# Patient Record
Sex: FEMALE | Race: BLACK OR AFRICAN AMERICAN | NOT HISPANIC OR LATINO | Employment: UNEMPLOYED | URBAN - METROPOLITAN AREA
[De-identification: names, ages, dates, MRNs, and addresses within clinical notes are randomized per-mention and may not be internally consistent; named-entity substitution may affect disease eponyms.]

---

## 2019-01-29 ENCOUNTER — TELEPHONE (OUTPATIENT)
Dept: ENDOCRINOLOGY | Facility: CLINIC | Age: 24
End: 2019-01-29

## 2022-04-16 ENCOUNTER — NURSE TRIAGE (OUTPATIENT)
Dept: ADMINISTRATIVE | Facility: CLINIC | Age: 27
End: 2022-04-16
Payer: MEDICAID

## 2022-04-17 NOTE — TELEPHONE ENCOUNTER
Spoke with Marleen Keita (nurse) at [facility]. She states Dr. Zamarripa was consulted on this patient a few days ago.  She states patient has not been seen and she is calling to check the status of when Dr. Zamarripa will see patient.  Dr. Zamarripa is not a [facility] provider.  Marleen states she is not sure how she got to the nurse on call line.  She states she will try to make make contact with Dr. Zamarripa and call back if needed. No further assistance needed     Reason for Disposition   Caller has cancelled the call before the first contact    Protocols used: NO CONTACT OR DUPLICATE CONTACT CALL-A-

## 2022-04-17 NOTE — TELEPHONE ENCOUNTER
Attempted to call Marleen Keita back she was gone for the day.  Spoke the oncoming nurse Funmi Galdamez to inform her that Dr. Edwardo Zamarripa was found in the system.  Offered to connect her to on call provider Ms. Choudhary declined.  She states patient was just seen by Dr. Ruff who works with Dr. Zamarripa and no further assistance is needed.

## 2022-04-28 ENCOUNTER — TELEPHONE (OUTPATIENT)
Dept: OBSTETRICS AND GYNECOLOGY | Facility: CLINIC | Age: 27
End: 2022-04-28
Payer: MEDICAID

## 2022-04-28 NOTE — TELEPHONE ENCOUNTER
----- Message from Rosa Conner sent at 4/28/2022 12:32 PM CDT -----  Contact: pt mother  Type: Needs Medical Advice    Who Called: pt mother   Best Call Back Number: [phone]  Inquiry/Question: pt was seen in hospital for low blood sugar and passing out was advise to be seen by Dr medel is 8 week pregnant, I am unable to schedule on my end please advise pt jerome apt  Thank you~

## 2022-04-29 ENCOUNTER — TELEPHONE (OUTPATIENT)
Dept: OBSTETRICS AND GYNECOLOGY | Facility: CLINIC | Age: 27
End: 2022-04-29
Payer: MEDICAID

## 2022-05-02 ENCOUNTER — PROCEDURE VISIT (OUTPATIENT)
Dept: OBSTETRICS AND GYNECOLOGY | Facility: CLINIC | Age: 27
End: 2022-05-02
Payer: MEDICAID

## 2022-05-02 ENCOUNTER — OFFICE VISIT (OUTPATIENT)
Dept: OBSTETRICS AND GYNECOLOGY | Facility: CLINIC | Age: 27
End: 2022-05-02
Payer: MEDICAID

## 2022-05-02 VITALS
DIASTOLIC BLOOD PRESSURE: 86 MMHG | SYSTOLIC BLOOD PRESSURE: 143 MMHG | WEIGHT: 130 LBS | HEIGHT: 61 IN | BODY MASS INDEX: 24.55 KG/M2

## 2022-05-02 DIAGNOSIS — O16.1 HYPERTENSION AFFECTING PREGNANCY IN FIRST TRIMESTER: ICD-10-CM

## 2022-05-02 DIAGNOSIS — N91.2 AMENORRHEA: Primary | ICD-10-CM

## 2022-05-02 DIAGNOSIS — N91.2 AMENORRHEA: ICD-10-CM

## 2022-05-02 DIAGNOSIS — O24.011 TYPE 1 DIABETES MELLITUS AFFECTING PREGNANCY IN FIRST TRIMESTER, ANTEPARTUM: ICD-10-CM

## 2022-05-02 LAB
B-HCG UR QL: POSITIVE
CTP QC/QA: YES

## 2022-05-02 PROCEDURE — 81025 URINE PREGNANCY TEST: CPT | Mod: S$GLB,,, | Performed by: NURSE PRACTITIONER

## 2022-05-02 PROCEDURE — 3077F PR MOST RECENT SYSTOLIC BLOOD PRESSURE >= 140 MM HG: ICD-10-PCS | Mod: CPTII,S$GLB,, | Performed by: NURSE PRACTITIONER

## 2022-05-02 PROCEDURE — 1160F RVW MEDS BY RX/DR IN RCRD: CPT | Mod: CPTII,S$GLB,, | Performed by: NURSE PRACTITIONER

## 2022-05-02 PROCEDURE — 3008F BODY MASS INDEX DOCD: CPT | Mod: CPTII,S$GLB,, | Performed by: NURSE PRACTITIONER

## 2022-05-02 PROCEDURE — 76801 OB US < 14 WKS SINGLE FETUS: CPT | Mod: S$GLB,,, | Performed by: OBSTETRICS & GYNECOLOGY

## 2022-05-02 PROCEDURE — 76801 US OB/GYN PROCEDURE (VIEWPOINT): ICD-10-PCS | Mod: S$GLB,,, | Performed by: OBSTETRICS & GYNECOLOGY

## 2022-05-02 PROCEDURE — 3077F SYST BP >= 140 MM HG: CPT | Mod: CPTII,S$GLB,, | Performed by: NURSE PRACTITIONER

## 2022-05-02 PROCEDURE — 1160F PR REVIEW ALL MEDS BY PRESCRIBER/CLIN PHARMACIST DOCUMENTED: ICD-10-PCS | Mod: CPTII,S$GLB,, | Performed by: NURSE PRACTITIONER

## 2022-05-02 PROCEDURE — 99214 OFFICE O/P EST MOD 30 MIN: CPT | Mod: TH,25,S$GLB, | Performed by: NURSE PRACTITIONER

## 2022-05-02 PROCEDURE — 81025 POCT URINE PREGNANCY: ICD-10-PCS | Mod: S$GLB,,, | Performed by: NURSE PRACTITIONER

## 2022-05-02 PROCEDURE — 3008F PR BODY MASS INDEX (BMI) DOCUMENTED: ICD-10-PCS | Mod: CPTII,S$GLB,, | Performed by: NURSE PRACTITIONER

## 2022-05-02 PROCEDURE — 1159F PR MEDICATION LIST DOCUMENTED IN MEDICAL RECORD: ICD-10-PCS | Mod: CPTII,S$GLB,, | Performed by: NURSE PRACTITIONER

## 2022-05-02 PROCEDURE — 99214 PR OFFICE/OUTPT VISIT, EST, LEVL IV, 30-39 MIN: ICD-10-PCS | Mod: TH,25,S$GLB, | Performed by: NURSE PRACTITIONER

## 2022-05-02 PROCEDURE — 3079F DIAST BP 80-89 MM HG: CPT | Mod: CPTII,S$GLB,, | Performed by: NURSE PRACTITIONER

## 2022-05-02 PROCEDURE — 3079F PR MOST RECENT DIASTOLIC BLOOD PRESSURE 80-89 MM HG: ICD-10-PCS | Mod: CPTII,S$GLB,, | Performed by: NURSE PRACTITIONER

## 2022-05-02 PROCEDURE — 1159F MED LIST DOCD IN RCRD: CPT | Mod: CPTII,S$GLB,, | Performed by: NURSE PRACTITIONER

## 2022-05-02 RX ORDER — LORAZEPAM 0.5 MG/1
0.5 TABLET ORAL DAILY PRN
COMMUNITY
Start: 2022-04-24 | End: 2022-06-09

## 2022-05-02 RX ORDER — METHYLDOPA 250 MG/1
500 TABLET, FILM COATED ORAL
COMMUNITY
Start: 2022-04-27 | End: 2022-05-18

## 2022-05-02 RX ORDER — NIFEDIPINE 30 MG/1
30 TABLET, EXTENDED RELEASE ORAL DAILY
Qty: 30 TABLET | Refills: 11 | Status: SHIPPED | OUTPATIENT
Start: 2022-05-02 | End: 2022-05-18

## 2022-05-02 RX ORDER — INSULIN GLARGINE 100 [IU]/ML
INJECTION, SOLUTION SUBCUTANEOUS
COMMUNITY
Start: 2022-04-21 | End: 2022-06-09 | Stop reason: SDUPTHER

## 2022-05-02 RX ORDER — HYDRALAZINE HYDROCHLORIDE 50 MG/1
50 TABLET, FILM COATED ORAL
COMMUNITY
Start: 2022-04-24 | End: 2022-05-18

## 2022-05-02 RX ORDER — METOCLOPRAMIDE 5 MG/1
5 TABLET ORAL EVERY 6 HOURS PRN
COMMUNITY
Start: 2022-04-21 | End: 2022-05-18

## 2022-05-02 RX ORDER — LABETALOL 200 MG/1
200 TABLET, FILM COATED ORAL 2 TIMES DAILY
Qty: 60 TABLET | Refills: 11 | Status: SHIPPED | OUTPATIENT
Start: 2022-05-02 | End: 2022-05-02

## 2022-05-02 RX ORDER — PROMETHAZINE HYDROCHLORIDE 25 MG/1
25 TABLET ORAL EVERY 4 HOURS
Qty: 30 TABLET | Refills: 0 | Status: SHIPPED | OUTPATIENT
Start: 2022-05-02 | End: 2022-06-06 | Stop reason: SDUPTHER

## 2022-05-02 RX ORDER — ONDANSETRON 4 MG/1
4 TABLET, FILM COATED ORAL
COMMUNITY
Start: 2022-04-22 | End: 2022-05-02 | Stop reason: ALTCHOICE

## 2022-05-02 RX ORDER — INSULIN ASPART 100 [IU]/ML
INJECTION, SOLUTION INTRAVENOUS; SUBCUTANEOUS
COMMUNITY
Start: 2022-04-28 | End: 2022-06-09

## 2022-05-02 NOTE — PROGRESS NOTES
Subjective:      Alicia Valles is being seen today for her first obstetrical visit. She is a   She is at 9w2d gestation. Patient's last menstrual period was 2022 (exact date). She is sure  of her LMP. Past medical hx is Positive for chronic uncontrolled hypertension.  Type 1 diabetes uncontrolled with insulin. Her previous obstetrical history is significant for None as she is a G1. .  She was recently released from a 2 week admission in the hospital where she was being evaluated for uncontrolled high blood pressure and uncontrolled diabetes.  She reports a seizure and hypoglycemia at 27. Blood pressures were ranging in the 200s over 100s.  She is currently taking in aldomet 250 mg twice a day.  She was supposed to be taking 500 mg twice a day, however her mom felt it was too much.  She is also supposed to be taking hydralazine 50 mg b.i.d. educated patient and mom regarding the importance of compliance with medication as prescribed.  Aldomet and is risk for rebound hypertension.  The plan is to wean patient off the Aldomet start Procardia at 30 mg XL with intentions of increasing.  She is to continue taking hydralazine b.i.d..  Patient's blood sugars are uncontrolled.  Mom states she is on Lantus 15 units it is not clear exactly what regimen they are following.  Attempted to educate mom and patient regarding maintenance, sliding scale, diet and compliance.  She reports blood sugar readings anywhere from .  Pregnancy history fully reviewed.      The following portions of the patient's history were reviewed and updated as appropriate: allergies, current medications, past family history, past medical history, past social history, past surgical history and problem list.      Review of Systems:  General ROS: negative for headache or visual changes  Breast ROS: negative for breast lumps  Gastrointestinal ROS: negative for constipation, diarrhea or nausea/vomiting  Musculoskeletal ROS: negative for pain  in joints or swelling in face or hands.   Neurological ROS: negative for - headaches, numbness/tingling or visual changes     Objective:*      Physical Exam      Constitutional: She is oriented to person, place, and time. She appears well-developed and well-nourished. No distress.     Pulmonary/Chest: Effort normal. No respiratory distress  Abdominal: Soft, gravid, nontender. No rebound and no guarding.   Genitourinary: Deferred   Musculoskeletal: Normal range of motion, Minimal peripheral edema.   Neurological: She is alert and oriented to person, place, and time. Coordination normal.   Skin: Skin is warm and dry. She is not diaphoretic.  Psychiatric: She has a normal mood and affect.     Assessment:      Overall doing well.   27 y.o., at 9w2d Gestation   There is no problem list on file for this patient.    Current Outpatient Medications on File Prior to Visit   Medication Sig Dispense Refill    hydrALAZINE (APRESOLINE) 50 MG tablet 50 mg.      insulin aspart U-100 (NOVOLOG) 100 unit/mL (3 mL) InPn pen SMARTSI Unit(s) SUB-Q Daily      insulin glargine (LANTUS) 100 unit/mL injection   15 unit, SUBQ, Daily, # 15 mL, 1 Refill(s), Maintenance, Pharmacy: [id number], 158, cm, 22 14:00:00 CDT, Height/Length Measured, 68.4, kg, 22 15:04:00 CDT, Weight Dosing      LORazepam (ATIVAN) 0.5 MG tablet Take 0.5 mg by mouth daily as needed.      methyldopa (ALDOMET) 250 MG tablet 500 mg.      metoclopramide HCl (REGLAN) 5 MG tablet Take 5 mg by mouth every 6 (six) hours as needed.      pen needle, diabetic (INSULIN PEN NEEDLE MISC) Use as directed.      [DISCONTINUED] ondansetron (ZOFRAN) 4 MG tablet 4 mg.       No current facility-administered medications on file prior to visit.           Plan:         Wean down from abdomen 250 mg daily, simultaneously start Procardia 30 mg XL daily, continue hydralazine 50 mg b.i.d..  Return next week with Dr. Min for adjustment of insulin  medication.  Patient is to return with blood glucose log sheet.    Referral to Maternal Fetal Medicine placed today.  Ultrasound today for dating and viability.  After return in 1 week will order prenatal labs with 24 hour urine at 10-11 weeks  Start and continue to take Prenatal vitamins.  Ultrasound, Prenatal labs, and cultures to be obtained    Amenorrhea  -     POCT urine pregnancy  -     US OB/GYN Procedure ([facility]); Future    Hypertension affecting pregnancy in first trimester  -     Ambulatory referral/consult to Perinatology; Future; Expected date: 05/09/2022    Type 1 diabetes mellitus affecting pregnancy in first trimester, antepartum  -     Ambulatory referral/consult to Perinatology; Future; Expected date: 05/09/2022    Other orders  -     Discontinue: labetaloL (NORMODYNE) 200 MG tablet; Take 1 tablet (200 mg total) by mouth 2 (two) times daily.  Dispense: 60 tablet; Refill: 11  -     NIFEdipine (PROCARDIA-XL) 30 MG (OSM) 24 hr tablet; Take 1 tablet (30 mg total) by mouth once daily.  Dispense: 30 tablet; Refill: 11  -     promethazine (PHENERGAN) 25 MG tablet; Take 1 tablet (25 mg total) by mouth every 4 (four) hours.  Dispense: 30 tablet; Refill: 0         Follow up 1Weeks, bleeding/pain precautions, kick counts, labor precautions discussed.     Patient was counseled today on proper weight gain based on the [place] [employer]'s recommendations based on her pre-pregnancy weight. Discussed foods to avoid in pregnancy (i.e. sushi, fish that are high in mercury, deli meat, and unpasteurized cheeses). Discussed prenatal vitamin options (i.e. stool softener, DHA). Contingency screen offered - patient desires.

## 2022-05-03 ENCOUNTER — PATIENT MESSAGE (OUTPATIENT)
Dept: MATERNAL FETAL MEDICINE | Facility: CLINIC | Age: 27
End: 2022-05-03
Payer: MEDICAID

## 2022-05-03 ENCOUNTER — TELEPHONE (OUTPATIENT)
Dept: MATERNAL FETAL MEDICINE | Facility: CLINIC | Age: 27
End: 2022-05-03
Payer: MEDICAID

## 2022-05-03 DIAGNOSIS — Z36.89 ENCOUNTER FOR FETAL ANATOMIC SURVEY: ICD-10-CM

## 2022-05-03 DIAGNOSIS — O16.9 HYPERTENSION AFFECTING PREGNANCY, ANTEPARTUM: ICD-10-CM

## 2022-05-03 DIAGNOSIS — O24.019 PRE-EXISTING TYPE 1 DIABETES MELLITUS DURING PREGNANCY, ANTEPARTUM: Primary | ICD-10-CM

## 2022-05-03 NOTE — TELEPHONE ENCOUNTER
RN attempted to call Pt at number listed [phone]. Patient's mother answered the phone and verified pt by name, .  Mother states patient is currently not with her, but able to schedule appt for her daughter with MFM- as she takes Quenisha to all of her appts.    RN educated parent that unfortunately patient is of legal age and does not have a POA,  or advance directive indicated in patient's chart that mother is point of contact/caregiver.   Mother has given RN patients phone number [phone].   RN will give patient a call.

## 2022-05-04 ENCOUNTER — PROCEDURE VISIT (OUTPATIENT)
Dept: MATERNAL FETAL MEDICINE | Facility: CLINIC | Age: 27
End: 2022-05-04
Payer: MEDICAID

## 2022-05-04 ENCOUNTER — OFFICE VISIT (OUTPATIENT)
Dept: MATERNAL FETAL MEDICINE | Facility: CLINIC | Age: 27
End: 2022-05-04
Payer: MEDICAID

## 2022-05-04 VITALS
SYSTOLIC BLOOD PRESSURE: 161 MMHG | HEIGHT: 61 IN | WEIGHT: 129.81 LBS | BODY MASS INDEX: 24.51 KG/M2 | DIASTOLIC BLOOD PRESSURE: 91 MMHG

## 2022-05-04 DIAGNOSIS — O24.011 PRE-EXISTING TYPE 1 DIABETES MELLITUS DURING PREGNANCY IN FIRST TRIMESTER: ICD-10-CM

## 2022-05-04 DIAGNOSIS — O16.1 HYPERTENSION AFFECTING PREGNANCY IN FIRST TRIMESTER: ICD-10-CM

## 2022-05-04 DIAGNOSIS — O24.011 TYPE 1 DIABETES MELLITUS AFFECTING PREGNANCY IN FIRST TRIMESTER, ANTEPARTUM: ICD-10-CM

## 2022-05-04 DIAGNOSIS — O24.019 PRE-EXISTING TYPE 1 DIABETES MELLITUS DURING PREGNANCY, ANTEPARTUM: Primary | ICD-10-CM

## 2022-05-04 DIAGNOSIS — O24.019 PRE-EXISTING TYPE 1 DIABETES MELLITUS DURING PREGNANCY, ANTEPARTUM: ICD-10-CM

## 2022-05-04 DIAGNOSIS — O16.9 HYPERTENSION AFFECTING PREGNANCY, ANTEPARTUM: ICD-10-CM

## 2022-05-04 DIAGNOSIS — Z36.89 ENCOUNTER FOR FETAL ANATOMIC SURVEY: ICD-10-CM

## 2022-05-04 PROCEDURE — 3077F PR MOST RECENT SYSTOLIC BLOOD PRESSURE >= 140 MM HG: ICD-10-PCS | Mod: CPTII,S$GLB,, | Performed by: OBSTETRICS & GYNECOLOGY

## 2022-05-04 PROCEDURE — 3008F PR BODY MASS INDEX (BMI) DOCUMENTED: ICD-10-PCS | Mod: CPTII,S$GLB,, | Performed by: OBSTETRICS & GYNECOLOGY

## 2022-05-04 PROCEDURE — 76801 OB US < 14 WKS SINGLE FETUS: CPT | Mod: S$GLB,,, | Performed by: OBSTETRICS & GYNECOLOGY

## 2022-05-04 PROCEDURE — 76801 PR US, OB <14WKS, TRANSABD, SINGLE GESTATION: ICD-10-PCS | Mod: S$GLB,,, | Performed by: OBSTETRICS & GYNECOLOGY

## 2022-05-04 PROCEDURE — 3080F PR MOST RECENT DIASTOLIC BLOOD PRESSURE >= 90 MM HG: ICD-10-PCS | Mod: CPTII,S$GLB,, | Performed by: OBSTETRICS & GYNECOLOGY

## 2022-05-04 PROCEDURE — 1160F RVW MEDS BY RX/DR IN RCRD: CPT | Mod: CPTII,S$GLB,, | Performed by: OBSTETRICS & GYNECOLOGY

## 2022-05-04 PROCEDURE — 1159F PR MEDICATION LIST DOCUMENTED IN MEDICAL RECORD: ICD-10-PCS | Mod: CPTII,S$GLB,, | Performed by: OBSTETRICS & GYNECOLOGY

## 2022-05-04 PROCEDURE — 3077F SYST BP >= 140 MM HG: CPT | Mod: CPTII,S$GLB,, | Performed by: OBSTETRICS & GYNECOLOGY

## 2022-05-04 PROCEDURE — 99205 OFFICE O/P NEW HI 60 MIN: CPT | Mod: TH,25,S$GLB, | Performed by: OBSTETRICS & GYNECOLOGY

## 2022-05-04 PROCEDURE — 99205 PR OFFICE/OUTPT VISIT, NEW, LEVL V, 60-74 MIN: ICD-10-PCS | Mod: TH,25,S$GLB, | Performed by: OBSTETRICS & GYNECOLOGY

## 2022-05-04 PROCEDURE — 1159F MED LIST DOCD IN RCRD: CPT | Mod: CPTII,S$GLB,, | Performed by: OBSTETRICS & GYNECOLOGY

## 2022-05-04 PROCEDURE — 1160F PR REVIEW ALL MEDS BY PRESCRIBER/CLIN PHARMACIST DOCUMENTED: ICD-10-PCS | Mod: CPTII,S$GLB,, | Performed by: OBSTETRICS & GYNECOLOGY

## 2022-05-04 PROCEDURE — 3008F BODY MASS INDEX DOCD: CPT | Mod: CPTII,S$GLB,, | Performed by: OBSTETRICS & GYNECOLOGY

## 2022-05-04 PROCEDURE — 3080F DIAST BP >= 90 MM HG: CPT | Mod: CPTII,S$GLB,, | Performed by: OBSTETRICS & GYNECOLOGY

## 2022-05-04 RX ORDER — INSULIN ASPART 100 [IU]/ML
INJECTION, SOLUTION INTRAVENOUS; SUBCUTANEOUS
COMMUNITY
Start: 2021-08-24 | End: 2022-06-09

## 2022-05-04 RX ORDER — INSULIN GLARGINE 100 [IU]/ML
INJECTION, SOLUTION SUBCUTANEOUS
COMMUNITY
Start: 2022-04-30 | End: 2022-06-09 | Stop reason: SDUPTHER

## 2022-05-04 NOTE — PROGRESS NOTES
Chief complaint: Uncontrolled DM in 1st trimester, [place] recently admitted for hypertensive encephalopathy    Provider requesting consultation: LUIS Ambrose NP    27 y.o. at 9w4d EGA    PMH:  Past Medical History:   Diagnosis Date    Anxiety     Diabetes mellitus     type 1    Hypertension     Hypertensive encephalopathy 2022       PObHx:  OB History    Para Term  AB Living   1             SAB IAB Ectopic Multiple Live Births                  # Outcome Date GA Lbr Alireza/2nd Weight Sex Delivery Anes PTL Lv   1 Current                PSH:  Past Surgical History:   Procedure Laterality Date    RETINAL DETACHMENT SURGERY         Family history:family history includes Cancer in her paternal grandfather; Diabetes in her maternal grandfather; Hypertension in her mother.    Social history: reports that she has never smoked. She has never used smokeless tobacco. She reports previous alcohol use. She reports that she does not use drugs.    A detailed fetal anatomical ultrasound was completed today.  See details in imaging section of EPIC.    The patient is referred to Maternal-Fetal Medicine for both a history of chronic hypertension recently discharged from the hospital after an admission for hypertensive encephalopathy and pre-existing diabetes that is completely out of control.  Her most recent hemoglobin A1c was 12.4.  Reviewing her glucose log which is not timed with respect to postprandial sugars demonstrates blood sugars as high as 487 and as low as 68. There is no rhyme or reason to her highs and lows.  She is known to have diabetic retinopathy.  She was strongly counseled on the need for diabetic control in pregnancy.  We reviewed the rationale based on the impact of hyperglycemia on the fetus.  Diabetes in pregnancy:  Today the patient was counseled on the risks of diabetes in pregnancy.  I reviewed the physiologic effects of pregnancy on diabetes and I stressed with the patient the need for  meticulous glucose control.  I discussed with the patient the increased risks of fetal structural anomalies, macrosomia, prematurity, shoulder dystocia,  hyperbilirubinemia, and sudden stillbirth in those patients with poor glucose control.   I also discussed with the patient the need for increased fetal surveillance with monthly fetal growth assessments and third trimester fetal testing.  I also reviewed the possibility of need for early delivery in those with poor glucose control or evidence of fetal growth abnormalities.  Recommendations from our MFM group at [facility]:  -Diet and/or medication should be used to keep fasting glucose levels <90 mg/dL and 2 hour postprandial levels <120 mg/dL.  Metformin or glyburide are our recommended first line therapy for those who are unable to control glucose with diet alone.  -In those patients with diabetes existing prior to pregnancy we recommend a fetal echocardiogram around 22-24 weeks gestation to rule out congenital heart disease.  -Periodic fetal growth assessments should be performed every 4-6 weeks to assess for fetal growth abnormalities.    -Twice weekly non-stress tests should be instituted beginning at 32 weeks EGA for those patients requiring medication for control of their diabetes and should be continued until delivery.  -Secondary to the high incidence of preeclampsia in patients with pregestational diabetes we recommend a baseline assessment of renal function.  This can be accomplished by a serum creatinine and a 24 hour urine collection or a urine protein to creatinine (P/C) ratio.    -In regards to timing of delivery our group refers to the ACOG article by Rohith et al from 2011 that addresses Timing of Indicated Late- and Early-Term Birth:  -Well controlled pregestational and gestational diabetes: we do not recommend late  or early term delivery  -Pregestational diabetes with vascular disease: 37-39 weeks  -Pregestational poorly  controlled: 34-39 weeks (individualized to situation)  -Gestational poorly controlled on medication: 34-39 weeks (individualized to situation)    Chronic Hypertension  In addition, I briefly counseled the patient on maternal/fetal risks associated with CHTN during pregnancy. Risks include but not limited to fetal growth restriction, miscarriage, abruption, maternal end organ disease (renal failure, MI, and stroke),  delivery, development of superimposed preeclampsia, and eclampsia. She was counseled on the recommendations for blood pressure control, serial ultrasound for fetal growth assessment and  testing, and timing of delivery. I also counseled her on the recommendation for aspirin 81 mg daily which may decrease her risk of developing superimposed preeclampsia.     Recommendations (Please refer to [facility] guidelines):  -Initiate aspirin 81 mg daily at 12-16 weeks gestation for preeclampsia risk reduction  -Continue current medications:  Procardia XL 30 mg  -Baseline evaluation with primary OB:    24-hour urine protein or baseline P/C ratio, CMP, and CBC.   Maternal EKG   Maternal ophthalmic evaluation   Maternal echocardiogram if HTN has been long-standing or EKG is abnormal  -Serial fetal growth ultrasounds every 4-6 weeks, beginning at 26-28 weeks.   -Continued close observation of patient's blood pressures. Avoid hypotension as this has been associated with uteroplacental insufficiency.  -Recommend treatment to a goal blood pressure < 140/90  -For women with evidence of end-organ damage (prior CVA, renal disease, etc) or co-morbid conditions (ie diabetes), a lower threshold may be recommended  -Weekly antepartum testing at 32 weeks (NST+AFV); twice weekly testing if control is poor, multiple comorbidities are present, or requires several medications for control   -Delivery timing:  No medications, no comorbid conditions: 39 0/7 - 39 6/7 weeks gestation  No medications, comorbid  conditions: 38 0/7 - 38 6/7 weeks gestation  Controlled on single agent, no comorbid conditions: 38 0/7 - 38 6/7 weeks gestation  Controlled on single agent, comorbid conditions: 37 0/7 - 38 6/7 weeks gestation  Uncontrolled or requiring ? 2 medications: 36 0/7 - 37 6/7 weeks gestation    Comorbid conditions include BMI >= 40, diabetes, and complex medical condition associated with placental dysfunction (ie lupus or other vascular disease)  Delivery may be recommended earlier pending results of fetal growth ultrasounds, AFV assessment, or antepartum testing results.    Patient is being admitted for diabetic control at [facility] tomorr after her ophthalmology appointment.  It was determined that out patient management would not be productive in her case.  After she has controlled we will continue to follow her closely for both diabetic and hypertensive evaluation.  Note is made of the fact that during most of the consult the patient was quiet and minimally communicative.  Most of the discussion was through her mother who was present for the consultation.    The patient was given an opportunity to ask questions about management and the diease process.  She expressed an understanding of and agreement to the above impression and plan. All questions were answered to her satisfaction.  She was given contact information to the [facility] clinic to address further concerns.      I spent a total of 45 minutes on the day of the visit. This includes face to face time and non-face to face time preparing to see the patient (eg, review of tests), Obtaining and/or reviewing separately obtained history, Documenting clinical information in the electronic or other health record, Independently interpreting results and communicating results to the patient/family/caregiver, or Care coordination.

## 2022-05-04 NOTE — PROGRESS NOTES
Patient admits she's experiencing headaches, blurred vision at the moment. Elevated blood pressure upon nursing assessment. Will recheck bp and notify provider.   Pt confirms she took her blood pressure medication this morning.

## 2022-05-05 ENCOUNTER — HOSPITAL ENCOUNTER (INPATIENT)
Facility: OTHER | Age: 27
LOS: 11 days | Discharge: HOME | End: 2022-05-16
Attending: OBSTETRICS & GYNECOLOGY | Admitting: OBSTETRICS & GYNECOLOGY
Payer: MEDICAID

## 2022-05-05 DIAGNOSIS — E11.9 DIABETES: ICD-10-CM

## 2022-05-05 DIAGNOSIS — D64.9 ANEMIA, UNSPECIFIED TYPE: ICD-10-CM

## 2022-05-05 DIAGNOSIS — Z34.91 FIRST TRIMESTER PREGNANCY: ICD-10-CM

## 2022-05-05 DIAGNOSIS — E10.69 TYPE 1 DIABETES MELLITUS WITH OTHER SPECIFIED COMPLICATION: Primary | ICD-10-CM

## 2022-05-05 DIAGNOSIS — Z97.8 USES SELF-APPLIED CONTINUOUS GLUCOSE MONITORING DEVICE: ICD-10-CM

## 2022-05-05 DIAGNOSIS — E10.9 TYPE 1 DIABETES MELLITUS: ICD-10-CM

## 2022-05-05 DIAGNOSIS — R07.9 CHEST PAIN: ICD-10-CM

## 2022-05-05 PROBLEM — I10 CHRONIC HYPERTENSION: Status: ACTIVE | Noted: 2022-05-05

## 2022-05-05 LAB
ABO + RH BLD: NORMAL
ALBUMIN SERPL BCP-MCNC: 2.6 G/DL (ref 3.5–5.2)
ALP SERPL-CCNC: 60 U/L (ref 55–135)
ALT SERPL W/O P-5'-P-CCNC: 19 U/L (ref 10–44)
ANION GAP SERPL CALC-SCNC: 7 MMOL/L (ref 8–16)
AST SERPL-CCNC: 18 U/L (ref 10–40)
BACTERIA #/AREA URNS HPF: NORMAL /HPF
BASOPHILS # BLD AUTO: 0.02 K/UL (ref 0–0.2)
BASOPHILS NFR BLD: 0.2 % (ref 0–1.9)
BILIRUB SERPL-MCNC: 0.2 MG/DL (ref 0.1–1)
BILIRUB UR QL STRIP: NEGATIVE
BLD GP AB SCN CELLS X3 SERPL QL: NORMAL
BUN SERPL-MCNC: 26 MG/DL (ref 6–20)
CALCIUM SERPL-MCNC: 8.7 MG/DL (ref 8.7–10.5)
CHLORIDE SERPL-SCNC: 102 MMOL/L (ref 95–110)
CLARITY UR: CLEAR
CO2 SERPL-SCNC: 25 MMOL/L (ref 23–29)
COLOR UR: YELLOW
CREAT SERPL-MCNC: 1.2 MG/DL (ref 0.5–1.4)
CREAT UR-MCNC: 53.1 MG/DL (ref 15–325)
DIFFERENTIAL METHOD: ABNORMAL
EOSINOPHIL # BLD AUTO: 0.1 K/UL (ref 0–0.5)
EOSINOPHIL NFR BLD: 1.1 % (ref 0–8)
ERYTHROCYTE [DISTWIDTH] IN BLOOD BY AUTOMATED COUNT: 13.8 % (ref 11.5–14.5)
EST. GFR  (AFRICAN AMERICAN): >60 ML/MIN/1.73 M^2
EST. GFR  (NON AFRICAN AMERICAN): >60 ML/MIN/1.73 M^2
ESTIMATED AVG GLUCOSE: 235 MG/DL (ref 68–131)
GLUCOSE SERPL-MCNC: 206 MG/DL (ref 70–110)
GLUCOSE SERPL-MCNC: 237 MG/DL (ref 70–110)
GLUCOSE UR QL STRIP: ABNORMAL
HBA1C MFR BLD: 9.8 % (ref 4–5.6)
HCT VFR BLD AUTO: 24.9 % (ref 37–48.5)
HCV AB SERPL QL IA: NEGATIVE
HGB BLD-MCNC: 8.3 G/DL (ref 12–16)
HGB UR QL STRIP: ABNORMAL
HIV1+2 IGG SERPL QL IA.RAPID: NORMAL
HYALINE CASTS #/AREA URNS LPF: 0 /LPF
IMM GRANULOCYTES # BLD AUTO: 0.03 K/UL (ref 0–0.04)
IMM GRANULOCYTES NFR BLD AUTO: 0.3 % (ref 0–0.5)
KETONES UR QL STRIP: NEGATIVE
LEUKOCYTE ESTERASE UR QL STRIP: NEGATIVE
LYMPHOCYTES # BLD AUTO: 2 K/UL (ref 1–4.8)
LYMPHOCYTES NFR BLD: 21.4 % (ref 18–48)
MCH RBC QN AUTO: 29.9 PG (ref 27–31)
MCHC RBC AUTO-ENTMCNC: 33.3 G/DL (ref 32–36)
MCV RBC AUTO: 90 FL (ref 82–98)
MICROSCOPIC COMMENT: NORMAL
MONOCYTES # BLD AUTO: 0.6 K/UL (ref 0.3–1)
MONOCYTES NFR BLD: 6.7 % (ref 4–15)
NEUTROPHILS # BLD AUTO: 6.5 K/UL (ref 1.8–7.7)
NEUTROPHILS NFR BLD: 70.3 % (ref 38–73)
NITRITE UR QL STRIP: NEGATIVE
NRBC BLD-RTO: 0 /100 WBC
PH UR STRIP: 6 [PH] (ref 5–8)
PLATELET # BLD AUTO: 252 K/UL (ref 150–450)
PMV BLD AUTO: 10.3 FL (ref 9.2–12.9)
POCT GLUCOSE: 243 MG/DL (ref 70–110)
POCT GLUCOSE: 263 MG/DL (ref 70–110)
POCT GLUCOSE: 354 MG/DL (ref 70–110)
POTASSIUM SERPL-SCNC: 4.3 MMOL/L (ref 3.5–5.1)
PROT SERPL-MCNC: 6.2 G/DL (ref 6–8.4)
PROT UR QL STRIP: ABNORMAL
PROT UR-MCNC: 199 MG/DL (ref 0–15)
PROT/CREAT UR: 3.75 MG/G{CREAT} (ref 0–0.2)
RBC # BLD AUTO: 2.78 M/UL (ref 4–5.4)
RBC #/AREA URNS HPF: 2 /HPF (ref 0–4)
SODIUM SERPL-SCNC: 134 MMOL/L (ref 136–145)
SP GR UR STRIP: 1.02 (ref 1–1.03)
TSH SERPL DL<=0.005 MIU/L-ACNC: 1.57 UIU/ML (ref 0.4–4)
URN SPEC COLLECT METH UR: ABNORMAL
UROBILINOGEN UR STRIP-ACNC: NEGATIVE EU/DL
WBC # BLD AUTO: 9.21 K/UL (ref 3.9–12.7)
WBC #/AREA URNS HPF: 4 /HPF (ref 0–5)

## 2022-05-05 PROCEDURE — 93010 ELECTROCARDIOGRAM REPORT: CPT | Mod: ,,, | Performed by: INTERNAL MEDICINE

## 2022-05-05 PROCEDURE — 93041 RHYTHM ECG TRACING: CPT

## 2022-05-05 PROCEDURE — 96372 THER/PROPH/DIAG INJ SC/IM: CPT | Performed by: STUDENT IN AN ORGANIZED HEALTH CARE EDUCATION/TRAINING PROGRAM

## 2022-05-05 PROCEDURE — 86762 RUBELLA ANTIBODY: CPT | Performed by: STUDENT IN AN ORGANIZED HEALTH CARE EDUCATION/TRAINING PROGRAM

## 2022-05-05 PROCEDURE — 93010 EKG 12-LEAD: ICD-10-PCS | Mod: ,,, | Performed by: INTERNAL MEDICINE

## 2022-05-05 PROCEDURE — 85025 COMPLETE CBC W/AUTO DIFF WBC: CPT | Performed by: STUDENT IN AN ORGANIZED HEALTH CARE EDUCATION/TRAINING PROGRAM

## 2022-05-05 PROCEDURE — 93005 ELECTROCARDIOGRAM TRACING: CPT

## 2022-05-05 PROCEDURE — 25000003 PHARM REV CODE 250

## 2022-05-05 PROCEDURE — 86787 VARICELLA-ZOSTER ANTIBODY: CPT | Performed by: STUDENT IN AN ORGANIZED HEALTH CARE EDUCATION/TRAINING PROGRAM

## 2022-05-05 PROCEDURE — 25000242 PHARM REV CODE 250 ALT 637 W/ HCPCS: Performed by: STUDENT IN AN ORGANIZED HEALTH CARE EDUCATION/TRAINING PROGRAM

## 2022-05-05 PROCEDURE — 25000003 PHARM REV CODE 250: Performed by: STUDENT IN AN ORGANIZED HEALTH CARE EDUCATION/TRAINING PROGRAM

## 2022-05-05 PROCEDURE — 86803 HEPATITIS C AB TEST: CPT | Performed by: STUDENT IN AN ORGANIZED HEALTH CARE EDUCATION/TRAINING PROGRAM

## 2022-05-05 PROCEDURE — 11000001 HC ACUTE MED/SURG PRIVATE ROOM

## 2022-05-05 PROCEDURE — 86592 SYPHILIS TEST NON-TREP QUAL: CPT | Performed by: STUDENT IN AN ORGANIZED HEALTH CARE EDUCATION/TRAINING PROGRAM

## 2022-05-05 PROCEDURE — 82570 ASSAY OF URINE CREATININE: CPT | Performed by: STUDENT IN AN ORGANIZED HEALTH CARE EDUCATION/TRAINING PROGRAM

## 2022-05-05 PROCEDURE — 80053 COMPREHEN METABOLIC PANEL: CPT | Performed by: STUDENT IN AN ORGANIZED HEALTH CARE EDUCATION/TRAINING PROGRAM

## 2022-05-05 PROCEDURE — 87340 HEPATITIS B SURFACE AG IA: CPT | Performed by: STUDENT IN AN ORGANIZED HEALTH CARE EDUCATION/TRAINING PROGRAM

## 2022-05-05 PROCEDURE — 63600175 PHARM REV CODE 636 W HCPCS: Performed by: STUDENT IN AN ORGANIZED HEALTH CARE EDUCATION/TRAINING PROGRAM

## 2022-05-05 PROCEDURE — 99223 1ST HOSP IP/OBS HIGH 75: CPT | Mod: ,,, | Performed by: OBSTETRICS & GYNECOLOGY

## 2022-05-05 PROCEDURE — 86703 HIV-1/HIV-2 1 RESULT ANTBDY: CPT | Performed by: STUDENT IN AN ORGANIZED HEALTH CARE EDUCATION/TRAINING PROGRAM

## 2022-05-05 PROCEDURE — 83036 HEMOGLOBIN GLYCOSYLATED A1C: CPT | Performed by: STUDENT IN AN ORGANIZED HEALTH CARE EDUCATION/TRAINING PROGRAM

## 2022-05-05 PROCEDURE — 86850 RBC ANTIBODY SCREEN: CPT | Performed by: STUDENT IN AN ORGANIZED HEALTH CARE EDUCATION/TRAINING PROGRAM

## 2022-05-05 PROCEDURE — 36415 COLL VENOUS BLD VENIPUNCTURE: CPT | Performed by: STUDENT IN AN ORGANIZED HEALTH CARE EDUCATION/TRAINING PROGRAM

## 2022-05-05 PROCEDURE — 99900035 HC TECH TIME PER 15 MIN (STAT)

## 2022-05-05 PROCEDURE — 99223 PR INITIAL HOSPITAL CARE,LEVL III: ICD-10-PCS | Mod: ,,, | Performed by: OBSTETRICS & GYNECOLOGY

## 2022-05-05 PROCEDURE — C9399 UNCLASSIFIED DRUGS OR BIOLOG: HCPCS | Performed by: STUDENT IN AN ORGANIZED HEALTH CARE EDUCATION/TRAINING PROGRAM

## 2022-05-05 PROCEDURE — 84443 ASSAY THYROID STIM HORMONE: CPT | Performed by: STUDENT IN AN ORGANIZED HEALTH CARE EDUCATION/TRAINING PROGRAM

## 2022-05-05 PROCEDURE — 81000 URINALYSIS NONAUTO W/SCOPE: CPT | Performed by: STUDENT IN AN ORGANIZED HEALTH CARE EDUCATION/TRAINING PROGRAM

## 2022-05-05 RX ORDER — NIFEDIPINE 30 MG/1
30 TABLET, EXTENDED RELEASE ORAL
Status: DISCONTINUED | OUTPATIENT
Start: 2022-05-05 | End: 2022-05-06

## 2022-05-05 RX ORDER — INSULIN ASPART 100 [IU]/ML
6 INJECTION, SOLUTION INTRAVENOUS; SUBCUTANEOUS ONCE
Status: COMPLETED | OUTPATIENT
Start: 2022-05-05 | End: 2022-05-05

## 2022-05-05 RX ORDER — AMOXICILLIN 250 MG
1 CAPSULE ORAL NIGHTLY PRN
Status: DISCONTINUED | OUTPATIENT
Start: 2022-05-05 | End: 2022-05-12

## 2022-05-05 RX ORDER — DOCUSATE SODIUM 100 MG/1
100 CAPSULE, LIQUID FILLED ORAL 2 TIMES DAILY
Status: DISCONTINUED | OUTPATIENT
Start: 2022-05-05 | End: 2022-05-05

## 2022-05-05 RX ORDER — DIPHENHYDRAMINE HCL 25 MG
25 CAPSULE ORAL EVERY 4 HOURS PRN
Status: DISCONTINUED | OUTPATIENT
Start: 2022-05-05 | End: 2022-05-12

## 2022-05-05 RX ORDER — INSULIN ASPART 100 [IU]/ML
3 INJECTION, SOLUTION INTRAVENOUS; SUBCUTANEOUS
Status: ON HOLD | COMMUNITY
End: 2022-05-16 | Stop reason: SDUPTHER

## 2022-05-05 RX ORDER — NIFEDIPINE 30 MG/1
30 TABLET, EXTENDED RELEASE ORAL DAILY
Status: DISCONTINUED | OUTPATIENT
Start: 2022-05-05 | End: 2022-05-05

## 2022-05-05 RX ORDER — NIFEDIPINE 30 MG/1
30 TABLET, EXTENDED RELEASE ORAL DAILY
Status: ON HOLD | COMMUNITY
End: 2022-05-16 | Stop reason: HOSPADM

## 2022-05-05 RX ORDER — INSULIN ASPART 100 [IU]/ML
3 INJECTION, SOLUTION INTRAVENOUS; SUBCUTANEOUS
Status: DISCONTINUED | OUTPATIENT
Start: 2022-05-05 | End: 2022-05-06

## 2022-05-05 RX ORDER — SIMETHICONE 80 MG
1 TABLET,CHEWABLE ORAL EVERY 6 HOURS PRN
Status: DISCONTINUED | OUTPATIENT
Start: 2022-05-05 | End: 2022-05-16 | Stop reason: HOSPADM

## 2022-05-05 RX ORDER — INSULIN GLARGINE 100 [IU]/ML
INJECTION, SOLUTION SUBCUTANEOUS
Status: ON HOLD | COMMUNITY
End: 2022-05-16 | Stop reason: SDUPTHER

## 2022-05-05 RX ORDER — SODIUM CHLORIDE 0.9 % (FLUSH) 0.9 %
10 SYRINGE (ML) INJECTION
Status: DISCONTINUED | OUTPATIENT
Start: 2022-05-05 | End: 2022-05-16 | Stop reason: HOSPADM

## 2022-05-05 RX ORDER — PYRIDOXINE HCL (VITAMIN B6) 25 MG
25 TABLET ORAL ONCE
Status: COMPLETED | OUTPATIENT
Start: 2022-05-05 | End: 2022-05-05

## 2022-05-05 RX ORDER — HYDRALAZINE HYDROCHLORIDE 25 MG/1
50 TABLET, FILM COATED ORAL EVERY 12 HOURS
Status: DISCONTINUED | OUTPATIENT
Start: 2022-05-05 | End: 2022-05-10

## 2022-05-05 RX ORDER — ONDANSETRON 8 MG/1
8 TABLET, ORALLY DISINTEGRATING ORAL EVERY 8 HOURS PRN
Status: DISCONTINUED | OUTPATIENT
Start: 2022-05-05 | End: 2022-05-13

## 2022-05-05 RX ORDER — PROCHLORPERAZINE EDISYLATE 5 MG/ML
5 INJECTION INTRAMUSCULAR; INTRAVENOUS EVERY 6 HOURS PRN
Status: DISCONTINUED | OUTPATIENT
Start: 2022-05-05 | End: 2022-05-16 | Stop reason: HOSPADM

## 2022-05-05 RX ORDER — HYDRALAZINE HYDROCHLORIDE 50 MG/1
50 TABLET, FILM COATED ORAL 2 TIMES DAILY
Status: ON HOLD | COMMUNITY
End: 2022-05-16 | Stop reason: HOSPADM

## 2022-05-05 RX ORDER — PRENATAL WITH FERROUS FUM AND FOLIC ACID 3080; 920; 120; 400; 22; 1.84; 3; 20; 10; 1; 12; 200; 27; 25; 2 [IU]/1; [IU]/1; MG/1; [IU]/1; MG/1; MG/1; MG/1; MG/1; MG/1; MG/1; UG/1; MG/1; MG/1; MG/1; MG/1
1 TABLET ORAL DAILY
Status: DISCONTINUED | OUTPATIENT
Start: 2022-05-06 | End: 2022-05-16 | Stop reason: HOSPADM

## 2022-05-05 RX ORDER — INSULIN ASPART 100 [IU]/ML
2 INJECTION, SOLUTION INTRAVENOUS; SUBCUTANEOUS ONCE
Status: COMPLETED | OUTPATIENT
Start: 2022-05-05 | End: 2022-05-05

## 2022-05-05 RX ORDER — DOCUSATE SODIUM 100 MG/1
200 CAPSULE, LIQUID FILLED ORAL 2 TIMES DAILY
Status: DISCONTINUED | OUTPATIENT
Start: 2022-05-05 | End: 2022-05-12

## 2022-05-05 RX ORDER — ACETAMINOPHEN 500 MG
1000 TABLET ORAL ONCE
Status: COMPLETED | OUTPATIENT
Start: 2022-05-05 | End: 2022-05-05

## 2022-05-05 RX ORDER — IBUPROFEN 200 MG
16 TABLET ORAL
Status: DISCONTINUED | OUTPATIENT
Start: 2022-05-05 | End: 2022-05-12

## 2022-05-05 RX ORDER — INSULIN ASPART 100 [IU]/ML
1 INJECTION, SOLUTION INTRAVENOUS; SUBCUTANEOUS ONCE
Status: COMPLETED | OUTPATIENT
Start: 2022-05-05 | End: 2022-05-05

## 2022-05-05 RX ORDER — NAPROXEN SODIUM 220 MG/1
81 TABLET, FILM COATED ORAL DAILY
Status: DISCONTINUED | OUTPATIENT
Start: 2022-05-06 | End: 2022-05-16 | Stop reason: HOSPADM

## 2022-05-05 RX ORDER — ACETAMINOPHEN 325 MG/1
650 TABLET ORAL EVERY 6 HOURS PRN
Status: DISCONTINUED | OUTPATIENT
Start: 2022-05-05 | End: 2022-05-16 | Stop reason: HOSPADM

## 2022-05-05 RX ORDER — HYDRALAZINE HYDROCHLORIDE 25 MG/1
50 TABLET, FILM COATED ORAL EVERY 12 HOURS
Status: DISCONTINUED | OUTPATIENT
Start: 2022-05-05 | End: 2022-05-05

## 2022-05-05 RX ORDER — FAMOTIDINE 20 MG/1
20 TABLET, FILM COATED ORAL 2 TIMES DAILY
Status: DISCONTINUED | OUTPATIENT
Start: 2022-05-05 | End: 2022-05-09

## 2022-05-05 RX ORDER — IBUPROFEN 200 MG
24 TABLET ORAL
Status: DISCONTINUED | OUTPATIENT
Start: 2022-05-05 | End: 2022-05-12

## 2022-05-05 RX ORDER — METOCLOPRAMIDE 10 MG/1
10 TABLET ORAL 3 TIMES DAILY
Status: DISCONTINUED | OUTPATIENT
Start: 2022-05-05 | End: 2022-05-09

## 2022-05-05 RX ORDER — DOCUSATE SODIUM 100 MG/1
100 CAPSULE, LIQUID FILLED ORAL DAILY
COMMUNITY
End: 2023-02-08

## 2022-05-05 RX ORDER — GLUCAGON 1 MG
1 KIT INJECTION
Status: DISCONTINUED | OUTPATIENT
Start: 2022-05-05 | End: 2022-05-12

## 2022-05-05 RX ORDER — DIPHENHYDRAMINE HYDROCHLORIDE 50 MG/ML
25 INJECTION INTRAMUSCULAR; INTRAVENOUS EVERY 4 HOURS PRN
Status: DISCONTINUED | OUTPATIENT
Start: 2022-05-05 | End: 2022-05-12

## 2022-05-05 RX ADMIN — ACETAMINOPHEN 1000 MG: 500 TABLET, FILM COATED ORAL at 05:05

## 2022-05-05 RX ADMIN — Medication 25 MG: at 08:05

## 2022-05-05 RX ADMIN — INSULIN DETEMIR 15 UNITS: 100 INJECTION, SOLUTION SUBCUTANEOUS at 05:05

## 2022-05-05 RX ADMIN — INSULIN ASPART 2 UNITS: 100 INJECTION, SOLUTION INTRAVENOUS; SUBCUTANEOUS at 05:05

## 2022-05-05 RX ADMIN — NIFEDIPINE 30 MG: 30 TABLET, FILM COATED, EXTENDED RELEASE ORAL at 05:05

## 2022-05-05 RX ADMIN — INSULIN ASPART 6 UNITS: 100 INJECTION, SOLUTION INTRAVENOUS; SUBCUTANEOUS at 08:05

## 2022-05-05 RX ADMIN — FAMOTIDINE 20 MG: 20 TABLET ORAL at 08:05

## 2022-05-05 RX ADMIN — DOCUSATE SODIUM 200 MG: 100 CAPSULE, LIQUID FILLED ORAL at 08:05

## 2022-05-05 RX ADMIN — SIMETHICONE 80 MG: 80 TABLET, CHEWABLE ORAL at 05:05

## 2022-05-05 RX ADMIN — HYDRALAZINE HYDROCHLORIDE 50 MG: 25 TABLET, FILM COATED ORAL at 10:05

## 2022-05-05 RX ADMIN — INSULIN ASPART 3 UNITS: 100 INJECTION, SOLUTION INTRAVENOUS; SUBCUTANEOUS at 05:05

## 2022-05-05 RX ADMIN — DOXYLAMINE SUCCINATE 12.5 MG: 25 TABLET ORAL at 08:05

## 2022-05-05 RX ADMIN — INSULIN ASPART 1 UNITS: 100 INJECTION, SOLUTION INTRAVENOUS; SUBCUTANEOUS at 10:05

## 2022-05-05 RX ADMIN — METOCLOPRAMIDE 10 MG: 10 TABLET ORAL at 08:05

## 2022-05-05 NOTE — ASSESSMENT & PLAN NOTE
- BP: (147)/(85) 147/85  - Baseline labs pending  - continue home procardia 30 mg qD and hydralazine 50 mg BID

## 2022-05-05 NOTE — H&P
[facility] ([place])  Obstetrics  History & Physical    Patient Name: Alicia Valles  MRN: [id number]  Admission Date: 2022  Primary Care Provider: No primary care provider on file.    Subjective:     Principal Problem:Type 1 diabetes mellitus with other specified complication    History of Present Illness:  Alicia Valles is a 27 y.o. [id number] at 9w5d presents for blood sugar patterning. She was seen in [facility] clinic yesterday after recently being discharged from the hospital where she was admitted for hypertensive encephalopathy/hypoglycemia.    Ms. Valles has poorly controlled DM1. She was diagnosed at age 10 and her most recent A1C was >12. She previously was followed by an endocrinologist in [place], but has not established with anyone in [place] where she currently lives. She states that used to count carbs and have an insulin sensitivity factor, but she has not done that in a few years. She currently takes lantus 15 u at 5 pm; for her mealtime insulin, she randomly decides how much to give herself. Reports that she has been admitted for DKA more than 5 times. Prior to her most recent hospitalization, she was found down with BG of 29. She had a seizure due to hypoglycemia; she has not ever had seizures before and does not take medications for a seizure disorder.  Reports that she has never had an episode of hypoglycemia like that before. 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 Reports prior surgery for retinal detachment.    Ms. Valles also has chronic hypertension. She previously was taking methyldopa and hydralazine, however she was recently switched by her OBGYN to procardia 30 mg qD and hydralazine 50 mg BID. She believes this regimen has kept her well-controlled. She does not follow with a PCP.    Currently, she reports cramping epigastric pain, which she believes could be gas pain. Denies polyuria,  shortness of breath, vaginal bleeding, vision changes, chest pain, diarrhea, and dysuria.    Of note, this is an unplanned, but desired pregnancy.      Obstetric HPI:  Denies cramping, headache, bleeding, SOB, nausea/vomiting, diarrhea, dysuria, polyuria, vision changes.    OB History    Para Term  AB Living   1 0 0 0 0 0   SAB IAB Ectopic Multiple Live Births   0 0 0 0 0      # Outcome Date GA Lbr Alireza/2nd Weight Sex Delivery Anes PTL Lv   1 Current              No past medical history on file.  No past surgical history on file.    PTA Medications   Medication Sig    docusate sodium (COLACE) 100 MG capsule Take 100 mg by mouth once daily.    hydrALAZINE (APRESOLINE) 50 MG tablet Take 50 mg by mouth 2 (two) times a day.    insulin aspart U-100 (NOVOLOG) 100 unit/mL (3 mL) InPn pen Inject 3 Units into the skin 3 (three) times daily before meals.    insulin glargine (LANTUS) 100 unit/mL injection Inject 15 Units into the skin every evening.    NIFEdipine (PROCARDIA-XL) 30 MG (OSM) 24 hr tablet Take 30 mg by mouth once daily.       Review of patient's allergies indicates:  No Known Allergies     Family History    None       Tobacco Use    Smoking status: Not on file    Smokeless tobacco: Not on file   Substance and Sexual Activity    Alcohol use: Not on file    Drug use: Not on file    Sexual activity: Not on file     Review of Systems   Constitutional:  Negative for chills and fever.   Respiratory:  Negative for cough and shortness of breath.    Cardiovascular:  Negative for chest pain and palpitations.   Gastrointestinal:  Positive for bloating. Negative for abdominal pain, constipation, diarrhea, nausea and vomiting.   Genitourinary:  Negative for dysuria, urgency, vaginal bleeding, vaginal discharge and vaginal pain.   Musculoskeletal:  Negative for arthralgias.   Integumentary:  Negative for rash.   Neurological:  Negative for syncope and headaches.    Objective:     Vital Signs (Most  Recent):  Temp: 99.1 °F (37.3 °C) (05/05/22 1444)  Pulse: 97 (05/05/22 1444)  Resp: 16 (05/05/22 1444)  BP: (!) 147/85 (05/05/22 1444)  SpO2: 98 % (05/05/22 1444)   Vital Signs (24h Range):  Temp:  [99.1 °F (37.3 °C)] 99.1 °F (37.3 °C)  Pulse:  [97] 97  Resp:  [16] 16  SpO2:  [98 %] 98 %  BP: (147)/(85) 147/85     Weight: 58.9 kg (129 lb 13.6 oz)  Body mass index is 24.54 kg/m².    FHT: deferred    Physical Exam:   Constitutional: She is oriented to person, place, and time. She appears well-developed and well-nourished. No distress.    HENT:   Head: Normocephalic and atraumatic.    Eyes: EOM are normal.     Cardiovascular:  Normal rate.             Pulmonary/Chest: Effort normal. No respiratory distress.        Abdominal: Soft.             Musculoskeletal: Normal range of motion.       Neurological: She is alert and oriented to person, place, and time.    Skin: Skin is warm and dry. She is not diaphoretic.    Psychiatric: She has a normal mood and affect.     Cervix: deferred     Significant Labs:  No results found for: GROUPTRH, HEPBSAG, RUBELLAIGGSC, STREPBCULT, AFP, [id number]    I have personallly reviewed all pertinent lab results from the last 24 hours.  Recent Lab Results         05/05/22  1638   05/05/22  1636   05/05/22  1615        Albumin 2.6           Alkaline Phosphatase 60           ALT 19           Anion Gap 7           Appearance, UA     Clear       AST 18           Bacteria, UA     Occasional       Baso # 0.02           Basophil % 0.2           Bilirubin (UA)     Negative       BILIRUBIN TOTAL 0.2  Comment: For infants and newborns, interpretation of results should be based  on gestational age, weight and in agreement with clinical  observations.    Premature Infant recommended reference ranges:  Up to 24 hours.............<8.0 mg/dL  Up to 48 hours............<12.0 mg/dL  3-5 days..................<15.0 mg/dL  6-29 days.................<15.0 mg/dL             BUN 26           Calcium 8.7            Chloride 102           CO2 25           Color, UA     Yellow       Creatinine 1.2           Differential Method Automated           eGFR if  >60           eGFR if non  >60  Comment: Calculation used to obtain the estimated glomerular filtration  rate (eGFR) is the CKD-EPI equation.              Eos # 0.1           Eosinophil % 1.1           Glucose 237           Glucose, UA     2+       Gran # (ANC) 6.5           Gran % 70.3           Hematocrit 24.9           Hemoglobin 8.3           Hyaline Casts, UA     0       Immature Grans (Abs) 0.03  Comment: Mild elevation in immature granulocytes is non specific and   can be seen in a variety of conditions including stress response,   acute inflammation, trauma and pregnancy. Correlation with other   laboratory and clinical findings is essential.             Immature Granulocytes 0.3           Ketones, UA     Negative       Leukocytes, UA     Negative       Lymph # 2.0           Lymph % 21.4           MCH 29.9           MCHC 33.3           MCV 90           Microscopic Comment     SEE COMMENT  Comment: Other formed elements not mentioned in the report are not   present in the microscopic examination.          Mono # 0.6           Mono % 6.7           MPV 10.3           NITRITE UA     Negative       nRBC 0           Occult Blood UA     Trace       pH, UA     6.0       Platelets 252           POCT Glucose   263         Potassium 4.3           PROTEIN TOTAL 6.2           Protein, UA     2+  Comment: Recommend a 24 hour urine protein or a urine   protein/creatinine ratio if globulin induced proteinuria is  clinically suspected.         RBC 2.78           RBC, UA     2       RDW 13.8           Sodium 134           Specific Gravity, UA     1.020       Specimen UA     Urine, Clean Catch       UROBILINOGEN UA     Negative       WBC, UA     4       WBC 9.21                 Assessment/Plan:     27 y.o. female  at 9w5d for:    * Type 1 diabetes  mellitus with other specified complication  - Diagnosed at age 10, does not currently follow with endocrine  - complications include diabetic retinopathy, gastroparesis, neuropathy, and h/o retinal detachment  - currently asymptomatic  - BG on admit 263  - CBC, CMP, P:C, UA, TSH pending  - Echo/EKG pending  - Will pattern BG 2AM, 6AM, pre-meal and 2 hours post-meal  - Continue lantus 15u qHS  - Will give aspart 3/3/3, ISF of 1:50 >150  - aspirin qD    First trimester pregnancy  - 1T labs pending  - daily PNV    Chronic hypertension  - BP: (147)/(85) 147/85  - Baseline labs pending  - continue home procardia 30 mg qD and hydralazine 50 mg BID        Archana Will MD  Obstetrics  [facility] ([place])      Patient seen and examined. Agree with resident assessment and plan.  Admitted last night for blood sugar patterning.   Recent admission to the hospital for hypertensive encephalopathy. Patient's mom states that the patient suffered an anoxic brain injury from ?encephalopathy vs hypoglycemic coma. She states that the patient has not recovered neurologic function completely with some episodes of weakness, confusion and blank staring episodes. The patient cannot live independently and depends on her mom to administer her insulin and help care for her.   The patient reports previously being able to count carbs and administer calculated insulin dose with meals.   Additional history provided as documented above.  Patient states she feels well this morning. Had hypoglycemic episode overnight.    Temp:  [98.1 °F (36.7 °C)-99.4 °F (37.4 °C)] 99.4 °F (37.4 °C)  Pulse:  [93-98] 94  Resp:  [16-18] 17  SpO2:  [97 %-98 %] 98 %  BP: (133-157)/() 157/100    No peripheral edema  Strength 5/5 x4 extremities    Labs reviewed  Cr 1.2  H/H 8.3/24.9  Plts 252  No ketonuria  AG normal  HbA1c 9.8 (previously 12.4)  P:C 3.75    BGs reviewed    27 year old G1 at 9w6d with poorly controlled T1DM with end organ damage  (retinopathy, neuropathy, gastroparesis), CHTN with history of hypertensive retinal detachment and encephalopathy, and proteinuria.   T1DM: discussed management of DM in pregnancy. Discussed risk of poorly controlled DM in pregnancy. Will consult DM education. Unsure if carb counting is most appropriate for patient, given her inability to consistently calculate and administer insulin. She is also very sensitive to correction dose insulin independent of meal time. For now, will continue Lantus 15u in the evening and will increase mealtime insulin to 4/4/4. Change correction dose to 1:50 if pre-meal BG is >150 and 1:75 between meals if BG is >150. Continue BG monitoring 2am, fasting, pre and post meals. EKG normal, echo pending.  CHTN: continue home meds. Increase to Procardia 60 daily. BP target <140/90. Will attempt to obtain [facility] from [facility] regarding [facility] imaging.   Proteinuria: Cr 1.2 (baseline 1.0 8/2021) and P:C 3.7. 24 hour urine pending. Will administer ppx Lovenox for presumed nephropathy while admitted. If >3g proteinuria on 24 hour urine, will continue Lovenox for the remainder of pregnancy and 6 weeks postpartum. Will consider nephrology consult, although no specific question for them at this time so will await additional testing before consulting.   Anemia: iron studies and Hb electrophoresis pending  We also discussed the option for elective pregnancy termination given risk of maternal morbidity and mortality. We reviewed the fact that termination would not be offered here based on current maternal status and would only be offered if imminent threat to maternal life was present; however, I informed the patient that we can provide information about resources if she is interested in pursuing this option.  The patient and her mother had no additional questions.     Lora Arthur MD  Maternal Fetal Medicine fellow  PGY-7

## 2022-05-05 NOTE — ASSESSMENT & PLAN NOTE
- Diagnosed at age 10, does not currently follow with endocrine  - complications include diabetic retinopathy, gastroparesis, neuropathy, and h/o retinal detachment  - currently asymptomatic  - BG on admit 263  - CBC, CMP, P:C, UA, TSH pending  - Echo/EKG pending  - Will pattern BG 2AM, 6AM, pre-meal and 2 hours post-meal  - Continue lantus 15u qHS  - Will give aspart 3/3/3, ISF of 1:50 >150  - aspirin qD

## 2022-05-05 NOTE — SUBJECTIVE & OBJECTIVE
Obstetric HPI:  Denies cramping, headache, bleeding, SOB, nausea/vomiting, diarrhea, dysuria, polyuria, vision changes.    OB History    Para Term  AB Living   1 0 0 0 0 0   SAB IAB Ectopic Multiple Live Births   0 0 0 0 0      # Outcome Date GA Lbr Alireza/2nd Weight Sex Delivery Anes PTL Lv   1 Current              No past medical history on file.  No past surgical history on file.    PTA Medications   Medication Sig    docusate sodium (COLACE) 100 MG capsule Take 100 mg by mouth once daily.    hydrALAZINE (APRESOLINE) 50 MG tablet Take 50 mg by mouth 2 (two) times a day.    insulin aspart U-100 (NOVOLOG) 100 unit/mL (3 mL) InPn pen Inject 3 Units into the skin 3 (three) times daily before meals.    insulin glargine (LANTUS) 100 unit/mL injection Inject 15 Units into the skin every evening.    NIFEdipine (PROCARDIA-XL) 30 MG (OSM) 24 hr tablet Take 30 mg by mouth once daily.       Review of patient's allergies indicates:  No Known Allergies     Family History    None       Tobacco Use    Smoking status: Not on file    Smokeless tobacco: Not on file   Substance and Sexual Activity    Alcohol use: Not on file    Drug use: Not on file    Sexual activity: Not on file     Review of Systems   Constitutional:  Negative for chills and fever.   Respiratory:  Negative for cough and shortness of breath.    Cardiovascular:  Negative for chest pain and palpitations.   Gastrointestinal:  Positive for bloating. Negative for abdominal pain, constipation, diarrhea, nausea and vomiting.   Genitourinary:  Negative for dysuria, urgency, vaginal bleeding, vaginal discharge and vaginal pain.   Musculoskeletal:  Negative for arthralgias.   Integumentary:  Negative for rash.   Neurological:  Negative for syncope and headaches.    Objective:     Vital Signs (Most Recent):  Temp: 99.1 °F (37.3 °C) (22 144)  Pulse: 97 (22 144)  Resp: 16 (22)  BP: (!) 147/85 (22 144)  SpO2: 98 % (22)    Vital Signs (24h Range):  Temp:  [99.1 °F (37.3 °C)] 99.1 °F (37.3 °C)  Pulse:  [97] 97  Resp:  [16] 16  SpO2:  [98 %] 98 %  BP: (147)/(85) 147/85     Weight: 58.9 kg (129 lb 13.6 oz)  Body mass index is 24.54 kg/m².    FHT: deferred    Physical Exam:   Constitutional: She is oriented to person, place, and time. She appears well-developed and well-nourished. No distress.    HENT:   Head: Normocephalic and atraumatic.    Eyes: EOM are normal.     Cardiovascular:  Normal rate.             Pulmonary/Chest: Effort normal. No respiratory distress.        Abdominal: Soft.             Musculoskeletal: Normal range of motion.       Neurological: She is alert and oriented to person, place, and time.    Skin: Skin is warm and dry. She is not diaphoretic.    Psychiatric: She has a normal mood and affect.     Cervix: deferred     Significant Labs:  No results found for: GROUPTRH, HEPBSAG, RUBELLAIGGSC, STREPBCULT, AFP, [id number]    I have personallly reviewed all pertinent lab results from the last 24 hours.  Recent Lab Results         05/05/22  1638   05/05/22  1636   05/05/22  1615        Albumin 2.6           Alkaline Phosphatase 60           ALT 19           Anion Gap 7           Appearance, UA     Clear       AST 18           Bacteria, UA     Occasional       Baso # 0.02           Basophil % 0.2           Bilirubin (UA)     Negative       BILIRUBIN TOTAL 0.2  Comment: For infants and newborns, interpretation of results should be based  on gestational age, weight and in agreement with clinical  observations.    Premature Infant recommended reference ranges:  Up to 24 hours.............<8.0 mg/dL  Up to 48 hours............<12.0 mg/dL  3-5 days..................<15.0 mg/dL  6-29 days.................<15.0 mg/dL             BUN 26           Calcium 8.7           Chloride 102           CO2 25           Color, UA     Yellow       Creatinine 1.2           Differential Method Automated           eGFR if  >60            eGFR if non  >60  Comment: Calculation used to obtain the estimated glomerular filtration  rate (eGFR) is the CKD-EPI equation.              Eos # 0.1           Eosinophil % 1.1           Glucose 237           Glucose, UA     2+       Gran # (ANC) 6.5           Gran % 70.3           Hematocrit 24.9           Hemoglobin 8.3           Hyaline Casts, UA     0       Immature Grans (Abs) 0.03  Comment: Mild elevation in immature granulocytes is non specific and   can be seen in a variety of conditions including stress response,   acute inflammation, trauma and pregnancy. Correlation with other   laboratory and clinical findings is essential.             Immature Granulocytes 0.3           Ketones, UA     Negative       Leukocytes, UA     Negative       Lymph # 2.0           Lymph % 21.4           MCH 29.9           MCHC 33.3           MCV 90           Microscopic Comment     SEE COMMENT  Comment: Other formed elements not mentioned in the report are not   present in the microscopic examination.          Mono # 0.6           Mono % 6.7           MPV 10.3           NITRITE UA     Negative       nRBC 0           Occult Blood UA     Trace       pH, UA     6.0       Platelets 252           POCT Glucose   263         Potassium 4.3           PROTEIN TOTAL 6.2           Protein, UA     2+  Comment: Recommend a 24 hour urine protein or a urine   protein/creatinine ratio if globulin induced proteinuria is  clinically suspected.         RBC 2.78           RBC, UA     2       RDW 13.8           Sodium 134           Specific Gravity, UA     1.020       Specimen UA     Urine, Clean Catch       UROBILINOGEN UA     Negative       WBC, UA     4       WBC 9.21

## 2022-05-05 NOTE — HOSPITAL COURSE
05/05/2022 - Admitted for BG patterning. 1T labs, echo, EKG, TSH, A1C pending. Will continue home BP meds. Lantus 15u qHS with aspart 3/3/3 and ISF 1:50 >150. Will count carbs for each meal.  05/06/2022 - BG . Symptomatic lows. Will continue to pattern.  05/07/2022 - BG poorly controlled, endocrine consulted. Adjusted carb ratio to 1:20 and ISF 1:70 > 180. Will not slide post-meal. One episode of hypoglycemia which responded to D50. Discussed meal timing.  05/08/2022 - Episode of hypoglycemia overnight, will continue to pattern.  05/09/2022 - Continued poor BG control. Insulin regimen now levemir 7u BID. Mealtime insulin 1:15 and ISF 1:70  >180.   05/10/2022 - Continued poor BG control. Insulin regimen now levemir 6u BID. Carb ratio 1:10 and ISF 1:70 > 180.  05/11/2022 - Episode of hypoglycemia overnight. Dexcom installed yesterday, appropriately calibrated. Patient considering termination of pregnancy, will continue to discuss. No changes to insulin regimen at this time.  05/12/2022 - Continued challenges with BG control. Patient and mother frustrated with labile BG. Will transfer to [facility] for closer monitoring with endocrine. Per endocrine recommendations:   - Levemir 6 units in the morning and 5 units at night (Eat a snack before bed if BG is < 100 mg/dl)  - Novolog 5 units TIDWM (Hold if BG < 80 mg/dL) (45 grams of carbohydrates)  - Novolog 2 units prn with snacks (15-30 grams of carbohydrates)  - Novolog SSI for BG excursions:  180 - 230 + 1 unit  231- 280  + 2 units  281 - 330 + 3 units  331 - 380 + 4 units      > 380   + 5 units   Price (Do Not Change): 0.00 Instructions: This plan will send the code FBSE to the PM system.  DO NOT or CHANGE the price. Detail Level: Simple

## 2022-05-05 NOTE — HPI
Alicia Valles is a 27 y.o. [id number] at 9w5d presents for blood sugar patterning. She was seen in [facility] clinic yesterday after recently being discharged from the hospital where she was admitted for hypertensive encephalopathy/hypoglycemia.    Ms. Valles has poorly controlled DM1. She was diagnosed at age 10 and her most recent A1C was >12. She previously was followed by an endocrinologist in [place], but has not established with anyone in [place] where she currently lives. She states that used to count carbs and have an insulin sensitivity factor, but she has not done that in a few years. She currently takes lantus 15 u at 5 pm; for her mealtime insulin, she randomly decides how much to give herself. Reports that she has been admitted for DKA more than 5 times. Prior to her most recent hospitalization, she was found down with BG of 29. She had a seizure due to hypoglycemia; she has not ever had seizures before and does not take medications for a seizure disorder.  Reports that she has never had an episode of hypoglycemia like that before. 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 Reports prior surgery for retinal detachment.    Ms. Valles also has chronic hypertension. She previously was taking methyldopa and hydralazine, however she was recently switched by her OBGYN to procardia 30 mg qD and hydralazine 50 mg BID. She believes this regimen has kept her well-controlled. She does not follow with a PCP.    Currently, she reports cramping epigastric pain, which she believes could be gas pain. Denies polyuria, shortness of breath, vaginal bleeding, vision changes, chest pain, diarrhea, and dysuria.    Of note, this is an unplanned, but desired pregnancy.

## 2022-05-06 PROBLEM — D64.9 ANEMIA: Status: ACTIVE | Noted: 2022-05-06

## 2022-05-06 PROBLEM — R80.9 PROTEINURIA: Status: ACTIVE | Noted: 2022-05-06

## 2022-05-06 LAB
ALBUMIN SERPL BCP-MCNC: 2.8 G/DL (ref 3.5–5.2)
ALP SERPL-CCNC: 60 U/L (ref 55–135)
ALT SERPL W/O P-5'-P-CCNC: 16 U/L (ref 10–44)
ANION GAP SERPL CALC-SCNC: 8 MMOL/L (ref 8–16)
ASCENDING AORTA: 2.09 CM
AST SERPL-CCNC: 19 U/L (ref 10–40)
AV INDEX (PROSTH): 0.85
AV MEAN GRADIENT: 7 MMHG
AV PEAK GRADIENT: 12 MMHG
AV VALVE AREA: 1.34 CM2
AV VELOCITY RATIO: 0.78
BILIRUB SERPL-MCNC: 0.2 MG/DL (ref 0.1–1)
BSA FOR ECHO PROCEDURE: 1.59 M2
BUN SERPL-MCNC: 21 MG/DL (ref 6–20)
CALCIUM SERPL-MCNC: 8.8 MG/DL (ref 8.7–10.5)
CHLORIDE SERPL-SCNC: 107 MMOL/L (ref 95–110)
CO2 SERPL-SCNC: 22 MMOL/L (ref 23–29)
CREAT SERPL-MCNC: 1.1 MG/DL (ref 0.5–1.4)
CV ECHO LV RWT: 0.35 CM
DOP CALC AO PEAK VEL: 1.75 M/S
DOP CALC AO VTI: 34.73 CM
DOP CALC LVOT AREA: 1.6 CM2
DOP CALC LVOT DIAMETER: 1.42 CM
DOP CALC LVOT PEAK VEL: 1.37 M/S
DOP CALC LVOT STROKE VOLUME: 46.55 CM3
DOP CALC RVOT PEAK VEL: 0.94 M/S
DOP CALC RVOT VTI: 19.45 CM
DOP CALCLVOT PEAK VEL VTI: 29.41 CM
E WAVE DECELERATION TIME: 189.76 MSEC
E/A RATIO: 1.24
E/E' RATIO: 13.79 M/S
ECHO LV POSTERIOR WALL: 0.81 CM (ref 0.6–1.1)
EJECTION FRACTION: 65 %
EST. GFR  (AFRICAN AMERICAN): >60 ML/MIN/1.73 M^2
EST. GFR  (NON AFRICAN AMERICAN): >60 ML/MIN/1.73 M^2
FERRITIN SERPL-MCNC: 79 NG/ML (ref 20–300)
FRACTIONAL SHORTENING: 31 % (ref 28–44)
GLUCOSE SERPL-MCNC: 110 MG/DL (ref 70–110)
HBV SURFACE AG SERPL QL IA: NEGATIVE
INTERVENTRICULAR SEPTUM: 0.67 CM (ref 0.6–1.1)
IRON SERPL-MCNC: 84 UG/DL (ref 30–160)
IVRT: 68.51 MSEC
LA MAJOR: 5.4 CM
LA MINOR: 4.64 CM
LA WIDTH: 4.21 CM
LEFT ATRIUM SIZE: 3.19 CM
LEFT ATRIUM VOLUME INDEX MOD: 33.1 ML/M2
LEFT ATRIUM VOLUME INDEX: 36.3 ML/M2
LEFT ATRIUM VOLUME MOD: 52 CM3
LEFT ATRIUM VOLUME: 56.98 CM3
LEFT INTERNAL DIMENSION IN SYSTOLE: 3.2 CM (ref 2.1–4)
LEFT VENTRICLE DIASTOLIC VOLUME INDEX: 62.75 ML/M2
LEFT VENTRICLE DIASTOLIC VOLUME: 98.51 ML
LEFT VENTRICLE MASS INDEX: 68 G/M2
LEFT VENTRICLE SYSTOLIC VOLUME INDEX: 26.2 ML/M2
LEFT VENTRICLE SYSTOLIC VOLUME: 41.06 ML
LEFT VENTRICULAR INTERNAL DIMENSION IN DIASTOLE: 4.62 CM (ref 3.5–6)
LEFT VENTRICULAR MASS: 107.4 G
LV LATERAL E/E' RATIO: 13.1 M/S
LV SEPTAL E/E' RATIO: 14.56 M/S
MAGNESIUM SERPL-MCNC: 2.2 MG/DL (ref 1.6–2.6)
MV A" WAVE DURATION": 11.04 MSEC
MV PEAK A VEL: 1.06 M/S
MV PEAK E VEL: 1.31 M/S
MV STENOSIS PRESSURE HALF TIME: 55.03 MS
MV VALVE AREA P 1/2 METHOD: 4 CM2
PHOSPHATE SERPL-MCNC: 3.6 MG/DL (ref 2.7–4.5)
PISA TR MAX VEL: 2.13 M/S
POCT GLUCOSE: 107 MG/DL (ref 70–110)
POCT GLUCOSE: 123 MG/DL (ref 70–110)
POCT GLUCOSE: 162 MG/DL (ref 70–110)
POCT GLUCOSE: 172 MG/DL (ref 70–110)
POCT GLUCOSE: 206 MG/DL (ref 70–110)
POCT GLUCOSE: 214 MG/DL (ref 70–110)
POCT GLUCOSE: 215 MG/DL (ref 70–110)
POCT GLUCOSE: 219 MG/DL (ref 70–110)
POCT GLUCOSE: 250 MG/DL (ref 70–110)
POCT GLUCOSE: 254 MG/DL (ref 70–110)
POCT GLUCOSE: 276 MG/DL (ref 70–110)
POCT GLUCOSE: 35 MG/DL (ref 70–110)
POCT GLUCOSE: 358 MG/DL (ref 70–110)
POCT GLUCOSE: 366 MG/DL (ref 70–110)
POCT GLUCOSE: 41 MG/DL (ref 70–110)
POCT GLUCOSE: 50 MG/DL (ref 70–110)
POCT GLUCOSE: 68 MG/DL (ref 70–110)
POCT GLUCOSE: 83 MG/DL (ref 70–110)
POTASSIUM SERPL-SCNC: 4 MMOL/L (ref 3.5–5.1)
PROT SERPL-MCNC: 6.8 G/DL (ref 6–8.4)
PULM VEIN S/D RATIO: 1.4
PV MEAN GRADIENT: 1.76 MMHG
PV PEAK D VEL: 0.53 M/S
PV PEAK S VEL: 0.74 M/S
PV PEAK VELOCITY: 1.11 CM/S
RA MAJOR: 4.27 CM
RA PRESSURE: 3 MMHG
RA WIDTH: 3.22 CM
RIGHT VENTRICULAR END-DIASTOLIC DIMENSION: 2.29 CM
RPR SER QL: NORMAL
RUBV IGG SER-ACNC: 20.6 IU/ML
RUBV IGG SER-IMP: REACTIVE
SATURATED IRON: 32 % (ref 20–50)
SINUS: 2.64 CM
SODIUM SERPL-SCNC: 137 MMOL/L (ref 136–145)
STJ: 1.95 CM
TDI LATERAL: 0.1 M/S
TDI SEPTAL: 0.09 M/S
TDI: 0.1 M/S
TOTAL IRON BINDING CAPACITY: 266 UG/DL (ref 250–450)
TR MAX PG: 18 MMHG
TRANSFERRIN SERPL-MCNC: 180 MG/DL (ref 200–375)
TRICUSPID ANNULAR PLANE SYSTOLIC EXCURSION: 2.65 CM
TV REST PULMONARY ARTERY PRESSURE: 21 MMHG

## 2022-05-06 PROCEDURE — 84100 ASSAY OF PHOSPHORUS: CPT | Performed by: OBSTETRICS & GYNECOLOGY

## 2022-05-06 PROCEDURE — 83020 HEMOGLOBIN ELECTROPHORESIS: CPT | Mod: 91 | Performed by: STUDENT IN AN ORGANIZED HEALTH CARE EDUCATION/TRAINING PROGRAM

## 2022-05-06 PROCEDURE — 63600175 PHARM REV CODE 636 W HCPCS: Performed by: STUDENT IN AN ORGANIZED HEALTH CARE EDUCATION/TRAINING PROGRAM

## 2022-05-06 PROCEDURE — 25000003 PHARM REV CODE 250: Performed by: STUDENT IN AN ORGANIZED HEALTH CARE EDUCATION/TRAINING PROGRAM

## 2022-05-06 PROCEDURE — 83735 ASSAY OF MAGNESIUM: CPT | Performed by: OBSTETRICS & GYNECOLOGY

## 2022-05-06 PROCEDURE — 84466 ASSAY OF TRANSFERRIN: CPT | Performed by: STUDENT IN AN ORGANIZED HEALTH CARE EDUCATION/TRAINING PROGRAM

## 2022-05-06 PROCEDURE — 82728 ASSAY OF FERRITIN: CPT | Performed by: STUDENT IN AN ORGANIZED HEALTH CARE EDUCATION/TRAINING PROGRAM

## 2022-05-06 PROCEDURE — 25000003 PHARM REV CODE 250

## 2022-05-06 PROCEDURE — 63600175 PHARM REV CODE 636 W HCPCS

## 2022-05-06 PROCEDURE — 11000001 HC ACUTE MED/SURG PRIVATE ROOM

## 2022-05-06 PROCEDURE — 80053 COMPREHEN METABOLIC PANEL: CPT | Performed by: OBSTETRICS & GYNECOLOGY

## 2022-05-06 PROCEDURE — 36415 COLL VENOUS BLD VENIPUNCTURE: CPT | Performed by: STUDENT IN AN ORGANIZED HEALTH CARE EDUCATION/TRAINING PROGRAM

## 2022-05-06 RX ORDER — GLUCAGON 1 MG
KIT INJECTION
Status: DISCONTINUED
Start: 2022-05-06 | End: 2022-05-07 | Stop reason: WASHOUT

## 2022-05-06 RX ORDER — INSULIN ASPART 100 [IU]/ML
1 INJECTION, SOLUTION INTRAVENOUS; SUBCUTANEOUS ONCE
Status: COMPLETED | OUTPATIENT
Start: 2022-05-06 | End: 2022-05-06

## 2022-05-06 RX ORDER — INSULIN ASPART 100 [IU]/ML
3 INJECTION, SOLUTION INTRAVENOUS; SUBCUTANEOUS ONCE
Status: COMPLETED | OUTPATIENT
Start: 2022-05-06 | End: 2022-05-06

## 2022-05-06 RX ORDER — INSULIN ASPART 100 [IU]/ML
4 INJECTION, SOLUTION INTRAVENOUS; SUBCUTANEOUS ONCE
Status: COMPLETED | OUTPATIENT
Start: 2022-05-06 | End: 2022-05-06

## 2022-05-06 RX ORDER — INSULIN ASPART 100 [IU]/ML
4 INJECTION, SOLUTION INTRAVENOUS; SUBCUTANEOUS
Status: DISCONTINUED | OUTPATIENT
Start: 2022-05-06 | End: 2022-05-07

## 2022-05-06 RX ORDER — ENOXAPARIN SODIUM 100 MG/ML
40 INJECTION SUBCUTANEOUS EVERY 24 HOURS
Status: DISCONTINUED | OUTPATIENT
Start: 2022-05-06 | End: 2022-05-16 | Stop reason: HOSPADM

## 2022-05-06 RX ORDER — IBUPROFEN 200 MG
16 TABLET ORAL
Status: DISCONTINUED | OUTPATIENT
Start: 2022-05-06 | End: 2022-05-06

## 2022-05-06 RX ORDER — LORAZEPAM 2 MG/ML
INJECTION INTRAMUSCULAR
Status: COMPLETED
Start: 2022-05-06 | End: 2022-05-06

## 2022-05-06 RX ORDER — NIFEDIPINE 30 MG/1
60 TABLET, EXTENDED RELEASE ORAL
Status: DISCONTINUED | OUTPATIENT
Start: 2022-05-06 | End: 2022-05-08

## 2022-05-06 RX ORDER — LORAZEPAM 2 MG/ML
0.5 INJECTION INTRAMUSCULAR ONCE
Status: COMPLETED | OUTPATIENT
Start: 2022-05-06 | End: 2022-05-06

## 2022-05-06 RX ORDER — NIFEDIPINE 30 MG/1
30 TABLET, EXTENDED RELEASE ORAL DAILY
Status: DISCONTINUED | OUTPATIENT
Start: 2022-05-06 | End: 2022-05-06

## 2022-05-06 RX ADMIN — HYDRALAZINE HYDROCHLORIDE 50 MG: 25 TABLET, FILM COATED ORAL at 10:05

## 2022-05-06 RX ADMIN — METOCLOPRAMIDE 10 MG: 10 TABLET ORAL at 09:05

## 2022-05-06 RX ADMIN — HYDRALAZINE HYDROCHLORIDE 50 MG: 25 TABLET, FILM COATED ORAL at 09:05

## 2022-05-06 RX ADMIN — LORAZEPAM 0.5 MG: 2 INJECTION INTRAMUSCULAR at 09:05

## 2022-05-06 RX ADMIN — LORAZEPAM 0.5 MG: 2 INJECTION INTRAMUSCULAR; INTRAVENOUS at 09:05

## 2022-05-06 RX ADMIN — Medication 24 G: at 01:05

## 2022-05-06 RX ADMIN — INSULIN ASPART 1 UNITS: 100 INJECTION, SOLUTION INTRAVENOUS; SUBCUTANEOUS at 12:05

## 2022-05-06 RX ADMIN — METOCLOPRAMIDE 10 MG: 10 TABLET ORAL at 08:05

## 2022-05-06 RX ADMIN — FAMOTIDINE 20 MG: 20 TABLET ORAL at 08:05

## 2022-05-06 RX ADMIN — INSULIN ASPART 1 UNITS: 100 INJECTION, SOLUTION INTRAVENOUS; SUBCUTANEOUS at 05:05

## 2022-05-06 RX ADMIN — ASPIRIN 81 MG CHEWABLE TABLET 81 MG: 81 TABLET CHEWABLE at 08:05

## 2022-05-06 RX ADMIN — METOCLOPRAMIDE 10 MG: 10 TABLET ORAL at 05:05

## 2022-05-06 RX ADMIN — ENOXAPARIN SODIUM 40 MG: 100 INJECTION SUBCUTANEOUS at 04:05

## 2022-05-06 RX ADMIN — INSULIN ASPART 4 UNITS: 100 INJECTION, SOLUTION INTRAVENOUS; SUBCUTANEOUS at 06:05

## 2022-05-06 RX ADMIN — INSULIN ASPART 4 UNITS: 100 INJECTION, SOLUTION INTRAVENOUS; SUBCUTANEOUS at 01:05

## 2022-05-06 RX ADMIN — INSULIN ASPART 3 UNITS: 100 INJECTION, SOLUTION INTRAVENOUS; SUBCUTANEOUS at 09:05

## 2022-05-06 RX ADMIN — DOCUSATE SODIUM 200 MG: 100 CAPSULE, LIQUID FILLED ORAL at 08:05

## 2022-05-06 RX ADMIN — INSULIN DETEMIR 15 UNITS: 100 INJECTION, SOLUTION SUBCUTANEOUS at 05:05

## 2022-05-06 RX ADMIN — NIFEDIPINE 60 MG: 30 TABLET, FILM COATED, EXTENDED RELEASE ORAL at 04:05

## 2022-05-06 RX ADMIN — INSULIN ASPART 3 UNITS: 100 INJECTION, SOLUTION INTRAVENOUS; SUBCUTANEOUS at 05:05

## 2022-05-06 RX ADMIN — Medication 16 G: at 09:05

## 2022-05-06 NOTE — ASSESSMENT & PLAN NOTE
- BP: (133-150)/(73-92) 133/73  - CBC 8.3/24.9/252, Cr 1.2, LFTs 19/18  - P:C 3.75  - 24 hour urine pending  - continue home procardia 30 mg qD and hydralazine 50 mg BID

## 2022-05-06 NOTE — NURSING
Latest Reference Range & Units 05/05/22 22:34   POCT Glucose 70 - 110 mg/dL 206 (H)   (H): Data is abnormally high    MD notified, 1 U aspart ordered.

## 2022-05-06 NOTE — CONSULTS
"Nutrition-Related Diabetes Education      Time Spent: 15 minutes   Learners: patient/ mother     Current HbA1c: 9.8   Home diabetes medication(s):15 u Lantus at 5 pm. She randomly decides how much to give herself for her mealtime insulin    Nutrition Education with handouts: CHO Counting for People with DM; Nutrition Label Reading; CHO; DM Label Reading Tips; Meal Planning Using the Plate Method    Comments: RD consulted for DM edu. RD consult for a diabetic education. Pt is on a diabetic diet (breakfast & midmorning snack = 70 g CHO, lunch and midafternoon snack = 70 g CHO, dinner and HS snack = 70 g CHO, snacks recommended at 15-30 g CHO). No LBM documented. Pt is [id number] at 9w5d. Pt has been dx with T1DM since she was 10 years. A1C 9.8% poorly controlled with other complications including retinopathy with blindness in right eye, gastroparesis, and neuropathy. BG poorly controlled overnight - .. Pt does not have an established endocrinologist where she currently lives. No wt hx available in system. Not sure which foods are CHO but does know how to count carbs and read the nutrition label. Understands the meaning of free foods such as - non-starch vegs, proteins, and fats. The main issue seems to be reading the nutrition label to the applesauce with "no sugar added" with 0g total CHO. Spoke with RN and did research on applesauce. Pt and mother states this is the applesauce but explains they will have to check the label for the applesauce to ensure it has 0g CHO compared to the 13 gCHO in applesauce with no added sugars. Explained how if it does say 0g it is considered a "free CHO item," but if it says 13g CHO the patient needs to be aware and count these CHO and take insulin as prescribed. Dicussed the need of possibly limiting said applesauce if it does contain CHO. Will continue to monitor. Mother will try to get picture of nutrition label of applesauce at home.     Barriers to Learning: Previous education " with possible confusion     Follow up: 5/10/2022   Please consult as needed.  Thank you!

## 2022-05-06 NOTE — NURSING
Latest Reference Range & Units 05/06/22 02:07   POCT Glucose 70 - 110 mg/dL 83     15 min after 6 saltine crackers and 30 min after glucose tabs, recheck in 15 min.

## 2022-05-06 NOTE — ASSESSMENT & PLAN NOTE
- Diagnosed at age 10, does not currently follow with endocrine  - complications include diabetic retinopathy, gastroparesis, neuropathy, and h/o retinal detachment  - currently asymptomatic  - CBC 8.3/24.9/252  - Cr 1.2, LFTs 18/19  - P:C 3.75  - UA with 2+ glucose, 2+ protein  - TSH 1.570  - EKG wnl  - Echo pending  - BG poorly controlled overnight,   - A1C 9.8  - Will pattern BG 2AM, 6AM, pre-meal and 2 hours post-meal  - Continue lantus 15u qHS  - Will give aspart 3/3/3, ISF of 1:50 >150  - aspirin qD

## 2022-05-06 NOTE — PROGRESS NOTES
[facility] - Antepartum ([place])  Obstetrics  Antepartum Progress Note    Patient Name: Alicia Valles  MRN: [id number]  Admission Date: 2022  Hospital Length of Stay: 1 days  Attending Physician: Jose Martin Funes MD  Primary Care Provider: Primary Doctor No    Subjective:     Principal Problem:Type 1 diabetes mellitus with other specified complication    HPI:  Alicia Valles is a 27 y.o. [id number] at 9w5d presents for blood sugar patterning. She was seen in [facility] clinic yesterday after recently being discharged from the hospital where she was admitted for hypertensive encephalopathy/hypoglycemia.    Ms. Valles has poorly controlled DM1. She was diagnosed at age 10 and her most recent A1C was >12. She previously was followed by an endocrinologist in [place], but has not established with anyone in [place] where she currently lives. She states that used to count carbs and have an insulin sensitivity factor, but she has not done that in a few years. She currently takes lantus 15 u at 5 pm; for her mealtime insulin, she randomly decides how much to give herself. Reports that she has been admitted for DKA more than 5 times. Prior to her most recent hospitalization, she was found down with BG of 29. She had a seizure due to hypoglycemia; she has not ever had seizures before and does not take medications for a seizure disorder.  Reports that she has never had an episode of hypoglycemia like that before. 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 Reports prior surgery for retinal detachment.    Ms. Valles also has chronic hypertension. She previously was taking methyldopa and hydralazine, however she was recently switched by her OBGYN to procardia 30 mg qD and hydralazine 50 mg BID. She believes this regimen has kept her well-controlled. She does not follow with a PCP.    Currently, she reports cramping epigastric pain, which she  believes could be gas pain. Denies polyuria, shortness of breath, vaginal bleeding, vision changes, chest pain, diarrhea, and dysuria.    Of note, this is an unplanned, but desired pregnancy.      Hospital Course:  05/05/2022 - Admitted for BG patterning. 1T labs, echo, EKG, TSH, A1C pending. Will continue home BP meds. Lantus 15u qHS with aspart 3/3/3 and ISF 1:50 >150. Will count carbs for each meal.  05/06/2022 - BG . Symptomatic lows. Will continue to pattern.      Obstetric HPI:  Patient reports no contractions. Denies headaches, SOB, nausea/vomiting, diarrhea, polyuria.   She is no longer feeling shaky and feels that her glucose has returned to normal.      Objective:     Vital Signs (Most Recent):  Temp: 98.1 °F (36.7 °C) (05/06/22 0225)  Pulse: 93 (05/06/22 0225)  Resp: 16 (05/06/22 0225)  BP: 133/73 (05/06/22 0225)  SpO2: 98 % (05/06/22 0225) Vital Signs (24h Range):  Temp:  [98.1 °F (36.7 °C)-99.1 °F (37.3 °C)] 98.1 °F (36.7 °C)  Pulse:  [93-98] 93  Resp:  [16-18] 16  SpO2:  [97 %-98 %] 98 %  BP: (133-150)/(73-92) 133/73     Weight: 58.9 kg (129 lb 13.6 oz)  Body mass index is 24.54 kg/m².    No intake or output data in the 24 hours ending 05/06/22 0642    Significant Labs:  Recent Lab Results  (Last 5 results in the past 24 hours)        05/06/22  0601   05/06/22  0436   05/06/22  0326   05/06/22  0224   05/06/22  0207        POCT Glucose 254   276   250   123   83                              Physical Exam:   Constitutional: She is oriented to person, place, and time. She appears well-developed and well-nourished. No distress.    HENT:   Head: Normocephalic and atraumatic.    Eyes: EOM are normal.     Cardiovascular:  Normal rate.             Pulmonary/Chest: Effort normal. No respiratory distress.        Abdominal: Soft.             Musculoskeletal: Normal range of motion.       Neurological: She is alert and oriented to person, place, and time.    Skin: Skin is warm and dry. She is not  diaphoretic.    Psychiatric: She has a normal mood and affect.     Assessment/Plan:     27 y.o. female  at 9w6d for:    * Type 1 diabetes mellitus with other specified complication  - Diagnosed at age 10, does not currently follow with endocrine  - complications include diabetic retinopathy, gastroparesis, neuropathy, and h/o retinal detachment  - currently asymptomatic  - CBC 8.3/24.9/252  - Cr 1.2, LFTs   - P:C 3.75  - UA with 2+ glucose, 2+ protein  - TSH 1.570  - EKG wnl  - Echo pending  - BG poorly controlled overnight,   - A1C 9.8  - Will pattern BG 2AM, 6AM, pre-meal and 2 hours post-meal  - Continue lantus 15u qHS  - Will give aspart 3/3/3, ISF of 1:50 >150  - aspirin qD    Proteinuria  - P:C 3.75  - 24 hour urine pending    Anemia  - iron/colace  - iron studies pending  - Hemoglobin electrophoresis pending    First trimester pregnancy  - 1T labs pending  - HCV neg  - HIV neg  - daily PNV    Chronic hypertension  - BP: (133-150)/(73-92) 133/73  - CBC 8.3/24.9/252, Cr 1.2, LFTs   - P:C 3.75  - 24 hour urine pending  - continue home procardia 30 mg qD and hydralazine 50 mg BID          Archana Will MD  Obstetrics  [facility] ([place])      See addendum to H&P for today's note    Lora Arthur MD  Maternal Fetal Medicine fellow  PGY-7

## 2022-05-06 NOTE — NURSING
Latest Reference Range & Units 05/06/22 02:24   POCT Glucose 70 - 110 mg/dL 123 (H)   (H): Data is abnormally high    Md notified, repeat BG 1 hour.

## 2022-05-06 NOTE — NURSING
Latest Reference Range & Units 05/06/22 01:47   POCT Glucose 70 - 110 mg/dL 41 (LL)   (LL): Data is critically low    Md notified, pt given crackers. Recheck BG in 15 min

## 2022-05-06 NOTE — SUBJECTIVE & OBJECTIVE
Obstetric HPI:  Patient reports no contractions. Denies headaches, SOB, nausea/vomiting, diarrhea, polyuria.   She is no longer feeling shaky and feels that her glucose has returned to normal.      Objective:     Vital Signs (Most Recent):  Temp: 98.1 °F (36.7 °C) (05/06/22 0225)  Pulse: 93 (05/06/22 0225)  Resp: 16 (05/06/22 0225)  BP: 133/73 (05/06/22 0225)  SpO2: 98 % (05/06/22 0225) Vital Signs (24h Range):  Temp:  [98.1 °F (36.7 °C)-99.1 °F (37.3 °C)] 98.1 °F (36.7 °C)  Pulse:  [93-98] 93  Resp:  [16-18] 16  SpO2:  [97 %-98 %] 98 %  BP: (133-150)/(73-92) 133/73     Weight: 58.9 kg (129 lb 13.6 oz)  Body mass index is 24.54 kg/m².    No intake or output data in the 24 hours ending 05/06/22 0642    Significant Labs:  Recent Lab Results  (Last 5 results in the past 24 hours)        05/06/22  0601   05/06/22  0436   05/06/22  0326   05/06/22  0224   05/06/22  0207        POCT Glucose 254   276   250   123   83                              Physical Exam:   Constitutional: She is oriented to person, place, and time. She appears well-developed and well-nourished. No distress.    HENT:   Head: Normocephalic and atraumatic.    Eyes: EOM are normal.     Cardiovascular:  Normal rate.             Pulmonary/Chest: Effort normal. No respiratory distress.        Abdominal: Soft.             Musculoskeletal: Normal range of motion.       Neurological: She is alert and oriented to person, place, and time.    Skin: Skin is warm and dry. She is not diaphoretic.    Psychiatric: She has a normal mood and affect.

## 2022-05-06 NOTE — NURSING
Latest Reference Range & Units 05/06/22 03:26   POCT Glucose 70 - 110 mg/dL 250 (H)   (H): Data is abnormally high    Md notified, recheck BG in 1 hr.

## 2022-05-06 NOTE — NURSING
Latest Reference Range & Units 05/05/22 20:04   POCT Glucose 70 - 110 mg/dL 354 (H)   (H): Data is abnormally high    MD notified, 6U Aspart ordered.

## 2022-05-06 NOTE — NURSING
Latest Reference Range & Units 05/06/22 01:28   POCT Glucose 70 - 110 mg/dL 35 (LL)   (LL): Data is critically low    Md notified, 24g glucose tab ordered. Recheck BG 15 min after glucose tabs.

## 2022-05-06 NOTE — NURSING
Latest Reference Range & Units 05/06/22 04:36   POCT Glucose 70 - 110 mg/dL 276 (H)   (H): Data is abnormally high    Md notified, 1u aspart ordered

## 2022-05-06 NOTE — PLAN OF CARE
Problem:  Fall Injury Risk  Goal: Absence of Fall, Infant Drop and Related Injury  Outcome: Ongoing, Progressing     Problem: Adult Inpatient Plan of Care  Goal: Plan of Care Review  Outcome: Ongoing, Progressing  Goal: Patient-Specific Goal (Individualized)  Outcome: Ongoing, Progressing  Goal: Absence of Hospital-Acquired Illness or Injury  Outcome: Ongoing, Progressing  Goal: Optimal Comfort and Wellbeing  Outcome: Ongoing, Progressing  Goal: Readiness for Transition of Care  Outcome: Ongoing, Progressing     Problem: Diabetes Comorbidity  Goal: Blood Glucose Level Within Targeted Range  Outcome: Ongoing, Progressing     Problem: Fall Injury Risk  Goal: Absence of Fall and Fall-Related Injury  Outcome: Ongoing, Progressing     Problem: Pain Acute  Goal: Acceptable Pain Control and Functional Ability  Outcome: Ongoing, Progressing

## 2022-05-07 DIAGNOSIS — E10.69 TYPE 1 DIABETES MELLITUS WITH OTHER SPECIFIED COMPLICATION: Primary | ICD-10-CM

## 2022-05-07 LAB
POCT GLUCOSE: 108 MG/DL (ref 70–110)
POCT GLUCOSE: 143 MG/DL (ref 70–110)
POCT GLUCOSE: 147 MG/DL (ref 70–110)
POCT GLUCOSE: 148 MG/DL (ref 70–110)
POCT GLUCOSE: 149 MG/DL (ref 70–110)
POCT GLUCOSE: 157 MG/DL (ref 70–110)
POCT GLUCOSE: 204 MG/DL (ref 70–110)
POCT GLUCOSE: 224 MG/DL (ref 70–110)
POCT GLUCOSE: 228 MG/DL (ref 70–110)
POCT GLUCOSE: 229 MG/DL (ref 70–110)
POCT GLUCOSE: 294 MG/DL (ref 70–110)
POCT GLUCOSE: 320 MG/DL (ref 70–110)
POCT GLUCOSE: 371 MG/DL (ref 70–110)
POCT GLUCOSE: 377 MG/DL (ref 70–110)
POCT GLUCOSE: 38 MG/DL (ref 70–110)
PROT 24H UR-MRATE: 6270 MG/SPEC (ref 0–100)
PROT UR-MCNC: 220 MG/DL (ref 0–15)
URINE COLLECTION DURATION: 24 HR
URINE VOLUME: 2850 ML
VARICELLA INTERPRETATION: POSITIVE
VARICELLA ZOSTER IGG: 3.09 ISR (ref 0–0.9)

## 2022-05-07 PROCEDURE — 99232 PR SUBSEQUENT HOSPITAL CARE,LEVL II: ICD-10-PCS | Mod: ,,, | Performed by: NURSE PRACTITIONER

## 2022-05-07 PROCEDURE — C9399 UNCLASSIFIED DRUGS OR BIOLOG: HCPCS | Performed by: OBSTETRICS & GYNECOLOGY

## 2022-05-07 PROCEDURE — 63600175 PHARM REV CODE 636 W HCPCS: Performed by: STUDENT IN AN ORGANIZED HEALTH CARE EDUCATION/TRAINING PROGRAM

## 2022-05-07 PROCEDURE — 25000003 PHARM REV CODE 250: Performed by: STUDENT IN AN ORGANIZED HEALTH CARE EDUCATION/TRAINING PROGRAM

## 2022-05-07 PROCEDURE — 25000003 PHARM REV CODE 250

## 2022-05-07 PROCEDURE — 11000001 HC ACUTE MED/SURG PRIVATE ROOM

## 2022-05-07 PROCEDURE — 99232 SBSQ HOSP IP/OBS MODERATE 35: CPT | Mod: ,,, | Performed by: NURSE PRACTITIONER

## 2022-05-07 PROCEDURE — 99232 PR SUBSEQUENT HOSPITAL CARE,LEVL II: ICD-10-PCS | Mod: ,,, | Performed by: OBSTETRICS & GYNECOLOGY

## 2022-05-07 PROCEDURE — 25000003 PHARM REV CODE 250: Performed by: OBSTETRICS & GYNECOLOGY

## 2022-05-07 PROCEDURE — 25000242 PHARM REV CODE 250 ALT 637 W/ HCPCS: Performed by: STUDENT IN AN ORGANIZED HEALTH CARE EDUCATION/TRAINING PROGRAM

## 2022-05-07 PROCEDURE — 84156 ASSAY OF PROTEIN URINE: CPT | Performed by: STUDENT IN AN ORGANIZED HEALTH CARE EDUCATION/TRAINING PROGRAM

## 2022-05-07 PROCEDURE — 99232 SBSQ HOSP IP/OBS MODERATE 35: CPT | Mod: ,,, | Performed by: OBSTETRICS & GYNECOLOGY

## 2022-05-07 RX ORDER — INSULIN ASPART 100 [IU]/ML
5 INJECTION, SOLUTION INTRAVENOUS; SUBCUTANEOUS
Status: DISCONTINUED | OUTPATIENT
Start: 2022-05-07 | End: 2022-05-08

## 2022-05-07 RX ORDER — INSULIN ASPART 100 [IU]/ML
3 INJECTION, SOLUTION INTRAVENOUS; SUBCUTANEOUS ONCE
Status: COMPLETED | OUTPATIENT
Start: 2022-05-07 | End: 2022-05-07

## 2022-05-07 RX ORDER — INSULIN ASPART 100 [IU]/ML
1-10 INJECTION, SOLUTION INTRAVENOUS; SUBCUTANEOUS
Status: DISCONTINUED | OUTPATIENT
Start: 2022-05-07 | End: 2022-05-11

## 2022-05-07 RX ORDER — PYRIDOXINE HCL (VITAMIN B6) 25 MG
25 TABLET ORAL ONCE
Status: DISCONTINUED | OUTPATIENT
Start: 2022-05-08 | End: 2022-05-07

## 2022-05-07 RX ORDER — PYRIDOXINE HCL (VITAMIN B6) 25 MG
25 TABLET ORAL ONCE
Status: DISCONTINUED | OUTPATIENT
Start: 2022-05-08 | End: 2022-05-08

## 2022-05-07 RX ADMIN — DEXTROSE 250 ML: 10 SOLUTION INTRAVENOUS at 05:05

## 2022-05-07 RX ADMIN — FAMOTIDINE 20 MG: 20 TABLET ORAL at 08:05

## 2022-05-07 RX ADMIN — DOXYLAMINE SUCCINATE 25 MG: 25 TABLET ORAL at 11:05

## 2022-05-07 RX ADMIN — INSULIN ASPART 3 UNITS: 100 INJECTION, SOLUTION INTRAVENOUS; SUBCUTANEOUS at 12:05

## 2022-05-07 RX ADMIN — DOCUSATE SODIUM 200 MG: 100 CAPSULE, LIQUID FILLED ORAL at 08:05

## 2022-05-07 RX ADMIN — INSULIN ASPART 3 UNITS: 100 INJECTION, SOLUTION INTRAVENOUS; SUBCUTANEOUS at 10:05

## 2022-05-07 RX ADMIN — INSULIN ASPART 3 UNITS: 100 INJECTION, SOLUTION INTRAVENOUS; SUBCUTANEOUS at 03:05

## 2022-05-07 RX ADMIN — INSULIN DETEMIR 8 UNITS: 100 INJECTION, SOLUTION SUBCUTANEOUS at 08:05

## 2022-05-07 RX ADMIN — HYDRALAZINE HYDROCHLORIDE 50 MG: 25 TABLET, FILM COATED ORAL at 10:05

## 2022-05-07 RX ADMIN — METOCLOPRAMIDE 10 MG: 10 TABLET ORAL at 03:05

## 2022-05-07 RX ADMIN — INSULIN ASPART 3 UNITS: 100 INJECTION, SOLUTION INTRAVENOUS; SUBCUTANEOUS at 06:05

## 2022-05-07 RX ADMIN — INSULIN ASPART 3 UNITS: 100 INJECTION, SOLUTION INTRAVENOUS; SUBCUTANEOUS at 01:05

## 2022-05-07 RX ADMIN — FAMOTIDINE 20 MG: 20 TABLET ORAL at 09:05

## 2022-05-07 RX ADMIN — NIFEDIPINE 60 MG: 30 TABLET, FILM COATED, EXTENDED RELEASE ORAL at 04:05

## 2022-05-07 RX ADMIN — ENOXAPARIN SODIUM 40 MG: 100 INJECTION SUBCUTANEOUS at 04:05

## 2022-05-07 RX ADMIN — INSULIN DETEMIR 8 UNITS: 100 INJECTION, SOLUTION SUBCUTANEOUS at 09:05

## 2022-05-07 RX ADMIN — METOCLOPRAMIDE 10 MG: 10 TABLET ORAL at 09:05

## 2022-05-07 RX ADMIN — METOCLOPRAMIDE 10 MG: 10 TABLET ORAL at 08:05

## 2022-05-07 RX ADMIN — HYDRALAZINE HYDROCHLORIDE 50 MG: 25 TABLET, FILM COATED ORAL at 09:05

## 2022-05-07 RX ADMIN — ASPIRIN 81 MG CHEWABLE TABLET 81 MG: 81 TABLET CHEWABLE at 09:05

## 2022-05-07 RX ADMIN — DOCUSATE SODIUM 200 MG: 100 CAPSULE, LIQUID FILLED ORAL at 09:05

## 2022-05-07 RX ADMIN — ACETAMINOPHEN 650 MG: 325 TABLET ORAL at 11:05

## 2022-05-07 NOTE — PROGRESS NOTES
Patient's pre-dinner , carb count 81. Patient given 8u aspart per algorithm. 2 hour postprandial BG 68, patient about to eat peaches for snack. 30 minutes later, RN called to report that patient was not acting normally. Repeat BG 50. 2 glucose tabs given. Patient had also received juice. Resident to bedside for assessment. Patient responsive but lethargic. Appears pale, diaphoretic. . Discussed with MFM fellow. Will hold off on additional glucose at this time given that it is currently normal. CMP, mag, phos ordered. Will get hourly glucose checks overnight. Continue to monitor closely.      Eliana Vasquez MD  OBGYN PGY-4

## 2022-05-07 NOTE — CONSULTS
[facility]te[facility] ([place])  Endocrinology  Diabetes Consult Note    Consult Requested by: Jose Martin Funes MD   Reason for admit: Type 1 diabetes mellitus with other specified complication    HISTORY OF PRESENT ILLNESS:  Reason for Consult: Management of T1DM, Hyperglycemia     Currently 10 weeks pregnant    Outpatient diabetes provider: Primary Care in [place]; [place] (No established PCP or endocrine care in [facility])  Diabetes education during pregnancy: None     Diabetes diagnosis year: 18 years     Home Diabetes Regimen:   Lantus 15 units QD  Novolog 3 units TIDWM with SSI (ISF 1:50)    For insulin pump users: Historically on Medronic (But has been off the pump for about 6 weeks; Found down with BG in the 20's).   No CGM    How often checking glucose at home? >4 x day (Every 2-3 hours)  BG readings on regimen: 's  Hypoglycemia on the regimen?  Yes  Missed doses on regimen?  No    Diabetes Complications include:     Hyperglycemia, Hypoglycemia , Diabetic nephropathy  , Diabetic retinopathy , and Diabetic gastroparesis     Complicating diabetes co morbidities:   Pregancy.       HPI:     Alicia Valles is a 27 y.o. [id number] at 9w5d presents for blood sugar patterning. She was seen in MFM clinic yesterday after recently being discharged from the hospital where she was admitted for hypertensive encephalopathy/hypoglycemia.Ms. Valles has poorly controlled DM1. She was diagnosed at age 10 and her most recent A1C was >12. She previously was followed by an endocrinologist in [place], but has not established with anyone in [place] where she currently lives. She states that used to count carbs and have an insulin sensitivity factor, but she has not done that in a few years. She currently takes lantus 15 u at 5 pm; for her mealtime insulin, she randomly decides how much to give herself. Reports that she has been admitted for DKA more than 5 times. Prior to her most recent hospitalization, she was found  down with BG of 29. She had a seizure due to hypoglycemia; she has not ever had seizures before and does not take medications for a seizure disorder.  Reports that she has never had an episode of hypoglycemia like that before. 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 Reports prior surgery for retinal detachment. Endocrine consulted to manage hyperglycemia and type 1 diabetes.         VIRTUAL TELENOTE    Start time:11:45  Chief complaint: Type 1 Diabetes in pregnancy  The patient location is: [facility]  The patient arrived at: 11:46  Present with the patient at the time of the telemed/virtual assessment:11:46             End time:  12:15    Total time spent with patient: 30 minutes    The attending portion of this evaluation, treatment, and documentation was performed per Enio Bedolla DNP, GENIEP via Telemedicine AudioVisual using the secure [employer]ender[employer] software platform with 2 way audio/video. The provider was located off-site and the patient is located in the hospital. The aforementioned video software was utilized to document the relevant history and physical exam.    Interval HPI:   Overnight events: No acute events overnight. Patient on the antepartum unit at [facility] in room B393/B393 A. Blood glucose  labile . BG at, above, and below goal on current insulin regimen (SSI, prandial, and basal insulin ). Steroid use- None.    Renal function- Normal   Vasopressors-  None       Diet gestational diabetic 2000 kcal (Breakfast & midmorning snack=70 carb grams; Lunch & midafternoon snack = 70 carb grams; Dinner & HS snack = 70 carb grams; Snacks recommended at 15 - 30 grams)     Eatin%  Nausea: Yes  Hypoglycemia and intervention: No  Fever: No  TPN and/or TF: No    PMH, PSH, FH, SH updated and reviewed     ROS:  Review of Systems  Constitutional: Negative for weight changes.  Eyes: Negative for visual  disturbance.  Respiratory: Negative for cough.   Cardiovascular: Negative for chest pain.  Gastrointestinal: Negative for nausea.  Endocrine: Positive for polyuria, polydipsia.  Musculoskeletal: Positive for back pain.  Skin: Negative for rash.  Neurological: Positive for syncope.  Psychiatric/Behavioral: Negative for depression.    Current Medications and/or Treatments Impacting Glycemic Control  Immunotherapy:    Immunosuppressants       None          Steroids:   Hormones (From admission, onward)                None          Pressors:    Autonomic Drugs (From admission, onward)                None          Hyperglycemia/Diabetes Medications:   Antihyperglycemics (From admission, onward)                Start     Stop Route Frequency Ordered    05/07/22 1130  insulin aspart U-100 pen 5 Units         -- SubQ 3 times daily with meals 05/07/22 0917    05/07/22 0945  insulin detemir U-100 pen 8 Units         -- SubQ 2 times daily 05/07/22 0836    05/07/22 0939  insulin aspart U-100 pen 1-10 Units         -- SubQ Before meals & nightly PRN 05/07/22 0842             PHYSICAL EXAMINATION:  Vitals:    05/07/22 0945   BP: (!) 148/87   Pulse: 99   Resp: 18   Temp: 98.2 °F (36.8 °C)     Body mass index is 24.37 kg/m².    Physical Exam  Physical Exam deferred due to tele health visit.       Labs Reviewed and Include   Recent Labs   Lab 05/06/22  2133      CALCIUM 8.8   ALBUMIN 2.8*   PROT 6.8      K 4.0   CO2 22*      BUN 21*   CREATININE 1.1   ALKPHOS 60   ALT 16   AST 19   BILITOT 0.2     Lab Results   Component Value Date    WBC 9.21 05/05/2022    HGB 8.3 (L) 05/05/2022    HCT 24.9 (L) 05/05/2022    MCV 90 05/05/2022     05/05/2022     Recent Labs   Lab 05/05/22  1638   TSH 1.570     Lab Results   Component Value Date    HGBA1C 9.8 (H) 05/05/2022       Nutritional status:   Body mass index is 24.37 kg/m².  Lab Results   Component Value Date    ALBUMIN 2.8 (L) 05/06/2022    ALBUMIN 2.6 (L) 05/05/2022      No results found for: PREALBUMIN    Estimated Creatinine Clearance: 62.1 mL/min (based on SCr of 1.1 mg/dL).    Accu-Checks  Recent Labs     05/06/22  2203 05/06/22  2302 05/07/22  0003 05/07/22  0103 05/07/22  0203 05/07/22  0303 05/07/22  0401 05/07/22  0503 05/07/22  0605 05/07/22  0947   POCTGLUCOSE 214* 215* 224* 294* 228* 147* 108 229* 204* 157*        ASSESSMENT and PLAN    * Type 1 diabetes mellitus with other specified complication  Endocrinology consulted for BG management.   BG goal    (Discussed recommendations with staff).   - Levemir Flex Pen 8 units BID  - Novolog (aspart) insulin ICR 1:15 Units SQ TIDWM and prn for BG excursions ISF 1:70 (starting to slide at 180 mg/dL)  - BG checks AC/HS/0200 and two hours post-prandial  - Initiate Dexcom use at d/c  - Hypoglycemia protocol in place  - If blood glucose greater than 300, please ask patient not to eat food or drink anything other than water until correctional insulin has brought it back below 250    ** Please notify Endocrine for any change and/or advance in diet**  ** Please call Endocrine for any BG related issues **    Goal of glucose for diabetes in pregnancy  Fasting blood glucose concentration ?95 mg/dL  One-hour postprandial blood glucose concentration ?140 mg/dL   Two-hour postprandial glucose concentration ?120 mg/dL     Goal A1c in pregnancy ideally <6.5%, to reduce the risk of congenital anomalies    Discharge Planning:   TBD. Please notify endocrinology prior to discharge.  Discharge planning:  Outpatient follow-up: In two weeks in the D/C clinic then follow-up with Dr. Schumacher for primary endocrine care.   Prescriptions needed: Dexcom G6; updated Lantus and Novolog dosing (see above).   Glucagon: Recommend Baqsimi if covered by insurance.   Ketones strips: Y                    First trimester pregnancy  Optimize glucose control for the health of mom and baby.       Anemia  Lab Results   Component Value Date    HGBA1C 9.8 (H)  05/05/2022     Anemia may impact the accuracy of the A1C.           Plan discussed with patient, family, and RN at bedside.      Enio Bedolla, DNP, FNP  Endocrinology  [facility] - [facility] ([place])

## 2022-05-07 NOTE — PROGRESS NOTES
[facility] - Antepartum ([place])  Obstetrics  Antepartum Progress Note    Patient Name: Alicia Valles  MRN: [id number]  Admission Date: 2022  Hospital Length of Stay: 2 days  Attending Physician: Jose Martin Funes MD  Primary Care Provider: Primary Doctor No    Subjective:     Principal Problem:Type 1 diabetes mellitus with other specified complication    HPI:  Alicia Valles is a 27 y.o. [id number] at 9w5d presents for blood sugar patterning. She was seen in [facility] clinic yesterday after recently being discharged from the hospital where she was admitted for hypertensive encephalopathy/hypoglycemia.    Ms. Valles has poorly controlled DM1. She was diagnosed at age 10 and her most recent A1C was >12. She previously was followed by an endocrinologist in [place], but has not established with anyone in [place] where she currently lives. She states that used to count carbs and have an insulin sensitivity factor, but she has not done that in a few years. She currently takes lantus 15 u at 5 pm; for her mealtime insulin, she randomly decides how much to give herself. Reports that she has been admitted for DKA more than 5 times. Prior to her most recent hospitalization, she was found down with BG of 29. She had a seizure due to hypoglycemia; she has not ever had seizures before and does not take medications for a seizure disorder.  Reports that she has never had an episode of hypoglycemia like that before. 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 Reports prior surgery for retinal detachment.    Ms. Valles also has chronic hypertension. She previously was taking methyldopa and hydralazine, however she was recently switched by her OBGYN to procardia 30 mg qD and hydralazine 50 mg BID. She believes this regimen has kept her well-controlled. She does not follow with a PCP.    Currently, she reports cramping epigastric pain, which she  believes could be gas pain. Denies polyuria, shortness of breath, vaginal bleeding, vision changes, chest pain, diarrhea, and dysuria.    Of note, this is an unplanned, but desired pregnancy.      Hospital Course:  05/05/2022 - Admitted for BG patterning. 1T labs, echo, EKG, TSH, A1C pending. Will continue home BP meds. Lantus 15u qHS with aspart 3/3/3 and ISF 1:50 >150. Will count carbs for each meal.  05/06/2022 - BG . Symptomatic lows. Will continue to pattern.        Resting at this time. No complaints.     Objective:     Vital Signs (Most Recent):  Temp: 98 °F (36.7 °C) (05/07/22 0205)  Pulse: 91 (05/07/22 0205)  Resp: 18 (05/07/22 0205)  BP: 136/81 (05/07/22 0205)  SpO2: 99 % (05/07/22 0205) Vital Signs (24h Range):  Temp:  [98 °F (36.7 °C)-99.4 °F (37.4 °C)] 98 °F (36.7 °C)  Pulse:  [] 91  Resp:  [17-18] 18  SpO2:  [99 %-100 %] 99 %  BP: (136-185)/() 136/81     Weight: 58.5 kg (129 lb)  Body mass index is 24.37 kg/m².      No intake or output data in the 24 hours ending 05/07/22 0454      Significant Labs:  Recent Lab Results  (Last 5 results in the past 24 hours)        05/07/22  0401   05/07/22  0303   05/07/22  0203   05/07/22  0103   05/07/22  0003        POCT Glucose 108   147   228   294   224                              Physical Exam:   Constitutional: She is oriented to person, place, and time. She appears well-developed and well-nourished. No distress.    HENT:   Head: Normocephalic and atraumatic.    Eyes: Conjunctivae and EOM are normal.     Cardiovascular:  Normal rate and regular rhythm.             Pulmonary/Chest: Effort normal. No respiratory distress.        Abdominal: Soft. She exhibits no distension. There is no abdominal tenderness.             Musculoskeletal: No tenderness or edema.       Neurological: She is alert and oriented to person, place, and time.    Skin: Skin is warm and dry. No rash noted. She is not diaphoretic.    Psychiatric: She has a normal mood and  affect. Her behavior is normal.     Assessment/Plan:     27 y.o. female  at 10w0d for:    * Type 1 diabetes mellitus with other specified complication  - Diagnosed at age 10, does not currently follow with endocrine  - complications include diabetic retinopathy, gastroparesis, neuropathy, and h/o retinal detachment  - currently asymptomatic  - CBC 8.3/24.9/252  - Cr 1.2, LFTs 18  - P:C 3.75  - UA with 2+ glucose, 2+ protein  - TSH 1.570  - EKG wnl  - Echo pending  - BG poorly controlled overnight,   - A1C 9.8  - Will pattern BG 2AM, 6AM, pre-meal and 2 hours post-meal  - Continue lantus 15u qHS  - Aspart up-titrated on , to 4u with each meal. Correction: 1 unit for every 50 > 150 before meals and 1 unit for every 75 > 150 after meals   - Given extremely labile BG overnight, hourly accuchecks obtained. Has not required any insulin since pre-dinner. Most recent .  - Repeat CMP, mag, phos were WNL.  - Plan for endocrinology consult today      Proteinuria  - P:C 3.75  - 24 hour urine pending    Anemia  - iron/colace  - iron studies pending  - Hemoglobin electrophoresis pending    First trimester pregnancy  - 1T labs pending  - HCV neg  - HIV neg  - daily PNV    Chronic hypertension  - BP: (136-185)/() 136/81  - CBC 8.3/24.9/252, Cr 1.2, LFTs 18  - P:C 3.75  - 24 hour urine pending  - continue home procardia 30 mg qD and hydralazine 50 mg BID          Eliana Vasquez MD  Obstetrics  [facility] - Antepartum ([place])    I have reviewed and agree with progress note, assessment and plan as written by Dr. Vasquez. Patient is a 27 y.o.  at 10w0d admitted to [facility] service for poorly controlled T1DM. Pregnancy also complicated by cHTN and renal disease. Yesterday pm patient had significant hyperglycemia pre-meal followed by post meal symptomatic hypoglycemia. Seen and examined at bedside this am. Doing well without major complaints. Denies abdominal pain, cramping, vaginal bleeding.     Temp:   [98 °F (36.7 °C)-99 °F (37.2 °C)] 98 °F (36.7 °C)  Pulse:  [] 94  Resp:  [17-18] 18  SpO2:  [98 %-100 %] 98 %  BP: (136-185)/() 144/82    1. Type 1 DM complicating pregnancy  -endocrinology consulted this am for assistance with management, appreciated recommendations:     Recommendations:  - place formal MFM diabetes telemedicine cosult  - adjust insulin to :              levemir 8 units twice a day               novolog per carb count and ISF 15 min prior to meals                          ICR 1:15                          ISF 1:70                          Target 180   - once BG improved can work on tightening target but for now need to avoid hypoglycemia.     -these orders were placed and verbally discussed with her nurse and the OB resident team  -a telemedicine consult was placed, and patient will have outpatient follow up arranged with endocrinology  -once again discussed options regarding pregnancy termination with patient given multiple uncontrolled medical co-morbidities and possibility of end organ damage or maternal death as a result of continued pregnancy. Patient expressed understanding.   -will continue to monitor closely inpatient until adequate glucose control is obtained     2. cHTN  -BP well controlled on current medication regimen  -24 hour urine in process  -on lovenox for DVT ppx given significant proteinuria    Jimenez Escobedo MD  PGY 5  Maternal Fetal Medicine  [facility]

## 2022-05-07 NOTE — ASSESSMENT & PLAN NOTE
- Diagnosed at age 10, does not currently follow with endocrine  - complications include diabetic retinopathy, gastroparesis, neuropathy, and h/o retinal detachment  - currently asymptomatic  - CBC 8.3/24.9/252  - Cr 1.2, LFTs 18/19  - P:C 3.75  - UA with 2+ glucose, 2+ protein  - TSH 1.570  - EKG wnl  - Echo pending  - BG poorly controlled overnight,   - A1C 9.8  - Will pattern BG 2AM, 6AM, pre-meal and 2 hours post-meal  - Continue lantus 15u qHS  - Aspart up-titrated on 5/6, to 4u with each meal. Correction: 1 unit for every 50 > 150 before meals and 1 unit for every 75 > 150 after meals   - Given extremely labile BG overnight, hourly accuchecks obtained. Has not required any insulin since pre-dinner. Most recent .  - Repeat CMP, mag, phos were WNL.  - Plan for endocrinology consult today

## 2022-05-07 NOTE — PROGRESS NOTES
" Latest Reference Range & Units 05/07/22 17:42 05/07/22 18:18   POCT Glucose 70 - 110 mg/dL 38 (LL) 149 (H)   (H): Data is abnormally high  (LL): Data is critically low      Pt called out asking for her BG to be checked and stated she "felt low". BG 38. Dr. Will notified. 250cc bolus of D10 given and peaches. BG rechecked 15 minutes after bolus. . Dr. Royal notified. Pt's dinner tray at bedside. Order placed for 3 units of aspart to be given with meal. Pt reports feeling better.   "

## 2022-05-07 NOTE — PROGRESS NOTES
Preprandial . Lunch tray at bedside. 53 carbs for meal. Dr. Will notified. Orders placed for mealtime aspart.

## 2022-05-07 NOTE — ASSESSMENT & PLAN NOTE
Endocrinology consulted for BG management.   BG goal      - Levemir Flex Pen 8 units BID  - Novolog (aspart) insulin ICR 1:15 Units SQ TIDWM and prn for BG excursions ISF 1:70 (starting to slide at 180 mg/dL)  - BG checks AC/HS/0200 and two hours post-prandial  - Initiate Dexcom use at d/c  - Hypoglycemia protocol in place  - If blood glucose greater than 300, please ask patient not to eat food or drink anything other than water until correctional insulin has brought it back below 250    ** Please notify Endocrine for any change and/or advance in diet**  ** Please call Endocrine for any BG related issues **    Goal of glucose for diabetes in pregnancy  Fasting blood glucose concentration ?95 mg/dL  One-hour postprandial blood glucose concentration ?140 mg/dL   Two-hour postprandial glucose concentration ?120 mg/dL     Goal A1c in pregnancy ideally <6.5%, to reduce the risk of congenital anomalies    Discharge Planning:   TBD. Please notify endocrinology prior to discharge.  Discharge planning:  Outpatient follow-up: In two weeks in the D/C clinic then follow-up with Dr. Schumacher for primary endocrine care.   Prescriptions needed: Dexcom G6; updated Lantus and Novolog dosing (see above).   Glucagon: Recommend Baqsimi if covered by insurance.   Ketones strips: Y

## 2022-05-07 NOTE — ASSESSMENT & PLAN NOTE
- BP: (136-185)/() 136/81  - CBC 8.3/24.9/252, Cr 1.2, LFTs 19/18  - P:C 3.75  - 24 hour urine pending  - continue home procardia 30 mg qD and hydralazine 50 mg BID

## 2022-05-07 NOTE — SUBJECTIVE & OBJECTIVE
VIRTUAL TELENOTE    Start time:11:45  Chief complaint: Type 1 Diabetes in pregnancy  The patient location is: [facility]  The patient arrived at: 11:46  Present with the patient at the time of the telemed/virtual assessment:11:46             End time:  12:15    Total time spent with patient: 30 minutes    The attending portion of this evaluation, treatment, and documentation was performed per Enio Bedolla DNP, FNP via Telemedicine AudioVisual using the secure [employer] software platform with 2 way audio/video. The provider was located off-site and the patient is located in the hospital. The aforementioned video software was utilized to document the relevant history and physical exam.    Interval HPI:   Overnight events: No acute events overnight. Patient on the antepartum unit at [facility] in room B393/B393 A. Blood glucose  labile . BG at, above, and below goal on current insulin regimen (SSI, prandial, and basal insulin ). Steroid use- None.    Renal function- Normal   Vasopressors-  None       Diet gestational diabetic 2000 kcal (Breakfast & midmorning snack=70 carb grams; Lunch & midafternoon snack = 70 carb grams; Dinner & HS snack = 70 carb grams; Snacks recommended at 15 - 30 grams)     Eatin%  Nausea: Yes  Hypoglycemia and intervention: No  Fever: No  TPN and/or TF: No    PMH, PSH, FH, SH updated and reviewed     ROS:  Review of Systems  Constitutional: Negative for weight changes.  Eyes: Negative for visual disturbance.  Respiratory: Negative for cough.   Cardiovascular: Negative for chest pain.  Gastrointestinal: Negative for nausea.  Endocrine: Positive for polyuria, polydipsia.  Musculoskeletal: Positive for back pain.  Skin: Negative for rash.  Neurological: Positive for syncope.  Psychiatric/Behavioral: Negative for depression.    Current Medications and/or Treatments Impacting Glycemic Control  Immunotherapy:    Immunosuppressants       None          Steroids:   Hormones (From  admission, onward)                None          Pressors:    Autonomic Drugs (From admission, onward)                None          Hyperglycemia/Diabetes Medications:   Antihyperglycemics (From admission, onward)                Start     Stop Route Frequency Ordered    05/07/22 1130  insulin aspart U-100 pen 5 Units         -- SubQ 3 times daily with meals 05/07/22 0917    05/07/22 0945  insulin detemir U-100 pen 8 Units         -- SubQ 2 times daily 05/07/22 0836    05/07/22 0939  insulin aspart U-100 pen 1-10 Units         -- SubQ Before meals & nightly PRN 05/07/22 0842             PHYSICAL EXAMINATION:  Vitals:    05/07/22 0945   BP: (!) 148/87   Pulse: 99   Resp: 18   Temp: 98.2 °F (36.8 °C)     Body mass index is 24.37 kg/m².    Physical Exam  Physical Exam deferred due to tele health visit.

## 2022-05-07 NOTE — HPI
Reason for Consult: Management of T1DM, Hyperglycemia     Currently 10 weeks pregnant    Outpatient diabetes provider: Primary Care in [place]; [place] (No established PCP or endocrine care in [facility])  Diabetes education during pregnancy: None     Diabetes diagnosis year: 18 years     Home Diabetes Regimen:   Lantus 15 units QD  Novolog 3 units TIDWM with SSI (ISF 1:50)    For insulin pump users: Historically on Medronic (But has been off the pump for about 6 weeks; Found down with BG in the 20's).   No CGM    How often checking glucose at home? >4 x day (Every 2-3 hours)  BG readings on regimen: 's  Hypoglycemia on the regimen?  Yes  Missed doses on regimen?  No    Diabetes Complications include:     Hyperglycemia, Hypoglycemia , Diabetic nephropathy  , Diabetic retinopathy , and Diabetic gastroparesis     Complicating diabetes co morbidities:   Pregancy.       HPI:     Alicia Valles is a 27 y.o. [id number] at 9w5d presents for blood sugar patterning. She was seen in [facility] clinic yesterday after recently being discharged from the hospital where she was admitted for hypertensive encephalopathy/hypoglycemia.Ms. Valles has poorly controlled DM1. She was diagnosed at age 10 and her most recent A1C was >12. She previously was followed by an endocrinologist in [place], but has not established with anyone in [place] where she currently lives. She states that used to count carbs and have an insulin sensitivity factor, but she has not done that in a few years. She currently takes lantus 15 u at 5 pm; for her mealtime insulin, she randomly decides how much to give herself. Reports that she has been admitted for DKA more than 5 times. Prior to her most recent hospitalization, she was found down with BG of 29. She had a seizure due to hypoglycemia; she has not ever had seizures before and does not take medications for a seizure disorder.  Reports that she has never had an episode of hypoglycemia like that before.  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 Reports prior surgery for retinal detachment. Endocrine consulted to manage hyperglycemia and type 1 diabetes.

## 2022-05-07 NOTE — PROGRESS NOTES
Latest Reference Range & Units 05/07/22 09:47   POCT Glucose 70 - 110 mg/dL 157 (H)   (H): Data is abnormally high    Preprandial BG. Breakfast tray at bedside with 44 carbs. Dr. Royal notified. Order placed for 3 units of aspart.

## 2022-05-07 NOTE — ASSESSMENT & PLAN NOTE
Lab Results   Component Value Date    HGBA1C 9.8 (H) 05/05/2022     Anemia may impact the accuracy of the A1C.

## 2022-05-07 NOTE — CARE UPDATE
Brief Endocrine Note    Patient with labile T1DM hospitalized for glycemic control having both hypoglycemia (after corrections) and postprandial hypoglycemia:    Recommendations:  - place formal MFM diabetes telemedicine cosult  - adjust insulin to :   levemir 8 units twice a day    novolog per carb count and ISF 15 min prior to meals    ICR 1:15    ISF 1:70    Target 180   - once BG improved can work on tightening target but for now need to avoid hypoglycemia.     Will work on getting patient visit in endocrine clinic.    Eunice Schumacher MD

## 2022-05-07 NOTE — SUBJECTIVE & OBJECTIVE
Resting at this time. No complaints.     Objective:     Vital Signs (Most Recent):  Temp: 98 °F (36.7 °C) (05/07/22 0205)  Pulse: 91 (05/07/22 0205)  Resp: 18 (05/07/22 0205)  BP: 136/81 (05/07/22 0205)  SpO2: 99 % (05/07/22 0205) Vital Signs (24h Range):  Temp:  [98 °F (36.7 °C)-99.4 °F (37.4 °C)] 98 °F (36.7 °C)  Pulse:  [] 91  Resp:  [17-18] 18  SpO2:  [99 %-100 %] 99 %  BP: (136-185)/() 136/81     Weight: 58.5 kg (129 lb)  Body mass index is 24.37 kg/m².      No intake or output data in the 24 hours ending 05/07/22 0454      Significant Labs:  Recent Lab Results  (Last 5 results in the past 24 hours)        05/07/22  0401   05/07/22  0303   05/07/22  0203   05/07/22  0103   05/07/22  0003        POCT Glucose 108   147   228   294   224                              Physical Exam:   Constitutional: She is oriented to person, place, and time. She appears well-developed and well-nourished. No distress.    HENT:   Head: Normocephalic and atraumatic.    Eyes: Conjunctivae and EOM are normal.     Cardiovascular:  Normal rate and regular rhythm.             Pulmonary/Chest: Effort normal. No respiratory distress.        Abdominal: Soft. She exhibits no distension. There is no abdominal tenderness.             Musculoskeletal: No tenderness or edema.       Neurological: She is alert and oriented to person, place, and time.    Skin: Skin is warm and dry. No rash noted. She is not diaphoretic.    Psychiatric: She has a normal mood and affect. Her behavior is normal.

## 2022-05-08 LAB
ALBUMIN SERPL BCP-MCNC: 2.4 G/DL (ref 3.5–5.2)
ALP SERPL-CCNC: 55 U/L (ref 55–135)
ALT SERPL W/O P-5'-P-CCNC: 14 U/L (ref 10–44)
ANION GAP SERPL CALC-SCNC: 9 MMOL/L (ref 8–16)
AST SERPL-CCNC: 19 U/L (ref 10–40)
BILIRUB SERPL-MCNC: 0.3 MG/DL (ref 0.1–1)
BUN SERPL-MCNC: 17 MG/DL (ref 6–20)
CALCIUM SERPL-MCNC: 8.6 MG/DL (ref 8.7–10.5)
CHLORIDE SERPL-SCNC: 105 MMOL/L (ref 95–110)
CO2 SERPL-SCNC: 23 MMOL/L (ref 23–29)
CREAT SERPL-MCNC: 1 MG/DL (ref 0.5–1.4)
EST. GFR  (AFRICAN AMERICAN): >60 ML/MIN/1.73 M^2
EST. GFR  (NON AFRICAN AMERICAN): >60 ML/MIN/1.73 M^2
GLUCOSE SERPL-MCNC: 121 MG/DL (ref 70–110)
MAGNESIUM SERPL-MCNC: 2.2 MG/DL (ref 1.6–2.6)
PHOSPHATE SERPL-MCNC: 4.9 MG/DL (ref 2.7–4.5)
POCT GLUCOSE: 101 MG/DL (ref 70–110)
POCT GLUCOSE: 131 MG/DL (ref 70–110)
POCT GLUCOSE: 135 MG/DL (ref 70–110)
POCT GLUCOSE: 147 MG/DL (ref 70–110)
POCT GLUCOSE: 153 MG/DL (ref 70–110)
POCT GLUCOSE: 198 MG/DL (ref 70–110)
POCT GLUCOSE: 207 MG/DL (ref 70–110)
POCT GLUCOSE: 230 MG/DL (ref 70–110)
POCT GLUCOSE: 241 MG/DL (ref 70–110)
POCT GLUCOSE: 316 MG/DL (ref 70–110)
POCT GLUCOSE: 316 MG/DL (ref 70–110)
POCT GLUCOSE: 425 MG/DL (ref 70–110)
POCT GLUCOSE: 54 MG/DL (ref 70–110)
POCT GLUCOSE: 77 MG/DL (ref 70–110)
POTASSIUM SERPL-SCNC: 4.5 MMOL/L (ref 3.5–5.1)
PROT SERPL-MCNC: 6.1 G/DL (ref 6–8.4)
SODIUM SERPL-SCNC: 137 MMOL/L (ref 136–145)

## 2022-05-08 PROCEDURE — 25000003 PHARM REV CODE 250: Performed by: STUDENT IN AN ORGANIZED HEALTH CARE EDUCATION/TRAINING PROGRAM

## 2022-05-08 PROCEDURE — 63600175 PHARM REV CODE 636 W HCPCS: Performed by: STUDENT IN AN ORGANIZED HEALTH CARE EDUCATION/TRAINING PROGRAM

## 2022-05-08 PROCEDURE — 63600175 PHARM REV CODE 636 W HCPCS: Performed by: OBSTETRICS & GYNECOLOGY

## 2022-05-08 PROCEDURE — 25000003 PHARM REV CODE 250

## 2022-05-08 PROCEDURE — 83735 ASSAY OF MAGNESIUM: CPT | Performed by: STUDENT IN AN ORGANIZED HEALTH CARE EDUCATION/TRAINING PROGRAM

## 2022-05-08 PROCEDURE — 80053 COMPREHEN METABOLIC PANEL: CPT | Performed by: STUDENT IN AN ORGANIZED HEALTH CARE EDUCATION/TRAINING PROGRAM

## 2022-05-08 PROCEDURE — 84100 ASSAY OF PHOSPHORUS: CPT | Performed by: STUDENT IN AN ORGANIZED HEALTH CARE EDUCATION/TRAINING PROGRAM

## 2022-05-08 PROCEDURE — 11000001 HC ACUTE MED/SURG PRIVATE ROOM

## 2022-05-08 PROCEDURE — 25000003 PHARM REV CODE 250: Performed by: OBSTETRICS & GYNECOLOGY

## 2022-05-08 PROCEDURE — 36415 COLL VENOUS BLD VENIPUNCTURE: CPT | Performed by: STUDENT IN AN ORGANIZED HEALTH CARE EDUCATION/TRAINING PROGRAM

## 2022-05-08 PROCEDURE — 25000242 PHARM REV CODE 250 ALT 637 W/ HCPCS: Performed by: STUDENT IN AN ORGANIZED HEALTH CARE EDUCATION/TRAINING PROGRAM

## 2022-05-08 PROCEDURE — 99232 PR SUBSEQUENT HOSPITAL CARE,LEVL II: ICD-10-PCS | Mod: ,,, | Performed by: OBSTETRICS & GYNECOLOGY

## 2022-05-08 PROCEDURE — 99232 SBSQ HOSP IP/OBS MODERATE 35: CPT | Mod: ,,, | Performed by: OBSTETRICS & GYNECOLOGY

## 2022-05-08 RX ORDER — LABETALOL HYDROCHLORIDE 5 MG/ML
20 INJECTION, SOLUTION INTRAVENOUS ONCE
Status: COMPLETED | OUTPATIENT
Start: 2022-05-08 | End: 2022-05-08

## 2022-05-08 RX ORDER — INSULIN ASPART 100 [IU]/ML
3 INJECTION, SOLUTION INTRAVENOUS; SUBCUTANEOUS ONCE
Status: DISCONTINUED | OUTPATIENT
Start: 2022-05-08 | End: 2022-05-08

## 2022-05-08 RX ORDER — INSULIN ASPART 100 [IU]/ML
3 INJECTION, SOLUTION INTRAVENOUS; SUBCUTANEOUS ONCE
Status: COMPLETED | OUTPATIENT
Start: 2022-05-08 | End: 2022-05-08

## 2022-05-08 RX ORDER — PYRIDOXINE HCL (VITAMIN B6) 25 MG
25 TABLET ORAL NIGHTLY
Status: DISCONTINUED | OUTPATIENT
Start: 2022-05-08 | End: 2022-05-16 | Stop reason: HOSPADM

## 2022-05-08 RX ORDER — NIFEDIPINE 10 MG/1
10 CAPSULE ORAL ONCE
Status: COMPLETED | OUTPATIENT
Start: 2022-05-08 | End: 2022-05-08

## 2022-05-08 RX ORDER — INSULIN ASPART 100 [IU]/ML
2 INJECTION, SOLUTION INTRAVENOUS; SUBCUTANEOUS ONCE
Status: COMPLETED | OUTPATIENT
Start: 2022-05-08 | End: 2022-05-08

## 2022-05-08 RX ORDER — NIFEDIPINE 30 MG/1
90 TABLET, EXTENDED RELEASE ORAL
Status: DISCONTINUED | OUTPATIENT
Start: 2022-05-08 | End: 2022-05-09

## 2022-05-08 RX ADMIN — FAMOTIDINE 20 MG: 20 TABLET ORAL at 09:05

## 2022-05-08 RX ADMIN — METOCLOPRAMIDE 10 MG: 10 TABLET ORAL at 09:05

## 2022-05-08 RX ADMIN — ENOXAPARIN SODIUM 40 MG: 100 INJECTION SUBCUTANEOUS at 05:05

## 2022-05-08 RX ADMIN — NIFEDIPINE 10 MG: 10 CAPSULE ORAL at 10:05

## 2022-05-08 RX ADMIN — METOCLOPRAMIDE 10 MG: 10 TABLET ORAL at 02:05

## 2022-05-08 RX ADMIN — HYDRALAZINE HYDROCHLORIDE 50 MG: 25 TABLET, FILM COATED ORAL at 09:05

## 2022-05-08 RX ADMIN — NIFEDIPINE 90 MG: 30 TABLET, FILM COATED, EXTENDED RELEASE ORAL at 05:05

## 2022-05-08 RX ADMIN — INSULIN DETEMIR 8 UNITS: 100 INJECTION, SOLUTION SUBCUTANEOUS at 09:05

## 2022-05-08 RX ADMIN — INSULIN ASPART 1 UNITS: 100 INJECTION, SOLUTION INTRAVENOUS; SUBCUTANEOUS at 11:05

## 2022-05-08 RX ADMIN — INSULIN ASPART 2 UNITS: 100 INJECTION, SOLUTION INTRAVENOUS; SUBCUTANEOUS at 12:05

## 2022-05-08 RX ADMIN — DOCUSATE SODIUM 200 MG: 100 CAPSULE, LIQUID FILLED ORAL at 09:05

## 2022-05-08 RX ADMIN — INSULIN ASPART 6 UNITS: 100 INJECTION, SOLUTION INTRAVENOUS; SUBCUTANEOUS at 07:05

## 2022-05-08 RX ADMIN — Medication 16 G: at 03:05

## 2022-05-08 RX ADMIN — HYDRALAZINE HYDROCHLORIDE 50 MG: 25 TABLET, FILM COATED ORAL at 07:05

## 2022-05-08 RX ADMIN — ASPIRIN 81 MG CHEWABLE TABLET 81 MG: 81 TABLET CHEWABLE at 09:05

## 2022-05-08 RX ADMIN — INSULIN ASPART 6 UNITS: 100 INJECTION, SOLUTION INTRAVENOUS; SUBCUTANEOUS at 02:05

## 2022-05-08 RX ADMIN — INSULIN ASPART 3 UNITS: 100 INJECTION, SOLUTION INTRAVENOUS; SUBCUTANEOUS at 09:05

## 2022-05-08 RX ADMIN — SIMETHICONE 80 MG: 80 TABLET, CHEWABLE ORAL at 02:05

## 2022-05-08 RX ADMIN — LABETALOL HYDROCHLORIDE 20 MG: 5 INJECTION INTRAVENOUS at 08:05

## 2022-05-08 RX ADMIN — DOXYLAMINE SUCCINATE 12.5 MG: 25 TABLET ORAL at 11:05

## 2022-05-08 NOTE — NURSING
No acute events noted overnight. VSS and afebrile. Blood sugar monitored per order. 1 episode of hypoglycemia, which resolved after glucose tabs and snack. All patient needs met and questions were answered. Pt safety maintained. Bed in the lowest position, wheels locked, call light within reach. Will continue to monitor.

## 2022-05-08 NOTE — NURSING
Latest Reference Range & Units 05/08/22 00:04   POCT Glucose 70 - 110 mg/dL 198 (H)   (H): Data is abnormally high    Dr. Will aware. No orders at this time. Will cont to monitor.

## 2022-05-08 NOTE — PLAN OF CARE
Problem: Adult Inpatient Plan of Care  Goal: Plan of Care Review  Outcome: Ongoing, Progressing  Goal: Patient-Specific Goal (Individualized)  Outcome: Ongoing, Progressing  Goal: Absence of Hospital-Acquired Illness or Injury  Outcome: Ongoing, Progressing  Goal: Optimal Comfort and Wellbeing  Outcome: Ongoing, Progressing  Goal: Readiness for Transition of Care  Outcome: Ongoing, Progressing     Problem: Diabetes Comorbidity  Goal: Blood Glucose Level Within Targeted Range  Outcome: Ongoing, Progressing     Problem: Maternal-Fetal Wellbeing  Goal: Optimal Maternal-Fetal Wellbeing  Outcome: Ongoing, Progressing     Problem: Fall Injury Risk  Goal: Absence of Fall and Fall-Related Injury  Outcome: Ongoing, Progressing

## 2022-05-08 NOTE — ASSESSMENT & PLAN NOTE
- BP: (132-148)/(74-87) 143/79  - CBC 8.3/24.9/252, Cr 1.2, LFTs 19/18  - P:C 3.75  - 24 hour urine 6270  - continue home hydralazine 50 mg BID, will titrate up to procardia 90 mg qD

## 2022-05-08 NOTE — CARE UPDATE
Endocrinology Care Update:  BG goal     BG mostly within goal ranges with one episode of hypoglycemia noted overnight (BG 54) requiring 16g oral glucose tabs.     Plan:  - Recommend decreasing Levemir Flex Pen to 7 units BID (20% dose decrease; hypoglycemia overnight)   -Continue Novolog (aspart) insulin ICR 1:15 Units SQ TIDWM and prn for BG excursions ISF 1:70 (starting to slide at 180 mg/dL)  - BG checks AC/HS/0200 and two hours post-prandial  - Initiate Dexcom use at d/c  - Hypoglycemia protocol in place  - If blood glucose greater than 300, please ask patient not to eat food or drink anything other than water until correctional insulin has brought it back below 250     ** Please notify Endocrine for any change and/or advance in diet**  ** Please call Endocrine for any BG related issues **     Goal of glucose for diabetes in pregnancy  Fasting blood glucose concentration ?95 mg/dL  One-hour postprandial blood glucose concentration ?140 mg/dL   Two-hour postprandial glucose concentration ?120 mg/dL      Goal A1c in pregnancy ideally <6.5%, to reduce the risk of congenital anomalies     Discharge Planning:   TBD. Please notify endocrinology prior to discharge.  Discharge planning:  Outpatient follow-up: In two weeks in the D/C clinic then follow-up with Dr. Schumacher for primary endocrine care.   Prescriptions needed: Dexcom G6; updated Lantus and Novolog dosing (see above).   Glucagon: Recommend Baqsimi if covered by insurance.   Ketones strips: Y

## 2022-05-08 NOTE — NURSING
Latest Reference Range & Units 05/08/22 14:07   POCT Glucose 70 - 110 mg/dL 230 (H)   (H): Data is abnormally high    MD notified. Will give appropriate dose of insulin with lunch when meal arrives to unit.

## 2022-05-08 NOTE — NURSING
Latest Reference Range & Units 05/08/22 17:12   POCT Glucose 70 - 110 mg/dL 241 (H)   (H): Data is abnormally high    MD Danny notified. No orders given.Pt safety maintained. Will continue to monitor.

## 2022-05-08 NOTE — ASSESSMENT & PLAN NOTE
- Diagnosed at age 10, does not currently follow with endocrine  - complications include diabetic retinopathy, gastroparesis, neuropathy, and h/o retinal detachment  - currently asymptomatic  - CBC 8.3/24.9/252  - Cr 1.2, LFTs 18/19  - P:C 3.75  - UA with 2+ glucose, 2+ protein  - TSH 1.570  - EKG wnl  - Echo wnl  - BG poorly controlled overnight,   - A1C 9.8  - Will pattern BG 2AM, 6AM, pre-meal and 2 hours post-meal  - Levemir 8/8  - Mealtime insulin: Carb ratio 1:20, ISF 1:70 > 180, will NOT correct for post-meal BG  - Will hold food if pre-meal BG >300  - Will attempt to space out meals to >4 hours apart to allow insulin to work  - Appreciate endocrine assistance

## 2022-05-08 NOTE — NURSING
" Latest Reference Range & Units 05/08/22 03:39   POCT Glucose 70 - 110 mg/dL 54 (L)   (L): Data is abnormally low    Pt called out stating she "felt low." Dr. Will notified. 4 glucose tabs given per MAR, juice and snack given per MD ok. Will reassess in 30 minutes.       Latest Reference Range & Units 05/08/22 04:27   POCT Glucose 70 - 110 mg/dL 147 (H)   (H): Data is abnormally high    BG 30 minutes after snack. Dr. Will aware.   "

## 2022-05-08 NOTE — NURSING
Latest Reference Range & Units 05/08/22 13:07   POCT Glucose 70 - 110 mg/dL 316 (H)   (H): Data is abnormally high    MD notified. Awaiting orders. Will continue to monitor and hold meals    Received orders to recheck BG in 1 hour and hold meals. No corrective insulin ordered at this time.

## 2022-05-08 NOTE — SUBJECTIVE & OBJECTIVE
Resting at this time. No complaints.     Objective:     Vital Signs (Most Recent):  Temp: 98.3 °F (36.8 °C) (05/08/22 0428)  Pulse: 97 (05/08/22 0428)  Resp: 16 (05/08/22 0428)  BP: (!) 143/79 (05/08/22 0428)  SpO2: 97 % (05/08/22 0428) Vital Signs (24h Range):  Temp:  [98 °F (36.7 °C)-98.5 °F (36.9 °C)] 98.3 °F (36.8 °C)  Pulse:  [] 97  Resp:  [16-18] 16  SpO2:  [97 %-99 %] 97 %  BP: (132-148)/(74-87) 143/79     Weight: 58.5 kg (129 lb)  Body mass index is 24.37 kg/m².      No intake or output data in the 24 hours ending 05/08/22 0440      Significant Labs:  Recent Lab Results  (Last 5 results in the past 24 hours)        05/08/22  0427   05/08/22  0339   05/08/22  0202   05/08/22  0004   05/07/22  2046        POCT Glucose 147   54   101   198   143                              Physical Exam:   Constitutional: She is oriented to person, place, and time. She appears well-developed and well-nourished. No distress.    HENT:   Head: Normocephalic and atraumatic.    Eyes: Conjunctivae and EOM are normal.     Cardiovascular:  Normal rate and regular rhythm.             Pulmonary/Chest: Effort normal. No respiratory distress.        Abdominal: Soft. She exhibits no distension. There is no abdominal tenderness.             Musculoskeletal: No tenderness or edema.       Neurological: She is alert and oriented to person, place, and time.    Skin: Skin is warm and dry. No rash noted. She is not diaphoretic.    Psychiatric: She has a normal mood and affect. Her behavior is normal.

## 2022-05-08 NOTE — CARE UPDATE
Summary of Glucose Checks For 5/7/22:    Breakfast: 44 carbs   Pre 157 > 3 units of aspart   Post 371 > 3 units aspart    Lunch: 53 carbs   Pre 377 > 4 units of aspart (did not do full ISF as pt ate lunch 1 hr after breakfast)   Post 320 > 3 units aspart > 148    1745: Patient felt symptomatic hypoglycemia and asked for BG check. BG 38, give D50 with improvement in symptoms. Repeat . Patient then requested to eat dinner.     Case discussed with endocrine. Recommend Carb ratio of 1:20 with ISF of 1:70 > 180. Do NOT recommend correction for post-meal BG. Recommend encouraging patient to wait at least 4 hours between meals. Recommend no PO intake if BG >300 pre-meal.    Dinner: 71 carbs   Pre 149 > 3 units of aspart   Post 143        Will continue to closely titrate insulin regimen.    Archana Will MD/MPH  OB/GYN PGY2

## 2022-05-08 NOTE — NURSING
Latest Reference Range & Units 05/08/22 09:17   POCT Glucose 70 - 110 mg/dL 135 (H)   (H): Data is abnormally high    BG pre-breakfast. MD Danny notified. Breakfast contains 65 grams of carbohydrates. 3 units of insulin ordered and given

## 2022-05-08 NOTE — NURSING
Latest Reference Range & Units 05/08/22 12:06   POCT Glucose 70 - 110 mg/dL 316 (H)   (H): Data is abnormally high    MD notified. Ordered 2 units of insulin. Given to pt in right arm. Will recheck BG in 1 hour and hold meals until < 250 per order.

## 2022-05-08 NOTE — PROGRESS NOTES
[facility], operated by [facility] - Antepartum ([place])  Obstetrics  Antepartum Progress Note    Patient Name: Alicia Valles  MRN: [id number]  Admission Date: 2022  Hospital Length of Stay: 3 days  Attending Physician: Jose Martin Funes MD  Primary Care Provider: Primary Doctor No    Subjective:     Principal Problem:Type 1 diabetes mellitus with other specified complication    HPI:  Alicia Valles is a 27 y.o. [id number] at 9w5d presents for blood sugar patterning. She was seen in [facility] clinic yesterday after recently being discharged from the hospital where she was admitted for hypertensive encephalopathy/hypoglycemia.    Ms. Valles has poorly controlled DM1. She was diagnosed at age 10 and her most recent A1C was >12. She previously was followed by an endocrinologist in [place], but has not established with anyone in [place] where she currently lives. She states that used to count carbs and have an insulin sensitivity factor, but she has not done that in a few years. She currently takes lantus 15 u at 5 pm; for her mealtime insulin, she randomly decides how much to give herself. Reports that she has been admitted for DKA more than 5 times. Prior to her most recent hospitalization, she was found down with BG of 29. She had a seizure due to hypoglycemia; she has not ever had seizures before and does not take medications for a seizure disorder.  Reports that she has never had an episode of hypoglycemia like that before. 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 Reports prior surgery for retinal detachment.    Ms. Valles also has chronic hypertension. She previously was taking methyldopa and hydralazine, however she was recently switched by her OBGYN to procardia 30 mg qD and hydralazine 50 mg BID. She believes this regimen has kept her well-controlled. She does not follow with a PCP.    Currently, she reports cramping epigastric pain, which she  believes could be gas pain. Denies polyuria, shortness of breath, vaginal bleeding, vision changes, chest pain, diarrhea, and dysuria.    Of note, this is an unplanned, but desired pregnancy.      Hospital Course:  05/05/2022 - Admitted for BG patterning. 1T labs, echo, EKG, TSH, A1C pending. Will continue home BP meds. Lantus 15u qHS with aspart 3/3/3 and ISF 1:50 >150. Will count carbs for each meal.  05/06/2022 - BG . Symptomatic lows. Will continue to pattern.  05/07/2022 - BG poorly controlled, endocrine consulted. Adjusted carb ratio to 1:20 and ISF 1:70 > 180. Will not slide post-meal. One episode of hypoglycemia which responded to D50. Discussed meal timing.  05/08/2022 - Episode of hypoglycemia overnight, will continue to pattern.        Resting at this time. No complaints.     Objective:     Vital Signs (Most Recent):  Temp: 98.3 °F (36.8 °C) (05/08/22 0428)  Pulse: 97 (05/08/22 0428)  Resp: 16 (05/08/22 0428)  BP: (!) 143/79 (05/08/22 0428)  SpO2: 97 % (05/08/22 0428) Vital Signs (24h Range):  Temp:  [98 °F (36.7 °C)-98.5 °F (36.9 °C)] 98.3 °F (36.8 °C)  Pulse:  [] 97  Resp:  [16-18] 16  SpO2:  [97 %-99 %] 97 %  BP: (132-148)/(74-87) 143/79     Weight: 58.5 kg (129 lb)  Body mass index is 24.37 kg/m².      No intake or output data in the 24 hours ending 05/08/22 0440      Significant Labs:  Recent Lab Results  (Last 5 results in the past 24 hours)        05/08/22  0427   05/08/22  0339   05/08/22  0202   05/08/22  0004   05/07/22 2046        POCT Glucose 147   54   101   198   143                              Physical Exam:   Constitutional: She is oriented to person, place, and time. She appears well-developed and well-nourished. No distress.    HENT:   Head: Normocephalic and atraumatic.    Eyes: Conjunctivae and EOM are normal.     Cardiovascular:  Normal rate and regular rhythm.             Pulmonary/Chest: Effort normal. No respiratory distress.        Abdominal: Soft. She exhibits no  distension. There is no abdominal tenderness.             Musculoskeletal: No tenderness or edema.       Neurological: She is alert and oriented to person, place, and time.    Skin: Skin is warm and dry. No rash noted. She is not diaphoretic.    Psychiatric: She has a normal mood and affect. Her behavior is normal.     Assessment/Plan:     27 y.o. female  at 10w1d for:    * Type 1 diabetes mellitus with other specified complication  - Diagnosed at age 10, does not currently follow with endocrine  - complications include diabetic retinopathy, gastroparesis, neuropathy, and h/o retinal detachment  - currently asymptomatic  - CBC 8.3/24.9/252  - Cr 1.2, LFTs   - P:C 3.75  - UA with 2+ glucose, 2+ protein  - TSH 1.570  - EKG wnl  - Echo wnl  - BG poorly controlled overnight,   - A1C 9.8  - Will pattern BG 2AM, 6AM, pre-meal and 2 hours post-meal  - Levemir 8/8  - Mealtime insulin: Carb ratio 1:20, ISF 1:70 > 180, will NOT correct for post-meal BG  - Will hold food if pre-meal BG >300  - Will attempt to space out meals to >4 hours apart to allow insulin to work  - Appreciate endocrine assistance    Proteinuria  - P:C 3.75  - 24 hour urine 6270, nephrotic range  - lovenox 40 mg qD    Anemia  - iron/colace  - iron studies grossly wnl  - Hemoglobin electrophoresis pending    First trimester pregnancy  - 1T labs pending  - HCV neg  - HIV neg  - daily PNV    Chronic hypertension  - BP: (132-148)/(74-87) 143/79  - CBC 8.3/24.9/252, Cr 1.2, LFTs   - P:C 3.75  - 24 hour urine 6270  - continue home hydralazine 50 mg BID, will titrate up to procardia 90 mg qD          Archana Will MD  Obstetrics  [facility] - [facility]te[facility] ([place])      I have reviewed and agree with progress note, assessment and plan as written by Dr. Delgado. Patient is a 27 y.o.  at 10w1d admitted to [facility] service for poorly controlled T1DM. Pregnancy also complicated by cHTN and nephrotic range proteinuria. Endocrinology consulted  yesterday for assistance with management, see recs below. Patient doing well at bedside this am, no acute complaints.    Temp:  [98 °F (36.7 °C)-98.5 °F (36.9 °C)] 98.3 °F (36.8 °C)  Pulse:  [] 97  Resp:  [16-18] 16  SpO2:  [97 %-99 %] 97 %  BP: (132-148)/(74-87) 143/79    FHTs via bedside ultrasound 163 bpm    1. Type 1 DM complicating pregnancy  -endocrinology consulted and assisting with management of this complex T1DM, appreciate assistance:  - insulin regimen and dietary planning adjusted given continued erratic hyper/hypo glycemic episodes              levemir 8 units twice a day               novolog per carb count and ISF 15 min prior to meals                          ICR 1:20                          ISF 1:70                          Target 180   -hold meals if glucose > 300, check hourly glucose until glucose is below 250, then patient can eat. Water only during hyperglycemic episodes  -only correct PREMEAL glucoses to avoid hypoglycemia     -plan once again discussed with nursing staff and OB resident team  -fetal heart tones confirmed at bedside  -will continue to monitor closely inpatient until adequate glucose control is obtained      2. cHTN  -BP mild range with 2 severe range BP overnight, will increase procardia to 90 mg XL  -24 hour urine 6275  -on lovenox for DVT ppx given nephrotic range proteinuria  -ASA 81 at 12 weeks for preeclampsia prevention    Jimenez Escobedo MD  PGY 5  Maternal Fetal Medicine  [facility]

## 2022-05-08 NOTE — PROGRESS NOTES
Latest Reference Range & Units 05/08/22 14:51   POCT Glucose 70 - 110 mg/dL 207 (H)   (H): Data is abnormally high    Pt's lunch tray arrived with 78 g of carbs. According to MAR, patient to receive 5 units of insulin for tray. BG prior to eating = 207, so pt will receive an additional unit of insulin. Dr. Marques notified pt will receive a total of 6 units of insulin prior to meal. MD states to administer now. No additional orders given. Pt safety maintained. Will continue to monitor.

## 2022-05-08 NOTE — NURSING
Latest Reference Range & Units 05/08/22 11:09   POCT Glucose 70 - 110 mg/dL 425 (H)   (H): Data is abnormally high    MD Danny notified. Advised to hold all meals and recheck BG in 1 hour.

## 2022-05-08 NOTE — NURSING
Latest Reference Range & Units 05/08/22 02:02   POCT Glucose 70 - 110 mg/dL 101     Dr. Will aware.

## 2022-05-08 NOTE — NURSING
Latest Reference Range & Units 05/07/22 20:46   POCT Glucose 70 - 110 mg/dL 143 (H)   (H): Data is abnormally high    2 hour post prandial BG. Dr. Will aware. No insulin needed at this time. Will encourage pt to have diabetic snack prior to bedtime per MD request.

## 2022-05-09 ENCOUNTER — TELEPHONE (OUTPATIENT)
Dept: ENDOCRINOLOGY | Facility: CLINIC | Age: 27
End: 2022-05-09
Payer: MEDICAID

## 2022-05-09 LAB
HB ELECTROPHORESIS INTERP CANCEL: NORMAL
HB ELECTROPHORESIS INTERPRETATION: NORMAL
HGB A MFR BLD ELPH: 97.5 % (ref 95.8–98)
HGB A2 MFR BLD: 2.5 % (ref 2–3.3)
HGB A2+XXX MFR BLD ELPH: NORMAL %
HGB F MFR BLD: 0 % (ref 0–0.9)
HGB XXX MFR BLD ELPH: NORMAL %
HPLC HB VARIANT: NORMAL
POCT GLUCOSE: 111 MG/DL (ref 70–110)
POCT GLUCOSE: 113 MG/DL (ref 70–110)
POCT GLUCOSE: 140 MG/DL (ref 70–110)
POCT GLUCOSE: 144 MG/DL (ref 70–110)
POCT GLUCOSE: 175 MG/DL (ref 70–110)
POCT GLUCOSE: 176 MG/DL (ref 70–110)
POCT GLUCOSE: 190 MG/DL (ref 70–110)
POCT GLUCOSE: 236 MG/DL (ref 70–110)
POCT GLUCOSE: 258 MG/DL (ref 70–110)
POCT GLUCOSE: 290 MG/DL (ref 70–110)
POCT GLUCOSE: 297 MG/DL (ref 70–110)
POCT GLUCOSE: 393 MG/DL (ref 70–110)
POCT GLUCOSE: 48 MG/DL (ref 70–110)
POCT GLUCOSE: 52 MG/DL (ref 70–110)
POCT GLUCOSE: 58 MG/DL (ref 70–110)
POCT GLUCOSE: 63 MG/DL (ref 70–110)

## 2022-05-09 PROCEDURE — 99232 SBSQ HOSP IP/OBS MODERATE 35: CPT | Mod: ,,, | Performed by: OBSTETRICS & GYNECOLOGY

## 2022-05-09 PROCEDURE — 63600175 PHARM REV CODE 636 W HCPCS: Performed by: STUDENT IN AN ORGANIZED HEALTH CARE EDUCATION/TRAINING PROGRAM

## 2022-05-09 PROCEDURE — 25000003 PHARM REV CODE 250: Performed by: STUDENT IN AN ORGANIZED HEALTH CARE EDUCATION/TRAINING PROGRAM

## 2022-05-09 PROCEDURE — 99232 PR SUBSEQUENT HOSPITAL CARE,LEVL II: ICD-10-PCS | Mod: ,,, | Performed by: OBSTETRICS & GYNECOLOGY

## 2022-05-09 PROCEDURE — 11000001 HC ACUTE MED/SURG PRIVATE ROOM

## 2022-05-09 PROCEDURE — 25000003 PHARM REV CODE 250

## 2022-05-09 PROCEDURE — 25000242 PHARM REV CODE 250 ALT 637 W/ HCPCS: Performed by: STUDENT IN AN ORGANIZED HEALTH CARE EDUCATION/TRAINING PROGRAM

## 2022-05-09 RX ORDER — FAMOTIDINE 10 MG/ML
20 INJECTION INTRAVENOUS 2 TIMES DAILY
Status: DISCONTINUED | OUTPATIENT
Start: 2022-05-09 | End: 2022-05-12

## 2022-05-09 RX ORDER — METOCLOPRAMIDE HYDROCHLORIDE 5 MG/ML
10 INJECTION INTRAMUSCULAR; INTRAVENOUS EVERY 8 HOURS
Status: DISCONTINUED | OUTPATIENT
Start: 2022-05-09 | End: 2022-05-12

## 2022-05-09 RX ORDER — INSULIN ASPART 100 [IU]/ML
3 INJECTION, SOLUTION INTRAVENOUS; SUBCUTANEOUS ONCE
Status: COMPLETED | OUTPATIENT
Start: 2022-05-09 | End: 2022-05-09

## 2022-05-09 RX ORDER — LABETALOL HYDROCHLORIDE 5 MG/ML
40 INJECTION, SOLUTION INTRAVENOUS ONCE
Status: COMPLETED | OUTPATIENT
Start: 2022-05-09 | End: 2022-05-09

## 2022-05-09 RX ORDER — NIFEDIPINE 30 MG/1
120 TABLET, EXTENDED RELEASE ORAL
Status: DISCONTINUED | OUTPATIENT
Start: 2022-05-09 | End: 2022-05-16 | Stop reason: HOSPADM

## 2022-05-09 RX ORDER — INSULIN ASPART 100 [IU]/ML
6 INJECTION, SOLUTION INTRAVENOUS; SUBCUTANEOUS ONCE
Status: COMPLETED | OUTPATIENT
Start: 2022-05-09 | End: 2022-05-09

## 2022-05-09 RX ORDER — METOCLOPRAMIDE 10 MG/1
10 TABLET ORAL EVERY 8 HOURS
Status: DISCONTINUED | OUTPATIENT
Start: 2022-05-09 | End: 2022-05-09

## 2022-05-09 RX ORDER — NIFEDIPINE 10 MG/1
10 CAPSULE ORAL ONCE
Status: DISCONTINUED | OUTPATIENT
Start: 2022-05-10 | End: 2022-05-12

## 2022-05-09 RX ORDER — POLYETHYLENE GLYCOL 3350 17 G/17G
17 POWDER, FOR SOLUTION ORAL DAILY
Status: DISCONTINUED | OUTPATIENT
Start: 2022-05-09 | End: 2022-05-16 | Stop reason: HOSPADM

## 2022-05-09 RX ORDER — LABETALOL HYDROCHLORIDE 5 MG/ML
20 INJECTION, SOLUTION INTRAVENOUS ONCE
Status: COMPLETED | OUTPATIENT
Start: 2022-05-09 | End: 2022-05-09

## 2022-05-09 RX ORDER — LORAZEPAM 2 MG/ML
0.5 INJECTION INTRAMUSCULAR EVERY 6 HOURS PRN
Status: DISCONTINUED | OUTPATIENT
Start: 2022-05-09 | End: 2022-05-11

## 2022-05-09 RX ORDER — INSULIN ASPART 100 [IU]/ML
5 INJECTION, SOLUTION INTRAVENOUS; SUBCUTANEOUS ONCE
Status: DISCONTINUED | OUTPATIENT
Start: 2022-05-09 | End: 2022-05-09

## 2022-05-09 RX ORDER — METOCLOPRAMIDE HYDROCHLORIDE 5 MG/ML
10 INJECTION INTRAMUSCULAR; INTRAVENOUS ONCE
Status: COMPLETED | OUTPATIENT
Start: 2022-05-09 | End: 2022-05-09

## 2022-05-09 RX ORDER — INSULIN ASPART 100 [IU]/ML
2 INJECTION, SOLUTION INTRAVENOUS; SUBCUTANEOUS ONCE
Status: DISCONTINUED | OUTPATIENT
Start: 2022-05-09 | End: 2022-05-09

## 2022-05-09 RX ADMIN — METOCLOPRAMIDE 10 MG: 10 TABLET ORAL at 03:05

## 2022-05-09 RX ADMIN — INSULIN ASPART 3 UNITS: 100 INJECTION, SOLUTION INTRAVENOUS; SUBCUTANEOUS at 04:05

## 2022-05-09 RX ADMIN — FAMOTIDINE 20 MG: 20 TABLET ORAL at 09:05

## 2022-05-09 RX ADMIN — INSULIN ASPART 6 UNITS: 100 INJECTION, SOLUTION INTRAVENOUS; SUBCUTANEOUS at 10:05

## 2022-05-09 RX ADMIN — PROCHLORPERAZINE EDISYLATE 5 MG: 5 INJECTION INTRAMUSCULAR; INTRAVENOUS at 03:05

## 2022-05-09 RX ADMIN — LORAZEPAM 0.5 MG: 2 INJECTION INTRAMUSCULAR; INTRAVENOUS at 08:05

## 2022-05-09 RX ADMIN — INSULIN ASPART 3 UNITS: 100 INJECTION, SOLUTION INTRAVENOUS; SUBCUTANEOUS at 12:05

## 2022-05-09 RX ADMIN — HYDRALAZINE HYDROCHLORIDE 50 MG: 25 TABLET, FILM COATED ORAL at 10:05

## 2022-05-09 RX ADMIN — NIFEDIPINE 120 MG: 30 TABLET, FILM COATED, EXTENDED RELEASE ORAL at 09:05

## 2022-05-09 RX ADMIN — Medication 16 G: at 03:05

## 2022-05-09 RX ADMIN — LABETALOL HYDROCHLORIDE 20 MG: 5 INJECTION INTRAVENOUS at 10:05

## 2022-05-09 RX ADMIN — ENOXAPARIN SODIUM 40 MG: 100 INJECTION SUBCUTANEOUS at 06:05

## 2022-05-09 RX ADMIN — METOCLOPRAMIDE 10 MG: 5 INJECTION, SOLUTION INTRAMUSCULAR; INTRAVENOUS at 09:05

## 2022-05-09 RX ADMIN — DOXYLAMINE SUCCINATE 12.5 MG: 25 TABLET ORAL at 10:05

## 2022-05-09 RX ADMIN — Medication 24 G: at 02:05

## 2022-05-09 RX ADMIN — DOCUSATE SODIUM 200 MG: 100 CAPSULE, LIQUID FILLED ORAL at 10:05

## 2022-05-09 RX ADMIN — INSULIN ASPART 2 UNITS: 100 INJECTION, SOLUTION INTRAVENOUS; SUBCUTANEOUS at 09:05

## 2022-05-09 RX ADMIN — FAMOTIDINE 20 MG: 10 INJECTION, SOLUTION INTRAVENOUS at 09:05

## 2022-05-09 RX ADMIN — ASPIRIN 81 MG CHEWABLE TABLET 81 MG: 81 TABLET CHEWABLE at 09:05

## 2022-05-09 RX ADMIN — DOCUSATE SODIUM 200 MG: 100 CAPSULE, LIQUID FILLED ORAL at 09:05

## 2022-05-09 RX ADMIN — LABETALOL HYDROCHLORIDE 40 MG: 5 INJECTION INTRAVENOUS at 11:05

## 2022-05-09 NOTE — NURSING
Latest Reference Range & Units 05/09/22 09:43 05/09/22 12:05   POCT Glucose 70 - 110 mg/dL 175 (H) 393 (H)   (H): Data is abnormally high    Patient's pre- and 2 hr post breakfast; patient ate approximately 50-55g carbs;  notified c each result

## 2022-05-09 NOTE — PROGRESS NOTES
[facility] - Antepartum ([place])  Obstetrics  Antepartum Progress Note    Patient Name: Alicia Valles  MRN: [id number]  Admission Date: 2022  Hospital Length of Stay: 4 days  Attending Physician: Jose Martin Funes MD  Primary Care Provider: Primary Doctor No    Subjective:     Principal Problem:Type 1 diabetes mellitus with other specified complication    HPI:  Alicia Valles is a 27 y.o. [id number] at 9w5d presents for blood sugar patterning. She was seen in [facility] clinic yesterday after recently being discharged from the hospital where she was admitted for hypertensive encephalopathy/hypoglycemia.    Ms. Valles has poorly controlled DM1. She was diagnosed at age 10 and her most recent A1C was >12. She previously was followed by an endocrinologist in [place], but has not established with anyone in [place] where she currently lives. She states that used to count carbs and have an insulin sensitivity factor, but she has not done that in a few years. She currently takes lantus 15 u at 5 pm; for her mealtime insulin, she randomly decides how much to give herself. Reports that she has been admitted for DKA more than 5 times. Prior to her most recent hospitalization, she was found down with BG of 29. She had a seizure due to hypoglycemia; she has not ever had seizures before and does not take medications for a seizure disorder.  Reports that she has never had an episode of hypoglycemia like that before. 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 Reports prior surgery for retinal detachment.    Ms. Valles also has chronic hypertension. She previously was taking methyldopa and hydralazine, however she was recently switched by her OBGYN to procardia 30 mg qD and hydralazine 50 mg BID. She believes this regimen has kept her well-controlled. She does not follow with a PCP.    Currently, she reports cramping epigastric pain, which she  believes could be gas pain. Denies polyuria, shortness of breath, vaginal bleeding, vision changes, chest pain, diarrhea, and dysuria.    Of note, this is an unplanned, but desired pregnancy.      Hospital Course:  05/05/2022 - Admitted for BG patterning. 1T labs, echo, EKG, TSH, A1C pending. Will continue home BP meds. Lantus 15u qHS with aspart 3/3/3 and ISF 1:50 >150. Will count carbs for each meal.  05/06/2022 - BG . Symptomatic lows. Will continue to pattern.  05/07/2022 - BG poorly controlled, endocrine consulted. Adjusted carb ratio to 1:20 and ISF 1:70 > 180. Will not slide post-meal. One episode of hypoglycemia which responded to D50. Discussed meal timing.  05/08/2022 - Episode of hypoglycemia overnight, will continue to pattern.  05/09/2022 - Continued poor BG control. Insulin regimen now levemir 7u BID. Mealtime insulin 1:15 and ISF 1:70  >180.         Resting at this time. No complaints.     Objective:     Vital Signs (Most Recent):  Temp: 98.9 °F (37.2 °C) (05/09/22 0315)  Pulse: 99 (05/09/22 0315)  Resp: 18 (05/09/22 0315)  BP: 130/68 (05/09/22 0315)  SpO2: 100 % (05/09/22 0315) Vital Signs (24h Range):  Temp:  [98.5 °F (36.9 °C)-99 °F (37.2 °C)] 98.9 °F (37.2 °C)  Pulse:  [] 99  Resp:  [16-18] 18  SpO2:  [100 %] 100 %  BP: (130-169)/() 130/68     Weight: 58.5 kg (129 lb)  Body mass index is 24.37 kg/m².      No intake or output data in the 24 hours ending 05/09/22 0636      Significant Labs:  Recent Lab Results  (Last 5 results in the past 24 hours)        05/09/22  0609   05/09/22  0307   05/09/22  0231   05/09/22  0208   05/09/22  0142        POCT Glucose 144   190   111   48   63                              Physical Exam:   Constitutional: She is oriented to person, place, and time. She appears well-developed and well-nourished. No distress.    HENT:   Head: Normocephalic and atraumatic.    Eyes: Conjunctivae and EOM are normal.     Cardiovascular:  Normal rate and regular  rhythm.             Pulmonary/Chest: Effort normal. No respiratory distress.        Abdominal: Soft. She exhibits no distension. There is no abdominal tenderness.             Musculoskeletal: No tenderness or edema.       Neurological: She is alert and oriented to person, place, and time.    Skin: Skin is warm and dry. No rash noted. She is not diaphoretic.    Psychiatric: She has a normal mood and affect. Her behavior is normal.     Assessment/Plan:     27 y.o. female  at 10w2d for:    * Type 1 diabetes mellitus with other specified complication  - Diagnosed at age 10, does not currently follow with endocrine  - complications include diabetic retinopathy, gastroparesis, neuropathy, and h/o retinal detachment  - currently asymptomatic  - CBC 8.3/24.9/252  - Cr 1.2, LFTs   - P:C 3.75  - UA with 2+ glucose, 2+ protein  - TSH 1.570  - EKG wnl  - Echo wnl  - BG poorly controlled overnight,   - A1C 9.8  - Will pattern BG 2AM, 6AM, pre-meal and 2 hours post-meal  - Levemir 7/7  - Mealtime insulin: Carb ratio 1:15, ISF 1:70 > 180, will NOT correct for post-meal BG  - Will hold food if pre-meal BG >300  - Will attempt to space out meals to >4 hours apart to allow insulin to work  - Appreciate endocrine assistance    Proteinuria  - P:C 3.75  - 24 hour urine 6270, nephrotic range  - lovenox 40 mg qD    Anemia  - iron/colace  - iron studies grossly wnl  - Hemoglobin electrophoresis pending    First trimester pregnancy  - RPR NR, HIV neg, HCV neg, HBV neg, RI  - daily PNV    Chronic hypertension  - BP: (130-169)/() 130/68  - CBC 8.3/24.9/252, Cr 1.2, LFTs   - P:C 3.75  - 24 hour urine 6270  - continue home hydralazine 50 mg BID, will titrate up to procardia 120 mg qD          Archana Will MD  Obstetrics  [facility] - [facility] ([place])      Patient seen and examined. Agree with resident assessment and plan.  BGs still eratic. Feels well, no complaints.    Temp:  [98.5 °F (36.9 °C)-98.9 °F (37.2 °C)]  98.9 °F (37.2 °C)  Pulse:  [] 99  Resp:  [16-18] 18  SpO2:  [100 %] 100 %  BP: (130-169)/() 130/68    Abdomen soft, no tenderness    FHTs verified yesterday    BGs noted    Plan as outlined above  Defer to endocrinology for all changes to insulin regimen. Changes made today: detemir 6u BID, Aspart ICHO 1:12, 1:70 correction >180 pre meals only.  Will discuss with endocrinology about potential transfer to [facility] for closer care with endocrine service. Currently no obstetric complaints or concerns  Will uptitrate BPs meds as needed for goal BP <140/90    Lora Arthur MD  Maternal Fetal Medicine fellow  PGY-7

## 2022-05-09 NOTE — PLAN OF CARE
05/09/22 1654   Discharge Assessment   Assessment Type Discharge Planning Assessment   Confirmed/corrected address, phone number and insurance Yes   Confirmed Demographics Correct on Facesheet   Source of Information patient   Lives With parent(s)   Do you expect to return to your current living situation? Other (see comments)  (moving in with pt mother.)   Prior to hospitilization cognitive status: Alert/Oriented   Current cognitive status: Alert/Oriented   Do you have any problems affording any of your prescribed medications? Yes  (some difficulty affording test strips. Provided info to [employer] program for diabetics.)   How do you get to doctors appointments? family or friend will provide   Discharge Plan A Home     Introduced self to pt and explained sw role. No anticipated d/c needs at this time. Should any d/c needs arise, please consult social work. Emotional support provided.

## 2022-05-09 NOTE — PROGRESS NOTES
Component      Latest Ref Rng & Units 5/8/2022           7:16 PM   POCT Glucose      70 - 110 mg/dL 131 (H)       Md notified, Md ordered to give 6 units of aspart based off of carb amount in pt's meal and to not give additional aspart for pre meal check. Pt safety maintained. And verbalized understanding to RN.

## 2022-05-09 NOTE — PROGRESS NOTES
Component      Latest Ref Rng & Units 5/8/2022          11:32 PM   POCT Glucose      70 - 110 mg/dL 77   Md notifed, pt given 4 hour snack with 1 unit of aspart to cover 24 G of carbs per Md order. Pt's safety maintained and RN will check BGL 2 hours PP.

## 2022-05-09 NOTE — PROGRESS NOTES
Latest Reference Range & Units 05/09/22 06:09   POCT Glucose 70 - 110 mg/dL 144 (H)   (H): Data is abnormally high    MD notified, no new orders placed.

## 2022-05-09 NOTE — CARE UPDATE
Endocrinology Care Update:  BG goal     BG continues to be highly variable. 2 hr post prandial BG significantly above goal. Recommend the following adjustments.     Plan:  -Increase Novolog (aspart) insulin to ICR 1:10 Units SQ TIDWM and prn for BG excursions ISF 1:70 (starting to slide at 180 mg/dL)  - BG checks AC/HS/0200 and two hours post-prandial  -Avoid post-prandial correction   - Hypoglycemia protocol in place  - If blood glucose greater than 300, please ask patient not to eat food or drink anything other than water until correctional insulin has brought it back below 250      ** Please call Endocrine for any BG related issues **

## 2022-05-09 NOTE — PROGRESS NOTES
Latest Reference Range & Units 05/08/22 21:51   POCT Glucose 70 - 110 mg/dL 153 (H)   (H): Data is abnormally high    MD notified, ordered to give 7 units of Detremir and to repeat BGL at 0000. PT verbalized understanding of patient care, and pt's safety was maintained.

## 2022-05-09 NOTE — ASSESSMENT & PLAN NOTE
- Diagnosed at age 10, does not currently follow with endocrine  - complications include diabetic retinopathy, gastroparesis, neuropathy, and h/o retinal detachment  - currently asymptomatic  - CBC 8.3/24.9/252  - Cr 1.2, LFTs 18/19  - P:C 3.75  - UA with 2+ glucose, 2+ protein  - TSH 1.570  - EKG wnl  - Echo wnl  - BG poorly controlled overnight,   - A1C 9.8  - Will pattern BG 2AM, 6AM, pre-meal and 2 hours post-meal  - Levemir 7/7  - Mealtime insulin: Carb ratio 1:15, ISF 1:70 > 180, will NOT correct for post-meal BG  - Will hold food if pre-meal BG >300  - Will attempt to space out meals to >4 hours apart to allow insulin to work  - Appreciate endocrine assistance

## 2022-05-09 NOTE — TELEPHONE ENCOUNTER
----- Message from Eunice Schumacher MD sent at 5/7/2022 10:19 AM CDT -----  I have a f/u spot open this Thursday at 10 am.  Can you please schedule this patient at that time in person with a same day diabetes education session for comprehensive education preferably before she sees me but if not possible after is okay.     ----- Message -----  From: Jimenez Escobedo MD  Sent: 5/7/2022   8:50 AM CDT  To: MD Dr. Endy Mcdermott,    See attached MFM patient from Saturday 5/7/22. As a reminder, she lives in [place] and will need an in person visit followed by virtual visits to assist with management of Type 1 DM in pregnancy. Thank you again for your assistance.     Jimenez Escobedo MD  PGY 5  Maternal Fetal Medicine  [facility]

## 2022-05-09 NOTE — ASSESSMENT & PLAN NOTE
- BP: (130-169)/() 130/68  - CBC 8.3/24.9/252, Cr 1.2, LFTs 19/18  - P:C 3.75  - 24 hour urine 6270  - continue home hydralazine 50 mg BID, will titrate up to procardia 120 mg qD

## 2022-05-09 NOTE — PROGRESS NOTES
Component      Latest Ref Rng & Units 5/9/2022           2:08 AM   POCT Glucose      70 - 110 mg/dL 48 (LL)     Md notifed, ordered to give 24 G of carbs via glucose tabs and to repeat BGL in 15 minutes. Pt notified, verbalized understanding, and stated she feels sweaty and tired. RN administer glucose tabs, please see mar for adminsitraiton details. Will continue to monitor BGL per Md order to ensure pt safety.

## 2022-05-09 NOTE — PROGRESS NOTES
Component      Latest Ref Rng & Units 5/9/2022           1:42 AM   POCT Glucose      70 - 110 mg/dL 63 (L)   Md notified, ordered to give 16 G of carbs to the patient and recheck BGL15 minutes after administration. Pt notified, verbalized understanding, and RN confirmed safety of pt.

## 2022-05-09 NOTE — CARE UPDATE
Endocrinology Care Update:  BG goal     BG has been variable () on current insulin regimen. One episode of hypoglycemia noted overnight (BG 48); pt received 24g oral glucose. Post prandial BG excursions noted; recommend increasing ICR and decreasing basal to prevent overnight hypoglycemia.     Plan:  - Recommend decreasing Levemir Flex Pen to 6 units BID (20% dose decrease; hypoglycemia overnight)   -Increase Novolog (aspart) insulin to ICR 1:12 Units SQ TIDWM and prn for BG excursions ISF 1:70 (starting to slide at 180 mg/dL)  - BG checks AC/HS/0200 and two hours post-prandial  - Initiate Dexcom use at d/c  - Hypoglycemia protocol in place  - If blood glucose greater than 300, please ask patient not to eat food or drink anything other than water until correctional insulin has brought it back below 250     ** Please notify Endocrine for any change and/or advance in diet**  ** Please call Endocrine for any BG related issues **     Goal of glucose for diabetes in pregnancy  Fasting blood glucose concentration ?95 mg/dL  One-hour postprandial blood glucose concentration ?140 mg/dL   Two-hour postprandial glucose concentration ?120 mg/dL      Goal A1c in pregnancy ideally <6.5%, to reduce the risk of congenital anomalies     Discharge Planning:   TBD. Please notify endocrinology prior to discharge.  Discharge planning:  Outpatient follow-up: In two weeks in the D/C clinic then follow-up with Dr. Schumacher for primary endocrine care.   Prescriptions needed: Dexcom G6; updated Lantus and Novolog dosing (see above).   Glucagon: Recommend Baqsimi if covered by insurance.   Ketones strips: Y

## 2022-05-09 NOTE — PROGRESS NOTES
Component      Latest Ref Rng & Units 5/9/2022           2:31 AM   POCT Glucose      70 - 110 mg/dL 111 (H)     MD notified, ordered to repeat BGL in 30 minutes. Pt notified of care plan, pt verbalized understanding. pt stated she did not have s/s of hypoglycemia anymore. PT stable and RN instructed pt to notify staff if she feels like her blood sugar is dropping before Rn checks in 30 min.

## 2022-05-09 NOTE — SUBJECTIVE & OBJECTIVE
Resting at this time. No complaints.     Objective:     Vital Signs (Most Recent):  Temp: 98.9 °F (37.2 °C) (05/09/22 0315)  Pulse: 99 (05/09/22 0315)  Resp: 18 (05/09/22 0315)  BP: 130/68 (05/09/22 0315)  SpO2: 100 % (05/09/22 0315) Vital Signs (24h Range):  Temp:  [98.5 °F (36.9 °C)-99 °F (37.2 °C)] 98.9 °F (37.2 °C)  Pulse:  [] 99  Resp:  [16-18] 18  SpO2:  [100 %] 100 %  BP: (130-169)/() 130/68     Weight: 58.5 kg (129 lb)  Body mass index is 24.37 kg/m².      No intake or output data in the 24 hours ending 05/09/22 0636      Significant Labs:  Recent Lab Results  (Last 5 results in the past 24 hours)        05/09/22  0609   05/09/22  0307   05/09/22  0231   05/09/22  0208   05/09/22  0142        POCT Glucose 144   190   111   48   63                              Physical Exam:   Constitutional: She is oriented to person, place, and time. She appears well-developed and well-nourished. No distress.    HENT:   Head: Normocephalic and atraumatic.    Eyes: Conjunctivae and EOM are normal.     Cardiovascular:  Normal rate and regular rhythm.             Pulmonary/Chest: Effort normal. No respiratory distress.        Abdominal: Soft. She exhibits no distension. There is no abdominal tenderness.             Musculoskeletal: No tenderness or edema.       Neurological: She is alert and oriented to person, place, and time.    Skin: Skin is warm and dry. No rash noted. She is not diaphoretic.    Psychiatric: She has a normal mood and affect. Her behavior is normal.

## 2022-05-09 NOTE — PROGRESS NOTES
Latest Reference Range & Units 05/09/22 03:07   POCT Glucose 70 - 110 mg/dL 190 (H)   (H): Data is abnormally high    Md notified and ordered to repeat BGL at 6 am unless pt calls out. Pt educated about the new care plan and verbalized understanding. RN confirmed pt was stable, safe, and that the pt understood to notify staff if she feels either hypo or hyperglycemic before 6 am.

## 2022-05-09 NOTE — CONSULTS
Pt seen by DM education. Type I many years, now 10 weeks pregnant. Per mom on 4/12, pt experience overdose of insulin w/ medtronic pump resulting in hypoglycemic seizure. Now doing MDI w/ carb ratio dosing. No CGM sensor was being used. Follows endocrine in [facility], lives in [place] - willing to drive to [place] for care. Pt staying with mom since seizure for additional help and support w/ DM management.     Pt using glucometer at home. Would benefit from CGM sensor.     C/o gastroparesis. Usually dosing novolog after meals due to low BG issues. Reviewed ways to reduce gastro symptoms through diet changes (reduce fats, limit fiber, cooked veggies, small portions).     Reviewed importance of accurate carb counting.     Encouraged to follow up w/ outpatient diabetes education + endo. All questions answered at this time.

## 2022-05-10 ENCOUNTER — EDUCATION (OUTPATIENT)
Dept: DIABETES | Facility: OTHER | Age: 27
End: 2022-05-10
Payer: MEDICAID

## 2022-05-10 LAB
GLUCOSE SERPL-MCNC: 230 MG/DL (ref 70–110)
GLUCOSE SERPL-MCNC: 286 MG/DL (ref 70–110)
GLUCOSE SERPL-MCNC: 347 MG/DL (ref 70–110)
POCT GLUCOSE: 117 MG/DL (ref 70–110)
POCT GLUCOSE: 120 MG/DL (ref 70–110)
POCT GLUCOSE: 121 MG/DL (ref 70–110)
POCT GLUCOSE: 138 MG/DL (ref 70–110)
POCT GLUCOSE: 166 MG/DL (ref 70–110)
POCT GLUCOSE: 167 MG/DL (ref 70–110)
POCT GLUCOSE: 179 MG/DL (ref 70–110)
POCT GLUCOSE: 193 MG/DL (ref 70–110)
POCT GLUCOSE: 206 MG/DL (ref 70–110)
POCT GLUCOSE: 208 MG/DL (ref 70–110)
POCT GLUCOSE: 243 MG/DL (ref 70–110)
POCT GLUCOSE: 248 MG/DL (ref 70–110)
POCT GLUCOSE: 272 MG/DL (ref 70–110)
POCT GLUCOSE: 273 MG/DL (ref 70–110)
POCT GLUCOSE: 286 MG/DL (ref 70–110)
POCT GLUCOSE: 294 MG/DL (ref 70–110)
POCT GLUCOSE: 302 MG/DL (ref 70–110)
POCT GLUCOSE: 325 MG/DL (ref 70–110)
POCT GLUCOSE: 342 MG/DL (ref 70–110)
POCT GLUCOSE: 355 MG/DL (ref 70–110)
POCT GLUCOSE: 363 MG/DL (ref 70–110)
POCT GLUCOSE: 375 MG/DL (ref 70–110)

## 2022-05-10 PROCEDURE — 63600175 PHARM REV CODE 636 W HCPCS: Performed by: STUDENT IN AN ORGANIZED HEALTH CARE EDUCATION/TRAINING PROGRAM

## 2022-05-10 PROCEDURE — 25000242 PHARM REV CODE 250 ALT 637 W/ HCPCS: Performed by: STUDENT IN AN ORGANIZED HEALTH CARE EDUCATION/TRAINING PROGRAM

## 2022-05-10 PROCEDURE — 99233 PR SUBSEQUENT HOSPITAL CARE,LEVL III: ICD-10-PCS | Mod: ,,, | Performed by: OBSTETRICS & GYNECOLOGY

## 2022-05-10 PROCEDURE — 25000003 PHARM REV CODE 250: Performed by: STUDENT IN AN ORGANIZED HEALTH CARE EDUCATION/TRAINING PROGRAM

## 2022-05-10 PROCEDURE — 25000003 PHARM REV CODE 250

## 2022-05-10 PROCEDURE — C1751 CATH, INF, PER/CENT/MIDLINE: HCPCS

## 2022-05-10 PROCEDURE — 36410 VNPNXR 3YR/> PHY/QHP DX/THER: CPT

## 2022-05-10 PROCEDURE — 99232 SBSQ HOSP IP/OBS MODERATE 35: CPT | Mod: ,,, | Performed by: NURSE PRACTITIONER

## 2022-05-10 PROCEDURE — 99232 PR SUBSEQUENT HOSPITAL CARE,LEVL II: ICD-10-PCS | Mod: ,,, | Performed by: NURSE PRACTITIONER

## 2022-05-10 PROCEDURE — 99233 SBSQ HOSP IP/OBS HIGH 50: CPT | Mod: ,,, | Performed by: OBSTETRICS & GYNECOLOGY

## 2022-05-10 PROCEDURE — A4216 STERILE WATER/SALINE, 10 ML: HCPCS | Performed by: STUDENT IN AN ORGANIZED HEALTH CARE EDUCATION/TRAINING PROGRAM

## 2022-05-10 PROCEDURE — 11000001 HC ACUTE MED/SURG PRIVATE ROOM

## 2022-05-10 RX ORDER — DOCUSATE SODIUM 100 MG/1
100 CAPSULE, LIQUID FILLED ORAL DAILY
Status: DISCONTINUED | OUTPATIENT
Start: 2022-05-10 | End: 2022-05-10

## 2022-05-10 RX ORDER — INSULIN ASPART 100 [IU]/ML
3 INJECTION, SOLUTION INTRAVENOUS; SUBCUTANEOUS ONCE
Status: COMPLETED | OUTPATIENT
Start: 2022-05-10 | End: 2022-05-10

## 2022-05-10 RX ORDER — INSULIN ASPART 100 [IU]/ML
2 INJECTION, SOLUTION INTRAVENOUS; SUBCUTANEOUS ONCE
Status: COMPLETED | OUTPATIENT
Start: 2022-05-10 | End: 2022-05-10

## 2022-05-10 RX ORDER — HYDRALAZINE HYDROCHLORIDE 25 MG/1
50 TABLET, FILM COATED ORAL EVERY 8 HOURS
Status: DISCONTINUED | OUTPATIENT
Start: 2022-05-10 | End: 2022-05-10

## 2022-05-10 RX ORDER — HYDRALAZINE HYDROCHLORIDE 25 MG/1
50 TABLET, FILM COATED ORAL EVERY 8 HOURS
Status: DISCONTINUED | OUTPATIENT
Start: 2022-05-10 | End: 2022-05-11

## 2022-05-10 RX ORDER — INSULIN ASPART 100 [IU]/ML
5 INJECTION, SOLUTION INTRAVENOUS; SUBCUTANEOUS ONCE
Status: COMPLETED | OUTPATIENT
Start: 2022-05-10 | End: 2022-05-10

## 2022-05-10 RX ORDER — HYDRALAZINE HYDROCHLORIDE 20 MG/ML
5 INJECTION INTRAMUSCULAR; INTRAVENOUS ONCE
Status: COMPLETED | OUTPATIENT
Start: 2022-05-10 | End: 2022-05-10

## 2022-05-10 RX ADMIN — ENOXAPARIN SODIUM 40 MG: 100 INJECTION SUBCUTANEOUS at 05:05

## 2022-05-10 RX ADMIN — DOXYLAMINE SUCCINATE 12.5 MG: 25 TABLET ORAL at 08:05

## 2022-05-10 RX ADMIN — HYDRALAZINE HYDROCHLORIDE 50 MG: 25 TABLET, FILM COATED ORAL at 02:05

## 2022-05-10 RX ADMIN — HYDRALAZINE HYDROCHLORIDE 5 MG: 20 INJECTION, SOLUTION INTRAMUSCULAR; INTRAVENOUS at 10:05

## 2022-05-10 RX ADMIN — INSULIN ASPART 2 UNITS: 100 INJECTION, SOLUTION INTRAVENOUS; SUBCUTANEOUS at 03:05

## 2022-05-10 RX ADMIN — FAMOTIDINE 20 MG: 10 INJECTION, SOLUTION INTRAVENOUS at 09:05

## 2022-05-10 RX ADMIN — Medication 10 ML: at 02:05

## 2022-05-10 RX ADMIN — DOCUSATE SODIUM 200 MG: 100 CAPSULE, LIQUID FILLED ORAL at 08:05

## 2022-05-10 RX ADMIN — INSULIN ASPART 3 UNITS: 100 INJECTION, SOLUTION INTRAVENOUS; SUBCUTANEOUS at 10:05

## 2022-05-10 RX ADMIN — ASPIRIN 81 MG CHEWABLE TABLET 81 MG: 81 TABLET CHEWABLE at 08:05

## 2022-05-10 RX ADMIN — METOCLOPRAMIDE 10 MG: 5 INJECTION, SOLUTION INTRAMUSCULAR; INTRAVENOUS at 09:05

## 2022-05-10 RX ADMIN — Medication 25 MG: at 08:05

## 2022-05-10 RX ADMIN — PSYLLIUM HUSK 1 PACKET: 3.4 POWDER ORAL at 07:05

## 2022-05-10 RX ADMIN — LORAZEPAM 0.5 MG: 2 INJECTION INTRAMUSCULAR; INTRAVENOUS at 06:05

## 2022-05-10 RX ADMIN — INSULIN ASPART 2 UNITS: 100 INJECTION, SOLUTION INTRAVENOUS; SUBCUTANEOUS at 02:05

## 2022-05-10 RX ADMIN — METOCLOPRAMIDE 10 MG: 5 INJECTION, SOLUTION INTRAMUSCULAR; INTRAVENOUS at 02:05

## 2022-05-10 RX ADMIN — PROCHLORPERAZINE EDISYLATE 5 MG: 5 INJECTION INTRAMUSCULAR; INTRAVENOUS at 09:05

## 2022-05-10 RX ADMIN — LORAZEPAM 0.5 MG: 2 INJECTION INTRAMUSCULAR; INTRAVENOUS at 03:05

## 2022-05-10 RX ADMIN — PROCHLORPERAZINE EDISYLATE 5 MG: 5 INJECTION INTRAMUSCULAR; INTRAVENOUS at 05:05

## 2022-05-10 RX ADMIN — INSULIN ASPART 2 UNITS: 100 INJECTION, SOLUTION INTRAVENOUS; SUBCUTANEOUS at 05:05

## 2022-05-10 RX ADMIN — METOCLOPRAMIDE 10 MG: 5 INJECTION, SOLUTION INTRAMUSCULAR; INTRAVENOUS at 05:05

## 2022-05-10 RX ADMIN — LORAZEPAM 0.5 MG: 2 INJECTION INTRAMUSCULAR; INTRAVENOUS at 09:05

## 2022-05-10 RX ADMIN — DIPHENHYDRAMINE HYDROCHLORIDE 25 MG: 25 CAPSULE ORAL at 05:05

## 2022-05-10 RX ADMIN — NIFEDIPINE 120 MG: 30 TABLET, FILM COATED, EXTENDED RELEASE ORAL at 07:05

## 2022-05-10 RX ADMIN — HYDRALAZINE HYDROCHLORIDE 50 MG: 25 TABLET, FILM COATED ORAL at 08:05

## 2022-05-10 RX ADMIN — DIPHENHYDRAMINE HYDROCHLORIDE 25 MG: 50 INJECTION, SOLUTION INTRAMUSCULAR; INTRAVENOUS at 09:05

## 2022-05-10 RX ADMIN — INSULIN ASPART 5 UNITS: 100 INJECTION, SOLUTION INTRAVENOUS; SUBCUTANEOUS at 11:05

## 2022-05-10 RX ADMIN — INSULIN ASPART 2 UNITS: 100 INJECTION, SOLUTION INTRAVENOUS; SUBCUTANEOUS at 08:05

## 2022-05-10 RX ADMIN — HYDRALAZINE HYDROCHLORIDE 50 MG: 25 TABLET, FILM COATED ORAL at 09:05

## 2022-05-10 NOTE — PROGRESS NOTES
Latest Reference Range & Units 05/10/22 13:04   POCT Glucose 70 - 110 mg/dL 166 (H)   (H): Data is abnormally high    Dr. Will notified. This CBG is 1 hr after starting eating her meal (per endocrine rec). MD notified that patient only ate half of her meal. Per MD unless patient requests do not need to recheck CBG until she eats again. Will continue to monitor patient.

## 2022-05-10 NOTE — PROGRESS NOTES
" Latest Reference Range & Units 05/10/22 14:01   POCT Glucose 70 - 110 mg/dL 121 (H)   (H): Data is abnormally high    2 hr pp. Pt complains of feeling hot and clammy. Juice given. Pt requests CBG check:    Latest Reference Range & Units 05/10/22 14:16   POCT Glucose 70 - 110 mg/dL 120 (H)   (H): Data is abnormally high    Pt still feeling warm and clammy after juice. Requested recheck:     Latest Reference Range & Units 05/10/22 14:29   POCT Glucose 70 - 110 mg/dL 138 (H)   (H): Data is abnormally high    2nd Juice given. Pt states she is "starting to feel better"     Dr. Mcfarlane notified of above CBG and juice given. Patient feeling better. Patient requesting snack. MD to put in orders for 2u aspart (see mar) prior to snack.    Instructed patient to call RN if she starts to feel any symptoms (hot, clammy, lightheaded, dizzy, or any other feelings not normal for her) Patient verbalized understanding.  "

## 2022-05-10 NOTE — PROGRESS NOTES
Latest Reference Range & Units 05/10/22 06:40   POCT Glucose 70 - 110 mg/dL 363 (H)   (H): Data is abnormally high    Md notified, ordered to repeat BGL in an hour. Pt denies hyperglycemic s/s. Pt safety maintained and all orders followed

## 2022-05-10 NOTE — NURSING
Latest Reference Range & Units 05/10/22 01:41   POCT Glucose 70 - 110 mg/dL 206 (H)   (H): Data is abnormally high    Md notified, ordered to repeat BGL in an hour. Pt denies hyperglycemic s/s. Pt safety maintained and all orders followed

## 2022-05-10 NOTE — NURSING
Latest Reference Range & Units 05/10/22 03:46   POCT Glucose 70 - 110 mg/dL 167 (H)   (H): Data is abnormally high    Md notified, Pt asked for a snack. 24 G of carbs given and 2 U of insulin given per Md order. MD ordered to repeat BGL in an hour. Pt safety maintained and all orders followed.

## 2022-05-10 NOTE — PROGRESS NOTES
Dr. Ernst at bedside to answer patient and mother questions regarding insulin dosing and highs and lows. All questions were answered. Patient and mother denied any other questions at this time.

## 2022-05-10 NOTE — NURSING
Latest Reference Range & Units 05/09/22 15:15 05/09/22 15:31 05/09/22 15:50   POCT Glucose 70 - 110 mg/dL 52 (L) 58 (L) 113 (H)   (L): Data is abnormally low  (H): Data is abnormally high  Patient Attempting to eat lunch and began feeling bad; blood glucose low, patient sweating , pale, not wanting to eat; rechecked blood sugar and still low (58);  called , glucose tabs given; patient's mother states patient usually doesn't respond to the tablets and requested juice; given @ 1330; blood sugar at 1350= 113; ate approximately 32g carbs for lunch; patient had declined midmorning snack; has sandwich for post-lunch;  called c above

## 2022-05-10 NOTE — NURSING
Latest Reference Range & Units 05/09/22 22:37   POCT Glucose 70 - 110 mg/dL 297 (H)   (H): Data is abnormally high    Md noticed, ordered to repeat BGL in an hour to get her 2 Hr pp BGL. Pt reported no s/s of hyperglycemia. Pt safety maintained and orders followed

## 2022-05-10 NOTE — PROGRESS NOTES
Latest Reference Range & Units 05/10/22 16:08   POCT Glucose 70 - 110 mg/dL 193 (H)   (H): Data is abnormally high    Dr. Will notified. Dexicom sensor reading 205 at same time as CBG.

## 2022-05-10 NOTE — PROGRESS NOTES
Latest Reference Range & Units 05/10/22 11:00   POCT Glucose 70 - 110 mg/dL 302 (H)   (H): Data is abnormally high      Dr. Will notified. Recheck in 30 minutes. If patient sleeping recheck in 1 hour. Will continue to monitor.

## 2022-05-10 NOTE — PROGRESS NOTES
Food & Nutrition  Education    Diet Education: DM + Gastroparesis   Time Spent: 15 minutes  Learners: pt and mother       Nutrition Education provided with handouts: Gastroparesis Nutrition Therapy; DM packet from previous EDU    Comments: RD f/u for questions regarding. Alison TERRELL, requested gastroparesis edu to help pt choose better options. Complaint of constipation. Has been on a higher fiber diet. Explained this may be the cause of exacerbating her gastroparesis symptoms. Will continue to monitor and educate pt as needed.       All questions and concerns answered. Dietitian's contact information provided.       Follow-Up: 5/16/2022   Please Re-consult as needed        Thanks!

## 2022-05-10 NOTE — NURSING
Latest Reference Range & Units 05/09/22 21:36   POCT Glucose 70 - 110 mg/dL 140 (H)   (H): Data is abnormally high    Md notifed, Md ordered to give 2 U on aspart to correct for the pt's carb intake for dinner (24 carbs). Pt safety maintained. And verbalized understanding to RN. Will get BGL hourly.

## 2022-05-10 NOTE — NURSING
40 labetalol given, MD ordered to repeat BP 30 minutes after administration. Pt safety maintained and ordered followed

## 2022-05-10 NOTE — PROGRESS NOTES
Latest Reference Range & Units 05/10/22 07:52   POCT Glucose 70 - 110 mg/dL 342 (H)   (H): Data is abnormally high    Dr. Mcfarlane notified. Will discuss with team and call with orders.     Also clarified with MD that hydralazine and procardia were given last night. Hydralazine given at 10pm last night. Next dose not due until 2pm today. Will change order to have dose due now. Will continue to monitor patient.

## 2022-05-10 NOTE — SUBJECTIVE & OBJECTIVE
Overnight, patient with anxiety regarding BG checks. Concerned that she is going to go to sleep and not wake up because her BG is too low. Required ativan with improvement in anxiety.    Now, resting without complaints.     Objective:     Vital Signs (Most Recent):  Temp: 98.4 °F (36.9 °C) (05/10/22 0415)  Pulse: 103 (05/10/22 0415)  Resp: 16 (05/10/22 0415)  BP: (!) 145/87 (05/10/22 0415)  SpO2: 99 % (05/10/22 0415) Vital Signs (24h Range):  Temp:  [98.4 °F (36.9 °C)-98.8 °F (37.1 °C)] 98.4 °F (36.9 °C)  Pulse:  [] 103  Resp:  [16-18] 16  SpO2:  [96 %-100 %] 99 %  BP: (136-189)/() 145/87     Weight: 58.5 kg (129 lb)  Body mass index is 24.37 kg/m².      No intake or output data in the 24 hours ending 05/10/22 0638      Significant Labs:  Recent Lab Results  (Last 5 results in the past 24 hours)        05/10/22  0545   05/10/22  0458   05/10/22  0346   05/10/22  0231   05/10/22  0141        POCT Glucose 355   325   167   179   206                              Physical Exam:   Constitutional: She is oriented to person, place, and time. She appears well-developed and well-nourished. No distress.    HENT:   Head: Normocephalic and atraumatic.    Eyes: Conjunctivae and EOM are normal.     Cardiovascular:  Normal rate and regular rhythm.             Pulmonary/Chest: Effort normal. No respiratory distress.        Abdominal: Soft. She exhibits no distension. There is no abdominal tenderness.             Musculoskeletal: No tenderness or edema.       Neurological: She is alert and oriented to person, place, and time.    Skin: Skin is warm and dry. No rash noted. She is not diaphoretic.    Psychiatric: She has a normal mood and affect. Her behavior is normal.    No

## 2022-05-10 NOTE — NURSING
Component      Latest Ref Rng & Units 5/10/2022           2:31 AM   POCT Glucose      70 - 110 mg/dL 179 (H)     Md notified, ordered to repeat BGL in an hour. Pt denies hyperglycemic s/s. Pt safety maintained and all orders followed

## 2022-05-10 NOTE — PROGRESS NOTES
Latest Reference Range & Units 05/10/22 11:35   POCT Glucose 70 - 110 mg/dL 248 (H)   (H): Data is abnormally high    Dr. Will and Dr. Boecking notified. Per Dr. Will patient to receive 5 units insulin. Dr. Boecking to put orders in.

## 2022-05-10 NOTE — NURSING
Latest Reference Range & Units 05/10/22 04:58   POCT Glucose 70 - 110 mg/dL 325 (H)   (H): Data is abnormally high  Md notified, ordered to repeat BGL in an hour. Pt denies hyperglycemic s/s. Pt safety maintained and all orders followed

## 2022-05-10 NOTE — NURSING
"Patient's blood sugar checked = 235; patient states doesn't want to eat dinner because she is still nauseated and states she vomited several times "small amount' Dr  Called and reported; patient requesting Ativan due to feeling anxious about her fluctuating blood sugars. Report to Night RN  "

## 2022-05-10 NOTE — NURSING
Component      Latest Ref Rng & Units 5/10/2022             POCT Glucose      70 - 110 mg/dL 243 (H)     Md notified, ordered to repeat BGL in an hour. Pt denies hyperglycemic s/s. Pt safety maintained and all orders followed

## 2022-05-10 NOTE — PROGRESS NOTES
PT called out in a panic attack. Day shift RN reported that the patient did not tolerate PO meds and could not take her BP medication during theshift. Md notified of situation and PT's request for ativan to help her anxiety attack. See Mar for administration. MD ordered to not take her BP until she can tolerate PO medication. Pt safety maintained and Md orders followed.

## 2022-05-10 NOTE — ASSESSMENT & PLAN NOTE
- Diagnosed at age 10, does not currently follow with endocrine  - complications include diabetic retinopathy, gastroparesis, neuropathy, and h/o retinal detachment  - currently asymptomatic  - CBC 8.3/24.9/252  - Cr 1.2, LFTs 18/19  - P:C 3.75  - UA with 2+ glucose, 2+ protein  - TSH 1.570  - EKG wnl  - Echo wnl  - BG poorly controlled overnight, 113-366  - A1C 9.8  - Will pattern BG 2AM, 6AM, pre-meal and 2 hours post-meal  - Levemir 6/6  - Mealtime insulin: Carb ratio 1:10, ISF 1:70 > 180, will NOT correct for post-meal BG  - Will hold food if pre-meal BG >300  - Will attempt to space out meals to >4 hours apart to allow insulin to work  - Appreciate endocrine assistance

## 2022-05-10 NOTE — PLAN OF CARE
Problem:  Fall Injury Risk  Goal: Absence of Fall, Infant Drop and Related Injury  Outcome: Ongoing, Progressing     Problem: Adult Inpatient Plan of Care  Goal: Plan of Care Review  Outcome: Ongoing, Not Progressing  Goal: Patient-Specific Goal (Individualized)  Outcome: Ongoing, Not Progressing  Goal: Absence of Hospital-Acquired Illness or Injury  Outcome: Ongoing, Not Progressing  Goal: Optimal Comfort and Wellbeing  Outcome: Ongoing, Not Progressing  Goal: Readiness for Transition of Care  Outcome: Ongoing, Not Progressing   Labile blood sugars; patient doesn't eat entire meal due to gastroparesis discomfort; declined to eat the mid-morning and afternoon snacks;  reports nausea c some relief p reglan and compazine; changed reglanto Q 8hrs for better 24hr coverage of nausea

## 2022-05-10 NOTE — NURSING
Component      Latest Ref Rng & Units 5/10/2022           5:45 AM   POCT Glucose      70 - 110 mg/dL 355 (H)    Md notified, ordered 2U of aspart, see mar for admin. ordered to repeat BGL in an hour. Pt denies hyperglycemic s/s. Pt safety maintained and all orders followed.

## 2022-05-10 NOTE — PROGRESS NOTES
Latest Reference Range & Units 05/10/22 17:40   POCT Glucose 70 - 110 mg/dL 117 (H)   (H): Data is abnormally high    Dr. Ernst notified. Patient dexcom reading 72 at this time. Patient drank juice 10 minutes ago d/t dexcom reading of 68. Pt not wanting to eat at this time d/t not feeling well. Compazine given (see MAR). Dr. Ernst aware. Instructed patient to call RN when she felt like she wanted to eat and will recheck CBG. Patient and mother verbalized understanding.

## 2022-05-10 NOTE — NURSING
Latest Reference Range & Units 05/09/22 23:38   POCT Glucose 70 - 110 mg/dL 290 (H)   (H): Data is abnormally high    Md notified, Ordered to repeat BGL in an hour.   Pt denies hyperglycemic s/s. Pt safety maintained and all orders followed

## 2022-05-10 NOTE — ASSESSMENT & PLAN NOTE
- BP: (136-189)/() 145/87  - CBC 8.3/24.9/252, Cr 1.2, LFTs 19/18  - P:C 3.75  - 24 hour urine 6270  - BP meds titrated to hydralazine 50 mg TID and procardia 120 mg qD  - required labetalol 20/40 overnight

## 2022-05-10 NOTE — PROGRESS NOTES
. Dr. Will notified. Pt has breakfast at bedside. Instructed patient not to eat breakfast as CBG is greater than 300. Dr. Will to consult with endocrinology and put in insulin orders.    Will continue to monitor patient.

## 2022-05-10 NOTE — PROGRESS NOTES
Latest Reference Range & Units 05/10/22 08:56   POCT Glucose 70 - 110 mg/dL 273 (H)   (H): Data is abnormally high    Dr. Will notified. No new orders. Do not give insulin at this time. WCYANY pt.

## 2022-05-10 NOTE — ASSESSMENT & PLAN NOTE
Endocrinology consulted for BG management.   BG goal      - Levemir Flex Pen 6 units BID  - Novolog (aspart) insulin ICR 1:10 Units SQ TIDWM and prn for BG excursions ISF 1:70 (starting to slide at 180 mg/dL)  - BG checks AC/HS/0200 and two hours post-prandial  - Initiate Dexcom (Clarity Code: [id number])  - Change diet to 45 grams of carbohydrates for meals and 15-25 grams of carbs for snacks   - Ensure prandial insulin is administered 15 minutes before starting to eat or drink.   - Hypoglycemia protocol in place  - If blood glucose greater than 300, please ask patient not to eat food or drink anything other than water until correctional insulin has brought it back below 250    ** Please notify Endocrine for any change and/or advance in diet**  ** Please call Endocrine for any BG related issues **    Goal of glucose for diabetes in pregnancy  Fasting blood glucose concentration ?95 mg/dL  One-hour postprandial blood glucose concentration ?140 mg/dL   Two-hour postprandial glucose concentration ?120 mg/dL     Goal A1c in pregnancy ideally <6.5%, to reduce the risk of congenital anomalies    Discharge Planning:   TBD. Please notify endocrinology prior to discharge.  Discharge planning:  Outpatient follow-up: In two weeks in the D/C clinic then follow-up with Dr. Schumacher for primary endocrine care.   Prescriptions needed: Dexcom G6; updated Lantus and Novolog dosing (see above).   Glucagon: Recommend Baqsimi if covered by insurance.   Ketones strips: Y

## 2022-05-10 NOTE — PROGRESS NOTES
[facility] - Antepartum ([place])  Endocrinology  Progress Note    Admit Date: 2022     Reason for Consult: Management of T1DM, Hyperglycemia     Currently 10 weeks pregnant    Outpatient diabetes provider: Primary Care in [place]; [place] (No established PCP or endocrine care in [facility])  Diabetes education during pregnancy: None     Diabetes diagnosis year: 18 years     Home Diabetes Regimen:   Lantus 15 units QD  Novolog 3 units TIDWM with SSI (ISF 1:50)    For insulin pump users: Historically on Medronic (But has been off the pump for about 6 weeks; Found down with BG in the 20's).   No CGM    How often checking glucose at home? >4 x day (Every 2-3 hours)  BG readings on regimen: 's  Hypoglycemia on the regimen?  Yes  Missed doses on regimen?  No    Diabetes Complications include:     Hyperglycemia, Hypoglycemia , Diabetic nephropathy  , Diabetic retinopathy , and Diabetic gastroparesis     Complicating diabetes co morbidities:   Pregancy.       HPI:     Alicia Valles is a 27 y.o. [id number] at 9w5d presents for blood sugar patterning. She was seen in MFM clinic yesterday after recently being discharged from the hospital where she was admitted for hypertensive encephalopathy/hypoglycemia.Ms. Valles has poorly controlled DM1. She was diagnosed at age 10 and her most recent A1C was >12. She previously was followed by an endocrinologist in [place], but has not established with anyone in [place] where she currently lives. She states that used to count carbs and have an insulin sensitivity factor, but she has not done that in a few years. She currently takes lantus 15 u at 5 pm; for her mealtime insulin, she randomly decides how much to give herself. Reports that she has been admitted for DKA more than 5 times. Prior to her most recent hospitalization, she was found down with BG of 29. She had a seizure due to hypoglycemia; she has not ever had seizures before and does not take medications for a seizure  "disorder.  Reports that she has never had an episode of hypoglycemia like that before. 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 Reports prior surgery for retinal detachment. Endocrine consulted to manage hyperglycemia and type 1 diabetes.         Interval HPI:   VIRTUAL TELENOTE     Start time:11:00  Chief complaint: Type 1 Diabetes in pregnancy  The patient location is: [facility]  The patient arrived at: 11:01  Present with the patient at the time of the telemed/virtual assessment:11:01                  End time:  11:20     Total time spent with patient: 20 minutes     The attending portion of this evaluation, treatment, and documentation was performed per Enio Bedolla DNP, SRINATH via Telemedicine AudioVisual using the secure [employer] software platform with 2 way audio/video. The provider was located off-site and the patient is located in the hospital. The aforementioned video software was utilized to document the relevant history and physical exam.  Overnight events: No acute events overnight. Patient on the Antepartum unit at [facility] in room B393/B393 A. Blood glucose  variable . BG at, above, and below goal on current insulin regimen (SSI, prandial, and basal insulin ). Steroid use- None.    Renal function- Normal   Vasopressors-  None       Diet gestational diabetic 2000 kcal (Breakfast & midmorning snack=70 carb grams; Lunch & midafternoon snack = 70 carb grams; Dinner & HS snack = 70 carb grams; Snacks recommended at 15 - 30 grams)     Eatin%  Nausea: No  Hypoglycemia and intervention: No  Fever: No  TPN and/or TF: No    /83 (BP Location: Right arm, Patient Position: Lying)   Pulse 98   Temp 98.2 °F (36.8 °C) (Oral)   Resp 15   Ht 5' 1" (1.549 m)   Wt 58.5 kg (129 lb)   LMP 2022 (Approximate)   SpO2 100%   Breastfeeding No   BMI 24.37 kg/m²     Labs Reviewed and Include    No " results for input(s): GLU, CALCIUM, ALBUMIN, PROT, NA, K, CO2, CL, BUN, CREATININE, ALKPHOS, ALT, AST, BILITOT in the last 24 hours.  Lab Results   Component Value Date    WBC 9.21 05/05/2022    HGB 8.3 (L) 05/05/2022    HCT 24.9 (L) 05/05/2022    MCV 90 05/05/2022     05/05/2022     Recent Labs   Lab 05/05/22  1638   TSH 1.570     Lab Results   Component Value Date    HGBA1C 9.8 (H) 05/05/2022       Nutritional status:   Body mass index is 24.37 kg/m².  Lab Results   Component Value Date    ALBUMIN 2.4 (L) 05/08/2022    ALBUMIN 2.8 (L) 05/06/2022    ALBUMIN 2.6 (L) 05/05/2022     No results found for: PREALBUMIN    Estimated Creatinine Clearance: 68.3 mL/min (based on SCr of 1 mg/dL).    Accu-Checks  Recent Labs     05/10/22  0752 05/10/22  0856 05/10/22  0955 05/10/22  1100 05/10/22  1135 05/10/22  1304 05/10/22  1401 05/10/22  1416 05/10/22  1429 05/10/22  1608   POCTGLUCOSE 342* 273* 375* 302* 248* 166* 121* 120* 138* 193*       Current Medications and/or Treatments Impacting Glycemic Control  Immunotherapy:    Immunosuppressants       None          Steroids:   Hormones (From admission, onward)                None          Pressors:    Autonomic Drugs (From admission, onward)                None          Hyperglycemia/Diabetes Medications:   Antihyperglycemics (From admission, onward)                Start     Stop Route Frequency Ordered    05/09/22 0900  insulin detemir U-100 pen 6 Units         -- SubQ 2 times daily 05/09/22 0856    05/07/22 0939  insulin aspart U-100 pen 1-10 Units         -- SubQ Before meals & nightly PRN 05/07/22 0842            ASSESSMENT and PLAN    * Type 1 diabetes mellitus with other specified complication  Endocrinology consulted for BG management.   BG goal      - Levemir Flex Pen 6 units BID  - Novolog (aspart) insulin ICR 1:10 Units SQ TIDWM and prn for BG excursions ISF 1:70 (starting to slide at 180 mg/dL)  - BG checks AC/HS/0200 and two hours post-prandial  -  Initiate Dexcom (Clarity Code: [id number])  - Change diet to 45 grams of carbohydrates for meals and 15-25 grams of carbs for snacks   - Ensure prandial insulin is administered 15 minutes before starting to eat or drink.   - Hypoglycemia protocol in place  - If blood glucose greater than 300, please ask patient not to eat food or drink anything other than water until correctional insulin has brought it back below 250    ** Please notify Endocrine for any change and/or advance in diet**  ** Please call Endocrine for any BG related issues **    Goal of glucose for diabetes in pregnancy  Fasting blood glucose concentration ?95 mg/dL  One-hour postprandial blood glucose concentration ?140 mg/dL   Two-hour postprandial glucose concentration ?120 mg/dL     Goal A1c in pregnancy ideally <6.5%, to reduce the risk of congenital anomalies    Discharge Planning:   TBD. Please notify endocrinology prior to discharge.  Discharge planning:  Outpatient follow-up: In two weeks in the D/C clinic then follow-up with Dr. Schumacher for primary endocrine care.   Prescriptions needed: Dexcom G6; updated Lantus and Novolog dosing (see above).   Glucagon: Recommend Baqsimi if covered by insurance.   Ketones strips: Y                    First trimester pregnancy  Optimize glucose control for the health of mom and baby.       Anemia  Lab Results   Component Value Date    HGBA1C 9.8 (H) 05/05/2022     Anemia may impact the accuracy of the A1C.            Enio Bedolla, DNP, FNP  Endocrinology  [facility] - Antepartum ([place])

## 2022-05-10 NOTE — PROGRESS NOTES
[facility] - Antepartum ([place])  Obstetrics  Antepartum Progress Note    Patient Name: Alicia Valles  MRN: [id number]  Admission Date: 2022  Hospital Length of Stay: 5 days  Attending Physician: Jose Martin Funes MD  Primary Care Provider: Primary Doctor No    Subjective:     Principal Problem:Type 1 diabetes mellitus with other specified complication    HPI:  Alicia Valles is a 27 y.o. [id number] at 9w5d presents for blood sugar patterning. She was seen in [facility] clinic yesterday after recently being discharged from the hospital where she was admitted for hypertensive encephalopathy/hypoglycemia.    Ms. Valles has poorly controlled DM1. She was diagnosed at age 10 and her most recent A1C was >12. She previously was followed by an endocrinologist in [place], but has not established with anyone in [place] where she currently lives. She states that used to count carbs and have an insulin sensitivity factor, but she has not done that in a few years. She currently takes lantus 15 u at 5 pm; for her mealtime insulin, she randomly decides how much to give herself. Reports that she has been admitted for DKA more than 5 times. Prior to her most recent hospitalization, she was found down with BG of 29. She had a seizure due to hypoglycemia; she has not ever had seizures before and does not take medications for a seizure disorder.  Reports that she has never had an episode of hypoglycemia like that before. 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 Reports prior surgery for retinal detachment.    Ms. Valles also has chronic hypertension. She previously was taking methyldopa and hydralazine, however she was recently switched by her OBGYN to procardia 30 mg qD and hydralazine 50 mg BID. She believes this regimen has kept her well-controlled. She does not follow with a PCP.    Currently, she reports cramping epigastric pain, which she  believes could be gas pain. Denies polyuria, shortness of breath, vaginal bleeding, vision changes, chest pain, diarrhea, and dysuria.    Of note, this is an unplanned, but desired pregnancy.      Hospital Course:  05/05/2022 - Admitted for BG patterning. 1T labs, echo, EKG, TSH, A1C pending. Will continue home BP meds. Lantus 15u qHS with aspart 3/3/3 and ISF 1:50 >150. Will count carbs for each meal.  05/06/2022 - BG . Symptomatic lows. Will continue to pattern.  05/07/2022 - BG poorly controlled, endocrine consulted. Adjusted carb ratio to 1:20 and ISF 1:70 > 180. Will not slide post-meal. One episode of hypoglycemia which responded to D50. Discussed meal timing.  05/08/2022 - Episode of hypoglycemia overnight, will continue to pattern.  05/09/2022 - Continued poor BG control. Insulin regimen now levemir 7u BID. Mealtime insulin 1:15 and ISF 1:70  >180.   05/10/2022 - Continued poor BG control. Insulin regimen now levemir 6u BID. Carb ratio 1:10 and ISF 1:70 > 180.      Overnight, patient with anxiety regarding BG checks. Concerned that she is going to go to sleep and not wake up because her BG is too low. Required ativan with improvement in anxiety.    Now, resting without complaints.     Objective:     Vital Signs (Most Recent):  Temp: 98.4 °F (36.9 °C) (05/10/22 0415)  Pulse: 103 (05/10/22 0415)  Resp: 16 (05/10/22 0415)  BP: (!) 145/87 (05/10/22 0415)  SpO2: 99 % (05/10/22 0415) Vital Signs (24h Range):  Temp:  [98.4 °F (36.9 °C)-98.8 °F (37.1 °C)] 98.4 °F (36.9 °C)  Pulse:  [] 103  Resp:  [16-18] 16  SpO2:  [96 %-100 %] 99 %  BP: (136-189)/() 145/87     Weight: 58.5 kg (129 lb)  Body mass index is 24.37 kg/m².      No intake or output data in the 24 hours ending 05/10/22 0638      Significant Labs:  Recent Lab Results  (Last 5 results in the past 24 hours)        05/10/22  0545   05/10/22  0458   05/10/22  0346   05/10/22  0231   05/10/22  0141        POCT Glucose 355   325   702   179    206                              Physical Exam:   Constitutional: She is oriented to person, place, and time. She appears well-developed and well-nourished. No distress.    HENT:   Head: Normocephalic and atraumatic.    Eyes: Conjunctivae and EOM are normal.     Cardiovascular:  Normal rate and regular rhythm.             Pulmonary/Chest: Effort normal. No respiratory distress.        Abdominal: Soft. She exhibits no distension. There is no abdominal tenderness.             Musculoskeletal: No tenderness or edema.       Neurological: She is alert and oriented to person, place, and time.    Skin: Skin is warm and dry. No rash noted. She is not diaphoretic.    Psychiatric: She has a normal mood and affect. Her behavior is normal.     Assessment/Plan:     27 y.o. female  at 10w3d for:    * Type 1 diabetes mellitus with other specified complication  - Diagnosed at age 10, does not currently follow with endocrine  - complications include diabetic retinopathy, gastroparesis, neuropathy, and h/o retinal detachment  - currently asymptomatic  - CBC 8.3/24.9/252  - Cr 1.2, LFTs   - P:C 3.75  - UA with 2+ glucose, 2+ protein  - TSH 1.570  - EKG wnl  - Echo wnl  - BG poorly controlled overnight, 113-366  - A1C 9.8  - Will pattern BG 2AM, 6AM, pre-meal and 2 hours post-meal  - Levemir 6/6  - Mealtime insulin: Carb ratio 1:10, ISF 1:70 > 180, will NOT correct for post-meal BG  - Will hold food if pre-meal BG >300  - Will attempt to space out meals to >4 hours apart to allow insulin to work  - Appreciate endocrine assistance    Proteinuria  - P:C 3.75  - 24 hour urine 6270, nephrotic range  - lovenox 40 mg qD    Anemia  - iron/colace  - iron studies grossly wnl  - Hemoglobin electrophoresis pending    First trimester pregnancy  - RPR NR, HIV neg, HCV neg, HBV neg, RI  - daily PNV    Chronic hypertension  - BP: (136-189)/() 145/87  - CBC 8.3/24.9/252, Cr 1.2, LFTs   - P:C 3.75  - 24 hour urine 6270  - BP meds  titrated to hydralazine 50 mg TID and procardia 120 mg qD  - required labetalol 20/40 overnight          Archana Will MD  Obstetrics  [facility] - Antepartum ([place])      Patient seen and examined. Agree with resident assessment and plan.  No complaints. Reports constipation.     Temp:  [98.4 °F (36.9 °C)-98.7 °F (37.1 °C)] 98.7 °F (37.1 °C)  Pulse:  [] 107  Resp:  [16-18] 18  SpO2:  [96 %-100 %] 100 %  BP: (131-189)/() 143/85    Abdomen soft, no fundal tenderness    BGs reviewed    Plan as outlined above  Defer to endocrinology for all changes to insulin regimen. Changes made today: detemir 6u BID, Aspart ICHO 1:10, 1:70 correction >180 pre meals only.  BGs slightly improved from yesterday. Will obtain Dexcom for CGM  Currently no obstetric complaints or concerns  Will uptitrate BPs meds as needed for goal BP <140/90  Colace and fiber PRN  Increase reglan to QID    Lora Arthur MD  Maternal Fetal Medicine fellow  PGY-7

## 2022-05-10 NOTE — PROGRESS NOTES
"DEXCOM G6 SENSOR START     Patient seen in hospital today for Dexcom G6 continuous glucose sensor system training.  Education provided using "Quick Start Guide" per Dexcom protocol.    · Kristopher/ Overview:  5 min glucose reading updates, trending arrows, BG graph screens, battery life indicator, Blue Tooth Symbol.  · Kristopher/ Menus: trend Graph, start sensor, enter BG, events, Alerts, Settings, Shutdown, Stop Sensor.   · Kristopher/ Screens and prompts  · Alarm Settings:    * Low alert: 90      * High alert: 250    · Transmitter and Sensor Codes entered per prompts    · Reviewed sensor site selection. Site selected and prepped using aseptic technique Inserted to R abdomen. Transmitter placed in pod and secured.  · Practiced sensor pod/transmitter removal from site, and removal of transmitter from sensor pod.  · Patient able to demonstrate without difficulty.  Encouraged to review manual or kristopher video prior to starting another sensor.   · Reviewed problem-solving aspects of sensor transmission/ variables that can disrupt transmission. /kristopher range 20 feet, but the first 3 hrs keep within 3 feet of transmitter.  · Pt instructed on lag time of interstitial fluid from CBG and was advised to tx hypoglycemia and dose insulin based on SMBG values.  · Dexcom technical support contact number given and examples of when to contact them discussed.   · Clarity kristopher and website reviewed w/ pt. Assisted pt w/ connecting to Clarity account for automatic uploads.     Sharing code: [email or web address].    "

## 2022-05-10 NOTE — SUBJECTIVE & OBJECTIVE
"Interval HPI:   Overnight events: No acute events overnight. Patient on the Antepartum unit at [facility] in room B393/B393 A. Blood glucose  variable . BG at, above, and below goal on current insulin regimen (SSI, prandial, and basal insulin ). Steroid use- None.    Renal function- Normal   Vasopressors-  None       Diet gestational diabetic 2000 kcal (Breakfast & midmorning snack=70 carb grams; Lunch & midafternoon snack = 70 carb grams; Dinner & HS snack = 70 carb grams; Snacks recommended at 15 - 30 grams)     Eatin%  Nausea: No  Hypoglycemia and intervention: No  Fever: No  TPN and/or TF: No    /83 (BP Location: Right arm, Patient Position: Lying)   Pulse 98   Temp 98.2 °F (36.8 °C) (Oral)   Resp 15   Ht 5' 1" (1.549 m)   Wt 58.5 kg (129 lb)   LMP 2022 (Approximate)   SpO2 100%   Breastfeeding No   BMI 24.37 kg/m²     Labs Reviewed and Include    No results for input(s): GLU, CALCIUM, ALBUMIN, PROT, NA, K, CO2, CL, BUN, CREATININE, ALKPHOS, ALT, AST, BILITOT in the last 24 hours.  Lab Results   Component Value Date    WBC 9.21 2022    HGB 8.3 (L) 2022    HCT 24.9 (L) 2022    MCV 90 2022     2022     Recent Labs   Lab 22  1638   TSH 1.570     Lab Results   Component Value Date    HGBA1C 9.8 (H) 2022       Nutritional status:   Body mass index is 24.37 kg/m².  Lab Results   Component Value Date    ALBUMIN 2.4 (L) 2022    ALBUMIN 2.8 (L) 2022    ALBUMIN 2.6 (L) 2022     No results found for: PREALBUMIN    Estimated Creatinine Clearance: 68.3 mL/min (based on SCr of 1 mg/dL).    Accu-Checks  Recent Labs     05/10/22  0752 05/10/22  0856 05/10/22  0955 05/10/22  1100 05/10/22  1135 05/10/22  1304 05/10/22  1401 05/10/22  1416 05/10/22  1429 05/10/22  1608   POCTGLUCOSE 342* 273* 375* 302* 248* 166* 121* 120* 138* 193*       Current Medications and/or Treatments Impacting Glycemic Control  Immunotherapy:  "   Immunosuppressants       None          Steroids:   Hormones (From admission, onward)                None          Pressors:    Autonomic Drugs (From admission, onward)                None          Hyperglycemia/Diabetes Medications:   Antihyperglycemics (From admission, onward)                Start     Stop Route Frequency Ordered    05/09/22 0900  insulin detemir U-100 pen 6 Units         -- SubQ 2 times daily 05/09/22 0856    05/07/22 0939  insulin aspart U-100 pen 1-10 Units         -- SubQ Before meals & nightly PRN 05/07/22 0842

## 2022-05-11 ENCOUNTER — TELEPHONE (OUTPATIENT)
Dept: MATERNAL FETAL MEDICINE | Facility: CLINIC | Age: 27
End: 2022-05-11
Payer: MEDICAID

## 2022-05-11 LAB
ALBUMIN SERPL BCP-MCNC: 2.7 G/DL (ref 3.5–5.2)
ALP SERPL-CCNC: 58 U/L (ref 55–135)
ALT SERPL W/O P-5'-P-CCNC: 18 U/L (ref 10–44)
ANION GAP SERPL CALC-SCNC: 9 MMOL/L (ref 8–16)
AST SERPL-CCNC: 25 U/L (ref 10–40)
BILIRUB SERPL-MCNC: 0.2 MG/DL (ref 0.1–1)
BUN SERPL-MCNC: 16 MG/DL (ref 6–20)
C PEPTIDE SERPL-MCNC: <0.08 NG/ML (ref 0.78–5.19)
CALCIUM SERPL-MCNC: 8.2 MG/DL (ref 8.7–10.5)
CHLORIDE SERPL-SCNC: 104 MMOL/L (ref 95–110)
CO2 SERPL-SCNC: 22 MMOL/L (ref 23–29)
CREAT SERPL-MCNC: 1.4 MG/DL (ref 0.5–1.4)
EST. GFR  (AFRICAN AMERICAN): 59 ML/MIN/1.73 M^2
EST. GFR  (NON AFRICAN AMERICAN): 51 ML/MIN/1.73 M^2
GLUCOSE SERPL-MCNC: 188 MG/DL (ref 70–110)
GLUCOSE SERPL-MCNC: 272 MG/DL (ref 70–110)
GLUCOSE SERPL-MCNC: 58 MG/DL (ref 70–110)
GLUCOSE SERPL-MCNC: 91 MG/DL (ref 70–110)
MAGNESIUM SERPL-MCNC: 2.2 MG/DL (ref 1.6–2.6)
PHOSPHATE SERPL-MCNC: 4.1 MG/DL (ref 2.7–4.5)
POCT GLUCOSE: 125 MG/DL (ref 70–110)
POCT GLUCOSE: 129 MG/DL (ref 70–110)
POCT GLUCOSE: 155 MG/DL (ref 70–110)
POCT GLUCOSE: 169 MG/DL (ref 70–110)
POCT GLUCOSE: 182 MG/DL (ref 70–110)
POCT GLUCOSE: 198 MG/DL (ref 70–110)
POCT GLUCOSE: 217 MG/DL (ref 70–110)
POCT GLUCOSE: 227 MG/DL (ref 70–110)
POCT GLUCOSE: 229 MG/DL (ref 70–110)
POCT GLUCOSE: 232 MG/DL (ref 70–110)
POCT GLUCOSE: 261 MG/DL (ref 70–110)
POCT GLUCOSE: 58 MG/DL (ref 70–110)
POCT GLUCOSE: 91 MG/DL (ref 70–110)
POCT GLUCOSE: 92 MG/DL (ref 70–110)
POTASSIUM SERPL-SCNC: 4.9 MMOL/L (ref 3.5–5.1)
PROT SERPL-MCNC: 5.9 G/DL (ref 6–8.4)
SODIUM SERPL-SCNC: 135 MMOL/L (ref 136–145)
TROPONIN I SERPL DL<=0.01 NG/ML-MCNC: <0.006 NG/ML (ref 0–0.03)

## 2022-05-11 PROCEDURE — 25000003 PHARM REV CODE 250: Performed by: STUDENT IN AN ORGANIZED HEALTH CARE EDUCATION/TRAINING PROGRAM

## 2022-05-11 PROCEDURE — 84100 ASSAY OF PHOSPHORUS: CPT | Performed by: OBSTETRICS & GYNECOLOGY

## 2022-05-11 PROCEDURE — 93010 EKG 12-LEAD: ICD-10-PCS | Mod: 59,76,, | Performed by: INTERNAL MEDICINE

## 2022-05-11 PROCEDURE — 93005 ELECTROCARDIOGRAM TRACING: CPT

## 2022-05-11 PROCEDURE — 84681 ASSAY OF C-PEPTIDE: CPT | Performed by: NURSE PRACTITIONER

## 2022-05-11 PROCEDURE — 63600175 PHARM REV CODE 636 W HCPCS: Performed by: STUDENT IN AN ORGANIZED HEALTH CARE EDUCATION/TRAINING PROGRAM

## 2022-05-11 PROCEDURE — 82947 ASSAY GLUCOSE BLOOD QUANT: CPT | Performed by: NURSE PRACTITIONER

## 2022-05-11 PROCEDURE — 99233 SBSQ HOSP IP/OBS HIGH 50: CPT | Mod: ,,, | Performed by: OBSTETRICS & GYNECOLOGY

## 2022-05-11 PROCEDURE — 99231 PR SUBSEQUENT HOSPITAL CARE,LEVL I: ICD-10-PCS | Mod: GT,,, | Performed by: PSYCHIATRY & NEUROLOGY

## 2022-05-11 PROCEDURE — 80053 COMPREHEN METABOLIC PANEL: CPT | Performed by: OBSTETRICS & GYNECOLOGY

## 2022-05-11 PROCEDURE — 83735 ASSAY OF MAGNESIUM: CPT | Performed by: OBSTETRICS & GYNECOLOGY

## 2022-05-11 PROCEDURE — 99231 SBSQ HOSP IP/OBS SF/LOW 25: CPT | Mod: GT,,, | Performed by: PSYCHIATRY & NEUROLOGY

## 2022-05-11 PROCEDURE — C9399 UNCLASSIFIED DRUGS OR BIOLOG: HCPCS | Performed by: STUDENT IN AN ORGANIZED HEALTH CARE EDUCATION/TRAINING PROGRAM

## 2022-05-11 PROCEDURE — 99233 PR SUBSEQUENT HOSPITAL CARE,LEVL III: ICD-10-PCS | Mod: ,,, | Performed by: OBSTETRICS & GYNECOLOGY

## 2022-05-11 PROCEDURE — 25000242 PHARM REV CODE 250 ALT 637 W/ HCPCS: Performed by: STUDENT IN AN ORGANIZED HEALTH CARE EDUCATION/TRAINING PROGRAM

## 2022-05-11 PROCEDURE — 93010 ELECTROCARDIOGRAM REPORT: CPT | Mod: 59,76,, | Performed by: INTERNAL MEDICINE

## 2022-05-11 PROCEDURE — 11000001 HC ACUTE MED/SURG PRIVATE ROOM

## 2022-05-11 PROCEDURE — 93010 ELECTROCARDIOGRAM REPORT: CPT | Mod: ,,, | Performed by: INTERNAL MEDICINE

## 2022-05-11 PROCEDURE — 84484 ASSAY OF TROPONIN QUANT: CPT | Performed by: OBSTETRICS & GYNECOLOGY

## 2022-05-11 PROCEDURE — 25000003 PHARM REV CODE 250

## 2022-05-11 PROCEDURE — 36415 COLL VENOUS BLD VENIPUNCTURE: CPT | Performed by: NURSE PRACTITIONER

## 2022-05-11 RX ORDER — INSULIN ASPART 100 [IU]/ML
3 INJECTION, SOLUTION INTRAVENOUS; SUBCUTANEOUS ONCE
Status: DISCONTINUED | OUTPATIENT
Start: 2022-05-11 | End: 2022-05-11

## 2022-05-11 RX ORDER — INSULIN ASPART 100 [IU]/ML
3 INJECTION, SOLUTION INTRAVENOUS; SUBCUTANEOUS ONCE
Status: COMPLETED | OUTPATIENT
Start: 2022-05-11 | End: 2022-05-11

## 2022-05-11 RX ORDER — INSULIN ASPART 100 [IU]/ML
5 INJECTION, SOLUTION INTRAVENOUS; SUBCUTANEOUS ONCE
Status: DISCONTINUED | OUTPATIENT
Start: 2022-05-11 | End: 2022-05-11

## 2022-05-11 RX ORDER — LORAZEPAM 2 MG/ML
0.5 INJECTION INTRAMUSCULAR EVERY 4 HOURS PRN
Status: DISCONTINUED | OUTPATIENT
Start: 2022-05-11 | End: 2022-05-12

## 2022-05-11 RX ORDER — LORAZEPAM 2 MG/ML
INJECTION INTRAMUSCULAR
Status: COMPLETED
Start: 2022-05-11 | End: 2022-05-16

## 2022-05-11 RX ORDER — CYCLOBENZAPRINE HCL 5 MG
5 TABLET ORAL ONCE
Status: COMPLETED | OUTPATIENT
Start: 2022-05-11 | End: 2022-05-11

## 2022-05-11 RX ORDER — INSULIN ASPART 100 [IU]/ML
2 INJECTION, SOLUTION INTRAVENOUS; SUBCUTANEOUS ONCE
Status: COMPLETED | OUTPATIENT
Start: 2022-05-11 | End: 2022-05-11

## 2022-05-11 RX ORDER — PYRIDOXINE HCL (VITAMIN B6) 25 MG
25 TABLET ORAL ONCE
Status: DISCONTINUED | OUTPATIENT
Start: 2022-05-11 | End: 2022-05-16 | Stop reason: HOSPADM

## 2022-05-11 RX ORDER — INSULIN ASPART 100 [IU]/ML
5 INJECTION, SOLUTION INTRAVENOUS; SUBCUTANEOUS ONCE
Status: COMPLETED | OUTPATIENT
Start: 2022-05-11 | End: 2022-05-11

## 2022-05-11 RX ADMIN — ENOXAPARIN SODIUM 40 MG: 100 INJECTION SUBCUTANEOUS at 05:05

## 2022-05-11 RX ADMIN — FAMOTIDINE 20 MG: 10 INJECTION, SOLUTION INTRAVENOUS at 08:05

## 2022-05-11 RX ADMIN — FAMOTIDINE 20 MG: 10 INJECTION, SOLUTION INTRAVENOUS at 09:05

## 2022-05-11 RX ADMIN — PROCHLORPERAZINE EDISYLATE 5 MG: 5 INJECTION INTRAMUSCULAR; INTRAVENOUS at 09:05

## 2022-05-11 RX ADMIN — DOXYLAMINE SUCCINATE 12.5 MG: 25 TABLET ORAL at 07:05

## 2022-05-11 RX ADMIN — Medication 16 G: at 02:05

## 2022-05-11 RX ADMIN — Medication 25 MG: at 07:05

## 2022-05-11 RX ADMIN — LORAZEPAM 0.5 MG: 2 INJECTION INTRAMUSCULAR; INTRAVENOUS at 11:05

## 2022-05-11 RX ADMIN — LORAZEPAM 0.5 MG: 2 INJECTION INTRAMUSCULAR; INTRAVENOUS at 09:05

## 2022-05-11 RX ADMIN — INSULIN DETEMIR 5 UNITS: 100 INJECTION, SOLUTION SUBCUTANEOUS at 10:05

## 2022-05-11 RX ADMIN — Medication 25 MG: at 09:05

## 2022-05-11 RX ADMIN — NIFEDIPINE 120 MG: 30 TABLET, FILM COATED, EXTENDED RELEASE ORAL at 05:05

## 2022-05-11 RX ADMIN — ONDANSETRON 8 MG: 8 TABLET, ORALLY DISINTEGRATING ORAL at 09:05

## 2022-05-11 RX ADMIN — INSULIN ASPART 3 UNITS: 100 INJECTION, SOLUTION INTRAVENOUS; SUBCUTANEOUS at 07:05

## 2022-05-11 RX ADMIN — HYDRALAZINE HYDROCHLORIDE 50 MG: 25 TABLET, FILM COATED ORAL at 05:05

## 2022-05-11 RX ADMIN — ASPIRIN 81 MG CHEWABLE TABLET 81 MG: 81 TABLET CHEWABLE at 08:05

## 2022-05-11 RX ADMIN — INSULIN ASPART 2 UNITS: 100 INJECTION, SOLUTION INTRAVENOUS; SUBCUTANEOUS at 03:05

## 2022-05-11 RX ADMIN — INSULIN ASPART 3 UNITS: 100 INJECTION, SOLUTION INTRAVENOUS; SUBCUTANEOUS at 10:05

## 2022-05-11 RX ADMIN — DIPHENHYDRAMINE HYDROCHLORIDE 25 MG: 50 INJECTION, SOLUTION INTRAMUSCULAR; INTRAVENOUS at 10:05

## 2022-05-11 RX ADMIN — DOXYLAMINE SUCCINATE 12.5 MG: 25 TABLET ORAL at 09:05

## 2022-05-11 RX ADMIN — DOCUSATE SODIUM 200 MG: 100 CAPSULE, LIQUID FILLED ORAL at 09:05

## 2022-05-11 RX ADMIN — INSULIN ASPART 5 UNITS: 100 INJECTION, SOLUTION INTRAVENOUS; SUBCUTANEOUS at 03:05

## 2022-05-11 RX ADMIN — METOCLOPRAMIDE 10 MG: 5 INJECTION, SOLUTION INTRAMUSCULAR; INTRAVENOUS at 09:05

## 2022-05-11 RX ADMIN — CYCLOBENZAPRINE HYDROCHLORIDE 5 MG: 5 TABLET, FILM COATED ORAL at 04:05

## 2022-05-11 RX ADMIN — HYDRALAZINE HYDROCHLORIDE 75 MG: 50 TABLET ORAL at 01:05

## 2022-05-11 RX ADMIN — DIPHENHYDRAMINE HYDROCHLORIDE 25 MG: 50 INJECTION, SOLUTION INTRAMUSCULAR; INTRAVENOUS at 03:05

## 2022-05-11 RX ADMIN — DOCUSATE SODIUM 200 MG: 100 CAPSULE, LIQUID FILLED ORAL at 08:05

## 2022-05-11 RX ADMIN — METOCLOPRAMIDE 10 MG: 5 INJECTION, SOLUTION INTRAMUSCULAR; INTRAVENOUS at 01:05

## 2022-05-11 RX ADMIN — METOCLOPRAMIDE 10 MG: 5 INJECTION, SOLUTION INTRAMUSCULAR; INTRAVENOUS at 05:05

## 2022-05-11 RX ADMIN — PROCHLORPERAZINE EDISYLATE 5 MG: 5 INJECTION INTRAMUSCULAR; INTRAVENOUS at 03:05

## 2022-05-11 RX ADMIN — DIPHENHYDRAMINE HYDROCHLORIDE 25 MG: 25 CAPSULE ORAL at 12:05

## 2022-05-11 RX ADMIN — LORAZEPAM 0.5 MG: 2 INJECTION INTRAMUSCULAR; INTRAVENOUS at 05:05

## 2022-05-11 RX ADMIN — DEXTROSE 250 ML: 10 SOLUTION INTRAVENOUS at 05:05

## 2022-05-11 RX ADMIN — PROMETHAZINE HYDROCHLORIDE 12.5 MG: 25 INJECTION INTRAMUSCULAR; INTRAVENOUS at 07:05

## 2022-05-11 RX ADMIN — HYDRALAZINE HYDROCHLORIDE 75 MG: 50 TABLET ORAL at 09:05

## 2022-05-11 RX ADMIN — INSULIN ASPART 3 UNITS: 100 INJECTION, SOLUTION INTRAVENOUS; SUBCUTANEOUS at 11:05

## 2022-05-11 NOTE — NURSING
Pt Dexcom reading 324. Dr. Will notified. Phlebotomy currently at bedside to draw glucose level and c-peptide. Awaiting results.

## 2022-05-11 NOTE — NURSING
Pt did not order a breakfast tray and stated she was not hungry and was nauseous. RN encouraged pt to order a tray to have at the bedside just in case she begins to have less nausea and more of an appetite.

## 2022-05-11 NOTE — SUBJECTIVE & OBJECTIVE
Overnight, patient with anxiety regarding hypoglycemia and general level of illness. Considering termination of pregnancy, but unsure at this time.  Now, resting without complaints.     Objective:     Vital Signs (Most Recent):  Temp: 98.3 °F (36.8 °C) (05/11/22 0846)  Pulse: 110 (05/11/22 0846)  Resp: 17 (05/11/22 0846)  BP: (!) 157/97 (05/11/22 0846)  SpO2: 99 % (05/11/22 0846) Vital Signs (24h Range):  Temp:  [98.2 °F (36.8 °C)-99.5 °F (37.5 °C)] 98.3 °F (36.8 °C)  Pulse:  [] 110  Resp:  [15-18] 17  SpO2:  [98 %-100 %] 99 %  BP: (133-168)/(83-98) 157/97     Weight: 58.5 kg (129 lb)  Body mass index is 24.37 kg/m².        Intake/Output Summary (Last 24 hours) at 5/11/2022 0901  Last data filed at 5/10/2022 1444  Gross per 24 hour   Intake 10 ml   Output --   Net 10 ml         Significant Labs:  Recent Lab Results  (Last 5 results in the past 24 hours)        05/11/22  0553   05/11/22  0407   05/11/22  0308   05/11/22  0308   05/11/22  0256        POC Glucose       91  Comment: via dexcom   58  Comment: via dexcom  [C]       POCT Glucose 155   227   92                                   [C] - Corrected Result               Physical Exam:   Constitutional: She is oriented to person, place, and time. She appears well-developed and well-nourished. No distress.    HENT:   Head: Normocephalic and atraumatic.    Eyes: Conjunctivae and EOM are normal.     Cardiovascular:  Normal rate and regular rhythm.             Pulmonary/Chest: Effort normal. No respiratory distress.        Abdominal: Soft. She exhibits no distension. There is no abdominal tenderness.             Musculoskeletal: No tenderness or edema.       Neurological: She is alert and oriented to person, place, and time.    Skin: Skin is warm and dry. No rash noted. She is not diaphoretic.    Psychiatric: She has a normal mood and affect. Her behavior is normal.

## 2022-05-11 NOTE — ASSESSMENT & PLAN NOTE
- BP: (133-168)/(83-98) 157/97  - CBC 8.3/24.9/252, Cr 1.2, LFTs 19/18  - P:C 3.75  - 24 hour urine 6270  - BP meds titrated to hydralazine 75 mg TID and procardia 120 mg qD

## 2022-05-11 NOTE — PROGRESS NOTES
[facility] - Antepartum ([place])  Obstetrics  Antepartum Progress Note    Patient Name: Alicia Valles  MRN: [id number]  Admission Date: 2022  Hospital Length of Stay: 6 days  Attending Physician: Jose Martin Funes MD  Primary Care Provider: Primary Doctor No    Subjective:     Principal Problem:Type 1 diabetes mellitus with other specified complication    HPI:  Alicia Valles is a 27 y.o. [id number] at 9w5d presents for blood sugar patterning. She was seen in [facility] clinic yesterday after recently being discharged from the hospital where she was admitted for hypertensive encephalopathy/hypoglycemia.    Ms. Valles has poorly controlled DM1. She was diagnosed at age 10 and her most recent A1C was >12. She previously was followed by an endocrinologist in [place], but has not established with anyone in [place] where she currently lives. She states that used to count carbs and have an insulin sensitivity factor, but she has not done that in a few years. She currently takes lantus 15 u at 5 pm; for her mealtime insulin, she randomly decides how much to give herself. Reports that she has been admitted for DKA more than 5 times. Prior to her most recent hospitalization, she was found down with BG of 29. She had a seizure due to hypoglycemia; she has not ever had seizures before and does not take medications for a seizure disorder.  Reports that she has never had an episode of hypoglycemia like that before. 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 Reports prior surgery for retinal detachment.    Ms. Valles also has chronic hypertension. She previously was taking methyldopa and hydralazine, however she was recently switched by her OBGYN to procardia 30 mg qD and hydralazine 50 mg BID. She believes this regimen has kept her well-controlled. She does not follow with a PCP.    Currently, she reports cramping epigastric pain, which she  believes could be gas pain. Denies polyuria, shortness of breath, vaginal bleeding, vision changes, chest pain, diarrhea, and dysuria.    Of note, this is an unplanned, but desired pregnancy.      Hospital Course:  05/05/2022 - Admitted for BG patterning. 1T labs, echo, EKG, TSH, A1C pending. Will continue home BP meds. Lantus 15u qHS with aspart 3/3/3 and ISF 1:50 >150. Will count carbs for each meal.  05/06/2022 - BG . Symptomatic lows. Will continue to pattern.  05/07/2022 - BG poorly controlled, endocrine consulted. Adjusted carb ratio to 1:20 and ISF 1:70 > 180. Will not slide post-meal. One episode of hypoglycemia which responded to D50. Discussed meal timing.  05/08/2022 - Episode of hypoglycemia overnight, will continue to pattern.  05/09/2022 - Continued poor BG control. Insulin regimen now levemir 7u BID. Mealtime insulin 1:15 and ISF 1:70  >180.   05/10/2022 - Continued poor BG control. Insulin regimen now levemir 6u BID. Carb ratio 1:10 and ISF 1:70 > 180.  05/11/2022 - Episode of hypoglycemia overnight. Dexcom installed yesterday, appropriately calibrated. Patient considering termination of pregnancy, will continue to discuss. No changes to insulin regimen at this time.      Overnight, patient with anxiety regarding hypoglycemia and general level of illness. Considering termination of pregnancy, but unsure at this time.  Now, resting without complaints.     Objective:     Vital Signs (Most Recent):  Temp: 98.3 °F (36.8 °C) (05/11/22 0846)  Pulse: 110 (05/11/22 0846)  Resp: 17 (05/11/22 0846)  BP: (!) 157/97 (05/11/22 0846)  SpO2: 99 % (05/11/22 0846) Vital Signs (24h Range):  Temp:  [98.2 °F (36.8 °C)-99.5 °F (37.5 °C)] 98.3 °F (36.8 °C)  Pulse:  [] 110  Resp:  [15-18] 17  SpO2:  [98 %-100 %] 99 %  BP: (133-168)/(83-98) 157/97     Weight: 58.5 kg (129 lb)  Body mass index is 24.37 kg/m².        Intake/Output Summary (Last 24 hours) at 5/11/2022 0901  Last data filed at 5/10/2022 1444  Gross  per 24 hour   Intake 10 ml   Output --   Net 10 ml         Significant Labs:  Recent Lab Results  (Last 5 results in the past 24 hours)        22  0553   22  0407   22  0308   22  0308   22  0256        POC Glucose       91  Comment: via dexcom   58  Comment: via dexcom  [C]       POCT Glucose 155   227   92                                   [C] - Corrected Result               Physical Exam:   Constitutional: She is oriented to person, place, and time. She appears well-developed and well-nourished. No distress.    HENT:   Head: Normocephalic and atraumatic.    Eyes: Conjunctivae and EOM are normal.     Cardiovascular:  Normal rate and regular rhythm.             Pulmonary/Chest: Effort normal. No respiratory distress.        Abdominal: Soft. She exhibits no distension. There is no abdominal tenderness.             Musculoskeletal: No tenderness or edema.       Neurological: She is alert and oriented to person, place, and time.    Skin: Skin is warm and dry. No rash noted. She is not diaphoretic.    Psychiatric: She has a normal mood and affect. Her behavior is normal.     Assessment/Plan:     27 y.o. female  at 10w4d for:    * Type 1 diabetes mellitus with other specified complication  - Diagnosed at age 10, does not currently follow with endocrine  - complications include diabetic retinopathy, gastroparesis, neuropathy, and h/o retinal detachment  - currently asymptomatic  - CBC 8.3/24.9/252  - Cr 1.2, LFTs   - P:C 3.75  - UA with 2+ glucose, 2+ protein  - TSH 1.570  - EKG wnl  - Echo wnl  - BG poorly controlled overnight,   - A1C 9.8  - Will pattern BG 2AM, 6AM, pre-meal and 2 hours post-meal  - Levemir /6  - Mealtime insulin: Carb ratio 1:10, ISF 1:70 > 180, will NOT correct for post-meal BG  - Will hold food if pre-meal BG >300  - Will attempt to space out meals to >4 hours apart to allow insulin to work  - Dexcom placed 5/10  - Appreciate endocrine  "assistance    Proteinuria  - P:C 3.75  - 24 hour urine 6270, nephrotic range  - lovenox 40 mg qD    Anemia  - iron/colace  - iron studies grossly wnl  - Hemoglobin electrophoresis pending    First trimester pregnancy  - RPR NR, HIV neg, HCV neg, HBV neg, RI  - daily PNV    Chronic hypertension  - BP: (133-168)/(83-98) 157/97  - CBC 8.3/24.9/252, Cr 1.2, LFTs 19/18  - P:C 3.75  - 24 hour urine 6270  - BP meds titrated to hydralazine 75 mg TID and procardia 120 mg qD          Archana Will MD  Obstetrics  [facility] - Antepartum ([place])      Patient seen and examined. Agree with resident assessment and plan.  Patient feels overwhelmed this morning, states she feels depressed about pregnancy co-morbidities and overall prognosis for herself and her fetus.    Temp:  [98.2 °F (36.8 °C)-99.5 °F (37.5 °C)] 98.3 °F (36.8 °C)  Pulse:  [] 110  Resp:  [15-18] 17  SpO2:  [98 %-100 %] 99 %  BP: (133-168)/(83-98) 157/97    Abdomen soft, no fundal tenderness    BGs reviewed    Long discussion with patient and mother about co-morbidities and overall poor health status. The patient and her mother have poor insight about the state of the patient's health before pregnancy, as they have asked on multiple occasions whether the patient would go back to "normal" after the pregnancy ends, because they view her pre-pregnancy status as normal. We reviewed the fact that the patient was not in a normal healthy state prior to pregnancy, nor would she become normal and healthy after the pregnancy ends without aggressive management of diabetes and HTN. The patient asked about potential outcomes of this pregnancy if it continues, and we reviewed these as well.   The patient stated that she would like to terminate the pregnancy due to her current health state. I inquired more about this, as this is a change in her previous desires, and she stated that she realizes how sick she is and doesn't want to continue the pregnancy in her current state. " The patient's mother is supportive of this decision. The patient asked to be discharged today so she can arrange for termination of pregnancy at a local clinic.   I reviewed options for pregnancy termination with the patient and voiced my concern about the patient leaving the hospital without appropriate BG control.   This is a difficult situation on many levels, as the patient is medically complex and desires pregnancy termination, however I am not confident that hospital discharge is appropriate at this time due to risk of DKA or hypoglycemia.   Will discuss with MFM department and with endocrinology to determine next best step for the patient.  Will ask psychiatry to see the patient, as today she is expressing symptoms of depression and anhedonia.  Will provide phone numbers for local termination clinics. There is at least a 4 week waiting period at all [place] termination clinics, due to high volume in clinics.   Will uptitrate hypertension medications.     Lora Arthur MD  Maternal Fetal Medicine fellow  PGY-7

## 2022-05-11 NOTE — CARE UPDATE
Care Update:     No acute events overnight. Patient on the Antepartum Unit in room B393/B393 A. Blood glucose variable. BG at, above and below goal on current insulin regimen (SSI, prandial, and basal insulin ). Steroid use- None.    Renal function- Normal   Vasopressors-  None       Diet gestational diabetic 2000 kcal (Breakfast & midmorning snack=70 carb grams; Lunch & midafternoon snack = 70 carb grams; Dinner & HS snack = 70 carb grams; Snacks recommended at 15 - 30 grams)     POCT Glucose   Date Value Ref Range Status   05/11/2022 261 (H) 70 - 110 mg/dL Final   05/11/2022 155 (H) 70 - 110 mg/dL Final   05/11/2022 227 (H) 70 - 110 mg/dL Final   05/11/2022 92 70 - 110 mg/dL Final   05/11/2022 58 (L) 70 - 110 mg/dL Final   05/11/2022 91 70 - 110 mg/dL Final   05/11/2022 125 (H) 70 - 110 mg/dL Final   05/11/2022 182 (H) 70 - 110 mg/dL Final   05/10/2022 208 (H) 70 - 110 mg/dL Final   05/10/2022 272 (H) 70 - 110 mg/dL Final   05/10/2022 286 (H) 70 - 110 mg/dL Final   05/10/2022 294 (H) 70 - 110 mg/dL Final   05/10/2022 117 (H) 70 - 110 mg/dL Final   05/10/2022 193 (H) 70 - 110 mg/dL Final   05/10/2022 138 (H) 70 - 110 mg/dL Final   05/10/2022 120 (H) 70 - 110 mg/dL Final   05/10/2022 121 (H) 70 - 110 mg/dL Final   05/10/2022 166 (H) 70 - 110 mg/dL Final   05/10/2022 248 (H) 70 - 110 mg/dL Final   05/10/2022 302 (H) 70 - 110 mg/dL Final   05/10/2022 375 (H) 70 - 110 mg/dL Final   05/10/2022 273 (H) 70 - 110 mg/dL Final   05/10/2022 342 (H) 70 - 110 mg/dL Final   05/10/2022 363 (H) 70 - 110 mg/dL Final   05/10/2022 355 (H) 70 - 110 mg/dL Final   05/10/2022 325 (H) 70 - 110 mg/dL Final   05/10/2022 167 (H) 70 - 110 mg/dL Final   05/10/2022 179 (H) 70 - 110 mg/dL Final   05/10/2022 206 (H) 70 - 110 mg/dL Final   05/10/2022 243 (H) 70 - 110 mg/dL Final   05/09/2022 290 (H) 70 - 110 mg/dL Final   05/09/2022 297 (H) 70 - 110 mg/dL Final   05/09/2022 140 (H) 70 - 110 mg/dL Final   05/09/2022 176 (H) 70 - 110 mg/dL Final    05/09/2022 258 (H) 70 - 110 mg/dL Final   05/09/2022 113 (H) 70 - 110 mg/dL Final   05/09/2022 58 (L) 70 - 110 mg/dL Final   05/09/2022 52 (L) 70 - 110 mg/dL Final   05/09/2022 236 (H) 70 - 110 mg/dL Final   05/09/2022 393 (H) 70 - 110 mg/dL Final   05/09/2022 175 (H) 70 - 110 mg/dL Final   05/09/2022 144 (H) 70 - 110 mg/dL Final   05/09/2022 190 (H) 70 - 110 mg/dL Final   05/09/2022 111 (H) 70 - 110 mg/dL Final   05/09/2022 48 (LL) 70 - 110 mg/dL Final   05/09/2022 63 (L) 70 - 110 mg/dL Final   05/08/2022 77 70 - 110 mg/dL Final   05/08/2022 153 (H) 70 - 110 mg/dL Final   05/08/2022 131 (H) 70 - 110 mg/dL Final   05/08/2022 241 (H) 70 - 110 mg/dL Final   05/08/2022 207 (H) 70 - 110 mg/dL Final   05/08/2022 230 (H) 70 - 110 mg/dL Final   05/08/2022 316 (H) 70 - 110 mg/dL Final   05/08/2022 316 (H) 70 - 110 mg/dL Final     Lab Results   Component Value Date    HGBA1C 9.8 (H) 05/05/2022       Type 1 diabetes mellitus with other specified complication  Endocrinology consulted for BG management.   BG goal       - Ordered C-peptide and random glucose to evaluate for any endogenous insulin production given sensitivity to insulin and carbohydrates.   - Levemir Flex Pen 6 units in the morning and 5 units at night. (20% decrease in h.s. dosing due to FBG dropped at 0200 overnight).   - Novolog (aspart) insulin ICR 1:10 Units SQ TIDWM and prn for BG excursions ISF 1:70 (starting to slide at 180 mg/dL)  - BG checks AC/HS/0200 and two hours post-prandial (Do not give post-prandial correction)  - Initiate Dexcom (Clarity Code: [id number])  - Change diet to 45 grams of carbohydrates for meals and 15-25 grams of carbs for snacks   - Ensure prandial insulin is administered 15 minutes before starting to eat or drink.   - Hypoglycemia protocol in place  - If blood glucose greater than 300, please ask patient not to eat food or drink anything other than water until correctional insulin has brought it back below 250     **  Please notify Endocrine for any change and/or advance in diet**  ** Please call Endocrine for any BG related issues **     Goal of glucose for diabetes in pregnancy  Fasting blood glucose concentration ?95 mg/dL  One-hour postprandial blood glucose concentration ?140 mg/dL   Two-hour postprandial glucose concentration ?120 mg/dL      Goal A1c in pregnancy ideally <6.5%, to reduce the risk of congenital anomalies       Discharge Planning:   - Lantus 6 units in the morning and 5 units at night (Eat a snack before bed if BG is < 100 mg/dl)  - Novolog 5 units TIDWM (Hold if BG < 80 mg/dL) (45 grams of carbohydrates)  - Novolog 2 units prn with snacks (15-30 grams of carbohydrates)  - Novolog SSI for BG excursions:  180 - 230 + 1 unit  231- 280  + 2 units  281 - 330 + 3 units  331 - 380 + 4 units      > 380   + 5 units  - Insurance approved diabetes testing supplies to check blood sugar 4 times a day (Before meals and at bedtime) and as needed.  - Dexcom G6 sensor and transmitter (to be changed every 10 days)   - BD pen needles   - Insurance approved glucagon for extreme hypoglycemia (Baqsimi or Zegalogue if insurance approved)  - Send BG logs every 4 days for review.  - Follow-up in discharge clinic in 2 weeks.     Education:  Discharge Teaching:    Reviewed topics related to DM including: the need for insulin, how insulin works, what makes it a high risk medication, the importance of immediate follow up with either PCP or endocrine provider, importance of and how to check BG, how to record BG on logs, how to administer insulin, appropriate insulin administration sites, importance of rotating injection sites, hyper/hypoglycemia, how and when to treat hypoglycemia, when to hold insulin, how the correction scale works, importance of storing unused insulin in the refrigerator, and when to seek medical attention.  Patient verbalized understanding, answered all questions to patient's satisfaction. Blood sugar logs given to  patient by staff at [facility].     Hypoglycemia (Low Blood Sugar)  Too little glucose (sugar) in your blood is called hypoglycemia or low blood sugar. Diabetes itself doesnt cause low blood sugar. But some of the treatments for diabetes, such as pills or insulin, may increase your risk for it. Low blood sugar may cause you to lose consciousness or have a seizure. So always treat low blood sugar right away.    Special note: Always carry a source of fast-acting sugar and a snack in case of hypoglycemia     What You May Notice  If you have low blood sugar, you may have these symptoms:   Shakiness or dizziness   Cold, clammy skin or sweating   Feelings of hunger   Headache   Nervousness   A hard, fast heartbeat   Weakness   Confusion or irritability   Blurred vision  What You Should Do   First, check your blood sugar. If it is too low (out of your target range), eat or drink 15 to 20 grams of fast-acting sugar. This may be 3 to 4 glucose tablets, 4 oz (half a cup) fruit juice or regular (non-diet) soda, 8 oz (one cup ) fat-free milk, or 1 tablespoon of honey. Dont take more than this, or your blood sugar may go too high.   Wait 15 minutes. Then recheck your blood sugar if you can.   If your blood sugar is still too low, repeat the steps above and check your blood sugar again. If your blood sugar still has not returned to your target range, contact your healthcare provider or seek emergency care.   Once your blood sugar returns to target range, eat a snack or meal.  Preventing Low Blood Sugar   Eat your meals and snacks at the same times each day. Dont skip meals!   Ask your healthcare provider if it is safe for you to drink alcohol. Never drink on an empty stomach.   Take your medication at the prescribed times.   Always carry a source of fast-acting sugar and a snack when youre away from home.  Other Things to Do   Carry a medical ID card or wear a medical alert bracelet or necklace. It  should say that you have diabetes. It should also say what to do if you pass out or have a seizure.   Make sure your family, friends, and coworkers know the signs of low blood sugar. Tell them what to do if your blood sugar falls very low and you cant treat yourself.   Keep a glucagon emergency kit handy. Be sure your family, friends, and coworkers know how and when to use it. Check it regularly and replace the glucagon before it expires.   Talk to your healthcare team about other things you can do to prevent low blood sugar.     If you experience hypoglycemia several times, call your doctor.   © 7504-8321 [facility], [address] [place] [address], [place] [address]. All rights reserved. This information is not intended as a substitute for professional medical care. Always follow your healthcare professional's instructions.         Outpatient follow-up: In two weeks in the D/C clinic then follow-up with Dr. Schumacher for primary endocrine care.   Prescriptions needed: Dexcom G6; updated Lantus and Novolog dosing (see above).   Glucagon: Recommend Baqsimi if covered by insurance.   Ketones strips: Y        First trimester pregnancy  Optimize glucose control for the health of mom and baby.     Patients with type 1 diabetes are more likely to have pregestational microvascular complications that can worsen due to pregnancy, and they are at higher risk of developing severe hypoglycemia and diabetic ketoacidosis. Microvascular complications also increase the risk for some pregnancy complications, such as (FGR)/(SGA) infant. Pregnancy can exacerbate preexisting microvascular disease, and this effect varies by type of complication. Fetal and  complications among patients with pregestational diabetes range in severity from potentially mild [LGA] infant to lethal (higher risk of early pregnancy loss, some congenital malformations, and stillbirth). The risk of these complications are directly related to glycemic control  throughout pregnancy. Will continue to optimize blood glucose control.         Anemia  Lab Results   Component Value Date    HGBA1C 9.8 (H) 05/05/2022       Anemia may impact the accuracy of the A1C.              Enio Bedolla DNP, FNP-C  Department of Endocrinology  Inpatient Glycemic Management

## 2022-05-11 NOTE — PLAN OF CARE
Problem: Adult Inpatient Plan of Care  Goal: Plan of Care Review  Outcome: Ongoing, Progressing     Problem:  Fall Injury Risk  Goal: Absence of Fall, Infant Drop and Related Injury  Outcome: Ongoing, Progressing     Problem: Adult Inpatient Plan of Care  Goal: Plan of Care Review  Outcome: Ongoing, Progressing  Goal: Patient-Specific Goal (Individualized)  Outcome: Ongoing, Progressing  Goal: Absence of Hospital-Acquired Illness or Injury  Outcome: Ongoing, Progressing  Goal: Optimal Comfort and Wellbeing  Outcome: Ongoing, Progressing  Goal: Readiness for Transition of Care  Outcome: Ongoing, Progressing     Problem: Diabetes Comorbidity  Goal: Blood Glucose Level Within Targeted Range  Outcome: Ongoing, Progressing     Problem: Fall Injury Risk  Goal: Absence of Fall and Fall-Related Injury  Outcome: Ongoing, Progressing     Problem: Pain Acute  Goal: Acceptable Pain Control and Functional Ability  Outcome: Ongoing, Progressing     Problem: Maternal-Fetal Wellbeing  Goal: Optimal Maternal-Fetal Wellbeing  Outcome: Ongoing, Progressing     Problem: Infection  Goal: Absence of Infection Signs and Symptoms  Outcome: Ongoing, Progressing

## 2022-05-11 NOTE — PROVIDER TRANSFER
Outside Transfer Acceptance Note / Regional Referral Center    Referring facility: [facility] LOCATION ([facility])   Referring provider: JAYRO OBANDO  Accepting facility: [facility]  Accepting provider: LISETTE WEAVER  Admitting provider: Hospital Medicine JF  Reason for transfer:  Endocrine Consult   Transfer diagnosis:  Symptomatic hypoglycemia  Transfer specialty requested: Hospital Medicine  Transfer specialty notified: yes  Transfer level: NUMBER 1-5: 2  Bed type requested:  Step-down  Isolation status: No active isolations   Admission class or status: IP- Inpatient      Narrative     Alicia Valles is a 27 y.o. with past medical history of diabetes mellitus type 1, currently pregnant with gestational age 10w4d  who presented to [facility] with labile blood sugars.  Patient has a history of poorly controlled brittle diabetes mellitus type 1, most recent A1c over 12. with the large BS swings, highs in the 300s (and some in the 400s) and just as sudden hypoglycemic episodes from minor dosing of immediate acting insulin. Endocrine consulted and managing her DM virtually but unable to around in person at [facility].  This has proven to be quite difficult. The appropriate regimen for long acting and carb ratio continues to be difficult to determine.  admitting provider and Endocrinology requesting transfer to [facility] for endocrinology consult for closer glucose management. No further management needed per obstetrics at this time, but they are willing to consult at [facility] if needed.    Patient noted to be afebrile, stable on room air.  Intermittent episodes of hypertension/tachycardia noted.  Last blood glucose 217. Mild KOBY with creatinine of 1.4, baseline 1.0.    Objective     Vitals: Temp: 98.3 °F (36.8 °C) (05/11/22 0846)  Pulse: (!) 114 (05/11/22 1806)  Resp: 18 (05/11/22 1806)  BP: (!) 155/96 (05/11/22 1806)  SpO2: 99 % (05/11/22 1806)  Recent Labs:   All  pertinent labs within the past 24 hours have been reviewed.  A1C:   Recent Labs   Lab 05/05/22  1638   HGBA1C 9.8*     CBC: No results for input(s): WBC, HGB, HCT, PLT in the last 48 hours.  CMP:   Recent Labs   Lab 05/11/22  1359 05/11/22  1736   NA  --  135*   K  --  4.9   CL  --  104   CO2  --  22*   * 188*   BUN  --  16   CREATININE  --  1.4   CALCIUM  --  8.2*   PROT  --  5.9*   ALBUMIN  --  2.7*   BILITOT  --  0.2   ALKPHOS  --  58   AST  --  25   ALT  --  18   ANIONGAP  --  9   EGFRNONAA  --  51*     Recent imaging:    Airway:     Vent settings:     IV access:        Midline Catheter Insertion/Assessment  - Single Lumen 05/10/22 1010 Left basilic vein (medial side of arm) other (see comments);18g x 10cm (Active)   $ Midline Charges (Upon insertion) Bedside Insertion Performed Pt > 3 Years Old;Midline Catheter (Supply) 05/10/22 1021   Site Assessment Dry;Clean;Intact;No redness;No swelling;No drainage;No warmth 05/11/22 0701   IV Device Securement catheter securement device 05/11/22 0701   Line Status Flushed;Saline locked;Capped 05/11/22 0701   Extremity Circumference (cm) 30 cm 05/10/22 1021   Dressing Type Biopatch in place 05/11/22 0701   Dressing Status Clean;Dry;Intact 05/11/22 0701   Dressing Intervention Integrity maintained 05/11/22 0701   Dressing Change Due 05/17/22 05/10/22 1021   Reason Not Rotated Not due 05/11/22 0701     Infusions:  None  Allergies: Review of patient's allergies indicates:  No Known Allergies   NPO: No      Anticoagulation:   Anticoagulants     Ordered     Route Frequency Start Stop    05/06/22 1137  enoxaparin         SubQ Daily 05/06/22 1700 --           Instructions      Garland Rawls-  Admit to Hospital Medicine  Upon patient arrival to floor, please send SecureChat to [facility] or call extension [phone] (if no answer, this will flip to a beeper, so enter your call back number) for Hospital Medicine admit team assignment and for additional admit orders for the patient.  Do  not page the attending physician associated with the patient on arrival (this physician may not be on duty at the time of arrival).  Rather, always call [phone] to reach the triage physician for orders and team assignment.    Per endocrinology:  Please consult [place]ocrinology [place] [facility]  Recommend the following POC on transfer:           - BG checks q4hr           - Hypoglycemia protocol           - Continued use of Dexcom G6   - Levemir 6 units in the morning and 5 units at night (Eat a snack before bed if BG is < 100 mg/dl)  - Novolog 5 units TIDWM (Hold if BG < 80 mg/dL) (45 grams of carbohydrates)  - Novolog 2 units prn with snacks (15-30 grams of carbohydrates)  - Novolog SSI for BG excursions:  180 - 230 + 1 unit  231- 280  + 2 units  281 - 330 + 3 units  331 - 380 + 4 units      > 380   + 5 units

## 2022-05-11 NOTE — NURSING
Pt ordered sugar free lemonade, chicken broth and 1/2 turkey sandwich that she was able to finally eat after having less nausea.   PAIN

## 2022-05-11 NOTE — NURSING
Dr. Boecking at bedside to discuss pt comment about not wanting to be here anymore and discomfort at the hospital.

## 2022-05-11 NOTE — CARE UPDATE
Spoke with MFM, and agree with proposed plan of transferring the patient to [facility] for closer glucose management. Patient to be admitted to hospital medicine team. Patient should be admitted to a step-down unit in the event that the patient needs to be put on frequent BG checks. Please consult endocrine upon admission to [facility].     Recommend the following POC on transfer:           - BG checks q4hr           - Hypoglycemia protocol           - Continued use of Dexcom G6   - Levemir 6 units in the morning and 5 units at night (Eat a snack before bed if BG is < 100 mg/dl)  - Novolog 5 units TIDWM (Hold if BG < 80 mg/dL) (45 grams of carbohydrates)  - Novolog 2 units prn with snacks (15-30 grams of carbohydrates)  - Novolog SSI for BG excursions:  180 - 230 + 1 unit  231- 280  + 2 units  281 - 330 + 3 units  331 - 380 + 4 units      > 380   + 5 units      POCT Glucose   Date Value Ref Range Status   05/11/2022 217 (H) 70 - 110 mg/dL Final   05/11/2022 232 (H) 70 - 110 mg/dL Final   05/11/2022 198 (H) 70 - 110 mg/dL Final   05/11/2022 229 (H) 70 - 110 mg/dL Final   05/11/2022 261 (H) 70 - 110 mg/dL Final   05/11/2022 155 (H) 70 - 110 mg/dL Final   05/11/2022 227 (H) 70 - 110 mg/dL Final   05/11/2022 92 70 - 110 mg/dL Final   05/11/2022 58 (L) 70 - 110 mg/dL Final   05/11/2022 91 70 - 110 mg/dL Final   05/11/2022 125 (H) 70 - 110 mg/dL Final   05/11/2022 182 (H) 70 - 110 mg/dL Final   05/10/2022 208 (H) 70 - 110 mg/dL Final   05/10/2022 272 (H) 70 - 110 mg/dL Final   05/10/2022 286 (H) 70 - 110 mg/dL Final   05/10/2022 294 (H) 70 - 110 mg/dL Final   05/10/2022 117 (H) 70 - 110 mg/dL Final   05/10/2022 193 (H) 70 - 110 mg/dL Final   05/10/2022 138 (H) 70 - 110 mg/dL Final   05/10/2022 120 (H) 70 - 110 mg/dL Final   05/10/2022 121 (H) 70 - 110 mg/dL Final   05/10/2022 166 (H) 70 - 110 mg/dL Final   05/10/2022 248 (H) 70 - 110 mg/dL Final   05/10/2022 302 (H) 70 - 110 mg/dL Final   05/10/2022 375 (H) 70 - 110  mg/dL Final   05/10/2022 273 (H) 70 - 110 mg/dL Final   05/10/2022 342 (H) 70 - 110 mg/dL Final   05/10/2022 363 (H) 70 - 110 mg/dL Final   05/10/2022 355 (H) 70 - 110 mg/dL Final   05/10/2022 325 (H) 70 - 110 mg/dL Final   05/10/2022 167 (H) 70 - 110 mg/dL Final   05/10/2022 179 (H) 70 - 110 mg/dL Final   05/10/2022 206 (H) 70 - 110 mg/dL Final   05/10/2022 243 (H) 70 - 110 mg/dL Final   05/09/2022 290 (H) 70 - 110 mg/dL Final   05/09/2022 297 (H) 70 - 110 mg/dL Final   05/09/2022 140 (H) 70 - 110 mg/dL Final   05/09/2022 176 (H) 70 - 110 mg/dL Final   05/09/2022 258 (H) 70 - 110 mg/dL Final   05/09/2022 113 (H) 70 - 110 mg/dL Final   05/09/2022 58 (L) 70 - 110 mg/dL Final   05/09/2022 52 (L) 70 - 110 mg/dL Final   05/09/2022 236 (H) 70 - 110 mg/dL Final   05/09/2022 393 (H) 70 - 110 mg/dL Final   05/09/2022 175 (H) 70 - 110 mg/dL Final   05/09/2022 144 (H) 70 - 110 mg/dL Final   05/09/2022 190 (H) 70 - 110 mg/dL Final   05/09/2022 111 (H) 70 - 110 mg/dL Final   05/09/2022 48 (LL) 70 - 110 mg/dL Final   05/09/2022 63 (L) 70 - 110 mg/dL Final   05/08/2022 77 70 - 110 mg/dL Final   05/08/2022 153 (H) 70 - 110 mg/dL Final   05/08/2022 131 (H) 70 - 110 mg/dL Final     Lab Results   Component Value Date    HGBA1C 9.8 (H) 05/05/2022       Enio Bedolla DNP, FNP-C  Department of Endocrinology  Inpatient Glycemic Management

## 2022-05-11 NOTE — NURSING
"Pt called out asking RN to come to room. Pt stated she would like RN to tell doctors she is "uncomfortable" and "I don't want to be here anymore." Dr. Marques notified.  "

## 2022-05-11 NOTE — NURSING
RN accompanied pt and pt family member to outdoor patio to get some fresh air and sunlight. Able to ambulate to patio, but was pushed in wheelchair to return to room after 15 min due to feeling like her heart rate was high. Upon returning to room pt encouraged to drink water since she has not had any but a few sips with medication administration. MD notified.

## 2022-05-11 NOTE — TELEPHONE ENCOUNTER
Mom verified self by name, Janene COLE.  Mother states her frustration as well and explains the same thing as previously stated.  RN tried to explain to mother as well of what RN told patient earlier with Dr. Dias's recommendations.  We would not be able to manage her blood sugars adequately from a [facility] outpatient standpoint. Patient does not have a local endocrinologist at home either so she is safer to stay inpatient with an endo on board. Mother states that M did not see patient today and it has been frustrating. RN advised parent/patient to have a care-team meeting with all providers as her sugars are not being addressed until it's too late, or having to call the doctor everytime her sugar needs to be treated, but it is bottomed out even worst before they treat her, then she is over-treated or medicated causing her sugars to come too high.  RN explained that per Dr. Dias, patient's blood sugar control is so brittle at the moment, does not advise to leave inpatient treatment until under control.   Mother agrees to call for a team-meeting so that better parameters are set for blood sugar control.   Call back if need anything else or if RN can do anything else.  Pt/parent rbalized understanding. Agreed to POC w intent to comply.

## 2022-05-11 NOTE — NURSING
Dr. Will at bedside for assessment. BP and pulse assessed using monitor. Ativan, EKG and CXR ordered by MD. BG ordered to be assessed.

## 2022-05-11 NOTE — CONSULTS
[facility]  Psychiatry  Telepsychiatry Consult Note    Please see previous notes:    Patient agreeable to consultation via telepsychiatry.    Tele-Consultation from Psychiatry started: 2022 at 1015  The chief complaint leading to psychiatric consultation is: depression  This consultation was requested by Archana Will MD, the Emergency Department attending physician.  The location of the consulting psychiatrist is [facility].  The patient location is  [facility]   The patient arrived at the ED at: unknown    Also present with the patient at the time of the consultation: no one    Patient Identification:   Alicia Valles is a 27 y.o. female.    Patient information was obtained from patient, past medical records, ER records and primary team.      Inpatient consult to Telemedicine - Psych  Consult performed by: Emilio Parker DO  Consult ordered by: Archana Will MD        Consult Start Time: 2022 10:15 CDT  Consult End Time: 2022 10:52 CDT        Subjective:     History of Present Illness:  Per OBGYN MD  Alicia Valles is a 27 y.o. [id number] at 9w5d presents for blood sugar patterning. She was seen in MFM clinic yesterday after recently being discharged from the hospital where she was admitted for hypertensive encephalopathy/hypoglycemia.     Ms. Valles has poorly controlled DM1. She was diagnosed at age 10 and her most recent A1C was >12. She previously was followed by an endocrinologist in [place], but has not established with anyone in [place] where she currently lives. She states that used to count carbs and have an insulin sensitivity factor, but she has not done that in a few years. She currently takes lantus 15 u at 5 pm; for her mealtime insulin, she randomly decides how much to give herself. Reports that she has been admitted for DKA more than 5 times. Prior to her most recent hospitalization, she was found down with BG of 29. She had a seizure due to hypoglycemia;  she has not ever had seizures before and does not take medications for a seizure disorder.  Reports that she has never had an episode of hypoglycemia like that before. 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 Reports prior surgery for retinal detachment.     Ms. Valles also has chronic hypertension. She previously was taking methyldopa and hydralazine, however she was recently switched by her OBGYN to procardia 30 mg qD and hydralazine 50 mg BID. She believes this regimen has kept her well-controlled. She does not follow with a PCP.     Currently, she reports cramping epigastric pain, which she believes could be gas pain. Denies polyuria, shortness of breath, vaginal bleeding, vision changes, chest pain, diarrhea, and dysuria.     Of note, this is an unplanned, but desired pregnancy.    Psychiatric Evaluation  Chart reviewed, no previous psychiatric encounters documented. Patient minimally engaged during interview. Objectively appears depressed (slow, monotonous speech, constricted affect, minimal eye contact, minimal engagement in interview) but is denying depression at this time. Answers my questions with soft, one word responses. Does not have report any additional psychiatric concerns today.     Psychiatric History:   Previous Psychiatric Hospitalizations: No   Previous Medication Trials: No   Previous Suicide Attempts: no   History of Violence: Denies  History of Depression: yes  History of Joyce: Denies  History of Auditory/Visual Hallucination Denies  History of Delusions: Denies  Outpatient psychiatrist (current & past): No    Substance Abuse History:  Tobacco:No  Alcohol: No  Illicit Substances:No  Detox/Rehab: No    Legal History: Past charges/incarcerations: deferred     Family Psychiatric History: denies      Social History:  Developmental/Childhood:Achieved all developmental milestones  "timely  *Education:"college"  Employment Status/Finances:Unemployed   Relationship Status/Sexual Orientation: single. States father is in the picture  Children: 0  Housing Status: lives with parents    history:  NO  Access to gun: Denies  Recreational activities:Time with family    Psychiatric Mental Status Exam:  Arousal: lethargic  Sensorium/Orientation: oriented to grossly intact  Behavior/Cooperation: reluctant to participate, eye contact minimal, guarded   Speech: slowed, soft, monotone  Language: grossly intact  Mood: " fine "   Affect: constricted  Thought Process: linear  Thought Content:   Auditory hallucinations: NO  Visual hallucinations: NO  Paranoia: NO  Delusions:  NO  Suicidal ideation: NO  Homicidal ideation: NO  Attention/Concentration:  intact  Memory:    Recent:  Intact   Remote: Intact     Fund of Knowledge: Aware of current events   Abstract reasoning: similarities were abstract  Insight: limited awareness of illness  Judgment: limited      Past Medical History: No past medical history on file.   Laboratory Data:   Labs Reviewed   CBC W/ AUTO DIFFERENTIAL - Abnormal; Notable for the following components:       Result Value    RBC 2.78 (*)     Hemoglobin 8.3 (*)     Hematocrit 24.9 (*)     All other components within normal limits    Narrative:     Release to patient->Immediate   COMPREHENSIVE METABOLIC PANEL - Abnormal; Notable for the following components:    Sodium 134 (*)     Glucose 237 (*)     BUN 26 (*)     Albumin 2.6 (*)     Anion Gap 7 (*)     All other components within normal limits    Narrative:     Release to patient->Immediate   PROTEIN / CREATININE RATIO, URINE - Abnormal; Notable for the following components:    Protein, Urine Random 199 (*)     Prot/Creat Ratio, Urine 3.75 (*)     All other components within normal limits    Narrative:     Specimen Source->Urine   URINALYSIS, REFLEX TO URINE CULTURE - Abnormal; Notable for the following components:    Protein, UA 2+ (*)  "    Glucose, UA 2+ (*)     Occult Blood UA Trace (*)     All other components within normal limits    Narrative:     Specimen Source->Urine   VARICELLA ZOSTER ANTIBODY, IGG - Abnormal; Notable for the following components:    Varicella Zoster IgG 3.09 (*)     Varicella Interpretation Positive (*)     All other components within normal limits    Narrative:     Release to patient->Immediate   HEMOGLOBIN A1C - Abnormal; Notable for the following components:    Hemoglobin A1C 9.8 (*)     Estimated Avg Glucose 235 (*)     All other components within normal limits    Narrative:     Release to patient->Immediate   IRON AND TIBC - Abnormal; Notable for the following components:    Transferrin 180 (*)     All other components within normal limits   PROTEIN, URINE, TIMED - Abnormal; Notable for the following components:    Protein, Urine 220 (*)     Urine Protein, Timed 6270 (*)     All other components within normal limits    Narrative:     Specimen Source->Urine  Collection Start Date->5/6/22  Collection Start Time-> 8:00 AM  Collection Stop Time-> 8:00 AM  05/7/2022   COMPREHENSIVE METABOLIC PANEL - Abnormal; Notable for the following components:    CO2 22 (*)     BUN 21 (*)     Albumin 2.8 (*)     All other components within normal limits   COMPREHENSIVE METABOLIC PANEL - Abnormal; Notable for the following components:    Glucose 121 (*)     Calcium 8.6 (*)     Albumin 2.4 (*)     All other components within normal limits   PHOSPHORUS - Abnormal; Notable for the following components:    Phosphorus 4.9 (*)     All other components within normal limits   POCT GLUCOSE - Abnormal; Notable for the following components:    POCT Glucose 263 (*)     All other components within normal limits   POCT GLUCOSE - Abnormal; Notable for the following components:    POCT Glucose 243 (*)     All other components within normal limits   POCT GLUCOSE - Abnormal; Notable for the following components:    POCT Glucose 354 (*)     All other  components within normal limits   POCT GLUCOSE - Abnormal; Notable for the following components:    POCT Glucose 206 (*)     All other components within normal limits   POCT GLUCOSE - Abnormal; Notable for the following components:    POCT Glucose 35 (*)     All other components within normal limits   POCT GLUCOSE - Abnormal; Notable for the following components:    POCT Glucose 41 (*)     All other components within normal limits   POCT GLUCOSE - Abnormal; Notable for the following components:    POCT Glucose 123 (*)     All other components within normal limits   POCT GLUCOSE - Abnormal; Notable for the following components:    POCT Glucose 250 (*)     All other components within normal limits   POCT GLUCOSE - Abnormal; Notable for the following components:    POCT Glucose 276 (*)     All other components within normal limits   POCT GLUCOSE - Abnormal; Notable for the following components:    POCT Glucose 254 (*)     All other components within normal limits   POCT GLUCOSE - Abnormal; Notable for the following components:    POCT Glucose 172 (*)     All other components within normal limits   POCT GLUCOSE - Abnormal; Notable for the following components:    POCT Glucose 219 (*)     All other components within normal limits   POCT GLUCOSE - Abnormal; Notable for the following components:    POCT Glucose 162 (*)     All other components within normal limits   POCT GLUCOSE - Abnormal; Notable for the following components:    POCT Glucose 358 (*)     All other components within normal limits   POCT GLUCOSE - Abnormal; Notable for the following components:    POCT Glucose 366 (*)     All other components within normal limits   POCT GLUCOSE - Abnormal; Notable for the following components:    POCT Glucose 68 (*)     All other components within normal limits   POCT GLUCOSE - Abnormal; Notable for the following components:    POCT Glucose 50 (*)     All other components within normal limits   POCT GLUCOSE - Abnormal;  Notable for the following components:    POCT Glucose 214 (*)     All other components within normal limits   POCT GLUCOSE - Abnormal; Notable for the following components:    POCT Glucose 215 (*)     All other components within normal limits   POCT GLUCOSE - Abnormal; Notable for the following components:    POCT Glucose 224 (*)     All other components within normal limits   POCT GLUCOSE - Abnormal; Notable for the following components:    POCT Glucose 294 (*)     All other components within normal limits   POCT GLUCOSE - Abnormal; Notable for the following components:    POCT Glucose 228 (*)     All other components within normal limits   POCT GLUCOSE - Abnormal; Notable for the following components:    POCT Glucose 147 (*)     All other components within normal limits   POCT GLUCOSE - Abnormal; Notable for the following components:    POCT Glucose 229 (*)     All other components within normal limits   POCT GLUCOSE - Abnormal; Notable for the following components:    POCT Glucose 204 (*)     All other components within normal limits   POCT GLUCOSE - Abnormal; Notable for the following components:    POCT Glucose 157 (*)     All other components within normal limits   POCT GLUCOSE - Abnormal; Notable for the following components:    POCT Glucose 371 (*)     All other components within normal limits   POCT GLUCOSE - Abnormal; Notable for the following components:    POCT Glucose 377 (*)     All other components within normal limits   POCT GLUCOSE - Abnormal; Notable for the following components:    POCT Glucose 320 (*)     All other components within normal limits   POCT GLUCOSE - Abnormal; Notable for the following components:    POCT Glucose 148 (*)     All other components within normal limits   POCT GLUCOSE - Abnormal; Notable for the following components:    POCT Glucose 38 (*)     All other components within normal limits   POCT GLUCOSE - Abnormal; Notable for the following components:    POCT Glucose 149  (*)     All other components within normal limits   POCT GLUCOSE - Abnormal; Notable for the following components:    POCT Glucose 143 (*)     All other components within normal limits   POCT GLUCOSE - Abnormal; Notable for the following components:    POCT Glucose 198 (*)     All other components within normal limits   POCT GLUCOSE - Abnormal; Notable for the following components:    POCT Glucose 54 (*)     All other components within normal limits   POCT GLUCOSE - Abnormal; Notable for the following components:    POCT Glucose 147 (*)     All other components within normal limits   POCT GLUCOSE - Abnormal; Notable for the following components:    POCT Glucose 135 (*)     All other components within normal limits   POCT GLUCOSE - Abnormal; Notable for the following components:    POCT Glucose 425 (*)     All other components within normal limits   POCT GLUCOSE - Abnormal; Notable for the following components:    POCT Glucose 316 (*)     All other components within normal limits   POCT GLUCOSE - Abnormal; Notable for the following components:    POCT Glucose 316 (*)     All other components within normal limits   POCT GLUCOSE - Abnormal; Notable for the following components:    POCT Glucose 230 (*)     All other components within normal limits   POCT GLUCOSE - Abnormal; Notable for the following components:    POCT Glucose 207 (*)     All other components within normal limits   POCT GLUCOSE - Abnormal; Notable for the following components:    POCT Glucose 241 (*)     All other components within normal limits   POCT GLUCOSE - Abnormal; Notable for the following components:    POCT Glucose 131 (*)     All other components within normal limits   POCT GLUCOSE - Abnormal; Notable for the following components:    POCT Glucose 153 (*)     All other components within normal limits   POCT GLUCOSE - Abnormal; Notable for the following components:    POCT Glucose 63 (*)     All other components within normal limits   POCT  GLUCOSE - Abnormal; Notable for the following components:    POCT Glucose 48 (*)     All other components within normal limits   POCT GLUCOSE - Abnormal; Notable for the following components:    POCT Glucose 111 (*)     All other components within normal limits   POCT GLUCOSE - Abnormal; Notable for the following components:    POCT Glucose 190 (*)     All other components within normal limits   POCT GLUCOSE - Abnormal; Notable for the following components:    POCT Glucose 144 (*)     All other components within normal limits   POCT GLUCOSE - Abnormal; Notable for the following components:    POCT Glucose 175 (*)     All other components within normal limits   POCT GLUCOSE - Abnormal; Notable for the following components:    POCT Glucose 393 (*)     All other components within normal limits   POCT GLUCOSE - Abnormal; Notable for the following components:    POCT Glucose 236 (*)     All other components within normal limits   POCT GLUCOSE - Abnormal; Notable for the following components:    POCT Glucose 52 (*)     All other components within normal limits   POCT GLUCOSE - Abnormal; Notable for the following components:    POCT Glucose 58 (*)     All other components within normal limits   POCT GLUCOSE - Abnormal; Notable for the following components:    POCT Glucose 113 (*)     All other components within normal limits   POCT GLUCOSE - Abnormal; Notable for the following components:    POCT Glucose 258 (*)     All other components within normal limits   POCT GLUCOSE - Abnormal; Notable for the following components:    POCT Glucose 176 (*)     All other components within normal limits   POCT GLUCOSE - Abnormal; Notable for the following components:    POCT Glucose 140 (*)     All other components within normal limits   POCT GLUCOSE - Abnormal; Notable for the following components:    POCT Glucose 297 (*)     All other components within normal limits   POCT GLUCOSE - Abnormal; Notable for the following components:     POCT Glucose 290 (*)     All other components within normal limits   POCT GLUCOSE - Abnormal; Notable for the following components:    POCT Glucose 243 (*)     All other components within normal limits   POCT GLUCOSE - Abnormal; Notable for the following components:    POCT Glucose 206 (*)     All other components within normal limits   POCT GLUCOSE - Abnormal; Notable for the following components:    POCT Glucose 179 (*)     All other components within normal limits   POCT GLUCOSE - Abnormal; Notable for the following components:    POCT Glucose 167 (*)     All other components within normal limits   POCT GLUCOSE - Abnormal; Notable for the following components:    POCT Glucose 325 (*)     All other components within normal limits   POCT GLUCOSE - Abnormal; Notable for the following components:    POCT Glucose 355 (*)     All other components within normal limits   POCT GLUCOSE - Abnormal; Notable for the following components:    POCT Glucose 363 (*)     All other components within normal limits   POCT GLUCOSE - Abnormal; Notable for the following components:    POCT Glucose 342 (*)     All other components within normal limits   POCT GLUCOSE - Abnormal; Notable for the following components:    POCT Glucose 273 (*)     All other components within normal limits   POCT GLUCOSE - Abnormal; Notable for the following components:    POCT Glucose 375 (*)     All other components within normal limits   POCT GLUCOSE - Abnormal; Notable for the following components:    POCT Glucose 302 (*)     All other components within normal limits   POCT GLUCOSE - Abnormal; Notable for the following components:    POCT Glucose 248 (*)     All other components within normal limits   POCT GLUCOSE - Abnormal; Notable for the following components:    POCT Glucose 166 (*)     All other components within normal limits   POCT GLUCOSE - Abnormal; Notable for the following components:    POCT Glucose 121 (*)     All other components within normal  limits   POCT GLUCOSE - Abnormal; Notable for the following components:    POCT Glucose 120 (*)     All other components within normal limits   POCT GLUCOSE - Abnormal; Notable for the following components:    POCT Glucose 138 (*)     All other components within normal limits   POCT GLUCOSE - Abnormal; Notable for the following components:    POCT Glucose 193 (*)     All other components within normal limits   POCT GLUCOSE - Abnormal; Notable for the following components:    POCT Glucose 117 (*)     All other components within normal limits   POCT GLUCOSE - Abnormal; Notable for the following components:    POCT Glucose 294 (*)     All other components within normal limits   POCT GLUCOSE - Abnormal; Notable for the following components:    POCT Glucose 286 (*)     All other components within normal limits   POCT GLUCOSE - Abnormal; Notable for the following components:    POCT Glucose 272 (*)     All other components within normal limits   POCT GLUCOSE - Abnormal; Notable for the following components:    POCT Glucose 208 (*)     All other components within normal limits   POCT GLUCOSE - Abnormal; Notable for the following components:    POCT Glucose 182 (*)     All other components within normal limits   POCT GLUCOSE - Abnormal; Notable for the following components:    POCT Glucose 125 (*)     All other components within normal limits   POCT GLUCOSE - Abnormal; Notable for the following components:    POCT Glucose 58 (*)     All other components within normal limits   POCT GLUCOSE - Abnormal; Notable for the following components:    POCT Glucose 227 (*)     All other components within normal limits   POCT GLUCOSE - Abnormal; Notable for the following components:    POCT Glucose 155 (*)     All other components within normal limits   TSH    Narrative:     Release to patient->Immediate   RPR    Narrative:     Release to patient->Immediate   HEPATITIS B SURFACE ANTIGEN    Narrative:     Release to patient->Immediate    RAPID HIV    Narrative:     Release to patient->Immediate   URINALYSIS MICROSCOPIC    Narrative:     Specimen Source->Urine   HEPATITIS C ANTIBODY   HEPATITIS C ANTIBODY    Narrative:     Release to patient->Immediate   RUBELLA ANTIBODY, IGG   HEMOGLOBIN ELECTROPHORSIS EVALUATION, BLOOD   FERRITIN   RUBELLA ANTIBODY, IGG    Narrative:     Release to patient->Immediate   MAGNESIUM   PHOSPHORUS   MAGNESIUM   TYPE & SCREEN   POCT GLUCOSE   POCT GLUCOSE   POCT GLUCOSE   POCT GLUCOSE   POCT GLUCOSE   POCT GLUCOSE   POCT GLUCOSE   POCT GLUCOSE MONITORING CONTINUOUS       Neurological History:  Seizures: deferred, patient participating minimally in interview  Head trauma:deferred, patient participating minimally in interview    Allergies:   Review of patient's allergies indicates:  No Known Allergies    Medications in ER:   Medications   sodium chloride 0.9% flush 10 mL (10 mLs Intravenous Given 5/10/22 1444)   acetaminophen tablet 650 mg (650 mg Oral Given 5/7/22 2307)   ondansetron disintegrating tablet 8 mg (8 mg Oral Given 5/11/22 0935)   prochlorperazine injection Soln 5 mg (5 mg Intravenous Given 5/11/22 0930)   senna-docusate 8.6-50 mg per tablet 1 tablet (has no administration in time range)   simethicone chewable tablet 80 mg (80 mg Oral Given 5/8/22 0207)   diphenhydrAMINE capsule 25 mg (25 mg Oral Given 5/10/22 0522)   diphenhydrAMINE injection 25 mg (25 mg Intravenous Given 5/11/22 0322)   prenatal vitamin oral tablet (1 tablet Oral Not Given 5/11/22 0900)   aspirin chewable tablet 81 mg (81 mg Oral Given 5/11/22 0832)   glucose chewable tablet 16 g (16 g Oral Given 5/11/22 0259)   glucose chewable tablet 24 g (24 g Oral Given 5/9/22 0215)   glucagon (human recombinant) injection 1 mg (has no administration in time range)   dextrose 10% bolus 125 mL (has no administration in time range)   dextrose 10% bolus 250 mL (0 mLs Intravenous Stopped 5/7/22 1802)   docusate sodium capsule 200 mg (200 mg Oral Given  5/11/22 0833)   enoxaparin injection 40 mg (40 mg Subcutaneous Given 5/10/22 1757)   doxylamine succinate split tablet 12.5 mg (12.5 mg Oral Given 5/11/22 0732)     And   pyridoxine (vitamin B6) tablet 25 mg (25 mg Oral Given 5/11/22 0733)   polyethylene glycol packet 17 g (17 g Oral Not Given 5/11/22 0900)   NIFEdipine 24 hr tablet 120 mg (120 mg Oral Given 5/10/22 1932)   insulin detemir U-100 pen 6 Units (6 Units Subcutaneous Given 5/11/22 0839)   metoclopramide HCl injection 10 mg (10 mg Intravenous Given 5/11/22 0557)   famotidine (PF) injection 20 mg (20 mg Intravenous Given 5/11/22 0835)   lorazepam injection 0.5 mg (0.5 mg Intravenous Given 5/10/22 1811)   NIFEdipine capsule 10 mg (0 mg Oral Hold 5/10/22 0000)   psyllium husk (aspartame) 3.4 gram packet 1 packet (1 packet Oral Not Given 5/11/22 0900)   doxylamine succinate split tablet 12.5 mg (has no administration in time range)     And   pyridoxine (vitamin B6) tablet 25 mg (has no administration in time range)   promethazine (PHENERGAN) 12.5 mg in sodium chloride 0.9% 50 mL IVPB (0 mg Intravenous Stopped 5/11/22 0754)   hydrALAZINE tablet 75 mg (has no administration in time range)   acetaminophen tablet 1,000 mg (1,000 mg Oral Given 5/5/22 1723)   insulin aspart U-100 pen 2 Units (2 Units Subcutaneous Given 5/5/22 1750)   insulin aspart U-100 pen 6 Units (6 Units Subcutaneous Given 5/5/22 2033)   doxylamine succinate split tablet 12.5 mg (12.5 mg Oral Given 5/5/22 2033)     And   pyridoxine (vitamin B6) tablet 25 mg (25 mg Oral Given 5/5/22 2033)   insulin aspart U-100 pen 1 Units (1 Units Subcutaneous Given 5/5/22 2252)   insulin aspart U-100 pen 1 Units (1 Units Subcutaneous Given 5/6/22 0530)   insulin aspart U-100 pen 1 Units (1 Units Subcutaneous Given 5/6/22 1204)   insulin aspart U-100 pen 3 Units (3 Units Subcutaneous Given 5/6/22 1706)   insulin aspart U-100 pen 4 Units (4 Units Subcutaneous Given 5/6/22 1811)   lorazepam injection 0.5 mg (0.5  mg Intravenous Given 5/6/22 2145)   insulin aspart U-100 pen 3 Units (3 Units Subcutaneous Given 5/7/22 1000)   insulin aspart U-100 pen 3 Units (3 Units Subcutaneous Given 5/7/22 1222)   insulin aspart U-100 pen 3 Units ( Subcutaneous Canceled Entry 5/7/22 1415)   insulin aspart U-100 pen 3 Units (3 Units Subcutaneous Given 5/7/22 1544)   insulin aspart U-100 pen 3 Units (3 Units Subcutaneous Given 5/7/22 1829)   doxylamine succinate split tablet 25 mg (25 mg Oral Given 5/7/22 2328)   insulin aspart U-100 pen 3 Units (3 Units Subcutaneous Given 5/8/22 0930)   NIFEdipine capsule 10 mg (10 mg Oral Given 5/8/22 1011)   insulin aspart U-100 pen 2 Units (2 Units Subcutaneous Given 5/8/22 1214)   labetaloL injection 20 mg (20 mg Intravenous Given 5/8/22 2028)   metoclopramide HCl injection 10 mg (10 mg Intravenous Given 5/9/22 0927)   insulin aspart U-100 pen 6 Units (6 Units Subcutaneous Given 5/9/22 1011)   insulin aspart U-100 pen 3 Units (3 Units Subcutaneous Given 5/9/22 1253)   insulin aspart U-100 pen 3 Units (3 Units Subcutaneous Given 5/9/22 1628)   labetaloL injection 20 mg (20 mg Intravenous Given 5/9/22 2231)   labetaloL injection 40 mg (40 mg Intravenous Given 5/9/22 2304)   insulin aspart U-100 pen 3 Units ( Subcutaneous Canceled Entry 5/10/22 1130)   insulin aspart U-100 pen 5 Units (5 Units Subcutaneous Given 5/10/22 1143)   insulin aspart U-100 pen 2 Units (2 Units Subcutaneous Given 5/10/22 1445)   hydrALAZINE injection 5 mg (5 mg Intravenous Given 5/10/22 2222)   insulin aspart U-100 pen 2 Units (2 Units Subcutaneous Given 5/11/22 0321)       Medications at home:   Current Facility-Administered Medications:     acetaminophen tablet 650 mg, 650 mg, Oral, Q6H PRN, Katherine C Boecking, MD, 650 mg at 05/07/22 2307    aspirin chewable tablet 81 mg, 81 mg, Oral, Daily, Archana Will MD, 81 mg at 05/11/22 0832    dextrose 10% bolus 125 mL, 12.5 g, Intravenous, PRN, Archana Will MD    dextrose 10%  bolus 250 mL, 25 g, Intravenous, PRN, Archana Will MD, Stopped at 05/07/22 1802    diphenhydrAMINE capsule 25 mg, 25 mg, Oral, Q4H PRN, Katherine C Boecking, MD, 25 mg at 05/10/22 0522    diphenhydrAMINE injection 25 mg, 25 mg, Intravenous, Q4H PRN, Katherine C Boecking, MD, 25 mg at 05/11/22 0322    docusate sodium capsule 200 mg, 200 mg, Oral, BID, Kim Barnes MD, 200 mg at 05/11/22 0833    doxylamine succinate split tablet 12.5 mg, 12.5 mg, Oral, QHS, 12.5 mg at 05/11/22 0732 **AND** pyridoxine (vitamin B6) tablet 25 mg, 25 mg, Oral, QHS, Kait Ernst MD, 25 mg at 05/11/22 0733    doxylamine succinate split tablet 12.5 mg, 12.5 mg, Oral, Once **AND** pyridoxine (vitamin B6) tablet 25 mg, 25 mg, Oral, Once, Bibi Mcfarlane MD    enoxaparin injection 40 mg, 40 mg, Subcutaneous, Daily, Archana Will MD, 40 mg at 05/10/22 1757    famotidine (PF) injection 20 mg, 20 mg, Intravenous, BID, Kait Ernst MD, 20 mg at 05/11/22 0835    glucagon (human recombinant) injection 1 mg, 1 mg, Intramuscular, PRN, Archana Will MD    glucose chewable tablet 16 g, 16 g, Oral, PRN, Archana Will MD, 16 g at 05/11/22 0259    glucose chewable tablet 24 g, 24 g, Oral, PRN, Archana Will MD, 24 g at 05/09/22 0215    hydrALAZINE tablet 75 mg, 75 mg, Oral, Q8H, Archana Will MD    insulin detemir U-100 pen 6 Units, 6 Units, Subcutaneous, BID, Archana Will MD, 6 Units at 05/11/22 0839    lorazepam injection 0.5 mg, 0.5 mg, Intravenous, Q6H PRN, Kait Ernst MD, 0.5 mg at 05/10/22 1811    metoclopramide HCl injection 10 mg, 10 mg, Intravenous, Q8H, Kait Ernst MD, 10 mg at 05/11/22 0557    NIFEdipine 24 hr tablet 120 mg, 120 mg, Oral, Daily with dinner, Archana Will MD, 120 mg at 05/10/22 1932    NIFEdipine capsule 10 mg, 10 mg, Oral, Once, Miguel A Wong MD    ondansetron disintegrating tablet 8 mg, 8 mg, Oral, Q8H PRN, Katherine C Boecking, MD, 8 mg at 05/11/22 0962    polyethylene  glycol packet 17 g, 17 g, Oral, Daily, Kait Ernst MD    prenatal vitamin oral tablet, 1 tablet, Oral, Daily, Katherine C Boecking, MD    prochlorperazine injection Soln 5 mg, 5 mg, Intravenous, Q6H PRN, Katherine C Boecking, MD, 5 mg at 05/11/22 0930    promethazine (PHENERGAN) 12.5 mg in sodium chloride 0.9% 50 mL IVPB, 12.5 mg, Intravenous, Q6H PRN, Archana Will MD, Stopped at 05/11/22 0754    psyllium husk (aspartame) 3.4 gram packet 1 packet, 1 packet, Oral, Daily, Archana Will MD, 1 packet at 05/10/22 1933    senna-docusate 8.6-50 mg per tablet 1 tablet, 1 tablet, Oral, Nightly PRN, Katherine C Boecking, MD    simethicone chewable tablet 80 mg, 1 tablet, Oral, Q6H PRN, Katherine C Boecking, MD, 80 mg at 05/08/22 0207    sodium chloride 0.9% flush 10 mL, 10 mL, Intravenous, PRN, Katherine C Boecking, MD, 10 mL at 05/10/22 1444      No new subjective & objective note has been filed under this hospital service since the last note was generated.      Assessment - Diagnosis - Goals:     Diagnosis/Impression:   Adjustment disorder    Rec:   -Patient minimally engaged during interview  objectively appears depressed (slow, monotonous speech, constricted affect, minimal eye contact, minimal engagement in interview) but is denying depression at this time  -No psychiatric medication recommendations at this time   -Please consult for any psychiatric decompensation  -Recommend outpatient psychiatric/psycholgoical followup       Time with patient and coordinating care: 37 minutes      More than 50% of the time was spent counseling/coordinating care    Consulting clinician was informed of the encounter and consult note.        Emilio Parker,    Psychiatry  [facility]

## 2022-05-11 NOTE — TELEPHONE ENCOUNTER
Pt verified self by name, . NKBASILIO.  Dr. Dias notified of patient's concerns.     Patient feels like it's not really helpful, she feels its been terrible staying inpatient.   Educated patient endocrinology is on board to stabilize her sugar as well.     Patient feels like they were managing her sugars better at home than at the hospital.  Patient state shse is constantly dropping, and getting to low before she is taken seriously and then she is over treated with insulin causing severe spikes and drops.   And they bringing it up too quick, and not consistent with keeping her stabilized the hospital.     Patient states that they are checking it but not checking it as often because she has a dexcom. To compare the sugars.. some of them are not checking her sugars as frequently. Or it is not readily treated as quickly then her sugar is spiked up because her sugar is dropped too low.     And it is not making sense to patient. Patient feels like she is not doing well there as she has been managing herself at home.     Per Dr. Dias, patient's sugars are so brittle that any minute change will spike the patient's sugars, so he is recommending that she stays because managing outpatient is not ideal until her sugars are stabilized. [facility] is unable to manage patient from an outpatient standpoint until we can get her under control.     Pt verbalized understanding. Agreed to POC w intent to comply.

## 2022-05-11 NOTE — NURSING
Latest Reference Range & Units 05/11/22 02:56   POC Glucose 70 - 110 MG/DL 58 ! (E)   !: Data is abnormal  (E): External lab result    Called to bedside by pts mom. Dexcom reading 58 and dropping. POCT glucose check also reading 58. Pt given juice and 16g glucose tabs. Repeat BG after 15 min 92. Dexcom reading 91. PT then stated she was hungry and wanting chips. Gave 2 units NovoLog per MD for 24g carbs. Stokes recheck BG in 1 hr.

## 2022-05-11 NOTE — TELEPHONE ENCOUNTER
Mother verified pt by name, .   Mother states that patient is currently admitted at [facility] for blood sugar control. Mother states that patient is not being well managed, her sugars are up and down (mother is managing her daughter's sugars better than the doctors are there), she was mobile, now unable to walk, she is becoming unconscious at times, and even talking about  now.   Mother is upset and wants to be discharged. Wants patient to see [facility] outpatient next week.  RN states she will forward message to Dr. Dias who is in clinic right now and let him know what is going on. Will call patient/mother back.  Pt verbalized understanding. Agreed to POC w intent to comply.

## 2022-05-11 NOTE — CARE UPDATE
Resident to bedside for patient and patient's mother reporting wanting to leave facility.    Patient and patient's mother report frustration with care and labile blood sugars. Patient reports she continues to feel symptomatic with lows and desires discharge to home. Patient's mother desires transfer to different facility.     Discussed pathophysiology of diabetes in pregnancy and importance of stable glucose control prior to discharge. Discussed risk to fetus with elevated blood sugars. Also discussed expected side effects with tighter glucose control.     Condolences given for patient's frustration. Recommend continued inpatient management given uncontrolled blood glucose, particularly hypoglycemia which can be fatal. Counseled on need to sign AMA paperwork if patient strongly desires to leave.     Patient and patient's mother verbalized understanding.      Katherine Boecking MD   Ob/Gyn PGY-2

## 2022-05-11 NOTE — PROGRESS NOTES
Latest Reference Range & Units 05/10/22 19:01   POCT Glucose 70 - 110 mg/dL 294 (H)   (H): Data is abnormally high    Dr. Wong notified. Patient drank 2 cranberry juice around 6pm. Pt states she does not want to eat at this time. Instructed patient to call RN if she wants to eat dinner. Patient verbalized understanding.    NURSE    Patient believes her labs say is is NOT immune to the MMR  Can we check that and schedule her?

## 2022-05-11 NOTE — ASSESSMENT & PLAN NOTE
- Diagnosed at age 10, does not currently follow with endocrine  - complications include diabetic retinopathy, gastroparesis, neuropathy, and h/o retinal detachment  - currently asymptomatic  - CBC 8.3/24.9/252  - Cr 1.2, LFTs 18/19  - P:C 3.75  - UA with 2+ glucose, 2+ protein  - TSH 1.570  - EKG wnl  - Echo wnl  - BG poorly controlled overnight,   - A1C 9.8  - Will pattern BG 2AM, 6AM, pre-meal and 2 hours post-meal  - Levemir 6/6  - Mealtime insulin: Carb ratio 1:10, ISF 1:70 > 180, will NOT correct for post-meal BG  - Will hold food if pre-meal BG >300  - Will attempt to space out meals to >4 hours apart to allow insulin to work  - Dexcom placed 5/10  - Appreciate endocrine assistance

## 2022-05-12 ENCOUNTER — TELEPHONE (OUTPATIENT)
Dept: ENDOCRINOLOGY | Facility: CLINIC | Age: 27
End: 2022-05-12
Payer: MEDICAID

## 2022-05-12 PROBLEM — N18.30 CHRONIC KIDNEY DISEASE, STAGE 3: Status: ACTIVE | Noted: 2022-05-12

## 2022-05-12 PROBLEM — N18.30 CHRONIC KIDNEY DISEASE, STAGE 3: Chronic | Status: ACTIVE | Noted: 2022-05-12

## 2022-05-12 PROBLEM — F41.9 ANXIETY: Chronic | Status: ACTIVE | Noted: 2022-05-12

## 2022-05-12 PROBLEM — E10.69 TYPE 1 DIABETES MELLITUS WITH OTHER SPECIFIED COMPLICATION: Chronic | Status: ACTIVE | Noted: 2022-05-05

## 2022-05-12 PROBLEM — F41.9 ANXIETY: Status: ACTIVE | Noted: 2022-05-12

## 2022-05-12 PROBLEM — I10 CHRONIC HYPERTENSION: Chronic | Status: ACTIVE | Noted: 2022-05-05

## 2022-05-12 LAB
POCT GLUCOSE: 164 MG/DL (ref 70–110)
POCT GLUCOSE: 198 MG/DL (ref 70–110)
POCT GLUCOSE: 205 MG/DL (ref 70–110)
POCT GLUCOSE: 256 MG/DL (ref 70–110)
POCT GLUCOSE: 258 MG/DL (ref 70–110)
POCT GLUCOSE: 300 MG/DL (ref 70–110)
POCT GLUCOSE: 45 MG/DL (ref 70–110)
POCT GLUCOSE: 80 MG/DL (ref 70–110)

## 2022-05-12 PROCEDURE — C9399 UNCLASSIFIED DRUGS OR BIOLOG: HCPCS | Performed by: STUDENT IN AN ORGANIZED HEALTH CARE EDUCATION/TRAINING PROGRAM

## 2022-05-12 PROCEDURE — 63600175 PHARM REV CODE 636 W HCPCS: Performed by: STUDENT IN AN ORGANIZED HEALTH CARE EDUCATION/TRAINING PROGRAM

## 2022-05-12 PROCEDURE — 25000003 PHARM REV CODE 250: Performed by: STUDENT IN AN ORGANIZED HEALTH CARE EDUCATION/TRAINING PROGRAM

## 2022-05-12 PROCEDURE — C9399 UNCLASSIFIED DRUGS OR BIOLOG: HCPCS | Performed by: NURSE PRACTITIONER

## 2022-05-12 PROCEDURE — 25000003 PHARM REV CODE 250: Performed by: HOSPITALIST

## 2022-05-12 PROCEDURE — 99223 1ST HOSP IP/OBS HIGH 75: CPT | Mod: ,,, | Performed by: HOSPITALIST

## 2022-05-12 PROCEDURE — 99233 SBSQ HOSP IP/OBS HIGH 50: CPT | Mod: ,,, | Performed by: OBSTETRICS & GYNECOLOGY

## 2022-05-12 PROCEDURE — 99232 SBSQ HOSP IP/OBS MODERATE 35: CPT | Mod: ,,, | Performed by: NURSE PRACTITIONER

## 2022-05-12 PROCEDURE — 99232 PR SUBSEQUENT HOSPITAL CARE,LEVL II: ICD-10-PCS | Mod: ,,, | Performed by: NURSE PRACTITIONER

## 2022-05-12 PROCEDURE — 25000003 PHARM REV CODE 250

## 2022-05-12 PROCEDURE — 99223 PR INITIAL HOSPITAL CARE,LEVL III: ICD-10-PCS | Mod: ,,, | Performed by: HOSPITALIST

## 2022-05-12 PROCEDURE — 99233 PR SUBSEQUENT HOSPITAL CARE,LEVL III: ICD-10-PCS | Mod: ,,, | Performed by: OBSTETRICS & GYNECOLOGY

## 2022-05-12 PROCEDURE — 20600001 HC STEP DOWN PRIVATE ROOM

## 2022-05-12 PROCEDURE — 25000003 PHARM REV CODE 250: Performed by: NURSE PRACTITIONER

## 2022-05-12 RX ORDER — GLUCAGON 1 MG
1 KIT INJECTION
Status: DISCONTINUED | OUTPATIENT
Start: 2022-05-12 | End: 2022-05-16 | Stop reason: HOSPADM

## 2022-05-12 RX ORDER — IBUPROFEN 200 MG
16 TABLET ORAL
Status: DISCONTINUED | OUTPATIENT
Start: 2022-05-12 | End: 2022-05-16 | Stop reason: HOSPADM

## 2022-05-12 RX ORDER — INSULIN ASPART 100 [IU]/ML
0-5 INJECTION, SOLUTION INTRAVENOUS; SUBCUTANEOUS
Status: DISCONTINUED | OUTPATIENT
Start: 2022-05-12 | End: 2022-05-16 | Stop reason: HOSPADM

## 2022-05-12 RX ORDER — INSULIN ASPART 100 [IU]/ML
5 INJECTION, SOLUTION INTRAVENOUS; SUBCUTANEOUS
Status: DISCONTINUED | OUTPATIENT
Start: 2022-05-12 | End: 2022-05-12

## 2022-05-12 RX ORDER — INSULIN ASPART 100 [IU]/ML
1-10 INJECTION, SOLUTION INTRAVENOUS; SUBCUTANEOUS
Status: DISCONTINUED | OUTPATIENT
Start: 2022-05-12 | End: 2022-05-12

## 2022-05-12 RX ORDER — SENNOSIDES 8.6 MG/1
8.6 TABLET ORAL DAILY
Status: DISCONTINUED | OUTPATIENT
Start: 2022-05-13 | End: 2022-05-16 | Stop reason: HOSPADM

## 2022-05-12 RX ORDER — INSULIN ASPART 100 [IU]/ML
3 INJECTION, SOLUTION INTRAVENOUS; SUBCUTANEOUS ONCE
Status: COMPLETED | OUTPATIENT
Start: 2022-05-12 | End: 2022-05-12

## 2022-05-12 RX ORDER — LORAZEPAM 0.5 MG/1
0.5 TABLET ORAL EVERY 6 HOURS PRN
Status: DISCONTINUED | OUTPATIENT
Start: 2022-05-12 | End: 2022-05-13

## 2022-05-12 RX ORDER — INSULIN ASPART 100 [IU]/ML
0-6 INJECTION, SOLUTION INTRAVENOUS; SUBCUTANEOUS
Status: DISCONTINUED | OUTPATIENT
Start: 2022-05-12 | End: 2022-05-14

## 2022-05-12 RX ORDER — METOCLOPRAMIDE 5 MG/1
10 TABLET ORAL
Status: DISCONTINUED | OUTPATIENT
Start: 2022-05-12 | End: 2022-05-16 | Stop reason: HOSPADM

## 2022-05-12 RX ORDER — INSULIN ASPART 100 [IU]/ML
2 INJECTION, SOLUTION INTRAVENOUS; SUBCUTANEOUS
Status: DISCONTINUED | OUTPATIENT
Start: 2022-05-12 | End: 2022-05-12

## 2022-05-12 RX ORDER — FAMOTIDINE 20 MG/1
20 TABLET, FILM COATED ORAL DAILY
Status: DISCONTINUED | OUTPATIENT
Start: 2022-05-13 | End: 2022-05-16 | Stop reason: HOSPADM

## 2022-05-12 RX ORDER — INSULIN ASPART 100 [IU]/ML
0-3 INJECTION, SOLUTION INTRAVENOUS; SUBCUTANEOUS
Status: DISCONTINUED | OUTPATIENT
Start: 2022-05-12 | End: 2022-05-16 | Stop reason: HOSPADM

## 2022-05-12 RX ORDER — HYDROXYZINE PAMOATE 25 MG/1
25 CAPSULE ORAL EVERY 8 HOURS PRN
Status: DISCONTINUED | OUTPATIENT
Start: 2022-05-12 | End: 2022-05-16 | Stop reason: HOSPADM

## 2022-05-12 RX ORDER — DIPHENHYDRAMINE HCL 12.5MG/5ML
12.5 ELIXIR ORAL NIGHTLY
Status: DISCONTINUED | OUTPATIENT
Start: 2022-05-12 | End: 2022-05-16 | Stop reason: HOSPADM

## 2022-05-12 RX ORDER — IBUPROFEN 200 MG
24 TABLET ORAL
Status: DISCONTINUED | OUTPATIENT
Start: 2022-05-12 | End: 2022-05-16 | Stop reason: HOSPADM

## 2022-05-12 RX ADMIN — HYDRALAZINE HYDROCHLORIDE 75 MG: 50 TABLET ORAL at 03:05

## 2022-05-12 RX ADMIN — INSULIN ASPART 5 UNITS: 100 INJECTION, SOLUTION INTRAVENOUS; SUBCUTANEOUS at 05:05

## 2022-05-12 RX ADMIN — INSULIN DETEMIR 5 UNITS: 100 INJECTION, SOLUTION SUBCUTANEOUS at 09:05

## 2022-05-12 RX ADMIN — DEXTROSE 250 ML: 10 SOLUTION INTRAVENOUS at 03:05

## 2022-05-12 RX ADMIN — Medication 25 MG: at 09:05

## 2022-05-12 RX ADMIN — HYDRALAZINE HYDROCHLORIDE 75 MG: 50 TABLET ORAL at 06:05

## 2022-05-12 RX ADMIN — LORAZEPAM 0.5 MG: 0.5 TABLET ORAL at 09:05

## 2022-05-12 RX ADMIN — INSULIN ASPART 3 UNITS: 100 INJECTION, SOLUTION INTRAVENOUS; SUBCUTANEOUS at 05:05

## 2022-05-12 RX ADMIN — HYDROXYZINE PAMOATE 25 MG: 25 CAPSULE ORAL at 05:05

## 2022-05-12 RX ADMIN — LORAZEPAM 0.5 MG: 2 INJECTION INTRAMUSCULAR; INTRAVENOUS at 10:05

## 2022-05-12 RX ADMIN — ONDANSETRON 8 MG: 8 TABLET, ORALLY DISINTEGRATING ORAL at 09:05

## 2022-05-12 RX ADMIN — INSULIN ASPART 5 UNITS: 100 INJECTION, SOLUTION INTRAVENOUS; SUBCUTANEOUS at 09:05

## 2022-05-12 RX ADMIN — ENOXAPARIN SODIUM 40 MG: 100 INJECTION SUBCUTANEOUS at 04:05

## 2022-05-12 RX ADMIN — HYDRALAZINE HYDROCHLORIDE 75 MG: 50 TABLET ORAL at 09:05

## 2022-05-12 RX ADMIN — METOCLOPRAMIDE 10 MG: 5 INJECTION, SOLUTION INTRAMUSCULAR; INTRAVENOUS at 03:05

## 2022-05-12 RX ADMIN — DOCUSATE SODIUM 200 MG: 100 CAPSULE, LIQUID FILLED ORAL at 09:05

## 2022-05-12 RX ADMIN — INSULIN DETEMIR 6 UNITS: 100 INJECTION, SOLUTION SUBCUTANEOUS at 09:05

## 2022-05-12 RX ADMIN — PROCHLORPERAZINE EDISYLATE 5 MG: 5 INJECTION INTRAMUSCULAR; INTRAVENOUS at 09:05

## 2022-05-12 RX ADMIN — FAMOTIDINE 20 MG: 10 INJECTION, SOLUTION INTRAVENOUS at 10:05

## 2022-05-12 RX ADMIN — NIFEDIPINE 120 MG: 30 TABLET, FILM COATED, EXTENDED RELEASE ORAL at 04:05

## 2022-05-12 RX ADMIN — DIPHENHYDRAMINE HYDROCHLORIDE 12.5 MG: 25 SOLUTION ORAL at 11:05

## 2022-05-12 RX ADMIN — METOCLOPRAMIDE 10 MG: 5 INJECTION, SOLUTION INTRAMUSCULAR; INTRAVENOUS at 06:05

## 2022-05-12 RX ADMIN — INSULIN ASPART 3 UNITS: 100 INJECTION, SOLUTION INTRAVENOUS; SUBCUTANEOUS at 01:05

## 2022-05-12 RX ADMIN — ASPIRIN 81 MG CHEWABLE TABLET 81 MG: 81 TABLET CHEWABLE at 09:05

## 2022-05-12 NOTE — ASSESSMENT & PLAN NOTE
Endocrinology consulted for BG management.   BG goal      - Levemir Flex Pen 6 units in the morning and 5 units in the evening.   - Novolog (aspart) insulin ICR 1:10 Units SQ TIDWM and prn for BG excursions ISF 1:50 (starting to slide at 180 mg/dL)  - Novolog ICR 1:18 with snacks.   - BG checks q4hr and two hours post-prandial  - Continue Dexcom (Clarity Code: [id number])  - Change diet to 45 grams of carbohydrates for meals and 15-25 grams of carbs for snacks   - Ensure prandial insulin is administered 15 minutes before starting to eat or drink.   - Hypoglycemia protocol in place  - If blood glucose greater than 300, please ask patient not to eat food or drink anything other than water until correctional insulin has brought it back below 250    ** Please notify Endocrine for any change and/or advance in diet**  ** Please call Endocrine for any BG related issues **    Goal of glucose for diabetes in pregnancy  Fasting blood glucose concentration ?95 mg/dL  One-hour postprandial blood glucose concentration ?140 mg/dL   Two-hour postprandial glucose concentration ?120 mg/dL     Goal A1c in pregnancy ideally <6.5%, to reduce the risk of congenital anomalies  Discharge Planning:   TBD. Please notify endocrinology prior to discharge.

## 2022-05-12 NOTE — PROGRESS NOTES
Pt mother called RN to bedside. Mother stated she is concerned that the pt will have a rapid decrease blood sugar. Mother states she is very frustrated. Pt and mother reassured and Dr. Will notified. 2 hr post prandial 274.

## 2022-05-12 NOTE — SUBJECTIVE & OBJECTIVE
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     Current Outpatient Medications on File Prior to Encounter   Medication Sig    hydrALAZINE (APRESOLINE) 50 MG tablet Take 50 mg by mouth 2 (two) times a day.    docusate sodium (COLACE) 100 MG capsule Take 100 mg by mouth once daily.    insulin aspart U-100 (NOVOLOG) 100 unit/mL (3 mL) InPn pen Inject 3 Units into the skin 3 (three) times daily before meals.    insulin glargine (LANTUS) 100 unit/mL injection Inject 15 Units into the skin every evening.    NIFEdipine (PROCARDIA-XL) 30 MG (OSM) 24 hr tablet Take 30 mg by mouth once daily.     Family History       Problem Relation (Age of Onset)    No Known Problems Mother, Father          Tobacco Use    Smoking status: Not on file    Smokeless tobacco: Not on file   Substance and Sexual Activity    Alcohol use: Not on file    Drug use: Not on file    Sexual activity: Not on file     Review of Systems   Constitutional:  Negative for fatigue and fever.   HENT:  Negative for congestion and sore throat.    Eyes:  Positive for visual disturbance. Negative for photophobia.   Respiratory:  Negative for cough, chest tightness and shortness of breath.    Cardiovascular:  Negative for chest pain and leg swelling.   Gastrointestinal:  Negative for abdominal distention and abdominal pain.   Genitourinary:  Negative for dysuria and urgency.   Musculoskeletal:  Negative for arthralgias and myalgias.   Skin:  Negative for rash and wound.   Neurological:  Negative for speech difficulty, light-headedness and headaches.   Psychiatric/Behavioral:   Negative for behavioral problems and sleep disturbance.    Objective:     Vital Signs (Most Recent):  Temp: 99 °F (37.2 °C) (05/12/22 1528)  Pulse: 101 (05/12/22 1528)  Resp: 16 (05/12/22 1528)  BP: (!) 140/88 (05/12/22 1528)  SpO2: 100 % (05/12/22 1528)   Vital Signs (24h Range):  Temp:  [98.2 °F (36.8 °C)-99 °F (37.2 °C)] 99 °F (37.2 °C)  Pulse:  [] 101  Resp:  [16-18] 16  SpO2:  [97 %-100 %] 100 %  BP: (127-175)/(67-98) 140/88     Weight: 58.5 kg (129 lb)  Body mass index is 24.37 kg/m².    Physical Exam  Constitutional:       Appearance: Normal appearance. She is normal weight.   HENT:      Head: Normocephalic and atraumatic.      Mouth/Throat:      Mouth: Mucous membranes are moist.      Pharynx: Oropharynx is clear.   Eyes:      General: No scleral icterus.     Extraocular Movements: Extraocular movements intact.      Pupils: Pupils are equal, round, and reactive to light.   Cardiovascular:      Rate and Rhythm: Normal rate and regular rhythm.      Pulses: Normal pulses.      Heart sounds: Normal heart sounds.   Pulmonary:      Effort: Pulmonary effort is normal. No respiratory distress.      Breath sounds: Normal breath sounds. No wheezing.   Abdominal:      General: Abdomen is flat. Bowel sounds are normal. There is no distension.      Palpations: Abdomen is soft.      Tenderness: no abdominal tenderness   Musculoskeletal:      Cervical back: Normal range of motion and neck supple.      Right lower leg: No edema.      Left lower leg: No edema.   Skin:     General: Skin is warm and dry.      Capillary Refill: Capillary refill takes less than 2 seconds.      Coloration: Skin is not pale.   Neurological:      General: No focal deficit present.      Mental Status: She is alert and oriented to person, place, and time. Mental status is at baseline.   Psychiatric:         Mood and Affect: Mood normal.         Behavior: Behavior normal.         CRANIAL NERVES     CN III, IV, VI   Pupils are equal, round, and  reactive to light.     Significant Labs: All pertinent labs within the past 24 hours have been reviewed.    Significant Imaging: I have reviewed all pertinent imaging results/findings within the past 24 hours.

## 2022-05-12 NOTE — ASSESSMENT & PLAN NOTE
Complications: diabetic retinopathy, gastroparesis, neuropathy, and h/o retinal detachment    Endocrinology consulted, follow for insulin recs  Diabetic diet with consistent carbs  Extensive conversations while inpatient

## 2022-05-12 NOTE — PROGRESS NOTES
[facility] - Antepartum ([place])  Obstetrics  Antepartum Progress Note    Patient Name: Alicia Vlales  MRN: [id number]  Admission Date: 2022  Hospital Length of Stay: 7 days  Attending Physician: Jose Martin Funes MD  Primary Care Provider: Primary Doctor No    Subjective:     Principal Problem:Type 1 diabetes mellitus with other specified complication    HPI:  Alicia Valles is a 27 y.o. [id number] at 9w5d presents for blood sugar patterning. She was seen in [facility] clinic yesterday after recently being discharged from the hospital where she was admitted for hypertensive encephalopathy/hypoglycemia.    Ms. Valles has poorly controlled DM1. She was diagnosed at age 10 and her most recent A1C was >12. She previously was followed by an endocrinologist in [place], but has not established with anyone in [place] where she currently lives. She states that used to count carbs and have an insulin sensitivity factor, but she has not done that in a few years. She currently takes lantus 15 u at 5 pm; for her mealtime insulin, she randomly decides how much to give herself. Reports that she has been admitted for DKA more than 5 times. Prior to her most recent hospitalization, she was found down with BG of 29. She had a seizure due to hypoglycemia; she has not ever had seizures before and does not take medications for a seizure disorder.  Reports that she has never had an episode of hypoglycemia like that before. 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 Reports prior surgery for retinal detachment.    Ms. Valles also has chronic hypertension. She previously was taking methyldopa and hydralazine, however she was recently switched by her OBGYN to procardia 30 mg qD and hydralazine 50 mg BID. She believes this regimen has kept her well-controlled. She does not follow with a PCP.    Currently, she reports cramping epigastric pain, which she  believes could be gas pain. Denies polyuria, shortness of breath, vaginal bleeding, vision changes, chest pain, diarrhea, and dysuria.    Of note, this is an unplanned, but desired pregnancy.      Hospital Course:  05/05/2022 - Admitted for BG patterning. 1T labs, echo, EKG, TSH, A1C pending. Will continue home BP meds. Lantus 15u qHS with aspart 3/3/3 and ISF 1:50 >150. Will count carbs for each meal.  05/06/2022 - BG . Symptomatic lows. Will continue to pattern.  05/07/2022 - BG poorly controlled, endocrine consulted. Adjusted carb ratio to 1:20 and ISF 1:70 > 180. Will not slide post-meal. One episode of hypoglycemia which responded to D50. Discussed meal timing.  05/08/2022 - Episode of hypoglycemia overnight, will continue to pattern.  05/09/2022 - Continued poor BG control. Insulin regimen now levemir 7u BID. Mealtime insulin 1:15 and ISF 1:70  >180.   05/10/2022 - Continued poor BG control. Insulin regimen now levemir 6u BID. Carb ratio 1:10 and ISF 1:70 > 180.  05/11/2022 - Episode of hypoglycemia overnight. Dexcom installed yesterday, appropriately calibrated. Patient considering termination of pregnancy, will continue to discuss. No changes to insulin regimen at this time.  05/12/2022 - Continued challenges with BG control. Patient and mother frustrated with labile BG. Will transfer to [facility] for closer monitoring with endocrine. Per endocrine recommendations:   - Levemir 6 units in the morning and 5 units at night (Eat a snack before bed if BG is < 100 mg/dl)  - Novolog 5 units TIDWM (Hold if BG < 80 mg/dL) (45 grams of carbohydrates)  - Novolog 2 units prn with snacks (15-30 grams of carbohydrates)  - Novolog SSI for BG excursions:  180 - 230 + 1 unit  231- 280  + 2 units  281 - 330 + 3 units  331 - 380 + 4 units      > 380   + 5 units      In evening, patient with episode of anxiety regarding labile BG. Precipitous drop of BG requiring D50 at 1700 and 0300. Repeat labs and EKG at time of  first hypoglycemia episode overall stable.  Now, resting in bed. Reports upper abdominal pain and nausea which has now resolved. Looking forward to transfer to main [place].     Objective:     Vital Signs (Most Recent):  Temp: 98.2 °F (36.8 °C) (22)  Pulse: 99 (22)  Resp: 16 (22)  BP: 127/74 (22)  SpO2: 97 % (22) Vital Signs (24h Range):  Temp:  [98.2 °F (36.8 °C)-98.6 °F (37 °C)] 98.2 °F (36.8 °C)  Pulse:  [] 99  Resp:  [16-18] 16  SpO2:  [97 %-100 %] 97 %  BP: (127-175)/(67-98) 127/74     Weight: 58.5 kg (129 lb)  Body mass index is 24.37 kg/m².        Intake/Output Summary (Last 24 hours) at 2022 0617  Last data filed at 2022 0559  Gross per 24 hour   Intake 1580 ml   Output --   Net 1580 ml           Significant Labs:  Recent Lab Results  (Last 5 results in the past 24 hours)        22  0558   22  0433   22  0343   22  0321   22  2144        POCT Glucose 164   198   256   45   169                              Physical Exam:   Constitutional: She is oriented to person, place, and time. She appears well-developed and well-nourished. No distress.    HENT:   Head: Normocephalic and atraumatic.    Eyes: Conjunctivae and EOM are normal.     Cardiovascular:  Normal rate and regular rhythm.             Pulmonary/Chest: Effort normal. No respiratory distress.        Abdominal: Soft. She exhibits no distension. There is no abdominal tenderness.             Musculoskeletal: No tenderness or edema.       Neurological: She is alert and oriented to person, place, and time.    Skin: Skin is warm and dry. No rash noted. She is not diaphoretic.    Psychiatric: She has a normal mood and affect. Her behavior is normal.     Assessment/Plan:     27 y.o. female  at 10w5d for:    * Type 1 diabetes mellitus with other specified complication  - Diagnosed at age 10, does not currently follow with endocrine  - complications include  diabetic retinopathy, gastroparesis, neuropathy, and h/o retinal detachment  - CBC 8.3/24.9/252   - Cr 1.2, LFTs 18/19 > Cr 1.4, LFTs 25/18  - P:C 3.75  - UA with 2+ glucose, 2+ protein  - TSH 1.570  - EKG wnl  - Echo wnl  - BG poorly controlled overnight  - A1C 9.8  - Will pattern BG 2AM, 6AM, pre-meal and 2 hours post-meal  - Levemir 5/6  - Mealtime insulin: aspart 5/5/5, novolog SSI for BG excursions:  180 - 230 + 1 unit  231- 280  + 2 units  281 - 330 + 3 units  331 - 380 + 4 units      > 380   + 5 units  - Will attempt to space out meals to >4 hours apart to allow insulin to work  - Dexcom placed 5/10  - Overnight 5/11, patient and mother expressed frustration with labile BG.  - Anticipate transfer to [facility] [place] today for closer BG monitoring with endocrine.  - Greatly appreciate endocrine assistance          Anxiety  - Patient with mood changes throughout hospital stay, becoming increasingly withdrawn and anxious  - Ativan PRN  - Telepsych consult on 5/11, recommend outpatient follow up  - Moved to room with window 5/11    Proteinuria  - P:C 3.75  - 24 hour urine 6270, nephrotic range  - lovenox 40 mg qD    Anemia  - iron/colace  - iron studies grossly wnl  - Hemoglobin electrophoresis wnl    First trimester pregnancy  - RPR NR, HIV neg, HCV neg, HBV neg, RI  - daily PNV  - FHT confirmed 5/11    Chronic hypertension  - BP: (127-175)/(67-98) 127/74  - CBC 8.3/24.9/252, Cr 1.2>1.4, LFTs 19/18>25/18  - P:C 3.75  - 24 hour urine 6270  - BP meds titrated to hydralazine 75 mg TID and procardia 120 mg qD          Archana Will MD  Obstetrics  [facility] - Antepartum ([place])      Patient seen and examined. Agree with resident assessment and plan.  No complaints this morning.  Had another episode of hypoglycemia overnight, resolved with D50.   Yesterday evening, patient had another hypoglycemic episode where she was severely symptomatic, minimally responsive, after receiving 5u insulin with lunch.     Temp:  [98.2 °F  (36.8 °C)-98.6 °F (37 °C)] 98.6 °F (37 °C)  Pulse:  [] 101  Resp:  [16-18] 16  SpO2:  [97 %-100 %] 98 %  BP: (127-175)/(67-98) 131/72    BGs reviewed    Cr 1.4    Awaiting bed at [facility] for transfer to hospital medicine service with endocrine to manage diabetes  Will continue to manage BGs until patient is transported, according to endocrine recs  Cr bumped to 1.4 today, now qualifies as CKD stage 3a  Continue anti-hypertensives, will increase to Hydralazine 100 TID if severe range BPs today  Patient was minimally responsive to psych consultant yesterday and therefore psych meds were not initiated  Encourage patient to ambulate to avoid deconditioning    Lora Arthur MD  Maternal Fetal Medicine fellow  PGY-7

## 2022-05-12 NOTE — PROGRESS NOTES
Pre-prandial blood sugar 205. Pt states her stomach hurts and will not eat all of her breakfast. Dr. Will notified and instructed to give 5 units of aspart.

## 2022-05-12 NOTE — ASSESSMENT & PLAN NOTE
Baseline Cr 1.0, Cr 1.4 on admission  Etiology T1DM  Associated proteinuria: yes  No indications for HD at this time    Plan:  - Trend Cr  - Strict I&Os  - Avoid nephrotoxic agents  - Renally adjust medications

## 2022-05-12 NOTE — NURSING
Pre-prandial /snack blood sugar=169    Snack=1/2 turkey sandwich on white bread and baked chips  Carbs=13+24    Notified Dr. Ernst of blood sugar; MD ordered 3 units of novolog at this time.

## 2022-05-12 NOTE — H&P
"[facility] - Intensive Care ([address] Medicine  History & Physical    Patient Name: Alicia Valles  MRN: [id number]  Patient Class: IP- Inpatient  Admission Date: 5/5/2022  Attending Physician: Jessica Peguero*   Primary Care Provider: Primary Doctor No         Patient information was obtained from patient, parent, past medical records and ER records.     Subjective:     Principal Problem:Type 1 diabetes mellitus with other specified complication    Chief Complaint:   Chief Complaint   Patient presents with    endocrine consult        HPI: Alicia Valles is a 27F with HTN and T1DM complicated by retinopathy, gastroparesis, and neuropathy who presented to [facility] as a transfer from [facility] for endocrinology evaluation. She has a history of poorly controlled T1DM, recent A1c >12. She was initially admitted to [facility] for hypertensive encephalopathy and hypoglycemia. Prior to this admission she reported a seizure secondary to hypoglycemia, BG of 29. No reported hx of seizures prior. 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     Current Outpatient Medications on File Prior to Encounter   Medication Sig    hydrALAZINE (APRESOLINE) 50 MG tablet Take 50 mg by mouth 2 (two) times a day.    docusate sodium (COLACE) 100 MG capsule Take 100 mg by mouth once daily.    insulin aspart U-100 (NOVOLOG) 100 unit/mL (3 mL) InPn pen Inject 3 Units into the " skin 3 (three) times daily before meals.    insulin glargine (LANTUS) 100 unit/mL injection Inject 15 Units into the skin every evening.    NIFEdipine (PROCARDIA-XL) 30 MG (OSM) 24 hr tablet Take 30 mg by mouth once daily.     Family History       Problem Relation (Age of Onset)    No Known Problems Mother, Father          Tobacco Use    Smoking status: Not on file    Smokeless tobacco: Not on file   Substance and Sexual Activity    Alcohol use: Not on file    Drug use: Not on file    Sexual activity: Not on file     Review of Systems   Constitutional:  Negative for fatigue and fever.   HENT:  Negative for congestion and sore throat.    Eyes:  Positive for visual disturbance. Negative for photophobia.   Respiratory:  Negative for cough, chest tightness and shortness of breath.    Cardiovascular:  Negative for chest pain and leg swelling.   Gastrointestinal:  Negative for abdominal distention and abdominal pain.   Genitourinary:  Negative for dysuria and urgency.   Musculoskeletal:  Negative for arthralgias and myalgias.   Skin:  Negative for rash and wound.   Neurological:  Negative for speech difficulty, light-headedness and headaches.   Psychiatric/Behavioral:  Negative for behavioral problems and sleep disturbance.    Objective:     Vital Signs (Most Recent):  Temp: 99 °F (37.2 °C) (05/12/22 1528)  Pulse: 101 (05/12/22 1528)  Resp: 16 (05/12/22 1528)  BP: (!) 140/88 (05/12/22 1528)  SpO2: 100 % (05/12/22 1528)   Vital Signs (24h Range):  Temp:  [98.2 °F (36.8 °C)-99 °F (37.2 °C)] 99 °F (37.2 °C)  Pulse:  [] 101  Resp:  [16-18] 16  SpO2:  [97 %-100 %] 100 %  BP: (127-175)/(67-98) 140/88     Weight: 58.5 kg (129 lb)  Body mass index is 24.37 kg/m².    Physical Exam  Constitutional:       Appearance: Normal appearance. She is normal weight.   HENT:      Head: Normocephalic and atraumatic.      Mouth/Throat:      Mouth: Mucous membranes are moist.      Pharynx: Oropharynx is clear.   Eyes:      General:  No scleral icterus.     Extraocular Movements: Extraocular movements intact.      Pupils: Pupils are equal, round, and reactive to light.   Cardiovascular:      Rate and Rhythm: Normal rate and regular rhythm.      Pulses: Normal pulses.      Heart sounds: Normal heart sounds.   Pulmonary:      Effort: Pulmonary effort is normal. No respiratory distress.      Breath sounds: Normal breath sounds. No wheezing.   Abdominal:      General: Abdomen is flat. Bowel sounds are normal. There is no distension.      Palpations: Abdomen is soft.      Tenderness: no abdominal tenderness   Musculoskeletal:      Cervical back: Normal range of motion and neck supple.      Right lower leg: No edema.      Left lower leg: No edema.   Skin:     General: Skin is warm and dry.      Capillary Refill: Capillary refill takes less than 2 seconds.      Coloration: Skin is not pale.   Neurological:      General: No focal deficit present.      Mental Status: She is alert and oriented to person, place, and time. Mental status is at baseline.   Psychiatric:         Mood and Affect: Mood normal.         Behavior: Behavior normal.         CRANIAL NERVES     CN III, IV, VI   Pupils are equal, round, and reactive to light.     Significant Labs: All pertinent labs within the past 24 hours have been reviewed.    Significant Imaging: I have reviewed all pertinent imaging results/findings within the past 24 hours.    Assessment/Plan:     * Type 1 diabetes mellitus with other specified complication  Complications: diabetic retinopathy, gastroparesis, neuropathy, and h/o retinal detachment    Endocrinology consulted, follow for insulin recs  Diabetic diet with consistent carbs  Extensive conversations while inpatient    Anxiety  Patient with anxiety during hospital admission  PRN vistaril       Proteinuria  See Diabetes      Anemia  CBC daily  Iron panel WNL      First trimester pregnancy  OBGYN following      Chronic hypertension  Cont hydralazine 75mg TID  and nifedipine 120mg nightly  Uptitrate BP meds for goal of <140/90, per OBGYN    VTE Risk Mitigation (From admission, onward)         Ordered     enoxaparin injection 40 mg  Daily         05/06/22 1137     Place FELICIA hose  Until discontinued         05/05/22 1553     Place sequential compression device  Until discontinued         05/05/22 1553     IP VTE LOW RISK PATIENT  Once         05/05/22 1553                   Adriana Castro ([place] Name: Deniz), MD  Department of Hospital Medicine   [facility] - Intensive Care ([place]-14)

## 2022-05-12 NOTE — ASSESSMENT & PLAN NOTE
- Diagnosed at age 10, does not currently follow with endocrine  - complications include diabetic retinopathy, gastroparesis, neuropathy, and h/o retinal detachment  - CBC 8.3/24.9/252   - Cr 1.2, LFTs 18/19 > Cr 1.4, LFTs 25/18  - P:C 3.75  - UA with 2+ glucose, 2+ protein  - TSH 1.570  - EKG wnl  - Echo wnl  - BG poorly controlled overnight  - A1C 9.8  - Will pattern BG 2AM, 6AM, pre-meal and 2 hours post-meal  - Levemir 5/6  - Mealtime insulin: aspart 5/5/5, novolog SSI for BG excursions:  180 - 230 + 1 unit  231- 280  + 2 units  281 - 330 + 3 units  331 - 380 + 4 units      > 380   + 5 units  - Will attempt to space out meals to >4 hours apart to allow insulin to work  - Dexcom placed 5/10  - Overnight 5/11, patient and mother expressed frustration with labile BG.  - Anticipate transfer to [facility] [place] today for closer BG monitoring with endocrine.  - Greatly appreciate endocrine assistance

## 2022-05-12 NOTE — ASSESSMENT & PLAN NOTE
- BP: (127-175)/(67-98) 127/74  - CBC 8.3/24.9/252, Cr 1.2>1.4, LFTs 19/18>25/18  - P:C 3.75  - 24 hour urine 6270  - BP meds titrated to hydralazine 75 mg TID and procardia 120 mg qD

## 2022-05-12 NOTE — ASSESSMENT & PLAN NOTE
Cont hydralazine 75mg TID and nifedipine 120mg nightly  Uptitrate BP meds for goal of <140/90, per OBGYN

## 2022-05-12 NOTE — SUBJECTIVE & OBJECTIVE
"Interval HPI:   Overnight events: No acute events overnight. Patient transferred to [facility] Patient on the M[facility]U in room 03827/37265 A. Blood glucose  variable . BG at, above, and below goal on current insulin regimen (SSI, prandial, and basal insulin ). Steroid use- None.    Renal function- Normal   Vasopressors-  None       Diet gestational diabetic 2000 kcal (Breakfast & midmorning snack=70 carb grams; Lunch & midafternoon snack = 70 carb grams; Dinner & HS snack = 70 carb grams; Snacks recommended at 15 - 30 grams)     Eatin%  Nausea: No  Hypoglycemia and intervention: No  Fever: No  TPN and/or TF: No      /84 (BP Location: Left arm, Patient Position: Lying)   Pulse 100   Temp 98.2 °F (36.8 °C) (Oral)   Resp 18   Ht 5' 1" (1.549 m)   Wt 58.5 kg (129 lb)   LMP 2022 (Approximate)   SpO2 100%   Breastfeeding No   BMI 24.37 kg/m²     Labs Reviewed and Include    No results for input(s): GLU, CALCIUM, ALBUMIN, PROT, NA, K, CO2, CL, BUN, CREATININE, ALKPHOS, ALT, AST, BILITOT in the last 24 hours.  Lab Results   Component Value Date    WBC 9.21 2022    HGB 8.3 (L) 2022    HCT 24.9 (L) 2022    MCV 90 2022     2022     No results for input(s): TSH, FREET4 in the last 168 hours.  Lab Results   Component Value Date    HGBA1C 9.8 (H) 2022       Nutritional status:   Body mass index is 24.37 kg/m².  Lab Results   Component Value Date    ALBUMIN 2.7 (L) 2022    ALBUMIN 2.4 (L) 2022    ALBUMIN 2.8 (L) 2022     No results found for: PREALBUMIN    Estimated Creatinine Clearance: 48.8 mL/min (based on SCr of 1.4 mg/dL).    Accu-Checks  Recent Labs     22  1753 22  1835 22  2144 22  0321 22  0343 22  0433 22  0558 22  0853 22  1250 22  1622   POCTGLUCOSE 217* 129* 169* 45* 256* 198* 164* 205* 258* 300*       Current Medications and/or Treatments Impacting Glycemic Control  Immunotherapy: "    Immunosuppressants       None          Steroids:   Hormones (From admission, onward)                None          Pressors:    Autonomic Drugs (From admission, onward)                None          Hyperglycemia/Diabetes Medications:   Antihyperglycemics (From admission, onward)                Start     Stop Route Frequency Ordered    05/13/22 0900  insulin detemir U-100 pen 6 Units         -- SubQ Daily 05/12/22 1817 05/12/22 2100  insulin detemir U-100 pen 5 Units         -- SubQ Nightly 05/12/22 1817 05/12/22 1914  insulin aspart U-100 pen 0-3 Units         -- SubQ As needed (PRN) 05/12/22 1817 05/12/22 1914  insulin aspart U-100 pen 0-5 Units         -- SubQ As needed (PRN) 05/12/22 1817 05/12/22 1830  insulin aspart U-100 pen 0-6 Units         -- SubQ 3 times daily with meals 05/12/22 1817

## 2022-05-12 NOTE — PROGRESS NOTES
[facility] ([place] [address])  Endocrinology  Progress Note    Admit Date: 2022     Reason for Consult: Management of T1DM, Hyperglycemia     Currently 10 weeks pregnant    Outpatient diabetes provider: Primary Care in [place]; [place] (No established PCP or endocrine care in [facility])  Diabetes education during pregnancy: None     Diabetes diagnosis year: 18 years     Home Diabetes Regimen:   Lantus 15 units QD  Novolog 3 units TIDWM with SSI (ISF 1:50)    For insulin pump users: Historically on Medronic (But has been off the pump for about 6 weeks; Found down with BG in the 20's).   No CGM    How often checking glucose at home? >4 x day (Every 2-3 hours)  BG readings on regimen: 's  Hypoglycemia on the regimen?  Yes  Missed doses on regimen?  No    Diabetes Complications include:     Hyperglycemia, Hypoglycemia , Diabetic nephropathy  , Diabetic retinopathy , and Diabetic gastroparesis     Complicating diabetes co morbidities:   Pregancy.       HPI:     Alicia Valles is a 27 y.o. [id number] at 9w5d presents for blood sugar patterning. She was seen in MFM clinic yesterday after recently being discharged from the hospital where she was admitted for hypertensive encephalopathy/hypoglycemia.Ms. Valles has poorly controlled DM1. She was diagnosed at age 10 and her most recent A1C was >12. She previously was followed by an endocrinologist in [place], but has not established with anyone in [place] where she currently lives. She states that used to count carbs and have an insulin sensitivity factor, but she has not done that in a few years. She currently takes lantus 15 u at 5 pm; for her mealtime insulin, she randomly decides how much to give herself. Reports that she has been admitted for DKA more than 5 times. Prior to her most recent hospitalization, she was found down with BG of 29. She had a seizure due to hypoglycemia; she has not ever had seizures before and does not take medications  "for a seizure disorder.  Reports that she has never had an episode of hypoglycemia like that before. 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 Reports prior surgery for retinal detachment. Endocrine consulted to manage hyperglycemia and type 1 diabetes.         Interval HPI:   Overnight events: No acute events overnight. Patient transferred to [facility] Patient on the MTSU in room 90893/81244 A. Blood glucose  variable . BG at, above, and below goal on current insulin regimen (SSI, prandial, and basal insulin ). Steroid use- None.    Renal function- Normal   Vasopressors-  None       Diet gestational diabetic 2000 kcal (Breakfast & midmorning snack=70 carb grams; Lunch & midafternoon snack = 70 carb grams; Dinner & HS snack = 70 carb grams; Snacks recommended at 15 - 30 grams)     Eatin%  Nausea: No  Hypoglycemia and intervention: No  Fever: No  TPN and/or TF: No      /84 (BP Location: Left arm, Patient Position: Lying)   Pulse 100   Temp 98.2 °F (36.8 °C) (Oral)   Resp 18   Ht 5' 1" (1.549 m)   Wt 58.5 kg (129 lb)   LMP 2022 (Approximate)   SpO2 100%   Breastfeeding No   BMI 24.37 kg/m²     Labs Reviewed and Include    No results for input(s): GLU, CALCIUM, ALBUMIN, PROT, NA, K, CO2, CL, BUN, CREATININE, ALKPHOS, ALT, AST, BILITOT in the last 24 hours.  Lab Results   Component Value Date    WBC 9.21 2022    HGB 8.3 (L) 2022    HCT 24.9 (L) 2022    MCV 90 2022     2022     No results for input(s): TSH, FREET4 in the last 168 hours.  Lab Results   Component Value Date    HGBA1C 9.8 (H) 2022       Nutritional status:   Body mass index is 24.37 kg/m².  Lab Results   Component Value Date    ALBUMIN 2.7 (L) 2022    ALBUMIN 2.4 (L) 2022    ALBUMIN 2.8 (L) 2022     No results found for: PREALBUMIN    Estimated Creatinine Clearance: 48.8 mL/min (based on " SCr of 1.4 mg/dL).    Accu-Checks  Recent Labs     05/11/22  1753 05/11/22  1835 05/11/22  2144 05/12/22  0321 05/12/22  0343 05/12/22  0433 05/12/22  0558 05/12/22  0853 05/12/22  1250 05/12/22  1622   POCTGLUCOSE 217* 129* 169* 45* 256* 198* 164* 205* 258* 300*       Current Medications and/or Treatments Impacting Glycemic Control  Immunotherapy:    Immunosuppressants       None          Steroids:   Hormones (From admission, onward)                None          Pressors:    Autonomic Drugs (From admission, onward)                None          Hyperglycemia/Diabetes Medications:   Antihyperglycemics (From admission, onward)                Start     Stop Route Frequency Ordered    05/13/22 0900  insulin detemir U-100 pen 6 Units         -- SubQ Daily 05/12/22 1817 05/12/22 2100  insulin detemir U-100 pen 5 Units         -- SubQ Nightly 05/12/22 1817 05/12/22 1914  insulin aspart U-100 pen 0-3 Units         -- SubQ As needed (PRN) 05/12/22 1817 05/12/22 1914  insulin aspart U-100 pen 0-5 Units         -- SubQ As needed (PRN) 05/12/22 1817 05/12/22 1830  insulin aspart U-100 pen 0-6 Units         -- SubQ 3 times daily with meals 05/12/22 1817            ASSESSMENT and PLAN    * Type 1 diabetes mellitus with other specified complication  Endocrinology consulted for BG management.   BG goal      - Levemir Flex Pen 6 units in the morning and 5 units in the evening.   - Novolog (aspart) insulin ICR 1:10 Units SQ TIDWM and prn for BG excursions ISF 1:50 (starting to slide at 180 mg/dL)  - Novolog ICR 1:18 with snacks.   - BG checks q4hr and two hours post-prandial  - Continue Dexcom (Clarity Code: [id number])  - Change diet to 45 grams of carbohydrates for meals and 15-25 grams of carbs for snacks   - Ensure prandial insulin is administered 15 minutes before starting to eat or drink.   - Hypoglycemia protocol in place  - If blood glucose greater than 300, please ask patient not to eat food or drink  anything other than water until correctional insulin has brought it back below 250    ** Please notify Endocrine for any change and/or advance in diet**  ** Please call Endocrine for any BG related issues **    Goal of glucose for diabetes in pregnancy  Fasting blood glucose concentration ?95 mg/dL  One-hour postprandial blood glucose concentration ?140 mg/dL   Two-hour postprandial glucose concentration ?120 mg/dL     Goal A1c in pregnancy ideally <6.5%, to reduce the risk of congenital anomalies  Discharge Planning:   TBD. Please notify endocrinology prior to discharge.                        First trimester pregnancy  Optimize glucose control for the health of mom and baby.       Anemia  Lab Results   Component Value Date    HGBA1C 9.8 (H) 05/05/2022     Anemia may impact the accuracy of the A1C.             Enio Bedolla, DNP, FNP  Endocrinology  [facility] - Intensive Care ([place]

## 2022-05-12 NOTE — HPI
"Alicia Valles is a 27F with HTN and T1DM complicated by retinopathy, gastroparesis, and neuropathy who [place] [facility] [place] [facility] for endocrinology evaluation. She has a history of poorly controlled T1DM, recent A1c >12. She was initially admitted to [facility] for hypertensive encephalopathy and hypoglycemia. Prior to this admission she reported a seizure secondary to hypoglycemia, BG of 29. No reported hx of seizures prior. Of note, she reports that she's been admitted for DKA more than 5 times and she "randomly decides how much insulin to give herself at mealtimes."  "

## 2022-05-12 NOTE — PROGRESS NOTES
Lunch tray has arrived, 32 grams of carbs.  Dr. Mcfarlane notified, will put in order for 4 units asparte.

## 2022-05-12 NOTE — SUBJECTIVE & OBJECTIVE
In evening, patient with episode of anxiety regarding labile BG. Precipitous drop of BG requiring D50 at 1700 and 0300. Repeat labs and EKG at time of first hypoglycemia episode overall stable.  Now, resting in bed. Reports upper abdominal pain and nausea which has now resolved. Looking forward to transfer to [facility].     Objective:     Vital Signs (Most Recent):  Temp: 98.2 °F (36.8 °C) (05/12/22 0554)  Pulse: 99 (05/12/22 0554)  Resp: 16 (05/12/22 0554)  BP: 127/74 (05/12/22 0554)  SpO2: 97 % (05/12/22 0554) Vital Signs (24h Range):  Temp:  [98.2 °F (36.8 °C)-98.6 °F (37 °C)] 98.2 °F (36.8 °C)  Pulse:  [] 99  Resp:  [16-18] 16  SpO2:  [97 %-100 %] 97 %  BP: (127-175)/(67-98) 127/74     Weight: 58.5 kg (129 lb)  Body mass index is 24.37 kg/m².        Intake/Output Summary (Last 24 hours) at 5/12/2022 0617  Last data filed at 5/12/2022 0559  Gross per 24 hour   Intake 1580 ml   Output --   Net 1580 ml           Significant Labs:  Recent Lab Results  (Last 5 results in the past 24 hours)        05/12/22  0558   05/12/22  0433   05/12/22  0343   05/12/22  0321   05/11/22  2144        POCT Glucose 164   198   256   45   169                              Physical Exam:   Constitutional: She is oriented to person, place, and time. She appears well-developed and well-nourished. No distress.    HENT:   Head: Normocephalic and atraumatic.    Eyes: Conjunctivae and EOM are normal.     Cardiovascular:  Normal rate and regular rhythm.             Pulmonary/Chest: Effort normal. No respiratory distress.        Abdominal: Soft. She exhibits no distension. There is no abdominal tenderness.             Musculoskeletal: No tenderness or edema.       Neurological: She is alert and oriented to person, place, and time.    Skin: Skin is warm and dry. No rash noted. She is not diaphoretic.    Psychiatric: She has a normal mood and affect. Her behavior is normal.

## 2022-05-12 NOTE — ASSESSMENT & PLAN NOTE
Cont hydralazine 75mg TID and procardia 120mg daily  Uptitrate BP meds for goal of <140/90, per OBGYN

## 2022-05-12 NOTE — ASSESSMENT & PLAN NOTE
- Patient with mood changes throughout hospital stay, becoming increasingly withdrawn and anxious  - Ativan PRN  - Telepsych consult on 5/11, recommend outpatient follow up  - Moved to room with window 5/11

## 2022-05-13 PROBLEM — Z97.8 USES SELF-APPLIED CONTINUOUS GLUCOSE MONITORING DEVICE: Status: ACTIVE | Noted: 2022-05-13

## 2022-05-13 PROBLEM — Z86.59 HISTORY OF PANIC ATTACKS: Chronic | Status: ACTIVE | Noted: 2022-05-13

## 2022-05-13 LAB
ABO + RH BLD: NORMAL
ALBUMIN SERPL BCP-MCNC: 2.3 G/DL (ref 3.5–5.2)
ALP SERPL-CCNC: 60 U/L (ref 55–135)
ALT SERPL W/O P-5'-P-CCNC: 14 U/L (ref 10–44)
ANION GAP SERPL CALC-SCNC: 7 MMOL/L (ref 8–16)
AST SERPL-CCNC: 20 U/L (ref 10–40)
BASOPHILS # BLD AUTO: 0.02 K/UL (ref 0–0.2)
BASOPHILS NFR BLD: 0.2 % (ref 0–1.9)
BILIRUB SERPL-MCNC: 0.2 MG/DL (ref 0.1–1)
BLD GP AB SCN CELLS X3 SERPL QL: NORMAL
BLD PROD TYP BPU: NORMAL
BLOOD UNIT EXPIRATION DATE: NORMAL
BLOOD UNIT TYPE CODE: 6200
BLOOD UNIT TYPE: NORMAL
BUN SERPL-MCNC: 16 MG/DL (ref 6–20)
CALCIUM SERPL-MCNC: 8.3 MG/DL (ref 8.7–10.5)
CHLORIDE SERPL-SCNC: 105 MMOL/L (ref 95–110)
CO2 SERPL-SCNC: 22 MMOL/L (ref 23–29)
CODING SYSTEM: NORMAL
CREAT SERPL-MCNC: 1.1 MG/DL (ref 0.5–1.4)
DIFFERENTIAL METHOD: ABNORMAL
DISPENSE STATUS: NORMAL
EOSINOPHIL # BLD AUTO: 0.1 K/UL (ref 0–0.5)
EOSINOPHIL NFR BLD: 1.1 % (ref 0–8)
ERYTHROCYTE [DISTWIDTH] IN BLOOD BY AUTOMATED COUNT: 13.9 % (ref 11.5–14.5)
EST. GFR  (AFRICAN AMERICAN): >60 ML/MIN/1.73 M^2
EST. GFR  (NON AFRICAN AMERICAN): >60 ML/MIN/1.73 M^2
GLUCOSE SERPL-MCNC: 96 MG/DL (ref 70–110)
HCT VFR BLD AUTO: 23.5 % (ref 37–48.5)
HGB BLD-MCNC: 7.6 G/DL (ref 12–16)
IMM GRANULOCYTES # BLD AUTO: 0.03 K/UL (ref 0–0.04)
IMM GRANULOCYTES NFR BLD AUTO: 0.3 % (ref 0–0.5)
LYMPHOCYTES # BLD AUTO: 2 K/UL (ref 1–4.8)
LYMPHOCYTES NFR BLD: 22.7 % (ref 18–48)
MCH RBC QN AUTO: 29.1 PG (ref 27–31)
MCHC RBC AUTO-ENTMCNC: 32.3 G/DL (ref 32–36)
MCV RBC AUTO: 90 FL (ref 82–98)
MONOCYTES # BLD AUTO: 0.6 K/UL (ref 0.3–1)
MONOCYTES NFR BLD: 6.9 % (ref 4–15)
NEUTROPHILS # BLD AUTO: 6.1 K/UL (ref 1.8–7.7)
NEUTROPHILS NFR BLD: 68.8 % (ref 38–73)
NRBC BLD-RTO: 0 /100 WBC
PLATELET # BLD AUTO: 264 K/UL (ref 150–450)
PMV BLD AUTO: 10.9 FL (ref 9.2–12.9)
POCT GLUCOSE: 109 MG/DL (ref 70–110)
POCT GLUCOSE: 324 MG/DL (ref 70–110)
POCT GLUCOSE: 481 MG/DL (ref 70–110)
POCT GLUCOSE: 96 MG/DL (ref 70–110)
POTASSIUM SERPL-SCNC: 4.1 MMOL/L (ref 3.5–5.1)
PROT SERPL-MCNC: 5.9 G/DL (ref 6–8.4)
RBC # BLD AUTO: 2.61 M/UL (ref 4–5.4)
SODIUM SERPL-SCNC: 134 MMOL/L (ref 136–145)
TRANS ERYTHROCYTES VOL PATIENT: NORMAL ML
WBC # BLD AUTO: 8.82 K/UL (ref 3.9–12.7)

## 2022-05-13 PROCEDURE — 86901 BLOOD TYPING SEROLOGIC RH(D): CPT

## 2022-05-13 PROCEDURE — 99233 PR SUBSEQUENT HOSPITAL CARE,LEVL III: ICD-10-PCS | Mod: ,,, | Performed by: HOSPITALIST

## 2022-05-13 PROCEDURE — 25000003 PHARM REV CODE 250: Performed by: NURSE PRACTITIONER

## 2022-05-13 PROCEDURE — 99232 PR SUBSEQUENT HOSPITAL CARE,LEVL II: ICD-10-PCS | Mod: ,,, | Performed by: NURSE PRACTITIONER

## 2022-05-13 PROCEDURE — 99233 SBSQ HOSP IP/OBS HIGH 50: CPT | Mod: ,,, | Performed by: HOSPITALIST

## 2022-05-13 PROCEDURE — 25000003 PHARM REV CODE 250: Performed by: STUDENT IN AN ORGANIZED HEALTH CARE EDUCATION/TRAINING PROGRAM

## 2022-05-13 PROCEDURE — 36430 TRANSFUSION BLD/BLD COMPNT: CPT

## 2022-05-13 PROCEDURE — 63600175 PHARM REV CODE 636 W HCPCS: Performed by: STUDENT IN AN ORGANIZED HEALTH CARE EDUCATION/TRAINING PROGRAM

## 2022-05-13 PROCEDURE — 25000003 PHARM REV CODE 250: Performed by: HOSPITALIST

## 2022-05-13 PROCEDURE — P9021 RED BLOOD CELLS UNIT: HCPCS

## 2022-05-13 PROCEDURE — 63600175 PHARM REV CODE 636 W HCPCS

## 2022-05-13 PROCEDURE — 80053 COMPREHEN METABOLIC PANEL: CPT

## 2022-05-13 PROCEDURE — 86920 COMPATIBILITY TEST SPIN: CPT

## 2022-05-13 PROCEDURE — C9399 UNCLASSIFIED DRUGS OR BIOLOG: HCPCS | Performed by: NURSE PRACTITIONER

## 2022-05-13 PROCEDURE — 25000003 PHARM REV CODE 250

## 2022-05-13 PROCEDURE — 36415 COLL VENOUS BLD VENIPUNCTURE: CPT

## 2022-05-13 PROCEDURE — 99232 SBSQ HOSP IP/OBS MODERATE 35: CPT | Mod: ,,, | Performed by: NURSE PRACTITIONER

## 2022-05-13 PROCEDURE — 20600001 HC STEP DOWN PRIVATE ROOM

## 2022-05-13 PROCEDURE — 85025 COMPLETE CBC W/AUTO DIFF WBC: CPT

## 2022-05-13 PROCEDURE — 86900 BLOOD TYPING SEROLOGIC ABO: CPT

## 2022-05-13 PROCEDURE — 63600175 PHARM REV CODE 636 W HCPCS: Performed by: NURSE PRACTITIONER

## 2022-05-13 RX ORDER — HYDROCODONE BITARTRATE AND ACETAMINOPHEN 500; 5 MG/1; MG/1
TABLET ORAL
Status: DISCONTINUED | OUTPATIENT
Start: 2022-05-13 | End: 2022-05-16 | Stop reason: HOSPADM

## 2022-05-13 RX ORDER — LORAZEPAM 2 MG/ML
0.5 INJECTION INTRAMUSCULAR EVERY 6 HOURS PRN
Status: DISCONTINUED | OUTPATIENT
Start: 2022-05-13 | End: 2022-05-16 | Stop reason: HOSPADM

## 2022-05-13 RX ORDER — ONDANSETRON 2 MG/ML
4 INJECTION INTRAMUSCULAR; INTRAVENOUS EVERY 6 HOURS PRN
Status: DISCONTINUED | OUTPATIENT
Start: 2022-05-13 | End: 2022-05-16 | Stop reason: HOSPADM

## 2022-05-13 RX ORDER — DIPHENHYDRAMINE HCL 25 MG
25 CAPSULE ORAL DAILY PRN
COMMUNITY
End: 2023-02-08

## 2022-05-13 RX ORDER — METOCLOPRAMIDE 5 MG/1
5 TABLET ORAL EVERY 6 HOURS PRN
Status: ON HOLD | COMMUNITY
Start: 2022-04-21 | End: 2022-05-16 | Stop reason: HOSPADM

## 2022-05-13 RX ORDER — LORAZEPAM 0.5 MG/1
0.5 TABLET ORAL DAILY PRN
Status: ON HOLD | COMMUNITY
Start: 2022-04-24 | End: 2022-05-26 | Stop reason: HOSPADM

## 2022-05-13 RX ORDER — ACETAMINOPHEN 500 MG
2000 TABLET ORAL DAILY PRN
Status: ON HOLD | COMMUNITY
End: 2022-10-05 | Stop reason: HOSPADM

## 2022-05-13 RX ADMIN — LORAZEPAM 0.5 MG: 2 INJECTION INTRAMUSCULAR; INTRAVENOUS at 06:05

## 2022-05-13 RX ADMIN — DIPHENHYDRAMINE HYDROCHLORIDE 12.5 MG: 25 SOLUTION ORAL at 11:05

## 2022-05-13 RX ADMIN — METOCLOPRAMIDE 10 MG: 5 TABLET ORAL at 08:05

## 2022-05-13 RX ADMIN — HYDRALAZINE HYDROCHLORIDE 75 MG: 50 TABLET ORAL at 10:05

## 2022-05-13 RX ADMIN — NIFEDIPINE 120 MG: 30 TABLET, FILM COATED, EXTENDED RELEASE ORAL at 05:05

## 2022-05-13 RX ADMIN — PROCHLORPERAZINE EDISYLATE 5 MG: 5 INJECTION INTRAMUSCULAR; INTRAVENOUS at 10:05

## 2022-05-13 RX ADMIN — INSULIN ASPART 3 UNITS: 100 INJECTION, SOLUTION INTRAVENOUS; SUBCUTANEOUS at 12:05

## 2022-05-13 RX ADMIN — HYDRALAZINE HYDROCHLORIDE 75 MG: 50 TABLET ORAL at 05:05

## 2022-05-13 RX ADMIN — INSULIN ASPART 1 UNITS: 100 INJECTION, SOLUTION INTRAVENOUS; SUBCUTANEOUS at 10:05

## 2022-05-13 RX ADMIN — METOCLOPRAMIDE 10 MG: 5 TABLET ORAL at 12:05

## 2022-05-13 RX ADMIN — LORAZEPAM 0.5 MG: 2 INJECTION INTRAMUSCULAR; INTRAVENOUS at 10:05

## 2022-05-13 RX ADMIN — HYDRALAZINE HYDROCHLORIDE 75 MG: 50 TABLET ORAL at 03:05

## 2022-05-13 RX ADMIN — ONDANSETRON 4 MG: 2 INJECTION INTRAMUSCULAR; INTRAVENOUS at 10:05

## 2022-05-13 RX ADMIN — ASPIRIN 81 MG CHEWABLE TABLET 81 MG: 81 TABLET CHEWABLE at 08:05

## 2022-05-13 RX ADMIN — METOCLOPRAMIDE 10 MG: 5 TABLET ORAL at 05:05

## 2022-05-13 RX ADMIN — INSULIN DETEMIR 5 UNITS: 100 INJECTION, SOLUTION SUBCUTANEOUS at 11:05

## 2022-05-13 RX ADMIN — INSULIN ASPART 6 UNITS: 100 INJECTION, SOLUTION INTRAVENOUS; SUBCUTANEOUS at 04:05

## 2022-05-13 RX ADMIN — INSULIN ASPART 3 UNITS: 100 INJECTION, SOLUTION INTRAVENOUS; SUBCUTANEOUS at 01:05

## 2022-05-13 RX ADMIN — Medication 25 MG: at 10:05

## 2022-05-13 RX ADMIN — INSULIN DETEMIR 6 UNITS: 100 INJECTION, SOLUTION SUBCUTANEOUS at 08:05

## 2022-05-13 RX ADMIN — ENOXAPARIN SODIUM 40 MG: 100 INJECTION SUBCUTANEOUS at 05:05

## 2022-05-13 RX ADMIN — FAMOTIDINE 20 MG: 20 TABLET ORAL at 08:05

## 2022-05-13 NOTE — PROGRESS NOTES
"[facility] ([address] Medicine  Progress Note    Patient Name: Alicia Valles  MRN: [id number]  Patient Class: IP- Inpatient   Admission Date: 5/5/2022  Length of Stay: 8 days  Attending Physician: Jessica Peguero*  Primary Care Provider: Primary Doctor No        Subjective:     Principal Problem:Type 1 diabetes mellitus with other specified complication        HPI:  Alicia Valles is a 27F with HTN and T1DM complicated by retinopathy, gastroparesis, and neuropathy who presented to [facility] as a transfer from [facility] for endocrinology evaluation. She has a history of poorly controlled T1DM, recent A1c >12. She was initially admitted to [facility] for hypertensive encephalopathy and hypoglycemia. Prior to this admission she reported a seizure secondary to hypoglycemia, BG of 29. No reported hx of seizures prior. Of note, she reports that she's been admitted for DKA more than 5 times and she "randomly decides how much insulin to give herself at mealtimes."      Overview/Hospital Course:  Patient transferred to [facility] for closer endocrinology evaluation.      Interval History: Pt with episode of lethargy and confusion overnight, BG POCT 58. Pt reports issues with nursing staff overnight and concern for delayed care, nursing supervisor notified. Patient with anxiety, ativan 0.5mg PO given with relief. Hx of panic attacks. Pt with sinus tachycardia and Hbg 7.6 on morning labs. Concern for possible sx anemia during pregnancy, 1u RBC ordered.    Review of Systems   Constitutional:  Negative for fatigue and fever.   HENT:  Negative for congestion and sore throat.    Eyes:  Positive for visual disturbance. Negative for photophobia.   Respiratory:  Negative for cough, chest tightness and shortness of breath.    Cardiovascular:  Negative for chest pain and leg swelling.   Gastrointestinal:  Negative for abdominal distention and abdominal pain.   Genitourinary:  Negative for dysuria and urgency. "   Musculoskeletal:  Negative for arthralgias and myalgias.   Skin:  Negative for rash and wound.   Neurological:  Negative for speech difficulty, light-headedness and headaches.   Psychiatric/Behavioral:  Negative for behavioral problems and sleep disturbance. The patient is nervous/anxious.    Objective:     Vital Signs (Most Recent):  Temp: 99.1 °F (37.3 °C) (05/13/22 0805)  Pulse: 102 (05/13/22 0805)  Resp: 18 (05/13/22 0805)  BP: 138/87 (05/13/22 0805)  SpO2: 100 % (05/13/22 0805) Vital Signs (24h Range):  Temp:  [97.9 °F (36.6 °C)-99.1 °F (37.3 °C)] 99.1 °F (37.3 °C)  Pulse:  [] 102  Resp:  [16-22] 18  SpO2:  [100 %] 100 %  BP: (137-158)/(77-94) 138/87     Weight: 58.5 kg (129 lb)  Body mass index is 24.37 kg/m².  No intake or output data in the 24 hours ending 05/13/22 0812   Physical Exam  Constitutional:       Appearance: Normal appearance. She is normal weight.   HENT:      Head: Normocephalic and atraumatic.      Mouth/Throat:      Mouth: Mucous membranes are moist.      Pharynx: Oropharynx is clear.   Eyes:      General: No scleral icterus.     Extraocular Movements: Extraocular movements intact.      Pupils: Pupils are equal, round, and reactive to light.   Cardiovascular:      Rate and Rhythm: Normal rate and regular rhythm.      Pulses: Normal pulses.      Heart sounds: Normal heart sounds.   Pulmonary:      Effort: Pulmonary effort is normal. No respiratory distress.      Breath sounds: Normal breath sounds. No wheezing.   Abdominal:      General: Abdomen is flat. Bowel sounds are normal. There is no distension.      Palpations: Abdomen is soft.      Tenderness: There is no abdominal tenderness.   Musculoskeletal:      Cervical back: Normal range of motion and neck supple.      Right lower leg: No edema.      Left lower leg: No edema.   Skin:     General: Skin is warm and dry.      Capillary Refill: Capillary refill takes less than 2 seconds.      Coloration: Skin is not pale.   Neurological:       General: No focal deficit present.      Mental Status: She is alert and oriented to person, place, and time. Mental status is at baseline.   Psychiatric:         Mood and Affect: Mood normal.         Behavior: Behavior normal.       Significant Labs: All pertinent labs within the past 24 hours have been reviewed.    Significant Imaging: I have reviewed all pertinent imaging results/findings within the past 24 hours.      Assessment/Plan:      * Type 1 diabetes mellitus with other specified complication  Complications: diabetic retinopathy, gastroparesis, neuropathy, and h/o retinal detachment    Endocrinology consulted, follow for insulin recs  Diabetic diet with consistent carbs  Extensive conversations while inpatient    History of panic attacks  Pt offered psych consult, denied  See anxiety      Anxiety  Patient with anxiety during hospital admission  PRN vistaril, ativan      Proteinuria  See Diabetes      Anemia  CBC daily  Iron panel WNL    Sx anemia 5/13 with Hbg 7.6 and sinus tachycardia, 1u pRBC transfusion ordered      First trimester pregnancy  OBGYN following      Chronic hypertension  Cont hydralazine 75mg TID and nifedipine 120mg nightly  Uptitrate BP meds for goal of <140/90, per OBGYN      VTE Risk Mitigation (From admission, onward)         Ordered     enoxaparin injection 40 mg  Daily         05/06/22 1137     Place FELICIA hose  Until discontinued         05/05/22 1553     Place sequential compression device  Until discontinued         05/05/22 1553     IP VTE LOW RISK PATIENT  Once         05/05/22 1553                Discharge Planning   BRYAN: 5/16/2022     Code Status: Full Code   Is the patient medically ready for discharge?: No    Reason for patient still in hospital (select all that apply): Consult recommendations  Discharge Plan A: Home                  Adriana Castro ([place] Name: MD Alo  Department of Hospital Medicine   [facility] - Intensive Care ([place]-14)

## 2022-05-13 NOTE — SUBJECTIVE & OBJECTIVE
"Interval HPI:   Overnight events: No acute events overnight. Patient on the MTSU in room 42315/16603 A. Blood glucose improving. BG at, above, and below goal on current insulin regimen (SSI, prandial, and basal insulin ). Steroid use- None.    Renal function- Normal   Vasopressors-  None       Diet gestational diabetic 2000 kcal (Breakfast & midmorning snack=70 carb grams; Lunch & midafternoon snack = 70 carb grams; Dinner & HS snack = 70 carb grams; Snacks recommended at 15 - 30 grams); Lactose Restricted     Eatin%  Nausea: No  Hypoglycemia and intervention: No  Fever: No  TPN and/or TF: No      /87   Pulse 102   Temp 99.1 °F (37.3 °C) (Oral)   Resp 18   Ht 5' 1" (1.549 m)   Wt 58.5 kg (129 lb)   LMP 2022 (Approximate)   SpO2 100%   Breastfeeding No   BMI 24.37 kg/m²     Labs Reviewed and Include    Recent Labs   Lab 22  0452   GLU 96   CALCIUM 8.3*   ALBUMIN 2.3*   PROT 5.9*   *   K 4.1   CO2 22*      BUN 16   CREATININE 1.1   ALKPHOS 60   ALT 14   AST 20   BILITOT 0.2     Lab Results   Component Value Date    WBC 8.82 2022    HGB 7.6 (L) 2022    HCT 23.5 (L) 2022    MCV 90 2022     2022     No results for input(s): TSH, FREET4 in the last 168 hours.  Lab Results   Component Value Date    HGBA1C 9.8 (H) 2022       Nutritional status:   Body mass index is 24.37 kg/m².  Lab Results   Component Value Date    ALBUMIN 2.3 (L) 2022    ALBUMIN 2.7 (L) 2022    ALBUMIN 2.4 (L) 2022     No results found for: PREALBUMIN    Estimated Creatinine Clearance: 62.1 mL/min (based on SCr of 1.1 mg/dL).    Accu-Checks  Recent Labs     22  2144 22  0321 22  0343 22  0433 22  0558 22  0853 22  1250 22  1622 22  1947 22  0808   POCTGLUCOSE 169* 45* 256* 198* 164* 205* 258* 300* 80 109       Current Medications and/or Treatments Impacting Glycemic Control  Immunotherapy:  "   Immunosuppressants       None          Steroids:   Hormones (From admission, onward)                None          Pressors:    Autonomic Drugs (From admission, onward)                None          Hyperglycemia/Diabetes Medications:   Antihyperglycemics (From admission, onward)                Start     Stop Route Frequency Ordered    05/13/22 0900  insulin detemir U-100 pen 6 Units         -- SubQ Daily 05/12/22 1817 05/12/22 2100  insulin detemir U-100 pen 5 Units         -- SubQ Nightly 05/12/22 1817 05/12/22 1914  insulin aspart U-100 pen 0-3 Units         -- SubQ As needed (PRN) 05/12/22 1817 05/12/22 1914  insulin aspart U-100 pen 0-5 Units         -- SubQ As needed (PRN) 05/12/22 1817 05/12/22 1830  insulin aspart U-100 pen 0-6 Units         -- SubQ 3 times daily with meals 05/12/22 1817

## 2022-05-13 NOTE — NURSING
Pt has dexcom glucose monitor which updates readings every 5 mins.  Blood sugar dropped to as low 58 and at that point patient appeared very lerthagic and for a brief moment unresponsive.     Managed top stabilize patient and control the blood sugar. Last time I checked the blood sugar was 129.    Pt medicated with anxiety prn meds and she slept will into the morning

## 2022-05-13 NOTE — HOSPITAL COURSE
Patient transferred to [facility] for closer endocrinology evaluation. Pt transfused 1u pRBC during admission secondary to symptomatic anemia, Hbg 7.6. Pt with anxiety during hospital admission uncontrolled by PRN vistaril, pt seen by psych during admission at [facility]. Ongoing talks about long term anxiety medication. Patient evaluated by endocrinology who recommend lantus 15U daily and novolog 3units with meals. Patient states understanding of new insulin regimen. Stable for discharge.

## 2022-05-13 NOTE — PROGRESS NOTES
[facility] ([place] [address])  Endocrinology  Progress Note    Admit Date: 2022     Reason for Consult: Management of T1DM, Hyperglycemia     Currently 10 weeks pregnant    Outpatient diabetes provider: Primary Care in [place]; [place] (No established PCP or endocrine care in [facility])  Diabetes education during pregnancy: None     Diabetes diagnosis year: 18 years     Home Diabetes Regimen:   Lantus 15 units QD  Novolog 3 units TIDWM with SSI (ISF 1:50)    For insulin pump users: Historically on Medronic (But has been off the pump for about 6 weeks; Found down with BG in the 20's).   No CGM    How often checking glucose at home? >4 x day (Every 2-3 hours)  BG readings on regimen: 's  Hypoglycemia on the regimen?  Yes  Missed doses on regimen?  No    Diabetes Complications include:     Hyperglycemia, Hypoglycemia , Diabetic nephropathy  , Diabetic retinopathy , and Diabetic gastroparesis     Complicating diabetes co morbidities:   Pregancy.       HPI:     Alicia Valles is a 27 y.o. [id number] at 9w5d presents for blood sugar patterning. She was seen in MFM clinic yesterday after recently being discharged from the hospital where she was admitted for hypertensive encephalopathy/hypoglycemia.Ms. Valles has poorly controlled DM1. She was diagnosed at age 10 and her most recent A1C was >12. She previously was followed by an endocrinologist in [place], but has not established with anyone in [place] where she currently lives. She states that used to count carbs and have an insulin sensitivity factor, but she has not done that in a few years. She currently takes lantus 15 u at 5 pm; for her mealtime insulin, she randomly decides how much to give herself. Reports that she has been admitted for DKA more than 5 times. Prior to her most recent hospitalization, she was found down with BG of 29. She had a seizure due to hypoglycemia; she has not ever had seizures before and does not take medications  "for a seizure disorder.  Reports that she has never had an episode of hypoglycemia like that before. 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 Reports prior surgery for retinal detachment. Endocrine consulted to manage hyperglycemia and type 1 diabetes.         Interval HPI:   Overnight events: No acute events overnight. Patient on the MTSU in room 89313/41376 A. Blood glucose improving. BG at, above, and below goal on current insulin regimen (SSI, prandial, and basal insulin ). Steroid use- None.    Overall CGM data shows BG within goal overnight.   Renal function- Normal   Vasopressors-  None       Diet gestational diabetic 2000 kcal (Breakfast & midmorning snack=70 carb grams; Lunch & midafternoon snack = 70 carb grams; Dinner & HS snack = 70 carb grams; Snacks recommended at 15 - 30 grams); Lactose Restricted     Eatin%  Nausea: No  Hypoglycemia and intervention: Yes, treated with glucose tabs.   Fever: No  TPN and/or TF: No      /87   Pulse 102   Temp 99.1 °F (37.3 °C) (Oral)   Resp 18   Ht 5' 1" (1.549 m)   Wt 58.5 kg (129 lb)   LMP 2022 (Approximate)   SpO2 100%   Breastfeeding No   BMI 24.37 kg/m²     Labs Reviewed and Include    Recent Labs   Lab 22  0452   GLU 96   CALCIUM 8.3*   ALBUMIN 2.3*   PROT 5.9*   *   K 4.1   CO2 22*      BUN 16   CREATININE 1.1   ALKPHOS 60   ALT 14   AST 20   BILITOT 0.2     Lab Results   Component Value Date    WBC 8.82 2022    HGB 7.6 (L) 2022    HCT 23.5 (L) 2022    MCV 90 2022     2022     No results for input(s): TSH, FREET4 in the last 168 hours.  Lab Results   Component Value Date    HGBA1C 9.8 (H) 2022       Nutritional status:   Body mass index is 24.37 kg/m².  Lab Results   Component Value Date    ALBUMIN 2.3 (L) 2022    ALBUMIN 2.7 (L) 2022    ALBUMIN 2.4 (L) 2022     No " results found for: PREALBUMIN    Estimated Creatinine Clearance: 62.1 mL/min (based on SCr of 1.1 mg/dL).    Accu-Checks  Recent Labs     05/11/22  2144 05/12/22  0321 05/12/22  0343 05/12/22  0433 05/12/22  0558 05/12/22  0853 05/12/22  1250 05/12/22  1622 05/12/22  1947 05/13/22  0808   POCTGLUCOSE 169* 45* 256* 198* 164* 205* 258* 300* 80 109       Current Medications and/or Treatments Impacting Glycemic Control  Immunotherapy:    Immunosuppressants       None          Steroids:   Hormones (From admission, onward)                None          Pressors:    Autonomic Drugs (From admission, onward)                None          Hyperglycemia/Diabetes Medications:   Antihyperglycemics (From admission, onward)                Start     Stop Route Frequency Ordered    05/13/22 0900  insulin detemir U-100 pen 6 Units         -- SubQ Daily 05/12/22 1817 05/12/22 2100  insulin detemir U-100 pen 5 Units         -- SubQ Nightly 05/12/22 1817 05/12/22 1914  insulin aspart U-100 pen 0-3 Units         -- SubQ As needed (PRN) 05/12/22 1817 05/12/22 1914  insulin aspart U-100 pen 0-5 Units         -- SubQ As needed (PRN) 05/12/22 1817 05/12/22 1830  insulin aspart U-100 pen 0-6 Units         -- SubQ 3 times daily with meals 05/12/22 1817            ASSESSMENT and PLAN    * Type 1 diabetes mellitus with other specified complication  Endocrinology consulted for BG management.   BG goal      - Levemir Flex Pen 6 units in the morning and 5 units in the evening.   - Novolog (aspart) insulin ICR 1:10 Units SQ TIDWM and prn for BG excursions ISF 1:50 (starting to slide at 180 mg/dL during the daytime. At night ISF 1:70 starting to slide at 180 mg/dL)  - Novolog ICR 1:18 with snacks.   - BG checks q4hr and two hours post-prandial  - Continue Dexcom (Clarity Code: [id number])  - Change diet to 45 grams of carbohydrates for meals and 15-25 grams of carbs for snacks   - Ensure prandial insulin is administered 15 minutes  before starting to eat or drink.   - Hypoglycemia protocol in place  - If blood glucose greater than 300, please ask patient not to eat food or drink anything other than water until correctional insulin has brought it back below 250    ** Please notify Endocrine for any change and/or advance in diet**  ** Please call Endocrine for any BG related issues **    Goal of glucose for diabetes in pregnancy  Fasting blood glucose concentration ?95 mg/dL  One-hour postprandial blood glucose concentration ?140 mg/dL   Two-hour postprandial glucose concentration ?120 mg/dL     Goal A1c in pregnancy ideally <6.5%, to reduce the risk of congenital anomalies  Discharge Planning:   TBD. Please notify endocrinology prior to discharge.                        First trimester pregnancy  Optimize glucose control for the health of mom and baby.       Anemia  Lab Results   Component Value Date    HGBA1C 9.8 (H) 05/05/2022     Anemia may impact the accuracy of the A1C.       Uses self-applied continuous glucose monitoring device  Patient may continue to wear Dexcom CGM, but must have a finger stick BG check AC/HS.   Treatment decision inpatient must be confirmed via fingerstick.   Always confirm hypo and hyperglycemia with finger sticks.              Enio Bedolla, DNP, FNP  Endocrinology  [facility] ([place]-)

## 2022-05-13 NOTE — ASSESSMENT & PLAN NOTE
Patient may continue to wear Dexcom CGM, but must have a finger stick BG check AC/HS.   Treatment decision inpatient must be confirmed via fingerstick.   Always confirm hypo and hyperglycemia with finger sticks.

## 2022-05-13 NOTE — SUBJECTIVE & OBJECTIVE
Interval History: Pt with episode of lethargy and confusion overnight, BG POCT 58. Pt reports issues with nursing staff overnight and concern for delayed care, nursing supervisor notified. Patient with anxiety, ativan 0.5mg PO given with relief. Hx of panic attacks. Pt with sinus tachycardia and Hbg 7.6 on morning labs. Concern for possible sx anemia during pregnancy, 1u RBC ordered.    Review of Systems   Constitutional:  Negative for fatigue and fever.   HENT:  Negative for congestion and sore throat.    Eyes:  Positive for visual disturbance. Negative for photophobia.   Respiratory:  Negative for cough, chest tightness and shortness of breath.    Cardiovascular:  Negative for chest pain and leg swelling.   Gastrointestinal:  Negative for abdominal distention and abdominal pain.   Genitourinary:  Negative for dysuria and urgency.   Musculoskeletal:  Negative for arthralgias and myalgias.   Skin:  Negative for rash and wound.   Neurological:  Negative for speech difficulty, light-headedness and headaches.   Psychiatric/Behavioral:  Negative for behavioral problems and sleep disturbance. The patient is nervous/anxious.    Objective:     Vital Signs (Most Recent):  Temp: 99.1 °F (37.3 °C) (05/13/22 0805)  Pulse: 102 (05/13/22 0805)  Resp: 18 (05/13/22 0805)  BP: 138/87 (05/13/22 0805)  SpO2: 100 % (05/13/22 0805) Vital Signs (24h Range):  Temp:  [97.9 °F (36.6 °C)-99.1 °F (37.3 °C)] 99.1 °F (37.3 °C)  Pulse:  [] 102  Resp:  [16-22] 18  SpO2:  [100 %] 100 %  BP: (137-158)/(77-94) 138/87     Weight: 58.5 kg (129 lb)  Body mass index is 24.37 kg/m².  No intake or output data in the 24 hours ending 05/13/22 0812   Physical Exam  Constitutional:       Appearance: Normal appearance. She is normal weight.   HENT:      Head: Normocephalic and atraumatic.      Mouth/Throat:      Mouth: Mucous membranes are moist.      Pharynx: Oropharynx is clear.   Eyes:      General: No scleral icterus.     Extraocular Movements:  Extraocular movements intact.      Pupils: Pupils are equal, round, and reactive to light.   Cardiovascular:      Rate and Rhythm: Normal rate and regular rhythm.      Pulses: Normal pulses.      Heart sounds: Normal heart sounds.   Pulmonary:      Effort: Pulmonary effort is normal. No respiratory distress.      Breath sounds: Normal breath sounds. No wheezing.   Abdominal:      General: Abdomen is flat. Bowel sounds are normal. There is no distension.      Palpations: Abdomen is soft.      Tenderness: There is no abdominal tenderness.   Musculoskeletal:      Cervical back: Normal range of motion and neck supple.      Right lower leg: No edema.      Left lower leg: No edema.   Skin:     General: Skin is warm and dry.      Capillary Refill: Capillary refill takes less than 2 seconds.      Coloration: Skin is not pale.   Neurological:      General: No focal deficit present.      Mental Status: She is alert and oriented to person, place, and time. Mental status is at baseline.   Psychiatric:         Mood and Affect: Mood normal.         Behavior: Behavior normal.       Significant Labs: All pertinent labs within the past 24 hours have been reviewed.    Significant Imaging: I have reviewed all pertinent imaging results/findings within the past 24 hours.

## 2022-05-13 NOTE — ASSESSMENT & PLAN NOTE
CBC daily  Iron panel WNL    Sx anemia 5/13 with Hbg 7.6 and sinus tachycardia, 1u pRBC transfusion ordered

## 2022-05-13 NOTE — PLAN OF CARE
Problem: Diabetes Comorbidity  Goal: Blood Glucose Level Within Targeted Range  Outcome: Ongoing, Progressing     Problem: Fall Injury Risk  Goal: Absence of Fall and Fall-Related Injury  Outcome: Ongoing, Progressing     Problem: Pain Acute  Goal: Acceptable Pain Control and Functional Ability  Outcome: Ongoing, Progressing

## 2022-05-13 NOTE — PHARMACY MED REC
"Admission Medication History     The home medication history was taken by Simon Lopez.    You may go to "Admission" then "Reconcile Home Medications" tabs to review and/or act upon these items.      The home medication list has been updated by the Pharmacy department.    Please read ALL comments highlighted in yellow.    Please address this information as you see fit.     Feel free to contact us if you have any questions or require assistance.      Medications listed below were obtained from: [facility] - [facility]    PTA Medications   Medication Sig    acetaminophen (TYLENOL) 500 MG tablet   Take 2,000 mg by mouth daily as needed for Pain.    diphenhydrAMINE (BENADRYL) 25 mg capsule   Take 25 mg by mouth daily as needed for Insomnia (if Unisom is ineffective).    docusate sodium (COLACE) 100 MG capsule   Take 100 mg by mouth once daily.    doxylamine succinate (UNISOM, DOXYLAMINE, ORAL)   Take 1 capsule by mouth nightly as needed (Sleep).    hydrALAZINE (APRESOLINE) 50 MG tablet   Take 50 mg by mouth 2 (two) times a day.    insulin aspart U-100 (NOVOLOG) 100 unit/mL (3 mL) InPn pen   Inject 3 Units into the skin 3 (three) times daily before meals. Also sliding scale: 2 units BS>250)    insulin glargine (LANTUS) 100 unit/mL injection   Inject 6 units every morning and 5 units every evening.    LORazepam (ATIVAN) 0.5 MG tablet   Take 0.5 mg by mouth daily as needed for Anxiety.    metoclopramide HCl (REGLAN) 5 MG tablet   Take 5 mg by mouth every 6 (six) hours as needed for GI spasms.    NIFEdipine (PROCARDIA-XL) 30 MG (OSM) 24 hr tablet   Take 30 mg by mouth once daily.    prenatal vit 93/iron fum/folic (PRENATAL FORMULA ORAL) Take 1 tablet by mouth once daily.       Potential issues to be addressed PRIOR TO DISCHARGE  Patient requires education regarding drug therapies: REGLAN & ATIVAN DUE TO PREGNACY    Simon Lopez, [facility]  EXT [id number]                  .          "

## 2022-05-13 NOTE — ASSESSMENT & PLAN NOTE
Endocrinology consulted for BG management.   BG goal      - Levemir Flex Pen 6 units in the morning and 5 units in the evening.   - Novolog (aspart) insulin ICR 1:10 Units SQ TIDWM and prn for BG excursions ISF 1:50 (starting to slide at 180 mg/dL during the daytime. At night ISF 1:70 starting to slide at 180 mg/dL)  - Novolog ICR 1:18 with snacks.   - BG checks q4hr and two hours post-prandial  - Continue Dexcom (Clarity Code: [id number])  - Change diet to 45 grams of carbohydrates for meals and 15-25 grams of carbs for snacks   - Ensure prandial insulin is administered 15 minutes before starting to eat or drink.   - Hypoglycemia protocol in place  - If blood glucose greater than 300, please ask patient not to eat food or drink anything other than water until correctional insulin has brought it back below 250    ** Please notify Endocrine for any change and/or advance in diet**  ** Please call Endocrine for any BG related issues **    Goal of glucose for diabetes in pregnancy  Fasting blood glucose concentration ?95 mg/dL  One-hour postprandial blood glucose concentration ?140 mg/dL   Two-hour postprandial glucose concentration ?120 mg/dL     Goal A1c in pregnancy ideally <6.5%, to reduce the risk of congenital anomalies  Discharge Planning:   TBD. Please notify endocrinology prior to discharge.

## 2022-05-14 PROBLEM — E11.43 GASTROPARESIS DUE TO DM: Chronic | Status: ACTIVE | Noted: 2022-05-14

## 2022-05-14 PROBLEM — K31.84 GASTROPARESIS DUE TO DM: Chronic | Status: ACTIVE | Noted: 2022-05-14

## 2022-05-14 PROBLEM — O26.859 SPOTTING IN EARLY PREGNANCY: Status: ACTIVE | Noted: 2022-05-14

## 2022-05-14 LAB
ALBUMIN SERPL BCP-MCNC: 2.4 G/DL (ref 3.5–5.2)
ALP SERPL-CCNC: 57 U/L (ref 55–135)
ALT SERPL W/O P-5'-P-CCNC: 17 U/L (ref 10–44)
ANION GAP SERPL CALC-SCNC: 7 MMOL/L (ref 8–16)
AST SERPL-CCNC: 21 U/L (ref 10–40)
BASOPHILS # BLD AUTO: 0.02 K/UL (ref 0–0.2)
BASOPHILS NFR BLD: 0.2 % (ref 0–1.9)
BILIRUB SERPL-MCNC: 0.3 MG/DL (ref 0.1–1)
BUN SERPL-MCNC: 20 MG/DL (ref 6–20)
CALCIUM SERPL-MCNC: 8.2 MG/DL (ref 8.7–10.5)
CHLORIDE SERPL-SCNC: 103 MMOL/L (ref 95–110)
CO2 SERPL-SCNC: 21 MMOL/L (ref 23–29)
CREAT SERPL-MCNC: 1 MG/DL (ref 0.5–1.4)
DIFFERENTIAL METHOD: ABNORMAL
EOSINOPHIL # BLD AUTO: 0.1 K/UL (ref 0–0.5)
EOSINOPHIL NFR BLD: 0.9 % (ref 0–8)
ERYTHROCYTE [DISTWIDTH] IN BLOOD BY AUTOMATED COUNT: 13.5 % (ref 11.5–14.5)
EST. GFR  (AFRICAN AMERICAN): >60 ML/MIN/1.73 M^2
EST. GFR  (NON AFRICAN AMERICAN): >60 ML/MIN/1.73 M^2
GLUCOSE SERPL-MCNC: 176 MG/DL (ref 70–110)
HCT VFR BLD AUTO: 29.1 % (ref 37–48.5)
HGB BLD-MCNC: 9.8 G/DL (ref 12–16)
IMM GRANULOCYTES # BLD AUTO: 0.05 K/UL (ref 0–0.04)
IMM GRANULOCYTES NFR BLD AUTO: 0.5 % (ref 0–0.5)
LYMPHOCYTES # BLD AUTO: 1.5 K/UL (ref 1–4.8)
LYMPHOCYTES NFR BLD: 16.1 % (ref 18–48)
MCH RBC QN AUTO: 30.2 PG (ref 27–31)
MCHC RBC AUTO-ENTMCNC: 33.7 G/DL (ref 32–36)
MCV RBC AUTO: 90 FL (ref 82–98)
MONOCYTES # BLD AUTO: 0.6 K/UL (ref 0.3–1)
MONOCYTES NFR BLD: 6.1 % (ref 4–15)
NEUTROPHILS # BLD AUTO: 7.3 K/UL (ref 1.8–7.7)
NEUTROPHILS NFR BLD: 76.2 % (ref 38–73)
NRBC BLD-RTO: 0 /100 WBC
PLATELET # BLD AUTO: 236 K/UL (ref 150–450)
PMV BLD AUTO: 10.9 FL (ref 9.2–12.9)
POCT GLUCOSE: 212 MG/DL (ref 70–110)
POCT GLUCOSE: 345 MG/DL (ref 70–110)
POCT GLUCOSE: 391 MG/DL (ref 70–110)
POCT GLUCOSE: 414 MG/DL (ref 70–110)
POCT GLUCOSE: 486 MG/DL (ref 70–110)
POTASSIUM SERPL-SCNC: 4.6 MMOL/L (ref 3.5–5.1)
PROT SERPL-MCNC: 5.5 G/DL (ref 6–8.4)
RBC # BLD AUTO: 3.24 M/UL (ref 4–5.4)
SODIUM SERPL-SCNC: 131 MMOL/L (ref 136–145)
WBC # BLD AUTO: 9.54 K/UL (ref 3.9–12.7)

## 2022-05-14 PROCEDURE — 25000003 PHARM REV CODE 250

## 2022-05-14 PROCEDURE — 63600175 PHARM REV CODE 636 W HCPCS: Performed by: STUDENT IN AN ORGANIZED HEALTH CARE EDUCATION/TRAINING PROGRAM

## 2022-05-14 PROCEDURE — 25000003 PHARM REV CODE 250: Performed by: STUDENT IN AN ORGANIZED HEALTH CARE EDUCATION/TRAINING PROGRAM

## 2022-05-14 PROCEDURE — 85025 COMPLETE CBC W/AUTO DIFF WBC: CPT

## 2022-05-14 PROCEDURE — 99232 SBSQ HOSP IP/OBS MODERATE 35: CPT | Mod: ,,, | Performed by: HOSPITALIST

## 2022-05-14 PROCEDURE — 99232 PR SUBSEQUENT HOSPITAL CARE,LEVL II: ICD-10-PCS | Mod: ,,, | Performed by: HOSPITALIST

## 2022-05-14 PROCEDURE — 20600001 HC STEP DOWN PRIVATE ROOM

## 2022-05-14 PROCEDURE — 25000003 PHARM REV CODE 250: Performed by: NURSE PRACTITIONER

## 2022-05-14 PROCEDURE — 99232 SBSQ HOSP IP/OBS MODERATE 35: CPT | Mod: ,,, | Performed by: NURSE PRACTITIONER

## 2022-05-14 PROCEDURE — 63600175 PHARM REV CODE 636 W HCPCS

## 2022-05-14 PROCEDURE — 80053 COMPREHEN METABOLIC PANEL: CPT

## 2022-05-14 PROCEDURE — 36415 COLL VENOUS BLD VENIPUNCTURE: CPT

## 2022-05-14 PROCEDURE — 99232 PR SUBSEQUENT HOSPITAL CARE,LEVL II: ICD-10-PCS | Mod: ,,, | Performed by: NURSE PRACTITIONER

## 2022-05-14 PROCEDURE — 25000003 PHARM REV CODE 250: Performed by: HOSPITALIST

## 2022-05-14 PROCEDURE — C9399 UNCLASSIFIED DRUGS OR BIOLOG: HCPCS | Performed by: NURSE PRACTITIONER

## 2022-05-14 RX ORDER — INSULIN ASPART 100 [IU]/ML
0-7 INJECTION, SOLUTION INTRAVENOUS; SUBCUTANEOUS
Status: DISCONTINUED | OUTPATIENT
Start: 2022-05-14 | End: 2022-05-16 | Stop reason: HOSPADM

## 2022-05-14 RX ORDER — METOCLOPRAMIDE HYDROCHLORIDE 5 MG/ML
10 INJECTION INTRAMUSCULAR; INTRAVENOUS DAILY PRN
Status: DISCONTINUED | OUTPATIENT
Start: 2022-05-14 | End: 2022-05-16 | Stop reason: HOSPADM

## 2022-05-14 RX ORDER — SERTRALINE HYDROCHLORIDE 25 MG/1
25 TABLET, FILM COATED ORAL DAILY
Status: DISCONTINUED | OUTPATIENT
Start: 2022-05-14 | End: 2022-05-16 | Stop reason: HOSPADM

## 2022-05-14 RX ADMIN — INSULIN ASPART 3 UNITS: 100 INJECTION, SOLUTION INTRAVENOUS; SUBCUTANEOUS at 06:05

## 2022-05-14 RX ADMIN — INSULIN DETEMIR 6 UNITS: 100 INJECTION, SOLUTION SUBCUTANEOUS at 08:05

## 2022-05-14 RX ADMIN — NIFEDIPINE 120 MG: 30 TABLET, FILM COATED, EXTENDED RELEASE ORAL at 06:05

## 2022-05-14 RX ADMIN — DEXTROSE 125 ML: 10 SOLUTION INTRAVENOUS at 09:05

## 2022-05-14 RX ADMIN — INSULIN ASPART 4 UNITS: 100 INJECTION, SOLUTION INTRAVENOUS; SUBCUTANEOUS at 01:05

## 2022-05-14 RX ADMIN — FAMOTIDINE 20 MG: 20 TABLET ORAL at 11:05

## 2022-05-14 RX ADMIN — LORAZEPAM 0.5 MG: 2 INJECTION INTRAMUSCULAR; INTRAVENOUS at 11:05

## 2022-05-14 RX ADMIN — INSULIN ASPART 2 UNITS: 100 INJECTION, SOLUTION INTRAVENOUS; SUBCUTANEOUS at 03:05

## 2022-05-14 RX ADMIN — Medication 25 MG: at 11:05

## 2022-05-14 RX ADMIN — DIPHENHYDRAMINE HYDROCHLORIDE 12.5 MG: 25 SOLUTION ORAL at 11:05

## 2022-05-14 RX ADMIN — INSULIN ASPART 5 UNITS: 100 INJECTION, SOLUTION INTRAVENOUS; SUBCUTANEOUS at 07:05

## 2022-05-14 RX ADMIN — HYDRALAZINE HYDROCHLORIDE 75 MG: 50 TABLET ORAL at 06:05

## 2022-05-14 RX ADMIN — PRENATAL VIT W/ FE FUMARATE-FA TAB 27-0.8 MG 1 TABLET: 27-0.8 TAB at 11:05

## 2022-05-14 RX ADMIN — INSULIN DETEMIR 5 UNITS: 100 INJECTION, SOLUTION SUBCUTANEOUS at 11:05

## 2022-05-14 RX ADMIN — ENOXAPARIN SODIUM 40 MG: 100 INJECTION SUBCUTANEOUS at 06:05

## 2022-05-14 RX ADMIN — ACETAMINOPHEN 650 MG: 325 TABLET ORAL at 11:05

## 2022-05-14 RX ADMIN — METOCLOPRAMIDE 10 MG: 5 TABLET ORAL at 06:05

## 2022-05-14 RX ADMIN — HYDRALAZINE HYDROCHLORIDE 75 MG: 50 TABLET ORAL at 03:05

## 2022-05-14 RX ADMIN — ASPIRIN 81 MG CHEWABLE TABLET 81 MG: 81 TABLET CHEWABLE at 11:05

## 2022-05-14 RX ADMIN — INSULIN ASPART 1 UNITS: 100 INJECTION, SOLUTION INTRAVENOUS; SUBCUTANEOUS at 09:05

## 2022-05-14 RX ADMIN — HYDRALAZINE HYDROCHLORIDE 75 MG: 50 TABLET ORAL at 11:05

## 2022-05-14 RX ADMIN — METOCLOPRAMIDE 10 MG: 5 TABLET ORAL at 01:05

## 2022-05-14 RX ADMIN — SERTRALINE HYDROCHLORIDE 25 MG: 25 TABLET ORAL at 11:05

## 2022-05-14 RX ADMIN — METOCLOPRAMIDE 10 MG: 5 INJECTION, SOLUTION INTRAMUSCULAR; INTRAVENOUS at 11:05

## 2022-05-14 NOTE — NURSING
Pt has dexcom glucose monitor which updates readings every 5 mins. Checked blood sugar readings from the mother     21:00 :     120  Patient received 5 units of Levemir    00:00       167     0400 176    Patient had an uneventful sight. Blood pressure and blood sugar table and within limits throughout the night.    Pt complained of stomach pain at beginning of shift and wanted reglan.  Explained to her that is was scheduled 3 times a per day.

## 2022-05-14 NOTE — PROGRESS NOTES
[facility] ([place] [address])  Endocrinology  Progress Note    Admit Date: 2022     Reason for Consult: Management of T1DM, Hyperglycemia     Currently 10 weeks pregnant    Outpatient diabetes provider: Primary Care in [place]; [place] (No established PCP or endocrine care in [facility])  Diabetes education during pregnancy: None     Diabetes diagnosis year: 18 years     Home Diabetes Regimen:   Lantus 15 units QD  Novolog 3 units TIDWM with SSI (ISF 1:50)    For insulin pump users: Historically on Medronic (But has been off the pump for about 6 weeks; Found down with BG in the 20's).   No CGM    How often checking glucose at home? >4 x day (Every 2-3 hours)  BG readings on regimen: 's  Hypoglycemia on the regimen?  Yes  Missed doses on regimen?  No    Diabetes Complications include:     Hyperglycemia, Hypoglycemia , Diabetic nephropathy  , Diabetic retinopathy , and Diabetic gastroparesis     Complicating diabetes co morbidities:   Pregancy.       HPI:     Alicia Valles is a 27 y.o. [id number] at 9w5d presents for blood sugar patterning. She was seen in MFM clinic yesterday after recently being discharged from the hospital where she was admitted for hypertensive encephalopathy/hypoglycemia.Ms. Valles has poorly controlled DM1. She was diagnosed at age 10 and her most recent A1C was >12. She previously was followed by an endocrinologist in [place], but has not established with anyone in [place] where she currently lives. She states that used to count carbs and have an insulin sensitivity factor, but she has not done that in a few years. She currently takes lantus 15 u at 5 pm; for her mealtime insulin, she randomly decides how much to give herself. Reports that she has been admitted for DKA more than 5 times. Prior to her most recent hospitalization, she was found down with BG of 29. She had a seizure due to hypoglycemia; she has not ever had seizures before and does not take medications  "for a seizure disorder.  Reports that she has never had an episode of hypoglycemia like that before. 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 Reports prior surgery for retinal detachment. Endocrine consulted to manage hyperglycemia and type 1 diabetes.         Interval HPI:   Overnight events: No acute events overnight. Patient on the MTSU in room 23294/17216 A. Patient BG within goal from 16:00-08:00 and overnight. Will work on improving prandial BG spikes. Blood glucose improving. BG at, above, and below goal on current insulin regimen (SSI, prandial, and basal insulin ). Steroid use- None.    Renal function- Normal   Vasopressors-  None       Diet gestational diabetic 2000 kcal (Breakfast & midmorning snack=70 carb grams; Lunch & midafternoon snack = 70 carb grams; Dinner & HS snack = 70 carb grams; Snacks recommended at 15 - 30 grams); Lactose Restricted     Eatin%  Nausea: No  Hypoglycemia and intervention: No  Fever: No  TPN and/or TF: No      BP (!) 159/92   Pulse 74   Temp 99 °F (37.2 °C) (Oral)   Resp 18   Ht 5' 1" (1.549 m)   Wt 58.5 kg (129 lb)   LMP 2022 (Approximate)   SpO2 100%   Breastfeeding No   BMI 24.37 kg/m²     Labs Reviewed and Include    Recent Labs   Lab 22  0743   *   CALCIUM 8.2*   ALBUMIN 2.4*   PROT 5.5*   *   K 4.6   CO2 21*      BUN 20   CREATININE 1.0   ALKPHOS 57   ALT 17   AST 21   BILITOT 0.3     Lab Results   Component Value Date    WBC 9.54 2022    HGB 9.8 (L) 2022    HCT 29.1 (L) 2022    MCV 90 2022     2022     No results for input(s): TSH, FREET4 in the last 168 hours.  Lab Results   Component Value Date    HGBA1C 9.8 (H) 2022       Nutritional status:   Body mass index is 24.37 kg/m².  Lab Results   Component Value Date    ALBUMIN 2.4 (L) 2022    ALBUMIN 2.3 (L) 2022    ALBUMIN 2.7 (L) " 05/11/2022     No results found for: PREALBUMIN    Estimated Creatinine Clearance: 68.3 mL/min (based on SCr of 1 mg/dL).    Accu-Checks  Recent Labs     05/12/22  0558 05/12/22  0853 05/12/22  1250 05/12/22  1622 05/12/22  1947 05/13/22  0808 05/13/22  1223 05/13/22  1356 05/13/22  1630 05/14/22  0858   POCTGLUCOSE 164* 205* 258* 300* 80 109 324* 481* 96 212*       Current Medications and/or Treatments Impacting Glycemic Control  Immunotherapy:    Immunosuppressants       None          Steroids:   Hormones (From admission, onward)                None          Pressors:    Autonomic Drugs (From admission, onward)                None          Hyperglycemia/Diabetes Medications:   Antihyperglycemics (From admission, onward)                Start     Stop Route Frequency Ordered    05/14/22 1130  insulin aspart U-100 pen 0-7 Units         -- SubQ 3 times daily with meals 05/14/22 0921    05/13/22 0900  insulin detemir U-100 pen 6 Units         -- SubQ Daily 05/12/22 1817    05/12/22 2100  insulin detemir U-100 pen 5 Units         -- SubQ Nightly 05/12/22 1817 05/12/22 1914  insulin aspart U-100 pen 0-3 Units         -- SubQ As needed (PRN) 05/12/22 1817 05/12/22 1914  insulin aspart U-100 pen 0-5 Units         -- SubQ As needed (PRN) 05/12/22 1817            ASSESSMENT and PLAN    * Type 1 diabetes mellitus with other specified complication  Endocrinology consulted for BG management.   BG goal      - Levemir Flex Pen 6 units in the morning and 5 units in the evening.   - Novolog (aspart) insulin ICR 1:8 Units SQ TIDWM and prn for BG excursions ISF 1:50 (starting to slide at 180 mg/dL during the daytime. At night ISF 1:70 starting to slide at 180 mg/dL)  - Novolog ICR 1:18 with snacks.   - BG checks q4hr and two hours post-prandial  - Continue Dexcom (Clarity Code: [id number])  - Change diet to 45 grams of carbohydrates for meals and 15-25 grams of carbs for snacks   - Ensure prandial insulin is  administered 15 minutes before starting to eat or drink.   - Hypoglycemia protocol in place  - If blood glucose greater than 300, please ask patient not to eat food or drink anything other than water until correctional insulin has brought it back below 250    ** Please notify Endocrine for any change and/or advance in diet**  ** Please call Endocrine for any BG related issues **    Goal of glucose for diabetes in pregnancy  Fasting blood glucose concentration ?95 mg/dL  One-hour postprandial blood glucose concentration ?140 mg/dL   Two-hour postprandial glucose concentration ?120 mg/dL     Goal A1c in pregnancy ideally <6.5%, to reduce the risk of congenital anomalies  Discharge Planning:   TBD. Please notify endocrinology prior to discharge.                        First trimester pregnancy  Optimize glucose control for the health of mom and baby.       Anemia  Lab Results   Component Value Date    HGBA1C 9.8 (H) 05/05/2022     Anemia may impact the accuracy of the A1C.       Uses self-applied continuous glucose monitoring device  Patient may continue to wear Dexcom CGM, but must have a finger stick BG check AC/HS.   Treatment decision inpatient must be confirmed via fingerstick.   Always confirm hypo and hyperglycemia with finger sticks.              Enio Bedolla, DNP, FNP  Endocrinology  [facility] ([place]

## 2022-05-14 NOTE — PROGRESS NOTES
"[facility] ([address] Medicine  Progress Note    Patient Name: Alicia Valles  MRN: [id number]  Patient Class: IP- Inpatient   Admission Date: 5/5/2022  Length of Stay: 9 days  Attending Physician: Jessica Peguero*  Primary Care Provider: Primary Doctor No        Subjective:     Principal Problem:Type 1 diabetes mellitus with other specified complication        HPI:  Alicia Valles is a 27F with HTN and T1DM complicated by retinopathy, gastroparesis, and neuropathy who presented to [facility] as a transfer from [facility] operated by [facility] for endocrinology evaluation. She has a history of poorly controlled T1DM, recent A1c >12. She was initially admitted to [facility] operated by [facility] for hypertensive encephalopathy and hypoglycemia. Prior to this admission she reported a seizure secondary to hypoglycemia, BG of 29. No reported hx of seizures prior. Of note, she reports that she's been admitted for DKA more than 5 times and she "randomly decides how much insulin to give herself at mealtimes."      Overview/Hospital Course:  Patient transferred to [facility] for closer endocrinology evaluation. Pt transfused 1u pRBC during admission secondary to symptomatic anemia, Hbg 7.6. Pt with anxiety during hospital admission uncontrolled by PRN vistaril, pt seen by psych during admission at [facility] operated by [facility]. Ongoing talks about long term anxiety medication.      Interval History: NAEON. Afebrile, HD stable. Endocrine following for BG.    Review of Systems   Constitutional:  Negative for fatigue and fever.   HENT:  Negative for congestion and sore throat.    Eyes:  Positive for visual disturbance. Negative for photophobia.   Respiratory:  Negative for cough, chest tightness and shortness of breath.    Cardiovascular:  Negative for chest pain and leg swelling.   Gastrointestinal:  Negative for abdominal distention and abdominal pain.   Genitourinary:  Negative for dysuria and urgency.   Musculoskeletal:  Negative for arthralgias and myalgias. "   Skin:  Negative for rash and wound.   Neurological:  Negative for speech difficulty, light-headedness and headaches.   Psychiatric/Behavioral:  Negative for behavioral problems and sleep disturbance. The patient is nervous/anxious.    Objective:     Vital Signs (Most Recent):  Temp: 98.7 °F (37.1 °C) (05/13/22 2044)  Pulse: 74 (05/13/22 2044)  Resp: 18 (05/13/22 2044)  BP: (!) 147/91 (05/13/22 2044)  SpO2: 100 % (05/13/22 2044)   Vital Signs (24h Range):  Temp:  [98.7 °F (37.1 °C)-99.2 °F (37.3 °C)] 98.7 °F (37.1 °C)  Pulse:  [] 74  Resp:  [18] 18  SpO2:  [100 %] 100 %  BP: (124-153)/(79-91) 147/91     Weight: 58.5 kg (129 lb)  Body mass index is 24.37 kg/m².    Intake/Output Summary (Last 24 hours) at 5/14/2022 0715  Last data filed at 5/13/2022 2200  Gross per 24 hour   Intake 272.5 ml   Output --   Net 272.5 ml      Physical Exam  Constitutional:       Appearance: Normal appearance. She is normal weight.   HENT:      Head: Normocephalic and atraumatic.      Mouth/Throat:      Mouth: Mucous membranes are moist.      Pharynx: Oropharynx is clear.   Eyes:      General: No scleral icterus.     Extraocular Movements: Extraocular movements intact.      Pupils: Pupils are equal, round, and reactive to light.   Cardiovascular:      Rate and Rhythm: Normal rate and regular rhythm.      Pulses: Normal pulses.      Heart sounds: Normal heart sounds.   Pulmonary:      Effort: Pulmonary effort is normal. No respiratory distress.      Breath sounds: Normal breath sounds. No wheezing.   Abdominal:      General: Abdomen is flat. Bowel sounds are normal. There is no distension.      Palpations: Abdomen is soft.      Tenderness: There is no abdominal tenderness.   Musculoskeletal:      Cervical back: Normal range of motion and neck supple.      Right lower leg: No edema.      Left lower leg: No edema.   Skin:     General: Skin is warm and dry.      Capillary Refill: Capillary refill takes less than 2 seconds.       Coloration: Skin is not pale.   Neurological:      General: No focal deficit present.      Mental Status: She is alert and oriented to person, place, and time. Mental status is at baseline.   Psychiatric:         Mood and Affect: Mood normal.         Behavior: Behavior normal.       Significant Labs: All pertinent labs within the past 24 hours have been reviewed.    Significant Imaging: I have reviewed all pertinent imaging results/findings within the past 24 hours.      Assessment/Plan:      * Type 1 diabetes mellitus with other specified complication  Complications: diabetic retinopathy, gastroparesis, neuropathy, and h/o retinal detachment    Endocrinology consulted, follow for insulin recs  Diabetic diet with consistent carbs  Extensive conversations while inpatient    Gastroparesis due to DM  Scheduled metoclopramide 10mg TID      History of panic attacks  Pt offered psych consult, denied  See anxiety      Uses self-applied continuous glucose monitoring device        Anxiety  Patient with anxiety during hospital admission  PRN vistaril, ativan  Start sertraline 25mg daily    Proteinuria  See Diabetes      Anemia  CBC daily  Iron panel WNL      First trimester pregnancy  OBGYN following      Chronic hypertension  Cont hydralazine 75mg TID and nifedipine 120mg nightly  Uptitrate BP meds for goal of <140/90, per OBGYN    VTE Risk Mitigation (From admission, onward)         Ordered     enoxaparin injection 40 mg  Daily         05/06/22 1137     Place FELICIA hose  Until discontinued         05/05/22 1553     Place sequential compression device  Until discontinued         05/05/22 1553     IP VTE LOW RISK PATIENT  Once         05/05/22 1553                Discharge Planning   BRYAN: 5/16/2022     Code Status: Full Code   Is the patient medically ready for discharge?: No    Reason for patient still in hospital (select all that apply): Consult recommendations  Discharge Plan A: Home                  Adriana Zarco  Name: MD Alo  Department of Hospital Medicine   [facility] - Intensive Care ([facility]

## 2022-05-14 NOTE — SUBJECTIVE & OBJECTIVE
"Interval HPI:   Overnight events: No acute events overnight. Patient on the MTSU in room 48768/69683 A. Patient BG within goal from 16:00-08:00 and overnight. Will work on improving prandial BG spikes. Blood glucose improving. BG at, above, and below goal on current insulin regimen (SSI, prandial, and basal insulin ). Steroid use- None.    Renal function- Normal   Vasopressors-  None       Diet gestational diabetic 2000 kcal (Breakfast & midmorning snack=70 carb grams; Lunch & midafternoon snack = 70 carb grams; Dinner & HS snack = 70 carb grams; Snacks recommended at 15 - 30 grams); Lactose Restricted     Eatin%  Nausea: No  Hypoglycemia and intervention: No  Fever: No  TPN and/or TF: No      BP (!) 159/92   Pulse 74   Temp 99 °F (37.2 °C) (Oral)   Resp 18   Ht 5' 1" (1.549 m)   Wt 58.5 kg (129 lb)   LMP 2022 (Approximate)   SpO2 100%   Breastfeeding No   BMI 24.37 kg/m²     Labs Reviewed and Include    Recent Labs   Lab 22  0743   *   CALCIUM 8.2*   ALBUMIN 2.4*   PROT 5.5*   *   K 4.6   CO2 21*      BUN 20   CREATININE 1.0   ALKPHOS 57   ALT 17   AST 21   BILITOT 0.3     Lab Results   Component Value Date    WBC 9.54 2022    HGB 9.8 (L) 2022    HCT 29.1 (L) 2022    MCV 90 2022     2022     No results for input(s): TSH, FREET4 in the last 168 hours.  Lab Results   Component Value Date    HGBA1C 9.8 (H) 2022       Nutritional status:   Body mass index is 24.37 kg/m².  Lab Results   Component Value Date    ALBUMIN 2.4 (L) 2022    ALBUMIN 2.3 (L) 2022    ALBUMIN 2.7 (L) 2022     No results found for: PREALBUMIN    Estimated Creatinine Clearance: 68.3 mL/min (based on SCr of 1 mg/dL).    Accu-Checks  Recent Labs     22  0558 22  0853 22  1250 22  1622 22  1947 22  0808 22  1223 22  1356 22  1630 22  0858   POCTGLUCOSE 164* 205* 258* 300* 80 109 324* " 481* 96 212*       Current Medications and/or Treatments Impacting Glycemic Control  Immunotherapy:    Immunosuppressants       None          Steroids:   Hormones (From admission, onward)                None          Pressors:    Autonomic Drugs (From admission, onward)                None          Hyperglycemia/Diabetes Medications:   Antihyperglycemics (From admission, onward)                Start     Stop Route Frequency Ordered    05/14/22 1130  insulin aspart U-100 pen 0-7 Units         -- SubQ 3 times daily with meals 05/14/22 0921    05/13/22 0900  insulin detemir U-100 pen 6 Units         -- SubQ Daily 05/12/22 1817 05/12/22 2100  insulin detemir U-100 pen 5 Units         -- SubQ Nightly 05/12/22 1817 05/12/22 1914  insulin aspart U-100 pen 0-3 Units         -- SubQ As needed (PRN) 05/12/22 1817 05/12/22 1914  insulin aspart U-100 pen 0-5 Units         -- SubQ As needed (PRN) 05/12/22 1817

## 2022-05-14 NOTE — ASSESSMENT & PLAN NOTE
Patient reporting light pink spotting. OBGYN consulted and reported light spotting is typical in early pregnancy. Pt counseled on monitoring spotting and warning signs (increased bleeding)

## 2022-05-14 NOTE — PLAN OF CARE
Alert and oriented x 4. Ambulating in hallway most of day. Insulin given as ordered with carb count. No c/o pain or sign of distress. Compliant with meds. Safety measures in place. Mother at bedside.

## 2022-05-14 NOTE — ASSESSMENT & PLAN NOTE
Endocrinology consulted for BG management.   BG goal      - Levemir Flex Pen 6 units in the morning and 5 units in the evening.   - Novolog (aspart) insulin ICR 1:8 Units SQ TIDWM and prn for BG excursions ISF 1:50 (starting to slide at 180 mg/dL during the daytime. At night ISF 1:70 starting to slide at 180 mg/dL)  - Novolog ICR 1:18 with snacks.   - BG checks q4hr and two hours post-prandial  - Continue Dexcom (Clarity Code: [id number])  - Change diet to 45 grams of carbohydrates for meals and 15-25 grams of carbs for snacks   - Ensure prandial insulin is administered 15 minutes before starting to eat or drink.   - Hypoglycemia protocol in place  - If blood glucose greater than 300, please ask patient not to eat food or drink anything other than water until correctional insulin has brought it back below 250    ** Please notify Endocrine for any change and/or advance in diet**  ** Please call Endocrine for any BG related issues **    Goal of glucose for diabetes in pregnancy  Fasting blood glucose concentration ?95 mg/dL  One-hour postprandial blood glucose concentration ?140 mg/dL   Two-hour postprandial glucose concentration ?120 mg/dL     Goal A1c in pregnancy ideally <6.5%, to reduce the risk of congenital anomalies  Discharge Planning:   TBD. Please notify endocrinology prior to discharge.

## 2022-05-14 NOTE — SUBJECTIVE & OBJECTIVE
Interval History: NAEON. Afebrile, HD stable. Endocrine following for BG.    Review of Systems   Constitutional:  Negative for fatigue and fever.   HENT:  Negative for congestion and sore throat.    Eyes:  Positive for visual disturbance. Negative for photophobia.   Respiratory:  Negative for cough, chest tightness and shortness of breath.    Cardiovascular:  Negative for chest pain and leg swelling.   Gastrointestinal:  Negative for abdominal distention and abdominal pain.   Genitourinary:  Negative for dysuria and urgency.   Musculoskeletal:  Negative for arthralgias and myalgias.   Skin:  Negative for rash and wound.   Neurological:  Negative for speech difficulty, light-headedness and headaches.   Psychiatric/Behavioral:  Negative for behavioral problems and sleep disturbance. The patient is nervous/anxious.    Objective:     Vital Signs (Most Recent):  Temp: 98.7 °F (37.1 °C) (05/13/22 2044)  Pulse: 74 (05/13/22 2044)  Resp: 18 (05/13/22 2044)  BP: (!) 147/91 (05/13/22 2044)  SpO2: 100 % (05/13/22 2044)   Vital Signs (24h Range):  Temp:  [98.7 °F (37.1 °C)-99.2 °F (37.3 °C)] 98.7 °F (37.1 °C)  Pulse:  [] 74  Resp:  [18] 18  SpO2:  [100 %] 100 %  BP: (124-153)/(79-91) 147/91     Weight: 58.5 kg (129 lb)  Body mass index is 24.37 kg/m².    Intake/Output Summary (Last 24 hours) at 5/14/2022 0715  Last data filed at 5/13/2022 2200  Gross per 24 hour   Intake 272.5 ml   Output --   Net 272.5 ml      Physical Exam  Constitutional:       Appearance: Normal appearance. She is normal weight.   HENT:      Head: Normocephalic and atraumatic.      Mouth/Throat:      Mouth: Mucous membranes are moist.      Pharynx: Oropharynx is clear.   Eyes:      General: No scleral icterus.     Extraocular Movements: Extraocular movements intact.      Pupils: Pupils are equal, round, and reactive to light.   Cardiovascular:      Rate and Rhythm: Normal rate and regular rhythm.      Pulses: Normal pulses.      Heart sounds: Normal  heart sounds.   Pulmonary:      Effort: Pulmonary effort is normal. No respiratory distress.      Breath sounds: Normal breath sounds. No wheezing.   Abdominal:      General: Abdomen is flat. Bowel sounds are normal. There is no distension.      Palpations: Abdomen is soft.      Tenderness: There is no abdominal tenderness.   Musculoskeletal:      Cervical back: Normal range of motion and neck supple.      Right lower leg: No edema.      Left lower leg: No edema.   Skin:     General: Skin is warm and dry.      Capillary Refill: Capillary refill takes less than 2 seconds.      Coloration: Skin is not pale.   Neurological:      General: No focal deficit present.      Mental Status: She is alert and oriented to person, place, and time. Mental status is at baseline.   Psychiatric:         Mood and Affect: Mood normal.         Behavior: Behavior normal.       Significant Labs: All pertinent labs within the past 24 hours have been reviewed.    Significant Imaging: I have reviewed all pertinent imaging results/findings within the past 24 hours.

## 2022-05-15 LAB
POCT GLUCOSE: 112 MG/DL (ref 70–110)
POCT GLUCOSE: 121 MG/DL (ref 70–110)
POCT GLUCOSE: 141 MG/DL (ref 70–110)
POCT GLUCOSE: 151 MG/DL (ref 70–110)
POCT GLUCOSE: 291 MG/DL (ref 70–110)
POCT GLUCOSE: 304 MG/DL (ref 70–110)

## 2022-05-15 PROCEDURE — 25000003 PHARM REV CODE 250: Performed by: STUDENT IN AN ORGANIZED HEALTH CARE EDUCATION/TRAINING PROGRAM

## 2022-05-15 PROCEDURE — 20600001 HC STEP DOWN PRIVATE ROOM

## 2022-05-15 PROCEDURE — 99232 PR SUBSEQUENT HOSPITAL CARE,LEVL II: ICD-10-PCS | Mod: ,,, | Performed by: HOSPITALIST

## 2022-05-15 PROCEDURE — 63600175 PHARM REV CODE 636 W HCPCS

## 2022-05-15 PROCEDURE — 25000003 PHARM REV CODE 250: Performed by: HOSPITALIST

## 2022-05-15 PROCEDURE — 99232 PR SUBSEQUENT HOSPITAL CARE,LEVL II: ICD-10-PCS | Mod: ,,, | Performed by: NURSE PRACTITIONER

## 2022-05-15 PROCEDURE — 99232 SBSQ HOSP IP/OBS MODERATE 35: CPT | Mod: ,,, | Performed by: HOSPITALIST

## 2022-05-15 PROCEDURE — 99232 SBSQ HOSP IP/OBS MODERATE 35: CPT | Mod: ,,, | Performed by: NURSE PRACTITIONER

## 2022-05-15 PROCEDURE — 25000003 PHARM REV CODE 250

## 2022-05-15 PROCEDURE — 63600175 PHARM REV CODE 636 W HCPCS: Performed by: STUDENT IN AN ORGANIZED HEALTH CARE EDUCATION/TRAINING PROGRAM

## 2022-05-15 RX ORDER — HYDRALAZINE HYDROCHLORIDE 50 MG/1
100 TABLET, FILM COATED ORAL EVERY 8 HOURS
Status: DISCONTINUED | OUTPATIENT
Start: 2022-05-15 | End: 2022-05-16 | Stop reason: HOSPADM

## 2022-05-15 RX ADMIN — SENNOSIDES 8.6 MG: 8.6 TABLET, FILM COATED ORAL at 09:05

## 2022-05-15 RX ADMIN — PRENATAL VIT W/ FE FUMARATE-FA TAB 27-0.8 MG 1 TABLET: 27-0.8 TAB at 09:05

## 2022-05-15 RX ADMIN — HYDRALAZINE HYDROCHLORIDE 100 MG: 50 TABLET ORAL at 09:05

## 2022-05-15 RX ADMIN — METOCLOPRAMIDE 10 MG: 5 TABLET ORAL at 01:05

## 2022-05-15 RX ADMIN — NIFEDIPINE 120 MG: 30 TABLET, FILM COATED, EXTENDED RELEASE ORAL at 05:05

## 2022-05-15 RX ADMIN — INSULIN ASPART 3 UNITS: 100 INJECTION, SOLUTION INTRAVENOUS; SUBCUTANEOUS at 01:05

## 2022-05-15 RX ADMIN — INSULIN ASPART 3 UNITS: 100 INJECTION, SOLUTION INTRAVENOUS; SUBCUTANEOUS at 09:05

## 2022-05-15 RX ADMIN — ENOXAPARIN SODIUM 40 MG: 100 INJECTION SUBCUTANEOUS at 05:05

## 2022-05-15 RX ADMIN — METOCLOPRAMIDE 10 MG: 5 TABLET ORAL at 05:05

## 2022-05-15 RX ADMIN — HYDRALAZINE HYDROCHLORIDE 75 MG: 50 TABLET ORAL at 06:05

## 2022-05-15 RX ADMIN — INSULIN DETEMIR 6 UNITS: 100 INJECTION, SOLUTION SUBCUTANEOUS at 09:05

## 2022-05-15 RX ADMIN — Medication 25 MG: at 09:05

## 2022-05-15 RX ADMIN — LORAZEPAM 0.5 MG: 2 INJECTION INTRAMUSCULAR; INTRAVENOUS at 09:05

## 2022-05-15 RX ADMIN — FAMOTIDINE 20 MG: 20 TABLET ORAL at 09:05

## 2022-05-15 RX ADMIN — INSULIN ASPART 2 UNITS: 100 INJECTION, SOLUTION INTRAVENOUS; SUBCUTANEOUS at 09:05

## 2022-05-15 RX ADMIN — ACETAMINOPHEN 650 MG: 325 TABLET ORAL at 10:05

## 2022-05-15 RX ADMIN — ASPIRIN 81 MG CHEWABLE TABLET 81 MG: 81 TABLET CHEWABLE at 09:05

## 2022-05-15 RX ADMIN — LORAZEPAM 0.5 MG: 2 INJECTION INTRAMUSCULAR; INTRAVENOUS at 03:05

## 2022-05-15 RX ADMIN — INSULIN DETEMIR 5 UNITS: 100 INJECTION, SOLUTION SUBCUTANEOUS at 10:05

## 2022-05-15 RX ADMIN — DIPHENHYDRAMINE HYDROCHLORIDE 12.5 MG: 25 SOLUTION ORAL at 09:05

## 2022-05-15 RX ADMIN — METOCLOPRAMIDE 10 MG: 5 TABLET ORAL at 06:05

## 2022-05-15 RX ADMIN — SERTRALINE HYDROCHLORIDE 25 MG: 25 TABLET ORAL at 09:05

## 2022-05-15 RX ADMIN — HYDRALAZINE HYDROCHLORIDE 100 MG: 50 TABLET ORAL at 01:05

## 2022-05-15 NOTE — SUBJECTIVE & OBJECTIVE
"Interval HPI:   Overnight events: No acute events overnight. Patient on the MTSU in room 71390/27110 A. BG excursions noted overnight. Working on timing of insulin administration. Patient will likely benefit from getting on an insulin pump outpatient. Blood glucose improving. BG at, above, and below goal on current insulin regimen (SSI, prandial, and basal insulin ). Steroid use- None.    Renal function- Normal   Vasopressors-  None       Diet gestational diabetic 2000 kcal (Breakfast & midmorning snack=70 carb grams; Lunch & midafternoon snack = 70 carb grams; Dinner & HS snack = 70 carb grams; Snacks recommended at 15 - 30 grams); Lactose Restricted     Eatin%  Nausea: No  Hypoglycemia and intervention: No  Fever: No  TPN and/or TF: No      BP (!) 150/96 (BP Location: Right arm, Patient Position: Lying)   Pulse 87   Temp 98.7 °F (37.1 °C) (Oral)   Resp 17   Ht 5' 1" (1.549 m)   Wt 58.5 kg (129 lb)   LMP 2022 (Approximate)   SpO2 100%   Breastfeeding No   BMI 24.37 kg/m²     Labs Reviewed and Include    No results for input(s): GLU, CALCIUM, ALBUMIN, PROT, NA, K, CO2, CL, BUN, CREATININE, ALKPHOS, ALT, AST, BILITOT in the last 24 hours.    Lab Results   Component Value Date    WBC 9.54 2022    HGB 9.8 (L) 2022    HCT 29.1 (L) 2022    MCV 90 2022     2022     No results for input(s): TSH, FREET4 in the last 168 hours.  Lab Results   Component Value Date    HGBA1C 9.8 (H) 2022       Nutritional status:   Body mass index is 24.37 kg/m².  Lab Results   Component Value Date    ALBUMIN 2.4 (L) 2022    ALBUMIN 2.3 (L) 2022    ALBUMIN 2.7 (L) 2022     No results found for: PREALBUMIN    Estimated Creatinine Clearance: 68.3 mL/min (based on SCr of 1 mg/dL).    Accu-Checks  Recent Labs     22  0808 22  1223 22  1356 22  1630 22  0858 22  1314 22  1525 22  1802 22  1932 05/15/22  0931 "   POCTGLUCOSE 109 324* 481* 96 212* 391* 486* 345* 414* 291*       Current Medications and/or Treatments Impacting Glycemic Control  Immunotherapy:    Immunosuppressants       None          Steroids:   Hormones (From admission, onward)                None          Pressors:    Autonomic Drugs (From admission, onward)                None          Hyperglycemia/Diabetes Medications:   Antihyperglycemics (From admission, onward)                Start     Stop Route Frequency Ordered    05/14/22 1130  insulin aspart U-100 pen 0-7 Units         -- SubQ 3 times daily with meals 05/14/22 0921    05/13/22 0900  insulin detemir U-100 pen 6 Units         -- SubQ Daily 05/12/22 1817 05/12/22 2100  insulin detemir U-100 pen 5 Units         -- SubQ Nightly 05/12/22 1817 05/12/22 1914  insulin aspart U-100 pen 0-3 Units         -- SubQ As needed (PRN) 05/12/22 1817 05/12/22 1914  insulin aspart U-100 pen 0-5 Units         -- SubQ As needed (PRN) 05/12/22 1817

## 2022-05-15 NOTE — PROGRESS NOTES
[facility] ([place] [address])  Endocrinology  Progress Note    Admit Date: 2022     Reason for Consult: Management of T1DM, Hyperglycemia     Currently 10 weeks pregnant    Outpatient diabetes provider: Primary Care in [place]; [place] (No established PCP or endocrine care in [facility])  Diabetes education during pregnancy: None     Diabetes diagnosis year: 18 years     Home Diabetes Regimen:   Lantus 15 units QD  Novolog 3 units TIDWM with SSI (ISF 1:50)    For insulin pump users: Historically on Medronic (But has been off the pump for about 6 weeks; Found down with BG in the 20's).   No CGM    How often checking glucose at home? >4 x day (Every 2-3 hours)  BG readings on regimen: 's  Hypoglycemia on the regimen?  Yes  Missed doses on regimen?  No    Diabetes Complications include:     Hyperglycemia, Hypoglycemia , Diabetic nephropathy  , Diabetic retinopathy , and Diabetic gastroparesis     Complicating diabetes co morbidities:   Pregancy.       HPI:     Alicia Valles is a 27 y.o. [id number] at 9w5d presents for blood sugar patterning. She was seen in MFM clinic yesterday after recently being discharged from the hospital where she was admitted for hypertensive encephalopathy/hypoglycemia.Ms. Valles has poorly controlled DM1. She was diagnosed at age 10 and her most recent A1C was >12. She previously was followed by an endocrinologist in [place], but has not established with anyone in [place] where she currently lives. She states that used to count carbs and have an insulin sensitivity factor, but she has not done that in a few years. She currently takes lantus 15 u at 5 pm; for her mealtime insulin, she randomly decides how much to give herself. Reports that she has been admitted for DKA more than 5 times. Prior to her most recent hospitalization, she was found down with BG of 29. She had a seizure due to hypoglycemia; she has not ever had seizures before and does not take medications  "for a seizure disorder.  Reports that she has never had an episode of hypoglycemia like that before. 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 Reports prior surgery for retinal detachment. Endocrine consulted to manage hyperglycemia and type 1 diabetes.         Interval HPI:   Overnight events: No acute events overnight. Patient on the MTSU in room 00748/79097 A. BG excursions noted overnight. Working on timing of insulin administration. Patient will likely benefit from getting on an insulin pump outpatient. Blood glucose improving. BG at, above, and below goal on current insulin regimen (SSI, prandial, and basal insulin ). Steroid use- None.    Renal function- Normal   Vasopressors-  None       Diet gestational diabetic 2000 kcal (Breakfast & midmorning snack=70 carb grams; Lunch & midafternoon snack = 70 carb grams; Dinner & HS snack = 70 carb grams; Snacks recommended at 15 - 30 grams); Lactose Restricted     Eatin%  Nausea: No  Hypoglycemia and intervention: No  Fever: No  TPN and/or TF: No      BP (!) 150/96 (BP Location: Right arm, Patient Position: Lying)   Pulse 87   Temp 98.7 °F (37.1 °C) (Oral)   Resp 17   Ht 5' 1" (1.549 m)   Wt 58.5 kg (129 lb)   LMP 2022 (Approximate)   SpO2 100%   Breastfeeding No   BMI 24.37 kg/m²     Labs Reviewed and Include    No results for input(s): GLU, CALCIUM, ALBUMIN, PROT, NA, K, CO2, CL, BUN, CREATININE, ALKPHOS, ALT, AST, BILITOT in the last 24 hours.    Lab Results   Component Value Date    WBC 9.54 2022    HGB 9.8 (L) 2022    HCT 29.1 (L) 2022    MCV 90 2022     2022     No results for input(s): TSH, FREET4 in the last 168 hours.  Lab Results   Component Value Date    HGBA1C 9.8 (H) 2022       Nutritional status:   Body mass index is 24.37 kg/m².  Lab Results   Component Value Date    ALBUMIN 2.4 (L) 2022    " ALBUMIN 2.3 (L) 05/13/2022    ALBUMIN 2.7 (L) 05/11/2022     No results found for: PREALBUMIN    Estimated Creatinine Clearance: 68.3 mL/min (based on SCr of 1 mg/dL).    Accu-Checks  Recent Labs     05/13/22  0808 05/13/22  1223 05/13/22  1356 05/13/22  1630 05/14/22  0858 05/14/22  1314 05/14/22  1525 05/14/22  1802 05/14/22  1932 05/15/22  0931   POCTGLUCOSE 109 324* 481* 96 212* 391* 486* 345* 414* 291*       Current Medications and/or Treatments Impacting Glycemic Control  Immunotherapy:    Immunosuppressants       None          Steroids:   Hormones (From admission, onward)                None          Pressors:    Autonomic Drugs (From admission, onward)                None          Hyperglycemia/Diabetes Medications:   Antihyperglycemics (From admission, onward)                Start     Stop Route Frequency Ordered    05/14/22 1130  insulin aspart U-100 pen 0-7 Units         -- SubQ 3 times daily with meals 05/14/22 0921    05/13/22 0900  insulin detemir U-100 pen 6 Units         -- SubQ Daily 05/12/22 1817 05/12/22 2100  insulin detemir U-100 pen 5 Units         -- SubQ Nightly 05/12/22 1817 05/12/22 1914  insulin aspart U-100 pen 0-3 Units         -- SubQ As needed (PRN) 05/12/22 1817 05/12/22 1914  insulin aspart U-100 pen 0-5 Units         -- SubQ As needed (PRN) 05/12/22 1817            ASSESSMENT and PLAN    * Type 1 diabetes mellitus with other specified complication  Endocrinology consulted for BG management.   BG goal      - Levemir Flex Pen 6 units in the morning and 5 units in the evening.   - Novolog (aspart) insulin ICR 1:8 Units SQ TIDWM and prn for BG excursions ISF 1:50 (starting to slide at 180 mg/dL during the daytime. At night ISF 1:70 starting to slide at 180 mg/dL)  - Novolog ICR 1:18 with snacks.   - BG checks q4hr and two hours post-prandial  - Continue Dexcom (Clarity Code: [id number])  - Change diet to 45 grams of carbohydrates for meals and 15-25 grams of carbs for  snacks   - Ensure prandial insulin is administered 15 minutes before starting to eat or drink.   - Hypoglycemia protocol in place  - If blood glucose greater than 300, please ask patient not to eat food or drink anything other than water until correctional insulin has brought it back below 250    ** Please notify Endocrine for any change and/or advance in diet**  ** Please call Endocrine for any BG related issues **    Goal of glucose for diabetes in pregnancy  Fasting blood glucose concentration ?95 mg/dL  One-hour postprandial blood glucose concentration ?140 mg/dL   Two-hour postprandial glucose concentration ?120 mg/dL     Goal A1c in pregnancy ideally <6.5%, to reduce the risk of congenital anomalies  Discharge Planning:   TBD. Please notify endocrinology prior to discharge.                        First trimester pregnancy  Optimize glucose control for the health of mom and baby.       Anemia  Lab Results   Component Value Date    HGBA1C 9.8 (H) 05/05/2022     Anemia may impact the accuracy of the A1C.       Uses self-applied continuous glucose monitoring device  Patient may continue to wear Dexcom CGM, but must have a finger stick BG check AC/HS.   Treatment decision inpatient must be confirmed via fingerstick.   Always confirm hypo and hyperglycemia with finger sticks.              Enio Bedolla, DNP, FNP  Endocrinology  [facility] ([place]-)

## 2022-05-15 NOTE — ASSESSMENT & PLAN NOTE
Endocrinology consulted for BG management.   BG goal      - Levemir Flex Pen 6 units in the morning and 5 units in the evening.   - Novolog (aspart) insulin ICR 1:8 Units SQ TIDWM and prn for BG excursions ISF 1:50 (starting to slide at 180 mg/dL during the daytime. At night ISF 1:70 starting to slide at 180 mg/dL)  - Novolog ICR 1:18 with snacks.   - BG checks q4hr and two hours post-prandial  - Continue Dexcom (Clarity Code: [id number])  - Change diet to 45 grams of carbohydrates for meals and 15-25 grams of carbs for snacks   - Ensure prandial insulin is administered 15 minutes before starting to eat or drink.   - Hypoglycemia protocol in place  - If blood glucose greater than 300, please ask patient not to eat food or drink anything other than water until correctional insulin has brought it back below 250    ** Please notify Endocrine for any change and/or advance in diet**  ** Please call Endocrine for any BG related issues **    Goal of glucose for diabetes in pregnancy  Fasting blood glucose concentration ?95 mg/dL  One-hour postprandial blood glucose concentration ?140 mg/dL   Two-hour postprandial glucose concentration ?120 mg/dL     Goal A1c in pregnancy ideally <6.5%, to reduce the risk of congenital anomalies  Discharge Planning:   TBD. Please notify endocrinology prior to discharge.

## 2022-05-15 NOTE — SUBJECTIVE & OBJECTIVE
Interval History: NAEON. Doing well today. Anxiety under control. Spotting resolved. BG 80s-400s. F/u endo recs.    Review of Systems   Constitutional:  Negative for fatigue and fever.   HENT:  Negative for congestion and sore throat.    Eyes:  Positive for visual disturbance. Negative for photophobia.   Respiratory:  Negative for cough, chest tightness and shortness of breath.    Cardiovascular:  Negative for chest pain and leg swelling.   Gastrointestinal:  Negative for abdominal distention and abdominal pain.   Genitourinary:  Negative for dysuria and urgency.   Musculoskeletal:  Negative for arthralgias and myalgias.   Skin:  Negative for rash and wound.   Neurological:  Negative for speech difficulty, light-headedness and headaches.   Psychiatric/Behavioral:  Negative for behavioral problems and sleep disturbance. The patient is nervous/anxious.    Objective:     Vital Signs (Most Recent):  Temp: 98.6 °F (37 °C) (05/15/22 0845)  Pulse: 87 (05/15/22 0540)  Resp: 17 (05/15/22 0540)  BP: (!) 150/96 (05/15/22 0540)  SpO2: 100 % (05/14/22 1929)   Vital Signs (24h Range):  Temp:  [97.9 °F (36.6 °C)-99 °F (37.2 °C)] 98.6 °F (37 °C)  Pulse:  [74-90] 87  Resp:  [17-18] 17  SpO2:  [100 %] 100 %  BP: (147-172)/(86-96) 150/96     Weight: 58.5 kg (129 lb)  Body mass index is 24.37 kg/m².  No intake or output data in the 24 hours ending 05/15/22 1326     Physical Exam  Constitutional:       Appearance: Normal appearance. She is normal weight.   HENT:      Head: Normocephalic and atraumatic.      Mouth/Throat:      Mouth: Mucous membranes are moist.      Pharynx: Oropharynx is clear.   Eyes:      General: No scleral icterus.     Extraocular Movements: Extraocular movements intact.      Pupils: Pupils are equal, round, and reactive to light.   Cardiovascular:      Rate and Rhythm: Normal rate and regular rhythm.      Pulses: Normal pulses.      Heart sounds: Normal heart sounds.   Pulmonary:      Effort: Pulmonary effort is  normal. No respiratory distress.      Breath sounds: Normal breath sounds. No wheezing.   Abdominal:      General: Abdomen is flat. Bowel sounds are normal. There is no distension.      Palpations: Abdomen is soft.      Tenderness: There is no abdominal tenderness.   Musculoskeletal:      Cervical back: Normal range of motion and neck supple.      Right lower leg: No edema.      Left lower leg: No edema.   Skin:     General: Skin is warm and dry.      Capillary Refill: Capillary refill takes less than 2 seconds.      Coloration: Skin is not pale.   Neurological:      General: No focal deficit present.      Mental Status: She is alert and oriented to person, place, and time. Mental status is at baseline.   Psychiatric:         Mood and Affect: Mood normal.         Behavior: Behavior normal.       Significant Labs: All pertinent labs within the past 24 hours have been reviewed.    Significant Imaging: I have reviewed all pertinent imaging results/findings within the past 24 hours.

## 2022-05-15 NOTE — PROGRESS NOTES
"[facility] - [facility] ([address] Medicine  Progress Note    Patient Name: Alicia Valles  MRN: [id number]  Patient Class: IP- Inpatient   Admission Date: 5/5/2022  Length of Stay: 10 days  Attending Physician: Jessica Peguero*  Primary Care Provider: Primary Doctor No        Subjective:     Principal Problem:Type 1 diabetes mellitus with other specified complication        HPI:  Alicia Valles is a 27F with HTN and T1DM complicated by retinopathy, gastroparesis, and neuropathy who presented to [facility] as a transfer from [facility] for endocrinology evaluation. She has a history of poorly controlled T1DM, recent A1c >12. She was initially admitted to [facility] for hypertensive encephalopathy and hypoglycemia. Prior to this admission she reported a seizure secondary to hypoglycemia, BG of 29. No reported hx of seizures prior. Of note, she reports that she's been admitted for DKA more than 5 times and she "randomly decides how much insulin to give herself at mealtimes."      Overview/Hospital Course:  Patient transferred to [facility] for closer endocrinology evaluation. Pt transfused 1u pRBC during admission secondary to symptomatic anemia, Hbg 7.6. Pt with anxiety during hospital admission uncontrolled by PRN vistaril, pt seen by psych during admission at [facility]. Ongoing talks about long term anxiety medication.      Interval History: NAEON. Doing well today. Anxiety under control. Spotting resolved. BG 80s-400s. F/u endo recs.    Review of Systems   Constitutional:  Negative for fatigue and fever.   HENT:  Negative for congestion and sore throat.    Eyes:  Positive for visual disturbance. Negative for photophobia.   Respiratory:  Negative for cough, chest tightness and shortness of breath.    Cardiovascular:  Negative for chest pain and leg swelling.   Gastrointestinal:  Negative for abdominal distention and abdominal pain.   Genitourinary:  Negative for dysuria and urgency.   Musculoskeletal:  " Negative for arthralgias and myalgias.   Skin:  Negative for rash and wound.   Neurological:  Negative for speech difficulty, light-headedness and headaches.   Psychiatric/Behavioral:  Negative for behavioral problems and sleep disturbance. The patient is nervous/anxious.    Objective:     Vital Signs (Most Recent):  Temp: 98.6 °F (37 °C) (05/15/22 0845)  Pulse: 87 (05/15/22 0540)  Resp: 17 (05/15/22 0540)  BP: (!) 150/96 (05/15/22 0540)  SpO2: 100 % (05/14/22 1929)   Vital Signs (24h Range):  Temp:  [97.9 °F (36.6 °C)-99 °F (37.2 °C)] 98.6 °F (37 °C)  Pulse:  [74-90] 87  Resp:  [17-18] 17  SpO2:  [100 %] 100 %  BP: (147-172)/(86-96) 150/96     Weight: 58.5 kg (129 lb)  Body mass index is 24.37 kg/m².  No intake or output data in the 24 hours ending 05/15/22 1326     Physical Exam  Constitutional:       Appearance: Normal appearance. She is normal weight.   HENT:      Head: Normocephalic and atraumatic.      Mouth/Throat:      Mouth: Mucous membranes are moist.      Pharynx: Oropharynx is clear.   Eyes:      General: No scleral icterus.     Extraocular Movements: Extraocular movements intact.      Pupils: Pupils are equal, round, and reactive to light.   Cardiovascular:      Rate and Rhythm: Normal rate and regular rhythm.      Pulses: Normal pulses.      Heart sounds: Normal heart sounds.   Pulmonary:      Effort: Pulmonary effort is normal. No respiratory distress.      Breath sounds: Normal breath sounds. No wheezing.   Abdominal:      General: Abdomen is flat. Bowel sounds are normal. There is no distension.      Palpations: Abdomen is soft.      Tenderness: There is no abdominal tenderness.   Musculoskeletal:      Cervical back: Normal range of motion and neck supple.      Right lower leg: No edema.      Left lower leg: No edema.   Skin:     General: Skin is warm and dry.      Capillary Refill: Capillary refill takes less than 2 seconds.      Coloration: Skin is not pale.   Neurological:      General: No focal  deficit present.      Mental Status: She is alert and oriented to person, place, and time. Mental status is at baseline.   Psychiatric:         Mood and Affect: Mood normal.         Behavior: Behavior normal.       Significant Labs: All pertinent labs within the past 24 hours have been reviewed.    Significant Imaging: I have reviewed all pertinent imaging results/findings within the past 24 hours.      Assessment/Plan:      * Type 1 diabetes mellitus with other specified complication  Complications: diabetic retinopathy, gastroparesis, neuropathy, and h/o retinal detachment    Endocrinology consulted, follow for insulin recs  Diabetic diet with consistent carbs  Extensive conversations while inpatient    Spotting in early pregnancy  Patient reporting light pink spotting. OBGYN consulted and reported light spotting is typical in early pregnancy. Pt counseled on monitoring spotting and warning signs (increased bleeding)      Gastroparesis due to DM  Scheduled metoclopramide 10mg TID      History of panic attacks  Pt offered psych consult, denied  See anxiety      Uses self-applied continuous glucose monitoring device        Anxiety  Patient with anxiety during hospital admission  PRN vistaril, ativan  Start sertraline 25mg daily    Proteinuria  See Diabetes      Anemia  CBC daily  Iron panel WNL      First trimester pregnancy  OBGYN following      Chronic hypertension  Cont hydralazine 75mg TID and nifedipine 120mg nightly  Uptitrate BP meds for goal of <140/90, per OBGYN      VTE Risk Mitigation (From admission, onward)         Ordered     enoxaparin injection 40 mg  Daily         05/06/22 1137     Place FELICIA hose  Until discontinued         05/05/22 1553     Place sequential compression device  Until discontinued         05/05/22 1553     IP VTE LOW RISK PATIENT  Once         05/05/22 1553                Discharge Planning   BRYAN: 5/16/2022     Code Status: Full Code   Is the patient medically ready for discharge?:  No    Reason for patient still in hospital (select all that apply): Patient trending condition and Consult recommendations  Discharge Plan A: Home                  Peggy Robert DO  Department of Hospital Medicine   [facility] - Intensive Care ([place])

## 2022-05-15 NOTE — PLAN OF CARE
Resting in room. Expressed a desire to leave today. Team aware. Ambulated around  hospital with Mother. Evening . States she is not hungry. No insulin given. No sign of distress. Safety measures in place. Mother at bedside.

## 2022-05-16 ENCOUNTER — PATIENT MESSAGE (OUTPATIENT)
Dept: ENDOCRINOLOGY | Facility: HOSPITAL | Age: 27
End: 2022-05-16
Payer: MEDICAID

## 2022-05-16 ENCOUNTER — TELEPHONE (OUTPATIENT)
Dept: ENDOCRINOLOGY | Facility: HOSPITAL | Age: 27
End: 2022-05-16

## 2022-05-16 VITALS
BODY MASS INDEX: 24.35 KG/M2 | DIASTOLIC BLOOD PRESSURE: 90 MMHG | TEMPERATURE: 99 F | WEIGHT: 129 LBS | HEIGHT: 61 IN | HEART RATE: 102 BPM | SYSTOLIC BLOOD PRESSURE: 150 MMHG | RESPIRATION RATE: 18 BRPM | OXYGEN SATURATION: 100 %

## 2022-05-16 DIAGNOSIS — E10.8 TYPE 1 DIABETES MELLITUS WITH COMPLICATIONS: Primary | ICD-10-CM

## 2022-05-16 LAB — POCT GLUCOSE: 182 MG/DL (ref 70–110)

## 2022-05-16 PROCEDURE — 25000003 PHARM REV CODE 250

## 2022-05-16 PROCEDURE — 99232 PR SUBSEQUENT HOSPITAL CARE,LEVL II: ICD-10-PCS | Mod: ,,, | Performed by: NURSE PRACTITIONER

## 2022-05-16 PROCEDURE — 25000003 PHARM REV CODE 250: Performed by: HOSPITALIST

## 2022-05-16 PROCEDURE — 99239 PR HOSPITAL DISCHARGE DAY,>30 MIN: ICD-10-PCS | Mod: ,,, | Performed by: HOSPITALIST

## 2022-05-16 PROCEDURE — 25000003 PHARM REV CODE 250: Performed by: STUDENT IN AN ORGANIZED HEALTH CARE EDUCATION/TRAINING PROGRAM

## 2022-05-16 PROCEDURE — 99232 SBSQ HOSP IP/OBS MODERATE 35: CPT | Mod: ,,, | Performed by: NURSE PRACTITIONER

## 2022-05-16 PROCEDURE — 99239 HOSP IP/OBS DSCHRG MGMT >30: CPT | Mod: ,,, | Performed by: HOSPITALIST

## 2022-05-16 PROCEDURE — 63600175 PHARM REV CODE 636 W HCPCS

## 2022-05-16 RX ORDER — SERTRALINE HYDROCHLORIDE 25 MG/1
25 TABLET, FILM COATED ORAL DAILY
Qty: 30 TABLET | Refills: 11 | Status: SHIPPED | OUTPATIENT
Start: 2022-05-16 | End: 2022-05-16 | Stop reason: SDUPTHER

## 2022-05-16 RX ORDER — INSULIN ASPART 100 [IU]/ML
0-3 INJECTION, SOLUTION INTRAVENOUS; SUBCUTANEOUS
Qty: 1 EACH | Refills: 0 | Status: CANCELLED | OUTPATIENT
Start: 2022-05-16 | End: 2022-06-15

## 2022-05-16 RX ORDER — INSULIN GLARGINE 100 [IU]/ML
15 INJECTION, SOLUTION SUBCUTANEOUS DAILY
Qty: 4.5 ML | Refills: 0 | Status: SHIPPED | OUTPATIENT
Start: 2022-05-16 | End: 2022-05-16 | Stop reason: SDUPTHER

## 2022-05-16 RX ORDER — IBUPROFEN 200 MG
24 TABLET ORAL
Qty: 60 TABLET | Refills: 12 | Status: SHIPPED | OUTPATIENT
Start: 2022-05-16 | End: 2022-05-26

## 2022-05-16 RX ORDER — PEN NEEDLE, DIABETIC 31 GX5/16"
NEEDLE, DISPOSABLE MISCELLANEOUS
Qty: 100 EACH | Refills: 3 | Status: SHIPPED | OUTPATIENT
Start: 2022-05-16 | End: 2022-05-16 | Stop reason: SDUPTHER

## 2022-05-16 RX ORDER — IBUPROFEN 200 MG
16 TABLET ORAL
Qty: 40 TABLET | Refills: 0 | Status: SHIPPED | OUTPATIENT
Start: 2022-05-16 | End: 2022-05-16 | Stop reason: SDUPTHER

## 2022-05-16 RX ORDER — INSULIN GLARGINE 100 [IU]/ML
11 INJECTION, SOLUTION SUBCUTANEOUS DAILY
Qty: 3.3 ML | Refills: 0 | Status: ON HOLD | OUTPATIENT
Start: 2022-05-16 | End: 2022-05-26 | Stop reason: HOSPADM

## 2022-05-16 RX ORDER — BLOOD-GLUCOSE,RECEIVER,CONT
1 EACH MISCELLANEOUS ONCE
Qty: 1 EACH | Refills: 0 | Status: SHIPPED | OUTPATIENT
Start: 2022-05-16 | End: 2023-02-08

## 2022-05-16 RX ORDER — INSULIN ASPART 100 [IU]/ML
0-7 INJECTION, SOLUTION INTRAVENOUS; SUBCUTANEOUS 3 TIMES DAILY
Qty: 1 EACH | Refills: 0 | Status: CANCELLED | OUTPATIENT
Start: 2022-05-16 | End: 2022-06-15

## 2022-05-16 RX ORDER — IBUPROFEN 200 MG
24 TABLET ORAL
Qty: 60 TABLET | Refills: 12 | Status: SHIPPED | OUTPATIENT
Start: 2022-05-16 | End: 2022-05-16 | Stop reason: SDUPTHER

## 2022-05-16 RX ORDER — HYDRALAZINE HYDROCHLORIDE 100 MG/1
100 TABLET, FILM COATED ORAL EVERY 8 HOURS
Qty: 90 TABLET | Refills: 11 | Status: SHIPPED | OUTPATIENT
Start: 2022-05-16 | End: 2022-06-09 | Stop reason: DRUGHIGH

## 2022-05-16 RX ORDER — METOCLOPRAMIDE 10 MG/1
10 TABLET ORAL
Qty: 90 TABLET | Refills: 0 | Status: SHIPPED | OUTPATIENT
Start: 2022-05-16 | End: 2022-05-16 | Stop reason: SDUPTHER

## 2022-05-16 RX ORDER — CEFAZOLIN SODIUM/D5W 2 G/100 ML
2 PLASTIC BAG, INJECTION (ML) INTRAVENOUS
Status: CANCELLED | OUTPATIENT
Start: 2022-05-16

## 2022-05-16 RX ORDER — PYRIDOXINE HCL (VITAMIN B6) 25 MG
25 TABLET ORAL ONCE
Refills: 0 | Status: CANCELLED | OUTPATIENT
Start: 2022-05-16 | End: 2022-05-16

## 2022-05-16 RX ORDER — BLOOD-GLUCOSE TRANSMITTER
2 EACH MISCELLANEOUS
Qty: 1 EACH | Refills: 5 | Status: SHIPPED | OUTPATIENT
Start: 2022-05-16 | End: 2022-05-16 | Stop reason: SDUPTHER

## 2022-05-16 RX ORDER — HYDRALAZINE HYDROCHLORIDE 100 MG/1
100 TABLET, FILM COATED ORAL EVERY 8 HOURS
Qty: 90 TABLET | Refills: 11 | Status: SHIPPED | OUTPATIENT
Start: 2022-05-16 | End: 2022-05-16 | Stop reason: SDUPTHER

## 2022-05-16 RX ORDER — IBUPROFEN 200 MG
16 TABLET ORAL
Qty: 40 TABLET | Refills: 0 | Status: SHIPPED | OUTPATIENT
Start: 2022-05-16 | End: 2022-06-09 | Stop reason: SDUPTHER

## 2022-05-16 RX ORDER — METOCLOPRAMIDE 10 MG/1
10 TABLET ORAL
Qty: 9 TABLET | Refills: 0 | Status: SHIPPED | OUTPATIENT
Start: 2022-05-16 | End: 2022-05-16 | Stop reason: SDUPTHER

## 2022-05-16 RX ORDER — PRENATAL WITH FERROUS FUM AND FOLIC ACID 3080; 920; 120; 400; 22; 1.84; 3; 20; 10; 1; 12; 200; 27; 25; 2 [IU]/1; [IU]/1; MG/1; [IU]/1; MG/1; MG/1; MG/1; MG/1; MG/1; MG/1; UG/1; MG/1; MG/1; MG/1; MG/1
1 TABLET ORAL DAILY
Qty: 30 TABLET | Refills: 11 | Status: SHIPPED | OUTPATIENT
Start: 2022-05-16 | End: 2022-05-16 | Stop reason: SDUPTHER

## 2022-05-16 RX ORDER — SERTRALINE HYDROCHLORIDE 25 MG/1
25 TABLET, FILM COATED ORAL DAILY
Qty: 30 TABLET | Refills: 11 | Status: SHIPPED | OUTPATIENT
Start: 2022-05-16 | End: 2022-06-09

## 2022-05-16 RX ORDER — FAMOTIDINE 20 MG/1
20 TABLET, FILM COATED ORAL DAILY
Qty: 30 TABLET | Refills: 11 | Status: ON HOLD | OUTPATIENT
Start: 2022-05-16 | End: 2022-05-26 | Stop reason: HOSPADM

## 2022-05-16 RX ORDER — NIFEDIPINE 60 MG/1
120 TABLET, EXTENDED RELEASE ORAL
Qty: 60 TABLET | Refills: 11 | Status: SHIPPED | OUTPATIENT
Start: 2022-05-16 | End: 2022-05-16 | Stop reason: SDUPTHER

## 2022-05-16 RX ORDER — INSULIN ASPART 100 [IU]/ML
0-5 INJECTION, SOLUTION INTRAVENOUS; SUBCUTANEOUS
Qty: 1 EACH | Refills: 0 | Status: CANCELLED | OUTPATIENT
Start: 2022-05-16 | End: 2022-06-15

## 2022-05-16 RX ORDER — PRENATAL WITH FERROUS FUM AND FOLIC ACID 3080; 920; 120; 400; 22; 1.84; 3; 20; 10; 1; 12; 200; 27; 25; 2 [IU]/1; [IU]/1; MG/1; [IU]/1; MG/1; MG/1; MG/1; MG/1; MG/1; MG/1; UG/1; MG/1; MG/1; MG/1; MG/1
1 TABLET ORAL DAILY
Qty: 30 TABLET | Refills: 11 | Status: ON HOLD | OUTPATIENT
Start: 2022-05-16 | End: 2022-05-26 | Stop reason: HOSPADM

## 2022-05-16 RX ORDER — METOCLOPRAMIDE 10 MG/1
10 TABLET ORAL
Qty: 9 TABLET | Refills: 0 | Status: SHIPPED | OUTPATIENT
Start: 2022-05-16 | End: 2022-05-19

## 2022-05-16 RX ORDER — BLOOD-GLUCOSE SENSOR
3 EACH MISCELLANEOUS
Qty: 1 EACH | Refills: 5 | Status: SHIPPED | OUTPATIENT
Start: 2022-05-16 | End: 2023-05-16

## 2022-05-16 RX ORDER — NIFEDIPINE 60 MG/1
120 TABLET, EXTENDED RELEASE ORAL
Qty: 60 TABLET | Refills: 11 | Status: ON HOLD | OUTPATIENT
Start: 2022-05-16 | End: 2022-05-26 | Stop reason: HOSPADM

## 2022-05-16 RX ORDER — FAMOTIDINE 20 MG/1
20 TABLET, FILM COATED ORAL DAILY
Qty: 30 TABLET | Refills: 11 | Status: SHIPPED | OUTPATIENT
Start: 2022-05-16 | End: 2022-05-16 | Stop reason: SDUPTHER

## 2022-05-16 RX ORDER — NAPROXEN SODIUM 220 MG/1
81 TABLET, FILM COATED ORAL DAILY
Qty: 30 TABLET | Refills: 11 | Status: SHIPPED | OUTPATIENT
Start: 2022-05-16 | End: 2022-05-16 | Stop reason: HOSPADM

## 2022-05-16 RX ORDER — METOCLOPRAMIDE HYDROCHLORIDE 5 MG/ML
10 INJECTION INTRAMUSCULAR; INTRAVENOUS DAILY PRN
Qty: 6 ML | Refills: 0 | Status: SHIPPED | OUTPATIENT
Start: 2022-05-16 | End: 2022-05-16 | Stop reason: HOSPADM

## 2022-05-16 RX ORDER — PEN NEEDLE, DIABETIC 31 GX5/16"
NEEDLE, DISPOSABLE MISCELLANEOUS
Qty: 100 EACH | Refills: 3 | Status: SHIPPED | OUTPATIENT
Start: 2022-05-16

## 2022-05-16 RX ORDER — BLOOD-GLUCOSE,RECEIVER,CONT
1 EACH MISCELLANEOUS ONCE
Qty: 1 EACH | Refills: 0 | Status: SHIPPED | OUTPATIENT
Start: 2022-05-16 | End: 2022-05-16 | Stop reason: SDUPTHER

## 2022-05-16 RX ORDER — INSULIN ASPART 100 [IU]/ML
INJECTION, SOLUTION INTRAVENOUS; SUBCUTANEOUS
Qty: 15 ML | Refills: 5 | Status: ON HOLD | OUTPATIENT
Start: 2022-05-16 | End: 2022-05-26 | Stop reason: HOSPADM

## 2022-05-16 RX ORDER — GLUCAGON 3 MG/1
1 POWDER NASAL
Qty: 2 EACH | Refills: 3 | Status: SHIPPED | OUTPATIENT
Start: 2022-05-16 | End: 2022-06-20 | Stop reason: SDUPTHER

## 2022-05-16 RX ORDER — BLOOD-GLUCOSE TRANSMITTER
2 EACH MISCELLANEOUS
Qty: 1 EACH | Refills: 5 | Status: SHIPPED | OUTPATIENT
Start: 2022-05-16 | End: 2023-05-16

## 2022-05-16 RX ORDER — BLOOD-GLUCOSE SENSOR
3 EACH MISCELLANEOUS
Qty: 1 EACH | Refills: 5 | Status: SHIPPED | OUTPATIENT
Start: 2022-05-16 | End: 2022-05-16 | Stop reason: SDUPTHER

## 2022-05-16 RX ORDER — GLUCAGON 3 MG/1
1 POWDER NASAL
Qty: 2 EACH | Refills: 3 | Status: SHIPPED | OUTPATIENT
Start: 2022-05-16 | End: 2022-05-16 | Stop reason: SDUPTHER

## 2022-05-16 RX ADMIN — INSULIN DETEMIR 6 UNITS: 100 INJECTION, SOLUTION SUBCUTANEOUS at 10:05

## 2022-05-16 RX ADMIN — ASPIRIN 81 MG CHEWABLE TABLET 81 MG: 81 TABLET CHEWABLE at 10:05

## 2022-05-16 RX ADMIN — LORAZEPAM 0.5 MG: 2 INJECTION INTRAMUSCULAR; INTRAVENOUS at 04:05

## 2022-05-16 RX ADMIN — METOCLOPRAMIDE 10 MG: 5 TABLET ORAL at 11:05

## 2022-05-16 RX ADMIN — SERTRALINE HYDROCHLORIDE 25 MG: 25 TABLET ORAL at 11:05

## 2022-05-16 RX ADMIN — LORAZEPAM 0.5 MG: 2 INJECTION INTRAMUSCULAR; INTRAVENOUS at 10:05

## 2022-05-16 RX ADMIN — METOCLOPRAMIDE 10 MG: 5 TABLET ORAL at 07:05

## 2022-05-16 RX ADMIN — ACETAMINOPHEN 650 MG: 325 TABLET ORAL at 11:05

## 2022-05-16 RX ADMIN — INSULIN ASPART 3 UNITS: 100 INJECTION, SOLUTION INTRAVENOUS; SUBCUTANEOUS at 10:05

## 2022-05-16 RX ADMIN — HYDRALAZINE HYDROCHLORIDE 100 MG: 50 TABLET ORAL at 07:05

## 2022-05-16 RX ADMIN — FAMOTIDINE 20 MG: 20 TABLET ORAL at 10:05

## 2022-05-16 NOTE — ASSESSMENT & PLAN NOTE
Complications: diabetic retinopathy, gastroparesis, neuropathy, and h/o retinal detachment    Endocrinology consulted, recommending lantus 15 qdaily, novolog 3 TID WM  Diabetic diet with consistent carbs  Extensive conversations while inpatient

## 2022-05-16 NOTE — DISCHARGE SUMMARY
"[facility] ([address] Medicine  Discharge Summary      Patient Name: Alicia Valles  MRN: [id number]  Patient Class: IP- Inpatient  Admission Date: 5/5/2022  Hospital Length of Stay: 11 days  Discharge Date and Time:  05/16/2022 11:19 AM  Attending Physician: Jessica Peguero*   Discharging Provider: Denise Matos MD  Primary Care Provider: Primary Doctor [facility] Medicine Team: [facility] Denise Matos MD    HPI:   Alicia Valles is a 27F with HTN and T1DM complicated by retinopathy, gastroparesis, and neuropathy who [place] [facility] [place] from [facility] for endocrinology evaluation. She has a history of poorly controlled T1DM, recent A1c >12. She was initially admitted to [facility] for hypertensive encephalopathy and hypoglycemia. Prior to this admission she reported a seizure secondary to hypoglycemia, BG of 29. No reported hx of seizures prior. Of note, she reports that she's been admitted for DKA more than 5 times and she "randomly decides how much insulin to give herself at mealtimes."      * No surgery found *      Hospital Course:   Patient transferred to [facility] [place] closer endocrinology evaluation. Pt transfused 1u pRBC during admission secondary to symptomatic anemia, Hbg 7.6. Pt with anxiety during hospital admission uncontrolled by PRN vistaril, pt seen by psych during admission at [facility]. Ongoing talks about long term anxiety medication. Patient evaluated by endocrinology who recommend lantus 15U daily and novolog 3units with meals. Patient states understanding of new insulin regimen. Stable for discharge.       Goals of Care Treatment Preferences:  Code Status: Full Code    Physical Exam  Constitutional:       General: She is not in acute distress.  HENT:      Mouth/Throat:      Mouth: Mucous membranes are dry.   Eyes:      General: No scleral icterus.  Cardiovascular:      Rate and Rhythm: Normal rate and regular rhythm.   Pulmonary:      " Effort: No respiratory distress.      Breath sounds: Normal breath sounds.   Abdominal:      General: There is no distension.      Tenderness: There is no abdominal tenderness.   Musculoskeletal:         General: Normal range of motion.      Cervical back: Normal range of motion.   Skin:     General: Skin is warm.   Neurological:      Mental Status: She is oriented to person, place, and time.           Consults:   Consults (From admission, onward)        Status Ordering Provider     Inpatient consult to Telemedicine - Psych  Once        Provider:  (Not yet assigned)    Completed VLAD MENDOSA     Inpatient consult to Diabetes educator  Once        Provider:  (Not yet assigned)    Completed JAYRO OBANDO     Inpatient consult to Telemedicine - Diabetes Management  Once        Provider:  (Not yet assigned)    Completed ZAK CARRASCO     Inpatient consult to Registered Dietitian/Nutritionist  Once        Provider:  (Not yet assigned)    Completed VLAD MENDOSA          * Type 1 diabetes mellitus with other specified complication  Complications: diabetic retinopathy, gastroparesis, neuropathy, and h/o retinal detachment    Endocrinology consulted, recommending lantus 15 qdaily, novolog 3 TID WM  Diabetic diet with consistent carbs  Extensive conversations while inpatient    Spotting in early pregnancy  Patient reporting light pink spotting. OBGYN consulted and reported light spotting is typical in early pregnancy. Pt counseled on monitoring spotting and warning signs (increased bleeding)      Gastroparesis due to DM  Scheduled metoclopramide 10mg TID      History of panic attacks  Pt offered psych consult, denied  See anxiety      Uses self-applied continuous glucose monitoring device        Anxiety  Patient with anxiety during hospital admission  PRN vistaril, ativan  Start sertraline 25mg daily    Proteinuria  See Diabetes      Anemia  CBC daily  Iron panel WNL      First trimester pregnancy  OBGYN  following      Chronic hypertension  Cont hydralazine 75mg TID and nifedipine 120mg nightly  Uptitrate BP meds for goal of <140/90, per OBGYN      Final Active Diagnoses:    Diagnosis Date Noted POA    PRINCIPAL PROBLEM:  Type 1 diabetes mellitus with other specified complication [E10.69] 05/05/2022 Yes     Chronic    Gastroparesis due to DM [E11.43, K31.84] 05/14/2022 Yes     Chronic    Spotting in early pregnancy [O26.859] 05/14/2022 Clinically Undetermined    Uses self-applied continuous glucose monitoring device [Z97.8] 05/13/2022 Yes    History of panic attacks [Z86.59] 05/13/2022 Not Applicable     Chronic    Anxiety [F41.9] 05/12/2022 Yes     Chronic    Anemia [D64.9] 05/06/2022 Yes    Proteinuria [R80.9] 05/06/2022 Yes    Chronic hypertension [I10] 05/05/2022 Yes     Chronic    First trimester pregnancy [Z34.91] 05/05/2022 Not Applicable      Problems Resolved During this Admission:       Discharged Condition: stable    Disposition:     Follow Up:    Patient Instructions:      Ambulatory referral/consult to Endocrinology   Standing Status: Future   Referral Priority: Routine Referral Type: Consultation   Requested Specialty: Endocrinology   Number of Visits Requested: 1       Significant Diagnostic Studies: Labs: All labs within the past 24 hours have been reviewed    Pending Diagnostic Studies:     None         Medications:  Reconciled Home Medications:      Medication List      START taking these medications    famotidine 20 MG tablet  Commonly known as: PEPCID  Take 1 tablet (20 mg total) by mouth once daily.     * glucose 4 GM chewable tablet  Take 4 tablets (16 g total) by mouth as needed for Low blood sugar.     * glucose 4 GM chewable tablet  Take 6 tablets (24 g total) by mouth as needed for Low blood sugar.     prenatal vitamin Tab  Take 1 tablet by mouth once daily.     sertraline 25 MG tablet  Commonly known as: ZOLOFT  Take 1 tablet (25 mg total) by mouth once daily.         * This list  has 2 medication(s) that are the same as other medications prescribed for you. Read the directions carefully, and ask your doctor or other care provider to review them with you.            CHANGE how you take these medications    hydrALAZINE 100 MG tablet  Commonly known as: APRESOLINE  Take 1 tablet (100 mg total) by mouth every 8 (eight) hours.  What changed:   · medication strength  · how much to take  · when to take this     insulin glargine 100 unit/mL injection  Commonly known as: Lantus  Inject 15 Units into the skin once daily. Inject 6 units every morning and 5 units every evening.  What changed:   · how much to take  · how to take this  · when to take this     metoclopramide HCl 10 MG tablet  Commonly known as: REGLAN  Take 1 tablet (10 mg total) by mouth 3 (three) times daily before meals. for 3 days  What changed:   · medication strength  · how much to take  · when to take this  · reasons to take this     NIFEdipine 60 MG (OSM) 24 hr tablet  Commonly known as: PROCARDIA-XL  Take 2 tablets (120 mg total) by mouth daily with dinner or evening meal.  What changed:   · medication strength  · how much to take  · when to take this        CONTINUE taking these medications    acetaminophen 500 MG tablet  Commonly known as: TYLENOL  Take 2,000 mg by mouth daily as needed for Pain.     diphenhydrAMINE 25 mg capsule  Commonly known as: BENADRYL  Take 25 mg by mouth daily as needed for Insomnia ((if Unisom ineffective)).     docusate sodium 100 MG capsule  Commonly known as: COLACE  Take 100 mg by mouth once daily.     insulin aspart U-100 100 unit/mL (3 mL) Inpn pen  Commonly known as: NovoLOG  Inject 3 Units into the skin 3 (three) times daily before meals. Also sliding scale: 2 units BS>250)     LORazepam 0.5 MG tablet  Commonly known as: ATIVAN  Take 0.5 mg by mouth daily as needed for Anxiety.     PRENATAL FORMULA ORAL  Take 1 tablet by mouth once daily.     UNISOM (DOXYLAMINE) ORAL  Take 1 capsule by mouth  nightly as needed (Sleep).            Indwelling Lines/Drains at time of discharge:   Lines/Drains/Airways     None                 Time spent on the discharge of patient: >35 minutes         Denise Matos MD  Department of Hospital Medicine  [facility] - Intensive Care ([place])

## 2022-05-16 NOTE — SUBJECTIVE & OBJECTIVE
"Interval HPI:   Overnight events: No acute events overnight. Patient on the MTSU in room 58361/73956 A. BG excursions noted overnight. Working on timing of insulin administration. Patient will likely benefit from getting on an insulin pump outpatient. Blood glucose improving. BG at, above, and below goal on current insulin regimen (SSI, prandial, and basal insulin ). Steroid use- None.    Renal function- Normal   Vasopressors-  None       Diet gestational diabetic 2000 kcal (Breakfast & midmorning snack=70 carb grams; Lunch & midafternoon snack = 70 carb grams; Dinner & HS snack = 70 carb grams; Snacks recommended at 15 - 30 grams); Lactose Restricted     Eatin%  Nausea: No  Hypoglycemia and intervention: No  Fever: No  TPN and/or TF: No      BP (!) 150/90 (BP Location: Right arm, Patient Position: Lying)   Pulse 102   Temp 99 °F (37.2 °C) (Oral)   Resp 18   Ht 5' 1" (1.549 m)   Wt 58.5 kg (129 lb)   LMP 2022 (Approximate)   SpO2 100%   Breastfeeding No   BMI 24.37 kg/m²     Labs Reviewed and Include    No results for input(s): GLU, CALCIUM, ALBUMIN, PROT, NA, K, CO2, CL, BUN, CREATININE, ALKPHOS, ALT, AST, BILITOT in the last 24 hours.    Lab Results   Component Value Date    WBC 9.54 2022    HGB 9.8 (L) 2022    HCT 29.1 (L) 2022    MCV 90 2022     2022     No results for input(s): TSH, FREET4 in the last 168 hours.  Lab Results   Component Value Date    HGBA1C 9.8 (H) 2022       Nutritional status:   Body mass index is 24.37 kg/m².  Lab Results   Component Value Date    ALBUMIN 2.4 (L) 2022    ALBUMIN 2.3 (L) 2022    ALBUMIN 2.7 (L) 2022     No results found for: PREALBUMIN    Estimated Creatinine Clearance: 68.3 mL/min (based on SCr of 1 mg/dL).    Accu-Checks  Recent Labs     22  1525 22  1802 22  1932 22  2136 05/15/22  0603 05/15/22  0931 05/15/22  1255 05/15/22  1800 05/15/22  2004 22  0750 "   POCTGLUCOSE 486* 345* 414* 112* 121* 291* 304* 141* 151* 182*       Current Medications and/or Treatments Impacting Glycemic Control  Immunotherapy:    Immunosuppressants       None          Steroids:   Hormones (From admission, onward)                None          Pressors:    Autonomic Drugs (From admission, onward)                None          Hyperglycemia/Diabetes Medications:   Antihyperglycemics (From admission, onward)                Start     Stop Route Frequency Ordered    05/14/22 1130  insulin aspart U-100 pen 0-7 Units         -- SubQ 3 times daily with meals 05/14/22 0921    05/13/22 0900  insulin detemir U-100 pen 6 Units         -- SubQ Daily 05/12/22 1817 05/12/22 2100  insulin detemir U-100 pen 5 Units         -- SubQ Nightly 05/12/22 1817 05/12/22 1914  insulin aspart U-100 pen 0-3 Units         -- SubQ As needed (PRN) 05/12/22 1817 05/12/22 1914  insulin aspart U-100 pen 0-5 Units         -- SubQ As needed (PRN) 05/12/22 1817

## 2022-05-16 NOTE — TELEPHONE ENCOUNTER
Patient needs a follow-up appointment in the discharge clinic in 1-2 weeks.    - Patient needs to be seen within 1 week in Dr. Schumacher's clinic.   - Start Dexcom paperwork  - Possibly interested in tandem pump.     Lab Results   Component Value Date    HGBA1C 9.8 (H) 05/05/2022       POCT Glucose   Date Value Ref Range Status   05/16/2022 182 (H) 70 - 110 mg/dL Final   05/15/2022 151 (H) 70 - 110 mg/dL Final   05/15/2022 141 (H) 70 - 110 mg/dL Final   05/15/2022 304 (H) 70 - 110 mg/dL Final   05/15/2022 291 (H) 70 - 110 mg/dL Final   05/15/2022 121 (H) 70 - 110 mg/dL Final   05/14/2022 112 (H) 70 - 110 mg/dL Final   05/14/2022 414 (H) 70 - 110 mg/dL Final   05/14/2022 345 (H) 70 - 110 mg/dL Final   05/14/2022 486 (HH) 70 - 110 mg/dL Final   05/14/2022 391 (H) 70 - 110 mg/dL Final   05/14/2022 212 (H) 70 - 110 mg/dL Final   05/13/2022 96 70 - 110 mg/dL Final   05/13/2022 481 (HH) 70 - 110 mg/dL Final   05/13/2022 324 (H) 70 - 110 mg/dL Final       Diabetes Medications             insulin aspart U-100 (NOVOLOG) 100 unit/mL (3 mL) InPn pen Inject 3 Units into the skin 3 (three) times daily before meals. Also sliding scale: 2 units BS>250)    insulin glargine (LANTUS) 100 unit/mL injection Inject 6 units every morning and 5 units every evening.      Hospital Medications             glucagon (human recombinant) injection 1 mg 1 mg, Intramuscular, As needed (PRN), Turn patient on their side, give IM, and NOTIFY MD IMMEDIATELY.<BR><BR>Feed the patient as soon as patient awakens and is able to swallow.    glucose chewable tablet 16 g 16 g, Oral, As needed (PRN), (16 grams = #  four 4gm glucose tablets)    glucose chewable tablet 24 g 24 g, Oral, As needed (PRN), (24 grams = # six 4gm glucose tablets)    insulin aspart U-100 pen 0-3 Units 0-3 Units, Subcutaneous, As needed (PRN), Administer 1 (one) unit for every 18 grams of carbohydrate.<BR><BR>Do not administer closer than every 3 hours<BR>&quot;HIGH ALERT MEDICATION&quot; -  Administer with meals or TF/TPN.    insulin aspart U-100 pen 0-5 Units 0-5 Units, Subcutaneous, As needed (PRN), **LOW CORRECTION DOSE**<BR>Blood Glucose<BR>mg/dL                  <BR>180 - 230 + 1 unit<BR>231- 280  + 2 units<BR>281 - 330 + 3 units<BR>331 - 380 + 4 units<BR>    &gt; 380   + 5 units<BR>Administer subcutaneously if needed at times designated by monitoring schedule. <BR>DO NOT HOLD correction dose insulin for patients who are  NPO.<BR>&quot;HIGH ALERT MEDICATION&quot; - Administer with meals or TF/TPN.    insulin aspart U-100 pen 0-7 Units 0-7 Units, Subcutaneous, 3 times daily with meals, Administer 1 (one) unit for every 8 grams of carbohydrate.<BR><BR>Do not administer closer than every 3 hours<BR><BR>If patient ate prior to BG check, administer scheduled Novolog only (do not cover with correction dose at this time).<BR>&quot;HIGH ALERT MEDICATION&quot; - Administer with meals or TF/TPN.    insulin detemir U-100 pen 5 Units 5 Units, Subcutaneous, Nightly, DO NOT HOLD basal insulin when patient is NPO.<BR>If  Blood Glucose is less than 100 mg/dL at BEDTIME, give 16 grams (four glucose tablets) X 1 dose. Recheck BG in 30 minutes and call MD for insulin dose adjustments prior to administering insulin detemir (Levemir).<BR><BR>&quot;HIGH ALERT MEDICATION&quot;    insulin detemir U-100 pen 6 Units 6 Units, Subcutaneous, Daily, DO NOT HOLD basal insulin when patient is NPO.<BR>&quot;HIGH ALERT MEDICATION&quot;          Thanks,    Enio Bedolla DNP, FNP-C  Department of Endocrinology  Inpatient Glycemic Management

## 2022-05-16 NOTE — PROGRESS NOTES
[facility] ([place] [address])  Endocrinology  Progress Note    Admit Date: 2022     Reason for Consult: Management of T1DM, Hyperglycemia     Currently 10 weeks pregnant    Outpatient diabetes provider: Primary Care in [place]; [place] (No established PCP or endocrine care in [facility])  Diabetes education during pregnancy: None     Diabetes diagnosis year: 18 years     Home Diabetes Regimen:   Lantus 15 units QD  Novolog 3 units TIDWM with SSI (ISF 1:50)    For insulin pump users: Historically on Medronic (But has been off the pump for about 6 weeks; Found down with BG in the 20's).   No CGM    How often checking glucose at home? >4 x day (Every 2-3 hours)  BG readings on regimen: 's  Hypoglycemia on the regimen?  Yes  Missed doses on regimen?  No    Diabetes Complications include:     Hyperglycemia, Hypoglycemia , Diabetic nephropathy  , Diabetic retinopathy , and Diabetic gastroparesis     Complicating diabetes co morbidities:   Pregancy.       HPI:     Alicia Valles is a 27 y.o. [id number] at 9w5d presents for blood sugar patterning. She was seen in MFM clinic yesterday after recently being discharged from the hospital where she was admitted for hypertensive encephalopathy/hypoglycemia.Ms. Valles has poorly controlled DM1. She was diagnosed at age 10 and her most recent A1C was >12. She previously was followed by an endocrinologist in [place], but has not established with anyone in [place] where she currently lives. She states that used to count carbs and have an insulin sensitivity factor, but she has not done that in a few years. She currently takes lantus 15 u at 5 pm; for her mealtime insulin, she randomly decides how much to give herself. Reports that she has been admitted for DKA more than 5 times. Prior to her most recent hospitalization, she was found down with BG of 29. She had a seizure due to hypoglycemia; she has not ever had seizures before and does not take medications  "for a seizure disorder.  Reports that she has never had an episode of hypoglycemia like that before. 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 Reports prior surgery for retinal detachment. Endocrine consulted to manage hyperglycemia and type 1 diabetes.         Interval HPI:   Overnight events: No acute events overnight. Patient on the MTSU in room 84727/61869 A. BG excursions noted overnight. Working on timing of insulin administration. Patient will likely benefit from getting on an insulin pump outpatient. Blood glucose improving. BG at, above, and below goal on current insulin regimen (SSI, prandial, and basal insulin ). Steroid use- None.    Renal function- Normal   Vasopressors-  None       Diet gestational diabetic 2000 kcal (Breakfast & midmorning snack=70 carb grams; Lunch & midafternoon snack = 70 carb grams; Dinner & HS snack = 70 carb grams; Snacks recommended at 15 - 30 grams); Lactose Restricted     Eatin%  Nausea: No  Hypoglycemia and intervention: No  Fever: No  TPN and/or TF: No      BP (!) 150/90 (BP Location: Right arm, Patient Position: Lying)   Pulse 102   Temp 99 °F (37.2 °C) (Oral)   Resp 18   Ht 5' 1" (1.549 m)   Wt 58.5 kg (129 lb)   LMP 2022 (Approximate)   SpO2 100%   Breastfeeding No   BMI 24.37 kg/m²     Labs Reviewed and Include    No results for input(s): GLU, CALCIUM, ALBUMIN, PROT, NA, K, CO2, CL, BUN, CREATININE, ALKPHOS, ALT, AST, BILITOT in the last 24 hours.    Lab Results   Component Value Date    WBC 9.54 2022    HGB 9.8 (L) 2022    HCT 29.1 (L) 2022    MCV 90 2022     2022     No results for input(s): TSH, FREET4 in the last 168 hours.  Lab Results   Component Value Date    HGBA1C 9.8 (H) 2022       Nutritional status:   Body mass index is 24.37 kg/m².  Lab Results   Component Value Date    ALBUMIN 2.4 (L) 2022    ALBUMIN " 2.3 (L) 05/13/2022    ALBUMIN 2.7 (L) 05/11/2022     No results found for: PREALBUMIN    Estimated Creatinine Clearance: 68.3 mL/min (based on SCr of 1 mg/dL).    Accu-Checks  Recent Labs     05/14/22  1525 05/14/22  1802 05/14/22  1932 05/14/22  2136 05/15/22  0603 05/15/22  0931 05/15/22  1255 05/15/22  1800 05/15/22  2004 05/16/22  0750   POCTGLUCOSE 486* 345* 414* 112* 121* 291* 304* 141* 151* 182*       Current Medications and/or Treatments Impacting Glycemic Control  Immunotherapy:    Immunosuppressants       None          Steroids:   Hormones (From admission, onward)                None          Pressors:    Autonomic Drugs (From admission, onward)                None          Hyperglycemia/Diabetes Medications:   Antihyperglycemics (From admission, onward)                Start     Stop Route Frequency Ordered    05/14/22 1130  insulin aspart U-100 pen 0-7 Units         -- SubQ 3 times daily with meals 05/14/22 0921    05/13/22 0900  insulin detemir U-100 pen 6 Units         -- SubQ Daily 05/12/22 1817 05/12/22 2100  insulin detemir U-100 pen 5 Units         -- SubQ Nightly 05/12/22 1817 05/12/22 1914  insulin aspart U-100 pen 0-3 Units         -- SubQ As needed (PRN) 05/12/22 1817 05/12/22 1914  insulin aspart U-100 pen 0-5 Units         -- SubQ As needed (PRN) 05/12/22 1817            ASSESSMENT and PLAN    * Type 1 diabetes mellitus with other specified complication  Endocrinology consulted for BG management.   BG goal      - Levemir Flex Pen 6 units in the morning and 5 units in the evening.   - Novolog (aspart) insulin ICR 1:8 Units SQ TIDWM and prn for BG excursions ISF 1:50 (starting to slide at 180 mg/dL during the daytime. At night ISF 1:70 starting to slide at 180 mg/dL)  - Novolog ICR 1:18 with snacks.   - BG checks q4hr and two hours post-prandial  - Continue Dexcom (Clarity Code: [id number])  - Change diet to 45 grams of carbohydrates for meals and 15-25 grams of carbs for  snacks   - Ensure prandial insulin is administered 15 minutes before starting to eat or drink.   - Hypoglycemia protocol in place  - If blood glucose greater than 300, please ask patient not to eat food or drink anything other than water until correctional insulin has brought it back below 250    ** Please notify Endocrine for any change and/or advance in diet**  ** Please call Endocrine for any BG related issues **    Goal of glucose for diabetes in pregnancy  Fasting blood glucose concentration ?95 mg/dL  One-hour postprandial blood glucose concentration ?140 mg/dL   Two-hour postprandial glucose concentration ?120 mg/dL     Goal A1c in pregnancy ideally <6.5%, to reduce the risk of congenital anomalies    Discharge Planning:   Discharge Planning:  - Dexcom G6 paperwork started and sent to DME  - Patient expresses interest in changing pump therapy; will start paperwork for T-Slim Tandem pump outpatient.    - Lantus 6 units in the morning and 5 units in the evening (Eat a snack before bed if BG is < 100 mg/dl)  - Novolog ICR: 1:8 units TIDWM and ICR 1:12 with snacks (Hold if BG < 100 mg/dL) (Likely 5 units with each meal and 2 units with snacks)  - Novolog SSI for BG excursions:  180 - 230 + 1 unit  231- 280  + 2 units  281 - 330 + 3 units  331 - 380 + 4 units      > 380   + 5 units  - Insurance approved diabetes testing supplies to check blood sugar 8 times a day (Before meals; after meals, and at bedtime) and as needed.  - BD pen needles   - Insurance approved glucagon for extreme hypoglycemia (Baqsimi or Zegalogue if insurance approved)  - Send BG logs every 2 days for review to endocrinology  - Follow-up in discharge clinic in 1 week (Dr. Schumacher).   - Refer to diabetes education       Education:  Discharge Teaching:    Reviewed topics related to DM including: the need for insulin, how insulin works, what makes it a high risk medication, the importance of immediate follow up with either PCP or endocrine provider,  importance of and how to check BG, how to record BG on logs, how to administer insulin, appropriate insulin administration sites, importance of rotating injection sites, hyper/hypoglycemia, how and when to treat hypoglycemia, when to hold insulin, how the correction scale works, importance of storing unused insulin in the refrigerator, and when to seek medical attention.  Patient verbalized understanding, answered all questions to patient's satisfaction. Blood sugar logs given to patient.     Hypoglycemia (Low Blood Sugar)  Too little glucose (sugar) in your blood is called hypoglycemia or low blood sugar. Diabetes itself doesnt cause low blood sugar. But some of the treatments for diabetes, such as pills or insulin, may increase your risk for it. Low blood sugar may cause you to lose consciousness or have a seizure. So always treat low blood sugar right away.    Special note: Always carry a source of fast-acting sugar and a snack in case of hypoglycemia     What You May Notice  If you have low blood sugar, you may have these symptoms:   Shakiness or dizziness   Cold, clammy skin or sweating   Feelings of hunger   Headache   Nervousness   A hard, fast heartbeat   Weakness   Confusion or irritability   Blurred vision  What You Should Do   First, check your blood sugar. If it is too low (out of your target range), eat or drink 15 to 20 grams of fast-acting sugar. This may be 3 to 4 glucose tablets, 4 oz (half a cup) fruit juice or regular (non-diet) soda, 8 oz (one cup ) fat-free milk, or 1 tablespoon of honey. Dont take more than this, or your blood sugar may go too high.   Wait 15 minutes. Then recheck your blood sugar if you can.   If your blood sugar is still too low, repeat the steps above and check your blood sugar again. If your blood sugar still has not returned to your target range, contact your healthcare provider or seek emergency care.   Once your blood sugar returns to target range, eat a  snack or meal.  Preventing Low Blood Sugar   Eat your meals and snacks at the same times each day. Dont skip meals!   Ask your healthcare provider if it is safe for you to drink alcohol. Never drink on an empty stomach.   Take your medication at the prescribed times.   Always carry a source of fast-acting sugar and a snack when youre away from home.  Other Things to Do   Carry a medical ID card or wear a medical alert bracelet or necklace. It should say that you have diabetes. It should also say what to do if you pass out or have a seizure.   Make sure your family, friends, and coworkers know the signs of low blood sugar. Tell them what to do if your blood sugar falls very low and you cant treat yourself.   Keep a glucagon emergency kit handy. Be sure your family, friends, and coworkers know how and when to use it. Check it regularly and replace the glucagon before it expires.   Talk to your healthcare team about other things you can do to prevent low blood sugar.     If you experience hypoglycemia several times, call your doctor.   © 8872-3996 [facility], [address] [place] [address]. All rights reserved. This information is not intended as a substitute for professional medical care. Always follow your healthcare professional's instructions.                         First trimester pregnancy  Optimize glucose control for the health of mom and baby.       Anemia  Lab Results   Component Value Date    HGBA1C 9.8 (H) 05/05/2022     Anemia may impact the accuracy of the A1C.       Uses self-applied continuous glucose monitoring device  Patient may continue to wear Dexcom CGM, but must have a finger stick BG check AC/HS.   Treatment decision inpatient must be confirmed via fingerstick.   Always confirm hypo and hyperglycemia with finger sticks.              Enio Bedolla, DNP, FNP  Endocrinology  [facility] - Intensive Care ([place]

## 2022-05-16 NOTE — ASSESSMENT & PLAN NOTE
Patient may continue to wear Dexcom CGM, but must have a finger stick BG check AC/HS.   Treatment decision inpatient must be confirmed via fingerstick.   Always confirm hypo and hyperglycemia with finger sticks.        X Size Of Lesion In Cm (Optional): 1.5

## 2022-05-16 NOTE — ASSESSMENT & PLAN NOTE
Endocrinology consulted for BG management.   BG goal      - Levemir Flex Pen 6 units in the morning and 5 units in the evening.   - Novolog (aspart) insulin ICR 1:8 Units SQ TIDWM and prn for BG excursions ISF 1:50 (starting to slide at 180 mg/dL during the daytime. At night ISF 1:70 starting to slide at 180 mg/dL)  - Novolog ICR 1:18 with snacks.   - BG checks q4hr and two hours post-prandial  - Continue Dexcom (Clarity Code: [id number])  - Change diet to 45 grams of carbohydrates for meals and 15-25 grams of carbs for snacks   - Ensure prandial insulin is administered 15 minutes before starting to eat or drink.   - Hypoglycemia protocol in place  - If blood glucose greater than 300, please ask patient not to eat food or drink anything other than water until correctional insulin has brought it back below 250    ** Please notify Endocrine for any change and/or advance in diet**  ** Please call Endocrine for any BG related issues **    Goal of glucose for diabetes in pregnancy  Fasting blood glucose concentration ?95 mg/dL  One-hour postprandial blood glucose concentration ?140 mg/dL   Two-hour postprandial glucose concentration ?120 mg/dL     Goal A1c in pregnancy ideally <6.5%, to reduce the risk of congenital anomalies    Discharge Planning:   Discharge Planning:  - Dexcom G6 paperwork started and sent to DME  - Patient expresses interest in changing pump therapy; will start paperwork for T-Slim Tandem pump outpatient.    - Lantus 6 units in the morning and 5 units in the evening (Eat a snack before bed if BG is < 100 mg/dl)  - Novolog ICR: 1:8 units TIDWM (Hold if BG < 100 mg/dL) (Likely 5 units with each meal and 2 units with snacks)  - Novolog SSI for BG excursions:  180 - 230 + 1 unit  231- 280  + 2 units  281 - 330 + 3 units  331 - 380 + 4 units      > 380   + 5 units  - Insurance approved diabetes testing supplies to check blood sugar 8 times a day (Before meals; after meals, and at bedtime) and as  needed.  - BD pen needles   - Insurance approved glucagon for extreme hypoglycemia (Baqsimi or Zegalogue if insurance approved)  - Send BG logs every 2 days for review to endocrinology  - Follow-up in discharge clinic in 1 week (Dr. Schumacher).   - Refer to diabetes education       Education:  Discharge Teaching:    Reviewed topics related to DM including: the need for insulin, how insulin works, what makes it a high risk medication, the importance of immediate follow up with either PCP or endocrine provider, importance of and how to check BG, how to record BG on logs, how to administer insulin, appropriate insulin administration sites, importance of rotating injection sites, hyper/hypoglycemia, how and when to treat hypoglycemia, when to hold insulin, how the correction scale works, importance of storing unused insulin in the refrigerator, and when to seek medical attention.  Patient verbalized understanding, answered all questions to patient's satisfaction. Blood sugar logs given to patient.     Hypoglycemia (Low Blood Sugar)  Too little glucose (sugar) in your blood is called hypoglycemia or low blood sugar. Diabetes itself doesnt cause low blood sugar. But some of the treatments for diabetes, such as pills or insulin, may increase your risk for it. Low blood sugar may cause you to lose consciousness or have a seizure. So always treat low blood sugar right away.    Special note: Always carry a source of fast-acting sugar and a snack in case of hypoglycemia     What You May Notice  If you have low blood sugar, you may have these symptoms:   Shakiness or dizziness   Cold, clammy skin or sweating   Feelings of hunger   Headache   Nervousness   A hard, fast heartbeat   Weakness   Confusion or irritability   Blurred vision  What You Should Do   First, check your blood sugar. If it is too low (out of your target range), eat or drink 15 to 20 grams of fast-acting sugar. This may be 3 to 4 glucose tablets, 4 oz  (half a cup) fruit juice or regular (non-diet) soda, 8 oz (one cup ) fat-free milk, or 1 tablespoon of honey. Dont take more than this, or your blood sugar may go too high.   Wait 15 minutes. Then recheck your blood sugar if you can.   If your blood sugar is still too low, repeat the steps above and check your blood sugar again. If your blood sugar still has not returned to your target range, contact your healthcare provider or seek emergency care.   Once your blood sugar returns to target range, eat a snack or meal.  Preventing Low Blood Sugar   Eat your meals and snacks at the same times each day. Dont skip meals!   Ask your healthcare provider if it is safe for you to drink alcohol. Never drink on an empty stomach.   Take your medication at the prescribed times.   Always carry a source of fast-acting sugar and a snack when youre away from home.  Other Things to Do   Carry a medical ID card or wear a medical alert bracelet or necklace. It should say that you have diabetes. It should also say what to do if you pass out or have a seizure.   Make sure your family, friends, and coworkers know the signs of low blood sugar. Tell them what to do if your blood sugar falls very low and you cant treat yourself.   Keep a glucagon emergency kit handy. Be sure your family, friends, and coworkers know how and when to use it. Check it regularly and replace the glucagon before it expires.   Talk to your healthcare team about other things you can do to prevent low blood sugar.     If you experience hypoglycemia several times, call your doctor.   © 6542-3682 [employer], [address] [place] [address], [place] [address]. All rights reserved. This information is not intended as a substitute for professional medical care. Always follow your healthcare professional's instructions.

## 2022-05-17 ENCOUNTER — TELEPHONE (OUTPATIENT)
Dept: MATERNAL FETAL MEDICINE | Facility: CLINIC | Age: 27
End: 2022-05-17
Payer: MEDICAID

## 2022-05-17 ENCOUNTER — TELEPHONE (OUTPATIENT)
Dept: ENDOCRINOLOGY | Facility: CLINIC | Age: 27
End: 2022-05-17
Payer: MEDICAID

## 2022-05-17 NOTE — TELEPHONE ENCOUNTER
mother verified self by name, . KELSEY.  Notified patient/mother that RN has been attempting to get intouch with someone from the endocrinology group but have not heard back yet.  Advised patient/parent that she should look through her discharge paperwork and contact the provider who discharged her. Parent wasn't sure who sent in the medication whether it was endocrinology or internal med. RN called rod earlier and Denise Gonzalez was the person who input her medication. Parent states that she believes Denise was part of internal Medicine. She will start there and call to see if they can replace her medication with a [place]-licensed provider.   Pt verbalized understanding. Agreed to POC w intent to comply.

## 2022-05-17 NOTE — PLAN OF CARE
[facility] ([facility])  Discharge Final Note    Primary Care Provider: Primary Doctor No    Expected Discharge Date: 5/16/2022      Final Discharge Note (most recent)     Final Note - 05/17/22 1047        Final Note    Assessment Type Final Discharge Note     Anticipated Discharge Disposition Home or Self Care     What phone number can be called within the next 1-3 days to see how you are doing after discharge? [phone]     Hospital Resources/Appts/Education Provided Appointments scheduled and added to AVS   physcian office contacted for appointments left message, physician office will contact patient directly.       Post-Acute Status    Post-Acute Authorization Other     Other Status No Post-Acute Service Needs     Discharge Delays None known at this time                 Important Message from Medicare       Please call to schedule your hospital follow-up apppointment for within 7 days of discharge from hospital.

## 2022-05-17 NOTE — TELEPHONE ENCOUNTER
Good afternoon,  Please send to medical team provider that discharged patient.  Enio is not in clinic today.  Have a great day.

## 2022-05-17 NOTE — TELEPHONE ENCOUNTER
----- Message from Soniya Serrano RN sent at 5/17/2022 11:34 AM CDT -----  Regarding: Medication Refill  Contact: pt  Patient/mother called requesting refills on 12 medications placed by provider before she was discharged yesterday. Can you please investigate and have a provider that is Medicaid/[place]-licensed to input her discharge medications?      ----- Message -----  From: Geetha Clements RN  Sent: 5/17/2022  11:00 AM CDT  To: Soniya Serrano RN      ----- Message -----  From: Unique Romero  Sent: 5/17/2022  10:33 AM CDT  To: Arun Murray Staff    Type: Needs Medical Advice    Who Called: pt  Best Call Back Number: [phone]    Inquiry/Question: pt was admitted to [facility] in [place] due to blood sugars, the Dr prescribed medication however, [place] them in [place] b[place]c it[place] a [place] provider.   Is Requesting to speak to someone regarding getting a provider here to rewrite Rx's so medicaid will pay.   Please call to discuss     Thank you~

## 2022-05-17 NOTE — TELEPHONE ENCOUNTER
Mother verified pt by name,     Pt was discharged yesterday from [facility]    Hydralazine   baqsimi 3mg one each by nostril (for hypoglycemia) x 3 refill,      Also 4 pages worth of refill medications.  'mother states that [facility] will not fill medication because it was not filled by a [place] provider- per Medicaid to pay for medications.       RN to call [facility] to further investgation.

## 2022-05-17 NOTE — TELEPHONE ENCOUNTER
RN called [facility].  Verified that Medicaid will not pay for the medications not placed by a [place]-licensed medicaid provider.  There are 12 medications the provider put in.    RN will contact the provider who placed the medication. Denise Gonzalez.

## 2022-05-18 ENCOUNTER — ROUTINE PRENATAL (OUTPATIENT)
Dept: OBSTETRICS AND GYNECOLOGY | Facility: CLINIC | Age: 27
End: 2022-05-18
Payer: MEDICAID

## 2022-05-18 VITALS — WEIGHT: 131 LBS | BODY MASS INDEX: 24.75 KG/M2 | DIASTOLIC BLOOD PRESSURE: 98 MMHG | SYSTOLIC BLOOD PRESSURE: 158 MMHG

## 2022-05-18 DIAGNOSIS — O09.91 HIGH-RISK PREGNANCY IN FIRST TRIMESTER: ICD-10-CM

## 2022-05-18 DIAGNOSIS — O16.1 HYPERTENSION AFFECTING PREGNANCY IN FIRST TRIMESTER: Primary | ICD-10-CM

## 2022-05-18 DIAGNOSIS — O24.011 PRE-EXISTING TYPE 1 DIABETES MELLITUS DURING PREGNANCY IN FIRST TRIMESTER: ICD-10-CM

## 2022-05-18 PROCEDURE — 99214 PR OFFICE/OUTPT VISIT, EST, LEVL IV, 30-39 MIN: ICD-10-PCS | Mod: TH,S$GLB,, | Performed by: NURSE PRACTITIONER

## 2022-05-18 PROCEDURE — 99214 OFFICE O/P EST MOD 30 MIN: CPT | Mod: TH,S$GLB,, | Performed by: NURSE PRACTITIONER

## 2022-05-18 RX ORDER — NIFEDIPINE 60 MG/1
TABLET, EXTENDED RELEASE ORAL
Qty: 30 TABLET | Refills: 11 | Status: SHIPPED | OUTPATIENT
Start: 2022-05-18 | End: 2022-06-09 | Stop reason: DRUGHIGH

## 2022-05-18 RX ORDER — SERTRALINE HYDROCHLORIDE 50 MG/1
50 TABLET, FILM COATED ORAL DAILY
Qty: 30 TABLET | Refills: 2 | Status: SHIPPED | OUTPATIENT
Start: 2022-05-18 | End: 2022-09-22

## 2022-05-18 RX ORDER — METOCLOPRAMIDE 10 MG/1
10 TABLET ORAL 4 TIMES DAILY
COMMUNITY
End: 2022-05-18 | Stop reason: SDUPTHER

## 2022-05-18 RX ORDER — GLUCAGON HYDROCHLORIDE 1 MG
KIT INJECTION
COMMUNITY
Start: 2022-05-02 | End: 2022-06-20 | Stop reason: SDUPTHER

## 2022-05-18 RX ORDER — FAMOTIDINE 20 MG/1
20 TABLET, FILM COATED ORAL 2 TIMES DAILY
Qty: 60 TABLET | Refills: 1 | Status: SHIPPED | OUTPATIENT
Start: 2022-05-18 | End: 2022-06-09 | Stop reason: SDUPTHER

## 2022-05-18 RX ORDER — HYDRALAZINE HYDROCHLORIDE 100 MG/1
100 TABLET, FILM COATED ORAL 2 TIMES DAILY
COMMUNITY
End: 2022-05-18

## 2022-05-18 RX ORDER — HYDRALAZINE HYDROCHLORIDE 100 MG/1
100 TABLET, FILM COATED ORAL EVERY 8 HOURS
Qty: 120 TABLET | Refills: 4 | Status: SHIPPED | OUTPATIENT
Start: 2022-05-18 | End: 2022-06-01

## 2022-05-18 RX ORDER — METOCLOPRAMIDE 10 MG/1
10 TABLET ORAL 4 TIMES DAILY
Qty: 120 TABLET | Refills: 2 | Status: SHIPPED | OUTPATIENT
Start: 2022-05-18 | End: 2023-03-06 | Stop reason: SDUPTHER

## 2022-05-18 RX ORDER — NIFEDIPINE 60 MG/1
60 TABLET, EXTENDED RELEASE ORAL DAILY
COMMUNITY
End: 2022-05-18

## 2022-05-18 NOTE — PROGRESS NOTES
2022  27 y.o. 11w4d Estimated Date of Delivery: 12/3/22, dating reviewed.   OB History    Para Term  AB Living   1             SAB IAB Ectopic Multiple Live Births                  # Outcome Date GA Lbr Alireza/2nd Weight Sex Delivery Anes PTL Lv   1 Current              The patient presents with complaints of   Chief Complaint   Patient presents with    Abdominal Pain       Reports: Good fetal movements reported. No Bleeding or contractions . She is doing poorly.  Patient was recently discharged yesterday from a hospital in [place].  She states at 1st she was admitted to [facility] they were unable to control and maintain her blood sugars and possibly hypertension, so she was then transferred to another hospital.  She remained inpatient for 2 weeks total for management.  She saw an endocrinologist who ordered a Dex Con blood sugar monitor.  She has a temporary one on today, and states it checks her blood sugars every 5 minutes.  Blood sugars in office today noted to be around 200.  This one is temporary and is only going to last for 10 days.  A permanent one is to be placed.  However does need a prior authorization .  She does have a follow-up appointment with Endocrinology in [place] for next Tuesday.  Blood pressure today is slightly elevated she admits to taking her medication.  She feels like the elevation is due to her moderate abdominal pain today.  She is currently taking nifedipine 120 mg ER in the evening and hydralazine 100 mg Q 8 hours.  However she states that the prescriptions that were sent over yesterday from [place] were not approved because she has [place] Medicaid.  I personally call the pharmacist at [facility] spoke with her and refilled every prescription that she was discharged with as instructions were previously.  At this time she does not have an appointment with [facility], advised patient she needs to call them to day to schedule an appointment for this  week or very early next week.  She is in moderate pain today due to a history of gastroparesis she is currently taking Reglan 5 mg 4 times a day I increase this today to 10 mg 4 times a day.  Patient's mother is predominantly doing most of the speaking for the patient today and again I feel she is managing her daughter's care.  She is taking prenatal vitamins. Ms. Valles   is adjusting well and has a good support system of family and friends. She is coping with pregnancy and having no difficulty with sleep.    Prenatal labs reviewed and updated today    Review of Systems:  General ROS: negative for headache or visual changes  Breast ROS: negative for breast lumps  Gastrointestinal ROS: negative for constipation, diarrhea or nausea/vomiting  Musculoskeletal ROS: negative for pain in joints or swelling in face or hands.   Neurological ROS: negative for - headaches, numbness/tingling or visual changes      Physical Exam:  BP (!) 158/98   Wt 59.4 kg (131 lb)   LMP 02/26/2022 (Exact Date)   BMI 24.75 kg/m²       Constitutional: She is oriented to person, place, and time. She appears well-developed and well-nourished. No distress.     Pulmonary/Chest: Effort normal. No respiratory distress  Abdominal: Soft, gravid, nontender. No rebound and no guarding.   Genitourinary: Deferred   Musculoskeletal: Normal range of motion, Minimal peripheral edema.   Neurological: She is alert and oriented to person, place, and time. Coordination normal.   Skin: Skin is warm and dry. She is not diaphoretic.  Psychiatric: She has a normal mood and affect.        Assessment:  Overall doing well.   27 y.o., at 11w4d Gestation   Patient Active Problem List   Diagnosis    Hypertension affecting pregnancy in first trimester    Pre-existing type 1 diabetes mellitus during pregnancy in first trimester     Current Outpatient Medications on File Prior to Visit   Medication Sig Dispense Refill    GLUCAGEN HYPOKIT 1 mg SolR SMARTSIG:Injection As  Directed      insulin aspart U-100 (NOVOLOG) 100 unit/mL injection   3 unit, SubQ, TID before meals, before a meal, # 10 mL, 1 Refill(s), Maintenance, [id number], 158, cm, 22 14:00:00 CDT, Height/Length Measured, 68.4, kg, 22 15:04:00 CDT, Weight Dosing      LANTUS SOLOSTAR U-100 INSULIN glargine 100 units/mL (3mL) SubQ pen SMARTSIG:15 Unit(s) SUB-Q Daily      LORazepam (ATIVAN) 0.5 MG tablet Take 0.5 mg by mouth daily as needed.      pen needle, diabetic (INSULIN PEN NEEDLE MISC) Use as directed.      promethazine (PHENERGAN) 25 MG tablet Take 1 tablet (25 mg total) by mouth every 4 (four) hours. 30 tablet 0    [DISCONTINUED] hydrALAZINE (APRESOLINE) 50 MG tablet 50 mg. Taking twice per [place] provider      [DISCONTINUED] metoclopramide HCl (REGLAN) 10 MG tablet Take 10 mg by mouth 4 (four) times daily.      [DISCONTINUED] NIFEdipine (PROCARDIA-XL) 30 MG (OSM) 24 hr tablet Take 1 tablet (30 mg total) by mouth once daily. (Patient taking differently: Take 30 mg by mouth once daily. Taking 4 to make 120mg per [place] provider) 30 tablet 11    insulin aspart U-100 (NOVOLOG) 100 unit/mL (3 mL) InPn pen SMARTSI Unit(s) SUB-Q Daily      insulin glargine (LANTUS) 100 unit/mL injection   15 unit, SUBQ, Daily, # 15 mL, 1 Refill(s), Maintenance, Pharmacy: [id number], 158, cm, 22 14:00:00 CDT, Height/Length Measured, 68.4, kg, 22 15:04:00 CDT, Weight Dosing      [DISCONTINUED] hydrALAZINE (APRESOLINE) 100 MG tablet Take 100 mg by mouth 2 (two) times daily.      [DISCONTINUED] methyldopa (ALDOMET) 250 MG tablet 500 mg.      [DISCONTINUED] metoclopramide HCl (REGLAN) 5 MG tablet Take 5 mg by mouth every 6 (six) hours as needed.      [DISCONTINUED] NIFEdipine (PROCARDIA XL) 60 MG (OSM) 24 hr tablet Take 60 mg by mouth once daily.       No current facility-administered medications on file prior to visit.       Hypertension affecting pregnancy in first  trimester    Pre-existing type 1 diabetes mellitus during pregnancy in first trimester    High-risk pregnancy in first trimester    Other orders  -     metoclopramide HCl (REGLAN) 10 MG tablet; Take 1 tablet (10 mg total) by mouth 4 (four) times daily.  Dispense: 120 tablet; Refill: 2  -     NIFEdipine (PROCARDIA XL) 60 MG (OSM) 24 hr tablet; 2 tablets in the evening with meal  Dispense: 30 tablet; Refill: 11  -     hydrALAZINE (APRESOLINE) 100 MG tablet; Take 1 tablet (100 mg total) by mouth every 8 (eight) hours.  Dispense: 120 tablet; Refill: 4  -     sertraline (ZOLOFT) 50 MG tablet; Take 1 tablet (50 mg total) by mouth once daily.  Dispense: 30 tablet; Refill: 2  -     famotidine (PEPCID) 20 MG tablet; Take 1 tablet (20 mg total) by mouth 2 (two) times daily.  Dispense: 60 tablet; Refill: 1         Plan:  Overall doing well  Follow up 1Weeks, bleeding/pain precautions, kick counts, labor precautions discussed.   Follow-up with MFM as soon as possible  Follow-up next week with Dr. Min  Keep follow-up appointment with Endocrinology  Patient to call endocrinologist for PA for Dexcon monitor  Patient needs to be managed by MD and not NP.

## 2022-05-18 NOTE — Clinical Note
Pt was discharged from hospital in [place] yesterday.  Please feel free to call me if necessary. Juliette Ambrose, DUTCH [phone]

## 2022-05-20 ENCOUNTER — DOCUMENTATION ONLY (OUTPATIENT)
Dept: TELEMEDICINE | Facility: HOSPITAL | Age: 27
End: 2022-05-20

## 2022-05-21 ENCOUNTER — HOSPITAL ENCOUNTER (INPATIENT)
Facility: HOSPITAL | Age: 27
LOS: 5 days | Discharge: HOME | End: 2022-05-26
Attending: EMERGENCY MEDICINE | Admitting: HOSPITALIST
Payer: MEDICAID

## 2022-05-21 DIAGNOSIS — I16.0 HYPERTENSIVE URGENCY: ICD-10-CM

## 2022-05-21 DIAGNOSIS — R11.2 NAUSEA AND VOMITING: ICD-10-CM

## 2022-05-21 DIAGNOSIS — I10 HYPERTENSION: Primary | ICD-10-CM

## 2022-05-21 DIAGNOSIS — Z34.91 FIRST TRIMESTER PREGNANCY: ICD-10-CM

## 2022-05-21 DIAGNOSIS — R00.0 TACHYCARDIA: ICD-10-CM

## 2022-05-21 DIAGNOSIS — Z34.90 PREGNANCY, UNSPECIFIED GESTATIONAL AGE: ICD-10-CM

## 2022-05-21 DIAGNOSIS — K31.84 GASTROPARESIS DUE TO DM: Chronic | ICD-10-CM

## 2022-05-21 DIAGNOSIS — E11.43 GASTROPARESIS DUE TO DM: Chronic | ICD-10-CM

## 2022-05-21 DIAGNOSIS — R11.10 HYPEREMESIS: ICD-10-CM

## 2022-05-21 DIAGNOSIS — E10.69 TYPE 1 DIABETES MELLITUS WITH OTHER SPECIFIED COMPLICATION: Chronic | ICD-10-CM

## 2022-05-21 PROBLEM — O23.41 UTI IN PREGNANCY, ANTEPARTUM, FIRST TRIMESTER: Status: ACTIVE | Noted: 2022-05-21

## 2022-05-21 PROBLEM — E87.1 HYPONATREMIA: Status: ACTIVE | Noted: 2022-05-21

## 2022-05-21 PROBLEM — D72.829 LEUKOCYTOSIS: Status: ACTIVE | Noted: 2022-05-21

## 2022-05-21 LAB
ALBUMIN SERPL BCP-MCNC: 2.6 G/DL (ref 3.5–5.2)
ALP SERPL-CCNC: 63 U/L (ref 55–135)
ALT SERPL W/O P-5'-P-CCNC: 33 U/L (ref 10–44)
AMORPH CRY UR QL COMP ASSIST: ABNORMAL
ANION GAP SERPL CALC-SCNC: 13 MMOL/L (ref 8–16)
AST SERPL-CCNC: 43 U/L (ref 10–40)
B-OH-BUTYR BLD STRIP-SCNC: 1.1 MMOL/L (ref 0–0.5)
BACTERIA #/AREA URNS AUTO: ABNORMAL /HPF
BASOPHILS # BLD AUTO: 0.03 K/UL (ref 0–0.2)
BASOPHILS NFR BLD: 0.1 % (ref 0–1.9)
BILIRUB SERPL-MCNC: 0.3 MG/DL (ref 0.1–1)
BILIRUB UR QL STRIP: NEGATIVE
BUN SERPL-MCNC: 18 MG/DL (ref 6–20)
CALCIUM SERPL-MCNC: 7.9 MG/DL (ref 8.7–10.5)
CHLORIDE SERPL-SCNC: 102 MMOL/L (ref 95–110)
CLARITY UR REFRACT.AUTO: ABNORMAL
CO2 SERPL-SCNC: 19 MMOL/L (ref 23–29)
COLOR UR AUTO: YELLOW
CREAT SERPL-MCNC: 1.1 MG/DL (ref 0.5–1.4)
CTP QC/QA: YES
DIFFERENTIAL METHOD: ABNORMAL
EOSINOPHIL # BLD AUTO: 0 K/UL (ref 0–0.5)
EOSINOPHIL NFR BLD: 0 % (ref 0–8)
ERYTHROCYTE [DISTWIDTH] IN BLOOD BY AUTOMATED COUNT: 13.9 % (ref 11.5–14.5)
EST. GFR  (AFRICAN AMERICAN): >60 ML/MIN/1.73 M^2
EST. GFR  (NON AFRICAN AMERICAN): >60 ML/MIN/1.73 M^2
GLUCOSE SERPL-MCNC: 198 MG/DL (ref 70–110)
GLUCOSE UR QL STRIP: ABNORMAL
HCT VFR BLD AUTO: 26.9 % (ref 37–48.5)
HGB BLD-MCNC: 9.3 G/DL (ref 12–16)
HGB UR QL STRIP: ABNORMAL
HYALINE CASTS UR QL AUTO: 0 /LPF
IMM GRANULOCYTES # BLD AUTO: 0.2 K/UL (ref 0–0.04)
IMM GRANULOCYTES NFR BLD AUTO: 1 % (ref 0–0.5)
KETONES UR QL STRIP: ABNORMAL
LEUKOCYTE ESTERASE UR QL STRIP: NEGATIVE
LYMPHOCYTES # BLD AUTO: 2 K/UL (ref 1–4.8)
LYMPHOCYTES NFR BLD: 9.8 % (ref 18–48)
MCH RBC QN AUTO: 29.8 PG (ref 27–31)
MCHC RBC AUTO-ENTMCNC: 34.6 G/DL (ref 32–36)
MCV RBC AUTO: 86 FL (ref 82–98)
MICROSCOPIC COMMENT: ABNORMAL
MONOCYTES # BLD AUTO: 0.9 K/UL (ref 0.3–1)
MONOCYTES NFR BLD: 4.5 % (ref 4–15)
NEUTROPHILS # BLD AUTO: 17.4 K/UL (ref 1.8–7.7)
NEUTROPHILS NFR BLD: 84.6 % (ref 38–73)
NITRITE UR QL STRIP: NEGATIVE
NRBC BLD-RTO: 0 /100 WBC
PH UR STRIP: 5 [PH] (ref 5–8)
PLATELET # BLD AUTO: 222 K/UL (ref 150–450)
PMV BLD AUTO: 10.6 FL (ref 9.2–12.9)
POCT GLUCOSE: 201 MG/DL (ref 70–110)
POTASSIUM SERPL-SCNC: 3.7 MMOL/L (ref 3.5–5.1)
PROT SERPL-MCNC: 6.1 G/DL (ref 6–8.4)
PROT UR QL STRIP: ABNORMAL
RBC # BLD AUTO: 3.12 M/UL (ref 4–5.4)
RBC #/AREA URNS AUTO: 5 /HPF (ref 0–4)
SARS-COV-2 RDRP RESP QL NAA+PROBE: NEGATIVE
SODIUM SERPL-SCNC: 134 MMOL/L (ref 136–145)
SP GR UR STRIP: 1.01 (ref 1–1.03)
SQUAMOUS #/AREA URNS AUTO: 6 /HPF
URN SPEC COLLECT METH UR: ABNORMAL
WBC # BLD AUTO: 20.6 K/UL (ref 3.9–12.7)
WBC #/AREA URNS AUTO: 7 /HPF (ref 0–5)
YEAST UR QL AUTO: ABNORMAL

## 2022-05-21 PROCEDURE — 99285 EMERGENCY DEPT VISIT HI MDM: CPT | Mod: 25

## 2022-05-21 PROCEDURE — 25000003 PHARM REV CODE 250: Performed by: EMERGENCY MEDICINE

## 2022-05-21 PROCEDURE — 93010 ELECTROCARDIOGRAM REPORT: CPT | Mod: ,,, | Performed by: INTERNAL MEDICINE

## 2022-05-21 PROCEDURE — 63600175 PHARM REV CODE 636 W HCPCS: Performed by: EMERGENCY MEDICINE

## 2022-05-21 PROCEDURE — U0002 COVID-19 LAB TEST NON-CDC: HCPCS | Performed by: PHYSICIAN ASSISTANT

## 2022-05-21 PROCEDURE — 82962 GLUCOSE BLOOD TEST: CPT

## 2022-05-21 PROCEDURE — G0378 HOSPITAL OBSERVATION PER HR: HCPCS

## 2022-05-21 PROCEDURE — 63600175 PHARM REV CODE 636 W HCPCS: Performed by: PHYSICIAN ASSISTANT

## 2022-05-21 PROCEDURE — 99220 PR INITIAL OBSERVATION CARE,LEVL III: ICD-10-PCS | Mod: ,,, | Performed by: HOSPITALIST

## 2022-05-21 PROCEDURE — 96375 TX/PRO/DX INJ NEW DRUG ADDON: CPT

## 2022-05-21 PROCEDURE — 25000003 PHARM REV CODE 250

## 2022-05-21 PROCEDURE — 93005 ELECTROCARDIOGRAM TRACING: CPT

## 2022-05-21 PROCEDURE — 96365 THER/PROPH/DIAG IV INF INIT: CPT

## 2022-05-21 PROCEDURE — 85025 COMPLETE CBC W/AUTO DIFF WBC: CPT | Performed by: PHYSICIAN ASSISTANT

## 2022-05-21 PROCEDURE — 99220 PR INITIAL OBSERVATION CARE,LEVL III: CPT | Mod: ,,, | Performed by: HOSPITALIST

## 2022-05-21 PROCEDURE — 96361 HYDRATE IV INFUSION ADD-ON: CPT

## 2022-05-21 PROCEDURE — 80053 COMPREHEN METABOLIC PANEL: CPT | Performed by: PHYSICIAN ASSISTANT

## 2022-05-21 PROCEDURE — 81001 URINALYSIS AUTO W/SCOPE: CPT | Performed by: PHYSICIAN ASSISTANT

## 2022-05-21 PROCEDURE — 87389 HIV-1 AG W/HIV-1&-2 AB AG IA: CPT | Performed by: EMERGENCY MEDICINE

## 2022-05-21 PROCEDURE — 63600175 PHARM REV CODE 636 W HCPCS

## 2022-05-21 PROCEDURE — 82010 KETONE BODYS QUAN: CPT | Performed by: PHYSICIAN ASSISTANT

## 2022-05-21 PROCEDURE — 20000000 HC ICU ROOM

## 2022-05-21 PROCEDURE — 99285 PR EMERGENCY DEPT VISIT,LEVEL V: ICD-10-PCS | Mod: CS,,, | Performed by: PHYSICIAN ASSISTANT

## 2022-05-21 PROCEDURE — 99285 EMERGENCY DEPT VISIT HI MDM: CPT | Mod: CS,,, | Performed by: PHYSICIAN ASSISTANT

## 2022-05-21 PROCEDURE — 96376 TX/PRO/DX INJ SAME DRUG ADON: CPT

## 2022-05-21 PROCEDURE — 93010 EKG 12-LEAD: ICD-10-PCS | Mod: ,,, | Performed by: INTERNAL MEDICINE

## 2022-05-21 RX ORDER — IBUPROFEN 200 MG
24 TABLET ORAL
Status: DISCONTINUED | OUTPATIENT
Start: 2022-05-21 | End: 2022-05-22

## 2022-05-21 RX ORDER — INSULIN ASPART 100 [IU]/ML
0-5 INJECTION, SOLUTION INTRAVENOUS; SUBCUTANEOUS
Status: DISCONTINUED | OUTPATIENT
Start: 2022-05-21 | End: 2022-05-22

## 2022-05-21 RX ORDER — IBUPROFEN 200 MG
16 TABLET ORAL
Status: DISCONTINUED | OUTPATIENT
Start: 2022-05-21 | End: 2022-05-22

## 2022-05-21 RX ORDER — LORAZEPAM 2 MG/ML
2 INJECTION INTRAMUSCULAR
Status: COMPLETED | OUTPATIENT
Start: 2022-05-21 | End: 2022-05-21

## 2022-05-21 RX ORDER — NIFEDIPINE 30 MG/1
120 TABLET, EXTENDED RELEASE ORAL
Status: COMPLETED | OUTPATIENT
Start: 2022-05-21 | End: 2022-05-21

## 2022-05-21 RX ORDER — METOCLOPRAMIDE 10 MG/1
10 TABLET ORAL
Status: DISCONTINUED | OUTPATIENT
Start: 2022-05-21 | End: 2022-05-22

## 2022-05-21 RX ORDER — PRENATAL WITH FERROUS FUM AND FOLIC ACID 3080; 920; 120; 400; 22; 1.84; 3; 20; 10; 1; 12; 200; 27; 25; 2 [IU]/1; [IU]/1; MG/1; [IU]/1; MG/1; MG/1; MG/1; MG/1; MG/1; MG/1; UG/1; MG/1; MG/1; MG/1; MG/1
1 TABLET ORAL DAILY
Status: DISCONTINUED | OUTPATIENT
Start: 2022-05-22 | End: 2022-05-26 | Stop reason: HOSPADM

## 2022-05-21 RX ORDER — ONDANSETRON 2 MG/ML
4 INJECTION INTRAMUSCULAR; INTRAVENOUS EVERY 8 HOURS PRN
Status: DISCONTINUED | OUTPATIENT
Start: 2022-05-21 | End: 2022-05-22

## 2022-05-21 RX ORDER — INSULIN ASPART 100 [IU]/ML
3 INJECTION, SOLUTION INTRAVENOUS; SUBCUTANEOUS
Status: DISCONTINUED | OUTPATIENT
Start: 2022-05-21 | End: 2022-05-22

## 2022-05-21 RX ORDER — TALC
6 POWDER (GRAM) TOPICAL NIGHTLY PRN
Status: DISCONTINUED | OUTPATIENT
Start: 2022-05-21 | End: 2022-05-21

## 2022-05-21 RX ORDER — AMOXICILLIN AND CLAVULANATE POTASSIUM 875; 125 MG/1; MG/1
1 TABLET, FILM COATED ORAL EVERY 12 HOURS
Status: DISCONTINUED | OUTPATIENT
Start: 2022-05-21 | End: 2022-05-22

## 2022-05-21 RX ORDER — DROPERIDOL 2.5 MG/ML
2.5 INJECTION, SOLUTION INTRAMUSCULAR; INTRAVENOUS ONCE AS NEEDED
Status: COMPLETED | OUTPATIENT
Start: 2022-05-21 | End: 2022-05-21

## 2022-05-21 RX ORDER — SERTRALINE HYDROCHLORIDE 25 MG/1
25 TABLET, FILM COATED ORAL DAILY
Status: DISCONTINUED | OUTPATIENT
Start: 2022-05-22 | End: 2022-05-26 | Stop reason: HOSPADM

## 2022-05-21 RX ORDER — GLUCAGON 1 MG
1 KIT INJECTION
Status: DISCONTINUED | OUTPATIENT
Start: 2022-05-21 | End: 2022-05-22

## 2022-05-21 RX ORDER — POLYETHYLENE GLYCOL 3350 17 G/17G
17 POWDER, FOR SOLUTION ORAL DAILY
Status: DISCONTINUED | OUTPATIENT
Start: 2022-05-22 | End: 2022-05-22

## 2022-05-21 RX ORDER — LABETALOL HYDROCHLORIDE 5 MG/ML
10 INJECTION, SOLUTION INTRAVENOUS
Status: DISCONTINUED | OUTPATIENT
Start: 2022-05-21 | End: 2022-05-21

## 2022-05-21 RX ORDER — LABETALOL HCL 20 MG/4 ML
10 SYRINGE (ML) INTRAVENOUS EVERY 6 HOURS PRN
Status: DISCONTINUED | OUTPATIENT
Start: 2022-05-21 | End: 2022-05-26 | Stop reason: HOSPADM

## 2022-05-21 RX ORDER — LORAZEPAM 2 MG/ML
1 INJECTION INTRAMUSCULAR EVERY 6 HOURS PRN
Status: DISCONTINUED | OUTPATIENT
Start: 2022-05-21 | End: 2022-05-25

## 2022-05-21 RX ORDER — DIPHENHYDRAMINE HCL 25 MG
25 CAPSULE ORAL NIGHTLY PRN
Status: DISCONTINUED | OUTPATIENT
Start: 2022-05-21 | End: 2022-05-22

## 2022-05-21 RX ORDER — ACETAMINOPHEN 325 MG/1
650 TABLET ORAL EVERY 4 HOURS PRN
Status: DISCONTINUED | OUTPATIENT
Start: 2022-05-21 | End: 2022-05-26 | Stop reason: HOSPADM

## 2022-05-21 RX ORDER — NIFEDIPINE 30 MG/1
120 TABLET, EXTENDED RELEASE ORAL
Status: DISCONTINUED | OUTPATIENT
Start: 2022-05-21 | End: 2022-05-23

## 2022-05-21 RX ORDER — FAMOTIDINE 20 MG/1
20 TABLET, FILM COATED ORAL DAILY
Status: DISCONTINUED | OUTPATIENT
Start: 2022-05-22 | End: 2022-05-22

## 2022-05-21 RX ORDER — HYDRALAZINE HYDROCHLORIDE 20 MG/ML
5 INJECTION INTRAMUSCULAR; INTRAVENOUS EVERY 8 HOURS PRN
Status: DISCONTINUED | OUTPATIENT
Start: 2022-05-21 | End: 2022-05-26 | Stop reason: HOSPADM

## 2022-05-21 RX ORDER — DOCUSATE SODIUM 100 MG/1
100 CAPSULE, LIQUID FILLED ORAL DAILY
Status: DISCONTINUED | OUTPATIENT
Start: 2022-05-22 | End: 2022-05-26 | Stop reason: HOSPADM

## 2022-05-21 RX ORDER — SODIUM CHLORIDE 0.9 % (FLUSH) 0.9 %
10 SYRINGE (ML) INJECTION
Status: DISCONTINUED | OUTPATIENT
Start: 2022-05-21 | End: 2022-05-26 | Stop reason: HOSPADM

## 2022-05-21 RX ORDER — HYDRALAZINE HYDROCHLORIDE 50 MG/1
100 TABLET, FILM COATED ORAL ONCE
Status: DISCONTINUED | OUTPATIENT
Start: 2022-05-21 | End: 2022-05-22

## 2022-05-21 RX ORDER — METOCLOPRAMIDE 10 MG/1
10 TABLET ORAL DAILY PRN
Status: DISCONTINUED | OUTPATIENT
Start: 2022-05-21 | End: 2022-05-21

## 2022-05-21 RX ORDER — ONDANSETRON 2 MG/ML
4 INJECTION INTRAMUSCULAR; INTRAVENOUS
Status: COMPLETED | OUTPATIENT
Start: 2022-05-21 | End: 2022-05-21

## 2022-05-21 RX ORDER — HYDRALAZINE HYDROCHLORIDE 50 MG/1
100 TABLET, FILM COATED ORAL EVERY 8 HOURS
Status: DISCONTINUED | OUTPATIENT
Start: 2022-05-21 | End: 2022-05-22

## 2022-05-21 RX ADMIN — SODIUM CHLORIDE, SODIUM LACTATE, POTASSIUM CHLORIDE, AND CALCIUM CHLORIDE 1000 ML: .6; .31; .03; .02 INJECTION, SOLUTION INTRAVENOUS at 12:05

## 2022-05-21 RX ADMIN — Medication 10 MG: at 06:05

## 2022-05-21 RX ADMIN — METOCLOPRAMIDE 10 MG: 10 TABLET ORAL at 06:05

## 2022-05-21 RX ADMIN — HYDRALAZINE HYDROCHLORIDE 5 MG: 20 INJECTION, SOLUTION INTRAMUSCULAR; INTRAVENOUS at 05:05

## 2022-05-21 RX ADMIN — LORAZEPAM 2 MG: 2 INJECTION INTRAMUSCULAR; INTRAVENOUS at 12:05

## 2022-05-21 RX ADMIN — LORAZEPAM 2 MG: 2 INJECTION INTRAMUSCULAR; INTRAVENOUS at 01:05

## 2022-05-21 RX ADMIN — NIFEDIPINE 120 MG: 30 TABLET, FILM COATED, EXTENDED RELEASE ORAL at 01:05

## 2022-05-21 RX ADMIN — ONDANSETRON 4 MG: 2 INJECTION INTRAMUSCULAR; INTRAVENOUS at 12:05

## 2022-05-21 RX ADMIN — PYRIDOXINE HYDROCHLORIDE 25 MG: 100 INJECTION, SOLUTION INTRAMUSCULAR; INTRAVENOUS at 01:05

## 2022-05-21 RX ADMIN — LORAZEPAM 1 MG: 2 INJECTION INTRAMUSCULAR; INTRAVENOUS at 11:05

## 2022-05-21 RX ADMIN — ONDANSETRON 4 MG: 2 INJECTION INTRAMUSCULAR; INTRAVENOUS at 11:05

## 2022-05-21 RX ADMIN — DROPERIDOL 2.5 MG: 2.5 INJECTION, SOLUTION INTRAMUSCULAR; INTRAVENOUS at 08:05

## 2022-05-21 NOTE — H&P
"[facility] - Emergency Dept  Hospital Medicine  History & Physical    Patient Name: Alicia Valles  MRN: [id number]  Patient Class: OP- Observation  Admission Date: 2022  Attending Physician: Noe Lundy MD   Primary Care Provider: Primary Doctor No         Patient information was obtained from patient, relative(s), past medical records and ER records.     Subjective:     Principal Problem:Hypertensive urgency    Chief Complaint:   Chief Complaint   Patient presents with    Multiple complaints     Signed out ama at hospital in [place], 11 weeks preg, gastroparesis, anemia, was supposed to be transferred here today        HPI: Alicia Valles is a 27F A2 who is currently 11 weeks pregnant with PMH HTN and T1DM complicated by retinopathy, gastroparesis, and neuropathy who presented to [facility] in [place] and admitted to ICU for uncontrolled hypertension and hyperglycemia. She required cardene gtt at [facility] admission. She was recently discharged AMA from [facility] after a transfer from [facility] for endocrinology evaluation. She was intially admitted to [facility] for HTN encephalopathy and hypoglycemia complicated by a seizure (BG 29). Of note, she reports that she's been admitted for DKA more than 5 times. Patient evaluated by endocrinology who recommend lantus 15U daily and novolog 3units with meals. Patient to be transferred here for higher level of care. She left AMA due to "not wanting to wait" and came to ED here.    Upon admission, she was hypertensive, 184/98, and tachycardic, 117. EKG with sinus tachycardia. Labs were notable for hyperglycemia, elevated BHB, , leukocytosis (20.6), anemia (9.3), hyponatremia (134), low bicarbonate (19), and UA with 2+ occult blood, 3+ protein, 3+ glucose, 1+ ketones, 7 WBC. Pt was given 2L LR bolus, labetalol 10mg IV, nifedipine 120mg PO, ativan 2mg IV x2, and zofran 4mg IV for intractable N/V and HTN control. She was admitted to hospital medicine after " "critical care reported patient safe for floor.      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     Current Outpatient Medications on File Prior to Encounter   Medication Sig    acetaminophen (TYLENOL) 500 MG tablet Take 2,000 mg by mouth daily as needed for Pain.    BD ULTRA-FINE LIGIA PEN NEEDLE 32 gauge x 5/32" Ndle To inject insulin 4-6 times daily.    blood-glucose meter,continuous (DEXCOM G6 ) Misc 1 Device by Misc.(Non-Drug; Combo Route) route once. for 1 dose    blood-glucose sensor (DEXCOM G6 SENSOR) Sandrine 3 Devices by Misc.(Non-Drug; Combo Route) route every 30 days.    blood-glucose transmitter (DEXCOM G6 TRANSMITTER) Sandrine 2 Devices by Misc.(Non-Drug; Combo Route) route every 6 (six) months.    diphenhydrAMINE (BENADRYL) 25 mg capsule Take 25 mg by mouth daily as needed for Insomnia ((if Unisom ineffective)).    docusate sodium (COLACE) 100 MG capsule Take 100 mg by mouth once daily.    doxylamine succinate (UNISOM, DOXYLAMINE, ORAL) Take 1 capsule by mouth nightly as needed (Sleep).    famotidine (PEPCID) 20 MG tablet Take 1 tablet (20 mg total) by mouth once daily.    glucagon (BAQSIMI) 3 mg/actuation Spry 1 each by Nasal route as needed (For Hypoglycemia).    glucose 4 GM chewable tablet Take 4 tablets (16 g total) by mouth as needed for Low blood sugar.    glucose 4 GM chewable tablet Take 6 tablets (24 g total) by mouth as needed for Low blood sugar.    hydrALAZINE (APRESOLINE) 100 MG tablet Take 1 tablet (100 mg total) by mouth every 8 (eight) hours.    insulin aspart U-100 " (NOVOLOG) 100 unit/mL (3 mL) InPn pen Inject 5-6 Units into the skin 3 (three) times daily before meals. May also inject 0-5 Units as needed (hyperglycemia). Sliding Scale: 180 - 230 + 1 unit; 231- 280  + 2 units; 281 - 330 + 3 units; 331 - 380 + 4 units; > 380   + 5 units. MAX TDD 40 units.    insulin glargine (LANTUS) 100 unit/mL injection Inject 11 Units into the skin once daily. Inject 6 units every morning and 5 units every evening.    LORazepam (ATIVAN) 0.5 MG tablet Take 0.5 mg by mouth daily as needed for Anxiety.    NIFEdipine (PROCARDIA-XL) 60 MG (OSM) 24 hr tablet Take 2 tablets (120 mg total) by mouth daily with dinner or evening meal.    PNV,calcium 72/iron/folic acid (PRENATAL VITAMIN) Tab Take 1 tablet by mouth once daily.    prenatal vit 93/iron fum/folic (PRENATAL FORMULA ORAL) Take 1 tablet by mouth once daily.    sertraline (ZOLOFT) 25 MG tablet Take 1 tablet (25 mg total) by mouth once daily.    [DISCONTINUED] aspirin 81 MG Chew Take 1 tablet (81 mg total) by mouth once daily.     Family History       Problem Relation (Age of Onset)    No Known Problems Mother, Father          Tobacco Use    Smoking status: Not on file    Smokeless tobacco: Not on file   Substance and Sexual Activity    Alcohol use: Not Currently    Drug use: Never    Sexual activity: Yes     Review of Systems   Constitutional:  Positive for fatigue. Negative for chills and fever.   HENT:  Negative for sinus pain, sneezing and sore throat.    Eyes:  Negative for photophobia and visual disturbance.   Respiratory:  Positive for shortness of breath. Negative for chest tightness.    Cardiovascular:  Negative for chest pain and palpitations.   Gastrointestinal:  Positive for nausea and vomiting. Negative for constipation and diarrhea.   Endocrine: Negative for polyphagia and polyuria.   Genitourinary:  Negative for dysuria and urgency.   Musculoskeletal:  Negative for arthralgias and myalgias.   Skin:  Negative for color  change and wound.   Neurological:  Positive for light-headedness. Negative for weakness and headaches.   Psychiatric/Behavioral:  Negative for confusion and sleep disturbance.    Objective:     Vital Signs (Most Recent):  Temp: 98.7 °F (37.1 °C) (05/21/22 1037)  Pulse: 105 (05/21/22 1336)  Resp: 15 (05/21/22 1336)  BP: (!) 182/112 (05/21/22 1537)  SpO2: 100 % (05/21/22 1336)   Vital Signs (24h Range):  Temp:  [98.7 °F (37.1 °C)] 98.7 °F (37.1 °C)  Pulse:  [104-117] 105  Resp:  [15-21] 15  SpO2:  [99 %-100 %] 100 %  BP: (176-217)/() 182/112     Weight: 59 kg (130 lb)  Body mass index is 24.56 kg/m².    Physical Exam  Vitals and nursing note reviewed.   Constitutional:       General: She is sleeping. She is not in acute distress.     Appearance: Normal appearance. She is normal weight. She is not ill-appearing.   HENT:      Head: Normocephalic and atraumatic.      Nose: Nose normal.      Mouth/Throat:      Mouth: Mucous membranes are moist.      Pharynx: Oropharynx is clear.   Eyes:      General: No scleral icterus.     Extraocular Movements: Extraocular movements intact.   Cardiovascular:      Rate and Rhythm: Regular rhythm. Tachycardia present.      Pulses: Normal pulses.      Heart sounds: Normal heart sounds.   Pulmonary:      Effort: Pulmonary effort is normal. No respiratory distress.      Breath sounds: Normal breath sounds. No wheezing.   Abdominal:      General: Abdomen is flat. Bowel sounds are normal. There is no distension.      Palpations: Abdomen is soft.      Tenderness: There is no abdominal tenderness.   Musculoskeletal:         General: Normal range of motion.      Cervical back: Normal range of motion and neck supple.   Skin:     General: Skin is warm and dry.      Capillary Refill: Capillary refill takes less than 2 seconds.      Coloration: Skin is not jaundiced.      Findings: No rash.   Neurological:      General: No focal deficit present.      Mental Status: She is oriented to person,  place, and time. Mental status is at baseline.   Psychiatric:         Mood and Affect: Mood normal.         Behavior: Behavior normal.           Significant Labs: All pertinent labs within the past 24 hours have been reviewed.    Significant Imaging: I have reviewed all pertinent imaging results/findings within the past 24 hours.    Assessment/Plan:     * Hypertensive urgency  See HTN      Leukocytosis  WBC 20.6 on admission  UA concerning for UTI - Patient started on Augmentin x5 days  BHB 1.1 - endocrine consulted  Possible stress response    Continue to monitor        UTI in pregnancy, antepartum, first trimester  WBC 20.6 on admission  UA: WBC 7, protein 3+, glucose 3+, ketones 1+, occult blood 2+    Augmentin x 5 days        Spotting in early pregnancy  Spotting on recent admission, continue to monitor      Gastroparesis due to DM  Cont metoclopramide 10mg TID  PRN zofran and phenergan IV for intractable N/V      History of panic attacks  See anxiety      Anxiety  Patient with anxiety during hospital admission  PRN vistaril, ativan  Cont sertraline 25mg daily    Proteinuria  See Diabetes      Anemia  CBC daily  Iron panel WNL      First trimester pregnancy        Chronic hypertension  Patient required recent ICU admission for cardene gtt with planned transfer here for higher level of care    Cont hydralazine 100mg TID and nifedipine 120mg nightly  Uptitrate BP meds for goal of <140/90, per OBGYN      Type 1 diabetes mellitus with other specified complication  Complications: diabetic retinopathy, gastroparesis, neuropathy, and h/o retinal detachment  BHB 1.1 on admission with , K 3.7    Endocrinology consulted, f/u recs  Detemir 8u daily, aspart 3u TID while awaiting recs  Diabetic diet with consistent carbs    VTE Risk Mitigation (From admission, onward)         Ordered     IP VTE LOW RISK PATIENT  Once         05/21/22 1535     IP VTE LOW RISK PATIENT  Once         05/21/22 1535                   Adriana  Matthew ([place] Name: Deniz), MD  Department of Hospital Medicine   [facility] - Emergency Dept

## 2022-05-21 NOTE — ASSESSMENT & PLAN NOTE
Patient required recent ICU admission for cardene gtt with planned transfer here for higher level of care    Cont hydralazine 100mg TID and nifedipine 120mg nightly  Uptitrate BP meds for goal of <140/90, per OBGYN

## 2022-05-21 NOTE — CARE UPDATE
Endocrine consult acknowledged. Full consult note to follow in am.       Patient is a 27F A2 who is currently 11 weeks pregnant with PMH HTN and T1DM complicated by retinopathy, gastroparesis, and neuropathy who presented to [facility] in [place] and admitted to ICU for uncontrolled hypertension and hyperglycemia. . She was recently discharged A[place] from [facility] [place] transfer from [facility] [place] for endocrinology evaluation. She was intially admitted to [facility] [place] for HTN encephalopathy and hypoglycemia complicated by a seizure (BG 29). Of note, she reports that she's been admitted for DKA more than 5 times. Patient to be transferred here for higher level of care.  She was admitted to hospital medicine after critical care reported patient safe for floor.    Recommend the following:  - Start Levemir Flex Pen 6 units in the morning and 5 units in the evening. (Eat a snack before bed if BG is < 100)  -Start Novolog ICR: 1:8 units TIDWM and ICR 1:12 with snacks (Hold if BG < 100 mg/dL) (Likely approx 5 units with each meal and 2 units with snacks)  - Novolog SSI for BG excursions:  180 - 230 + 1 unit  231- 280  + 2 units  281 - 330 + 3 units  331 - 380 + 4 units      > 380   + 5 units    - BG checks q4hr and two hours post-prandial  - Change diet to 45 grams of carbohydrates for meals and 15-25 grams of carbs for snacks   - Ensure prandial insulin is administered 15 minutes before starting to eat or drink.   - Hypoglycemia protocol in place  - If blood glucose greater than 300, please ask patient not to eat food or drink anything other than water until correctional insulin has brought it back below 250        ** Please notify Endocrine for any change and/or advance in diet**

## 2022-05-21 NOTE — ED TRIAGE NOTES
Pt present to ED via personal transportation after leaving AMA from [facility] ICU. Pt stated she was supposed to be transported here this am, but got impatient and came herself instead. Mother is at bedside. Pt is 12 weeks pregnant. Pt c/o hyperemesis, ''unable to keep anything down'' x last 2 days. Pt hx HTN, diabetes T1, seizures, gastroparesis and anemia.

## 2022-05-21 NOTE — ASSESSMENT & PLAN NOTE
Complications: diabetic retinopathy, gastroparesis, neuropathy, and h/o retinal detachment  BHB 1.1 on admission with , K 3.7    Endocrinology consulted, f/u recs  Detemir 8u daily, aspart 3u TID while awaiting recs  Diabetic diet with consistent carbs

## 2022-05-21 NOTE — ED PROVIDER NOTES
Encounter Date: 2022       History     Chief Complaint   Patient presents with    Multiple complaints     Signed out ama at hospital in [place], 11 weeks preg, gastroparesis, anemia, was supposed to be transferred here today     28 y/o  (currently 12 weeks pregnant) with history of HTN, DM, seizures, gastroparesis presents to the ED c/o intractable n/v.  She was admitted in the ICU at a hospital in [place] - plan was to transfer her here this morning for endocrine consult. Unfortunately, she left AMA last night and arrived to the ED by POV this morning.  She reports persistent n/v and inability to tolerate po.  She was receiving compazine, zofran, phenergan with no improvement. She also has uncontrolled HTN - she was on cardene infusion in [place].  She attempted to take 1 of her BP medications (she is on hydralazine and nifedipine) this morning but vomited shortly after so is unsure if it stayed down.  She reports chills, SOB, lightheadedness. She is followed by OBGYN in [place] at [facility]. She denies fever, chest pain, dysuria, diarrhea, abdominal pain, vaginal bleeding, headache.    The history is provided by the patient.     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 Negative for fever.   HENT: Negative for congestion and sore throat.    Eyes: Negative for photophobia and visual disturbance.   Respiratory: Positive for  shortness of breath. Negative for cough.    Cardiovascular: Negative for chest pain.   Gastrointestinal: Positive for nausea and vomiting. Negative for abdominal pain, constipation and diarrhea.   Genitourinary: Negative for dysuria, hematuria and vaginal bleeding.   Musculoskeletal: Negative for back pain.   Skin: Negative for rash and wound.   Neurological: Positive for light-headedness. Negative for syncope, weakness and headaches.   Psychiatric/Behavioral: Negative for confusion.       Physical Exam     Initial Vitals [05/21/22 1037]   BP Pulse Resp Temp SpO2   (!) 184/98 (!) 117 18 98.7 °F (37.1 °C) 99 %      MAP       --         Physical Exam    Nursing note and vitals reviewed.  Constitutional: She appears well-developed and well-nourished. She is not diaphoretic. No distress.   HENT:   Head: Normocephalic and atraumatic.   Neck: Neck supple.   Normal range of motion.  Cardiovascular: Regular rhythm and normal heart sounds. Tachycardia present.  Exam reveals no gallop and no friction rub.    No murmur heard.  Pulmonary/Chest: Breath sounds normal. She has no wheezes. She has no rhonchi. She has no rales.   Abdominal: Abdomen is soft. Bowel sounds are normal. There is no abdominal tenderness. There is no rebound and no guarding.   Musculoskeletal:         General: Normal range of motion.      Cervical back: Normal range of motion and neck supple.     Neurological: She is alert and oriented to person, place, and time.   Skin: Skin is warm and dry.   Psychiatric:   Flat affect         ED Course   Procedures  Labs Reviewed   CBC W/ AUTO DIFFERENTIAL - Abnormal; Notable for the following components:       Result Value    WBC 20.60 (*)     RBC 3.12 (*)     Hemoglobin 9.3 (*)     Hematocrit 26.9 (*)     Immature Granulocytes 1.0 (*)     Gran # (ANC) 17.4 (*)     Immature Grans (Abs) 0.20 (*)     Gran % 84.6 (*)     Lymph % 9.8 (*)     All other components within normal limits   COMPREHENSIVE METABOLIC PANEL -  Abnormal; Notable for the following components:    Sodium 134 (*)     CO2 19 (*)     Glucose 198 (*)     Calcium 7.9 (*)     Albumin 2.6 (*)     AST 43 (*)     All other components within normal limits   BETA - HYDROXYBUTYRATE, SERUM - Abnormal; Notable for the following components:    Beta-Hydroxybutyrate 1.1 (*)     All other components within normal limits   URINALYSIS, REFLEX TO URINE CULTURE - Abnormal; Notable for the following components:    Appearance, UA Cloudy (*)     Protein, UA 3+ (*)     Glucose, UA 3+ (*)     Ketones, UA 1+ (*)     Occult Blood UA 2+ (*)     All other components within normal limits    Narrative:     Specimen Source->Urine   URINALYSIS MICROSCOPIC - Abnormal; Notable for the following components:    RBC, UA 5 (*)     WBC, UA 7 (*)     All other components within normal limits    Narrative:     Specimen Source->Urine   POCT GLUCOSE - Abnormal; Notable for the following components:    POCT Glucose 201 (*)     All other components within normal limits   HIV 1 / 2 ANTIBODY   SARS-COV-2 RDRP GENE   POCT GLUCOSE MONITORING CONTINUOUS   POCT GLUCOSE MONITORING CONTINUOUS        ECG Results          EKG 12-lead (Final result)  Result time 05/21/22 14:31:23    Final result by Interface, Lab In [facility] (05/21/22 14:31:23)                 Narrative:    Test Reason : I10,    Vent. Rate : 111 BPM     Atrial Rate : 111 BPM     P-R Int : 130 ms          QRS Dur : 066 ms      QT Int : 328 ms       P-R-T Axes : 060 100 054 degrees     QTc Int : 446 ms    Sinus tachycardia  Rightward axis  Borderline Abnormal ECG  When compared with ECG of 11-MAY-2022 17:44,  No significant change was found  Confirmed by Elias ROBERTSON MD ([id number]) on 5/21/2022 2:31:12 PM    Referred By: [email or web address]   SELF           Confirmed By:Elias ROBERTSON MD                            Imaging Results    None          Medications   droperidoL injection 2.5 mg (has no administration in time range)   hydrALAZINE tablet 100 mg (100 mg Oral Not  Given 5/21/22 1544)   sodium chloride 0.9% flush 10 mL (has no administration in time range)   docusate sodium capsule 100 mg (has no administration in time range)   famotidine tablet 20 mg (has no administration in time range)   hydrALAZINE tablet 100 mg (has no administration in time range)   NIFEdipine 24 hr tablet 120 mg (0 mg Oral Hold 5/21/22 1700)   sertraline tablet 25 mg (has no administration in time range)   prenatal vitamin oral tablet (has no administration in time range)   diphenhydrAMINE capsule 25 mg (has no administration in time range)   sodium chloride 0.9% flush 10 mL (has no administration in time range)   acetaminophen tablet 650 mg (has no administration in time range)   polyethylene glycol packet 17 g (has no administration in time range)   ondansetron injection 4 mg (has no administration in time range)   promethazine (PHENERGAN) 6.25 mg in dextrose 5 % 50 mL IVPB (has no administration in time range)   metoclopramide HCl tablet 10 mg (10 mg Oral Not Given 5/21/22 1645)   labetalol 20 mg/4 mL (5 mg/mL) IV syring (has no administration in time range)   hydrALAZINE injection 5 mg (5 mg Intravenous Given 5/21/22 1726)   glucose chewable tablet 16 g (has no administration in time range)   glucose chewable tablet 24 g (has no administration in time range)   glucagon (human recombinant) injection 1 mg (has no administration in time range)   dextrose 10% bolus 125 mL (has no administration in time range)   dextrose 10% bolus 250 mL (has no administration in time range)   insulin detemir U-100 pen 8 Units (has no administration in time range)   insulin aspart U-100 pen 3 Units (has no administration in time range)   insulin aspart U-100 pen 0-5 Units (has no administration in time range)   lorazepam injection 1 mg (has no administration in time range)   amoxicillin-clavulanate 875-125mg per tablet 1 tablet (has no administration in time range)   ondansetron injection 4 mg (4 mg Intravenous Given  22 1220)   lorazepam injection 2 mg (2 mg Intravenous Given 22 1220)   lactated ringers bolus 1,000 mL ( Intravenous Restarted 22 1336)   pyridoxine (vitamin B6) (B-6) 25 mg in sodium chloride 0.9% 50 mL IVPB (0 mg Intravenous Stopped 22 1344)   NIFEdipine 24 hr tablet 120 mg (120 mg Oral Given 22 1334)   lactated ringers bolus 1,000 mL (1,000 mLs Intravenous Bolus from Bag 22 1536)   lorazepam injection 2 mg (2 mg Intravenous Given 22 1335)     Medical Decision Making:   History:   Old Medical Records: I decided to obtain old medical records.  Old Records Summarized: records from clinic visits and records from previous admission(s).       <> Summary of Records: Admitted - for HTN, DM. Was hypoglycemic and had a seizure. Endocrine consult  Admitted at OSH - planned to transfer to [facility] ICU for endocrine consult but she left AMA  Clinical Tests:   Lab Tests: Ordered and Reviewed  Medical Tests: Ordered and Reviewed  Other:   I have discussed this case with another health care provider.       <> Summary of the Discussion: Critical care, hospital medicine       APC / Resident Notes:   28 y/o  (currently 12 weeks pregnant) with history of HTN, DM, seizures, gastroparesis presents to the ED c/o intractable n/v.  Tachycardic, hypertensive. Lungs clear. Abdomen soft and nontender. Flat affect. Transfer records reviewed. She signed out AMA from hospital last night, presented to the ED by POV today. DDx includes but is not limited to hypertensive urgency/emergency, DKA, hyperemesis gravidarum, KOBY, electrolyte abnormality. Will get labs, give IVF, zofran, B6, ativan.    EKG shows sinus tachycardia, no prolonged QTc.     COVID negative. Leukocytosis noted with WBC 20.60. anemia with Hgb 9.3. Cr normal. Glucose 198, no signs of DKA. Beta hydroxybutyrate elevated at 1.1. UA: + ketones, + protein, +sugar.    After IV medication, she was able to tolerate her oral meds. BP improved.      Discussed with Critical care, if she is no longer requiring cardene gtt, then she does not need to go to ICU.    Placed in observation to hospital medicine for further management of hypertension, hyperglycemia, intractable n/v.                 Clinical Impression:   Final diagnoses:  [I10] Hypertension (Primary)  [R11.2] Nausea and vomiting  [Z34.90] Pregnancy, unspecified gestational age  [R11.10] Hyperemesis          ED Disposition Condition    Observation               Abbey Laurent PA-C  05/21/22 5549

## 2022-05-21 NOTE — HPI
"Alicia Valles is a 27F A2 who is currently 11 weeks pregnant with PMH HTN and T1DM complicated by retinopathy, gastroparesis, and neuropathy who presented to [facility] in [place] and admitted to ICU for uncontrolled hypertension and hyperglycemia. She required cardene gtt at [facility] admission. She was recently discharged AMA from [facility] after a transfer from [facility] for endocrinology evaluation. She was intially admitted to [facility] for HTN encephalopathy and hypoglycemia complicated by a seizure (BG 29). Of note, she reports that she's been admitted for DKA more than 5 times. Patient evaluated by endocrinology who recommend lantus 15U daily and novolog 3units with meals. Patient to be transferred here for higher level of care. She left AMA due to "not wanting to wait" and came to ED here.    Upon admission, she was hypertensive, 184/98, and tachycardic, 117. EKG with sinus tachycardia. Labs were notable for hyperglycemia, elevated BHB, , leukocytosis (20.6), anemia (9.3), hyponatremia (134), low bicarbonate (19), and UA with 2+ occult blood, 3+ protein, 3+ glucose, 1+ ketones, 7 WBC. Pt was given 2L LR bolus, labetalol 10mg IV, nifedipine 120mg PO, ativan 2mg IV x2, and zofran 4mg IV for intractable N/V and HTN control. She was admitted to hospital medicine after critical care reported patient safe for floor.  "

## 2022-05-21 NOTE — ASSESSMENT & PLAN NOTE
WBC 20.6 on admission  UA concerning for UTI - Patient started on Augmentin x5 days  BHB 1.1 - endocrine consulted  Possible stress response    Continue to monitor

## 2022-05-21 NOTE — SUBJECTIVE & OBJECTIVE
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     Current Outpatient Medications on File Prior to Encounter   Medication Sig    acetaminophen (TYLENOL) 500 MG tablet Take 2,000 mg by mouth daily as needed for Pain.    BD ULTRA-FINE LIGIA PEN NEEDLE 32 gauge x 5/32" Ndle To inject insulin 4-6 times daily.    blood-glucose meter,continuous (DEXCOM G6 ) Misc 1 Device by Misc.(Non-Drug; Combo Route) route once. for 1 dose    blood-glucose sensor (DEXCOM G6 SENSOR) Sandrine 3 Devices by Misc.(Non-Drug; Combo Route) route every 30 days.    blood-glucose transmitter (DEXCOM G6 TRANSMITTER) Sandrine 2 Devices by Misc.(Non-Drug; Combo Route) route every 6 (six) months.    diphenhydrAMINE (BENADRYL) 25 mg capsule Take 25 mg by mouth daily as needed for Insomnia ((if Unisom ineffective)).    docusate sodium (COLACE) 100 MG capsule Take 100 mg by mouth once daily.    doxylamine succinate (UNISOM, DOXYLAMINE, ORAL) Take 1 capsule by mouth nightly as needed (Sleep).    famotidine (PEPCID) 20 MG tablet Take 1 tablet (20 mg total) by mouth once daily.    glucagon (BAQSIMI) 3 mg/actuation Spry 1 each by Nasal route as needed (For Hypoglycemia).    glucose 4 GM chewable tablet Take 4 tablets (16 g total) by mouth as needed for Low blood sugar.    glucose 4 GM chewable tablet Take 6 tablets (24 g total) by mouth as needed for Low blood sugar.    hydrALAZINE (APRESOLINE) 100 MG tablet Take 1 tablet (100 mg total) by mouth every 8 (eight) hours.    insulin aspart U-100 (NOVOLOG) 100 unit/mL (3 mL) InPn pen Inject 5-6 Units into the skin 3 " (three) times daily before meals. May also inject 0-5 Units as needed (hyperglycemia). Sliding Scale: 180 - 230 + 1 unit; 231- 280  + 2 units; 281 - 330 + 3 units; 331 - 380 + 4 units; > 380   + 5 units. MAX TDD 40 units.    insulin glargine (LANTUS) 100 unit/mL injection Inject 11 Units into the skin once daily. Inject 6 units every morning and 5 units every evening.    LORazepam (ATIVAN) 0.5 MG tablet Take 0.5 mg by mouth daily as needed for Anxiety.    NIFEdipine (PROCARDIA-XL) 60 MG (OSM) 24 hr tablet Take 2 tablets (120 mg total) by mouth daily with dinner or evening meal.    PNV,calcium 72/iron/folic acid (PRENATAL VITAMIN) Tab Take 1 tablet by mouth once daily.    prenatal vit 93/iron fum/folic (PRENATAL FORMULA ORAL) Take 1 tablet by mouth once daily.    sertraline (ZOLOFT) 25 MG tablet Take 1 tablet (25 mg total) by mouth once daily.    [DISCONTINUED] aspirin 81 MG Chew Take 1 tablet (81 mg total) by mouth once daily.     Family History       Problem Relation (Age of Onset)    No Known Problems Mother, Father          Tobacco Use    Smoking status: Not on file    Smokeless tobacco: Not on file   Substance and Sexual Activity    Alcohol use: Not Currently    Drug use: Never    Sexual activity: Yes     Review of Systems   Constitutional:  Positive for fatigue. Negative for chills and fever.   HENT:  Negative for sinus pain, sneezing and sore throat.    Eyes:  Negative for photophobia and visual disturbance.   Respiratory:  Positive for shortness of breath. Negative for chest tightness.    Cardiovascular:  Negative for chest pain and palpitations.   Gastrointestinal:  Positive for nausea and vomiting. Negative for constipation and diarrhea.   Endocrine: Negative for polyphagia and polyuria.   Genitourinary:  Negative for dysuria and urgency.   Musculoskeletal:  Negative for arthralgias and myalgias.   Skin:  Negative for color change and wound.   Neurological:  Positive for light-headedness. Negative for  weakness and headaches.   Psychiatric/Behavioral:  Negative for confusion and sleep disturbance.    Objective:     Vital Signs (Most Recent):  Temp: 98.7 °F (37.1 °C) (05/21/22 1037)  Pulse: 105 (05/21/22 1336)  Resp: 15 (05/21/22 1336)  BP: (!) 182/112 (05/21/22 1537)  SpO2: 100 % (05/21/22 1336)   Vital Signs (24h Range):  Temp:  [98.7 °F (37.1 °C)] 98.7 °F (37.1 °C)  Pulse:  [104-117] 105  Resp:  [15-21] 15  SpO2:  [99 %-100 %] 100 %  BP: (176-217)/() 182/112     Weight: 59 kg (130 lb)  Body mass index is 24.56 kg/m².    Physical Exam  Vitals and nursing note reviewed.   Constitutional:       General: She is sleeping. She is not in acute distress.     Appearance: Normal appearance. She is normal weight. She is not ill-appearing.   HENT:      Head: Normocephalic and atraumatic.      Nose: Nose normal.      Mouth/Throat:      Mouth: Mucous membranes are moist.      Pharynx: Oropharynx is clear.   Eyes:      General: No scleral icterus.     Extraocular Movements: Extraocular movements intact.   Cardiovascular:      Rate and Rhythm: Regular rhythm. Tachycardia present.      Pulses: Normal pulses.      Heart sounds: Normal heart sounds.   Pulmonary:      Effort: Pulmonary effort is normal. No respiratory distress.      Breath sounds: Normal breath sounds. No wheezing.   Abdominal:      General: Abdomen is flat. Bowel sounds are normal. There is no distension.      Palpations: Abdomen is soft.      Tenderness: There is no abdominal tenderness.   Musculoskeletal:         General: Normal range of motion.      Cervical back: Normal range of motion and neck supple.   Skin:     General: Skin is warm and dry.      Capillary Refill: Capillary refill takes less than 2 seconds.      Coloration: Skin is not jaundiced.      Findings: No rash.   Neurological:      General: No focal deficit present.      Mental Status: She is oriented to person, place, and time. Mental status is at baseline.   Psychiatric:         Mood and  Affect: Mood normal.         Behavior: Behavior normal.           Significant Labs: All pertinent labs within the past 24 hours have been reviewed.    Significant Imaging: I have reviewed all pertinent imaging results/findings within the past 24 hours.

## 2022-05-21 NOTE — ASSESSMENT & PLAN NOTE
WBC 20.6 on admission  UA: WBC 7, protein 3+, glucose 3+, ketones 1+, occult blood 2+    Augmentin x 5 days       Bilateral Helical Rim Advancement Flap Text: The defect edges were debeveled with a #15 blade scalpel.  Given the location of the defect and the proximity to free margins (helical rim) a bilateral helical rim advancement flap was deemed most appropriate.  Using a sterile surgical marker, the appropriate advancement flaps were drawn incorporating the defect and placing the expected incisions between the helical rim and antihelix where possible.  The area thus outlined was incised through and through with a #15 scalpel blade.  With a skin hook and iris scissors, the flaps were gently and sharply undermined and freed up.

## 2022-05-22 LAB
ALBUMIN SERPL BCP-MCNC: 2.2 G/DL (ref 3.5–5.2)
ALP SERPL-CCNC: 54 U/L (ref 55–135)
ALT SERPL W/O P-5'-P-CCNC: 24 U/L (ref 10–44)
ANION GAP SERPL CALC-SCNC: 11 MMOL/L (ref 8–16)
ANION GAP SERPL CALC-SCNC: 11 MMOL/L (ref 8–16)
AST SERPL-CCNC: 27 U/L (ref 10–40)
BASOPHILS # BLD AUTO: 0.02 K/UL (ref 0–0.2)
BASOPHILS NFR BLD: 0.1 % (ref 0–1.9)
BILIRUB SERPL-MCNC: 0.2 MG/DL (ref 0.1–1)
BUN SERPL-MCNC: 18 MG/DL (ref 6–20)
BUN SERPL-MCNC: 22 MG/DL (ref 6–20)
CALCIUM SERPL-MCNC: 7.5 MG/DL (ref 8.7–10.5)
CALCIUM SERPL-MCNC: 7.7 MG/DL (ref 8.7–10.5)
CHLORIDE SERPL-SCNC: 100 MMOL/L (ref 95–110)
CHLORIDE SERPL-SCNC: 104 MMOL/L (ref 95–110)
CO2 SERPL-SCNC: 17 MMOL/L (ref 23–29)
CO2 SERPL-SCNC: 18 MMOL/L (ref 23–29)
CREAT SERPL-MCNC: 0.9 MG/DL (ref 0.5–1.4)
CREAT SERPL-MCNC: 1.4 MG/DL (ref 0.5–1.4)
DIFFERENTIAL METHOD: ABNORMAL
EOSINOPHIL # BLD AUTO: 0 K/UL (ref 0–0.5)
EOSINOPHIL NFR BLD: 0 % (ref 0–8)
ERYTHROCYTE [DISTWIDTH] IN BLOOD BY AUTOMATED COUNT: 13.6 % (ref 11.5–14.5)
EST. GFR  (AFRICAN AMERICAN): 58.6 ML/MIN/1.73 M^2
EST. GFR  (AFRICAN AMERICAN): >60 ML/MIN/1.73 M^2
EST. GFR  (NON AFRICAN AMERICAN): 50.8 ML/MIN/1.73 M^2
EST. GFR  (NON AFRICAN AMERICAN): >60 ML/MIN/1.73 M^2
GLUCOSE SERPL-MCNC: 228 MG/DL (ref 70–110)
GLUCOSE SERPL-MCNC: 264 MG/DL (ref 70–110)
HCT VFR BLD AUTO: 27.6 % (ref 37–48.5)
HGB BLD-MCNC: 9.4 G/DL (ref 12–16)
IMM GRANULOCYTES # BLD AUTO: 0.13 K/UL (ref 0–0.04)
IMM GRANULOCYTES NFR BLD AUTO: 0.8 % (ref 0–0.5)
LYMPHOCYTES # BLD AUTO: 1.6 K/UL (ref 1–4.8)
LYMPHOCYTES NFR BLD: 9.5 % (ref 18–48)
MAGNESIUM SERPL-MCNC: 1.7 MG/DL (ref 1.6–2.6)
MCH RBC QN AUTO: 29.8 PG (ref 27–31)
MCHC RBC AUTO-ENTMCNC: 34.1 G/DL (ref 32–36)
MCV RBC AUTO: 88 FL (ref 82–98)
MONOCYTES # BLD AUTO: 0.6 K/UL (ref 0.3–1)
MONOCYTES NFR BLD: 3.4 % (ref 4–15)
NEUTROPHILS # BLD AUTO: 14.9 K/UL (ref 1.8–7.7)
NEUTROPHILS NFR BLD: 86.2 % (ref 38–73)
NRBC BLD-RTO: 0 /100 WBC
PHOSPHATE SERPL-MCNC: 4.1 MG/DL (ref 2.7–4.5)
PLATELET # BLD AUTO: 257 K/UL (ref 150–450)
PMV BLD AUTO: 11.1 FL (ref 9.2–12.9)
POCT GLUCOSE: 207 MG/DL (ref 70–110)
POCT GLUCOSE: 217 MG/DL (ref 70–110)
POCT GLUCOSE: 276 MG/DL (ref 70–110)
POCT GLUCOSE: 289 MG/DL (ref 70–110)
POCT GLUCOSE: 406 MG/DL (ref 70–110)
POTASSIUM SERPL-SCNC: 3.6 MMOL/L (ref 3.5–5.1)
POTASSIUM SERPL-SCNC: 3.6 MMOL/L (ref 3.5–5.1)
PROT SERPL-MCNC: 5.3 G/DL (ref 6–8.4)
RBC # BLD AUTO: 3.15 M/UL (ref 4–5.4)
SODIUM SERPL-SCNC: 128 MMOL/L (ref 136–145)
SODIUM SERPL-SCNC: 133 MMOL/L (ref 136–145)
WBC # BLD AUTO: 17.3 K/UL (ref 3.9–12.7)

## 2022-05-22 PROCEDURE — 20000000 HC ICU ROOM

## 2022-05-22 PROCEDURE — C9399 UNCLASSIFIED DRUGS OR BIOLOG: HCPCS

## 2022-05-22 PROCEDURE — 95251 PR GLUCOSE MONITOR, 72 HOUR, PHYS INTERP: ICD-10-PCS | Mod: TH,,, | Performed by: INTERNAL MEDICINE

## 2022-05-22 PROCEDURE — 36410 VNPNXR 3YR/> PHY/QHP DX/THER: CPT

## 2022-05-22 PROCEDURE — 85025 COMPLETE CBC W/AUTO DIFF WBC: CPT

## 2022-05-22 PROCEDURE — 80048 BASIC METABOLIC PNL TOTAL CA: CPT | Mod: XB

## 2022-05-22 PROCEDURE — 93005 ELECTROCARDIOGRAM TRACING: CPT

## 2022-05-22 PROCEDURE — 36415 COLL VENOUS BLD VENIPUNCTURE: CPT

## 2022-05-22 PROCEDURE — 93010 EKG 12-LEAD: ICD-10-PCS | Mod: ,,, | Performed by: INTERNAL MEDICINE

## 2022-05-22 PROCEDURE — 63600175 PHARM REV CODE 636 W HCPCS

## 2022-05-22 PROCEDURE — 80053 COMPREHEN METABOLIC PANEL: CPT | Performed by: STUDENT IN AN ORGANIZED HEALTH CARE EDUCATION/TRAINING PROGRAM

## 2022-05-22 PROCEDURE — 84100 ASSAY OF PHOSPHORUS: CPT

## 2022-05-22 PROCEDURE — 95251 CONT GLUC MNTR ANALYSIS I&R: CPT | Mod: TH,,, | Performed by: INTERNAL MEDICINE

## 2022-05-22 PROCEDURE — 96372 THER/PROPH/DIAG INJ SC/IM: CPT

## 2022-05-22 PROCEDURE — 63600175 PHARM REV CODE 636 W HCPCS: Performed by: STUDENT IN AN ORGANIZED HEALTH CARE EDUCATION/TRAINING PROGRAM

## 2022-05-22 PROCEDURE — 99214 PR OFFICE/OUTPT VISIT, EST, LEVL IV, 30-39 MIN: ICD-10-PCS | Mod: TH,,, | Performed by: INTERNAL MEDICINE

## 2022-05-22 PROCEDURE — 25000003 PHARM REV CODE 250

## 2022-05-22 PROCEDURE — 99214 OFFICE O/P EST MOD 30 MIN: CPT | Mod: TH,,, | Performed by: INTERNAL MEDICINE

## 2022-05-22 PROCEDURE — 99225 PR SUBSEQUENT OBSERVATION CARE,LEVEL II: CPT | Mod: ,,, | Performed by: HOSPITALIST

## 2022-05-22 PROCEDURE — 83735 ASSAY OF MAGNESIUM: CPT

## 2022-05-22 PROCEDURE — G0378 HOSPITAL OBSERVATION PER HR: HCPCS

## 2022-05-22 PROCEDURE — C1751 CATH, INF, PER/CENT/MIDLINE: HCPCS

## 2022-05-22 PROCEDURE — 96372 THER/PROPH/DIAG INJ SC/IM: CPT | Performed by: STUDENT IN AN ORGANIZED HEALTH CARE EDUCATION/TRAINING PROGRAM

## 2022-05-22 PROCEDURE — 93010 ELECTROCARDIOGRAM REPORT: CPT | Mod: ,,, | Performed by: INTERNAL MEDICINE

## 2022-05-22 PROCEDURE — 51798 US URINE CAPACITY MEASURE: CPT

## 2022-05-22 PROCEDURE — 99225 PR SUBSEQUENT OBSERVATION CARE,LEVEL II: ICD-10-PCS | Mod: ,,, | Performed by: HOSPITALIST

## 2022-05-22 PROCEDURE — 25000003 PHARM REV CODE 250: Performed by: STUDENT IN AN ORGANIZED HEALTH CARE EDUCATION/TRAINING PROGRAM

## 2022-05-22 PROCEDURE — 63600175 PHARM REV CODE 636 W HCPCS: Performed by: NURSE PRACTITIONER

## 2022-05-22 RX ORDER — HYDRALAZINE HYDROCHLORIDE 20 MG/ML
20 INJECTION INTRAMUSCULAR; INTRAVENOUS EVERY 6 HOURS
Status: DISCONTINUED | OUTPATIENT
Start: 2022-05-22 | End: 2022-05-22

## 2022-05-22 RX ORDER — ONDANSETRON 2 MG/ML
4 INJECTION INTRAMUSCULAR; INTRAVENOUS EVERY 8 HOURS PRN
Status: DISCONTINUED | OUTPATIENT
Start: 2022-05-22 | End: 2022-05-23

## 2022-05-22 RX ORDER — GLUCAGON 1 MG
1 KIT INJECTION
Status: DISCONTINUED | OUTPATIENT
Start: 2022-05-22 | End: 2022-05-26 | Stop reason: HOSPADM

## 2022-05-22 RX ORDER — HYDRALAZINE HYDROCHLORIDE 50 MG/1
100 TABLET, FILM COATED ORAL EVERY 8 HOURS
Status: DISCONTINUED | OUTPATIENT
Start: 2022-05-22 | End: 2022-05-22

## 2022-05-22 RX ORDER — LABETALOL HCL 20 MG/4 ML
10 SYRINGE (ML) INTRAVENOUS EVERY 6 HOURS
Status: DISCONTINUED | OUTPATIENT
Start: 2022-05-22 | End: 2022-05-23

## 2022-05-22 RX ORDER — IBUPROFEN 200 MG
24 TABLET ORAL
Status: DISCONTINUED | OUTPATIENT
Start: 2022-05-22 | End: 2022-05-26 | Stop reason: HOSPADM

## 2022-05-22 RX ORDER — INSULIN ASPART 100 [IU]/ML
5 INJECTION, SOLUTION INTRAVENOUS; SUBCUTANEOUS
Status: DISCONTINUED | OUTPATIENT
Start: 2022-05-22 | End: 2022-05-22

## 2022-05-22 RX ORDER — IBUPROFEN 200 MG
16 TABLET ORAL
Status: DISCONTINUED | OUTPATIENT
Start: 2022-05-22 | End: 2022-05-26 | Stop reason: HOSPADM

## 2022-05-22 RX ORDER — DIPHENHYDRAMINE HYDROCHLORIDE 50 MG/ML
12.5 INJECTION INTRAMUSCULAR; INTRAVENOUS EVERY 6 HOURS
Status: DISCONTINUED | OUTPATIENT
Start: 2022-05-23 | End: 2022-05-26 | Stop reason: HOSPADM

## 2022-05-22 RX ORDER — CALCIUM CARBONATE 200(500)MG
500 TABLET,CHEWABLE ORAL 3 TIMES DAILY PRN
Status: DISCONTINUED | OUTPATIENT
Start: 2022-05-22 | End: 2022-05-26 | Stop reason: HOSPADM

## 2022-05-22 RX ORDER — DIPHENHYDRAMINE HYDROCHLORIDE 50 MG/ML
6.25 INJECTION INTRAMUSCULAR; INTRAVENOUS EVERY 6 HOURS PRN
Status: DISCONTINUED | OUTPATIENT
Start: 2022-05-22 | End: 2022-05-22

## 2022-05-22 RX ORDER — PYRIDOXINE HCL (VITAMIN B6) 25 MG
25 TABLET ORAL EVERY 8 HOURS
Status: DISCONTINUED | OUTPATIENT
Start: 2022-05-22 | End: 2022-05-26 | Stop reason: HOSPADM

## 2022-05-22 RX ORDER — DIPHENHYDRAMINE HYDROCHLORIDE 50 MG/ML
12.5 INJECTION INTRAMUSCULAR; INTRAVENOUS EVERY 6 HOURS PRN
Status: DISCONTINUED | OUTPATIENT
Start: 2022-05-22 | End: 2022-05-26 | Stop reason: HOSPADM

## 2022-05-22 RX ORDER — HYDRALAZINE HYDROCHLORIDE 20 MG/ML
20 INJECTION INTRAMUSCULAR; INTRAVENOUS EVERY 8 HOURS
Status: DISCONTINUED | OUTPATIENT
Start: 2022-05-22 | End: 2022-05-25

## 2022-05-22 RX ORDER — FAMOTIDINE 20 MG/1
40 TABLET, FILM COATED ORAL DAILY
Status: DISCONTINUED | OUTPATIENT
Start: 2022-05-23 | End: 2022-05-26 | Stop reason: HOSPADM

## 2022-05-22 RX ORDER — METOCLOPRAMIDE HYDROCHLORIDE 5 MG/ML
10 INJECTION INTRAMUSCULAR; INTRAVENOUS EVERY 6 HOURS
Status: DISCONTINUED | OUTPATIENT
Start: 2022-05-22 | End: 2022-05-26 | Stop reason: HOSPADM

## 2022-05-22 RX ORDER — SODIUM CHLORIDE, SODIUM LACTATE, POTASSIUM CHLORIDE, CALCIUM CHLORIDE 600; 310; 30; 20 MG/100ML; MG/100ML; MG/100ML; MG/100ML
INJECTION, SOLUTION INTRAVENOUS CONTINUOUS
Status: ACTIVE | OUTPATIENT
Start: 2022-05-22 | End: 2022-05-22

## 2022-05-22 RX ORDER — DIPHENHYDRAMINE HCL 25 MG
25 CAPSULE ORAL
Status: DISCONTINUED | OUTPATIENT
Start: 2022-05-22 | End: 2022-05-22

## 2022-05-22 RX ORDER — INSULIN ASPART 100 [IU]/ML
1-10 INJECTION, SOLUTION INTRAVENOUS; SUBCUTANEOUS
Status: DISCONTINUED | OUTPATIENT
Start: 2022-05-22 | End: 2022-05-26 | Stop reason: HOSPADM

## 2022-05-22 RX ORDER — INSULIN ASPART 100 [IU]/ML
0-5 INJECTION, SOLUTION INTRAVENOUS; SUBCUTANEOUS
Status: DISCONTINUED | OUTPATIENT
Start: 2022-05-22 | End: 2022-05-26 | Stop reason: HOSPADM

## 2022-05-22 RX ADMIN — LABETALOL HCL IV SOLN PREFILLED SYRINGE 20 MG/4ML (5 MG/ML) 10 MG: 20/4 SOLUTION PREFILLED SYRINGE at 06:05

## 2022-05-22 RX ADMIN — PROMETHAZINE HYDROCHLORIDE 6.25 MG: 25 INJECTION INTRAMUSCULAR; INTRAVENOUS at 02:05

## 2022-05-22 RX ADMIN — HYDRALAZINE HYDROCHLORIDE 20 MG: 20 INJECTION, SOLUTION INTRAMUSCULAR; INTRAVENOUS at 11:05

## 2022-05-22 RX ADMIN — INSULIN ASPART 3 UNITS: 100 INJECTION, SOLUTION INTRAVENOUS; SUBCUTANEOUS at 05:05

## 2022-05-22 RX ADMIN — Medication 25 MG: at 10:05

## 2022-05-22 RX ADMIN — CEFTRIAXONE 1 G: 1 INJECTION, SOLUTION INTRAVENOUS at 02:05

## 2022-05-22 RX ADMIN — Medication 10 MG: at 07:05

## 2022-05-22 RX ADMIN — METOCLOPRAMIDE 10 MG: 10 TABLET ORAL at 04:05

## 2022-05-22 RX ADMIN — LORAZEPAM 1 MG: 2 INJECTION INTRAMUSCULAR; INTRAVENOUS at 11:05

## 2022-05-22 RX ADMIN — PROMETHAZINE HYDROCHLORIDE 6.25 MG: 25 INJECTION INTRAMUSCULAR; INTRAVENOUS at 07:05

## 2022-05-22 RX ADMIN — HYDRALAZINE HYDROCHLORIDE 20 MG: 20 INJECTION, SOLUTION INTRAMUSCULAR; INTRAVENOUS at 10:05

## 2022-05-22 RX ADMIN — METOCLOPRAMIDE 10 MG: 10 TABLET ORAL at 11:05

## 2022-05-22 RX ADMIN — FAMOTIDINE 20 MG: 20 TABLET ORAL at 08:05

## 2022-05-22 RX ADMIN — INSULIN DETEMIR 5 UNITS: 100 INJECTION, SOLUTION SUBCUTANEOUS at 08:05

## 2022-05-22 RX ADMIN — METOCLOPRAMIDE 10 MG: 10 TABLET ORAL at 06:05

## 2022-05-22 RX ADMIN — PRENATAL VIT W/ FE FUMARATE-FA TAB 27-0.8 MG 1 TABLET: 27-0.8 TAB at 08:05

## 2022-05-22 RX ADMIN — HYDRALAZINE HYDROCHLORIDE 20 MG: 20 INJECTION, SOLUTION INTRAMUSCULAR; INTRAVENOUS at 05:05

## 2022-05-22 RX ADMIN — DOCUSATE SODIUM 100 MG: 100 CAPSULE, LIQUID FILLED ORAL at 08:05

## 2022-05-22 RX ADMIN — HYDRALAZINE HYDROCHLORIDE 100 MG: 50 TABLET ORAL at 03:05

## 2022-05-22 RX ADMIN — HYDRALAZINE HYDROCHLORIDE 5 MG: 20 INJECTION, SOLUTION INTRAMUSCULAR; INTRAVENOUS at 09:05

## 2022-05-22 RX ADMIN — DIPHENHYDRAMINE HYDROCHLORIDE 25 MG: 25 CAPSULE ORAL at 01:05

## 2022-05-22 RX ADMIN — PYRIDOXINE HYDROCHLORIDE 25 MG: 100 INJECTION, SOLUTION INTRAMUSCULAR; INTRAVENOUS at 08:05

## 2022-05-22 RX ADMIN — INSULIN DETEMIR 5 UNITS: 100 INJECTION, SOLUTION SUBCUTANEOUS at 10:05

## 2022-05-22 RX ADMIN — SODIUM CHLORIDE, SODIUM LACTATE, POTASSIUM CHLORIDE, AND CALCIUM CHLORIDE: 600; 310; 30; 20 INJECTION, SOLUTION INTRAVENOUS at 11:05

## 2022-05-22 RX ADMIN — SODIUM CHLORIDE, SODIUM LACTATE, POTASSIUM CHLORIDE, AND CALCIUM CHLORIDE: 600; 310; 30; 20 INJECTION, SOLUTION INTRAVENOUS at 06:05

## 2022-05-22 RX ADMIN — INSULIN ASPART 1 UNITS: 100 INJECTION, SOLUTION INTRAVENOUS; SUBCUTANEOUS at 10:05

## 2022-05-22 RX ADMIN — INSULIN ASPART 1 UNITS: 100 INJECTION, SOLUTION INTRAVENOUS; SUBCUTANEOUS at 05:05

## 2022-05-22 RX ADMIN — HYDRALAZINE HYDROCHLORIDE 20 MG: 20 INJECTION, SOLUTION INTRAMUSCULAR; INTRAVENOUS at 01:05

## 2022-05-22 RX ADMIN — METOCLOPRAMIDE 10 MG: 5 INJECTION, SOLUTION INTRAMUSCULAR; INTRAVENOUS at 10:05

## 2022-05-22 RX ADMIN — NIFEDIPINE 120 MG: 30 TABLET, FILM COATED, EXTENDED RELEASE ORAL at 05:05

## 2022-05-22 RX ADMIN — SERTRALINE HYDROCHLORIDE 25 MG: 25 TABLET ORAL at 08:05

## 2022-05-22 NOTE — HOSPITAL COURSE
Patient admitted for hypertensive urgency and intractable N/V. Endocrine consulted for assistance with insulin regimen. MFM consulted for recommendations given presentation and complicated pregnancy. Blood pressure and blood glucose much better controlled with assistance of MFM and Endo. Patient also with improvement in nausea, vomiting and able to tolerate PO medications and food. Patient is interested in terminating pregnancy given extremely high risk of further complications and both maternal and fetal morbidity and mortality -- resources provided for follow-up after discharge. Patient is medically ready for d/c with PCP establishment, MFM and Endo close follow-up.

## 2022-05-22 NOTE — PLAN OF CARE
Discussed blood pressure monitoring with patient ,diabetes patient complaints of difficulty urinating bladder scan patient 127 ml noted MD notified generalized edema noted MD is made aware awaiting for her to reach back

## 2022-05-22 NOTE — ASSESSMENT & PLAN NOTE
WBC 20.6 on admission  UA: WBC 7, protein 3+, glucose 3+, ketones 1+, occult blood 2+    Augmentin x 5 days

## 2022-05-22 NOTE — PROGRESS NOTES
Pharmacist Renal Dose Adjustment Note    Alicia Valles is a 27 y.o. female being treated with the medication famotidine.    Patient Data:    Vital Signs (Most Recent):  Temp: 98.3 °F (36.8 °C) (05/22/22 0900)  Pulse: (!) 140 (05/22/22 0934)  Resp: 15 (05/22/22 0900)  BP: (!) 167/98 (05/22/22 0906)  SpO2: 100 % (05/22/22 0900)   Vital Signs (72h Range):  Temp:  [97.1 °F (36.2 °C)-98.7 °F (37.1 °C)]   Pulse:  [104-140]   Resp:  [15-22]   BP: (145-217)/()   SpO2:  [99 %-100 %]      Recent Labs   Lab 05/21/22  1211 05/22/22  0237   CREATININE 1.1 0.9     Serum creatinine: 0.9 mg/dL 05/22/22 0237  Estimated creatinine clearance: 76.1 mL/min    Medication: Famotidine 20 mg daily will be changed to famotidine 40 mg daily.    Pharmacist's Name: Carla Rivas PharmD  Pharmacist's Extension: [phone]

## 2022-05-22 NOTE — ASSESSMENT & PLAN NOTE
Complications: diabetic retinopathy, gastroparesis, neuropathy, and h/o retinal detachment  BHB 1.1 on admission with , K 3.7    Endocrinology consulted, f/u recs

## 2022-05-22 NOTE — SUBJECTIVE & OBJECTIVE
Interval History: NAEON. Endocrine consulted for insulin regimen. Pt with intractable N/V overnight. Pt required PRN labetalol and hydralazine overnight for BP control.     Review of Systems   Constitutional:  Positive for fatigue. Negative for chills and fever.   HENT:  Negative for sinus pain, sneezing and sore throat.    Eyes:  Negative for photophobia and visual disturbance.   Respiratory:  Positive for shortness of breath. Negative for chest tightness.    Cardiovascular:  Positive for palpitations. Negative for chest pain.   Gastrointestinal:  Positive for nausea and vomiting. Negative for constipation and diarrhea.   Endocrine: Negative for polyphagia and polyuria.   Genitourinary:  Negative for dysuria and urgency.   Musculoskeletal:  Negative for arthralgias and myalgias.   Skin:  Negative for color change and wound.   Neurological:  Negative for weakness, light-headedness and headaches.   Psychiatric/Behavioral:  Negative for confusion and sleep disturbance. The patient is nervous/anxious.    Objective:     Vital Signs (Most Recent):  Temp: 97.7 °F (36.5 °C) (05/22/22 0340)  Pulse: (!) 120 (05/21/22 1850)  Resp: 15 (05/21/22 1336)  BP: (!) 164/91 (05/22/22 0340)  SpO2: 100 % (05/21/22 1850)   Vital Signs (24h Range):  Temp:  [97.7 °F (36.5 °C)-98.7 °F (37.1 °C)] 97.7 °F (36.5 °C)  Pulse:  [104-120] 120  Resp:  [15-21] 15  SpO2:  [99 %-100 %] 100 %  BP: (164-217)/() 164/91     Weight: 59 kg (130 lb)  Body mass index is 24.56 kg/m².  No intake or output data in the 24 hours ending 05/22/22 0756   Physical Exam  Vitals and nursing note reviewed.   Constitutional:       General: She is sleeping. She is not in acute distress.     Appearance: Normal appearance. She is normal weight. She is not ill-appearing.   HENT:      Head: Normocephalic and atraumatic.      Nose: Nose normal.      Mouth/Throat:      Mouth: Mucous membranes are moist.      Pharynx: Oropharynx is clear.   Eyes:      General: No scleral  icterus.     Extraocular Movements: Extraocular movements intact.   Cardiovascular:      Rate and Rhythm: Regular rhythm. Tachycardia present.      Pulses: Normal pulses.      Heart sounds: Normal heart sounds.   Pulmonary:      Effort: Pulmonary effort is normal. No respiratory distress.      Breath sounds: Normal breath sounds. No wheezing.   Abdominal:      General: Abdomen is flat. Bowel sounds are normal. There is no distension.      Palpations: Abdomen is soft.      Tenderness: There is no abdominal tenderness.   Musculoskeletal:         General: Normal range of motion.      Cervical back: Normal range of motion and neck supple.   Skin:     General: Skin is warm and dry.      Capillary Refill: Capillary refill takes less than 2 seconds.      Coloration: Skin is not jaundiced.      Findings: No rash.   Neurological:      General: No focal deficit present.      Mental Status: She is oriented to person, place, and time. Mental status is at baseline.   Psychiatric:         Mood and Affect: Mood normal.         Behavior: Behavior normal.       Significant Labs: All pertinent labs within the past 24 hours have been reviewed.    Significant Imaging: I have reviewed all pertinent imaging results/findings within the past 24 hours.

## 2022-05-22 NOTE — HPI
Reason for Consult: Management of T1DM, Hyperglycemia during pregnancy    Outpatient diabetes provider: Primary Care in [place]; [place] (No established PCP or endocrine care in [facility])  Diabetes education during pregnancy: None      Diabetes diagnosis year: 18 years      Home Diabetes Regimen:     - Lantus 6 units in the morning and 5 units in the evening (Eat a snack before bed if BG is < 100 mg/dl)  - Novolog ICR: 1:8 units TIDWM and ICR 1:12 with snacks (Hold if BG < 100 mg/dL) (Likely 5 units with each meal and 2 units with snacks)  - Novolog SSI for BG excursions:  180 - 230 + 1 unit  231- 280  + 2 units  281 - 330 + 3 units  331 - 380 + 4 units      > 380   + 5 units     Historically on Medronic (But has been off the pump for 7 weeks; Found down with BG in the 20's).     Blood Sugar Monitoring:  Dexcom (Clarity Code: [id number])        Diabetes Complications include:     Hyperglycemia, Hypoglycemia , Diabetic nephropathy  , Diabetic retinopathy , and Diabetic gastroparesis   She reports that she's been admitted for DKA more than 5 times.     Complicating diabetes co morbidities:   Pregancy.       HPI:   27 year old female A2 who is currently 12 weeks pregnant with pertinent medical history of type 1 diabetes mellitus complicated by retinopathy, gastroparesis, and neuropathy who presented initially to [facility] in [place] and admitted to ICU for uncontrolled hypertension and hyperglycemia. She left [facility] and came to [facility] for management of uncontrolled nausea and decreased appetite.   She also has a history of leaving [facility] from [facility] after a transfer from [facility] when she was managed for encephalopathy and hypoglycemia complicated by a seizure (BG 29).     Endocrinology consulted for management of inpatient diabetes

## 2022-05-22 NOTE — CONSULTS
[facility] - [facility] ([facility]-)  Endocrinology  Diabetes Consult Note    Consult Requested by: Justin Hurd MD   Reason for admit: Hypertensive urgency    HISTORY OF PRESENT ILLNESS:  Reason for Consult: Management of T1DM, Hyperglycemia during pregnancy    Outpatient diabetes provider: [facility] in [place]; [place] (No established PCP or endocrine care in [facility])  Diabetes education during pregnancy: None      Diabetes diagnosis year: 18 years      Home Diabetes Regimen:     - Lantus 6 units in the morning and 5 units in the evening (Eat a snack before bed if BG is < 100 mg/dl)  - Novolog ICR: 1:8 units TIDWM and ICR 1:12 with snacks (Hold if BG < 100 mg/dL) (Likely 5 units with each meal and 2 units with snacks)  - Novolog SSI for BG excursions:  180 - 230 + 1 unit  231- 280  + 2 units  281 - 330 + 3 units  331 - 380 + 4 units      > 380   + 5 units     Historically on Medronic (But has been off the pump for 7 weeks; Found down with BG in the 20's).     Blood Sugar Monitoring:  Dexcom (Clarity Code: [id number])        Diabetes Complications include:     Hyperglycemia, Hypoglycemia , Diabetic nephropathy  , Diabetic retinopathy , and Diabetic gastroparesis   She reports that she's been admitted for DKA more than 5 times.     Complicating diabetes co morbidities:   Pregancy.       HPI:   27 year old female A2 who is currently 12 weeks pregnant with pertinent medical history of type 1 diabetes mellitus complicated by retinopathy, gastroparesis, and neuropathy who presented initially to [facility] in [place] and admitted to ICU for uncontrolled hypertension and hyperglycemia. She left [facility] and came to [facility] for management of uncontrolled nausea and decreased appetite.   She also has a history of leaving [facility] from [facility] after a transfer from [facility] when she was managed for encephalopathy and hypoglycemia complicated by a seizure (BG 29).     Endocrinology consulted for  management of inpatient diabetes      Interval HPI:   nausea present, hyperglycemia observed    PMH, PSH, FH, SH updated and reviewed       Review of Systems as above    Current Medications and/or Treatments Impacting Glycemic Control  Immunotherapy:    Immunosuppressants       None          Steroids:   Hormones (From admission, onward)                None          Pressors:    Autonomic Drugs (From admission, onward)                None          Hyperglycemia/Diabetes Medications:   Antihyperglycemics (From admission, onward)                Start     Stop Route Frequency Ordered    05/22/22 2100  insulin detemir U-100 pen 5 Units         -- SubQ Nightly 05/22/22 0747    05/22/22 1145  insulin aspart U-100 pen 1-10 Units         -- SubQ 3 times daily with meals 05/22/22 1033    05/22/22 0900  insulin detemir U-100 pen 6 Units         -- SubQ Daily 05/22/22 0747             PHYSICAL EXAMINATION:  Vitals:    05/22/22 1127   BP: (!) 166/111   Pulse:    Resp:    Temp:      Body mass index is 24.56 kg/m².    Physical Exam  HENT:      Head: Normocephalic and atraumatic.      Right Ear: External ear normal.      Left Ear: External ear normal.      Mouth/Throat:      Mouth: Mucous membranes are moist.   Eyes:      Extraocular Movements: Extraocular movements intact.   Cardiovascular:      Rate and Rhythm: Normal rate.   Pulmonary:      Effort: Pulmonary effort is normal.   Abdominal:      Comments: Pregnant  Has dexcom sensor present  on abdomen   Musculoskeletal:         General: Normal range of motion.      Cervical back: Normal range of motion.   Skin:     Comments: Tattoos present   Neurological:      General: No focal deficit present.      Mental Status: She is alert and oriented to person, place, and time.   Psychiatric:         Mood and Affect: Mood normal.         Labs Reviewed and Include   Recent Labs   Lab 05/21/22  1211 05/22/22  0237   * 228*   CALCIUM 7.9* 7.7*   ALBUMIN 2.6*  --    PROT 6.1  --    NA  134* 133*   K 3.7 3.6   CO2 19* 18*    104   BUN 18 18   CREATININE 1.1 0.9   ALKPHOS 63  --    ALT 33  --    AST 43*  --    BILITOT 0.3  --      Lab Results   Component Value Date    WBC 17.30 (H) 05/22/2022    HGB 9.4 (L) 05/22/2022    HCT 27.6 (L) 05/22/2022    MCV 88 05/22/2022     05/22/2022     No results for input(s): TSH, FREET4 in the last 168 hours.  Lab Results   Component Value Date    HGBA1C 9.8 (H) 05/05/2022       Nutritional status:   Body mass index is 24.56 kg/m².  Lab Results   Component Value Date    ALBUMIN 2.6 (L) 05/21/2022    ALBUMIN 2.4 (L) 05/14/2022    ALBUMIN 2.3 (L) 05/13/2022     No results found for: PREALBUMIN    Estimated Creatinine Clearance: 76.1 mL/min (based on SCr of 0.9 mg/dL).    Accu-Checks  Recent Labs     05/21/22  1134 05/22/22  0527 05/22/22  0840 05/22/22  1127   POCTGLUCOSE 201* 406* 207* 217*        ASSESSMENT and PLAN    Gastroparesis due to DM  - on anti-emetics  - needs better control of blood glucose to prevent complications    First trimester pregnancy  - aiming for a fasting glucose level below 95 mg/dl, a reading of under 140 mg/dl an hour after eating, and a reading of under 120 mg/dl two hours after eating    Type 1 diabetes mellitus with other specified complication  - she is 12 weeks pregnant   - blood glucose goal should be aimed for a fasting glucose level below 95 mg/dl, a reading of under 140 mg/dl an hour after eating, and a reading of under 120 mg/dl two hours after eating   - despite multiple reassurances and advice about blood sugar goals in pregnancy, patient wants to continue on current regime of Levemir Flex Pen 6 units in the morning and 5 units in the evening. Novolog (aspart) insulin ICR 1:8 Units before meals and low dose correction scale  - BG checks q4hr and two hours post-prandial  - Continue Dexcom, reviewed data showing hyperglycemia and blood sugar above goal (Clarity Code: [id number])  - Needs to have a diet with 45 grams  of carbohydrates for meals and 15-25 grams of carbs for snacks   - Ensure prandial insulin is administered 15 minutes before starting to eat or drink.   - Hypoglycemia protocol in place  - If blood glucose greater than 300, please ask patient not to eat food or drink anything other than water until correctional insulin has brought it back below 250     ** Please notify Endocrine for any change and/or advance in diet**  ** Please call Endocrine for any BG related issues **        Henry Aleman MD  Endocrinology  [facility]

## 2022-05-22 NOTE — SUBJECTIVE & OBJECTIVE
Interval HPI:   nausea present, hyperglycemia observed    PMH, PSH, FH, SH updated and reviewed       Review of Systems as above    Current Medications and/or Treatments Impacting Glycemic Control  Immunotherapy:    Immunosuppressants       None          Steroids:   Hormones (From admission, onward)                None          Pressors:    Autonomic Drugs (From admission, onward)                None          Hyperglycemia/Diabetes Medications:   Antihyperglycemics (From admission, onward)                Start     Stop Route Frequency Ordered    05/22/22 2100  insulin detemir U-100 pen 5 Units         -- SubQ Nightly 05/22/22 0747    05/22/22 1145  insulin aspart U-100 pen 1-10 Units         -- SubQ 3 times daily with meals 05/22/22 1033    05/22/22 0900  insulin detemir U-100 pen 6 Units         -- SubQ Daily 05/22/22 0747             PHYSICAL EXAMINATION:  Vitals:    05/22/22 1127   BP: (!) 166/111   Pulse:    Resp:    Temp:      Body mass index is 24.56 kg/m².    Physical Exam  HENT:      Head: Normocephalic and atraumatic.      Right Ear: External ear normal.      Left Ear: External ear normal.      Mouth/Throat:      Mouth: Mucous membranes are moist.   Eyes:      Extraocular Movements: Extraocular movements intact.   Cardiovascular:      Rate and Rhythm: Normal rate.   Pulmonary:      Effort: Pulmonary effort is normal.   Abdominal:      Comments: Pregnant  Has dexcom sensor present  on abdomen   Musculoskeletal:         General: Normal range of motion.      Cervical back: Normal range of motion.   Skin:     Comments: Tattoos present   Neurological:      General: No focal deficit present.      Mental Status: She is alert and oriented to person, place, and time.   Psychiatric:         Mood and Affect: Mood normal.

## 2022-05-22 NOTE — PROGRESS NOTES
"[facility] - Int[facility] ([address] Medicine  Progress Note    Patient Name: Alicia Valles  MRN: [id number]  Patient Class: OP- Observation   Admission Date: 2022  Length of Stay: 0 days  Attending Physician: Justin Hurd MD  Primary Care Provider: Primary Doctor No        Subjective:     Principal Problem:Hypertensive urgency        HPI:  Alicia Valles is a 27F A2 who is currently 11 weeks pregnant with PMH HTN and T1DM complicated by retinopathy, gastroparesis, and neuropathy who presented to [facility] in [place] and admitted to ICU for uncontrolled hypertension and hyperglycemia. She required cardene gtt at [facility] admission. She was recently discharged AMA from [facility] after a transfer from [facility] for endocrinology evaluation. She was intially admitted to [facility] for HTN encephalopathy and hypoglycemia complicated by a seizure (BG 29). Of note, she reports that she's been admitted for DKA more than 5 times. Patient evaluated by endocrinology who recommend lantus 15U daily and novolog 3units with meals. Patient to be transferred here for higher level of care. She left [facility] due to "not wanting to wait" and came to ED here.    Upon admission, she was hypertensive, 184/98, and tachycardic, 117. EKG with sinus tachycardia. Labs were notable for hyperglycemia, elevated BHB, , leukocytosis (20.6), anemia (9.3), hyponatremia (134), low bicarbonate (19), and UA with 2+ occult blood, 3+ protein, 3+ glucose, 1+ ketones, 7 WBC. Pt was given 2L LR bolus, labetalol 10mg IV, nifedipine 120mg PO, ativan 2mg IV x2, and zofran 4mg IV for intractable N/V and HTN control. She was admitted to hospital medicine after critical care reported patient safe for floor.      Overview/Hospital Course:  Patient admitted for hypertensive urgency and intractable N/V. Endocrine consulted for assistance with insulin regimen.      Interval History: NAEON. Endocrine consulted for insulin regimen. Pt with " intractable N/V overnight. Pt required PRN labetalol and hydralazine overnight for BP control.     Review of Systems   Constitutional:  Positive for fatigue. Negative for chills and fever.   HENT:  Negative for sinus pain, sneezing and sore throat.    Eyes:  Negative for photophobia and visual disturbance.   Respiratory:  Positive for shortness of breath. Negative for chest tightness.    Cardiovascular:  Positive for palpitations. Negative for chest pain.   Gastrointestinal:  Positive for nausea and vomiting. Negative for constipation and diarrhea.   Endocrine: Negative for polyphagia and polyuria.   Genitourinary:  Negative for dysuria and urgency.   Musculoskeletal:  Negative for arthralgias and myalgias.   Skin:  Negative for color change and wound.   Neurological:  Negative for weakness, light-headedness and headaches.   Psychiatric/Behavioral:  Negative for confusion and sleep disturbance. The patient is nervous/anxious.    Objective:     Vital Signs (Most Recent):  Temp: 97.7 °F (36.5 °C) (05/22/22 0340)  Pulse: (!) 120 (05/21/22 1850)  Resp: 15 (05/21/22 1336)  BP: (!) 164/91 (05/22/22 0340)  SpO2: 100 % (05/21/22 1850)   Vital Signs (24h Range):  Temp:  [97.7 °F (36.5 °C)-98.7 °F (37.1 °C)] 97.7 °F (36.5 °C)  Pulse:  [104-120] 120  Resp:  [15-21] 15  SpO2:  [99 %-100 %] 100 %  BP: (164-217)/() 164/91     Weight: 59 kg (130 lb)  Body mass index is 24.56 kg/m².  No intake or output data in the 24 hours ending 05/22/22 0756   Physical Exam  Vitals and nursing note reviewed.   Constitutional:       General: She is sleeping. She is not in acute distress.     Appearance: Normal appearance. She is normal weight. She is not ill-appearing.   HENT:      Head: Normocephalic and atraumatic.      Nose: Nose normal.      Mouth/Throat:      Mouth: Mucous membranes are moist.      Pharynx: Oropharynx is clear.   Eyes:      General: No scleral icterus.     Extraocular Movements: Extraocular movements intact.    Cardiovascular:      Rate and Rhythm: Regular rhythm. Tachycardia present.      Pulses: Normal pulses.      Heart sounds: Normal heart sounds.   Pulmonary:      Effort: Pulmonary effort is normal. No respiratory distress.      Breath sounds: Normal breath sounds. No wheezing.   Abdominal:      General: Abdomen is flat. Bowel sounds are normal. There is no distension.      Palpations: Abdomen is soft.      Tenderness: There is no abdominal tenderness.   Musculoskeletal:         General: Normal range of motion.      Cervical back: Normal range of motion and neck supple.   Skin:     General: Skin is warm and dry.      Capillary Refill: Capillary refill takes less than 2 seconds.      Coloration: Skin is not jaundiced.      Findings: No rash.   Neurological:      General: No focal deficit present.      Mental Status: She is oriented to person, place, and time. Mental status is at baseline.   Psychiatric:         Mood and Affect: Mood normal.         Behavior: Behavior normal.       Significant Labs: All pertinent labs within the past 24 hours have been reviewed.    Significant Imaging: I have reviewed all pertinent imaging results/findings within the past 24 hours.      Assessment/Plan:      * Hypertensive urgency  See HTN      Leukocytosis  WBC 20.6 on admission  UA concerning for UTI - Patient started on Augmentin x5 days  BHB 1.1 - endocrine consulted  Possible stress response    Continue to monitor        Hyponatremia        UTI in pregnancy, antepartum, first trimester  WBC 20.6 on admission  UA: WBC 7, protein 3+, glucose 3+, ketones 1+, occult blood 2+    Augmentin x 5 days        Spotting in early pregnancy  Spotting on recent admission, continue to monitor      Gastroparesis due to DM  Cont metoclopramide 10mg TID  PRN zofran and phenergan IV for intractable N/V      History of panic attacks  See anxiety      Uses self-applied continuous glucose monitoring device        Anxiety  Patient with anxiety during  hospital admission  PRN vistaril, ativan  Cont sertraline 25mg daily    Proteinuria  See Diabetes      Anemia  CBC daily  Iron panel WNL      First trimester pregnancy        Chronic hypertension  Patient required recent ICU admission for cardene gtt with planned transfer here for higher level of care    Cont hydralazine 20mg IV TID and nifedipine 120mg nightly  Uptitrate BP meds for goal of <140/90, per OBGYN      Type 1 diabetes mellitus with other specified complication  Complications: diabetic retinopathy, gastroparesis, neuropathy, and h/o retinal detachment  BHB 1.1 on admission with , K 3.7    Endocrinology consulted, f/u recs        VTE Risk Mitigation (From admission, onward)         Ordered     IP VTE LOW RISK PATIENT  Once         05/21/22 1535     IP VTE LOW RISK PATIENT  Once         05/21/22 1535                Discharge Planning   BRYAN:      Code Status: Full Code   Is the patient medically ready for discharge?:     Reason for patient still in hospital (select all that apply): Patient trending condition and Consult recommendations                     Adriana Castro (Margaret Name: MD Alo  Department of Hospital Medicine   [facility] - Intensive Care ([place]-[facility])

## 2022-05-22 NOTE — ASSESSMENT & PLAN NOTE
- she is 12 weeks pregnant   - blood glucose goal should be aimed for a fasting glucose level below 95 mg/dl, a reading of under 140 mg/dl an hour after eating, and a reading of under 120 mg/dl two hours after eating   - despite multiple reassurances and advice about blood sugar goals in pregnancy, patient wants to continue on current regime of Levemir Flex Pen 6 units in the morning and 5 units in the evening. Novolog (aspart) insulin ICR 1:8 Units before meals and low dose correction scale  - BG checks q4hr and two hours post-prandial  - Continue Dexcom, reviewed data showing hyperglycemia and blood sugar above goal (Clarity Code: [id number])  - Needs to have a diet with 45 grams of carbohydrates for meals and 15-25 grams of carbs for snacks   - Ensure prandial insulin is administered 15 minutes before starting to eat or drink.   - Hypoglycemia protocol in place  - If blood glucose greater than 300, please ask patient not to eat food or drink anything other than water until correctional insulin has brought it back below 250     ** Please notify Endocrine for any change and/or advance in diet**  ** Please call Endocrine for any BG related issues **

## 2022-05-22 NOTE — ASSESSMENT & PLAN NOTE
Patient required recent ICU admission for cardene gtt with planned transfer here for higher level of care    Cont hydralazine 20mg IV TID and nifedipine 120mg nightly  Uptitrate BP meds for goal of <140/90, per OBGYN

## 2022-05-22 NOTE — ASSESSMENT & PLAN NOTE
- aiming for a fasting glucose level below 95 mg/dl, a reading of under 140 mg/dl an hour after eating, and a reading of under 120 mg/dl two hours after eating

## 2022-05-23 PROBLEM — O21.9 NAUSEA AND VOMITING DURING PREGNANCY: Status: ACTIVE | Noted: 2022-05-23

## 2022-05-23 PROBLEM — Z3A.12 12 WEEKS GESTATION OF PREGNANCY: Status: ACTIVE | Noted: 2022-05-23

## 2022-05-23 LAB
ALBUMIN SERPL BCP-MCNC: 2.3 G/DL (ref 3.5–5.2)
ALP SERPL-CCNC: 59 U/L (ref 55–135)
ALT SERPL W/O P-5'-P-CCNC: 24 U/L (ref 10–44)
ANION GAP SERPL CALC-SCNC: 10 MMOL/L (ref 8–16)
AST SERPL-CCNC: 25 U/L (ref 10–40)
BASOPHILS # BLD AUTO: 0.02 K/UL (ref 0–0.2)
BASOPHILS NFR BLD: 0.2 % (ref 0–1.9)
BILIRUB SERPL-MCNC: 0.3 MG/DL (ref 0.1–1)
BUN SERPL-MCNC: 22 MG/DL (ref 6–20)
CALCIUM SERPL-MCNC: 7.9 MG/DL (ref 8.7–10.5)
CHLORIDE SERPL-SCNC: 99 MMOL/L (ref 95–110)
CO2 SERPL-SCNC: 20 MMOL/L (ref 23–29)
CREAT SERPL-MCNC: 1.3 MG/DL (ref 0.5–1.4)
DIFFERENTIAL METHOD: ABNORMAL
EOSINOPHIL # BLD AUTO: 0 K/UL (ref 0–0.5)
EOSINOPHIL NFR BLD: 0.2 % (ref 0–8)
ERYTHROCYTE [DISTWIDTH] IN BLOOD BY AUTOMATED COUNT: 13.2 % (ref 11.5–14.5)
EST. GFR  (AFRICAN AMERICAN): >60 ML/MIN/1.73 M^2
EST. GFR  (NON AFRICAN AMERICAN): 55.6 ML/MIN/1.73 M^2
GLUCOSE SERPL-MCNC: 225 MG/DL (ref 70–110)
HCT VFR BLD AUTO: 24.8 % (ref 37–48.5)
HGB BLD-MCNC: 8.9 G/DL (ref 12–16)
IMM GRANULOCYTES # BLD AUTO: 0.12 K/UL (ref 0–0.04)
IMM GRANULOCYTES NFR BLD AUTO: 0.9 % (ref 0–0.5)
LYMPHOCYTES # BLD AUTO: 2 K/UL (ref 1–4.8)
LYMPHOCYTES NFR BLD: 15.3 % (ref 18–48)
MAGNESIUM SERPL-MCNC: 1.6 MG/DL (ref 1.6–2.6)
MCH RBC QN AUTO: 30.1 PG (ref 27–31)
MCHC RBC AUTO-ENTMCNC: 35.9 G/DL (ref 32–36)
MCV RBC AUTO: 84 FL (ref 82–98)
MONOCYTES # BLD AUTO: 0.8 K/UL (ref 0.3–1)
MONOCYTES NFR BLD: 6.2 % (ref 4–15)
NEUTROPHILS # BLD AUTO: 10.1 K/UL (ref 1.8–7.7)
NEUTROPHILS NFR BLD: 77.2 % (ref 38–73)
NRBC BLD-RTO: 0 /100 WBC
PHOSPHATE SERPL-MCNC: 3.8 MG/DL (ref 2.7–4.5)
PLATELET # BLD AUTO: 231 K/UL (ref 150–450)
PMV BLD AUTO: 11.3 FL (ref 9.2–12.9)
POCT GLUCOSE: 175 MG/DL (ref 70–110)
POCT GLUCOSE: 176 MG/DL (ref 70–110)
POCT GLUCOSE: 191 MG/DL (ref 70–110)
POCT GLUCOSE: 337 MG/DL (ref 70–110)
POCT GLUCOSE: 348 MG/DL (ref 70–110)
POTASSIUM SERPL-SCNC: 3.4 MMOL/L (ref 3.5–5.1)
PROT SERPL-MCNC: 5.5 G/DL (ref 6–8.4)
RBC # BLD AUTO: 2.96 M/UL (ref 4–5.4)
SODIUM SERPL-SCNC: 129 MMOL/L (ref 136–145)
WBC # BLD AUTO: 13.05 K/UL (ref 3.9–12.7)

## 2022-05-23 PROCEDURE — 99233 SBSQ HOSP IP/OBS HIGH 50: CPT | Mod: ,,, | Performed by: HOSPITALIST

## 2022-05-23 PROCEDURE — 25000003 PHARM REV CODE 250

## 2022-05-23 PROCEDURE — 99232 SBSQ HOSP IP/OBS MODERATE 35: CPT | Mod: ,,, | Performed by: NURSE PRACTITIONER

## 2022-05-23 PROCEDURE — 99223 1ST HOSP IP/OBS HIGH 75: CPT | Mod: ,,, | Performed by: OBSTETRICS & GYNECOLOGY

## 2022-05-23 PROCEDURE — 63600175 PHARM REV CODE 636 W HCPCS: Performed by: STUDENT IN AN ORGANIZED HEALTH CARE EDUCATION/TRAINING PROGRAM

## 2022-05-23 PROCEDURE — 80053 COMPREHEN METABOLIC PANEL: CPT | Performed by: STUDENT IN AN ORGANIZED HEALTH CARE EDUCATION/TRAINING PROGRAM

## 2022-05-23 PROCEDURE — 63600175 PHARM REV CODE 636 W HCPCS

## 2022-05-23 PROCEDURE — C9399 UNCLASSIFIED DRUGS OR BIOLOG: HCPCS

## 2022-05-23 PROCEDURE — 96372 THER/PROPH/DIAG INJ SC/IM: CPT

## 2022-05-23 PROCEDURE — 84100 ASSAY OF PHOSPHORUS: CPT

## 2022-05-23 PROCEDURE — 99233 PR SUBSEQUENT HOSPITAL CARE,LEVL III: ICD-10-PCS | Mod: ,,, | Performed by: HOSPITALIST

## 2022-05-23 PROCEDURE — 20600001 HC STEP DOWN PRIVATE ROOM

## 2022-05-23 PROCEDURE — 99232 PR SUBSEQUENT HOSPITAL CARE,LEVL II: ICD-10-PCS | Mod: ,,, | Performed by: NURSE PRACTITIONER

## 2022-05-23 PROCEDURE — 85025 COMPLETE CBC W/AUTO DIFF WBC: CPT

## 2022-05-23 PROCEDURE — 83735 ASSAY OF MAGNESIUM: CPT

## 2022-05-23 PROCEDURE — 25000003 PHARM REV CODE 250: Performed by: HOSPITALIST

## 2022-05-23 PROCEDURE — 99223 PR INITIAL HOSPITAL CARE,LEVL III: ICD-10-PCS | Mod: ,,, | Performed by: OBSTETRICS & GYNECOLOGY

## 2022-05-23 PROCEDURE — 25000003 PHARM REV CODE 250: Performed by: STUDENT IN AN ORGANIZED HEALTH CARE EDUCATION/TRAINING PROGRAM

## 2022-05-23 RX ORDER — NIFEDIPINE 30 MG/1
60 TABLET, EXTENDED RELEASE ORAL 2 TIMES DAILY
Status: DISCONTINUED | OUTPATIENT
Start: 2022-05-23 | End: 2022-05-24

## 2022-05-23 RX ORDER — LABETALOL HCL 20 MG/4 ML
20 SYRINGE (ML) INTRAVENOUS EVERY 6 HOURS
Status: DISCONTINUED | OUTPATIENT
Start: 2022-05-23 | End: 2022-05-24

## 2022-05-23 RX ORDER — SCOLOPAMINE TRANSDERMAL SYSTEM 1 MG/1
1 PATCH, EXTENDED RELEASE TRANSDERMAL
Status: DISCONTINUED | OUTPATIENT
Start: 2022-05-23 | End: 2022-05-26 | Stop reason: HOSPADM

## 2022-05-23 RX ORDER — MAGNESIUM SULFATE HEPTAHYDRATE 40 MG/ML
2 INJECTION, SOLUTION INTRAVENOUS ONCE
Status: COMPLETED | OUTPATIENT
Start: 2022-05-23 | End: 2022-05-23

## 2022-05-23 RX ORDER — ONDANSETRON 2 MG/ML
4 INJECTION INTRAMUSCULAR; INTRAVENOUS EVERY 8 HOURS
Status: DISCONTINUED | OUTPATIENT
Start: 2022-05-23 | End: 2022-05-25

## 2022-05-23 RX ORDER — MORPHINE SULFATE 2 MG/ML
2 INJECTION, SOLUTION INTRAMUSCULAR; INTRAVENOUS EVERY 6 HOURS PRN
Status: DISCONTINUED | OUTPATIENT
Start: 2022-05-23 | End: 2022-05-26 | Stop reason: HOSPADM

## 2022-05-23 RX ADMIN — LABETALOL HCL IV SOLN PREFILLED SYRINGE 20 MG/4ML (5 MG/ML) 10 MG: 20/4 SOLUTION PREFILLED SYRINGE at 01:05

## 2022-05-23 RX ADMIN — INSULIN ASPART 2 UNITS: 100 INJECTION, SOLUTION INTRAVENOUS; SUBCUTANEOUS at 08:05

## 2022-05-23 RX ADMIN — MORPHINE SULFATE 2 MG: 2 INJECTION, SOLUTION INTRAMUSCULAR; INTRAVENOUS at 09:05

## 2022-05-23 RX ADMIN — DIPHENHYDRAMINE HYDROCHLORIDE 12.5 MG: 50 INJECTION, SOLUTION INTRAMUSCULAR; INTRAVENOUS at 09:05

## 2022-05-23 RX ADMIN — SCOPALAMINE 1 PATCH: 1 PATCH, EXTENDED RELEASE TRANSDERMAL at 11:05

## 2022-05-23 RX ADMIN — Medication 25 MG: at 11:05

## 2022-05-23 RX ADMIN — PROMETHAZINE HYDROCHLORIDE 12.5 MG: 25 INJECTION INTRAMUSCULAR; INTRAVENOUS at 11:05

## 2022-05-23 RX ADMIN — LORAZEPAM 1 MG: 2 INJECTION INTRAMUSCULAR; INTRAVENOUS at 01:05

## 2022-05-23 RX ADMIN — METOCLOPRAMIDE 10 MG: 5 INJECTION, SOLUTION INTRAMUSCULAR; INTRAVENOUS at 05:05

## 2022-05-23 RX ADMIN — MORPHINE SULFATE 2 MG: 2 INJECTION, SOLUTION INTRAMUSCULAR; INTRAVENOUS at 11:05

## 2022-05-23 RX ADMIN — METOCLOPRAMIDE 10 MG: 5 INJECTION, SOLUTION INTRAMUSCULAR; INTRAVENOUS at 11:05

## 2022-05-23 RX ADMIN — DOCUSATE SODIUM 100 MG: 100 CAPSULE, LIQUID FILLED ORAL at 08:05

## 2022-05-23 RX ADMIN — METOCLOPRAMIDE 10 MG: 5 INJECTION, SOLUTION INTRAMUSCULAR; INTRAVENOUS at 04:05

## 2022-05-23 RX ADMIN — DIPHENHYDRAMINE HYDROCHLORIDE 12.5 MG: 50 INJECTION, SOLUTION INTRAMUSCULAR; INTRAVENOUS at 01:05

## 2022-05-23 RX ADMIN — PROMETHAZINE HYDROCHLORIDE 12.5 MG: 25 INJECTION INTRAMUSCULAR; INTRAVENOUS at 01:05

## 2022-05-23 RX ADMIN — SERTRALINE HYDROCHLORIDE 25 MG: 25 TABLET ORAL at 09:05

## 2022-05-23 RX ADMIN — DIPHENHYDRAMINE HYDROCHLORIDE 12.5 MG: 50 INJECTION, SOLUTION INTRAMUSCULAR; INTRAVENOUS at 11:05

## 2022-05-23 RX ADMIN — Medication 20 MG: at 05:05

## 2022-05-23 RX ADMIN — HYDRALAZINE HYDROCHLORIDE 20 MG: 20 INJECTION, SOLUTION INTRAMUSCULAR; INTRAVENOUS at 02:05

## 2022-05-23 RX ADMIN — FAMOTIDINE 40 MG: 20 TABLET ORAL at 08:05

## 2022-05-23 RX ADMIN — INSULIN DETEMIR 6 UNITS: 100 INJECTION, SOLUTION SUBCUTANEOUS at 09:05

## 2022-05-23 RX ADMIN — Medication 10 MG: at 05:05

## 2022-05-23 RX ADMIN — NIFEDIPINE 60 MG: 30 TABLET, FILM COATED, EXTENDED RELEASE ORAL at 11:05

## 2022-05-23 RX ADMIN — HYDRALAZINE HYDROCHLORIDE 20 MG: 20 INJECTION, SOLUTION INTRAMUSCULAR; INTRAVENOUS at 11:05

## 2022-05-23 RX ADMIN — HYDRALAZINE HYDROCHLORIDE 20 MG: 20 INJECTION, SOLUTION INTRAMUSCULAR; INTRAVENOUS at 06:05

## 2022-05-23 RX ADMIN — Medication 25 MG: at 06:05

## 2022-05-23 RX ADMIN — LORAZEPAM 1 MG: 2 INJECTION INTRAMUSCULAR; INTRAVENOUS at 04:05

## 2022-05-23 RX ADMIN — LABETALOL HCL IV SOLN PREFILLED SYRINGE 20 MG/4ML (5 MG/ML) 10 MG: 20/4 SOLUTION PREFILLED SYRINGE at 06:05

## 2022-05-23 RX ADMIN — ONDANSETRON 4 MG: 2 INJECTION INTRAMUSCULAR; INTRAVENOUS at 11:05

## 2022-05-23 RX ADMIN — MAGNESIUM SULFATE 2 G: 2 INJECTION INTRAVENOUS at 12:05

## 2022-05-23 NOTE — PROGRESS NOTES
"Patient is alert and oriented x  4 mom at bedside patient is sinus tch on the monitor 130-140 MD is made aware order for LR at 150 ml /hr is received and started . EKG ordered . Patient is very anxious ativan is given IV as ordered . Patient has a poor appetite , MD is made aware . Patient requested to be on a clear liquid diet MD is made aware diet is adjusted . Patient medications has been adjusted as well . Patient states :" I would like to terminate the pregnancy " MD is made aware mom is remained at bedside . Patient requested a shower , shower given by mom . Patient agrees to have the pure wick on in order to monitor urine output . No signs of distress noted .Report is given to the incoming nurse    "

## 2022-05-23 NOTE — ASSESSMENT & PLAN NOTE
- Recommend scheduled IV antiemetics: zofran/reglan (alternating), PPI, scopolamine patch, IVF   - Consider NPO until tolerating liquids and advance to bland diet

## 2022-05-23 NOTE — ASSESSMENT & PLAN NOTE
"Patient required recent ICU admission for cardene gtt with planned transfer here for higher level of care    BP today: BP (!) 177/97   Pulse 98   Temp 97.5 °F (36.4 °C)   Resp 14   Ht 5' 1" (1.549 m)   Wt 59 kg (130 lb)   LMP 02/26/2022 (Approximate)   SpO2 97%   BMI 24.56 kg/m²     PLAN:  --Cont hydralazine 20mg IV q8h  --Cont nifedipine 120mg nightly  --Continue labetalol 10mg IV q6h  --Uptitrate BP meds for goal of <140/90, per OBGYN    "

## 2022-05-23 NOTE — HOSPITAL COURSE
05/23/2022 BP sustained severe range, currently on Labetalol 10mg IV q6h, Hydralazine 20mg q8h, Procardia 60/60

## 2022-05-23 NOTE — CONSULTS
[facility] ([place] [address])  Obstetrics  Consult Note    Patient Name: Alicia Valles  MRN: [id number]  Admission Date: 2022  Hospital Length of Stay: 0 days  Code Status: Full Code  Primary Care Provider: Primary Doctor No  Principal Problem: Hypertensive urgency    Consults  Subjective:     Principal Problem:Hypertensive urgency    History of Present Illness:  Alicia Valles is a 27 y.o. [id number] at 12w2d admitted to [facility] in the setting of hypertensive urgency and DM1 BG control. PMH significant for HTN and T1DM complicated by retinopathy, gastroparesis, and neuropathy.     Patient with complicated hospital course. She was initially admitted to [facility] in the setting of BG patterning following a hospitalization due to hypertensive encephalopathy. She was then transferred to [facility] for endocrine management in the setting of brittle DM1. She subsequently left [place] and presented to [facility] in [place] for uncontrolled hypertension and hyperglycemia. She is s/p cardine gtt in ICU at [facility] and transferred to [facility] for higher level of care in the management of hypertensive urgency and BG control.    On evaluation, patient resting comfortably in bed. Denies HA, CP, SOB, RUQ pain. Reports nausea and continued vomiting. She reports she has had minimal PO intake during this admission.       Obstetric HPI:  Patient reports None contractions, No vaginal bleeding , No loss of fluid       OB History    Para Term  AB Living   3 0 0 0 2 0   SAB IAB Ectopic Multiple Live Births   0 0 0 0 0      # Outcome Date GA Lbr Alireza/2nd Weight Sex Delivery Anes PTL Lv   3 Current            2 AB            1 AB              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      Past Surgical History:   Procedure Laterality Date     "RETINAL DETACHMENT SURGERY         PTA Medications   Medication Sig    acetaminophen (TYLENOL) 500 MG tablet Take 2,000 mg by mouth daily as needed for Pain.    BD ULTRA-FINE LIGIA PEN NEEDLE 32 gauge x 5/32" Ndle To inject insulin 4-6 times daily.    blood-glucose meter,continuous (DEXCOM G6 ) Misc 1 Device by Misc.(Non-Drug; Combo Route) route once. for 1 dose    blood-glucose sensor (DEXCOM G6 SENSOR) Sandrine 3 Devices by Misc.(Non-Drug; Combo Route) route every 30 days.    blood-glucose transmitter (DEXCOM G6 TRANSMITTER) Sandrine 2 Devices by Misc.(Non-Drug; Combo Route) route every 6 (six) months.    diphenhydrAMINE (BENADRYL) 25 mg capsule Take 25 mg by mouth daily as needed for Insomnia ((if Unisom ineffective)).    docusate sodium (COLACE) 100 MG capsule Take 100 mg by mouth once daily.    doxylamine succinate (UNISOM, DOXYLAMINE, ORAL) Take 1 capsule by mouth nightly as needed (Sleep).    famotidine (PEPCID) 20 MG tablet Take 1 tablet (20 mg total) by mouth once daily.    glucagon (BAQSIMI) 3 mg/actuation Spry 1 each by Nasal route as needed (For Hypoglycemia).    glucose 4 GM chewable tablet Take 4 tablets (16 g total) by mouth as needed for Low blood sugar.    glucose 4 GM chewable tablet Take 6 tablets (24 g total) by mouth as needed for Low blood sugar.    hydrALAZINE (APRESOLINE) 100 MG tablet Take 1 tablet (100 mg total) by mouth every 8 (eight) hours.    insulin aspart U-100 (NOVOLOG) 100 unit/mL (3 mL) InPn pen Inject 5-6 Units into the skin 3 (three) times daily before meals. May also inject 0-5 Units as needed (hyperglycemia). Sliding Scale: 180 - 230 + 1 unit; 231- 280  + 2 units; 281 - 330 + 3 units; 331 - 380 + 4 units; > 380   + 5 units. MAX TDD 40 units.    insulin glargine (LANTUS) 100 unit/mL injection Inject 11 Units into the skin once daily. Inject 6 units every morning and 5 units every evening.    LORazepam (ATIVAN) 0.5 MG tablet Take 0.5 mg by mouth daily as needed for " Anxiety.    NIFEdipine (PROCARDIA-XL) 60 MG (OSM) 24 hr tablet Take 2 tablets (120 mg total) by mouth daily with dinner or evening meal.    PNV,calcium 72/iron/folic acid (PRENATAL VITAMIN) Tab Take 1 tablet by mouth once daily.    prenatal vit 93/iron fum/folic (PRENATAL FORMULA ORAL) Take 1 tablet by mouth once daily.    sertraline (ZOLOFT) 25 MG tablet Take 1 tablet (25 mg total) by mouth once daily.       Review of patient's allergies indicates:  No Known Allergies     Family History       Problem Relation (Age of Onset)    No Known Problems Mother, Father          Tobacco Use    Smoking status: Not on file    Smokeless tobacco: Not on file   Substance and Sexual Activity    Alcohol use: Not Currently    Drug use: Never    Sexual activity: Yes     Review of Systems   Constitutional:  Negative for fever.   Respiratory:  Negative for shortness of breath.    Cardiovascular:  Negative for chest pain.   Gastrointestinal:  Positive for nausea and vomiting. Negative for abdominal pain.   Endocrine: Negative for hot flashes.   Genitourinary:  Negative for pelvic pain, vaginal bleeding and vaginal discharge.   Musculoskeletal:  Negative for back pain.   Integumentary:  Negative for breast mass, nipple discharge and breast skin changes.   Neurological:  Negative for headaches.   Hematological:  Does not bruise/bleed easily.   Psychiatric/Behavioral:  Negative for depression.    Breast: Negative for mass, mastodynia, nipple discharge and skin changes   Objective:     Vital Signs (Most Recent):  Temp: 98 °F (36.7 °C) (05/23/22 1600)  Pulse: 98 (05/23/22 1600)  Resp: 11 (05/23/22 1600)  BP: (!) 168/100 (05/23/22 1600)  SpO2: 98 % (05/23/22 1600)   Vital Signs (24h Range):  Temp:  [97.5 °F (36.4 °C)-98.3 °F (36.8 °C)] 98 °F (36.7 °C)  Pulse:  [] 98  Resp:  [11-19] 11  SpO2:  [97 %-98 %] 98 %  BP: (155-177)/() 168/100     Weight: 59 kg (130 lb)  Body mass index is 24.56 kg/m².      Physical Exam:    Constitutional: She is oriented to person, place, and time. She appears well-developed. No distress.   No fever    HENT:   Head: Normocephalic and atraumatic.     Neck: No thyromegaly present.    Cardiovascular:  Normal rate.             Pulmonary/Chest: Effort normal. No respiratory distress. Right breast exhibits no mass, no nipple discharge, no skin change, no tenderness and no swelling. Left breast exhibits no mass, no nipple discharge, no skin change, no tenderness and no swelling. Breasts are symmetrical.        Abdominal: She exhibits no mass. There is no splenomegaly or hepatomegaly. There is no abdominal tenderness. No hernia.     Genitourinary: Uterus is not enlarged and not tender.              Lymphadenopathy:     She has no cervical adenopathy.    Neurological: She is alert and oriented to person, place, and time.     Psychiatric: Mood and affect normal.     Cervix: deferred     Significant Labs:  Lab Results   Component Value Date    GROUPTRH A POS 05/13/2022    HEPBSAG Negative 05/05/2022       I have personallly reviewed all pertinent lab results from the last 24 hours.  Recent Lab Results  (Last 5 results in the past 24 hours)        05/23/22  1658   05/23/22  1151   05/23/22  0905   05/23/22  0334   05/22/22  2203        Albumin       2.3         Alkaline Phosphatase       59         ALT       24         Anion Gap       10         AST       25         Baso #       0.02         Basophil %       0.2         BILIRUBIN TOTAL       0.3  Comment: For infants and newborns, interpretation of results should be based  on gestational age, weight and in agreement with clinical  observations.    Premature Infant recommended reference ranges:  Up to 24 hours.............<8.0 mg/dL  Up to 48 hours............<12.0 mg/dL  3-5 days..................<15.0 mg/dL  6-29 days.................<15.0 mg/dL           BUN       22         Calcium       7.9         Chloride       99         CO2       20         Creatinine        1.3         Differential Method       Automated         eGFR if        >60.0         eGFR if non        55.6  Comment: Calculation used to obtain the estimated glomerular filtration  rate (eGFR) is the CKD-EPI equation.            Eos #       0.0         Eosinophil %       0.2         Glucose       225         Gran # (ANC)       10.1         Gran %       77.2         Hematocrit       24.8         Hemoglobin       8.9         Immature Grans (Abs)       0.12  Comment: Mild elevation in immature granulocytes is non specific and   can be seen in a variety of conditions including stress response,   acute inflammation, trauma and pregnancy. Correlation with other   laboratory and clinical findings is essential.           Immature Granulocytes       0.9         Lymph #       2.0         Lymph %       15.3         Magnesium       1.6         MCH       30.1         MCHC       35.9         MCV       84         Mono #       0.8         Mono %       6.2         MPV       11.3         nRBC       0         Phosphorus       3.8         Platelets       231         POCT Glucose 176   191   175     276       Potassium       3.4         PROTEIN TOTAL       5.5         RBC       2.96         RDW       13.2         Sodium       129         WBC       13.05                              Assessment/Plan:     27 y.o. female  at 12w2d for:    Nausea and vomiting during pregnancy  - Recommend scheduled IV antiemetics: zofran/reglan (alternating), PPI, scopolamine patch, IVF   - Consider NPO until tolerating liquids and advance to bland diet    12 weeks gestation of pregnancy  - Previable pregnancy, FHT daily not indicated  - Patient mentioned termination to primary team in the setting of multiple hospitalizations and clinical status  - Discussed with patient and mother at bedside. Patient previously given resources for termination clinics. Discussed gestational age limitations in [place] and  surrounding states if she wishes to proceed. Patient and mother verbalized understanding.         UTI in pregnancy, antepartum, first trimester  - Recommend Rocephin 2g x1 for inpatient treatment of suspected UTI  - Recommend urine culture to follow up sensitivities, and repeat urine culture qTrimester    Chronic hypertension  - BP: (155-177)/() 168/100  - Asymptomatic, denies HA, CP, SOB, RUQ pain.  - Plt 222, AST/ALT 33/45, Cr 1.3   - Concern for early Pre Eclampsia low, Cr likely secondary to chronic disease  - Current medications (5/23) IV Labetalol 10 mg q6h, IV Hydralazine 20mg q8h, Procardia XL 120mg (60/60)  - IV medications secondary to limited PO intake  - When tolerating PO, recommend transition to PO labetalol (max 800mg TID)  - Can increase IV labetalol (max 300 mg daily)  - Consider Procardia ER 10-20 mg for acute severe range pressures (max 180mg daily)  - Goal BP <160/110  - [facility] available for assistance - [id number]      Type 1 diabetes mellitus with other specified complication  - [id number]  - Continue management per endocrine          Katherine C Boecking, MD  Obstetrics & Gynecology  [facility] ([place]-[facility])

## 2022-05-23 NOTE — PROGRESS NOTES
[facility] ([place] [address])  Endocrinology  Progress Note    Admit Date: 2022     Reason for Consult: Management of T1DM, Hyperglycemia during pregnancy    Outpatient diabetes provider: Primary Care in [place]; [place] (No established PCP or endocrine care in [facility])  Diabetes education during pregnancy: None      Diabetes diagnosis year: 18 years      Home Diabetes Regimen:     - Lantus 6 units in the morning and 5 units in the evening (Eat a snack before bed if BG is < 100 mg/dl)  - Novolog ICR: 1:8 units TIDWM and ICR 1:12 with snacks (Hold if BG < 100 mg/dL) (Likely 5 units with each meal and 2 units with snacks)  - Novolog SSI for BG excursions:  180 - 230 + 1 unit  231- 280  + 2 units  281 - 330 + 3 units  331 - 380 + 4 units      > 380   + 5 units     Historically on Medronic (But has been off the pump for 7 weeks; Found down with BG in the 20's).     Blood Sugar Monitoring:  Dexcom (Clarity Code: [id number])        Diabetes Complications include:     Hyperglycemia, Hypoglycemia , Diabetic nephropathy  , Diabetic retinopathy , and Diabetic gastroparesis   She reports that she's been admitted for DKA more than 5 times.     Complicating diabetes co morbidities:   Pregancy.       HPI:   27 year old female A2 who is currently 12 weeks pregnant with pertinent medical history of type 1 diabetes mellitus complicated by retinopathy, gastroparesis, and neuropathy who presented initially to [facility] in [place] and admitted to ICU for uncontrolled hypertension and hyperglycemia. She left [place] and came to [facility] for management of uncontrolled nausea and decreased appetite.   She also has a history of leaving [place] from [facility] after a transfer from [facility] when she was managed for encephalopathy and hypoglycemia complicated by a seizure (BG 29).     Endocrinology consulted for management of inpatient diabetes      Interval HPI:   Overnight events: BG above goal ranges on current  "SQ insulin regimen. Patient reporting N/V during rounds. Diet adjusted to clear liquids per primary team.   Eating:   <25%  Nausea: Yes  Hypoglycemia and intervention: No  Fever: No  TPN and/or TF: No  If yes, type of TF/TPN and rate: n/a    BP (!) 177/97   Pulse 98   Temp 97.5 °F (36.4 °C)   Resp 14   Ht 5' 1" (1.549 m)   Wt 59 kg (130 lb)   LMP 02/26/2022 (Approximate)   SpO2 97%   BMI 24.56 kg/m²     Labs Reviewed and Include    Recent Labs   Lab 05/23/22  0334   *   CALCIUM 7.9*   ALBUMIN 2.3*   PROT 5.5*   *   K 3.4*   CO2 20*   CL 99   BUN 22*   CREATININE 1.3   ALKPHOS 59   ALT 24   AST 25   BILITOT 0.3     Lab Results   Component Value Date    WBC 13.05 (H) 05/23/2022    HGB 8.9 (L) 05/23/2022    HCT 24.8 (L) 05/23/2022    MCV 84 05/23/2022     05/23/2022     No results for input(s): TSH, FREET4 in the last 168 hours.  Lab Results   Component Value Date    HGBA1C 9.8 (H) 05/05/2022       Nutritional status:   Body mass index is 24.56 kg/m².  Lab Results   Component Value Date    ALBUMIN 2.3 (L) 05/23/2022    ALBUMIN 2.2 (L) 05/22/2022    ALBUMIN 2.6 (L) 05/21/2022     No results found for: PREALBUMIN    Estimated Creatinine Clearance: 52.7 mL/min (based on SCr of 1.3 mg/dL).    Accu-Checks  Recent Labs     05/21/22  1134 05/22/22  0527 05/22/22  0840 05/22/22  1127 05/22/22  1726 05/22/22  2203 05/23/22  0905 05/23/22  1151   POCTGLUCOSE 201* 406* 207* 217* 289* 276* 175* 191*       Current Medications and/or Treatments Impacting Glycemic Control  Immunotherapy:    Immunosuppressants       None          Steroids:   Hormones (From admission, onward)                None          Pressors:    Autonomic Drugs (From admission, onward)                None          Hyperglycemia/Diabetes Medications:   Antihyperglycemics (From admission, onward)                Start     Stop Route Frequency Ordered    05/22/22 2100  insulin detemir U-100 pen 5 Units         -- SubQ Nightly 05/22/22 0743 "    05/22/22 1244  insulin aspart U-100 pen 0-5 Units         -- SubQ Before meals & nightly PRN 05/22/22 1144    05/22/22 1145  insulin aspart U-100 pen 1-10 Units         -- SubQ 3 times daily with meals 05/22/22 1033    05/22/22 0900  insulin detemir U-100 pen 6 Units         -- SubQ Daily 05/22/22 0747            ASSESSMENT and PLAN    * Hypertensive urgency  Managed per primary team  Optimize BG control    Type 1 diabetes mellitus with other specified complication  - she is 12 weeks pregnant   - blood glucose goal should be aimed for a fasting glucose level below 95 mg/dl, a reading of under 140   mg/dl an hour after eating, and a reading of under 120 mg/dl two hours after eating     Plan:  -Increase Levemir to 6 units BID  -Continue Novolog (aspart) insulin ICR 1:8 Units before meals   -Change Novolog Correction scale (starting to slide at 150 mg/dL during the daytime).  - BG checks q4hr and two hours post-prandial  - Continue Dexcom, reviewed data showing hyperglycemia and blood sugar above goal (Clarity Code: [id number])  -Should be changed when appropriate to have a diet with 45 grams of carbohydrates for meals and 15-25 grams of carbs for snacks   - While on clear liquids recommend changing to bariatric sugar free clears   - Ensure prandial insulin is administered 15 minutes before starting to eat or drink.   - Hypoglycemia protocol in place  - If blood glucose greater than 300, please ask patient not to eat food or drink anything other than water until correctional insulin has brought it back below 250     ** Please notify Endocrine for any change and/or advance in diet**  ** Please call Endocrine for any BG related issues **    Gastroparesis due to DM  - on anti-emetics  - needs better control of blood glucose to prevent complications        Alma Rodrigez NP  Endocrinology  [facility] ([place]

## 2022-05-23 NOTE — SUBJECTIVE & OBJECTIVE
"Interval HPI:   Overnight events: BG above goal ranges on current SQ insulin regimen. Patient reporting N/V during rounds. Diet adjusted to clear liquids per primary team.   Eating:   <25%  Nausea: Yes  Hypoglycemia and intervention: No  Fever: No  TPN and/or TF: No  If yes, type of TF/TPN and rate: n/a    BP (!) 177/97   Pulse 98   Temp 97.5 °F (36.4 °C)   Resp 14   Ht 5' 1" (1.549 m)   Wt 59 kg (130 lb)   LMP 02/26/2022 (Approximate)   SpO2 97%   BMI 24.56 kg/m²     Labs Reviewed and Include    Recent Labs   Lab 05/23/22  0334   *   CALCIUM 7.9*   ALBUMIN 2.3*   PROT 5.5*   *   K 3.4*   CO2 20*   CL 99   BUN 22*   CREATININE 1.3   ALKPHOS 59   ALT 24   AST 25   BILITOT 0.3     Lab Results   Component Value Date    WBC 13.05 (H) 05/23/2022    HGB 8.9 (L) 05/23/2022    HCT 24.8 (L) 05/23/2022    MCV 84 05/23/2022     05/23/2022     No results for input(s): TSH, FREET4 in the last 168 hours.  Lab Results   Component Value Date    HGBA1C 9.8 (H) 05/05/2022       Nutritional status:   Body mass index is 24.56 kg/m².  Lab Results   Component Value Date    ALBUMIN 2.3 (L) 05/23/2022    ALBUMIN 2.2 (L) 05/22/2022    ALBUMIN 2.6 (L) 05/21/2022     No results found for: PREALBUMIN    Estimated Creatinine Clearance: 52.7 mL/min (based on SCr of 1.3 mg/dL).    Accu-Checks  Recent Labs     05/21/22  1134 05/22/22  0527 05/22/22  0840 05/22/22  1127 05/22/22  1726 05/22/22  2203 05/23/22  0905 05/23/22  1151   POCTGLUCOSE 201* 406* 207* 217* 289* 276* 175* 191*       Current Medications and/or Treatments Impacting Glycemic Control  Immunotherapy:    Immunosuppressants       None          Steroids:   Hormones (From admission, onward)                None          Pressors:    Autonomic Drugs (From admission, onward)                None          Hyperglycemia/Diabetes Medications:   Antihyperglycemics (From admission, onward)                Start     Stop Route Frequency Ordered    05/22/22 2100  " insulin detemir U-100 pen 5 Units         -- SubQ Nightly 05/22/22 0747    05/22/22 1244  insulin aspart U-100 pen 0-5 Units         -- SubQ Before meals & nightly PRN 05/22/22 1144    05/22/22 1145  insulin aspart U-100 pen 1-10 Units         -- SubQ 3 times daily with meals 05/22/22 1033    05/22/22 0900  insulin detemir U-100 pen 6 Units         -- SubQ Daily 05/22/22 0747

## 2022-05-23 NOTE — PROGRESS NOTES
"[facility] - [facility] ([address] Medicine  Progress Note    Patient Name: Alicia Valles  MRN: [id number]  Patient Class: IP- Inpatient   Admission Date: 2022  Length of Stay: 0 days  Attending Physician: Justin Hurd MD  Primary Care Provider: Primary Doctor No        Subjective:     Principal Problem:Hypertensive urgency      HPI:  Alicia Valles is a 27F A2 who is currently 11 weeks pregnant with PMH HTN and T1DM complicated by retinopathy, gastroparesis, and neuropathy who presented to [facility] in [place] and admitted to ICU for uncontrolled hypertension and hyperglycemia. She required cardene gtt at [facility] admission. She was recently discharged AMA from [facility] [place] [facility] after a transfer from [facility] for endocrinology evaluation. She was intially admitted to [facility] for HTN encephalopathy and hypoglycemia complicated by a seizure (BG 29). Of note, she reports that she's been admitted for DKA more than 5 times. Patient evaluated by endocrinology who recommend lantus 15U daily and novolog 3units with meals. Patient to be transferred here for higher level of care. She left [place] due to "not wanting to wait" and came to ED here.    Upon admission, she was hypertensive, 184/98, and tachycardic, 117. EKG with sinus tachycardia. Labs were notable for hyperglycemia, elevated BHB, , leukocytosis (20.6), anemia (9.3), hyponatremia (134), low bicarbonate (19), and UA with 2+ occult blood, 3+ protein, 3+ glucose, 1+ ketones, 7 WBC. Pt was given 2L LR bolus, labetalol 10mg IV, nifedipine 120mg PO, ativan 2mg IV x2, and zofran 4mg IV for intractable N/V and HTN control. She was admitted to hospital medicine after critical care reported patient safe for floor.      Overview/Hospital Course:  Patient admitted for hypertensive urgency and intractable N/V. Endocrine consulted for assistance with insulin regimen. MFM consulted for recommendations given presentation and complicated " pregnancy.      Interval History: Patient with continued nausea, abd pain. Continues to be tachycardic and hypertensive.     Review of Systems   Constitutional:  Positive for fatigue. Negative for chills and fever.   HENT:  Negative for congestion, rhinorrhea and sore throat.    Eyes:  Negative for photophobia and visual disturbance.   Respiratory:  Positive for shortness of breath. Negative for chest tightness.    Cardiovascular:  Positive for palpitations. Negative for chest pain.   Gastrointestinal:  Positive for abdominal pain, nausea and vomiting. Negative for constipation and diarrhea.   Endocrine: Negative for polyphagia and polyuria.   Genitourinary:  Negative for dysuria and urgency.   Musculoskeletal:  Negative for arthralgias and myalgias.   Skin:  Negative for color change and wound.   Neurological:  Negative for weakness, light-headedness and headaches.   Psychiatric/Behavioral:  Negative for agitation and confusion. The patient is not nervous/anxious.      Objective:     Vital Signs (Most Recent):  Temp: 97.5 °F (36.4 °C) (05/23/22 1200)  Pulse: 98 (05/23/22 1200)  Resp: 14 (05/23/22 1200)  BP: (!) 177/97 (05/23/22 1200)  SpO2: 97 % (05/23/22 1200)   Vital Signs (24h Range):  Temp:  [97.5 °F (36.4 °C)-98.5 °F (36.9 °C)] 97.5 °F (36.4 °C)  Pulse:  [] 98  Resp:  [14-19] 14  SpO2:  [97 %-98 %] 97 %  BP: (155-177)/() 177/97     Weight: 59 kg (130 lb)  Body mass index is 24.56 kg/m².    Intake/Output Summary (Last 24 hours) at 5/23/2022 1439  Last data filed at 5/22/2022 1600  Gross per 24 hour   Intake 30 ml   Output --   Net 30 ml      Physical Exam  Vitals and nursing note reviewed.   Constitutional:       General: She is not in acute distress.     Appearance: Normal appearance. She is normal weight. She is not ill-appearing.   HENT:      Head: Normocephalic and atraumatic.      Nose: Nose normal.      Mouth/Throat:      Pharynx: Oropharynx is clear.   Eyes:      Extraocular Movements:  "Extraocular movements intact.      Conjunctiva/sclera: Conjunctivae normal.   Cardiovascular:      Rate and Rhythm: Regular rhythm. Tachycardia present.      Pulses: Normal pulses.      Heart sounds: Normal heart sounds.   Pulmonary:      Effort: Pulmonary effort is normal. No respiratory distress.      Breath sounds: Normal breath sounds. No wheezing.   Abdominal:      General: Abdomen is flat. Bowel sounds are normal. There is no distension.      Palpations: Abdomen is soft.      Tenderness: There is abdominal tenderness.   Musculoskeletal:         General: No swelling or tenderness. Normal range of motion.      Cervical back: Normal range of motion and neck supple.   Skin:     General: Skin is warm and dry.   Neurological:      General: No focal deficit present.      Mental Status: She is alert and oriented to person, place, and time. Mental status is at baseline.   Psychiatric:         Mood and Affect: Mood normal.         Behavior: Behavior normal.         Thought Content: Thought content normal.         Judgment: Judgment normal.       Significant Labs: All pertinent labs within the past 24 hours have been reviewed.    Significant Imaging: I have reviewed all pertinent imaging results/findings within the past 24 hours.      Assessment/Plan:      * Hypertensive urgency  See HTN      First trimester pregnancy  --MFM consulted for complicated pregnancy      Chronic hypertension  Patient required recent ICU admission for cardene gtt with planned transfer here for higher level of care    BP today: BP (!) 177/97   Pulse 98   Temp 97.5 °F (36.4 °C)   Resp 14   Ht 5' 1" (1.549 m)   Wt 59 kg (130 lb)   LMP 02/26/2022 (Approximate)   SpO2 97%   BMI 24.56 kg/m²     PLAN:  --Cont hydralazine 20mg IV q8h  --Cont nifedipine 120mg nightly  --Continue labetalol 10mg IV q6h  --Uptitrate BP meds for goal of <140/90, per OBGYN      Type 1 diabetes mellitus with other specified complication  Complications: diabetic " retinopathy, gastroparesis, neuropathy, and h/o retinal detachment  BHB 1.1 on admission with , K 3.7    Endocrinology consulted, f/u recs      Gastroparesis due to DM  Cont metoclopramide 10mg TID  PRN zofran and phenergan IV for intractable N/V      History of panic attacks  See anxiety      Anxiety  Patient with anxiety during hospital admission  PRN vistaril, ativan  Cont sertraline 25mg daily    Leukocytosis  WBC 20.6 on admission  UA concerning for UTI - Patient started on Augmentin x5 days  BHB 1.1 - endocrine consulted  Possible stress response    Continue to monitor        Hyponatremia        UTI in pregnancy, antepartum, first trimester  WBC 20.6 on admission  UA: WBC 7, protein 3+, glucose 3+, ketones 1+, occult blood 2+    Augmentin x 5 days        Spotting in early pregnancy  Spotting on recent admission, continue to monitor      Uses self-applied continuous glucose monitoring device        Proteinuria  See Diabetes      Anemia  CBC daily  Iron panel WNL        VTE Risk Mitigation (From admission, onward)         Ordered     IP VTE LOW RISK PATIENT  Once         05/21/22 1535     IP VTE LOW RISK PATIENT  Once         05/21/22 1535                Discharge Planning   BRYAN: 5/26/2022     Code Status: Full Code   Is the patient medically ready for discharge?:     Reason for patient still in hospital (select all that apply): Patient trending condition, Treatment, Consult recommendations and Pending disposition           Anish Peng MD  Department of Hospital Medicine   [facility] - Intensive Care ([place])

## 2022-05-23 NOTE — ASSESSMENT & PLAN NOTE
- Recommend Rocephin 2g x1 for inpatient treatment of suspected UTI  - Recommend urine culture to follow up sensitivities, and repeat urine culture qTrimester

## 2022-05-23 NOTE — SUBJECTIVE & OBJECTIVE
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    BD ULTRA-FINE LIGIA PEN NEEDLE 32 gauge x 5/32" Ndle To inject insulin 4-6 times daily.    blood-glucose meter,continuous (DEXCOM G6 ) Misc 1 Device by Misc.(Non-Drug; Combo Route) route once. for 1 dose    blood-glucose sensor (DEXCOM G6 SENSOR) Sandrine 3 Devices by Misc.(Non-Drug; Combo Route) route every 30 days.    blood-glucose transmitter (DEXCOM G6 TRANSMITTER) Sandrine 2 Devices by Misc.(Non-Drug; Combo Route) route every 6 (six) months.    diphenhydrAMINE (BENADRYL) 25 mg capsule Take 25 mg by mouth daily as needed for Insomnia ((if Unisom ineffective)).    docusate sodium (COLACE) 100 MG capsule Take 100 mg by mouth once daily.    doxylamine succinate (UNISOM, DOXYLAMINE, ORAL) Take 1 capsule by mouth nightly as needed (Sleep).    famotidine (PEPCID) 20 MG tablet Take 1 tablet (20 mg total) by mouth once daily.    glucagon (BAQSIMI) 3 mg/actuation Spry 1 each by Nasal route as needed (For Hypoglycemia).    glucose 4 GM chewable tablet Take 4 tablets (16 g total) by mouth as needed for Low blood sugar.    glucose 4 GM chewable tablet Take 6 tablets (24 g total) by mouth as needed for Low blood sugar.    hydrALAZINE (APRESOLINE) 100 MG tablet Take 1 tablet " (100 mg total) by mouth every 8 (eight) hours.    insulin aspart U-100 (NOVOLOG) 100 unit/mL (3 mL) InPn pen Inject 5-6 Units into the skin 3 (three) times daily before meals. May also inject 0-5 Units as needed (hyperglycemia). Sliding Scale: 180 - 230 + 1 unit; 231- 280  + 2 units; 281 - 330 + 3 units; 331 - 380 + 4 units; > 380   + 5 units. MAX TDD 40 units.    insulin glargine (LANTUS) 100 unit/mL injection Inject 11 Units into the skin once daily. Inject 6 units every morning and 5 units every evening.    LORazepam (ATIVAN) 0.5 MG tablet Take 0.5 mg by mouth daily as needed for Anxiety.    NIFEdipine (PROCARDIA-XL) 60 MG (OSM) 24 hr tablet Take 2 tablets (120 mg total) by mouth daily with dinner or evening meal.    PNV,calcium 72/iron/folic acid (PRENATAL VITAMIN) Tab Take 1 tablet by mouth once daily.    prenatal vit 93/iron fum/folic (PRENATAL FORMULA ORAL) Take 1 tablet by mouth once daily.    sertraline (ZOLOFT) 25 MG tablet Take 1 tablet (25 mg total) by mouth once daily.       Review of patient's allergies indicates:  No Known Allergies     Family History       Problem Relation (Age of Onset)    No Known Problems Mother, Father          Tobacco Use    Smoking status: Not on file    Smokeless tobacco: Not on file   Substance and Sexual Activity    Alcohol use: Not Currently    Drug use: Never    Sexual activity: Yes     Review of Systems   Constitutional:  Negative for fever.   Respiratory:  Negative for shortness of breath.    Cardiovascular:  Negative for chest pain.   Gastrointestinal:  Positive for nausea and vomiting. Negative for abdominal pain.   Endocrine: Negative for hot flashes.   Genitourinary:  Negative for pelvic pain, vaginal bleeding and vaginal discharge.   Musculoskeletal:  Negative for back pain.   Integumentary:  Negative for breast mass, nipple discharge and breast skin changes.   Neurological:  Negative for headaches.   Hematological:  Does not bruise/bleed easily.    Psychiatric/Behavioral:  Negative for depression.    Breast: Negative for mass, mastodynia, nipple discharge and skin changes   Objective:     Vital Signs (Most Recent):  Temp: 98 °F (36.7 °C) (05/23/22 1600)  Pulse: 98 (05/23/22 1600)  Resp: 11 (05/23/22 1600)  BP: (!) 168/100 (05/23/22 1600)  SpO2: 98 % (05/23/22 1600)   Vital Signs (24h Range):  Temp:  [97.5 °F (36.4 °C)-98.3 °F (36.8 °C)] 98 °F (36.7 °C)  Pulse:  [] 98  Resp:  [11-19] 11  SpO2:  [97 %-98 %] 98 %  BP: (155-177)/() 168/100     Weight: 59 kg (130 lb)  Body mass index is 24.56 kg/m².      Physical Exam:   Constitutional: She is oriented to person, place, and time. She appears well-developed. No distress.   No fever    HENT:   Head: Normocephalic and atraumatic.     Neck: No thyromegaly present.    Cardiovascular:  Normal rate.             Pulmonary/Chest: Effort normal. No respiratory distress. Right breast exhibits no mass, no nipple discharge, no skin change, no tenderness and no swelling. Left breast exhibits no mass, no nipple discharge, no skin change, no tenderness and no swelling. Breasts are symmetrical.        Abdominal: She exhibits no mass. There is no splenomegaly or hepatomegaly. There is no abdominal tenderness. No hernia.     Genitourinary: Uterus is not enlarged and not tender.              Lymphadenopathy:     She has no cervical adenopathy.    Neurological: She is alert and oriented to person, place, and time.     Psychiatric: Mood and affect normal.     Cervix: deferred     Significant Labs:  Lab Results   Component Value Date    GROUPTRH A POS 05/13/2022    HEPBSAG Negative 05/05/2022       I have personallly reviewed all pertinent lab results from the last 24 hours.  Recent Lab Results  (Last 5 results in the past 24 hours)        05/23/22  1658   05/23/22  1151   05/23/22  0905   05/23/22  0334   05/22/22  2203        Albumin       2.3         Alkaline Phosphatase       59         ALT       24         Anion Gap        10         AST       25         Baso #       0.02         Basophil %       0.2         BILIRUBIN TOTAL       0.3  Comment: For infants and newborns, interpretation of results should be based  on gestational age, weight and in agreement with clinical  observations.    Premature Infant recommended reference ranges:  Up to 24 hours.............<8.0 mg/dL  Up to 48 hours............<12.0 mg/dL  3-5 days..................<15.0 mg/dL  6-29 days.................<15.0 mg/dL           BUN       22         Calcium       7.9         Chloride       99         CO2       20         Creatinine       1.3         Differential Method       Automated         eGFR if        >60.0         eGFR if non        55.6  Comment: Calculation used to obtain the estimated glomerular filtration  rate (eGFR) is the CKD-EPI equation.            Eos #       0.0         Eosinophil %       0.2         Glucose       225         Gran # (ANC)       10.1         Gran %       77.2         Hematocrit       24.8         Hemoglobin       8.9         Immature Grans (Abs)       0.12  Comment: Mild elevation in immature granulocytes is non specific and   can be seen in a variety of conditions including stress response,   acute inflammation, trauma and pregnancy. Correlation with other   laboratory and clinical findings is essential.           Immature Granulocytes       0.9         Lymph #       2.0         Lymph %       15.3         Magnesium       1.6         MCH       30.1         MCHC       35.9         MCV       84         Mono #       0.8         Mono %       6.2         MPV       11.3         nRBC       0         Phosphorus       3.8         Platelets       231         POCT Glucose 176   191   175     276       Potassium       3.4         PROTEIN TOTAL       5.5         RBC       2.96         RDW       13.2         Sodium       129         WBC       13.05

## 2022-05-23 NOTE — HPI
Alicia Valles is a 27 y.o. G1Q2165N at 12w2d admitted to [facility] in the setting of hypertensive urgency and DM1 BG control. PMH significant for HTN and T1DM complicated by retinopathy, gastroparesis, and neuropathy.     Patient with complicated hospital course. She was initially admitted to [facility] in the setting of BG patterning following a hospitalization due to hypertensive encephalopathy. She was then transferred to [facility] for endocrine management in the setting of brittle DM1. She subsequently left [place] and presented to [facility] in [place] for uncontrolled hypertension and hyperglycemia. She is s/p cardine gtt in ICU at [facility] and transferred to [facility] for higher level of care in the management of hypertensive urgency and BG control.    On evaluation, patient resting comfortably in bed. Denies HA, CP, SOB, RUQ pain. Reports nausea and continued vomiting. She reports she has had minimal PO intake during this admission.

## 2022-05-23 NOTE — ASSESSMENT & PLAN NOTE
- she is 12 weeks pregnant   - blood glucose goal should be aimed for a fasting glucose level below 95 mg/dl, a reading of under 140   mg/dl an hour after eating, and a reading of under 120 mg/dl two hours after eating     Plan:  -Increase Levemir to 6 units BID  -Continue Novolog (aspart) insulin ICR 1:8 Units before meals   -Change Novolog Correction scale (starting to slide at 150 mg/dL during the daytime).  - BG checks q4hr and two hours post-prandial  - Continue Dexcom, reviewed data showing hyperglycemia and blood sugar above goal (Clarity Code: [id number])  -Should be changed when appropriate to have a diet with 45 grams of carbohydrates for meals and 15-25 grams of carbs for snacks   - While on clear liquids recommend changing to bariatric sugar free clears   - Ensure prandial insulin is administered 15 minutes before starting to eat or drink.   - Hypoglycemia protocol in place  - If blood glucose greater than 300, please ask patient not to eat food or drink anything other than water until correctional insulin has brought it back below 250     ** Please notify Endocrine for any change and/or advance in diet**  ** Please call Endocrine for any BG related issues **

## 2022-05-23 NOTE — ASSESSMENT & PLAN NOTE
- BP: (155-177)/() 168/100  - Asymptomatic, denies HA, CP, SOB, RUQ pain.  - Plt 222, AST/ALT 33/45, Cr 1.3   - Concern for early Pre Eclampsia low, Cr likely secondary to chronic disease  - Current medications (5/23) IV Labetalol 10 mg q6h, IV Hydralazine 20mg q8h, Procardia XL 120mg (60/60)  - IV medications secondary to limited PO intake  - When tolerating PO, recommend transition to PO labetalol (max 800mg TID)  - Can increase IV labetalol (max 300 mg daily)  - Consider Procardia ER 10-20 mg for acute severe range pressures (max 180mg daily)  - Goal BP <160/110  - [facility] available for assistance - [id number]

## 2022-05-23 NOTE — ASSESSMENT & PLAN NOTE
- Previable pregnancy, FHT daily not indicated  - Patient mentioned termination to primary team in the setting of multiple hospitalizations and clinical status  - Discussed with patient and mother at bedside. Patient previously given resources for termination clinics. Discussed gestational age limitations in [place] and surrounding states if she wishes to proceed. Patient and mother verbalized understanding.

## 2022-05-23 NOTE — SUBJECTIVE & OBJECTIVE
Interval History: Patient with continued nausea, abd pain. Continues to be tachycardic and hypertensive.     Review of Systems   Constitutional:  Positive for fatigue. Negative for chills and fever.   HENT:  Negative for congestion, rhinorrhea and sore throat.    Eyes:  Negative for photophobia and visual disturbance.   Respiratory:  Positive for shortness of breath. Negative for chest tightness.    Cardiovascular:  Positive for palpitations. Negative for chest pain.   Gastrointestinal:  Positive for abdominal pain, nausea and vomiting. Negative for constipation and diarrhea.   Endocrine: Negative for polyphagia and polyuria.   Genitourinary:  Negative for dysuria and urgency.   Musculoskeletal:  Negative for arthralgias and myalgias.   Skin:  Negative for color change and wound.   Neurological:  Negative for weakness, light-headedness and headaches.   Psychiatric/Behavioral:  Negative for agitation and confusion. The patient is not nervous/anxious.      Objective:     Vital Signs (Most Recent):  Temp: 97.5 °F (36.4 °C) (05/23/22 1200)  Pulse: 98 (05/23/22 1200)  Resp: 14 (05/23/22 1200)  BP: (!) 177/97 (05/23/22 1200)  SpO2: 97 % (05/23/22 1200)   Vital Signs (24h Range):  Temp:  [97.5 °F (36.4 °C)-98.5 °F (36.9 °C)] 97.5 °F (36.4 °C)  Pulse:  [] 98  Resp:  [14-19] 14  SpO2:  [97 %-98 %] 97 %  BP: (155-177)/() 177/97     Weight: 59 kg (130 lb)  Body mass index is 24.56 kg/m².    Intake/Output Summary (Last 24 hours) at 5/23/2022 1439  Last data filed at 5/22/2022 1600  Gross per 24 hour   Intake 30 ml   Output --   Net 30 ml      Physical Exam  Vitals and nursing note reviewed.   Constitutional:       General: She is not in acute distress.     Appearance: Normal appearance. She is normal weight. She is not ill-appearing.   HENT:      Head: Normocephalic and atraumatic.      Nose: Nose normal.      Mouth/Throat:      Pharynx: Oropharynx is clear.   Eyes:      Extraocular Movements: Extraocular movements  intact.      Conjunctiva/sclera: Conjunctivae normal.   Cardiovascular:      Rate and Rhythm: Regular rhythm. Tachycardia present.      Pulses: Normal pulses.      Heart sounds: Normal heart sounds.   Pulmonary:      Effort: Pulmonary effort is normal. No respiratory distress.      Breath sounds: Normal breath sounds. No wheezing.   Abdominal:      General: Abdomen is flat. Bowel sounds are normal. There is no distension.      Palpations: Abdomen is soft.      Tenderness: There is abdominal tenderness.   Musculoskeletal:         General: No swelling or tenderness. Normal range of motion.      Cervical back: Normal range of motion and neck supple.   Skin:     General: Skin is warm and dry.   Neurological:      General: No focal deficit present.      Mental Status: She is alert and oriented to person, place, and time. Mental status is at baseline.   Psychiatric:         Mood and Affect: Mood normal.         Behavior: Behavior normal.         Thought Content: Thought content normal.         Judgment: Judgment normal.       Significant Labs: All pertinent labs within the past 24 hours have been reviewed.    Significant Imaging: I have reviewed all pertinent imaging results/findings within the past 24 hours.

## 2022-05-24 PROBLEM — I10 CHRONIC HYPERTENSION: Chronic | Status: RESOLVED | Noted: 2022-05-05 | Resolved: 2022-05-24

## 2022-05-24 PROBLEM — Z97.8 USES SELF-APPLIED CONTINUOUS GLUCOSE MONITORING DEVICE: Status: RESOLVED | Noted: 2022-05-13 | Resolved: 2022-05-24

## 2022-05-24 PROBLEM — Z86.59 HISTORY OF PANIC ATTACKS: Chronic | Status: RESOLVED | Noted: 2022-05-13 | Resolved: 2022-05-24

## 2022-05-24 PROBLEM — E87.1 HYPONATREMIA: Status: RESOLVED | Noted: 2022-05-21 | Resolved: 2022-05-24

## 2022-05-24 LAB
ALBUMIN SERPL BCP-MCNC: 1.9 G/DL (ref 3.5–5.2)
ALP SERPL-CCNC: 50 U/L (ref 55–135)
ALT SERPL W/O P-5'-P-CCNC: 17 U/L (ref 10–44)
ANION GAP SERPL CALC-SCNC: 7 MMOL/L (ref 8–16)
AST SERPL-CCNC: 18 U/L (ref 10–40)
BASOPHILS # BLD AUTO: 0.01 K/UL (ref 0–0.2)
BASOPHILS NFR BLD: 0.1 % (ref 0–1.9)
BILIRUB SERPL-MCNC: 0.2 MG/DL (ref 0.1–1)
BUN SERPL-MCNC: 16 MG/DL (ref 6–20)
CALCIUM SERPL-MCNC: 7.4 MG/DL (ref 8.7–10.5)
CHLORIDE SERPL-SCNC: 102 MMOL/L (ref 95–110)
CO2 SERPL-SCNC: 22 MMOL/L (ref 23–29)
CREAT SERPL-MCNC: 1.1 MG/DL (ref 0.5–1.4)
DIFFERENTIAL METHOD: ABNORMAL
EOSINOPHIL # BLD AUTO: 0.1 K/UL (ref 0–0.5)
EOSINOPHIL NFR BLD: 0.9 % (ref 0–8)
ERYTHROCYTE [DISTWIDTH] IN BLOOD BY AUTOMATED COUNT: 13.4 % (ref 11.5–14.5)
EST. GFR  (AFRICAN AMERICAN): >60 ML/MIN/1.73 M^2
EST. GFR  (NON AFRICAN AMERICAN): >60 ML/MIN/1.73 M^2
GLUCOSE SERPL-MCNC: 123 MG/DL (ref 70–110)
HCT VFR BLD AUTO: 22 % (ref 37–48.5)
HGB BLD-MCNC: 8 G/DL (ref 12–16)
HIV 1+2 AB+HIV1 P24 AG SERPL QL IA: NEGATIVE
IMM GRANULOCYTES # BLD AUTO: 0.05 K/UL (ref 0–0.04)
IMM GRANULOCYTES NFR BLD AUTO: 0.6 % (ref 0–0.5)
LYMPHOCYTES # BLD AUTO: 2.2 K/UL (ref 1–4.8)
LYMPHOCYTES NFR BLD: 27.6 % (ref 18–48)
MAGNESIUM SERPL-MCNC: 2.2 MG/DL (ref 1.6–2.6)
MCH RBC QN AUTO: 30.1 PG (ref 27–31)
MCHC RBC AUTO-ENTMCNC: 36.4 G/DL (ref 32–36)
MCV RBC AUTO: 83 FL (ref 82–98)
MONOCYTES # BLD AUTO: 0.6 K/UL (ref 0.3–1)
MONOCYTES NFR BLD: 7.9 % (ref 4–15)
NEUTROPHILS # BLD AUTO: 5 K/UL (ref 1.8–7.7)
NEUTROPHILS NFR BLD: 62.9 % (ref 38–73)
NRBC BLD-RTO: 0 /100 WBC
PHOSPHATE SERPL-MCNC: 3.9 MG/DL (ref 2.7–4.5)
PLATELET # BLD AUTO: 181 K/UL (ref 150–450)
PMV BLD AUTO: 10.1 FL (ref 9.2–12.9)
POCT GLUCOSE: 120 MG/DL (ref 70–110)
POCT GLUCOSE: 130 MG/DL (ref 70–110)
POCT GLUCOSE: 143 MG/DL (ref 70–110)
POCT GLUCOSE: 243 MG/DL (ref 70–110)
POTASSIUM SERPL-SCNC: 3.5 MMOL/L (ref 3.5–5.1)
PROT SERPL-MCNC: 4.5 G/DL (ref 6–8.4)
RBC # BLD AUTO: 2.66 M/UL (ref 4–5.4)
SODIUM SERPL-SCNC: 131 MMOL/L (ref 136–145)
WBC # BLD AUTO: 8 K/UL (ref 3.9–12.7)

## 2022-05-24 PROCEDURE — 20600001 HC STEP DOWN PRIVATE ROOM

## 2022-05-24 PROCEDURE — 99233 SBSQ HOSP IP/OBS HIGH 50: CPT | Mod: ,,, | Performed by: HOSPITALIST

## 2022-05-24 PROCEDURE — 85025 COMPLETE CBC W/AUTO DIFF WBC: CPT

## 2022-05-24 PROCEDURE — 25000003 PHARM REV CODE 250: Performed by: HOSPITALIST

## 2022-05-24 PROCEDURE — 63600175 PHARM REV CODE 636 W HCPCS: Performed by: STUDENT IN AN ORGANIZED HEALTH CARE EDUCATION/TRAINING PROGRAM

## 2022-05-24 PROCEDURE — 36415 COLL VENOUS BLD VENIPUNCTURE: CPT

## 2022-05-24 PROCEDURE — 99233 PR SUBSEQUENT HOSPITAL CARE,LEVL III: ICD-10-PCS | Mod: ,,, | Performed by: HOSPITALIST

## 2022-05-24 PROCEDURE — 25000003 PHARM REV CODE 250

## 2022-05-24 PROCEDURE — 99232 PR SUBSEQUENT HOSPITAL CARE,LEVL II: ICD-10-PCS | Mod: ,,, | Performed by: NURSE PRACTITIONER

## 2022-05-24 PROCEDURE — 25000003 PHARM REV CODE 250: Performed by: STUDENT IN AN ORGANIZED HEALTH CARE EDUCATION/TRAINING PROGRAM

## 2022-05-24 PROCEDURE — 83735 ASSAY OF MAGNESIUM: CPT

## 2022-05-24 PROCEDURE — 87088 URINE BACTERIA CULTURE: CPT

## 2022-05-24 PROCEDURE — 87086 URINE CULTURE/COLONY COUNT: CPT

## 2022-05-24 PROCEDURE — 99232 SBSQ HOSP IP/OBS MODERATE 35: CPT | Mod: ,,, | Performed by: NURSE PRACTITIONER

## 2022-05-24 PROCEDURE — 63600175 PHARM REV CODE 636 W HCPCS

## 2022-05-24 PROCEDURE — 80053 COMPREHEN METABOLIC PANEL: CPT

## 2022-05-24 PROCEDURE — 84100 ASSAY OF PHOSPHORUS: CPT

## 2022-05-24 RX ORDER — LABETALOL HCL 20 MG/4 ML
40 SYRINGE (ML) INTRAVENOUS EVERY 6 HOURS
Status: DISCONTINUED | OUTPATIENT
Start: 2022-05-24 | End: 2022-05-25

## 2022-05-24 RX ORDER — NIFEDIPINE 30 MG/1
90 TABLET, EXTENDED RELEASE ORAL 2 TIMES DAILY
Status: DISCONTINUED | OUTPATIENT
Start: 2022-05-24 | End: 2022-05-26 | Stop reason: HOSPADM

## 2022-05-24 RX ADMIN — LORAZEPAM 1 MG: 2 INJECTION INTRAMUSCULAR; INTRAVENOUS at 04:05

## 2022-05-24 RX ADMIN — INSULIN ASPART 4 UNITS: 100 INJECTION, SOLUTION INTRAVENOUS; SUBCUTANEOUS at 08:05

## 2022-05-24 RX ADMIN — Medication 20 MG: at 02:05

## 2022-05-24 RX ADMIN — ONDANSETRON 4 MG: 2 INJECTION INTRAMUSCULAR; INTRAVENOUS at 06:05

## 2022-05-24 RX ADMIN — METOCLOPRAMIDE 10 MG: 5 INJECTION, SOLUTION INTRAMUSCULAR; INTRAVENOUS at 11:05

## 2022-05-24 RX ADMIN — Medication 40 MG: at 05:05

## 2022-05-24 RX ADMIN — Medication 25 MG: at 02:05

## 2022-05-24 RX ADMIN — Medication 20 MG: at 06:05

## 2022-05-24 RX ADMIN — Medication 40 MG: at 12:05

## 2022-05-24 RX ADMIN — HYDRALAZINE HYDROCHLORIDE 5 MG: 20 INJECTION, SOLUTION INTRAMUSCULAR; INTRAVENOUS at 04:05

## 2022-05-24 RX ADMIN — METOCLOPRAMIDE 10 MG: 5 INJECTION, SOLUTION INTRAMUSCULAR; INTRAVENOUS at 05:05

## 2022-05-24 RX ADMIN — HYDRALAZINE HYDROCHLORIDE 20 MG: 20 INJECTION, SOLUTION INTRAMUSCULAR; INTRAVENOUS at 02:05

## 2022-05-24 RX ADMIN — INSULIN DETEMIR 6 UNITS: 100 INJECTION, SOLUTION SUBCUTANEOUS at 08:05

## 2022-05-24 RX ADMIN — DOCUSATE SODIUM 100 MG: 100 CAPSULE, LIQUID FILLED ORAL at 08:05

## 2022-05-24 RX ADMIN — PROMETHAZINE HYDROCHLORIDE 12.5 MG: 25 INJECTION INTRAMUSCULAR; INTRAVENOUS at 12:05

## 2022-05-24 RX ADMIN — PROMETHAZINE HYDROCHLORIDE 12.5 MG: 25 INJECTION INTRAMUSCULAR; INTRAVENOUS at 08:05

## 2022-05-24 RX ADMIN — NIFEDIPINE 90 MG: 30 TABLET, FILM COATED, EXTENDED RELEASE ORAL at 08:05

## 2022-05-24 RX ADMIN — ONDANSETRON 4 MG: 2 INJECTION INTRAMUSCULAR; INTRAVENOUS at 02:05

## 2022-05-24 RX ADMIN — ONDANSETRON 4 MG: 2 INJECTION INTRAMUSCULAR; INTRAVENOUS at 11:05

## 2022-05-24 RX ADMIN — SERTRALINE HYDROCHLORIDE 25 MG: 25 TABLET ORAL at 09:05

## 2022-05-24 RX ADMIN — METOCLOPRAMIDE 10 MG: 5 INJECTION, SOLUTION INTRAMUSCULAR; INTRAVENOUS at 06:05

## 2022-05-24 RX ADMIN — HYDRALAZINE HYDROCHLORIDE 20 MG: 20 INJECTION, SOLUTION INTRAMUSCULAR; INTRAVENOUS at 06:05

## 2022-05-24 RX ADMIN — DIPHENHYDRAMINE HYDROCHLORIDE 12.5 MG: 50 INJECTION, SOLUTION INTRAMUSCULAR; INTRAVENOUS at 06:05

## 2022-05-24 RX ADMIN — DIPHENHYDRAMINE HYDROCHLORIDE 12.5 MG: 50 INJECTION, SOLUTION INTRAMUSCULAR; INTRAVENOUS at 11:05

## 2022-05-24 RX ADMIN — Medication 25 MG: at 06:05

## 2022-05-24 RX ADMIN — FAMOTIDINE 40 MG: 20 TABLET ORAL at 08:05

## 2022-05-24 RX ADMIN — CEFTRIAXONE 2 G: 2 INJECTION, SOLUTION INTRAVENOUS at 12:05

## 2022-05-24 RX ADMIN — Medication 25 MG: at 11:05

## 2022-05-24 NOTE — SUBJECTIVE & OBJECTIVE
"Interval HPI:   Overnight events: Patient reported N/V overnight. BG variable on current SQ insulin regimen. Reported drinking gatorade yesterday evening likely cause of BG excursions.   Eating:    bariatric sugar free clears   Nausea: Yes  Hypoglycemia and intervention: No  Fever: No  TPN and/or TF: No  If yes, type of TF/TPN and rate: n/a    /80 (BP Location: Right arm, Patient Position: Lying)   Pulse 95   Temp 97.5 °F (36.4 °C) (Axillary)   Resp 13   Ht 5' 1" (1.549 m)   Wt 59 kg (130 lb)   LMP 02/26/2022 (Approximate)   SpO2 97%   BMI 24.56 kg/m²     Labs Reviewed and Include    Recent Labs   Lab 05/24/22  1225   *   CALCIUM 7.4*   ALBUMIN 1.9*   PROT 4.5*   *   K 3.5   CO2 22*      BUN 16   CREATININE 1.1   ALKPHOS 50*   ALT 17   AST 18   BILITOT 0.2     Lab Results   Component Value Date    WBC 8.00 05/24/2022    HGB 8.0 (L) 05/24/2022    HCT 22.0 (L) 05/24/2022    MCV 83 05/24/2022     05/24/2022     No results for input(s): TSH, FREET4 in the last 168 hours.  Lab Results   Component Value Date    HGBA1C 9.8 (H) 05/05/2022       Nutritional status:   Body mass index is 24.56 kg/m².  Lab Results   Component Value Date    ALBUMIN 1.9 (L) 05/24/2022    ALBUMIN 2.3 (L) 05/23/2022    ALBUMIN 2.2 (L) 05/22/2022     No results found for: PREALBUMIN    Estimated Creatinine Clearance: 62.3 mL/min (based on SCr of 1.1 mg/dL).    Accu-Checks  Recent Labs     05/22/22  1127 05/22/22  1726 05/22/22  2203 05/23/22  0905 05/23/22  1151 05/23/22  1658 05/23/22  2025 05/23/22  2112 05/24/22  0837 05/24/22  1145   POCTGLUCOSE 217* 289* 276* 175* 191* 176* 348* 337* 143* 120*       Current Medications and/or Treatments Impacting Glycemic Control  Immunotherapy:    Immunosuppressants       None          Steroids:   Hormones (From admission, onward)                None          Pressors:    Autonomic Drugs (From admission, onward)                None          Hyperglycemia/Diabetes " Medications:   Antihyperglycemics (From admission, onward)                Start     Stop Route Frequency Ordered    05/23/22 2100  insulin detemir U-100 pen 6 Units         -- SubQ Nightly 05/23/22 1459    05/22/22 1244  insulin aspart U-100 pen 0-5 Units         -- SubQ Before meals & nightly PRN 05/22/22 1144    05/22/22 1145  insulin aspart U-100 pen 1-10 Units         -- SubQ 3 times daily with meals 05/22/22 1033    05/22/22 0900  insulin detemir U-100 pen 6 Units         -- SubQ Daily 05/22/22 0747

## 2022-05-24 NOTE — NURSING
Patient complained of N/V throughout the night. Medicated as scheduled and also with prns. Now complaining of lower abdominal pain and requesting of meds to just let her get some rest. Had a horrible night. I hopr obs/gyn come to see her today.

## 2022-05-24 NOTE — PLAN OF CARE
Educate the patient on how to control nausea and vomiting , her anxiety patient has generalized edema MD is made aware . Patient complaints of abdominal and cramping pain , ultrasound is ordered . Pain medications is given as ordered. Patient did not eat breakfast or lunch refused PO meds MD is made aware

## 2022-05-24 NOTE — ASSESSMENT & PLAN NOTE
--Scheduled metoclopramide 10mg IV q6h  --Scheduled Zofran 4mg IV q8h  --Scopolamine patch q3 days  --Benadryl 12.5mg IV q6h  --Famotidine 40mg QD PO  --Phenergan IV q6h prn

## 2022-05-24 NOTE — ASSESSMENT & PLAN NOTE
WBC 20.6 on admission  UA: WBC 7, protein 3+, glucose 3+, ketones 1+, occult blood 2+    --Follow-up urine culture

## 2022-05-24 NOTE — ASSESSMENT & PLAN NOTE
Patient with diabetic gastroparesis, intractable nausea and vomiting  [facility] consulted for assistance    --Scheduled Zofran, Benadryl, famotidine, pyridoxine, metoclopramide  --Scopolamine patch  --Prn promethazine

## 2022-05-24 NOTE — ASSESSMENT & PLAN NOTE
- she is 12 weeks pregnant   - blood glucose goal should be aimed for a fasting glucose level below 95 mg/dl, a reading of under 140   mg/dl an hour after eating, and a reading of under 120 mg/dl two hours after eating     Plan:  -Continue Levemir 6 units BID  -Continue Novolog (aspart) insulin ICR 1:8 Units before meals   -Novolog Correction scale (starting to slide at 150 mg/dL during the daytime).  -BG checks q4hr and two hours post-prandial  - Continue Dexcom, reviewed data showing hyperglycemia and blood sugar above goal (Clarity Code: [id number])  -Patient requesting diet changed back to diabetic. Advise 45 grams of carbohydrates for meals and 15-25 grams of carbs for snacks   Diet diabetic [facility]; 2000 Calorie; Consistent Carbohydrate  - Ensure prandial insulin is administered 15 minutes before starting to eat or drink.   - Hypoglycemia protocol in place  - If blood glucose greater than 300, please ask patient not to eat food or drink anything other than water until correctional insulin has brought it back below 250     ** Please notify Endocrine for any change and/or advance in diet**  ** Please call Endocrine for any BG related issues **

## 2022-05-24 NOTE — PLAN OF CARE
Problem:  Fall Injury Risk  Goal: Absence of Fall, Infant Drop and Related Injury  Outcome: Ongoing, Progressing     Problem: Diabetes Comorbidity  Goal: Blood Glucose Level Within Targeted Range  Outcome: Ongoing, Progressing     Problem: Infection  Goal: Absence of Infection Signs and Symptoms  Outcome: Ongoing, Progressing

## 2022-05-24 NOTE — ASSESSMENT & PLAN NOTE
"Patient required recent ICU admission for cardene gtt with planned transfer here for higher level of care  BP goal <160/110 per [facility]    BP today: /80 (BP Location: Right arm, Patient Position: Lying)   Pulse 95   Temp 97.5 °F (36.4 °C) (Axillary)   Resp 13   Ht 5' 1" (1.549 m)   Wt 59 kg (130 lb)   LMP 02/26/2022 (Approximate)   SpO2 97%   BMI 24.56 kg/m²     PLAN:  --M consulted -- appreciate assistance  --Hydralazine 20mg IV q8h  --Labetalol 40mg IV q6h (max dose 300mg QD)  --Nifedipine 90mg PO BID (max dose 180mg QD)  --IV hydralazine, labetalol prn    "

## 2022-05-24 NOTE — PLAN OF CARE
Patient is alert in bed patient slept on and off throughout the day nausea medications were given as ordered  . Patient requested to resume diabetic diet d is made aware order is changed as requested

## 2022-05-24 NOTE — SUBJECTIVE & OBJECTIVE
Interval History: Patient with continued abd pain, nausea, and vomiting, but overall is feeling better. BG well-controlled with Endo's aid. [facility] recommendations re: BP control and nausea with improvement in vitals and symptoms.     Review of Systems   Constitutional:  Positive for fatigue. Negative for chills and fever.   HENT:  Negative for congestion, rhinorrhea and sore throat.    Eyes:  Negative for photophobia and visual disturbance.   Respiratory:  Positive for shortness of breath. Negative for chest tightness.    Cardiovascular:  Negative for chest pain.   Gastrointestinal:  Positive for abdominal pain, nausea and vomiting. Negative for constipation and diarrhea.   Endocrine: Negative for polyphagia and polyuria.   Genitourinary:  Negative for dysuria and urgency.   Musculoskeletal:  Negative for arthralgias and myalgias.   Skin:  Negative for color change and wound.   Neurological:  Negative for weakness, light-headedness and headaches.   Psychiatric/Behavioral:  Negative for agitation and confusion. The patient is not nervous/anxious.      Objective:     Vital Signs (Most Recent):  Temp: 97.5 °F (36.4 °C) (05/24/22 1145)  Pulse: 95 (05/24/22 1145)  Resp: 13 (05/24/22 1145)  BP: 135/80 (05/24/22 1145)  SpO2: 97 % (05/24/22 1145) Vital Signs (24h Range):  Temp:  [97.5 °F (36.4 °C)-98.9 °F (37.2 °C)] 97.5 °F (36.4 °C)  Pulse:  [] 95  Resp:  [11-20] 13  SpO2:  [97 %-98 %] 97 %  BP: (135-177)/() 135/80     Weight: 59 kg (130 lb)  Body mass index is 24.56 kg/m².  No intake or output data in the 24 hours ending 05/24/22 1334   Physical Exam  Vitals and nursing note reviewed.   Constitutional:       General: She is not in acute distress.     Appearance: Normal appearance. She is normal weight. She is not ill-appearing.   HENT:      Head: Normocephalic and atraumatic.      Nose: Nose normal.      Mouth/Throat:      Pharynx: Oropharynx is clear.   Eyes:      Extraocular Movements: Extraocular movements  intact.      Conjunctiva/sclera: Conjunctivae normal.   Cardiovascular:      Rate and Rhythm: Regular rhythm. Tachycardia present.      Pulses: Normal pulses.      Heart sounds: Normal heart sounds.   Pulmonary:      Effort: Pulmonary effort is normal. No respiratory distress.      Breath sounds: Normal breath sounds. No wheezing.   Abdominal:      General: Abdomen is flat. Bowel sounds are normal. There is no distension.      Palpations: Abdomen is soft.      Tenderness: There is abdominal tenderness.   Musculoskeletal:         General: No swelling or tenderness. Normal range of motion.      Cervical back: Normal range of motion and neck supple.   Skin:     General: Skin is warm and dry.   Neurological:      General: No focal deficit present.      Mental Status: She is alert and oriented to person, place, and time. Mental status is at baseline.   Psychiatric:         Mood and Affect: Mood normal.         Behavior: Behavior normal.         Thought Content: Thought content normal.         Judgment: Judgment normal.       Significant Labs: All pertinent labs within the past 24 hours have been reviewed.    Significant Imaging: I have reviewed all pertinent imaging results/findings within the past 24 hours.

## 2022-05-24 NOTE — PROGRESS NOTES
"Patient is alert in bed mom is remained at bedside . Upon arrival on shift patient complaints of worsening abdominal pain , nausea and vomiting place call to the MD order for ultrasound and pain medicine is received and given as ordered . Patient high blood pressure medication is given as ordered. Patient did not each breakfast or lunch MD is made aware , order to continue with nausea medication is received .Replaced mag . Patient slept throughout the day from 1045 till 1930 , patient wake up and ate about 25 % of food . Report is given to the incoming nurse . Patient is awake and states :"I feel better now " will continue to monitor the patient . Maternity is on board . Endocrinology is on board as well for patient . Nausea and vomiting is remained medication were given as ordered   "

## 2022-05-24 NOTE — PLAN OF CARE
[facility] ([place] [id number])  Initial Discharge Assessment       Primary Care Provider: Primary Doctor No    Admission Diagnosis: Hyperemesis [R11.10]  Hypertension [I10]  Nausea and vomiting [R11.2]  Pregnancy, unspecified gestational age [Z34.90]    Admission Date: 5/21/2022  Expected Discharge Date: 5/26/2022     Met with patient family at bedside. Initial discharge planning completed. Patient lives in a single-story dwelling with no stairs. Patient lives with mother (Polina) whom will provide support upon discharge. Patient was independent with ADLs prior to admission. Patients take blood sugar with glucometer 3 times daily but in need of a PCP. Patient was discharged from this facility less than a week ago and was not able to follow up with any of her prior appointments.      Discharge Barriers Identified: None    Payor: [place] / Plan[place] / Product Type: Government /     Extended Emergency Contact Information  Primary Emergency Contact: Polina Valles  Mobile Phone: [phone]  Relation: Mother  Preferred language: English   needed? No    Discharge Plan A: Home with family  Discharge Plan B: Home      [id number] - [place], [place] - [address] AT [address]  [address]  [place] [address]  Phone: [phone] Fax: [phone]      Initial Assessment (most recent)     Adult Discharge Assessment - 05/24/22 1344        Discharge Assessment    Assessment Type Discharge Planning Assessment     Confirmed/corrected address, phone number and insurance Yes     Confirmed Demographics Correct on Facesheet     Source of Information patient;family     Does patient/caregiver understand observation status Yes     Communicated BRYAN with patient/caregiver Yes     Reason For Admission Hypertensive urgency     Lives With parent(s)     Facility Arrived From: home     Do you expect to return to your current living situation? Yes      Prior to hospitilization cognitive status: Alert/Oriented     Current cognitive status: Alert/Oriented     Walking or Climbing Stairs Difficulty none     Dressing/Bathing Difficulty none     Home Layout Able to live on 1st floor     Equipment Currently Used at Home none     Readmission within 30 days? Yes     Patient currently being followed by outpatient case management? No     Do you currently have service(s) that help you manage your care at home? No     Do you take prescription medications? Yes     Do you have prescription coverage? Yes     Coverage [place] medicaid     Is the patient taking medications as prescribed? yes     Who is going to help you get home at discharge? Polina Sargent-[phone]     How do you get to doctors appointments? family or friend will provide     Are you on dialysis? No     Do you take coumadin? No     Discharge Plan A Home with family     Discharge Plan B Home     DME Needed Upon Discharge  none     Discharge Plan discussed with: Parent(s)     Name(s) and Number(s) Polina Sargent-[phone]     Discharge Barriers Identified None        Relationship/Environment    Name(s) of Who Lives With Patient Polina Sargent-[phone]

## 2022-05-24 NOTE — PROGRESS NOTES
"[facility] ([address] Medicine  Progress Note    Patient Name: Alicia Valles  MRN: [id number]  Patient Class: IP- Inpatient   Admission Date: 2022  Length of Stay: 1 days  Attending Physician: Justin Hurd MD  Primary Care Provider: Primary Doctor No        Subjective:     Principal Problem:Hypertensive urgency      HPI:  Alicia Valles is a 27F A2 who is currently 11 weeks pregnant with PMH HTN and T1DM complicated by retinopathy, gastroparesis, and neuropathy who presented to [facility] in [place] and admitted to ICU for uncontrolled hypertension and hyperglycemia. She required cardene gtt at [facility] admission. She was recently discharged AMA from [facility] after a transfer from [facility] [place] for endocrinology evaluation. She was intially admitted to [facility] [place] for HTN encephalopathy and hypoglycemia complicated by a seizure (BG 29). Of note, she reports that she's been admitted for DKA more than 5 times. Patient evaluated by endocrinology who recommend lantus 15U daily and novolog 3units with meals. Patient to be transferred here for higher level of care. She left [facility] due to "not wanting to wait" and came to ED here.    Upon admission, she was hypertensive, 184/98, and tachycardic, 117. EKG with sinus tachycardia. Labs were notable for hyperglycemia, elevated BHB, , leukocytosis (20.6), anemia (9.3), hyponatremia (134), low bicarbonate (19), and UA with 2+ occult blood, 3+ protein, 3+ glucose, 1+ ketones, 7 WBC. Pt was given 2L LR bolus, labetalol 10mg IV, nifedipine 120mg PO, ativan 2mg IV x2, and zofran 4mg IV for intractable N/V and HTN control. She was admitted to hospital medicine after critical care reported patient safe for floor.      Overview/Hospital Course:  Patient admitted for hypertensive urgency and intractable N/V. Endocrine consulted for assistance with insulin regimen. MFM consulted for recommendations given presentation and complicated " pregnancy.      Interval History: Patient with continued abd pain, nausea, and vomiting, but overall is feeling better. BG well-controlled with Endo's aid. [facility] recommendations re: BP control and nausea with improvement in vitals and symptoms.     Review of Systems   Constitutional:  Positive for fatigue. Negative for chills and fever.   HENT:  Negative for congestion, rhinorrhea and sore throat.    Eyes:  Negative for photophobia and visual disturbance.   Respiratory:  Positive for shortness of breath. Negative for chest tightness.    Cardiovascular:  Negative for chest pain.   Gastrointestinal:  Positive for abdominal pain, nausea and vomiting. Negative for constipation and diarrhea.   Endocrine: Negative for polyphagia and polyuria.   Genitourinary:  Negative for dysuria and urgency.   Musculoskeletal:  Negative for arthralgias and myalgias.   Skin:  Negative for color change and wound.   Neurological:  Negative for weakness, light-headedness and headaches.   Psychiatric/Behavioral:  Negative for agitation and confusion. The patient is not nervous/anxious.      Objective:     Vital Signs (Most Recent):  Temp: 97.5 °F (36.4 °C) (05/24/22 1145)  Pulse: 95 (05/24/22 1145)  Resp: 13 (05/24/22 1145)  BP: 135/80 (05/24/22 1145)  SpO2: 97 % (05/24/22 1145) Vital Signs (24h Range):  Temp:  [97.5 °F (36.4 °C)-98.9 °F (37.2 °C)] 97.5 °F (36.4 °C)  Pulse:  [] 95  Resp:  [11-20] 13  SpO2:  [97 %-98 %] 97 %  BP: (135-177)/() 135/80     Weight: 59 kg (130 lb)  Body mass index is 24.56 kg/m².  No intake or output data in the 24 hours ending 05/24/22 1334   Physical Exam  Vitals and nursing note reviewed.   Constitutional:       General: She is not in acute distress.     Appearance: Normal appearance. She is normal weight. She is not ill-appearing.   HENT:      Head: Normocephalic and atraumatic.      Nose: Nose normal.      Mouth/Throat:      Pharynx: Oropharynx is clear.   Eyes:      Extraocular Movements:  "Extraocular movements intact.      Conjunctiva/sclera: Conjunctivae normal.   Cardiovascular:      Rate and Rhythm: Regular rhythm. Tachycardia present.      Pulses: Normal pulses.      Heart sounds: Normal heart sounds.   Pulmonary:      Effort: Pulmonary effort is normal. No respiratory distress.      Breath sounds: Normal breath sounds. No wheezing.   Abdominal:      General: Abdomen is flat. Bowel sounds are normal. There is no distension.      Palpations: Abdomen is soft.      Tenderness: There is abdominal tenderness.   Musculoskeletal:         General: No swelling or tenderness. Normal range of motion.      Cervical back: Normal range of motion and neck supple.   Skin:     General: Skin is warm and dry.   Neurological:      General: No focal deficit present.      Mental Status: She is alert and oriented to person, place, and time. Mental status is at baseline.   Psychiatric:         Mood and Affect: Mood normal.         Behavior: Behavior normal.         Thought Content: Thought content normal.         Judgment: Judgment normal.       Significant Labs: All pertinent labs within the past 24 hours have been reviewed.    Significant Imaging: I have reviewed all pertinent imaging results/findings within the past 24 hours.      Assessment/Plan:      * Hypertensive urgency  Patient required recent ICU admission for cardene gtt with planned transfer here for higher level of care  BP goal <160/110 per [facility]    BP today: /80 (BP Location: Right arm, Patient Position: Lying)   Pulse 95   Temp 97.5 °F (36.4 °C) (Axillary)   Resp 13   Ht 5' 1" (1.549 m)   Wt 59 kg (130 lb)   LMP 02/26/2022 (Approximate)   SpO2 97%   BMI 24.56 kg/m²     PLAN:  --[facility] consulted -- appreciate assistance  --Hydralazine 20mg IV q8h  --Labetalol 40mg IV q6h (max dose 300mg QD)  --Nifedipine 90mg PO BID (max dose 180mg QD)  --IV hydralazine, labetalol prn      UTI in pregnancy, antepartum, first trimester  WBC 20.6 on admission  UA: " WBC 7, protein 3+, glucose 3+, ketones 1+, occult blood 2+    --Follow-up urine culture        First trimester pregnancy  --MFM consulted for complicated pregnancy      Type 1 diabetes mellitus with other specified complication  Complications: diabetic retinopathy, gastroparesis, neuropathy, and h/o retinal detachment  BHB 1.1 on admission with , K 3.7    Endocrinology consulted, f/u recs      Gastroparesis due to DM  --Scheduled metoclopramide 10mg IV q6h  --Scheduled Zofran 4mg IV q8h  --Scopolamine patch q3 days  --Benadryl 12.5mg IV q6h  --Famotidine 40mg QD PO  --Phenergan IV q6h prn      Anxiety  --Sertraline 25mg QD  --PRN vistaril, ativan    Nausea and vomiting during pregnancy  Patient with diabetic gastroparesis, intractable nausea and vomiting  MFM consulted for assistance    --Scheduled Zofran, Benadryl, famotidine, pyridoxine, metoclopramide  --Scopolamine patch  --Prn promethazine      Leukocytosis  WBC 20.6 on admission  UA concerning for UTI - Patient started on Augmentin x5 days  BHB 1.1 - endocrine consulted  Possible stress response    Continue to monitor        Spotting in early pregnancy  Spotting on recent admission, continue to monitor      Uses self-applied continuous glucose monitoring device        Proteinuria  See Diabetes      Anemia  CBC daily  Iron panel WNL        VTE Risk Mitigation (From admission, onward)         Ordered     IP VTE LOW RISK PATIENT  Once         05/21/22 1535     IP VTE LOW RISK PATIENT  Once         05/21/22 1535                Discharge Planning   BRYAN: 5/26/2022     Code Status: Full Code   Is the patient medically ready for discharge?:     Reason for patient still in hospital (select all that apply): Patient trending condition, Treatment, Consult recommendations and Pending disposition             Anish Peng MD  Department of Hospital Medicine   [facility] - Int[facility] ([place]

## 2022-05-24 NOTE — PROGRESS NOTES
[facility] ([place] [address])  Endocrinology  Progress Note    Admit Date: 2022     Reason for Consult: Management of T1DM, Hyperglycemia during pregnancy    Outpatient diabetes provider: Primary Care in [place]; [place] (No established PCP or endocrine care in [facility])  Diabetes education during pregnancy: None      Diabetes diagnosis year: 18 years      Home Diabetes Regimen:     - Lantus 6 units in the morning and 5 units in the evening (Eat a snack before bed if BG is < 100 mg/dl)  - Novolog ICR: 1:8 units TIDWM and ICR 1:12 with snacks (Hold if BG < 100 mg/dL) (Likely 5 units with each meal and 2 units with snacks)  - Novolog SSI for BG excursions:  180 - 230 + 1 unit  231- 280  + 2 units  281 - 330 + 3 units  331 - 380 + 4 units      > 380   + 5 units     Historically on Medronic (But has been off the pump for 7 weeks; Found down with BG in the 20's).     Blood Sugar Monitoring:  Dexcom (Clarity Code: [id number])        Diabetes Complications include:     Hyperglycemia, Hypoglycemia , Diabetic nephropathy  , Diabetic retinopathy , and Diabetic gastroparesis   She reports that she's been admitted for DKA more than 5 times.     Complicating diabetes co morbidities:   Pregancy.       HPI:   27 year old female A2 who is currently 12 weeks pregnant with pertinent medical history of type 1 diabetes mellitus complicated by retinopathy, gastroparesis, and neuropathy who presented initially to [facility] in [place] and admitted to ICU for uncontrolled hypertension and hyperglycemia. She left [place] and came to [facility] for management of uncontrolled nausea and decreased appetite.   She also has a history of leaving [place] from [facility] after a transfer from [facility] when she was managed for encephalopathy and hypoglycemia complicated by a seizure (BG 29).     Endocrinology consulted for management of inpatient diabetes      Interval HPI:   Overnight events: Patient reported N/V overnight.  "BG variable on current SQ insulin regimen. Reported drinking gatorade yesterday evening likely cause of BG excursions.   Eating:    bariatric sugar free clears   Nausea: Yes  Hypoglycemia and intervention: No  Fever: No  TPN and/or TF: No  If yes, type of TF/TPN and rate: n/a    /80 (BP Location: Right arm, Patient Position: Lying)   Pulse 95   Temp 97.5 °F (36.4 °C) (Axillary)   Resp 13   Ht 5' 1" (1.549 m)   Wt 59 kg (130 lb)   LMP 02/26/2022 (Approximate)   SpO2 97%   BMI 24.56 kg/m²     Labs Reviewed and Include    Recent Labs   Lab 05/24/22  1225   *   CALCIUM 7.4*   ALBUMIN 1.9*   PROT 4.5*   *   K 3.5   CO2 22*      BUN 16   CREATININE 1.1   ALKPHOS 50*   ALT 17   AST 18   BILITOT 0.2     Lab Results   Component Value Date    WBC 8.00 05/24/2022    HGB 8.0 (L) 05/24/2022    HCT 22.0 (L) 05/24/2022    MCV 83 05/24/2022     05/24/2022     No results for input(s): TSH, FREET4 in the last 168 hours.  Lab Results   Component Value Date    HGBA1C 9.8 (H) 05/05/2022       Nutritional status:   Body mass index is 24.56 kg/m².  Lab Results   Component Value Date    ALBUMIN 1.9 (L) 05/24/2022    ALBUMIN 2.3 (L) 05/23/2022    ALBUMIN 2.2 (L) 05/22/2022     No results found for: PREALBUMIN    Estimated Creatinine Clearance: 62.3 mL/min (based on SCr of 1.1 mg/dL).    Accu-Checks  Recent Labs     05/22/22  1127 05/22/22  1726 05/22/22  2203 05/23/22  0905 05/23/22  1151 05/23/22  1658 05/23/22  2025 05/23/22  2112 05/24/22  0837 05/24/22  1145   POCTGLUCOSE 217* 289* 276* 175* 191* 176* 348* 337* 143* 120*       Current Medications and/or Treatments Impacting Glycemic Control  Immunotherapy:    Immunosuppressants       None          Steroids:   Hormones (From admission, onward)                None          Pressors:    Autonomic Drugs (From admission, onward)                None          Hyperglycemia/Diabetes Medications:   Antihyperglycemics (From admission, onward)                " Start     Stop Route Frequency Ordered    05/23/22 2100  insulin detemir U-100 pen 6 Units         -- SubQ Nightly 05/23/22 1459    05/22/22 1244  insulin aspart U-100 pen 0-5 Units         -- SubQ Before meals & nightly PRN 05/22/22 1144    05/22/22 1145  insulin aspart U-100 pen 1-10 Units         -- SubQ 3 times daily with meals 05/22/22 1033    05/22/22 0900  insulin detemir U-100 pen 6 Units         -- SubQ Daily 05/22/22 0747            ASSESSMENT and PLAN    * Hypertensive urgency  Managed per primary team  Optimize BG control    Type 1 diabetes mellitus with other specified complication  - she is 12 weeks pregnant   - blood glucose goal should be aimed for a fasting glucose level below 95 mg/dl, a reading of under 140   mg/dl an hour after eating, and a reading of under 120 mg/dl two hours after eating     Plan:  -Continue Levemir 6 units BID  -Continue Novolog (aspart) insulin ICR 1:8 Units before meals   -Novolog Correction scale (starting to slide at 150 mg/dL during the daytime).  -BG checks q4hr and two hours post-prandial  - Continue Dexcom, reviewed data showing hyperglycemia and blood sugar above goal (Clarity Code: [id number])  -Patient requesting diet changed back to diabetic. Advise 45 grams of carbohydrates for meals and 15-25 grams of carbs for snacks   Diet diabetic [facility]; 2000 Calorie; Consistent Carbohydrate  - Ensure prandial insulin is administered 15 minutes before starting to eat or drink.   - Hypoglycemia protocol in place  - If blood glucose greater than 300, please ask patient not to eat food or drink anything other than water until correctional insulin has brought it back below 250     ** Please notify Endocrine for any change and/or advance in diet**  ** Please call Endocrine for any BG related issues **    Gastroparesis due to DM  - on anti-emetics  - needs better control of blood glucose to prevent complications        Alma Rodrigez NP  Endocrinology  [facility] -  [facility]ensive Care ([facility]-)

## 2022-05-25 LAB
ALBUMIN SERPL BCP-MCNC: 1.8 G/DL (ref 3.5–5.2)
ALP SERPL-CCNC: 49 U/L (ref 55–135)
ALT SERPL W/O P-5'-P-CCNC: 17 U/L (ref 10–44)
ANION GAP SERPL CALC-SCNC: 6 MMOL/L (ref 8–16)
AST SERPL-CCNC: 20 U/L (ref 10–40)
BASOPHILS # BLD AUTO: 0 K/UL (ref 0–0.2)
BASOPHILS NFR BLD: 0 % (ref 0–1.9)
BILIRUB SERPL-MCNC: 0.1 MG/DL (ref 0.1–1)
BUN SERPL-MCNC: 16 MG/DL (ref 6–20)
CALCIUM SERPL-MCNC: 7.3 MG/DL (ref 8.7–10.5)
CHLORIDE SERPL-SCNC: 104 MMOL/L (ref 95–110)
CO2 SERPL-SCNC: 23 MMOL/L (ref 23–29)
CREAT SERPL-MCNC: 1.1 MG/DL (ref 0.5–1.4)
DIFFERENTIAL METHOD: ABNORMAL
EOSINOPHIL # BLD AUTO: 0.1 K/UL (ref 0–0.5)
EOSINOPHIL NFR BLD: 1.4 % (ref 0–8)
ERYTHROCYTE [DISTWIDTH] IN BLOOD BY AUTOMATED COUNT: 13.2 % (ref 11.5–14.5)
EST. GFR  (AFRICAN AMERICAN): >60 ML/MIN/1.73 M^2
EST. GFR  (NON AFRICAN AMERICAN): >60 ML/MIN/1.73 M^2
GLUCOSE SERPL-MCNC: 138 MG/DL (ref 70–110)
HCT VFR BLD AUTO: 21.2 % (ref 37–48.5)
HGB BLD-MCNC: 7.4 G/DL (ref 12–16)
IMM GRANULOCYTES # BLD AUTO: 0.05 K/UL (ref 0–0.04)
IMM GRANULOCYTES NFR BLD AUTO: 0.7 % (ref 0–0.5)
LYMPHOCYTES # BLD AUTO: 2.2 K/UL (ref 1–4.8)
LYMPHOCYTES NFR BLD: 28.2 % (ref 18–48)
MAGNESIUM SERPL-MCNC: 2.3 MG/DL (ref 1.6–2.6)
MCH RBC QN AUTO: 30.3 PG (ref 27–31)
MCHC RBC AUTO-ENTMCNC: 34.9 G/DL (ref 32–36)
MCV RBC AUTO: 87 FL (ref 82–98)
MONOCYTES # BLD AUTO: 0.6 K/UL (ref 0.3–1)
MONOCYTES NFR BLD: 8 % (ref 4–15)
NEUTROPHILS # BLD AUTO: 4.7 K/UL (ref 1.8–7.7)
NEUTROPHILS NFR BLD: 61.7 % (ref 38–73)
NRBC BLD-RTO: 0 /100 WBC
PHOSPHATE SERPL-MCNC: 3.8 MG/DL (ref 2.7–4.5)
PLATELET # BLD AUTO: 185 K/UL (ref 150–450)
PMV BLD AUTO: 11.1 FL (ref 9.2–12.9)
POCT GLUCOSE: 145 MG/DL (ref 70–110)
POCT GLUCOSE: 154 MG/DL (ref 70–110)
POCT GLUCOSE: 164 MG/DL (ref 70–110)
POTASSIUM SERPL-SCNC: 3.6 MMOL/L (ref 3.5–5.1)
PROT SERPL-MCNC: 4.4 G/DL (ref 6–8.4)
RBC # BLD AUTO: 2.44 M/UL (ref 4–5.4)
SODIUM SERPL-SCNC: 133 MMOL/L (ref 136–145)
WBC # BLD AUTO: 7.66 K/UL (ref 3.9–12.7)

## 2022-05-25 PROCEDURE — 99232 PR SUBSEQUENT HOSPITAL CARE,LEVL II: ICD-10-PCS | Mod: ,,, | Performed by: NURSE PRACTITIONER

## 2022-05-25 PROCEDURE — 25000003 PHARM REV CODE 250

## 2022-05-25 PROCEDURE — 80053 COMPREHEN METABOLIC PANEL: CPT

## 2022-05-25 PROCEDURE — 85025 COMPLETE CBC W/AUTO DIFF WBC: CPT

## 2022-05-25 PROCEDURE — 25000003 PHARM REV CODE 250: Performed by: STUDENT IN AN ORGANIZED HEALTH CARE EDUCATION/TRAINING PROGRAM

## 2022-05-25 PROCEDURE — 63600175 PHARM REV CODE 636 W HCPCS: Performed by: STUDENT IN AN ORGANIZED HEALTH CARE EDUCATION/TRAINING PROGRAM

## 2022-05-25 PROCEDURE — 20600001 HC STEP DOWN PRIVATE ROOM

## 2022-05-25 PROCEDURE — 63600175 PHARM REV CODE 636 W HCPCS

## 2022-05-25 PROCEDURE — 99233 PR SUBSEQUENT HOSPITAL CARE,LEVL III: ICD-10-PCS | Mod: ,,, | Performed by: HOSPITALIST

## 2022-05-25 PROCEDURE — 99233 SBSQ HOSP IP/OBS HIGH 50: CPT | Mod: ,,, | Performed by: HOSPITALIST

## 2022-05-25 PROCEDURE — 25000003 PHARM REV CODE 250: Performed by: HOSPITALIST

## 2022-05-25 PROCEDURE — 99232 SBSQ HOSP IP/OBS MODERATE 35: CPT | Mod: ,,, | Performed by: NURSE PRACTITIONER

## 2022-05-25 PROCEDURE — 36415 COLL VENOUS BLD VENIPUNCTURE: CPT

## 2022-05-25 PROCEDURE — 83735 ASSAY OF MAGNESIUM: CPT

## 2022-05-25 PROCEDURE — 84100 ASSAY OF PHOSPHORUS: CPT

## 2022-05-25 RX ORDER — GABAPENTIN 300 MG/1
300 CAPSULE ORAL 3 TIMES DAILY
COMMUNITY
Start: 2022-04-09 | End: 2022-06-09

## 2022-05-25 RX ORDER — PROMETHAZINE HYDROCHLORIDE 12.5 MG/1
12.5 TABLET ORAL EVERY 6 HOURS PRN
Status: DISCONTINUED | OUTPATIENT
Start: 2022-05-25 | End: 2022-05-26 | Stop reason: HOSPADM

## 2022-05-25 RX ORDER — AMOXICILLIN AND CLAVULANATE POTASSIUM 875; 125 MG/1; MG/1
TABLET, FILM COATED ORAL
Status: ON HOLD | COMMUNITY
Start: 2021-12-13 | End: 2022-05-26 | Stop reason: HOSPADM

## 2022-05-25 RX ORDER — SERTRALINE HYDROCHLORIDE 50 MG/1
50 TABLET, FILM COATED ORAL DAILY
Status: ON HOLD | COMMUNITY
Start: 2022-05-18 | End: 2022-05-26 | Stop reason: HOSPADM

## 2022-05-25 RX ORDER — PROMETHAZINE HYDROCHLORIDE 12.5 MG/1
12.5 TABLET ORAL EVERY 6 HOURS PRN
COMMUNITY
End: 2022-06-09

## 2022-05-25 RX ORDER — PREGABALIN 150 MG/1
150 CAPSULE ORAL
COMMUNITY
End: 2022-05-26

## 2022-05-25 RX ORDER — HYDRALAZINE HYDROCHLORIDE 25 MG/1
25 TABLET, FILM COATED ORAL EVERY 8 HOURS
Status: DISCONTINUED | OUTPATIENT
Start: 2022-05-25 | End: 2022-05-26 | Stop reason: HOSPADM

## 2022-05-25 RX ORDER — METHYLDOPA 250 MG/1
500 TABLET, FILM COATED ORAL 2 TIMES DAILY
COMMUNITY
Start: 2022-04-25 | End: 2022-05-26

## 2022-05-25 RX ORDER — LISINOPRIL 20 MG/1
TABLET ORAL
Status: ON HOLD | COMMUNITY
Start: 2022-02-17 | End: 2022-05-26 | Stop reason: HOSPADM

## 2022-05-25 RX ORDER — MUPIROCIN 20 MG/G
OINTMENT TOPICAL 2 TIMES DAILY
Status: DISCONTINUED | OUTPATIENT
Start: 2022-05-25 | End: 2022-05-26 | Stop reason: HOSPADM

## 2022-05-25 RX ORDER — ONDANSETRON 4 MG/1
4 TABLET, ORALLY DISINTEGRATING ORAL EVERY 8 HOURS PRN
Status: ON HOLD | COMMUNITY
Start: 2022-04-30 | End: 2022-05-26 | Stop reason: HOSPADM

## 2022-05-25 RX ORDER — LABETALOL 100 MG/1
200 TABLET, FILM COATED ORAL EVERY 12 HOURS
Status: DISCONTINUED | OUTPATIENT
Start: 2022-05-25 | End: 2022-05-26 | Stop reason: HOSPADM

## 2022-05-25 RX ORDER — LABETALOL 100 MG/1
100 TABLET, FILM COATED ORAL 2 TIMES DAILY
Status: ON HOLD | COMMUNITY
Start: 2022-04-09 | End: 2022-05-26 | Stop reason: HOSPADM

## 2022-05-25 RX ORDER — PREGABALIN 100 MG/1
100 CAPSULE ORAL 3 TIMES DAILY
Status: ON HOLD | COMMUNITY
Start: 2022-02-17 | End: 2022-05-26 | Stop reason: HOSPADM

## 2022-05-25 RX ORDER — ONDANSETRON 4 MG/1
4 TABLET, ORALLY DISINTEGRATING ORAL EVERY 8 HOURS
Status: DISCONTINUED | OUTPATIENT
Start: 2022-05-25 | End: 2022-05-26 | Stop reason: HOSPADM

## 2022-05-25 RX ORDER — IBUPROFEN 600 MG/1
600 TABLET ORAL EVERY 8 HOURS PRN
Status: ON HOLD | COMMUNITY
Start: 2021-12-13 | End: 2022-05-26 | Stop reason: HOSPADM

## 2022-05-25 RX ADMIN — HYDRALAZINE HYDROCHLORIDE 20 MG: 20 INJECTION, SOLUTION INTRAMUSCULAR; INTRAVENOUS at 07:05

## 2022-05-25 RX ADMIN — INSULIN DETEMIR 6 UNITS: 100 INJECTION, SOLUTION SUBCUTANEOUS at 09:05

## 2022-05-25 RX ADMIN — ONDANSETRON 4 MG: 4 TABLET, ORALLY DISINTEGRATING ORAL at 10:05

## 2022-05-25 RX ADMIN — MUPIROCIN: 20 OINTMENT TOPICAL at 09:05

## 2022-05-25 RX ADMIN — HYDRALAZINE HYDROCHLORIDE 25 MG: 25 TABLET, FILM COATED ORAL at 10:05

## 2022-05-25 RX ADMIN — HYDRALAZINE HYDROCHLORIDE 20 MG: 20 INJECTION, SOLUTION INTRAMUSCULAR; INTRAVENOUS at 01:05

## 2022-05-25 RX ADMIN — SERTRALINE HYDROCHLORIDE 25 MG: 25 TABLET ORAL at 11:05

## 2022-05-25 RX ADMIN — FAMOTIDINE 40 MG: 20 TABLET ORAL at 09:05

## 2022-05-25 RX ADMIN — METOCLOPRAMIDE 10 MG: 5 INJECTION, SOLUTION INTRAMUSCULAR; INTRAVENOUS at 07:05

## 2022-05-25 RX ADMIN — ONDANSETRON 4 MG: 2 INJECTION INTRAMUSCULAR; INTRAVENOUS at 07:05

## 2022-05-25 RX ADMIN — METOCLOPRAMIDE 10 MG: 5 INJECTION, SOLUTION INTRAMUSCULAR; INTRAVENOUS at 05:05

## 2022-05-25 RX ADMIN — NIFEDIPINE 90 MG: 30 TABLET, FILM COATED, EXTENDED RELEASE ORAL at 09:05

## 2022-05-25 RX ADMIN — PRENATAL VIT W/ FE FUMARATE-FA TAB 27-0.8 MG 1 TABLET: 27-0.8 TAB at 09:05

## 2022-05-25 RX ADMIN — Medication 40 MG: at 09:05

## 2022-05-25 RX ADMIN — Medication 25 MG: at 09:05

## 2022-05-25 RX ADMIN — DIPHENHYDRAMINE HYDROCHLORIDE 12.5 MG: 50 INJECTION, SOLUTION INTRAMUSCULAR; INTRAVENOUS at 10:05

## 2022-05-25 RX ADMIN — LORAZEPAM 1 MG: 2 INJECTION INTRAMUSCULAR; INTRAVENOUS at 03:05

## 2022-05-25 RX ADMIN — INSULIN ASPART 2 UNITS: 100 INJECTION, SOLUTION INTRAVENOUS; SUBCUTANEOUS at 09:05

## 2022-05-25 RX ADMIN — INSULIN ASPART 5 UNITS: 100 INJECTION, SOLUTION INTRAVENOUS; SUBCUTANEOUS at 10:05

## 2022-05-25 RX ADMIN — METOCLOPRAMIDE 10 MG: 5 INJECTION, SOLUTION INTRAMUSCULAR; INTRAVENOUS at 11:05

## 2022-05-25 RX ADMIN — Medication 25 MG: at 01:05

## 2022-05-25 RX ADMIN — LABETALOL HYDROCHLORIDE 200 MG: 100 TABLET, FILM COATED ORAL at 09:05

## 2022-05-25 RX ADMIN — DIPHENHYDRAMINE HYDROCHLORIDE 12.5 MG: 50 INJECTION, SOLUTION INTRAMUSCULAR; INTRAVENOUS at 03:05

## 2022-05-25 RX ADMIN — INSULIN ASPART 1 UNITS: 100 INJECTION, SOLUTION INTRAVENOUS; SUBCUTANEOUS at 04:05

## 2022-05-25 RX ADMIN — DOCUSATE SODIUM 100 MG: 100 CAPSULE, LIQUID FILLED ORAL at 09:05

## 2022-05-25 RX ADMIN — CEFTRIAXONE 2 G: 2 INJECTION, SOLUTION INTRAVENOUS at 12:05

## 2022-05-25 RX ADMIN — Medication 25 MG: at 10:05

## 2022-05-25 RX ADMIN — LORAZEPAM 1 MG: 2 INJECTION INTRAMUSCULAR; INTRAVENOUS at 12:05

## 2022-05-25 NOTE — PROGRESS NOTES
"[facility] ([address] Medicine  Progress Note    Patient Name: Alicia Valles  MRN: [id number]  Patient Class: IP- Inpatient   Admission Date: 2022  Length of Stay: 2 days  Attending Physician: Justin Hurd MD  Primary Care Provider: Primary Doctor No        Subjective:     Principal Problem:Hypertensive urgency        HPI:  Alicia Valles is a 27F A2 who is currently 11 weeks pregnant with PMH HTN and T1DM complicated by retinopathy, gastroparesis, and neuropathy who presented to [facility] in [place] and admitted to ICU for uncontrolled hypertension and hyperglycemia. She required cardene gtt at [facility] admission. She was recently discharged AMA from [facility] after a transfer from [facility] for endocrinology evaluation. She was intially admitted to [facility] for HTN encephalopathy and hypoglycemia complicated by a seizure (BG 29). Of note, she reports that she's been admitted for DKA more than 5 times. Patient evaluated by endocrinology who recommend lantus 15U daily and novolog 3units with meals. Patient to be transferred here for higher level of care. She left [facility] due to "not wanting to wait" and came to ED here.    Upon admission, she was hypertensive, 184/98, and tachycardic, 117. EKG with sinus tachycardia. Labs were notable for hyperglycemia, elevated BHB, , leukocytosis (20.6), anemia (9.3), hyponatremia (134), low bicarbonate (19), and UA with 2+ occult blood, 3+ protein, 3+ glucose, 1+ ketones, 7 WBC. Pt was given 2L LR bolus, labetalol 10mg IV, nifedipine 120mg PO, ativan 2mg IV x2, and zofran 4mg IV for intractable N/V and HTN control. She was admitted to hospital medicine after critical care reported patient safe for floor.      Overview/Hospital Course:  Patient admitted for hypertensive urgency and intractable N/V. Endocrine consulted for assistance with insulin regimen. MFM consulted for recommendations given presentation and complicated " pregnancy.      Interval History: No events overnight. Transitioning IV antihypertensives to PO as she is tolerating po much better    Review of Systems   Constitutional:  Positive for fatigue. Negative for chills and fever.   HENT:  Negative for congestion, rhinorrhea and sore throat.    Eyes:  Negative for photophobia and visual disturbance.   Respiratory:  Positive for shortness of breath. Negative for chest tightness.    Cardiovascular:  Negative for chest pain.   Gastrointestinal:  Positive for abdominal pain, nausea and vomiting. Negative for constipation and diarrhea.   Endocrine: Negative for polyphagia and polyuria.   Genitourinary:  Negative for dysuria and urgency.   Musculoskeletal:  Negative for arthralgias and myalgias.   Skin:  Negative for color change and wound.   Neurological:  Negative for weakness, light-headedness and headaches.   Psychiatric/Behavioral:  Negative for agitation and confusion. The patient is not nervous/anxious.      Objective:     Vital Signs (Most Recent):  Temp: 98.4 °F (36.9 °C) (05/25/22 0701)  Pulse: 97 (05/25/22 0701)  Resp: 16 (05/25/22 0701)  BP: 135/87 (05/25/22 0701)  SpO2: 98 % (05/25/22 0701) Vital Signs (24h Range):  Temp:  [98 °F (36.7 °C)-98.7 °F (37.1 °C)] 98.4 °F (36.9 °C)  Pulse:  [96-97] 97  Resp:  [16-19] 16  SpO2:  [98 %] 98 %  BP: (107-135)/(67-87) 135/87     Weight: 59 kg (130 lb)  Body mass index is 24.56 kg/m².    Intake/Output Summary (Last 24 hours) at 5/25/2022 1740  Last data filed at 5/25/2022 0110  Gross per 24 hour   Intake 109.89 ml   Output --   Net 109.89 ml        Physical Exam  Vitals and nursing note reviewed.   Constitutional:       General: She is not in acute distress.     Appearance: Normal appearance. She is normal weight. She is not ill-appearing.   HENT:      Head: Normocephalic and atraumatic.      Nose: Nose normal.      Mouth/Throat:      Pharynx: Oropharynx is clear.   Eyes:      Extraocular Movements: Extraocular movements  "intact.      Conjunctiva/sclera: Conjunctivae normal.   Cardiovascular:      Rate and Rhythm: Regular rhythm. Tachycardia present.      Pulses: Normal pulses.      Heart sounds: Normal heart sounds.   Pulmonary:      Effort: Pulmonary effort is normal. No respiratory distress.      Breath sounds: Normal breath sounds. No wheezing.   Abdominal:      General: Abdomen is flat. Bowel sounds are normal. There is no distension.      Palpations: Abdomen is soft.      Tenderness: There is abdominal tenderness.   Musculoskeletal:         General: No swelling or tenderness. Normal range of motion.      Cervical back: Normal range of motion and neck supple.   Skin:     General: Skin is warm and dry.   Neurological:      General: No focal deficit present.      Mental Status: She is alert and oriented to person, place, and time. Mental status is at baseline.   Psychiatric:         Mood and Affect: Mood normal.         Behavior: Behavior normal.         Thought Content: Thought content normal.         Judgment: Judgment normal.       Significant Labs: All pertinent labs within the past 24 hours have been reviewed.    Significant Imaging: I have reviewed all pertinent imaging results/findings within the past 24 hours.      Assessment/Plan:      * Hypertensive urgency  Patient required recent ICU admission for cardene gtt with planned transfer here for higher level of care  BP goal <160/110 per MFM    BP today: /80 (BP Location: Right arm, Patient Position: Lying)   Pulse 95   Temp 97.5 °F (36.4 °C) (Axillary)   Resp 13   Ht 5' 1" (1.549 m)   Wt 59 kg (130 lb)   LMP 02/26/2022 (Approximate)   SpO2 97%   BMI 24.56 kg/m²     Initially started on IV anti-hypertensives and have now transitioned to an oral regimen given significantly improved oral tolerance    --continue PO hydralazine, labetalol, and nifedipine    Nausea and vomiting during pregnancy  Multifactorial in the setting of diabetic gastroparesis and " pregnancy  MFM consulted for assistance    --Scheduled Zofran, Benadryl, famotidine, pyridoxine, metoclopramide  --Scopolamine patch  --Prn promethazine      Leukocytosis  WBC 20.6 on admission  UA concerning for UTI - Patient started on Augmentin x5 days  BHB 1.1 - endocrine consulted  Possible stress response    Continue to monitor        UTI in pregnancy, antepartum, first trimester  WBC 20.6 on admission  UA: WBC 7, protein 3+, glucose 3+, ketones 1+, occult blood 2+    --Follow-up urine culture        Spotting in early pregnancy  Spotting on recent admission, continue to monitor      Gastroparesis due to DM  Multimodal antiemetic regimen:   --Scheduled metoclopramide 10mg IV q6h  --Scheduled Zofran 4mg IV q8h  --Scopolamine patch q3 days  --Benadryl 12.5mg IV q6h  --Famotidine 40mg QD PO  --Phenergan IV q6h prn      Anxiety  --Sertraline 25mg QD  --PRN vistaril, ativan    Proteinuria  See Diabetes      Anemia  CBC daily  Iron panel WNL      First trimester pregnancy  --MFM consulted for complicated pregnancy      Type 1 diabetes mellitus with other specified complication  Complications: diabetic retinopathy, gastroparesis, neuropathy, and h/o retinal detachment  BHB 1.1 on admission with , K 3.7    Endocrinology consulted, f/u recs        VTE Risk Mitigation (From admission, onward)         Ordered     IP VTE LOW RISK PATIENT  Once         05/21/22 1535     IP VTE LOW RISK PATIENT  Once         05/21/22 1535              Discharge Planning   BRYAN: 5/26/2022     Code Status: Full Code   Is the patient medically ready for discharge?: No    Reason for patient still in hospital (select all that apply): Patient trending condition  Discharge Plan A: Home with family        Radha Palmer MD  Department of Hospital Medicine   [facility] - Intensive Care ([place]-)

## 2022-05-25 NOTE — ASSESSMENT & PLAN NOTE
Multifactorial in the setting of diabetic gastroparesis and pregnancy  MFM consulted for assistance    --Scheduled Zofran, Benadryl, famotidine, pyridoxine, metoclopramide  --Scopolamine patch  --Prn promethazine

## 2022-05-25 NOTE — ASSESSMENT & PLAN NOTE
- she is 12 weeks pregnant   - blood glucose goal should be aimed for a fasting glucose level below 95 mg/dl, a reading of under 140  mg/dl an hour after eating, and a reading of under 120 mg/dl two hours after eating   -Will allow for slightly higher goals to avoid severe hypoglycemia at this time.    Plan:  -Continue Levemir 6 units BID  -Continue Novolog (aspart) insulin ICR 1:8 Units before meals   -Novolog Correction scale (starting to slide at 150 mg/dL during the daytime).  -BG checks q4hr and two hours post-prandial  - Continue Dexcom, reviewed data showing improvement in hyperglycemia  (Clarity Code: [id number])  -. Advise 45 grams of carbohydrates for meals and 15-25 grams of carbs for snacks   Diet diabetic [facility]; 2000 Calorie; Consistent Carbohydrate  - Ensure prandial insulin is administered 15 minutes before starting to eat or drink.   - Hypoglycemia protocol in place  - If blood glucose greater than 300, please ask patient not to eat food or drink anything other than water until correctional insulin has brought it back below 250     ** Please notify Endocrine for any change and/or advance in diet**  ** Please call Endocrine for any BG related issues **    Discharge plans: Please notify endocrine prior to discharge. Patient requesting Dexcom samples at discharge and will need to schedule follow up with Dr. Schumacher in clinic.

## 2022-05-25 NOTE — ASSESSMENT & PLAN NOTE
Multimodal antiemetic regimen:   --Scheduled metoclopramide 10mg IV q6h  --Scheduled Zofran 4mg IV q8h  --Scopolamine patch q3 days  --Benadryl 12.5mg IV q6h  --Famotidine 40mg QD PO  --Phenergan IV q6h prn

## 2022-05-25 NOTE — SUBJECTIVE & OBJECTIVE
"Interval HPI:   Overnight events: NAEON. BG has been well controlled today on current SQ insulin regimen. Reports appetite improving and able to tolerate diet. Diet diabetic [facility];  Calorie; Consistent Carbohydrate  Eatin%  Nausea: No  Hypoglycemia and intervention: No  Fever: No  TPN and/or TF: No  If yes, type of TF/TPN and rate: n/a    /87 (BP Location: Right arm, Patient Position: Lying)   Pulse 97   Temp 98.4 °F (36.9 °C) (Oral)   Resp 16   Ht 5' 1" (1.549 m)   Wt 59 kg (130 lb)   LMP 2022 (Approximate)   SpO2 98%   BMI 24.56 kg/m²     Labs Reviewed and Include    Recent Labs   Lab 22  08   *   CALCIUM 7.3*   ALBUMIN 1.8*   PROT 4.4*   *   K 3.6   CO2 23      BUN 16   CREATININE 1.1   ALKPHOS 49*   ALT 17   AST 20   BILITOT 0.1     Lab Results   Component Value Date    WBC 7.66 2022    HGB 7.4 (L) 2022    HCT 21.2 (L) 2022    MCV 87 2022     2022     No results for input(s): TSH, FREET4 in the last 168 hours.  Lab Results   Component Value Date    HGBA1C 9.8 (H) 2022       Nutritional status:   Body mass index is 24.56 kg/m².  Lab Results   Component Value Date    ALBUMIN 1.8 (L) 2022    ALBUMIN 1.9 (L) 2022    ALBUMIN 2.3 (L) 2022     No results found for: PREALBUMIN    Estimated Creatinine Clearance: 62.3 mL/min (based on SCr of 1.1 mg/dL).    Accu-Checks  Recent Labs     22  1658 22  20222  2112 22  0837 22  1145 22  1636 22  1915 22  0829 22  1122 22  1324   POCTGLUCOSE 176* 348* 337* 143* 120* 130* 243* 154* 164* 145*       Current Medications and/or Treatments Impacting Glycemic Control  Immunotherapy:    Immunosuppressants       None          Steroids:   Hormones (From admission, onward)                None          Pressors:    Autonomic Drugs (From admission, onward)                None      "     Hyperglycemia/Diabetes Medications:   Antihyperglycemics (From admission, onward)                Start     Stop Route Frequency Ordered    05/23/22 2100  insulin detemir U-100 pen 6 Units         -- SubQ Nightly 05/23/22 1459    05/22/22 1244  insulin aspart U-100 pen 0-5 Units         -- SubQ Before meals & nightly PRN 05/22/22 1144    05/22/22 1145  insulin aspart U-100 pen 1-10 Units         -- SubQ 3 times daily with meals 05/22/22 1033    05/22/22 0900  insulin detemir U-100 pen 6 Units         -- SubQ Daily 05/22/22 0747

## 2022-05-25 NOTE — PROGRESS NOTES
[facility] - Intensive [facility] ([place] [id number])  Endocrinology  Progress Note    Admit Date: 2022     Reason for Consult: Management of T1DM, Hyperglycemia during pregnancy    Outpatient diabetes provider: Primary Care in [place]; [place] (No established PCP or endocrine care in [facility])  Diabetes education during pregnancy: None      Diabetes diagnosis year: 18 years      Home Diabetes Regimen:     - Lantus 6 units in the morning and 5 units in the evening (Eat a snack before bed if BG is < 100 mg/dl)  - Novolog ICR: 1:8 units TIDWM and ICR 1:12 with snacks (Hold if BG < 100 mg/dL) (Likely 5 units with each meal and 2 units with snacks)  - Novolog SSI for BG excursions:  180 - 230 + 1 unit  231- 280  + 2 units  281 - 330 + 3 units  331 - 380 + 4 units      > 380   + 5 units     Historically on Medronic (But has been off the pump for 7 weeks; Found down with BG in the 20's).     Blood Sugar Monitoring:  Dexcom (Clarity Code: [id number])        Diabetes Complications include:     Hyperglycemia, Hypoglycemia , Diabetic nephropathy  , Diabetic retinopathy , and Diabetic gastroparesis   She reports that she's been admitted for DKA more than 5 times.     Complicating diabetes co morbidities:   Pregancy.       HPI:   27 year old female A2 who is currently 12 weeks pregnant with pertinent medical history of type 1 diabetes mellitus complicated by retinopathy, gastroparesis, and neuropathy who presented initially to [facility] in [place] and admitted to ICU for uncontrolled hypertension and hyperglycemia. She left [facility] and came to [facility] for management of uncontrolled nausea and decreased appetite.   She also has a history of leaving [facility] from [facility] after a transfer from [facility] when she was managed for encephalopathy and hypoglycemia complicated by a seizure (BG 29).     Endocrinology consulted for management of inpatient diabetes      Interval HPI:   Overnight events: NAEON. BG has been well  "controlled today on current SQ insulin regimen. Reports appetite improving and able to tolerate diet. Diet diabetic [facility];  Calorie; Consistent Carbohydrate  Eatin%  Nausea: No  Hypoglycemia and intervention: No  Fever: No  TPN and/or TF: No  If yes, type of TF/TPN and rate: n/a    /87 (BP Location: Right arm, Patient Position: Lying)   Pulse 97   Temp 98.4 °F (36.9 °C) (Oral)   Resp 16   Ht 5' 1" (1.549 m)   Wt 59 kg (130 lb)   LMP 2022 (Approximate)   SpO2 98%   BMI 24.56 kg/m²     Labs Reviewed and Include    Recent Labs   Lab 22  0826   *   CALCIUM 7.3*   ALBUMIN 1.8*   PROT 4.4*   *   K 3.6   CO2 23      BUN 16   CREATININE 1.1   ALKPHOS 49*   ALT 17   AST 20   BILITOT 0.1     Lab Results   Component Value Date    WBC 7.66 2022    HGB 7.4 (L) 2022    HCT 21.2 (L) 2022    MCV 87 2022     2022     No results for input(s): TSH, FREET4 in the last 168 hours.  Lab Results   Component Value Date    HGBA1C 9.8 (H) 2022       Nutritional status:   Body mass index is 24.56 kg/m².  Lab Results   Component Value Date    ALBUMIN 1.8 (L) 2022    ALBUMIN 1.9 (L) 2022    ALBUMIN 2.3 (L) 2022     No results found for: PREALBUMIN    Estimated Creatinine Clearance: 62.3 mL/min (based on SCr of 1.1 mg/dL).    Accu-Checks  Recent Labs     22  1658 22  2025 22  2112 22  0837 22  1145 22  1636 22  1915 22  0829 22  1122 22  1324   POCTGLUCOSE 176* 348* 337* 143* 120* 130* 243* 154* 164* 145*       Current Medications and/or Treatments Impacting Glycemic Control  Immunotherapy:    Immunosuppressants       None          Steroids:   Hormones (From admission, onward)                None          Pressors:    Autonomic Drugs (From admission, onward)                None          Hyperglycemia/Diabetes Medications:   Antihyperglycemics (From admission, " onward)                Start     Stop Route Frequency Ordered    05/23/22 2100  insulin detemir U-100 pen 6 Units         -- SubQ Nightly 05/23/22 1459    05/22/22 1244  insulin aspart U-100 pen 0-5 Units         -- SubQ Before meals & nightly PRN 05/22/22 1144    05/22/22 1145  insulin aspart U-100 pen 1-10 Units         -- SubQ 3 times daily with meals 05/22/22 1033    05/22/22 0900  insulin detemir U-100 pen 6 Units         -- SubQ Daily 05/22/22 0747            ASSESSMENT and PLAN    * Hypertensive urgency  Managed per primary team  Optimize BG control    Type 1 diabetes mellitus with other specified complication  - she is 12 weeks pregnant   - blood glucose goal should be aimed for a fasting glucose level below 95 mg/dl, a reading of under 140  mg/dl an hour after eating, and a reading of under 120 mg/dl two hours after eating   -Will allow for slightly higher goals to avoid severe hypoglycemia at this time.    Plan:  -Continue Levemir 6 units BID  -Continue Novolog (aspart) insulin ICR 1:8 Units before meals   -Novolog Correction scale (starting to slide at 150 mg/dL during the daytime).  -BG checks q4hr and two hours post-prandial  - Continue Dexcom, reviewed data showing improvement in hyperglycemia  (Clarity Code: [id number])  -. Advise 45 grams of carbohydrates for meals and 15-25 grams of carbs for snacks   Diet diabetic [facility]; 2000 Calorie; Consistent Carbohydrate  - Ensure prandial insulin is administered 15 minutes before starting to eat or drink.   - Hypoglycemia protocol in place  - If blood glucose greater than 300, please ask patient not to eat food or drink anything other than water until correctional insulin has brought it back below 250     ** Please notify Endocrine for any change and/or advance in diet**  ** Please call Endocrine for any BG related issues **    Discharge plans: Please notify endocrine prior to discharge. Patient requesting Dexcom samples at discharge and will need  to schedule follow up with Dr. Schumacher in clinic.     Gastroparesis due to DM  - on anti-emetics  - needs better control of blood glucose to prevent complications        Alma Rodrigez, NP  Endocrinology  [facility] - Intensive Care ([place]-)

## 2022-05-25 NOTE — PLAN OF CARE
27F A2 with medical comorbidities of poorly controlled T1DM (a1c ~10) and chronic HTN here with uncontrolled HTN, labile BS, and persistent N/V. MFM and endocrine are following closely. MFM feels concern for early preeclampsia is low; greatly appreciate their recommendations. Patient is greatly improved  and may be transitioned to oral antiemetics with advancement of diet in anticipation of d/c . Would ensure DVT PPx while hospitalized.

## 2022-05-25 NOTE — SUBJECTIVE & OBJECTIVE
Interval History: No events overnight. Transitioning IV antihypertensives to PO as she is tolerating po much better    Review of Systems   Constitutional:  Positive for fatigue. Negative for chills and fever.   HENT:  Negative for congestion, rhinorrhea and sore throat.    Eyes:  Negative for photophobia and visual disturbance.   Respiratory:  Positive for shortness of breath. Negative for chest tightness.    Cardiovascular:  Negative for chest pain.   Gastrointestinal:  Positive for abdominal pain, nausea and vomiting. Negative for constipation and diarrhea.   Endocrine: Negative for polyphagia and polyuria.   Genitourinary:  Negative for dysuria and urgency.   Musculoskeletal:  Negative for arthralgias and myalgias.   Skin:  Negative for color change and wound.   Neurological:  Negative for weakness, light-headedness and headaches.   Psychiatric/Behavioral:  Negative for agitation and confusion. The patient is not nervous/anxious.      Objective:     Vital Signs (Most Recent):  Temp: 98.4 °F (36.9 °C) (05/25/22 0701)  Pulse: 97 (05/25/22 0701)  Resp: 16 (05/25/22 0701)  BP: 135/87 (05/25/22 0701)  SpO2: 98 % (05/25/22 0701) Vital Signs (24h Range):  Temp:  [98 °F (36.7 °C)-98.7 °F (37.1 °C)] 98.4 °F (36.9 °C)  Pulse:  [96-97] 97  Resp:  [16-19] 16  SpO2:  [98 %] 98 %  BP: (107-135)/(67-87) 135/87     Weight: 59 kg (130 lb)  Body mass index is 24.56 kg/m².    Intake/Output Summary (Last 24 hours) at 5/25/2022 1740  Last data filed at 5/25/2022 0110  Gross per 24 hour   Intake 109.89 ml   Output --   Net 109.89 ml        Physical Exam  Vitals and nursing note reviewed.   Constitutional:       General: She is not in acute distress.     Appearance: Normal appearance. She is normal weight. She is not ill-appearing.   HENT:      Head: Normocephalic and atraumatic.      Nose: Nose normal.      Mouth/Throat:      Pharynx: Oropharynx is clear.   Eyes:      Extraocular Movements: Extraocular movements intact.       Conjunctiva/sclera: Conjunctivae normal.   Cardiovascular:      Rate and Rhythm: Regular rhythm. Tachycardia present.      Pulses: Normal pulses.      Heart sounds: Normal heart sounds.   Pulmonary:      Effort: Pulmonary effort is normal. No respiratory distress.      Breath sounds: Normal breath sounds. No wheezing.   Abdominal:      General: Abdomen is flat. Bowel sounds are normal. There is no distension.      Palpations: Abdomen is soft.      Tenderness: There is abdominal tenderness.   Musculoskeletal:         General: No swelling or tenderness. Normal range of motion.      Cervical back: Normal range of motion and neck supple.   Skin:     General: Skin is warm and dry.   Neurological:      General: No focal deficit present.      Mental Status: She is alert and oriented to person, place, and time. Mental status is at baseline.   Psychiatric:         Mood and Affect: Mood normal.         Behavior: Behavior normal.         Thought Content: Thought content normal.         Judgment: Judgment normal.       Significant Labs: All pertinent labs within the past 24 hours have been reviewed.    Significant Imaging: I have reviewed all pertinent imaging results/findings within the past 24 hours.

## 2022-05-25 NOTE — ASSESSMENT & PLAN NOTE
"Patient required recent ICU admission for cardene gtt with planned transfer here for higher level of care  BP goal <160/110 per MFM    BP today: /80 (BP Location: Right arm, Patient Position: Lying)   Pulse 95   Temp 97.5 °F (36.4 °C) (Axillary)   Resp 13   Ht 5' 1" (1.549 m)   Wt 59 kg (130 lb)   LMP 02/26/2022 (Approximate)   SpO2 97%   BMI 24.56 kg/m²     Initially started on IV anti-hypertensives and have now transitioned to an oral regimen given significantly improved oral tolerance    --continue PO hydralazine, labetalol, and nifedipine  "

## 2022-05-26 ENCOUNTER — TELEPHONE (OUTPATIENT)
Dept: ENDOCRINOLOGY | Facility: HOSPITAL | Age: 27
End: 2022-05-26

## 2022-05-26 ENCOUNTER — PATIENT MESSAGE (OUTPATIENT)
Dept: ENDOCRINOLOGY | Facility: HOSPITAL | Age: 27
End: 2022-05-26
Payer: MEDICAID

## 2022-05-26 ENCOUNTER — TELEPHONE (OUTPATIENT)
Dept: MATERNAL FETAL MEDICINE | Facility: CLINIC | Age: 27
End: 2022-05-26
Payer: MEDICAID

## 2022-05-26 VITALS
TEMPERATURE: 98 F | BODY MASS INDEX: 24.55 KG/M2 | SYSTOLIC BLOOD PRESSURE: 123 MMHG | WEIGHT: 130 LBS | RESPIRATION RATE: 20 BRPM | HEART RATE: 96 BPM | HEIGHT: 61 IN | OXYGEN SATURATION: 97 % | DIASTOLIC BLOOD PRESSURE: 71 MMHG

## 2022-05-26 DIAGNOSIS — E10.8 TYPE 1 DIABETES MELLITUS WITH COMPLICATIONS: Primary | ICD-10-CM

## 2022-05-26 LAB
ABO + RH BLD: NORMAL
ALBUMIN SERPL BCP-MCNC: 1.7 G/DL (ref 3.5–5.2)
ALP SERPL-CCNC: 45 U/L (ref 55–135)
ALT SERPL W/O P-5'-P-CCNC: 16 U/L (ref 10–44)
ANION GAP SERPL CALC-SCNC: 4 MMOL/L (ref 8–16)
AST SERPL-CCNC: 18 U/L (ref 10–40)
BACTERIA UR CULT: ABNORMAL
BASOPHILS # BLD AUTO: 0.01 K/UL (ref 0–0.2)
BASOPHILS NFR BLD: 0.1 % (ref 0–1.9)
BILIRUB SERPL-MCNC: 0.1 MG/DL (ref 0.1–1)
BLD GP AB SCN CELLS X3 SERPL QL: NORMAL
BLD PROD TYP BPU: NORMAL
BLOOD UNIT EXPIRATION DATE: NORMAL
BLOOD UNIT TYPE CODE: 6200
BLOOD UNIT TYPE: NORMAL
BUN SERPL-MCNC: 16 MG/DL (ref 6–20)
CALCIUM SERPL-MCNC: 7.2 MG/DL (ref 8.7–10.5)
CHLORIDE SERPL-SCNC: 102 MMOL/L (ref 95–110)
CO2 SERPL-SCNC: 23 MMOL/L (ref 23–29)
CODING SYSTEM: NORMAL
CREAT SERPL-MCNC: 1.2 MG/DL (ref 0.5–1.4)
DIFFERENTIAL METHOD: ABNORMAL
DISPENSE STATUS: NORMAL
EOSINOPHIL # BLD AUTO: 0.1 K/UL (ref 0–0.5)
EOSINOPHIL NFR BLD: 1.4 % (ref 0–8)
ERYTHROCYTE [DISTWIDTH] IN BLOOD BY AUTOMATED COUNT: 13 % (ref 11.5–14.5)
EST. GFR  (AFRICAN AMERICAN): >60 ML/MIN/1.73 M^2
EST. GFR  (NON AFRICAN AMERICAN): >60 ML/MIN/1.73 M^2
GLUCOSE SERPL-MCNC: 281 MG/DL (ref 70–110)
HCT VFR BLD AUTO: 18.6 % (ref 37–48.5)
HGB BLD-MCNC: 6.3 G/DL (ref 12–16)
IMM GRANULOCYTES # BLD AUTO: 0.04 K/UL (ref 0–0.04)
IMM GRANULOCYTES NFR BLD AUTO: 0.6 % (ref 0–0.5)
LYMPHOCYTES # BLD AUTO: 1.8 K/UL (ref 1–4.8)
LYMPHOCYTES NFR BLD: 26.2 % (ref 18–48)
MAGNESIUM SERPL-MCNC: 2 MG/DL (ref 1.6–2.6)
MCH RBC QN AUTO: 28.8 PG (ref 27–31)
MCHC RBC AUTO-ENTMCNC: 33.9 G/DL (ref 32–36)
MCV RBC AUTO: 85 FL (ref 82–98)
MONOCYTES # BLD AUTO: 0.6 K/UL (ref 0.3–1)
MONOCYTES NFR BLD: 8.3 % (ref 4–15)
NEUTROPHILS # BLD AUTO: 4.4 K/UL (ref 1.8–7.7)
NEUTROPHILS NFR BLD: 63.4 % (ref 38–73)
NRBC BLD-RTO: 0 /100 WBC
NUM UNITS TRANS PACKED RBC: NORMAL
PHOSPHATE SERPL-MCNC: 3.5 MG/DL (ref 2.7–4.5)
PLATELET # BLD AUTO: 179 K/UL (ref 150–450)
PMV BLD AUTO: 10.8 FL (ref 9.2–12.9)
POCT GLUCOSE: 262 MG/DL (ref 70–110)
POCT GLUCOSE: 339 MG/DL (ref 70–110)
POTASSIUM SERPL-SCNC: 3.9 MMOL/L (ref 3.5–5.1)
PROT SERPL-MCNC: 4.2 G/DL (ref 6–8.4)
RBC # BLD AUTO: 2.19 M/UL (ref 4–5.4)
SODIUM SERPL-SCNC: 129 MMOL/L (ref 136–145)
WBC # BLD AUTO: 6.98 K/UL (ref 3.9–12.7)

## 2022-05-26 PROCEDURE — 63600175 PHARM REV CODE 636 W HCPCS

## 2022-05-26 PROCEDURE — 63600175 PHARM REV CODE 636 W HCPCS: Performed by: STUDENT IN AN ORGANIZED HEALTH CARE EDUCATION/TRAINING PROGRAM

## 2022-05-26 PROCEDURE — 36415 COLL VENOUS BLD VENIPUNCTURE: CPT

## 2022-05-26 PROCEDURE — 80053 COMPREHEN METABOLIC PANEL: CPT

## 2022-05-26 PROCEDURE — 83735 ASSAY OF MAGNESIUM: CPT

## 2022-05-26 PROCEDURE — 86850 RBC ANTIBODY SCREEN: CPT

## 2022-05-26 PROCEDURE — 99232 SBSQ HOSP IP/OBS MODERATE 35: CPT | Mod: ,,, | Performed by: NURSE PRACTITIONER

## 2022-05-26 PROCEDURE — 25000003 PHARM REV CODE 250

## 2022-05-26 PROCEDURE — 84100 ASSAY OF PHOSPHORUS: CPT

## 2022-05-26 PROCEDURE — 25000003 PHARM REV CODE 250: Performed by: STUDENT IN AN ORGANIZED HEALTH CARE EDUCATION/TRAINING PROGRAM

## 2022-05-26 PROCEDURE — P9016 RBC LEUKOCYTES REDUCED: HCPCS

## 2022-05-26 PROCEDURE — 99239 HOSP IP/OBS DSCHRG MGMT >30: CPT | Mod: ,,, | Performed by: HOSPITALIST

## 2022-05-26 PROCEDURE — 86900 BLOOD TYPING SEROLOGIC ABO: CPT

## 2022-05-26 PROCEDURE — 25000003 PHARM REV CODE 250: Performed by: HOSPITALIST

## 2022-05-26 PROCEDURE — 86920 COMPATIBILITY TEST SPIN: CPT

## 2022-05-26 PROCEDURE — 99232 PR SUBSEQUENT HOSPITAL CARE,LEVL II: ICD-10-PCS | Mod: ,,, | Performed by: NURSE PRACTITIONER

## 2022-05-26 PROCEDURE — 99239 PR HOSPITAL DISCHARGE DAY,>30 MIN: ICD-10-PCS | Mod: ,,, | Performed by: HOSPITALIST

## 2022-05-26 PROCEDURE — 85025 COMPLETE CBC W/AUTO DIFF WBC: CPT

## 2022-05-26 PROCEDURE — 36430 TRANSFUSION BLD/BLD COMPNT: CPT

## 2022-05-26 RX ORDER — SCOLOPAMINE TRANSDERMAL SYSTEM 1 MG/1
1 PATCH, EXTENDED RELEASE TRANSDERMAL
Qty: 10 PATCH | Refills: 0 | Status: SHIPPED | OUTPATIENT
Start: 2022-05-26 | End: 2022-05-26 | Stop reason: SDUPTHER

## 2022-05-26 RX ORDER — OXYCODONE HYDROCHLORIDE 5 MG/1
5 CAPSULE ORAL EVERY 6 HOURS PRN
Qty: 10 CAPSULE | Refills: 0 | Status: SHIPPED | OUTPATIENT
Start: 2022-05-26 | End: 2022-06-09

## 2022-05-26 RX ORDER — PYRIDOXINE HCL (VITAMIN B6) 25 MG
25 TABLET ORAL EVERY 8 HOURS
Qty: 90 TABLET | Refills: 2 | Status: SHIPPED | OUTPATIENT
Start: 2022-05-26 | End: 2022-05-26 | Stop reason: SDUPTHER

## 2022-05-26 RX ORDER — LABETALOL 200 MG/1
200 TABLET, FILM COATED ORAL EVERY 12 HOURS
Qty: 60 TABLET | Refills: 11 | Status: SHIPPED | OUTPATIENT
Start: 2022-05-26 | End: 2022-05-26 | Stop reason: SDUPTHER

## 2022-05-26 RX ORDER — INSULIN ASPART 100 [IU]/ML
0-5 INJECTION, SOLUTION INTRAVENOUS; SUBCUTANEOUS
Qty: 1.4 ML | Refills: 0 | Status: SHIPPED | OUTPATIENT
Start: 2022-05-26 | End: 2022-06-02

## 2022-05-26 RX ORDER — LABETALOL 200 MG/1
200 TABLET, FILM COATED ORAL EVERY 12 HOURS
Qty: 14 TABLET | Refills: 0 | Status: SHIPPED | OUTPATIENT
Start: 2022-05-26 | End: 2022-06-02

## 2022-05-26 RX ORDER — INSULIN ASPART 100 [IU]/ML
0-5 INJECTION, SOLUTION INTRAVENOUS; SUBCUTANEOUS
Qty: 3 ML | Refills: 3 | Status: SHIPPED | OUTPATIENT
Start: 2022-05-26 | End: 2022-05-26 | Stop reason: SDUPTHER

## 2022-05-26 RX ORDER — PRENATAL WITH FERROUS FUM AND FOLIC ACID 3080; 920; 120; 400; 22; 1.84; 3; 20; 10; 1; 12; 200; 27; 25; 2 [IU]/1; [IU]/1; MG/1; [IU]/1; MG/1; MG/1; MG/1; MG/1; MG/1; MG/1; UG/1; MG/1; MG/1; MG/1; MG/1
1 TABLET ORAL DAILY
Qty: 30 TABLET | Refills: 11 | Status: SHIPPED | OUTPATIENT
Start: 2022-05-27 | End: 2022-05-26 | Stop reason: SDUPTHER

## 2022-05-26 RX ORDER — FAMOTIDINE 40 MG/1
40 TABLET, FILM COATED ORAL DAILY
Qty: 7 TABLET | Refills: 0 | Status: SHIPPED | OUTPATIENT
Start: 2022-05-27 | End: 2022-06-03

## 2022-05-26 RX ORDER — PYRIDOXINE HCL (VITAMIN B6) 25 MG
25 TABLET ORAL EVERY 8 HOURS
Qty: 21 TABLET | Refills: 0 | Status: SHIPPED | OUTPATIENT
Start: 2022-05-26 | End: 2022-06-02

## 2022-05-26 RX ORDER — INSULIN GLARGINE 100 [IU]/ML
6 INJECTION, SOLUTION SUBCUTANEOUS 2 TIMES DAILY
Qty: 3.6 ML | Refills: 11 | Status: SHIPPED | OUTPATIENT
Start: 2022-05-26 | End: 2022-05-26 | Stop reason: SDUPTHER

## 2022-05-26 RX ORDER — NIFEDIPINE 90 MG/1
90 TABLET, EXTENDED RELEASE ORAL 2 TIMES DAILY
Qty: 60 TABLET | Refills: 11 | Status: SHIPPED | OUTPATIENT
Start: 2022-05-26 | End: 2022-05-26 | Stop reason: SDUPTHER

## 2022-05-26 RX ORDER — ONDANSETRON 4 MG/1
4 TABLET, ORALLY DISINTEGRATING ORAL EVERY 8 HOURS
Qty: 90 TABLET | Refills: 2 | Status: SHIPPED | OUTPATIENT
Start: 2022-05-26 | End: 2022-05-26 | Stop reason: SDUPTHER

## 2022-05-26 RX ORDER — INSULIN GLARGINE 100 [IU]/ML
6 INJECTION, SOLUTION SUBCUTANEOUS 2 TIMES DAILY
Qty: 3.6 ML | Refills: 11 | Status: SHIPPED | OUTPATIENT
Start: 2022-05-26 | End: 2022-05-30 | Stop reason: SDUPTHER

## 2022-05-26 RX ORDER — INSULIN ASPART 100 [IU]/ML
5 INJECTION, SOLUTION INTRAVENOUS; SUBCUTANEOUS
Qty: 13.5 ML | Refills: 3 | Status: SHIPPED | OUTPATIENT
Start: 2022-05-26 | End: 2022-05-26 | Stop reason: SDUPTHER

## 2022-05-26 RX ORDER — INSULIN ASPART 100 [IU]/ML
5 INJECTION, SOLUTION INTRAVENOUS; SUBCUTANEOUS
Qty: 3 ML | Refills: 0 | Status: SHIPPED | OUTPATIENT
Start: 2022-05-26 | End: 2022-06-09

## 2022-05-26 RX ORDER — INSULIN ASPART 100 [IU]/ML
1-10 INJECTION, SOLUTION INTRAVENOUS; SUBCUTANEOUS 3 TIMES DAILY
Qty: 27 ML | Refills: 3 | Status: SHIPPED | OUTPATIENT
Start: 2022-05-26 | End: 2022-05-26 | Stop reason: SDUPTHER

## 2022-05-26 RX ORDER — HYDROCODONE BITARTRATE AND ACETAMINOPHEN 500; 5 MG/1; MG/1
TABLET ORAL
Status: DISCONTINUED | OUTPATIENT
Start: 2022-05-26 | End: 2022-05-26 | Stop reason: HOSPADM

## 2022-05-26 RX ORDER — PRENATAL WITH FERROUS FUM AND FOLIC ACID 3080; 920; 120; 400; 22; 1.84; 3; 20; 10; 1; 12; 200; 27; 25; 2 [IU]/1; [IU]/1; MG/1; [IU]/1; MG/1; MG/1; MG/1; MG/1; MG/1; MG/1; UG/1; MG/1; MG/1; MG/1; MG/1
1 TABLET ORAL DAILY
Qty: 7 TABLET | Refills: 0 | Status: SHIPPED | OUTPATIENT
Start: 2022-05-27 | End: 2023-02-08

## 2022-05-26 RX ORDER — ONDANSETRON 4 MG/1
4 TABLET, ORALLY DISINTEGRATING ORAL EVERY 8 HOURS
Qty: 21 TABLET | Refills: 0 | Status: SHIPPED | OUTPATIENT
Start: 2022-05-26 | End: 2022-06-02

## 2022-05-26 RX ORDER — NIFEDIPINE 90 MG/1
90 TABLET, EXTENDED RELEASE ORAL 2 TIMES DAILY
Qty: 14 TABLET | Refills: 0 | Status: SHIPPED | OUTPATIENT
Start: 2022-05-26 | End: 2022-06-02

## 2022-05-26 RX ORDER — FAMOTIDINE 40 MG/1
40 TABLET, FILM COATED ORAL DAILY
Qty: 30 TABLET | Refills: 11 | Status: SHIPPED | OUTPATIENT
Start: 2022-05-27 | End: 2022-05-26 | Stop reason: SDUPTHER

## 2022-05-26 RX ORDER — SCOLOPAMINE TRANSDERMAL SYSTEM 1 MG/1
1 PATCH, EXTENDED RELEASE TRANSDERMAL
Qty: 3 PATCH | Refills: 0 | Status: SHIPPED | OUTPATIENT
Start: 2022-05-26 | End: 2022-06-04

## 2022-05-26 RX ADMIN — NIFEDIPINE 90 MG: 30 TABLET, FILM COATED, EXTENDED RELEASE ORAL at 08:05

## 2022-05-26 RX ADMIN — INSULIN DETEMIR 6 UNITS: 100 INJECTION, SOLUTION SUBCUTANEOUS at 09:05

## 2022-05-26 RX ADMIN — LABETALOL HYDROCHLORIDE 200 MG: 100 TABLET, FILM COATED ORAL at 08:05

## 2022-05-26 RX ADMIN — CEFTRIAXONE 1 G: 1 INJECTION, SOLUTION INTRAVENOUS at 03:05

## 2022-05-26 RX ADMIN — HYDRALAZINE HYDROCHLORIDE 25 MG: 25 TABLET, FILM COATED ORAL at 05:05

## 2022-05-26 RX ADMIN — Medication 25 MG: at 05:05

## 2022-05-26 RX ADMIN — INSULIN ASPART 10 UNITS: 100 INJECTION, SOLUTION INTRAVENOUS; SUBCUTANEOUS at 11:05

## 2022-05-26 RX ADMIN — SERTRALINE HYDROCHLORIDE 25 MG: 25 TABLET ORAL at 08:05

## 2022-05-26 RX ADMIN — Medication 25 MG: at 01:05

## 2022-05-26 RX ADMIN — DOCUSATE SODIUM 100 MG: 100 CAPSULE, LIQUID FILLED ORAL at 08:05

## 2022-05-26 RX ADMIN — MUPIROCIN: 20 OINTMENT TOPICAL at 09:05

## 2022-05-26 RX ADMIN — METOCLOPRAMIDE 10 MG: 5 INJECTION, SOLUTION INTRAMUSCULAR; INTRAVENOUS at 12:05

## 2022-05-26 RX ADMIN — DIPHENHYDRAMINE HYDROCHLORIDE 12.5 MG: 50 INJECTION, SOLUTION INTRAMUSCULAR; INTRAVENOUS at 06:05

## 2022-05-26 RX ADMIN — ONDANSETRON 4 MG: 4 TABLET, ORALLY DISINTEGRATING ORAL at 01:05

## 2022-05-26 RX ADMIN — INSULIN ASPART 1 UNITS: 100 INJECTION, SOLUTION INTRAVENOUS; SUBCUTANEOUS at 07:05

## 2022-05-26 RX ADMIN — HYDRALAZINE HYDROCHLORIDE 25 MG: 25 TABLET, FILM COATED ORAL at 01:05

## 2022-05-26 RX ADMIN — METOCLOPRAMIDE 10 MG: 5 INJECTION, SOLUTION INTRAMUSCULAR; INTRAVENOUS at 02:05

## 2022-05-26 RX ADMIN — INSULIN ASPART 1 UNITS: 100 INJECTION, SOLUTION INTRAVENOUS; SUBCUTANEOUS at 05:05

## 2022-05-26 RX ADMIN — FAMOTIDINE 40 MG: 20 TABLET ORAL at 08:05

## 2022-05-26 RX ADMIN — MORPHINE SULFATE 2 MG: 2 INJECTION, SOLUTION INTRAMUSCULAR; INTRAVENOUS at 08:05

## 2022-05-26 RX ADMIN — ONDANSETRON 4 MG: 4 TABLET, ORALLY DISINTEGRATING ORAL at 05:05

## 2022-05-26 NOTE — PLAN OF CARE
Problem:  Fall Injury Risk  Goal: Absence of Fall, Infant Drop and Related Injury  Outcome: Ongoing, Progressing     Problem: Adult Inpatient Plan of Care  Goal: Plan of Care Review  Outcome: Ongoing, Progressing  Goal: Patient-Specific Goal (Individualized)  Outcome: Ongoing, Progressing  Goal: Absence of Hospital-Acquired Illness or Injury  Outcome: Ongoing, Progressing  Goal: Optimal Comfort and Wellbeing  Outcome: Ongoing, Progressing  Goal: Readiness for Transition of Care  Outcome: Ongoing, Progressing     Problem: Diabetes Comorbidity  Goal: Blood Glucose Level Within Targeted Range  Outcome: Ongoing, Progressing     Problem: Infection  Goal: Absence of Infection Signs and Symptoms  Outcome: Ongoing, Progressing

## 2022-05-26 NOTE — DISCHARGE SUMMARY
"[facility] - [facility] ([place] [address])  [facility] Medicine  Discharge Summary      Patient Name: Alicia Valles  MRN: [id number]  Patient Class: IP- Inpatient  Admission Date: 2022  Hospital Length of Stay: 3 days  Discharge Date and Time:  2022 1:33 PM  Attending Physician: Jessica Peguero*   Discharging Provider: Anish Peng MD  Primary Care Provider: Primary Doctor   Hospital Medicine Team: [facility] HOSP MED 3 Anish Peng MD    HPI:   Alicia Valles is a 27F A2 who is currently 11 weeks pregnant with PMH HTN and T1DM complicated by retinopathy, gastroparesis, and neuropathy who presented to [facility] in [place] and admitted to ICU for uncontrolled hypertension and hyperglycemia. She required cardene gtt at [facility] admission. She was recently discharged AMA from [facility] after a transfer from [facility] for endocrinology evaluation. She was intially admitted to [facility] for HTN encephalopathy and hypoglycemia complicated by a seizure (BG 29). Of note, she reports that she's been admitted for DKA more than 5 times. Patient evaluated by endocrinology who recommend lantus 15U daily and novolog 3units with meals. Patient to be transferred here for higher level of care. She left AM due to "not wanting to wait" and came to ED here.    Upon admission, she was hypertensive, 184/98, and tachycardic, 117. EKG with sinus tachycardia. Labs were notable for hyperglycemia, elevated BHB, , leukocytosis (20.6), anemia (9.3), hyponatremia (134), low bicarbonate (19), and UA with 2+ occult blood, 3+ protein, 3+ glucose, 1+ ketones, 7 WBC. Pt was given 2L LR bolus, labetalol 10mg IV, nifedipine 120mg PO, ativan 2mg IV x2, and zofran 4mg IV for intractable N/V and HTN control. She was admitted to hospital medicine after critical care reported patient safe for floor.      * No surgery found *      Hospital Course:   Patient admitted for hypertensive urgency and intractable N/V. Endocrine consulted for " assistance with insulin regimen. MFM consulted for recommendations given presentation and complicated pregnancy. Blood pressure and blood glucose much better controlled with assistance of MFM and Endo. Patient also with improvement in nausea, vomiting and able to tolerate PO medications and food. Patient is interested in terminating pregnancy given extremely high risk of further complications and both maternal and fetal morbidity and mortality -- resources provided for follow-up after discharge. Patient is medically ready for d/c with PCP establishment, MFM and Endo close follow-up.       Goals of Care Treatment Preferences:  Code Status: Full Code    SUBJECTIVE  Review of Systems   Constitutional:  Positive for fatigue. Negative for chills and fever.   HENT:  Negative for congestion, rhinorrhea and sore throat.    Eyes:  Negative for photophobia and visual disturbance.   Respiratory:  Negative for shortness of breath, chest tightness.    Cardiovascular:  Negative for chest pain.   Gastrointestinal:  Positive for abdominal pain. Negative for nausea, vomiting, constipation and diarrhea.   Endocrine: Negative for polyphagia and polyuria.   Genitourinary:  Negative for dysuria and urgency.   Musculoskeletal:  Negative for arthralgias and myalgias.   Skin:  Negative for color change and wound.   Neurological:  Negative for weakness, light-headedness and headaches.   Psychiatric/Behavioral:  Negative for agitation and confusion. The patient is not nervous/anxious.       OBJECTIVE  Vitals:    05/26/22 1405   BP: 122/73   Pulse: 96   Resp: 20   Temp: 98.3 °F (36.8 °C)         Physical Exam  Vitals and nursing note reviewed.   Constitutional:       General: She is not in acute distress.     Appearance: Normal appearance. She is normal weight. She is not ill-appearing.   HENT:      Head: Normocephalic and atraumatic.      Nose: Nose normal.      Mouth/Throat:      Pharynx: Oropharynx is clear.   Eyes:      Extraocular  Movements: Extraocular movements intact.      Conjunctiva/sclera: Conjunctivae normal.   Cardiovascular:      Rate and Rhythm: Regular rate and rhythm.     Pulses: Normal pulses.      Heart sounds: Normal heart sounds.   Pulmonary:      Effort: Pulmonary effort is normal. No respiratory distress.      Breath sounds: Normal breath sounds. No wheezing.   Abdominal:      General: Abdomen is flat. Bowel sounds are normal. There is no distension.      Palpations: Abdomen is soft.      Tenderness: There is abdominal tenderness.   Musculoskeletal:         General: No swelling or tenderness. Normal range of motion.      Cervical back: Normal range of motion and neck supple.   Skin:     General: Skin is warm and dry.   Neurological:      General: No focal deficit present.      Mental Status: She is alert and oriented to person, place, and time. Mental status is at baseline.   Psychiatric:         Mood and Affect: Mood normal.         Behavior: Behavior normal.         Thought Content: Thought content normal.         Judgment: Judgment normal.       Consults:   Consults (From admission, onward)        Status Ordering Provider     Inpatient consult to Maternal Fetal Medicine  Once        Provider:  (Not yet assigned)    Acknowledged ORTIZ ECHAVARRIA     Inpatient consult to Midline team  Once        Provider:  (Not yet assigned)    Completed MARYELLEN JENKINS     Inpatient consult to Endocrinology  Once        Provider:  (Not yet assigned)    Completed MARYELLEN JENKINS          No new Assessment & Plan notes have been filed under this hospital service since the last note was generated.  Service: Hospital Medicine    Final Active Diagnoses:    Diagnosis Date Noted POA    PRINCIPAL PROBLEM:  Hypertensive urgency [I16.0] 05/21/2022 Yes    UTI in pregnancy, antepartum, first trimester [O23.41] 05/21/2022 Yes    Type 1 diabetes mellitus with other specified complication [E10.69] 05/05/2022 Yes     Chronic    First trimester pregnancy  [Z34.91] 05/05/2022 Not Applicable    Gastroparesis due to DM [E11.43, K31.84] 05/14/2022 Yes     Chronic    Anxiety [F41.9] 05/12/2022 Yes     Chronic    12 weeks gestation of pregnancy [Z3A.12] 05/23/2022 Not Applicable    Nausea and vomiting during pregnancy [O21.9] 05/23/2022 Unknown    Leukocytosis [D72.829] 05/21/2022 Yes    Spotting in early pregnancy [O26.859] 05/14/2022 Yes    Anemia [D64.9] 05/06/2022 Yes    Proteinuria [R80.9] 05/06/2022 Yes      Problems Resolved During this Admission:    Diagnosis Date Noted Date Resolved POA    Chronic hypertension [I10] 05/05/2022 05/24/2022 Yes     Chronic    History of panic attacks [Z86.59] 05/13/2022 05/24/2022 Not Applicable     Chronic    Hyponatremia [E87.1] 05/21/2022 05/24/2022 Yes    Uses self-applied continuous glucose monitoring device [Z97.8] 05/13/2022 05/24/2022 Yes       Discharged Condition: stable    Disposition: Home or Self Care    Follow Up:   Follow-up Information     Giovanna Hyatt MD. Go on 6/1/2022.    Specialty: Family Medicine  Why: New Primary Care appointment and hospital follow up: 6/1/22 at 8:30 am  Contact information:  [address] [facility] [id number] [phone]             Dr. Trevor Benjamin Follow up on 6/1/2022.    Why: Hospital follow up: 6/1/22 at 3:20pm  Contact information:  [address] [place] [address]                     Patient Instructions:   No discharge procedures on file.    Significant Diagnostic Studies: Labs: All labs within the past 24 hours have been reviewed    Pending Diagnostic Studies:     None         Medications:  Reconciled Home Medications:      Medication List      START taking these medications    oxyCODONE 5 mg Cap  Commonly known as: OXY-IR  Take 1 capsule (5 mg total) by mouth every 6 (six) hours as needed for Pain.     pyridoxine (vitamin B6) 25 MG Tab  Commonly known as: B-6  Take 1 tablet (25 mg total) by mouth every 8 (eight) hours.     scopolamine  1.3-1.5 mg (1 mg over 3 days)  Commonly known as: TRANSDERM-SCOP  Place 1 patch onto the skin Every 3 (three) days.        CHANGE how you take these medications    famotidine 40 MG tablet  Commonly known as: PEPCID  Take 1 tablet (40 mg total) by mouth once daily.  Start taking on: May 27, 2022  What changed:   · medication strength  · how much to take     * insulin aspart U-100 100 unit/mL (3 mL) Inpn pen  Commonly known as: NovoLOG  Inject 0-5 Units into the skin before meals and at bedtime as needed (Hyperglycemia). Novolog SSI for BG excursions:  180 - 230 + 1 unit  231- 280  + 2 units  281 - 330 + 3 units  331 - 380 + 4 units      > 380   + 5 units  What changed: See the new instructions.     * insulin aspart U-100 100 unit/mL (3 mL) Inpn pen  Commonly known as: NovoLOG  Inject 5 Units into the skin 3 (three) times daily with meals. Novolog ICR: 1:8 units TIDWM and ICR 1:12 with snacks  What changed: You were already taking a medication with the same name, and this prescription was added. Make sure you understand how and when to take each.     labetaloL 200 MG tablet  Commonly known as: NORMODYNE  Take 1 tablet (200 mg total) by mouth every 12 (twelve) hours.  What changed:   · medication strength  · how much to take  · when to take this     LANTUS U-100 INSULIN 100 unit/mL injection  Generic drug: insulin glargine  Inject 6 Units into the skin 2 (two) times a day.  What changed:   · how much to take  · when to take this  · additional instructions     NIFEdipine 90 MG (OSM) 24 hr tablet  Commonly known as: PROCARDIA-XL  Take 1 tablet (90 mg total) by mouth 2 (two) times a day.  What changed:   · medication strength  · how much to take  · when to take this     ondansetron 4 MG Tbdl  Commonly known as: ZOFRAN-ODT  Dissolve 1 tablet (4 mg total) by mouth every 8 (eight) hours.  What changed:   · when to take this  · reasons to take this     prenatal vitamin Tab  Take 1 tablet by mouth once daily.  Start taking on:  "May 27, 2022  What changed:   · medication strength  · Another medication with the same name was removed. Continue taking this medication, and follow the directions you see here.     sertraline 25 MG tablet  Commonly known as: ZOLOFT  Take 1 tablet (25 mg total) by mouth once daily.  What changed: Another medication with the same name was removed. Continue taking this medication, and follow the directions you see here.         * This list has 2 medication(s) that are the same as other medications prescribed for you. Read the directions carefully, and ask your doctor or other care provider to review them with you.            CONTINUE taking these medications    acetaminophen 500 MG tablet  Commonly known as: TYLENOL  Take 2,000 mg by mouth daily as needed for Pain.     BAQSIMI 3 mg/actuation Spry  Generic drug: glucagon  1 each by Nasal route as needed (For Hypoglycemia).     BD ULTRA-FINE LIGIA PEN NEEDLE 32 gauge x 5/32" Ndle  Generic drug: pen needle, diabetic  To inject insulin 4-6 times daily.     DEXCOM G6  Misc  Generic drug: blood-glucose meter,continuous  1 Device by Misc.(Non-Drug; Combo Route) route once. for 1 dose     DEXCOM G6 SENSOR Sandrine  Generic drug: blood-glucose sensor  3 Devices by Misc.(Non-Drug; Combo Route) route every 30 days.     DEXCOM G6 TRANSMITTER Sandrine  Generic drug: blood-glucose transmitter  2 Devices by Misc.(Non-Drug; Combo Route) route every 6 (six) months.     diphenhydrAMINE 25 mg capsule  Commonly known as: BENADRYL  Take 25 mg by mouth daily as needed for Insomnia ((if Unisom ineffective)).     docusate sodium 100 MG capsule  Commonly known as: COLACE  Take 100 mg by mouth once daily.     gabapentin 300 MG capsule  Commonly known as: NEURONTIN  Take 300 mg by mouth 3 (three) times daily.     * glucose 4 GM chewable tablet  Take 4 tablets (16 g total) by mouth as needed for Low blood sugar.     * glucose 4 GM chewable tablet  Take 6 tablets (24 g total) by mouth as needed " for Low blood sugar.     hydrALAZINE 100 MG tablet  Commonly known as: APRESOLINE  Take 1 tablet (100 mg total) by mouth every 8 (eight) hours.     promethazine 12.5 MG Tab  Commonly known as: PHENERGAN  Take 12.5 mg by mouth every 6 (six) hours as needed.     UNISOM (DOXYLAMINE) ORAL  Take 1 capsule by mouth nightly as needed (Sleep).         * This list has 2 medication(s) that are the same as other medications prescribed for you. Read the directions carefully, and ask your doctor or other care provider to review them with you.            STOP taking these medications    amoxicillin-clavulanate 875-125mg 875-125 mg per tablet  Commonly known as: AUGMENTIN     aspirin 81 MG Chew     ibuprofen 600 MG tablet  Commonly known as: ADVIL,MOTRIN     lisinopriL 20 MG tablet  Commonly known as: PRINIVIL,ZESTRIL     LORazepam 0.5 MG tablet  Commonly known as: ATIVAN     methyldopa 250 MG tablet  Commonly known as: ALDOMET     pregabalin 100 MG capsule  Commonly known as: LYRICA     pregabalin 150 MG capsule  Commonly known as: LYRICA            Indwelling Lines/Drains at time of discharge:   Lines/Drains/Airways     None                 Time spent on the discharge of patient: 33 minutes         Anish Peng MD  Department of Hospital Medicine  [facility] - Intensive Care ([place]-14)

## 2022-05-26 NOTE — NURSING
Patient adequate for discharge after administration on 1unit RBC. Midline to MESHA removed; cath tip intact. Discharge instructions reviewed with patient and her mother at the bedside; both v/u. MD at bedside to answer any and all questions regarding patient's medications. Patient transported off unit via wheelchair by her mom. Patient has all personal belongings. Patient and mom will stop at pharmacy downstairs for home Rx.  NAD

## 2022-05-26 NOTE — PROGRESS NOTES
[facility] ([place] [address])  Endocrinology  Progress Note    Admit Date: 2022     Reason for Consult: Management of T1DM, Hyperglycemia during pregnancy    Outpatient diabetes provider: Primary Care in [place]; [place] (No established PCP or endocrine care in [facility])  Diabetes education during pregnancy: None      Diabetes diagnosis year: 18 years      Home Diabetes Regimen:     - Lantus 6 units in the morning and 5 units in the evening (Eat a snack before bed if BG is < 100 mg/dl)  - Novolog ICR: 1:8 units TIDWM and ICR 1:12 with snacks (Hold if BG < 100 mg/dL) (Likely 5 units with each meal and 2 units with snacks)  - Novolog SSI for BG excursions:  180 - 230 + 1 unit  231- 280  + 2 units  281 - 330 + 3 units  331 - 380 + 4 units      > 380   + 5 units     Historically on Medronic (But has been off the pump for 7 weeks; Found down with BG in the 20's).     Blood Sugar Monitoring:  Dexcom (Clarity Code: [id number])        Diabetes Complications include:     Hyperglycemia, Hypoglycemia , Diabetic nephropathy  , Diabetic retinopathy , and Diabetic gastroparesis   She reports that she's been admitted for DKA more than 5 times.     Complicating diabetes co morbidities:   Pregancy.       HPI:   27 year old female A2 who is currently 12 weeks pregnant with pertinent medical history of type 1 diabetes mellitus complicated by retinopathy, gastroparesis, and neuropathy who presented initially to [facility] in [place] and admitted to ICU for uncontrolled hypertension and hyperglycemia. She left [place] and came to [facility] for management of uncontrolled nausea and decreased appetite.   She also has a history of leaving [place] from [facility] after a transfer from [facility] when she was managed for encephalopathy and hypoglycemia complicated by a seizure (BG 29).     Endocrinology consulted for management of inpatient diabetes      Interval HPI:   Overnight events: No acute events overnight.  "Patient on the MTSU in room 49996/12048 A. Blood glucose  variable . BG at, above, and below goal on current insulin regimen (SSI, prandial, and basal insulin ). Steroid use- None .    Renal function- Normal   Vasopressors-  None       Diet diabetic [facility];  Calorie; Consistent Carbohydrate     Eatin%  Nausea: No  Hypoglycemia and intervention: No  Fever: No  TPN and/or TF: No      /75 (BP Location: Right arm, Patient Position: Lying)   Pulse 100   Temp 99.1 °F (37.3 °C)   Resp 20   Ht 5' 1" (1.549 m)   Wt 59 kg (130 lb)   LMP 2022 (Approximate)   SpO2 100%   Breastfeeding No   BMI 24.56 kg/m²     Labs Reviewed and Include    Recent Labs   Lab 22  0507   *   CALCIUM 7.2*   ALBUMIN 1.7*   PROT 4.2*   *   K 3.9   CO2 23      BUN 16   CREATININE 1.2   ALKPHOS 45*   ALT 16   AST 18   BILITOT 0.1     Lab Results   Component Value Date    WBC 6.98 2022    HGB 6.3 (L) 2022    HCT 18.6 (LL) 2022    MCV 85 2022     2022     No results for input(s): TSH, FREET4 in the last 168 hours.  Lab Results   Component Value Date    HGBA1C 9.8 (H) 2022       Nutritional status:   Body mass index is 24.56 kg/m².  Lab Results   Component Value Date    ALBUMIN 1.7 (L) 2022    ALBUMIN 1.8 (L) 2022    ALBUMIN 1.9 (L) 2022     No results found for: PREALBUMIN    Estimated Creatinine Clearance: 57.1 mL/min (based on SCr of 1.2 mg/dL).    Accu-Checks  Recent Labs     22  2112 22  0837 22  1145 22  1636 22  1915 22  0829 22  1122 22  1324 22  0829   POCTGLUCOSE 348* 337* 143* 120* 130* 243* 154* 164* 145* 262*       Current Medications and/or Treatments Impacting Glycemic Control  Immunotherapy:    Immunosuppressants       None          Steroids:   Hormones (From admission, onward)                None          Pressors:    Autonomic Drugs (From " admission, onward)                None          Hyperglycemia/Diabetes Medications:   Antihyperglycemics (From admission, onward)                Start     Stop Route Frequency Ordered    05/23/22 2100  insulin detemir U-100 pen 6 Units         -- SubQ Nightly 05/23/22 1459    05/22/22 1244  insulin aspart U-100 pen 0-5 Units         -- SubQ Before meals & nightly PRN 05/22/22 1144    05/22/22 1145  insulin aspart U-100 pen 1-10 Units         -- SubQ 3 times daily with meals 05/22/22 1033    05/22/22 0900  insulin detemir U-100 pen 6 Units         -- SubQ Daily 05/22/22 0747            ASSESSMENT and PLAN    * Hypertensive urgency  Managed per primary team  Optimize BG control    Type 1 diabetes mellitus with other specified complication  - she is 12 weeks pregnant   - blood glucose goal should be aimed for a fasting glucose level below 95 mg/dl, a reading of under 140  mg/dl an hour after eating, and a reading of under 120 mg/dl two hours after eating   -Will allow for slightly higher goals to avoid severe hypoglycemia at this time.    Plan:  -Continue Levemir 6 units BID  -Continue Novolog (aspart) insulin ICR 1:8 Units before meals   -Novolog Correction scale (starting to slide at 150 mg/dL during the daytime) (ISF 1:40).  -BG checks q4hr and two hours post-prandial  - BG checks AC/HS/0200 + SSI prn  - Continue Dexcom, reviewed data showing improvement in hyperglycemia  (Clarity Code: [id number])  -. Advise 45 grams of carbohydrates for meals and 15-25 grams of carbs for snacks   [facility]iet [facility]; 2000 Calorie; Consistent Carbohydrate  - Ensure prandial insulin is administered 15 minutes before starting to eat or drink.   - Hypoglycemia protocol in place  - If blood glucose greater than 300, please ask patient not to eat food or drink anything other than water until correctional insulin has brought it back below 250     ** Please notify Endocrine for any change and/or advance in diet**  ** Please  call Endocrine for any BG related issues **    Discharge plans: Please notify endocrine prior to discharge. Patient requesting Dexcom samples at discharge and will need to schedule follow up with Dr. Schumacher in clinic.     - [place] Medicaid DME paperwork completed today and sent to Dexcom   - Lantus 6 units BID (Eat a snack before bed if BG is < 100 mg/dl)  - Novolog ICR: 1:8 units TIDWM and ICR 1:12 with snacks (Hold if BG < 100 mg/dL) (Likely 6 units with each meal and 3 units with snacks)  - Novolog SSI for BG excursions:  Blood Glucose   mg/dL                  Pre-meal                2200/0200   151-190                1 unit                      1 unit   191-230                2 units                    1 unit   231-270                3 units                    2 unit   271-310                4 units                    2 units   311-350                5 units                    3 units   351-390                6 units                    4 units   > 390                    7 units                    5 units   - Send BG logs in 4 days  - Follow-up in clinic with Dr. Schumacher.     First trimester pregnancy  - aiming for a fasting glucose level below 95 mg/dl, a reading of under 140 mg/dl an hour after eating, and a reading of under 120 mg/dl two hours after eating    Gastroparesis due to DM  - on anti-emetics  - needs better control of blood glucose to prevent complications         Enio Bedolla, DNP, FNP  Endocrinology  [facility] - [facility] ([place]-14)

## 2022-05-26 NOTE — PLAN OF CARE
[facility] ([facility])  Discharge Final Note    Primary Care Provider: Primary Doctor No    Expected Discharge Date: 5/26/2022    Final Discharge Note (most recent)       Final Note - 05/26/22 1624          Final Note    Assessment Type Final Discharge Note (P)      Anticipated Discharge Disposition Home or Self Care (P)      Hospital Resources/Appts/Education Provided Appointments scheduled and added to AVS (P)         Post-Acute Status    Discharge Delays None known at this time (P)                      Important Message from Medicare             Contact Info       Giovanna Hyatt MD   Specialty: Family Medicine    [address] [place] [id number]   Phone: [phone]       Next Steps: Go on 6/1/2022    Instructions: New Primary Care appointment and hospital follow up: 6/1/22 at 8:30 am    Dr. Trevor Benjamin    [address] [place] [address]       Next Steps: Follow up on 6/1/2022    Instructions: Hospital follow up: 6/1/22 at 3:20pm          SW arranged PCP and MFM f/u for the patient in [place].     Maria M Mann, LCSW   PRN

## 2022-05-26 NOTE — TELEPHONE ENCOUNTER
Patient needs a follow-up appointment in the discharge clinic in 1-2 weeks.    Lab Results   Component Value Date    HGBA1C 9.8 (H) 05/05/2022       POCT Glucose   Date Value Ref Range Status   05/26/2022 339 (H) 70 - 110 mg/dL Final   05/26/2022 262 (H) 70 - 110 mg/dL Final   05/25/2022 145 (H) 70 - 110 mg/dL Final   05/25/2022 164 (H) 70 - 110 mg/dL Final   05/25/2022 154 (H) 70 - 110 mg/dL Final   05/24/2022 243 (H) 70 - 110 mg/dL Final   05/24/2022 130 (H) 70 - 110 mg/dL Final   05/24/2022 120 (H) 70 - 110 mg/dL Final   05/24/2022 143 (H) 70 - 110 mg/dL Final   05/23/2022 337 (H) 70 - 110 mg/dL Final   05/23/2022 348 (H) 70 - 110 mg/dL Final   05/23/2022 176 (H) 70 - 110 mg/dL Final       Diabetes Medications             glucagon (BAQSIMI) 3 mg/actuation Spry 1 each by Nasal route as needed (For Hypoglycemia).    glucose 4 GM chewable tablet Take 4 tablets (16 g total) by mouth as needed for Low blood sugar.    glucose 4 GM chewable tablet Take 6 tablets (24 g total) by mouth as needed for Low blood sugar.    insulin aspart U-100 (NOVOLOG) 100 unit/mL (3 mL) InPn pen Inject 5-6 Units into the skin 3 (three) times daily before meals. May also inject 0-5 Units as needed (hyperglycemia). Sliding Scale: 180 - 230 + 1 unit; 231- 280  + 2 units; 281 - 330 + 3 units; 331 - 380 + 4 units; > 380   + 5 units. MAX TDD 40 units.    insulin glargine (LANTUS) 100 unit/mL injection Inject 11 Units into the skin once daily. Inject 6 units every morning and 5 units every evening.      Hospital Medications             glucagon (human recombinant) injection 1 mg 1 mg, Intramuscular, As needed (PRN), Turn patient on their side, give IM, and NOTIFY MD IMMEDIATELY.<BR><BR>Feed the patient as soon as patient awakens and is able to swallow.    glucose chewable tablet 16 g 16 g, Oral, As needed (PRN), (16 grams = #  four 4gm glucose tablets)    glucose chewable tablet 24 g 24 g, Oral, As needed (PRN), (24 grams = # six 4gm glucose  tablets)    insulin aspart U-100 pen 0-5 Units 0-5 Units, Subcutaneous, Before meals &amp; nightly PRN, **LOW CORRECTION DOSE**<BR>Blood Glucose<BR>mg/dL                  Pre-meal                2200/0200<BR>151-190                1 unit                      1 unit<BR>191-230                2 units                    1 unit<BR>231-270                3 units                    2 unit<BR>271-310                4 units                    2 units<BR>311-350                5 units                    3 units<BR>351-390                6 units                    4 units<BR>&gt; 390                    7 units                    5 units <BR>Administer subcutaneously if needed at times designated by monitoring schedule. <BR>DO NOT HOLD correction dose insulin for patients who are  NPO.<BR>&quot;HIGH ALERT MEDICATION&quot; - Administer with meals or TF/TPN.    insulin aspart U-100 pen 1-10 Units 1-10 Units, Subcutaneous, 3 times daily with meals, Administer 1 (one) unit for every 8 grams of carbohydrate.<BR><BR>Do not administer closer than every 3 hours<BR><BR>If patient ate prior to BG check, administer scheduled Novolog only (do not cover with correction dose at this time).<BR>&quot;HIGH ALERT MEDICATION&quot; - Administer with meals or TF/TPN.    insulin detemir U-100 pen 6 Units 6 Units, Subcutaneous, Daily, If Blood Glucose is less than 100 mg/dL at BEDTIME, give 16 grams (four glucose tablets) X 1 dose for patients who can take PO. Recheck BG in 30 minutes and call MD for insulin dose adjustments prior to administering insulin detemir (Levemir).<BR>&quot;HIGH ALERT MEDICATION&quot;    insulin detemir U-100 pen 6 Units 6 Units, Subcutaneous, Nightly, If Blood Glucose is less than 100 mg/dL at BEDTIME, give 16 grams (four glucose tablets) X 1 dose for patients who can take PO. Recheck BG in 30 minutes and call MD for insulin dose adjustments prior to administering insulin detemir (Levemir).<BR>&quot;HIGH ALERT  MEDICATION&quot;          Thanks,    Enio Bedolla DNP, FNP-C  Department of Endocrinology  Inpatient Glycemic Management

## 2022-05-26 NOTE — TELEPHONE ENCOUNTER
Maria M Mann called NIDHI Serrano that patient needed to be scheduled for a f/u MFM appt within a week as patient is being discharged from [facility] inpatient. RN given Johnathan an appt for patient.

## 2022-05-26 NOTE — PLAN OF CARE
Patient received alert and oriented x4, Mom at the bedside voiced no concerns no indication of any pain and or discomfort at this time. White board updated on the plan of care . All safety interventions are in place, call bell in use. Will continue to observe

## 2022-05-26 NOTE — ASSESSMENT & PLAN NOTE
- she is 12 weeks pregnant   - blood glucose goal should be aimed for a fasting glucose level below 95 mg/dl, a reading of under 140  mg/dl an hour after eating, and a reading of under 120 mg/dl two hours after eating   -Will allow for slightly higher goals to avoid severe hypoglycemia at this time.    Plan:  -Continue Levemir 6 units BID  -Continue Novolog (aspart) insulin ICR 1:8 Units before meals   -Novolog Correction scale (starting to slide at 150 mg/dL during the daytime) (ISF 1:40).  -BG checks q4hr and two hours post-prandial  - BG checks AC/HS/0200 + SSI prn  - Continue Dexcom, reviewed data showing improvement in hyperglycemia  (Clarity Code: [id number])  -. Advise 45 grams of carbohydrates for meals and 15-25 grams of carbs for snacks   Diet diabetic [facility]; 2000 Calorie; Consistent Carbohydrate  - Ensure prandial insulin is administered 15 minutes before starting to eat or drink.   - Hypoglycemia protocol in place  - If blood glucose greater than 300, please ask patient not to eat food or drink anything other than water until correctional insulin has brought it back below 250     ** Please notify Endocrine for any change and/or advance in diet**  ** Please call Endocrine for any BG related issues **    Discharge plans: Please notify endocrine prior to discharge. Patient requesting Dexcom samples at discharge and will need to schedule follow up with Dr. Schumacher in clinic.

## 2022-05-26 NOTE — SUBJECTIVE & OBJECTIVE
"Interval HPI:   Overnight events: No acute events overnight. Patient on the MTSU in room 86318/98481 A. Blood glucose  variable . BG at, above, and below goal on current insulin regimen (SSI, prandial, and basal insulin ). Steroid use- None .    Renal function- Normal   Vasopressors-  None       Diet diabetic [facility]; 2000 Calorie; Consistent Carbohydrate     Eatin%  Nausea: No  Hypoglycemia and intervention: No  Fever: No  TPN and/or TF: No      /75 (BP Location: Right arm, Patient Position: Lying)   Pulse 100   Temp 99.1 °F (37.3 °C)   Resp 20   Ht 5' 1" (1.549 m)   Wt 59 kg (130 lb)   LMP 2022 (Approximate)   SpO2 100%   Breastfeeding No   BMI 24.56 kg/m²     Labs Reviewed and Include    Recent Labs   Lab 22  0507   *   CALCIUM 7.2*   ALBUMIN 1.7*   PROT 4.2*   *   K 3.9   CO2 23      BUN 16   CREATININE 1.2   ALKPHOS 45*   ALT 16   AST 18   BILITOT 0.1     Lab Results   Component Value Date    WBC 6.98 2022    HGB 6.3 (L) 2022    HCT 18.6 (LL) 2022    MCV 85 2022     2022     No results for input(s): TSH, FREET4 in the last 168 hours.  Lab Results   Component Value Date    HGBA1C 9.8 (H) 2022       Nutritional status:   Body mass index is 24.56 kg/m².  Lab Results   Component Value Date    ALBUMIN 1.7 (L) 2022    ALBUMIN 1.8 (L) 2022    ALBUMIN 1.9 (L) 2022     No results found for: PREALBUMIN    Estimated Creatinine Clearance: 57.1 mL/min (based on SCr of 1.2 mg/dL).    Accu-Checks  Recent Labs     22  2112 22  0837 22  1145 22  1636 22  1915 22  0829 22  1122 22  1324 22  0829   POCTGLUCOSE 348* 337* 143* 120* 130* 243* 154* 164* 145* 262*       Current Medications and/or Treatments Impacting Glycemic Control  Immunotherapy:    Immunosuppressants       None          Steroids:   Hormones (From admission, onward)           "      None          Pressors:    Autonomic Drugs (From admission, onward)                None          Hyperglycemia/Diabetes Medications:   Antihyperglycemics (From admission, onward)                Start     Stop Route Frequency Ordered    05/23/22 2100  insulin detemir U-100 pen 6 Units         -- SubQ Nightly 05/23/22 1459    05/22/22 1244  insulin aspart U-100 pen 0-5 Units         -- SubQ Before meals & nightly PRN 05/22/22 1144    05/22/22 1145  insulin aspart U-100 pen 1-10 Units         -- SubQ 3 times daily with meals 05/22/22 1033    05/22/22 0900  insulin detemir U-100 pen 6 Units         -- SubQ Daily 05/22/22 0747

## 2022-05-26 NOTE — DISCHARGE INSTRUCTIONS
--Please follow-up with MFM and Endocrinology in clinic  --We are working to set you up with a Primary Care Physician -- please follow-up next week    Please see the below directions from Endocrinology regarding your insulin regimen:    Type 1 diabetes mellitus with other specified complication  - she is 12 weeks pregnant   - blood glucose goal should be aimed for a fasting glucose level below 95 mg/dl, a reading of under 140  mg/dl an hour after eating, and a reading of under 120 mg/dl two hours after eating   -Will allow for slightly higher goals to avoid severe hypoglycemia at this time.     Plan:  -Continue Levemir 6 units BID  -Continue Novolog (aspart) insulin ICR 1:8 Units before meals   -Novolog Correction scale (starting to slide at 150 mg/dL during the daytime) (ISF 1:40).  -BG checks q4hr and two hours post-prandial  - BG checks AC/HS/0200 + SSI prn  - Continue Dexcom, reviewed data showing improvement in hyperglycemia  (Clarity Code: [id number])  -. Advise 45 grams of carbohydrates for meals and 15-25 grams of carbs for snacks   [facility]; 2000 Calorie; Consistent Carbohydrate  - Ensure prandial insulin is administered 15 minutes before starting to eat or drink.   - Hypoglycemia protocol in place  - If blood glucose greater than 300, please ask patient not to eat food or drink anything other than water until correctional insulin has brought it back below 250     ** Please notify Endocrine for any change and/or advance in diet**  ** Please call Endocrine for any BG related issues **     Discharge plans: Please notify endocrine prior to discharge. Patient requesting Dexcom samples at discharge and will need to schedule follow up with Dr. Schumacher in clinic.      - [place] Medicaid DME paperwork completed today and sent to [employer]   - Lantus 6 units BID (Eat a snack before bed if BG is < 100 mg/dl)  - Novolog ICR: 1:8 units TIDWM and ICR 1:12 with snacks (Hold if BG < 100 mg/dL) (Likely 6  units with each meal and 3 units with snacks)  - Novolog SSI for BG excursions:  Blood Glucose   mg/dL                  Pre-meal                2200/0200   151-190                1 unit                      1 unit   191-230                2 units                    1 unit   231-270                3 units                    2 unit   271-310                4 units                    2 units   311-350                5 units                    3 units   351-390                6 units                    4 units   > 390                    7 units                    5 units   - Send BG logs in 4 days  - Follow-up in clinic with Dr. Schumacher.

## 2022-05-27 ENCOUNTER — PATIENT OUTREACH (OUTPATIENT)
Dept: ADMINISTRATIVE | Facility: CLINIC | Age: 27
End: 2022-05-27
Payer: MEDICAID

## 2022-05-27 NOTE — PROGRESS NOTES
C3 nurse spoke with Alicia Valles  for a [facility] post hospital discharge follow up call. The patient has a scheduled HOSFU appointment with Giovanna Hyatt MD on 06/01/2022 @ 0926.

## 2022-05-30 ENCOUNTER — CLINICAL SUPPORT (OUTPATIENT)
Dept: DIABETES | Facility: CLINIC | Age: 27
End: 2022-05-30
Payer: MEDICAID

## 2022-05-30 DIAGNOSIS — E10.8 TYPE 1 DIABETES MELLITUS WITH COMPLICATIONS: ICD-10-CM

## 2022-05-30 PROCEDURE — 99999 PR PBB SHADOW E&M-EST. PATIENT-LVL II: ICD-10-PCS | Mod: PBBFAC,,,

## 2022-05-30 PROCEDURE — 99999 PR PBB SHADOW E&M-EST. PATIENT-LVL II: CPT | Mod: PBBFAC,,,

## 2022-05-30 PROCEDURE — 99212 OFFICE O/P EST SF 10 MIN: CPT | Mod: PBBFAC | Performed by: DIETITIAN, REGISTERED

## 2022-05-30 PROCEDURE — G0108 DIAB MANAGE TRN  PER INDIV: HCPCS | Mod: PBBFAC | Performed by: DIETITIAN, REGISTERED

## 2022-05-30 RX ORDER — INSULIN GLARGINE 100 [IU]/ML
8 INJECTION, SOLUTION SUBCUTANEOUS 2 TIMES DAILY
Qty: 4.8 ML | Refills: 11 | Status: SHIPPED | OUTPATIENT
Start: 2022-05-30 | End: 2022-09-22

## 2022-05-30 NOTE — TELEPHONE ENCOUNTER
Increased ISF to 1:35 and increased basal inuslin to 8 units BID per evaluate with Archana Souza RD. See CGM data.     Enio Bedolla DNP, FNP-C  Department of Endocrinology  Inpatient Glycemic Management

## 2022-05-30 NOTE — PROGRESS NOTES
Diabetes Care Specialist Progress Note  Author: Archana Souza RD, CDE  Date: 6/3/2022    Program Intake  Reason for Diabetes Program Visit:: Initial Diabetes Assessment  Type of Intervention:: Individual  Individual: Education    Education: Self-Management Skill Review   -   pregnancy in T1  (currently about 13 weeks pregnant)    Current diabetes risk level:: high    In the last 12 months, have you:: been admitted to a hospital  Was the ER or hospital admission related to diabetes?: Yes (May 2021)    Permission to speak with others about care:: yes (mother present at visit - mother assists in care)      Lab Results   Component Value Date    HGBA1C 9.8 (H) 2022         Clinical  Problem Review  Reviewed Problem List with Patient: yes  Active comorbidities affecting diabetes self-care.: yes  Comorbidities: Gastrointestinal Disorder (gastroparesis)  Reviewed health maintenance: yes    Clinical Assessment  Current Diabetes Treatment: Insulin  Levemir 6 units BID (9am and 9pm) (reports she does not miss)  Novolog ICR  1:8,  + -190 + 1 (reports she is giving novolog 5+ times daily;  Timing of shot varies to before or after meal, depending on how she feels: nausea, appetite, etc)      Have you ever experienced hypoglycemia (low blood sugar)?: yes  In the last month, how often have you experienced low blood sugar?: once a week (since hospital d/c)  Are you able to tell when your blood sugar is low?: Yes    Have you ever experienced hyperglycemia (high blood sugar)?: yes  In the last month, how often have you experienced high blood sugar?: more than once a day    SMBG via Dexcom G6 (started on sample last week; pending receipt of supplies)  Average B mg/dL  Time in Range (loose pregnancy targets):  16%     (<1% low, 83% high)  GMI:  9.0%        Medication Information  How many days a week do you miss your medications?: Never  Do you sometimes have difficulty refilling your medications?: No  Medication  adherence impacting ability to self-manage diabetes?: No    Labs  Do you have regular lab work to monitor your medications?: Yes  Lab Compliance Barriers: No        Nutritional Status  Diet: Regular (carb counting)  Meal Plan 24 Hour Recall: Breakfast, Lunch, Dinner, Snack    Meal Plan 24 Hour Recall - Lunch: Egg roll, pizza stick  Meal Plan 24 Hour Recall - Dinner: Baked fish  Meal Plan 24 Hour Recall - Snack: Tuna and crackers    Recent Changes in Weight: No Recent Weight Change  Current nutritional status an area of need that is impacting patient's ability to self-manage diabetes?: No            Additional Social History  Support  Does anyone support you with your diabetes care?: yes  Who supports you?: parent, self  Who takes you to your medical appointments?: self, parent  Does the current support meet the patient's needs?: Yes  Is Support an area impacting ability to self-manage diabetes?: No    Access to Mass Media & Technology  Does the patient have access to any of the following devices or technologies?: Smart phone, Internet Access  Media or technology needs impacting ability to self-manage diabetes?: No    Cognitive/Behavioral Health  Alert and Oriented: Yes  Difficulty Thinking: No  Requires Prompting: No  Requires assistance for routine expression?: No  Cognitive or behavioral barriers impacting ability to self-manage diabetes?: No    Culture/[place]  Culture or Denominational beliefs that may impact ability to access healthcare: No    Communication  Language preference: English  Hearing Problems: No  Vision Problems: No  Communication needs impacting ability to self-manage diabetes?: No    Health Literacy  Preferred Learning Method: Face to Face, Demonstration, Hands On, Reading Materials  How often do you need to have someone help you read instructions, pamphlets, or written material from your doctor or pharmacy?: Never  Health literacy needs impacting ability to self-manage diabetes?:  No            Diabetes Self-Management Skills Assessment  Diabetes Disease Process/Treatment Options  Patient/caregiver able to state what happens when someone has diabetes.: yes  Patient/caregiver knows what type of diabetes they have.: yes  Diabetes Type : Type I  Patient/caregiver able to identify at least three signs and symptoms of diabetes.: yes  Patient able to identify at least three risk factors for diabetes.: yes  Diabetes Disease Process/Treatment Options: Skills Assessment Completed: Yes  Assessment indicates:: Adequate understanding  Area of need?: No    Nutrition/Healthy Eating  Challenges to healthy eating:: snacking between meals and at night  Method of carbohydrate measurement:: carb counting/reading labels  Patient can identify foods that impact blood sugar.: yes  Nutrition/Healthy Eating Skills Assessment Completed:: Yes  Assessment indicates:: Instruction Needed  Area of need?: Yes    Physical Activity/Exercise  Patient formally exercises outside of work.: no  Patient can identify forms of physical activity.: yes  Physical Activity/Exercise Skills Assessment Completed: : Yes  Assessment indicates:: Instruction Needed  Area of need?: Yes    Medications  Patient is able to describe current diabetes management routine.: yes  Diabetes management routine:: insulin  Patient is able to identify current diabetes medications, dosages, and appropriate timing of medications.: no  Patient understands the purpose of the medications taken for diabetes.: yes  Patient reports problems or concerns with current medication regimen.: no  Medication Skills Assessment Completed:: Yes  Assessment indicates:: Instruction Needed  Area of need?: Yes    Home Blood Glucose Monitoring  Patient states that blood sugar is checked at home daily.: yes  Monitoring Method:: personal continuous glucose monitor  Personal CGM type:: Dexcom G6  Patient is able to use personal CGM appropriately.: yes  CGM Report reviewed?: yes  Home  Blood Glucose Monitoring Skills Assessment Completed: : Yes  Assessment indicates:: Instruction Needed  Area of need?: Yes    Acute Complications  Patient is able to identify types of acute complications: Yes  Patient Identified:: Hypoglycemia, Hyperglycemia  Acute Complications Skills Assessment Completed: : Yes  Assessment indicates:: Instruction Needed  Area of need?: Yes    Chronic Complications  Patient can identify major chronic complications of diabetes.: yes  Patient can identify ways to prevent or delay diabetes complications.: yes  Chronic Complications Skills Assessment Completed: : Yes  Assessment indicates:: Instruction Needed  Area of need?: Yes    Psychosocial/Coping  Patient can identify ways of coping with chronic disease.: yes  Psychosocial/Coping Skills Assessment Completed: : Yes  Area of need?: Yes              Diabetes Self Support Plan    Assessment Summary and Plan    Based on today's diabetes care assessment, the following areas of need were identified:      Social 5/30/2022   Support No   Access to Mass Media/Tech No   Cognitive/Behavioral Health No   Culture/[place] No   Communication No   Health Literacy No      Clinical 5/30/2022   Medication Adherence No   Lab Compliance No   Nutritional Status No      Diabetes Self-Management Skills 5/30/2022   Diabetes Disease Process/Treatment Options No   Nutrition/Healthy Eating Yes  See Care Plan         Physical Activity/Exercise Yes  Encouraged regular activity throughout pregnancy.   Encouraged 10 minute walks after higher carb meals.          Medication Yes  See care Plan         Home Blood Glucose Monitoring Yes  Started on Dexcom sample last week per NP [place].   2nd sample given today per NP until supply comes in - pending receipt.    She had never worn a CGM prior to this.     Discussed BG targets in pregnancy.     Loose pregnancy targets set on Dexcom reports given recent BG hx.     Target (day)    Target (night)       Currently only 16% in range - will discuss with NP to make medication adjustments.        Acute Complications Yes  - Discussed hypoglycemia vs hyperglycemia symptoms and discussed appropriate treatments for each.   - Reviewed that pt is at high risk of hypo with current medication regimen.   - Discussed general vs severe hyperglycemia and risk of DKA/HHS.      Discussed appropriate hypo treatment in pregnancy.   Encouraged less aggressive treatment given BG target range.              Chronic Complications Yes  Reviewed maternal and fetal complications with uncontrolled diabetes.            Psychosocial/Coping Yes    Needs regular f/u and encouragement          Today's interventions were provided through individual discussion, instruction, and written materials were provided.      Patient verbalized understanding of instruction and written materials.  Pt was able to return back demonstration of instructions today. Patient understood key points, needs reinforcement and further instruction.                     Diabetes Self-Management Care Plan:    Today's Diabetes Self-Management Care Plan was developed with Alicia's input. Alicia has agreed to work toward the following goal(s) to improve his/her overall diabetes control.      Care Plan: Diabetes Management   Updates made since 5/4/2022 12:00 AM      Problem: Medications       Long-Range Goal: Patient will take all insulin and other diabetes related medications as discussed today.    Start Date: 5/30/2022   Expected End Date: 5/30/2023   Priority: High   Barriers: Lack of Motivation to Change   Note:    T1DM complicated by retinopathy, gastroparesis, and neuropathy   T1 dx in 2004 (age 9)  Has been on [employer] pump in the recent past - has been off pump for about 7 weeks not as she was found down with BG in the 20s  In Patient NP reports possibility of taking basal insulin in addition to pump? But unable to get direct answer from pt. Pt and mother  report pump malfunction.     She is currently 13 weeks pregnant.   Having some nausea, but no more vomiting. Seeing OB in [place].     Appetite is influenced by reports of severe gastroparesis (pain). She is prescribed oxycodone for pain. Instructed to discuss use with her OB asap.     -Reviewed timing and MOA of long vs rapid acting insulin.   -Reviewed need for rotation of injection sites, appropriate insulin storage, and insulin pen use.   -Emphasized need to take Levemir at same times daily.  -Reviewed appropriate use of ICR and ISF.     Long discussion on need to give insulin prior to eating.  No current evidence of delayed rise in BG with gastroparesis based on dexcom reports - no foreseeable need to give dose AFTER eating unless she is unsure of completion of meal and/or vomiting.   Encourage to, at a minimum, give split dose: half upfront and half when she is sure she will eat full amount.     At end of visit, mother admits that pt has not been using CS as reported - using 180-230 +1.       Instructed (per NP) to   INCREASE levemir to 8 units BID  Continue ICR of 1:8  Start using ISF of 40 >150 as originally reported      Tandem TSlim with CIQ discussed as an option for better control of BG during pregnancy.   Pt and mother are both fearful of pump right now as she was found down with BG of 20 with last pump use.          Task: Reviewed with patient all current diabetes medications and provided basic review of the purpose, dosage, frequency, side effects, and storage of both oral and injectable diabetes medications. Completed 6/3/2022      Task: Discussed appropriate timing and administration of both long- and rapid- acting insulins. Completed 6/3/2022      Task: Reviewed appropriate administration of insulin via pen Completed 6/3/2022      Task: Discussed guidelines for preventing, detecting and treating hypoglycemia and hyperglycemia and reviewed the importance of meal and medication timing with diabetes  mediations for prevention of hypoglycemia and maximum drug benefit. Completed 6/3/2022      Problem: Healthy Eating       Long-Range Goal: Patient will aim to eat 3-6 small meals daily with carbs limited to 45-60gm per meal (x3) gm per meal and/or 15-30 gm per meal (x3)    Start Date: 5/30/2022   Expected End Date: 5/30/2023   Priority: High   Barriers: Knowledge deficit; Lack of Motivation to Change   Note:    Discussed both gastroparesis and pregnancy carb goals.     Encouraged multiple small meals daily.   Can eat up to 60 gm carb at one time (2-3 times daily), but limit other small meals to <30 gm carb.        Task: Reviewed the sources and role of Carbohydrate, Protein, and Fat and how each nutrient impacts blood sugar. Completed 6/3/2022      Task: Provided visual examples using dry measuring cups, food models, and other familiar objects such as computer mouse, deck or cards, tennis ball etc. to help with visualization of portions. Completed 6/3/2022      Task: Explained how to count carbohydrates using the food label and the use of dry measuring cups for accurate carb counting. Completed 6/3/2022      Task: Discussed strategies for choosing healthier menu options when dining out. Completed 6/3/2022      Task: Recommended replacing beverages containing high sugar content with noncaloric/sugar free options and/or water. Completed 6/3/2022      Task: Review the importance of balancing carbohydrates with each meal using portion control techniques to count servings of carbohydrate and label reading to identify serving size and amount of total carbs per serving. Completed 6/3/2022      Task: Provided Sample plate method and reviewed the use of the plate to estimate amounts of carbohydrate per meal. Completed 6/3/2022                    Follow Up Plan     F/u with DE in pump clinic next week (scheduled).         Today's care plan and follow up schedule was discussed with patient.  Alicia verbalized understanding  of the care plan, goals, and agrees to follow up plan.        The patient was encouraged to communicate with his/her health care provider/physician and care team regarding his/her condition(s) and treatment.  I provided the patient with my contact information today and encouraged to contact me via phone or [facility] Patient Portal as needed.           Length of Visit   Total Time: 60 Minutes

## 2022-05-31 ENCOUNTER — TELEPHONE (OUTPATIENT)
Dept: MATERNAL FETAL MEDICINE | Facility: CLINIC | Age: 27
End: 2022-05-31
Payer: MEDICAID

## 2022-05-31 NOTE — TELEPHONE ENCOUNTER
"Patient's mother, Polina, called the [facility] office requesting to speak to Dr. Dias regarding Alicia's abdominal pain and to report that the "reglan is not working." Discussed with Polina that Dr. Bailey was not available but that Harley should be evaluated for her pain. Polina did reach out to the primary OB but was instructed that Harley "take more reglan."    Discussed with Polina that patient should go to ER for evaluation and that she could go to nearest ER if possible. Patient had received care at both [facility] & [facility] in the past. Polina states that she will decide where to bring Alicia.     Patient is scheduled for a follow-up with Dr. Dias tomorrow but was instructed not to wait for the visit to be evaluated for pain.    Polina verbalized understanding of information.    "

## 2022-06-01 ENCOUNTER — PROCEDURE VISIT (OUTPATIENT)
Dept: MATERNAL FETAL MEDICINE | Facility: CLINIC | Age: 27
End: 2022-06-01
Payer: MEDICAID

## 2022-06-01 ENCOUNTER — OFFICE VISIT (OUTPATIENT)
Dept: MATERNAL FETAL MEDICINE | Facility: CLINIC | Age: 27
End: 2022-06-01
Payer: MEDICAID

## 2022-06-01 ENCOUNTER — OFFICE VISIT (OUTPATIENT)
Dept: PRIMARY CARE CLINIC | Facility: CLINIC | Age: 27
End: 2022-06-01
Payer: MEDICAID

## 2022-06-01 VITALS
DIASTOLIC BLOOD PRESSURE: 86 MMHG | OXYGEN SATURATION: 98 % | WEIGHT: 146.31 LBS | HEIGHT: 61 IN | BODY MASS INDEX: 27.63 KG/M2 | HEART RATE: 85 BPM | SYSTOLIC BLOOD PRESSURE: 144 MMHG | TEMPERATURE: 99 F

## 2022-06-01 VITALS — WEIGHT: 149.25 LBS | BODY MASS INDEX: 28.2 KG/M2 | DIASTOLIC BLOOD PRESSURE: 60 MMHG | SYSTOLIC BLOOD PRESSURE: 125 MMHG

## 2022-06-01 DIAGNOSIS — Z36.9 ENCOUNTER FOR FETAL ULTRASOUND: Primary | ICD-10-CM

## 2022-06-01 DIAGNOSIS — I10 PRIMARY HYPERTENSION: Primary | ICD-10-CM

## 2022-06-01 DIAGNOSIS — O24.011 PRE-EXISTING TYPE 1 DIABETES MELLITUS DURING PREGNANCY IN FIRST TRIMESTER: ICD-10-CM

## 2022-06-01 DIAGNOSIS — K31.84 GASTROPARESIS: Primary | ICD-10-CM

## 2022-06-01 DIAGNOSIS — Z34.92 SECOND TRIMESTER PREGNANCY: ICD-10-CM

## 2022-06-01 DIAGNOSIS — O24.011 PRE-EXISTING TYPE 1 DIABETES MELLITUS DURING PREGNANCY IN FIRST TRIMESTER: Primary | ICD-10-CM

## 2022-06-01 DIAGNOSIS — Z36.9 ENCOUNTER FOR FETAL ULTRASOUND: ICD-10-CM

## 2022-06-01 DIAGNOSIS — O16.1 HYPERTENSION AFFECTING PREGNANCY IN FIRST TRIMESTER: Primary | ICD-10-CM

## 2022-06-01 PROCEDURE — 1159F PR MEDICATION LIST DOCUMENTED IN MEDICAL RECORD: ICD-10-PCS | Mod: CPTII,S$GLB,, | Performed by: FAMILY MEDICINE

## 2022-06-01 PROCEDURE — 4010F PR ACE/ARB THEARPY RXD/TAKEN: ICD-10-PCS | Mod: CPTII,S$GLB,, | Performed by: FAMILY MEDICINE

## 2022-06-01 PROCEDURE — 1159F PR MEDICATION LIST DOCUMENTED IN MEDICAL RECORD: ICD-10-PCS | Mod: CPTII,S$GLB,, | Performed by: OBSTETRICS & GYNECOLOGY

## 2022-06-01 PROCEDURE — 3008F BODY MASS INDEX DOCD: CPT | Mod: CPTII,S$GLB,, | Performed by: OBSTETRICS & GYNECOLOGY

## 2022-06-01 PROCEDURE — 3077F PR MOST RECENT SYSTOLIC BLOOD PRESSURE >= 140 MM HG: ICD-10-PCS | Mod: CPTII,S$GLB,, | Performed by: FAMILY MEDICINE

## 2022-06-01 PROCEDURE — 3008F BODY MASS INDEX DOCD: CPT | Mod: CPTII,S$GLB,, | Performed by: FAMILY MEDICINE

## 2022-06-01 PROCEDURE — 99213 PR OFFICE/OUTPT VISIT, EST, LEVL III, 20-29 MIN: ICD-10-PCS | Mod: TH,S$GLB,, | Performed by: FAMILY MEDICINE

## 2022-06-01 PROCEDURE — 3074F SYST BP LT 130 MM HG: CPT | Mod: CPTII,S$GLB,, | Performed by: OBSTETRICS & GYNECOLOGY

## 2022-06-01 PROCEDURE — 3008F PR BODY MASS INDEX (BMI) DOCUMENTED: ICD-10-PCS | Mod: CPTII,S$GLB,, | Performed by: FAMILY MEDICINE

## 2022-06-01 PROCEDURE — 1159F MED LIST DOCD IN RCRD: CPT | Mod: CPTII,S$GLB,, | Performed by: OBSTETRICS & GYNECOLOGY

## 2022-06-01 PROCEDURE — 3078F PR MOST RECENT DIASTOLIC BLOOD PRESSURE < 80 MM HG: ICD-10-PCS | Mod: CPTII,S$GLB,, | Performed by: OBSTETRICS & GYNECOLOGY

## 2022-06-01 PROCEDURE — 99215 PR OFFICE/OUTPT VISIT, EST, LEVL V, 40-54 MIN: ICD-10-PCS | Mod: TH,25,S$GLB, | Performed by: OBSTETRICS & GYNECOLOGY

## 2022-06-01 PROCEDURE — 3046F PR MOST RECENT HEMOGLOBIN A1C LEVEL > 9.0%: ICD-10-PCS | Mod: CPTII,S$GLB,, | Performed by: FAMILY MEDICINE

## 2022-06-01 PROCEDURE — 99215 OFFICE O/P EST HI 40 MIN: CPT | Mod: TH,25,S$GLB, | Performed by: OBSTETRICS & GYNECOLOGY

## 2022-06-01 PROCEDURE — 1160F RVW MEDS BY RX/DR IN RCRD: CPT | Mod: CPTII,S$GLB,, | Performed by: OBSTETRICS & GYNECOLOGY

## 2022-06-01 PROCEDURE — 3077F SYST BP >= 140 MM HG: CPT | Mod: CPTII,S$GLB,, | Performed by: FAMILY MEDICINE

## 2022-06-01 PROCEDURE — 76813 OB US NUCHAL MEAS 1 GEST: CPT | Mod: S$GLB,,, | Performed by: OBSTETRICS & GYNECOLOGY

## 2022-06-01 PROCEDURE — 3078F DIAST BP <80 MM HG: CPT | Mod: CPTII,S$GLB,, | Performed by: OBSTETRICS & GYNECOLOGY

## 2022-06-01 PROCEDURE — 76813 PR US, OB NUCHAL, TRANSABDOM/TRANSVAG, FIRST GESTATION: ICD-10-PCS | Mod: S$GLB,,, | Performed by: OBSTETRICS & GYNECOLOGY

## 2022-06-01 PROCEDURE — 3079F PR MOST RECENT DIASTOLIC BLOOD PRESSURE 80-89 MM HG: ICD-10-PCS | Mod: CPTII,S$GLB,, | Performed by: FAMILY MEDICINE

## 2022-06-01 PROCEDURE — 3008F PR BODY MASS INDEX (BMI) DOCUMENTED: ICD-10-PCS | Mod: CPTII,S$GLB,, | Performed by: OBSTETRICS & GYNECOLOGY

## 2022-06-01 PROCEDURE — 3046F HEMOGLOBIN A1C LEVEL >9.0%: CPT | Mod: CPTII,S$GLB,, | Performed by: FAMILY MEDICINE

## 2022-06-01 PROCEDURE — 3079F DIAST BP 80-89 MM HG: CPT | Mod: CPTII,S$GLB,, | Performed by: FAMILY MEDICINE

## 2022-06-01 PROCEDURE — 99213 OFFICE O/P EST LOW 20 MIN: CPT | Mod: TH,S$GLB,, | Performed by: FAMILY MEDICINE

## 2022-06-01 PROCEDURE — 4010F ACE/ARB THERAPY RXD/TAKEN: CPT | Mod: CPTII,S$GLB,, | Performed by: FAMILY MEDICINE

## 2022-06-01 PROCEDURE — 1111F PR DISCHARGE MEDS RECONCILED W/ CURRENT OUTPATIENT MED LIST: ICD-10-PCS | Mod: CPTII,S$GLB,, | Performed by: FAMILY MEDICINE

## 2022-06-01 PROCEDURE — 1111F DSCHRG MED/CURRENT MED MERGE: CPT | Mod: CPTII,S$GLB,, | Performed by: FAMILY MEDICINE

## 2022-06-01 PROCEDURE — 3074F PR MOST RECENT SYSTOLIC BLOOD PRESSURE < 130 MM HG: ICD-10-PCS | Mod: CPTII,S$GLB,, | Performed by: OBSTETRICS & GYNECOLOGY

## 2022-06-01 PROCEDURE — 1160F PR REVIEW ALL MEDS BY PRESCRIBER/CLIN PHARMACIST DOCUMENTED: ICD-10-PCS | Mod: CPTII,S$GLB,, | Performed by: OBSTETRICS & GYNECOLOGY

## 2022-06-01 PROCEDURE — 1159F MED LIST DOCD IN RCRD: CPT | Mod: CPTII,S$GLB,, | Performed by: FAMILY MEDICINE

## 2022-06-01 RX ORDER — GLUCAGON HYDROCHLORIDE 1 MG
KIT INJECTION
COMMUNITY
Start: 2022-05-02 | End: 2022-06-09 | Stop reason: SDUPTHER

## 2022-06-01 RX ORDER — ONDANSETRON 4 MG/1
4 TABLET, FILM COATED ORAL EVERY 8 HOURS PRN
Qty: 30 TABLET | Refills: 5 | Status: SHIPPED | OUTPATIENT
Start: 2022-06-01 | End: 2022-06-13 | Stop reason: ALTCHOICE

## 2022-06-01 RX ORDER — INSULIN GLARGINE 100 [IU]/ML
INJECTION, SOLUTION SUBCUTANEOUS
COMMUNITY
Start: 2022-04-30 | End: 2022-06-09 | Stop reason: SDUPTHER

## 2022-06-01 RX ORDER — POTASSIUM CHLORIDE 750 MG/1
10 CAPSULE, EXTENDED RELEASE ORAL
COMMUNITY
End: 2022-06-09

## 2022-06-01 RX ORDER — SCOLOPAMINE TRANSDERMAL SYSTEM 1 MG/1
1 PATCH, EXTENDED RELEASE TRANSDERMAL
Qty: 10 PATCH | Refills: 4 | Status: SHIPPED | OUTPATIENT
Start: 2022-06-01 | End: 2022-06-09

## 2022-06-01 RX ORDER — FAMOTIDINE 40 MG/1
40 TABLET, FILM COATED ORAL NIGHTLY
Qty: 30 TABLET | Refills: 10 | Status: SHIPPED | OUTPATIENT
Start: 2022-06-01 | End: 2022-10-31

## 2022-06-01 RX ORDER — HYDRALAZINE HYDROCHLORIDE 25 MG/1
25 TABLET, FILM COATED ORAL EVERY 8 HOURS
Qty: 90 TABLET | Refills: 11 | Status: SHIPPED | OUTPATIENT
Start: 2022-06-01 | End: 2022-07-13

## 2022-06-01 RX ORDER — OXYCODONE AND ACETAMINOPHEN 5; 325 MG/1; MG/1
1 TABLET ORAL 3 TIMES DAILY PRN
COMMUNITY
End: 2022-06-09

## 2022-06-01 RX ORDER — INSULIN ASPART 100 [IU]/ML
INJECTION, SOLUTION INTRAVENOUS; SUBCUTANEOUS
COMMUNITY
Start: 2022-04-24 | End: 2022-06-09

## 2022-06-01 RX ORDER — METOCLOPRAMIDE 5 MG/1
5 TABLET ORAL
COMMUNITY
Start: 2022-04-27 | End: 2022-06-09 | Stop reason: DRUGHIGH

## 2022-06-01 RX ORDER — NIFEDIPINE 90 MG/1
90 TABLET, EXTENDED RELEASE ORAL 2 TIMES DAILY
Qty: 30 TABLET | Refills: 11 | Status: ON HOLD | OUTPATIENT
Start: 2022-06-01 | End: 2022-10-05 | Stop reason: HOSPADM

## 2022-06-01 RX ORDER — PYRIDOXINE HCL (VITAMIN B6) 25 MG
25 TABLET ORAL DAILY
Qty: 30 TABLET | Refills: 6 | Status: SHIPPED | OUTPATIENT
Start: 2022-06-01 | End: 2022-09-22

## 2022-06-01 RX ORDER — LABETALOL 200 MG/1
200 TABLET, FILM COATED ORAL EVERY 12 HOURS
Qty: 60 TABLET | Refills: 11 | Status: ON HOLD | OUTPATIENT
Start: 2022-06-01 | End: 2022-10-05 | Stop reason: HOSPADM

## 2022-06-01 NOTE — PROGRESS NOTES
Subjective:       Patient ID: Alicia Valles is a 27 y.o. female.    Chief Complaint: Follow-up    Hospital f/u     admitted to [place] [facility] at 12 weeks gestation on 2022 with HTN urgency, Type I diabetes, gastroparesis and h/o seizure disorder. States upon discharged she was scheduled a follow up appointment here, but also has an appointment with M this afternoon. States she's taking labetalol and 25mg of hydralazine, along with the rest of her medications. Pt is not exactly sure why she is here. Pt states she is also following with endocrinology.      Review of Systems   Constitutional: Negative for appetite change, chills, fatigue, fever and unexpected weight change.   HENT: Negative for ear discharge, ear pain, hearing loss, mouth dryness, mouth sores, postnasal drip, rhinorrhea, sinus pressure/congestion, sneezing, sore throat and trouble swallowing.    Eyes: Negative for photophobia, pain, discharge, redness and itching.   Respiratory: Negative for cough, chest tightness, shortness of breath and wheezing.    Cardiovascular: Negative for chest pain, palpitations and leg swelling.   Gastrointestinal: Negative for abdominal pain and blood in stool.   Endocrine: Negative for cold intolerance, heat intolerance, polydipsia, polyphagia and polyuria.   Genitourinary: Negative for bladder incontinence, difficulty urinating, dysuria, frequency, hematuria, nocturia and urgency.   Musculoskeletal: Negative for arthralgias, gait problem and joint swelling.   Integumentary:  Negative for rash and wound.   Neurological: Negative for dizziness, vertigo, syncope, weakness, numbness and headaches.   Psychiatric/Behavioral: Negative for agitation, behavioral problems, confusion, dysphoric mood, self-injury, sleep disturbance and suicidal ideas. The patient is not nervous/anxious and is not hyperactive.          Objective:      Physical Exam  Vitals reviewed.   Constitutional:       General: She is not  in acute distress.     Appearance: Normal appearance. She is normal weight. She is not ill-appearing or toxic-appearing.   Cardiovascular:      Rate and Rhythm: Regular rhythm.   Pulmonary:      Effort: Pulmonary effort is normal.   Genitourinary:     Comments: Gravid uterus  Neurological:      General: No focal deficit present.      Mental Status: She is alert and oriented to person, place, and time.   Psychiatric:         Mood and Affect: Mood normal.         Behavior: Behavior normal.         Assessment:       Problem List Items Addressed This Visit    None     Visit Diagnoses     Primary hypertension    -  Primary    Second trimester pregnancy              Plan:       Chart reveiwed  Advised pt that I do not manage pregnancies.   I am not exactly sure why hospital set up this appointment  Advised to keep appointment this afternoon with MFM  If has any problems with them managing her bp and other needs during pregnancy to call back and I will try to find her another MFM provider as again, I do not manage OB patients  Patient and mother verbalized understanding.   F/u prn.   Total time spent 25 mins

## 2022-06-01 NOTE — PROGRESS NOTES
Patient returns to [facility] today after having spent in a long time in the hospital for diabetic regulation.  She states that none of the hospital prescriptions could be filled by pharmacy because the physician did not have a [place] license.  I refilled 7 prescriptions for her today.  She also asks continued to complain of gastroparesis in pain.  In the hospital she was given narcotic for this but I do not feel that this is a good solution to the problem.  I told her to try Excedrin migraine because of the combination in that and to take her Reglan an hour to 2 hours before she starts her meals.  Endocrine is controlling her blood sugars..  The patient was given an opportunity to ask questions about management and the diease process.  She expressed an understanding of and agreement to the above impression and plan. All questions were answered to her satisfaction.  She was given contact information to the [facility] clinic to address further concerns.      I spent a total of 50 minutes on the day of the visit. This includes face to face time and non-face to face time preparing to see the patient (eg, review of tests), Obtaining and/or reviewing separately obtained history, Documenting clinical information in the electronic or other health record, Independently interpreting results and communicating results to the patient/family/caregiver, or Care coordination.

## 2022-06-02 ENCOUNTER — TELEPHONE (OUTPATIENT)
Dept: GASTROENTEROLOGY | Facility: CLINIC | Age: 27
End: 2022-06-02
Payer: MEDICAID

## 2022-06-02 ENCOUNTER — LAB VISIT (OUTPATIENT)
Dept: LAB | Facility: CLINIC | Age: 27
End: 2022-06-02
Payer: MEDICAID

## 2022-06-02 ENCOUNTER — ROUTINE PRENATAL (OUTPATIENT)
Dept: OBSTETRICS AND GYNECOLOGY | Facility: CLINIC | Age: 27
End: 2022-06-02
Payer: MEDICAID

## 2022-06-02 VITALS — WEIGHT: 146 LBS | DIASTOLIC BLOOD PRESSURE: 90 MMHG | BODY MASS INDEX: 27.59 KG/M2 | SYSTOLIC BLOOD PRESSURE: 160 MMHG

## 2022-06-02 DIAGNOSIS — Z3A.13 13 WEEKS GESTATION OF PREGNANCY: ICD-10-CM

## 2022-06-02 DIAGNOSIS — O21.9 NAUSEA AND VOMITING DURING PREGNANCY: Primary | ICD-10-CM

## 2022-06-02 DIAGNOSIS — K31.84 GASTROPARESIS DUE TO DM: ICD-10-CM

## 2022-06-02 DIAGNOSIS — O24.011 PRE-EXISTING TYPE 1 DIABETES MELLITUS DURING PREGNANCY IN FIRST TRIMESTER: ICD-10-CM

## 2022-06-02 DIAGNOSIS — E10.69 TYPE 1 DIABETES MELLITUS WITH OTHER SPECIFIED COMPLICATION: ICD-10-CM

## 2022-06-02 DIAGNOSIS — E11.43 GASTROPARESIS DUE TO DM: ICD-10-CM

## 2022-06-02 PROCEDURE — 81508 FTL CGEN ABNOR TWO PROTEINS: CPT | Performed by: OBSTETRICS & GYNECOLOGY

## 2022-06-02 PROCEDURE — 1111F PR DISCHARGE MEDS RECONCILED W/ CURRENT OUTPATIENT MED LIST: ICD-10-PCS | Mod: CPTII,S$GLB,, | Performed by: NURSE PRACTITIONER

## 2022-06-02 PROCEDURE — 99213 PR OFFICE/OUTPT VISIT, EST, LEVL III, 20-29 MIN: ICD-10-PCS | Mod: TH,S$GLB,, | Performed by: NURSE PRACTITIONER

## 2022-06-02 PROCEDURE — 36415 COLL VENOUS BLD VENIPUNCTURE: CPT | Mod: ,,, | Performed by: OBSTETRICS & GYNECOLOGY

## 2022-06-02 PROCEDURE — 99213 OFFICE O/P EST LOW 20 MIN: CPT | Mod: TH,S$GLB,, | Performed by: NURSE PRACTITIONER

## 2022-06-02 PROCEDURE — 36415 PR COLLECTION VENOUS BLOOD,VENIPUNCTURE: ICD-10-PCS | Mod: ,,, | Performed by: OBSTETRICS & GYNECOLOGY

## 2022-06-02 PROCEDURE — 1111F DSCHRG MED/CURRENT MED MERGE: CPT | Mod: CPTII,S$GLB,, | Performed by: NURSE PRACTITIONER

## 2022-06-02 NOTE — TELEPHONE ENCOUNTER
----- Message from Carmen Stewart sent at 6/2/2022 10:43 AM CDT -----  Contact: pt  Type:  Sooner Appointment Request    Caller is requesting a sooner appointment.  Caller declined first available appointment listed below.  Caller will not accept being placed on the waitlist and is requesting a message be sent to doctor.    Name of Caller:  pt  When is the first available appointment?  06/30/2022  Symptoms:  pt is pregnant and has gastroparesis, diabetes    Best Call Back Number:  [phone]

## 2022-06-02 NOTE — PROGRESS NOTES
"HISTORY OF PRESENT ILLNESS:    Alicia Valles is a 27 y.o. female, , Patient's last menstrual period was 2022 (approximate).,  presents for a problem visit, complaining of increased abdominal pain, swelling in legs/back, and nausea/vomitting.  Was seen by MFM yesterday.  Has been admitted to [facility] twice over the past month.  Hx of Type 1 DM with insulin pump, Hx of primary HTN on meds, and 13 weeks 5 days gestation.      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        Past Surgical History:   Procedure Laterality Date    RETINAL DETACHMENT SURGERY         MEDICATIONS AND ALLERGIES:      Current Outpatient Medications:     acetaminophen (TYLENOL) 500 MG tablet, Take 2,000 mg by mouth daily as needed for Pain., Disp: , Rfl:     BD ULTRA-FINE LIGIA PEN NEEDLE 32 gauge x 5/32" Ndle, To inject insulin 4-6 times daily., Disp: 100 each, Rfl: 3    blood-glucose sensor (DEXCOM G6 SENSOR) Sandrine, 3 Devices by Misc.(Non-Drug; Combo Route) route every 30 days., Disp: 1 each, Rfl: 5    blood-glucose transmitter (DEXCOM G6 TRANSMITTER) Sandrine, 2 Devices by Misc.(Non-Drug; Combo Route) route every 6 (six) months., Disp: 1 each, Rfl: 5    diphenhydrAMINE (BENADRYL) 25 mg capsule, Take 25 mg by mouth daily as needed for Insomnia ((if Unisom ineffective))., Disp: , Rfl:     docusate sodium (COLACE) 100 MG capsule, Take 100 mg by mouth once daily., Disp: , Rfl:     doxylamine succinate (UNISOM, DOXYLAMINE, ORAL), Take 1 capsule by mouth nightly as needed (Sleep)., Disp: , Rfl:     famotidine (PEPCID) 40 MG tablet, Take 1 tablet (40 mg total) by mouth once daily. for 7 days, Disp: 7 tablet, Rfl: 0    gabapentin (NEURONTIN) 300 MG capsule, Take 300 mg by mouth 3 (three) times daily., Disp: , Rfl:     GLUCAGEN HYPOKIT 1 mg SolR, " SMARTSIG:Injection As Directed, Disp: , Rfl:     glucagon (BAQSIMI) 3 mg/actuation Spry, 1 each by Nasal route as needed (For Hypoglycemia)., Disp: 2 each, Rfl: 3    hydrALAZINE (APRESOLINE) 100 MG tablet, Take 1 tablet (100 mg total) by mouth every 8 (eight) hours., Disp: 90 tablet, Rfl: 11    insulin aspart U-100 (NOVOLOG) 100 unit/mL (3 mL) InPn pen, Inject 0-5 Units into the skin before meals and at bedtime as needed (Hyperglycemia). Novolog SSI for BG excursions: 180 - 230 + 1 unit 231- 280  + 2 units 281 - 330 + 3 units 331 - 380 + 4 units     > 380   + 5 units, Disp: 1.4 mL, Rfl: 0    insulin aspart U-100 (NOVOLOG) 100 unit/mL (3 mL) InPn pen, Inject 5 Units into the skin 3 (three) times daily with meals. Novolog ICR: 1:8 units three times daily with meals and ICR 1:12 with snacks for 7 days, Disp: 3 mL, Rfl: 0    insulin aspart U-100 (NOVOLOG) 100 unit/mL injection, SMARTSIG:3 Unit(s) SUB-Q 3 Times Daily, Disp: , Rfl:     insulin glargine (LANTUS U-100 INSULIN) 100 unit/mL injection, Inject 8 Units into the skin 2 (two) times a day., Disp: 4.8 mL, Rfl: 11    insulin pump cartridge Crtg, Inject into the skin., Disp: , Rfl:     labetaloL (NORMODYNE) 200 MG tablet, Take 1 tablet (200 mg total) by mouth every 12 (twelve) hours. for 7 days, Disp: 14 tablet, Rfl: 0    LANTUS SOLOSTAR U-100 INSULIN glargine 100 units/mL (3mL) SubQ pen, SMARTSIG:15 Unit(s) SUB-Q Daily, Disp: , Rfl:     NIFEdipine (PROCARDIA-XL) 90 MG (OSM) 24 hr tablet, Take 1 tablet (90 mg total) by mouth 2 (two) times a day. for 7 days, Disp: 14 tablet, Rfl: 0    ondansetron (ZOFRAN-ODT) 4 MG TbDL, Dissolve 1 tablet (4 mg total) by mouth every 8 (eight) hours., Disp: 21 tablet, Rfl: 0    oxyCODONE (OXY-IR) 5 mg Cap, Take 1 capsule (5 mg total) by mouth every 6 (six) hours as needed for Pain., Disp: 10 capsule, Rfl: 0    oxyCODONE-acetaminophen (PERCOCET) 5-325 mg per tablet, Take 1 tablet by mouth 3 (three) times daily as needed.,  Disp: , Rfl:     PNV,calcium 72/iron/folic acid (PRENATAL VITAMIN) Tab, Take 1 tablet by mouth once daily. for 7 days, Disp: 7 tablet, Rfl: 0    potassium chloride (MICRO-K) 10 MEQ CpSR, Take 10 mEq by mouth., Disp: , Rfl:     promethazine (PHENERGAN) 12.5 MG Tab, Take 12.5 mg by mouth every 6 (six) hours as needed., Disp: , Rfl:     pyridoxine, vitamin B6, (B-6) 25 MG Tab, Take 1 tablet (25 mg total) by mouth every 8 (eight) hours. for 7 days, Disp: 21 tablet, Rfl: 0    scopolamine (TRANSDERM-SCOP) 1.3-1.5 mg (1 mg over 3 days), Place 1 patch onto the skin Every 3 (three) days. for 9 days, Disp: 3 patch, Rfl: 0    sertraline (ZOLOFT) 25 MG tablet, Take 1 tablet (25 mg total) by mouth once daily., Disp: 30 tablet, Rfl: 11    blood-glucose meter,continuous (DEXCOM G6 ) Misc, 1 Device by Misc.(Non-Drug; Combo Route) route once. for 1 dose, Disp: 1 each, Rfl: 0    glucose 4 GM chewable tablet, Take 4 tablets (16 g total) by mouth as needed for Low blood sugar., Disp: 40 tablet, Rfl: 0    Review of patient's allergies indicates:  No Known Allergies    Family History   Problem Relation Age of Onset    No Known Problems Mother     No Known Problems Father        Social History     Socioeconomic History    Marital status: Single   Tobacco Use    Smoking status: Never Smoker    Smokeless tobacco: Never Used   Substance and Sexual Activity    Alcohol use: Not Currently    Drug use: Never    Sexual activity: Yes       Review of Systems   Constitutional: Positive for appetite change.   Cardiovascular: Positive for leg swelling.   Gastrointestinal: Positive for abdominal pain, bloating, nausea, vomiting and reflux.   Endocrine: Positive for diabetes.   Musculoskeletal: Positive for back pain.         BP (!) 160/90   Wt 66.2 kg (146 lb)   LMP 02/26/2022 (Approximate)   BMI 27.59 kg/m²     Physical Exam  Constitutional:       Appearance: Normal appearance.   HENT:      Head: Normocephalic.       Mouth/Throat:      Mouth: Mucous membranes are moist.   Pulmonary:      Effort: Pulmonary effort is normal.   Abdominal:      Palpations: Abdomen is soft.      Comments: Gravid abdomen   Musculoskeletal:         General: Swelling present. Normal range of motion.      Cervical back: Normal range of motion.      Right lower leg: Edema present.      Left lower leg: Edema present.   Neurological:      General: No focal deficit present.      Mental Status: She is alert and oriented to person, place, and time. Mental status is at baseline.   Skin:     General: Skin is warm and dry.      Capillary Refill: Capillary refill takes less than 2 seconds.   Psychiatric:         Mood and Affect: Mood normal.         Behavior: Behavior normal.         Thought Content: Thought content normal.         Judgment: Judgment normal.   Vitals and nursing note reviewed. Exam conducted with a chaperone present.          DIAGNOSIS & PLAN  1. Nausea and vomiting during pregnancy     2. Type 1 diabetes mellitus with other specified complication     3. 13 weeks gestation of pregnancy     4. Gastroparesis due to DM             COUNSELING:  Continue with prescribed medications and keep all follow up appointments with MD; educated pt on importance of visit with MDs only.  Encouraged to return to the ED at [facility] with increased symptoms.  Pt feels like her medication has started to work since being in office. Has referral to GI and awaiting appt.  If symptoms worsen return to ED or contact M.          I have spent 30 minutes addressing problems separate from annual exam.  This includes face to face time and non-face to face time preparing to see the patient (eg, review of tests), Obtaining and/or reviewing separately obtained history, Documenting clinical information in the electronic or other health record, Independently interpreting resultsand communicating results to the patient/family/caregiver, or Care coordination.

## 2022-06-02 NOTE — TELEPHONE ENCOUNTER
Attempted to contact Alicia Valles to discuss inability to schedule her at this time..    Left voice mail to return our call at [phone] on [phone] (home).    Ninfa Gordon

## 2022-06-03 ENCOUNTER — TELEPHONE (OUTPATIENT)
Dept: GASTROENTEROLOGY | Facility: CLINIC | Age: 27
End: 2022-06-03
Payer: MEDICAID

## 2022-06-03 ENCOUNTER — TELEPHONE (OUTPATIENT)
Dept: MATERNAL FETAL MEDICINE | Facility: CLINIC | Age: 27
End: 2022-06-03
Payer: MEDICAID

## 2022-06-03 ENCOUNTER — TELEPHONE (OUTPATIENT)
Dept: ADMINISTRATIVE | Facility: HOSPITAL | Age: 27
End: 2022-06-03
Payer: MEDICAID

## 2022-06-03 NOTE — TELEPHONE ENCOUNTER
Pt verified self by name, .   Notified of Dr. Dias's recommendations to call endocrinology if pain persists with her gastroparesis, as MFM does not manage her diabetes.   Pt verbalized understanding. Agreed to POC w intent to comply.

## 2022-06-03 NOTE — TELEPHONE ENCOUNTER
MA called pt and schedule her with an MD. Pt verbalizes understadning and appreciates the call.  Thanks MA   ----- Message from Soniya Serrano RN sent at 6/3/2022  9:32 AM CDT -----  Hello,    This patient has been attempting to see a Gastroenterology as she is experiencing stomach pains d/t gastroparesis secondary to her diabetes. Patient is also pregnant.  Can you contact her to schedule her an appt?   Dr. Henning with [facility] says he is unable to schedule pt an appt.  Maternal-Fetal Medicine does not manage this realm per Dr. Dias.      Thank you.    V/rSoniya RN

## 2022-06-04 ENCOUNTER — TELEPHONE (OUTPATIENT)
Dept: ENDOCRINOLOGY | Facility: HOSPITAL | Age: 27
End: 2022-06-04
Payer: MEDICAID

## 2022-06-05 RX ORDER — METOCLOPRAMIDE 5 MG/1
5 TABLET ORAL
Qty: 90 TABLET | Refills: 3 | Status: SHIPPED | OUTPATIENT
Start: 2022-06-05 | End: 2022-06-09 | Stop reason: DRUGHIGH

## 2022-06-06 ENCOUNTER — TELEPHONE (OUTPATIENT)
Dept: ENDOCRINOLOGY | Facility: HOSPITAL | Age: 27
End: 2022-06-06
Payer: MEDICAID

## 2022-06-06 LAB
# FETUSES US: NORMAL
AGE AT DELIVERY: 29
B-HCG MOM SERPL: NORMAL
B-HCG SERPL-ACNC: 100.8 IU/ML
FET CRL US.MEAS: 68.1 MM
FET NASAL BONE LENGTH US.MEAS: NORMAL MM
FET NUCHAL FOLD MOM THICKNESS US.MEAS: NORMAL
FET NUCHAL FOLD THICKNESS US.MEAS: 0.9 MM
FET TS 21 RISK FROM MAT AGE: NORMAL
GA (DAYS): 2 D
GA (WEEKS): 13 WK
IDDM PATIENT QL: NORMAL
INTEGRATED SCN PATIENT-IMP NAR: NORMAL
INTEGRATED SCN PATIENT-IMP: NEGATIVE
PAPP-A MOM SERPL: NORMAL
PAPP-A SERPL-MCNC: NORMAL NG/ML
SMOKING STATUS FTND: NO
TS 18 RISK FETUS: NORMAL
TS 21 RISK FETUS: NORMAL
US DATE: NORMAL

## 2022-06-06 NOTE — TELEPHONE ENCOUNTER
Patient called about oxycodone prescription refill (given at discharge from inpatient) for gastroparesis. Discussed that oxycodone can actually worsen gastroparesis. Currently taking reglan with meals; rx sent in and encourage other lifestyle modifications. Patient verbalized understanding and denies questions at this time. Reminded of appointment this week.

## 2022-06-07 RX ORDER — PROMETHAZINE HYDROCHLORIDE 25 MG/1
25 TABLET ORAL EVERY 4 HOURS
Qty: 30 TABLET | Refills: 0 | Status: SHIPPED | OUTPATIENT
Start: 2022-06-07 | End: 2022-06-13 | Stop reason: SDUPTHER

## 2022-06-08 ENCOUNTER — OFFICE VISIT (OUTPATIENT)
Dept: ENDOCRINOLOGY | Facility: CLINIC | Age: 27
End: 2022-06-08
Payer: MEDICAID

## 2022-06-08 ENCOUNTER — LAB VISIT (OUTPATIENT)
Dept: LAB | Facility: HOSPITAL | Age: 27
End: 2022-06-08
Attending: INTERNAL MEDICINE
Payer: MEDICAID

## 2022-06-08 VITALS
SYSTOLIC BLOOD PRESSURE: 152 MMHG | HEIGHT: 61 IN | OXYGEN SATURATION: 99 % | BODY MASS INDEX: 28.72 KG/M2 | HEART RATE: 99 BPM | DIASTOLIC BLOOD PRESSURE: 94 MMHG | WEIGHT: 152.13 LBS

## 2022-06-08 DIAGNOSIS — E10.8 TYPE 1 DIABETES MELLITUS WITH COMPLICATIONS: Primary | ICD-10-CM

## 2022-06-08 DIAGNOSIS — K31.84 GASTROPARESIS DUE TO DM: Chronic | ICD-10-CM

## 2022-06-08 DIAGNOSIS — E11.43 GASTROPARESIS DUE TO DM: Chronic | ICD-10-CM

## 2022-06-08 DIAGNOSIS — E10.8 TYPE 1 DIABETES MELLITUS WITH COMPLICATIONS: ICD-10-CM

## 2022-06-08 DIAGNOSIS — O24.011 PRE-EXISTING TYPE 1 DIABETES MELLITUS DURING PREGNANCY IN FIRST TRIMESTER: ICD-10-CM

## 2022-06-08 LAB
ESTIMATED AVG GLUCOSE: 166 MG/DL (ref 68–131)
HBA1C MFR BLD: 7.4 % (ref 4–5.6)

## 2022-06-08 PROCEDURE — 99999 PR PBB SHADOW E&M-EST. PATIENT-LVL V: CPT | Mod: PBBFAC,,, | Performed by: INTERNAL MEDICINE

## 2022-06-08 PROCEDURE — 95251 PR GLUCOSE MONITOR, 72 HOUR, PHYS INTERP: ICD-10-PCS | Mod: ,,, | Performed by: INTERNAL MEDICINE

## 2022-06-08 PROCEDURE — 99214 OFFICE O/P EST MOD 30 MIN: CPT | Mod: 25,S$PBB,TH, | Performed by: INTERNAL MEDICINE

## 2022-06-08 PROCEDURE — 99214 PR OFFICE/OUTPT VISIT, EST, LEVL IV, 30-39 MIN: ICD-10-PCS | Mod: 25,S$PBB,TH, | Performed by: INTERNAL MEDICINE

## 2022-06-08 PROCEDURE — 99999 PR PBB SHADOW E&M-EST. PATIENT-LVL V: ICD-10-PCS | Mod: PBBFAC,,, | Performed by: INTERNAL MEDICINE

## 2022-06-08 PROCEDURE — 36415 COLL VENOUS BLD VENIPUNCTURE: CPT | Performed by: INTERNAL MEDICINE

## 2022-06-08 PROCEDURE — 4010F ACE/ARB THERAPY RXD/TAKEN: CPT | Mod: CPTII,,, | Performed by: INTERNAL MEDICINE

## 2022-06-08 PROCEDURE — 83036 HEMOGLOBIN GLYCOSYLATED A1C: CPT | Performed by: INTERNAL MEDICINE

## 2022-06-08 PROCEDURE — 4010F PR ACE/ARB THEARPY RXD/TAKEN: ICD-10-PCS | Mod: CPTII,,, | Performed by: INTERNAL MEDICINE

## 2022-06-08 PROCEDURE — 99215 OFFICE O/P EST HI 40 MIN: CPT | Mod: PBBFAC | Performed by: INTERNAL MEDICINE

## 2022-06-08 PROCEDURE — 95251 CONT GLUC MNTR ANALYSIS I&R: CPT | Mod: ,,, | Performed by: INTERNAL MEDICINE

## 2022-06-08 NOTE — PROGRESS NOTES
Alicia Valles is a 27 y.o. female  presenting for follow-up of T1DM    Seen by inpatient team during previous admissions    History of Present Illness  T1DM  Diagnosed at age 9  Known complications: gastroparesis, nephropathy, retinopathy  Frequent DKA admissions    Estimated Date of Delivery: 12/3/22  Currently 14 wks pregnant    On medtronic pump in past but stopped following episode of severe hypoglycemia    Has been admitted multiple times during pregnancy for uncontrolled glucose and HTN  Dose adjustments last made 5/30 during education visit    Glucose remains above goal during the day related to late/omitted boluses. Scared to bolus when sugar is normal due to fear of lows  Unclear if bolusing for all meals/snacks and if taking corrections  Had sausage biscuit for breakfast this morning. Took insulin after started eating as sugar normal before    Continue to have severe abdominal pain which she relates to gastroparesis. Has not made dietary modifications discussed at last education visit      Current Diabetes Regimen:  Levemir to 8 units BID  Continue ICR of 1:8  Start using ISF of 40 >150 as originally reported        Timing of prandial insulin:after eating  Omitted doses: yes    Diet/Exercise:  Not following gastroparesis diet    Last Hgb A1C:  Lab Results   Component Value Date    HGBA1C 7.4 (H) 06/08/2022         Glucose Monitoring:  Meter/CGM: Dexcom G6- in media      Hypoglycemic Episodes: some overnight, she does not always wake up    DKA: multiple episodes in past    Screening / DM Complications:    Nephropathy:  ACEi/ARB: Not taking  No results found for: MICALBCREAT    Last Lipid Panel:  Statin: Not taking  No results found for: LDLCALC    Last foot exam Most Recent Foot Exam Date: Not Found  Last eye exam Most Recent Eye Exam Date: Not Found;  no laser surgery or DR    Lab Results   Component Value Date    TSH 1.570 05/05/2022       No results found for: TTGIGA          Current Outpatient  "Medications:     acetaminophen (TYLENOL) 500 MG tablet, Take 2,000 mg by mouth daily as needed for Pain., Disp: , Rfl:     BD ULTRA-FINE LIGIA PEN NEEDLE 32 gauge x 5/32" Ndle, To inject insulin 4-6 times daily., Disp: 100 each, Rfl: 3    blood-glucose sensor (DEXCOM G6 SENSOR) Sandrine, 3 Devices by Misc.(Non-Drug; Combo Route) route every 30 days., Disp: 1 each, Rfl: 5    blood-glucose transmitter (DEXCOM G6 TRANSMITTER) Sandrine, 2 Devices by Misc.(Non-Drug; Combo Route) route every 6 (six) months., Disp: 1 each, Rfl: 5    diphenhydrAMINE (BENADRYL) 25 mg capsule, Take 25 mg by mouth daily as needed for Insomnia ((if Unisom ineffective))., Disp: , Rfl:     docusate sodium (COLACE) 100 MG capsule, Take 100 mg by mouth once daily., Disp: , Rfl:     doxylamine succinate (UNISOM, DOXYLAMINE, ORAL), Take 1 capsule by mouth nightly as needed (Sleep)., Disp: , Rfl:     famotidine (PEPCID) 40 MG tablet, Take 1 tablet (40 mg total) by mouth every evening., Disp: 30 tablet, Rfl: 10    GLUCAGEN HYPOKIT 1 mg SolR, SMARTSIG:Injection As Directed, Disp: , Rfl:     hydrALAZINE (APRESOLINE) 25 MG tablet, Take 1 tablet (25 mg total) by mouth every 8 (eight) hours., Disp: 90 tablet, Rfl: 11    insulin aspart U-100 (NOVOLOG) 100 unit/mL (3 mL) InPn pen, SMARTSI Unit(s) SUB-Q Daily, Disp: , Rfl:     insulin aspart U-100 (NOVOLOG) 100 unit/mL (3 mL) InPn pen, Inject 5 Units into the skin 3 (three) times daily with meals. Novolog ICR: 1:8 units three times daily with meals and ICR 1:12 with snacks for 7 days, Disp: 3 mL, Rfl: 0    insulin aspart U-100 (NOVOLOG) 100 unit/mL injection,  3 unit, SubQ, TID before meals, before a meal, # 10 mL, 1 Refill(s), Maintenance, Pharmacy: [id number], 158, cm, 22 14:00:00 CDT, Height/Length Measured, 68.4, kg, 22 15:04:00 CDT, Weight Dosing, Disp: , Rfl:     insulin aspart U-100 (NOVOLOG) 100 unit/mL injection, SMARTSIG:3 Unit(s) SUB-Q 3 Times Daily, Disp: , " Rfl:     insulin glargine (LANTUS U-100 INSULIN) 100 unit/mL injection, Inject 8 Units into the skin 2 (two) times a day., Disp: 4.8 mL, Rfl: 11    labetaloL (NORMODYNE) 200 MG tablet, Take 1 tablet (200 mg total) by mouth every 12 (twelve) hours., Disp: 60 tablet, Rfl: 11    metoclopramide HCl (REGLAN) 10 MG tablet, Take 1 tablet (10 mg total) by mouth 4 (four) times daily., Disp: 120 tablet, Rfl: 2    NIFEdipine (PROCARDIA-XL) 90 MG (OSM) 24 hr tablet, Take 1 tablet (90 mg total) by mouth 2 (two) times a day., Disp: 30 tablet, Rfl: 11    ondansetron (ZOFRAN) 4 MG tablet, Take 1 tablet (4 mg total) by mouth every 8 (eight) hours as needed for Nausea., Disp: 30 tablet, Rfl: 5    promethazine (PHENERGAN) 25 MG tablet, Take 1 tablet (25 mg total) by mouth every 4 (four) hours., Disp: 30 tablet, Rfl: 0    pyridoxine, vitamin B6, (B-6) 25 MG Tab, Take 1 tablet (25 mg total) by mouth once daily., Disp: 30 tablet, Rfl: 6    sertraline (ZOLOFT) 50 MG tablet, Take 1 tablet (50 mg total) by mouth once daily., Disp: 30 tablet, Rfl: 2    blood-glucose meter,continuous (DEXCOM G6 ) Misc, 1 Device by Misc.(Non-Drug; Combo Route) route once. for 1 dose, Disp: 1 each, Rfl: 0    famotidine (PEPCID) 20 MG tablet, Take 1 tablet (20 mg total) by mouth 2 (two) times daily., Disp: 60 tablet, Rfl: 1    gabapentin (NEURONTIN) 300 MG capsule, Take 300 mg by mouth 3 (three) times daily., Disp: , Rfl:     GLUCAGEN HYPOKIT 1 mg SolR, SMARTSIG:Injection As Directed, Disp: , Rfl:     glucagon (BAQSIMI) 3 mg/actuation Spry, 1 each by Nasal route as needed (For Hypoglycemia)., Disp: 2 each, Rfl: 3    glucose 4 GM chewable tablet, Take 4 tablets (16 g total) by mouth as needed for Low blood sugar., Disp: 40 tablet, Rfl: 0    hydrALAZINE (APRESOLINE) 100 MG tablet, Take 1 tablet (100 mg total) by mouth every 8 (eight) hours., Disp: 90 tablet, Rfl: 11    insulin glargine (LANTUS) 100 unit/mL injection,  15 unit, SUBQ, Daily,  # 15 mL, 1 Refill(s), Maintenance, Pharmacy: [facility] #[id number], 158, cm, 04/18/22 14:00:00 CDT, Height/Length Measured, 68.4, kg, 04/12/22 15:04:00 CDT, Weight Dosing, Disp: , Rfl:     insulin pump cartridge Crtg, Inject into the skin., Disp: , Rfl:     LANTUS SOLOSTAR U-100 INSULIN glargine 100 units/mL (3mL) SubQ pen, SMARTSIG:15 Unit(s) SUB-Q Daily, Disp: , Rfl:     LANTUS SOLOSTAR U-100 INSULIN glargine 100 units/mL (3mL) SubQ pen, SMARTSIG:15 Unit(s) SUB-Q Daily, Disp: , Rfl:     LORazepam (ATIVAN) 0.5 MG tablet, Take 0.5 mg by mouth daily as needed., Disp: , Rfl:     metoclopramide HCl (REGLAN) 5 MG tablet, Take 1 tablet (5 mg total) by mouth 3 (three) times daily before meals., Disp: 90 tablet, Rfl: 3    metoclopramide HCl (REGLAN) 5 MG tablet, 5 mg., Disp: , Rfl:     NIFEdipine (PROCARDIA XL) 60 MG (OSM) 24 hr tablet, 2 tablets in the evening with meal (Patient taking differently: Take 90 mg by mouth 2 (two) times a day. 2 tablets in the evening with meal), Disp: 30 tablet, Rfl: 11    oxyCODONE (OXY-IR) 5 mg Cap, Take 1 capsule (5 mg total) by mouth every 6 (six) hours as needed for Pain., Disp: 10 capsule, Rfl: 0    oxyCODONE-acetaminophen (PERCOCET) 5-325 mg per tablet, Take 1 tablet by mouth 3 (three) times daily as needed., Disp: , Rfl:     pen needle, diabetic (INSULIN PEN NEEDLE MISC), Use as directed., Disp: , Rfl:     PNV,calcium 72/iron/folic acid (PRENATAL VITAMIN) Tab, Take 1 tablet by mouth once daily. for 7 days, Disp: 7 tablet, Rfl: 0    potassium chloride (MICRO-K) 10 MEQ CpSR, Take 10 mEq by mouth., Disp: , Rfl:     promethazine (PHENERGAN) 12.5 MG Tab, Take 12.5 mg by mouth every 6 (six) hours as needed., Disp: , Rfl:     scopolamine (TRANSDERM-SCOP) 1.3-1.5 mg (1 mg over 3 days), Place 1 patch onto the skin every 72 hours., Disp: 10 patch, Rfl: 4    sertraline (ZOLOFT) 25 MG tablet, Take 1 tablet (25 mg total) by mouth once daily., Disp: 30 tablet, Rfl: 11    ROS as  above    Objective:     Vitals:    06/08/22 0943   BP: (!) 152/94   Pulse: 99     Wt Readings from Last 3 Encounters:   06/08/22 69 kg (152 lb 1.9 oz)   06/02/22 66.2 kg (146 lb)   06/01/22 67.7 kg (149 lb 4 oz)     Body mass index is 28.74 kg/m².  Physical Exam  Constitutional:       Appearance: She is well-developed.   HENT:      Head: Normocephalic.   Eyes:      Conjunctiva/sclera: Conjunctivae normal.   Pulmonary:      Effort: Pulmonary effort is normal.   Musculoskeletal:         General: Normal range of motion.   Skin:     General: Skin is warm.      Findings: No rash.   Neurological:      Mental Status: She is alert and oriented to person, place, and time.         LABS    Chemistry        Component Value Date/Time     (L) 05/26/2022 0507    K 3.9 05/26/2022 0507     05/26/2022 0507    CO2 23 05/26/2022 0507    BUN 16 05/26/2022 0507    CREATININE 1.2 05/26/2022 0507     (H) 05/26/2022 0507        Component Value Date/Time    CALCIUM 7.2 (L) 05/26/2022 0507    ALKPHOS 45 (L) 05/26/2022 0507    AST 18 05/26/2022 0507    ALT 16 05/26/2022 0507    BILITOT 0.1 05/26/2022 0507    ESTGFRAFRICA >60.0 05/26/2022 0507    EGFRNONAA >60.0 05/26/2022 0507              Assessment and Plan     Pre-existing type 1 diabetes mellitus during pregnancy in first trimester  Dexcom reviewed and with glucoses above goal for pregnancy related to late/missed boluses  She has fear of lows due to episode of severe hypoglycemia but reviewed that this must be balanced with avoidance of severe hyperglycemia which she is having daily  Will send prescriptions for InPen to allow for dose tracking  Encouraged to work on bolusing at least when she starts to eat and for all meals/snacks  Continue levemir 8 units BID and current CR 1:8  Will need relaxed glucose goals due to fear of lows at this time  Update A1c now    Gastroparesis due to DM  Reviewed need for glycemic control and dietary change  Cont reglan, zogran and  phenergan  Would avoid narcotics as this can worsen gastroparesis              Alison Alcantara MD

## 2022-06-09 ENCOUNTER — TELEPHONE (OUTPATIENT)
Dept: OBSTETRICS AND GYNECOLOGY | Facility: CLINIC | Age: 27
End: 2022-06-09
Payer: MEDICAID

## 2022-06-09 DIAGNOSIS — E10.8 TYPE 1 DIABETES MELLITUS WITH COMPLICATIONS: Primary | ICD-10-CM

## 2022-06-09 RX ORDER — INSULIN PEN,REUSABLE,BT LISPRO
1 INSULIN PEN (EA) SUBCUTANEOUS ONCE
Qty: 1 EACH | Refills: 0 | Status: SHIPPED | OUTPATIENT
Start: 2022-06-09 | End: 2022-06-28 | Stop reason: SDUPTHER

## 2022-06-09 RX ORDER — INSULIN ASPART 100 [IU]/ML
INJECTION, SOLUTION INTRAVENOUS; SUBCUTANEOUS
Qty: 12 ML | Refills: 11 | Status: SHIPPED | OUTPATIENT
Start: 2022-06-09 | End: 2022-06-28 | Stop reason: SDUPTHER

## 2022-06-09 NOTE — ASSESSMENT & PLAN NOTE
Reviewed need for glycemic control and dietary change  Cont reglan, zogran and phenergan  Would avoid narcotics as this can worsen gastroparesis

## 2022-06-09 NOTE — ASSESSMENT & PLAN NOTE
Dexcom reviewed and with glucoses above goal for pregnancy related to late/missed boluses  She has fear of lows due to episode of severe hypoglycemia but reviewed that this must be balanced with avoidance of severe hyperglycemia which she is having daily  Will send prescriptions for InPen to allow for dose tracking  Encouraged to work on bolusing at least when she starts to eat and for all meals/snacks  Continue levemir 8 units BID and current CR 1:8  Will need relaxed glucose goals due to fear of lows at this time  Update A1c now

## 2022-06-09 NOTE — TELEPHONE ENCOUNTER
----- Message from Stella Haro sent at 6/9/2022  4:37 PM CDT -----  Contact: pt    ----- Message -----  From: Renee Barthelemy, MA  Sent: 6/9/2022   4:18 PM CDT  To: Stella Haro      ----- Message -----  From: Rosa Conner  Sent: 6/9/2022   1:02 PM CDT  To: [facility] Staff    Type: Needs Medical Advice    Who Called: pt  Best Call Back Number: [phone]  Inquiry/Question: pt 14 wks pregnant states that she has had some spotting and would like to know if ultrasound can be ordered to check on baby, please advise pt  Thank you~

## 2022-06-10 ENCOUNTER — TELEPHONE (OUTPATIENT)
Dept: OBSTETRICS AND GYNECOLOGY | Facility: CLINIC | Age: 27
End: 2022-06-10
Payer: MEDICAID

## 2022-06-10 ENCOUNTER — TELEPHONE (OUTPATIENT)
Dept: MATERNAL FETAL MEDICINE | Facility: CLINIC | Age: 27
End: 2022-06-10
Payer: MEDICAID

## 2022-06-10 NOTE — TELEPHONE ENCOUNTER
"RN called [facility] stating she called Dr Min's office and they told her [facility] needs to see her and evaluate her for bleeding. Patient stated she was bleeding last night enough to fill a pad but that it has stopped today. RN stated patient needs to be seen by her Primary OB and if they will not see her then she will need to go to the nearest ED. Patient stated she is "not doing that and will just not worry about it."     RN called Dr Min's office.  did not know why she was told to go to [facility]. She is going to call the patient and tell her to go to the ED if she is still cramping and bleeding. Patient will be scheduled for a OB appointment with Dr Min on Monday to be evaluated.         "

## 2022-06-10 NOTE — TELEPHONE ENCOUNTER
Called pt x 2 no answer, message left.    ----- Message from Lupe Mejia MA sent at 6/10/2022 11:05 AM CDT -----  Please reach out to patient. She has called here twice about spotting. I told her to call her OB and she said she did but did not receive a call back.

## 2022-06-13 ENCOUNTER — ROUTINE PRENATAL (OUTPATIENT)
Dept: OBSTETRICS AND GYNECOLOGY | Facility: CLINIC | Age: 27
End: 2022-06-13
Payer: MEDICAID

## 2022-06-13 VITALS
BODY MASS INDEX: 28.91 KG/M2 | DIASTOLIC BLOOD PRESSURE: 90 MMHG | SYSTOLIC BLOOD PRESSURE: 150 MMHG | HEART RATE: 94 BPM | WEIGHT: 153 LBS

## 2022-06-13 DIAGNOSIS — Z3A.15 15 WEEKS GESTATION OF PREGNANCY: Primary | ICD-10-CM

## 2022-06-13 DIAGNOSIS — E11.43 GASTROPARESIS DUE TO DM: ICD-10-CM

## 2022-06-13 DIAGNOSIS — K31.84 GASTROPARESIS DUE TO DM: ICD-10-CM

## 2022-06-13 DIAGNOSIS — O21.9 NAUSEA AND VOMITING DURING PREGNANCY: ICD-10-CM

## 2022-06-13 DIAGNOSIS — O09.92 HIGH-RISK PREGNANCY IN SECOND TRIMESTER: ICD-10-CM

## 2022-06-13 DIAGNOSIS — O16.2 HYPERTENSION AFFECTING PREGNANCY IN SECOND TRIMESTER: ICD-10-CM

## 2022-06-13 DIAGNOSIS — O24.012 PRE-EXISTING TYPE 1 DIABETES MELLITUS DURING PREGNANCY IN SECOND TRIMESTER: ICD-10-CM

## 2022-06-13 PROCEDURE — 1111F PR DISCHARGE MEDS RECONCILED W/ CURRENT OUTPATIENT MED LIST: ICD-10-PCS | Mod: CPTII,S$GLB,,

## 2022-06-13 PROCEDURE — 3051F PR MOST RECENT HEMOGLOBIN A1C LEVEL 7.0 - < 8.0%: ICD-10-PCS | Mod: CPTII,S$GLB,,

## 2022-06-13 PROCEDURE — 59425 PR ANTEPARTUM CARE ONLY, 4-6 VISITS: ICD-10-PCS | Mod: TH,S$GLB,,

## 2022-06-13 PROCEDURE — 59425 ANTEPARTUM CARE ONLY: CPT | Mod: TH,S$GLB,,

## 2022-06-13 PROCEDURE — 1111F DSCHRG MED/CURRENT MED MERGE: CPT | Mod: CPTII,S$GLB,,

## 2022-06-13 PROCEDURE — 3051F HG A1C>EQUAL 7.0%<8.0%: CPT | Mod: CPTII,S$GLB,,

## 2022-06-13 RX ORDER — PROMETHAZINE HYDROCHLORIDE 25 MG/1
25 TABLET ORAL EVERY 4 HOURS PRN
Qty: 180 TABLET | Refills: 1 | Status: SHIPPED | OUTPATIENT
Start: 2022-06-13 | End: 2022-08-12

## 2022-06-13 RX ORDER — PROMETHAZINE HYDROCHLORIDE 25 MG/1
25 TABLET ORAL EVERY 4 HOURS
Qty: 60 TABLET | Refills: 0 | Status: SHIPPED | OUTPATIENT
Start: 2022-06-13 | End: 2023-09-29 | Stop reason: SDUPTHER

## 2022-06-13 NOTE — PROGRESS NOTES
SUBJECTIVE:      Alicia Valles is a 27 y.o. female  at 15w2d presenting for a routine pregnancy visit.  Patient reports absent fetal movements. She denies cramping, contractions, vaginal bleeding and leaking.  She is taking prenatal vitamins. She reports an episode of vaginal bleeding on 2022. She states she has not had any vaginal bleeding since. She reports nausea managed with Phenergan.    Estimated Date of Delivery: 12/3/2022, by Last Menstrual Period, dating reviewed.  Prenatal labs reviewed.    Review of Systems   Constitutional: Negative for activity change, appetite change, chills, fatigue, fever and unexpected weight change.   HENT: Negative for nasal congestion, dental problem, ear pain, postnasal drip, rhinorrhea, sinus pressure/congestion, sneezing and sore throat.    Eyes: Negative for discharge, visual disturbance and eye dryness.   Respiratory: Negative for cough, chest tightness and shortness of breath.    Cardiovascular: Negative for chest pain, palpitations and leg swelling.   Gastrointestinal: Negative for abdominal distention, abdominal pain, change in bowel habit, constipation, diarrhea, nausea, vomiting, reflux and change in bowel habit.  Genitourinary: Negative for difficulty urinating, dyspareunia, dysuria, flank pain, frequency, urgency, cramps, contractions, vaginal bleeding, vaginal discharge and vaginal pain.  Musculoskeletal: Negative for back pain and myalgias.   Integumentary:  Negative for rash, breast mass, and breast discharge.  Neurological: Negative for dizziness, syncope, weakness, light-headedness, numbness, headaches, disturbances or difficulties in coordination.  Hematological: Negative for unexplained bruising or bleeding.  Psychiatric/Behavioral: Negative sleep disturbance. The patient is not nervous/anxious.    OBJECTIVE:      BP (!) 150/90   Pulse 94   Wt 69.4 kg (153 lb)   LMP 2022 (Approximate)   BMI 28.91 kg/m²     Physical Exam  Constitutional:  She is oriented to person, place, and time. She appears well-developed and well-nourished.    Head: Normocephalic and atraumatic.      Cardiovascular: Normal rate and regular rhythm.  Pulmonary/Chest: Effort normal.      Abdominal: Soft. Gravid. There is no abdominal tenderness.    Genitourinary: Deferred  Musculoskeletal: Moves all extremeties.       Neurological: She is alert and oriented to person, place, and time. GCS eye subscore is 4. GCS verbal subscore is 5. GCS motor subscore is 6.    Skin: Skin is warm and dry. Capillary refill takes less than 2 seconds.    Psychiatric: Her speech is normal and behavior is normal. Mood, affect and thought content normal.    ASSESSMENT:       1. 15 weeks gestation of pregnancy    2. Pre-existing type 1 diabetes mellitus during pregnancy in second trimester    3. Hypertension affecting pregnancy in second trimester    4. High-risk pregnancy in second trimester    5. Gastroparesis due to DM    6. Nausea and vomiting during pregnancy      PLAN:   15 weeks gestation of pregnancy  -     Maternal Sequential Screen Part 2; Future; Expected date: 06/13/2022    Pre-existing type 1 diabetes mellitus during pregnancy in second trimester    Hypertension affecting pregnancy in second trimester    High-risk pregnancy in second trimester  -     Toxicology screen, urine  -     Urine culture  -     C. trachomatis/N. gonorrhoeae by AMP DNA [facility]; Urine    Gastroparesis due to DM    Nausea and vomiting during pregnancy  -     promethazine (PHENERGAN) 25 MG tablet; Take 1 tablet (25 mg total) by mouth every 4 (four) hours as needed for Nausea.  Dispense: 180 tablet; Refill: 1    Encouraged compression socks/stockings for BLE edema. Discussed adequate water intake. Fetal movement counts, bleeding, pain, and labor precautions reviewed.    Follow-up in 1 week(s) for routine OB appointment with Dr. Min.

## 2022-06-14 ENCOUNTER — PATIENT MESSAGE (OUTPATIENT)
Dept: ENDOCRINOLOGY | Facility: CLINIC | Age: 27
End: 2022-06-14
Payer: MEDICAID

## 2022-06-14 ENCOUNTER — TELEPHONE (OUTPATIENT)
Dept: ENDOCRINOLOGY | Facility: CLINIC | Age: 27
End: 2022-06-14
Payer: MEDICAID

## 2022-06-14 NOTE — TELEPHONE ENCOUNTER
----- Message from Adriana Farnsworth MA sent at 6/13/2022  3:19 PM CDT -----  Regarding: RE: Appointment  Patient scheduled for the first available- 7/6 at 8am.  Please let me know if that is ok.  ----- Message -----  From: Jennifer Beard MA  Sent: 6/13/2022   2:02 PM CDT  To: Adriana Farnsworth MA  Subject: FW: Appointment                                    ----- Message -----  From: Tomasa Rushing MA  Sent: 6/13/2022   1:53 PM CDT  To: Adriana Farnsworth MA, Jennifer Beard MA, #  Subject: Appointment                                      Pt has [place] medicaid. I'm unable to schedule her.   ----- Message -----  From: Tomasa Rushing MA  Sent: 6/9/2022   4:55 PM CDT  To: Tomasa Rushign MA      ----- Message -----  From: Alison Alcantara MD  Sent: 6/9/2022   2:58 PM CDT  To: Eunice Schumacher MD, Endy Dawson Staff    Can you please set her up to see Dr[facility] in [facility] few weeks? Patient is pregnant

## 2022-06-15 ENCOUNTER — OFFICE VISIT (OUTPATIENT)
Dept: GASTROENTEROLOGY | Facility: CLINIC | Age: 27
End: 2022-06-15
Payer: MEDICAID

## 2022-06-15 VITALS
HEIGHT: 61 IN | DIASTOLIC BLOOD PRESSURE: 87 MMHG | HEART RATE: 106 BPM | BODY MASS INDEX: 29.27 KG/M2 | SYSTOLIC BLOOD PRESSURE: 134 MMHG | WEIGHT: 155 LBS

## 2022-06-15 DIAGNOSIS — K31.84 GASTROPARESIS DUE TO DM: Primary | ICD-10-CM

## 2022-06-15 DIAGNOSIS — E11.43 GASTROPARESIS DUE TO DM: Primary | ICD-10-CM

## 2022-06-15 DIAGNOSIS — Z34.91 FIRST TRIMESTER PREGNANCY: ICD-10-CM

## 2022-06-15 PROCEDURE — 99213 OFFICE O/P EST LOW 20 MIN: CPT | Mod: PBBFAC | Performed by: INTERNAL MEDICINE

## 2022-06-15 PROCEDURE — 99204 OFFICE O/P NEW MOD 45 MIN: CPT | Mod: S$PBB,,, | Performed by: INTERNAL MEDICINE

## 2022-06-15 PROCEDURE — 99999 PR PBB SHADOW E&M-EST. PATIENT-LVL III: CPT | Mod: PBBFAC,,, | Performed by: INTERNAL MEDICINE

## 2022-06-15 PROCEDURE — 99204 PR OFFICE/OUTPT VISIT, NEW, LEVL IV, 45-59 MIN: ICD-10-PCS | Mod: S$PBB,,, | Performed by: INTERNAL MEDICINE

## 2022-06-15 PROCEDURE — 3051F HG A1C>EQUAL 7.0%<8.0%: CPT | Mod: CPTII,,, | Performed by: INTERNAL MEDICINE

## 2022-06-15 PROCEDURE — 3051F PR MOST RECENT HEMOGLOBIN A1C LEVEL 7.0 - < 8.0%: ICD-10-PCS | Mod: CPTII,,, | Performed by: INTERNAL MEDICINE

## 2022-06-15 PROCEDURE — 99999 PR PBB SHADOW E&M-EST. PATIENT-LVL III: ICD-10-PCS | Mod: PBBFAC,,, | Performed by: INTERNAL MEDICINE

## 2022-06-15 PROCEDURE — 1111F DSCHRG MED/CURRENT MED MERGE: CPT | Mod: CPTII,,, | Performed by: INTERNAL MEDICINE

## 2022-06-15 PROCEDURE — 1111F PR DISCHARGE MEDS RECONCILED W/ CURRENT OUTPATIENT MED LIST: ICD-10-PCS | Mod: CPTII,,, | Performed by: INTERNAL MEDICINE

## 2022-06-15 NOTE — PROGRESS NOTES
[facility]    Reason for visit: The primary encounter diagnosis was Gastroparesis due to DM. A diagnosis of First trimester pregnancy was also pertinent to this visit.  Referring Provider/PCP: Giovanna Hyatt MD    History of Present Illness:  Alicia Valles is a 27 y.o. female with a history of  currently 18w pregnant, DM1, HTN, seizure disorder, diabetic gastroparesis who is presenting for initial evaluation of gastroparesis.  Patient follows with OB for current pregnancy and endocrine for her DM1.  Poorly controlled glucose control, last BG check >300, Last A1C >7 but improving from 9.  Patient is currently prescribed reglan and phenergan.  Based on last endocrine note, she does not follow a gastroparesis diet.        Review of Systems:   Constitutional: no fever, chills or change in weight   Eyes: no visual changes   ENT: no sore throat or dysphagia  Respiratory: no cough or shortness of breath   Cardiovascular: no chest pain or palpitations   Gastrointestinal: as per HPI  Hematologic/Lymphatic: no easy bruising or lymphadenopathy   Musculoskeletal: no arthralgias or myalgias   Neurological: no change in mental status  Behavioral/Psych: no change in mood    Medical History:  Past Medical History:   Diagnosis Date    Anemia     Anemia, unspecified     Anxiety     Diabetes mellitus     type 1    Diabetes mellitus     Gastroparesis     Hypertension     Hypertensive encephalopathy 2022    Seizures     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        Past Surgical History:   Procedure Laterality Date    RETINAL DETACHMENT SURGERY  2018    RETINAL DETACHMENT SURGERY         Family History   Problem Relation Age of Onset    No Known Problems Father     Cancer Paternal Grandfather         unsure    Diabetes Maternal Grandfather     Hypertension Mother        Social History     Socioeconomic History    Marital status: Single  "  Tobacco Use    Smoking status: Never Smoker    Smokeless tobacco: Never Used   Substance and Sexual Activity    Alcohol use: Not Currently    Drug use: Never    Sexual activity: Yes     Partners: Male     Birth control/protection: None   Social History Narrative    ** Merged History Encounter **            Current Outpatient Medications on File Prior to Visit   Medication Sig Dispense Refill    acetaminophen (TYLENOL) 500 MG tablet Take 2,000 mg by mouth daily as needed for Pain.      BD ULTRA-FINE LIGIA PEN NEEDLE 32 gauge x 5/32" Ndle To inject insulin 4-6 times daily. 100 each 3    blood-glucose meter,continuous (DEXCOM G6 ) Misc 1 Device by Misc.(Non-Drug; Combo Route) route once. for 1 dose 1 each 0    blood-glucose sensor (DEXCOM G6 SENSOR) Sandrine 3 Devices by Misc.(Non-Drug; Combo Route) route every 30 days. 1 each 5    blood-glucose transmitter (DEXCOM G6 TRANSMITTER) Sandrine 2 Devices by Misc.(Non-Drug; Combo Route) route every 6 (six) months. 1 each 5    diphenhydrAMINE (BENADRYL) 25 mg capsule Take 25 mg by mouth daily as needed for Insomnia ((if Unisom ineffective)).      docusate sodium (COLACE) 100 MG capsule Take 100 mg by mouth once daily.      doxylamine succinate (UNISOM, DOXYLAMINE, ORAL) Take 1 capsule by mouth nightly as needed (Sleep).      famotidine (PEPCID) 40 MG tablet Take 1 tablet (40 mg total) by mouth every evening. 30 tablet 10    GLUCAGEN HYPOKIT 1 mg SolR SMARTSIG:Injection As Directed      glucagon (BAQSIMI) 3 mg/actuation Spry 1 each by Nasal route as needed (For Hypoglycemia). 2 each 3    hydrALAZINE (APRESOLINE) 25 MG tablet Take 1 tablet (25 mg total) by mouth every 8 (eight) hours. 90 tablet 11    insulin admin supplies (INPEN, NOVOLOG OR FIASP, PINK) InPn Inject 1 each into the skin once. for 1 dose 1 each 0    insulin aspart U-100 (NOVOLOG PENFILL U-100 INSULIN) 100 unit/mL Crtg ICR: 1:8 units three times daily with meals and ICR 1:12 with snacks plus " correction. Up to 40 units daily. For use with InPen 12 mL 11    labetaloL (NORMODYNE) 200 MG tablet Take 1 tablet (200 mg total) by mouth every 12 (twelve) hours. 60 tablet 11    metoclopramide HCl (REGLAN) 10 MG tablet Take 1 tablet (10 mg total) by mouth 4 (four) times daily. 120 tablet 2    NIFEdipine (PROCARDIA-XL) 90 MG (OSM) 24 hr tablet Take 1 tablet (90 mg total) by mouth 2 (two) times a day. 30 tablet 11    promethazine (PHENERGAN) 25 MG tablet Take 1 tablet (25 mg total) by mouth every 4 (four) hours. 60 tablet 0    promethazine (PHENERGAN) 25 MG tablet Take 1 tablet (25 mg total) by mouth every 4 (four) hours as needed for Nausea. 180 tablet 1    pyridoxine, vitamin B6, (B-6) 25 MG Tab Take 1 tablet (25 mg total) by mouth once daily. 30 tablet 6    sertraline (ZOLOFT) 50 MG tablet Take 1 tablet (50 mg total) by mouth once daily. 30 tablet 2    [DISCONTINUED] insulin detemir U-100 (LEVEMIR FLEXTOUCH) 100 unit/mL (3 mL) SubQ InPn pen Inject 6 Units into the skin 2 (two) times daily. 3.6 mL 11    insulin glargine (LANTUS U-100 INSULIN) 100 unit/mL injection Inject 8 Units into the skin 2 (two) times a day. (Patient not taking: Reported on 6/15/2022) 4.8 mL 11    PNV,calcium 72/iron/folic acid (PRENATAL VITAMIN) Tab Take 1 tablet by mouth once daily. for 7 days 7 tablet 0    [DISCONTINUED] aspirin 81 MG Chew Take 1 tablet (81 mg total) by mouth once daily. 30 tablet 11    [DISCONTINUED] lisinopriL (PRINIVIL,ZESTRIL) 20 MG tablet TAKE ONE TABLET BY MOUTH ONE TIME DAILY; DO NOT GET PREGNANT ON THIS PILL      [DISCONTINUED] methyldopa (ALDOMET) 250 MG tablet Take 500 mg by mouth 2 (two) times daily.      [DISCONTINUED] pregabalin (LYRICA) 150 MG capsule Take 150 mg by mouth.       No current facility-administered medications on file prior to visit.       Review of patient's allergies indicates:  No Known Allergies    Physical Exam:  Vitals:    06/15/22 1344   BP: 134/87   Pulse: 106         Gen:  NAD, lying comfortably  HENT: NCAT, oropharynx clear  Eyes: anicteric sclerae, EOMI grossly  Neck: supple, no visible masses/goiter  Cardiac: RRR, no M/R/G, S1/S2 present  Lungs: CTAB, no crackles, no wheezes  Abd: soft, NT/ND, normoactive BS, no rebound  Ext: no LE edema, warm, well perfused  Skin: skin intact on exposed body parts, no visible rashes, lesions  Neuro: A&Ox4, neuro exam grossly intact, moves all extremities  Psych: appropriate mood, affect    Laboratory:  Lab Results   Component Value Date     (L) 05/26/2022    K 3.9 05/26/2022     05/26/2022    CO2 23 05/26/2022    BUN 16 05/26/2022    CREATININE 1.2 05/26/2022    CALCIUM 7.2 (L) 05/26/2022    ANIONGAP 4 (L) 05/26/2022    ESTGFRAFRICA >60.0 05/26/2022    EGFRNONAA >60.0 05/26/2022       Lab Results   Component Value Date    ALT 16 05/26/2022    AST 18 05/26/2022    ALKPHOS 45 (L) 05/26/2022    BILITOT 0.1 05/26/2022       Lab Results   Component Value Date    WBC 6.98 05/26/2022    HGB 6.3 (L) 05/26/2022    HCT 18.6 (LL) 05/26/2022    MCV 85 05/26/2022     05/26/2022       Microbiology:  No Pertinent Microbiology    Imaging:  No Pertinent Imaging    Assessment:  Alicia Valles is a 27 y.o. female who is presenting for initial evaluation of gastroparesis during pregnancy      Plan:  1. Continue reglan  2. Continue to monitor and control blood glucose  3. Dietary referral for gastroparesis diet  4. Continue phenergan PRN (prescribed by OB)  5. If symptoms continue to be bothersome after pregnancy will consider repeating EGD and discussing further management strategies  6. CRC Screening: at age 43  Follow up in about 6 months (around 12/15/2022).    Jamin Graham MD  Gastroenterology Fellow    Orders Placed This Encounter   Procedures    Ambulatory referral/consult to Nutrition Services

## 2022-06-15 NOTE — PROGRESS NOTES
I have reviewed the notes, assessments, and/or procedures performed by Dr. Graham, I concur with her/his documentation of Alicia Valles.

## 2022-06-16 ENCOUNTER — TELEPHONE (OUTPATIENT)
Dept: OBSTETRICS AND GYNECOLOGY | Facility: CLINIC | Age: 27
End: 2022-06-16
Payer: MEDICAID

## 2022-06-16 NOTE — TELEPHONE ENCOUNTER
----- Message from Rosa Conner sent at 6/16/2022  2:04 PM CDT -----  Contact: pt mother  Type: Needs Medical Advice    Who Called: pt mother  Best Call Back Number: [phone]  Inquiry/Question: pt mother calling with concerns about pt calling to see if they can get referral to see psychiatrist, please advise pt mother  Thank you~

## 2022-06-20 ENCOUNTER — TELEPHONE (OUTPATIENT)
Dept: ENDOCRINOLOGY | Facility: CLINIC | Age: 27
End: 2022-06-20
Payer: MEDICAID

## 2022-06-20 ENCOUNTER — LAB VISIT (OUTPATIENT)
Dept: LAB | Facility: CLINIC | Age: 27
End: 2022-06-20
Payer: MEDICAID

## 2022-06-20 ENCOUNTER — ROUTINE PRENATAL (OUTPATIENT)
Dept: OBSTETRICS AND GYNECOLOGY | Facility: CLINIC | Age: 27
End: 2022-06-20
Payer: MEDICAID

## 2022-06-20 VITALS
SYSTOLIC BLOOD PRESSURE: 144 MMHG | DIASTOLIC BLOOD PRESSURE: 84 MMHG | BODY MASS INDEX: 28.98 KG/M2 | WEIGHT: 153.38 LBS

## 2022-06-20 DIAGNOSIS — O21.9 NAUSEA AND VOMITING DURING PREGNANCY: ICD-10-CM

## 2022-06-20 DIAGNOSIS — O24.011 PRE-EXISTING TYPE 1 DIABETES MELLITUS DURING PREGNANCY IN FIRST TRIMESTER: ICD-10-CM

## 2022-06-20 DIAGNOSIS — O24.011 PRE-EXISTING TYPE 1 DIABETES MELLITUS DURING PREGNANCY IN FIRST TRIMESTER: Primary | ICD-10-CM

## 2022-06-20 DIAGNOSIS — O16.1 HYPERTENSION AFFECTING PREGNANCY IN FIRST TRIMESTER: ICD-10-CM

## 2022-06-20 DIAGNOSIS — Z3A.16 16 WEEKS GESTATION OF PREGNANCY: ICD-10-CM

## 2022-06-20 LAB
AMPHET+METHAMPHET UR QL: NEGATIVE
BARBITURATES UR QL SCN>200 NG/ML: NEGATIVE
BASOPHILS # BLD AUTO: 0.02 K/UL (ref 0–0.2)
BASOPHILS NFR BLD: 0.2 % (ref 0–1.9)
BENZODIAZ UR QL SCN>200 NG/ML: NEGATIVE
BZE UR QL SCN: NEGATIVE
CANNABINOIDS UR QL SCN: NEGATIVE
CREAT UR-MCNC: 54 MG/DL (ref 15–325)
DIFFERENTIAL METHOD: ABNORMAL
EOSINOPHIL # BLD AUTO: 0.1 K/UL (ref 0–0.5)
EOSINOPHIL NFR BLD: 0.8 % (ref 0–8)
ERYTHROCYTE [DISTWIDTH] IN BLOOD BY AUTOMATED COUNT: 16.3 % (ref 11.5–14.5)
ETHANOL UR-MCNC: <10 MG/DL
HCT VFR BLD AUTO: 20.5 % (ref 37–48.5)
HGB BLD-MCNC: 6.7 G/DL (ref 12–16)
IMM GRANULOCYTES # BLD AUTO: 0.12 K/UL (ref 0–0.04)
IMM GRANULOCYTES NFR BLD AUTO: 1.2 % (ref 0–0.5)
LYMPHOCYTES # BLD AUTO: 1.3 K/UL (ref 1–4.8)
LYMPHOCYTES NFR BLD: 12.9 % (ref 18–48)
MCH RBC QN AUTO: 30.9 PG (ref 27–31)
MCHC RBC AUTO-ENTMCNC: 32.7 G/DL (ref 32–36)
MCV RBC AUTO: 95 FL (ref 82–98)
METHADONE UR QL SCN>300 NG/ML: NEGATIVE
MONOCYTES # BLD AUTO: 0.6 K/UL (ref 0.3–1)
MONOCYTES NFR BLD: 5.9 % (ref 4–15)
NEUTROPHILS # BLD AUTO: 8.2 K/UL (ref 1.8–7.7)
NEUTROPHILS NFR BLD: 79 % (ref 38–73)
NRBC BLD-RTO: 0 /100 WBC
OPIATES UR QL SCN: NEGATIVE
PCP UR QL SCN>25 NG/ML: NEGATIVE
PLATELET # BLD AUTO: 359 K/UL (ref 150–450)
PMV BLD AUTO: 9.9 FL (ref 9.2–12.9)
RBC # BLD AUTO: 2.17 M/UL (ref 4–5.4)
TOXICOLOGY INFORMATION: NORMAL
WBC # BLD AUTO: 10.36 K/UL (ref 3.9–12.7)

## 2022-06-20 PROCEDURE — 36415 COLL VENOUS BLD VENIPUNCTURE: CPT | Mod: ,,, | Performed by: OBSTETRICS & GYNECOLOGY

## 2022-06-20 PROCEDURE — 36415 PR COLLECTION VENOUS BLOOD,VENIPUNCTURE: ICD-10-PCS | Mod: ,,, | Performed by: OBSTETRICS & GYNECOLOGY

## 2022-06-20 PROCEDURE — 59425 PR ANTEPARTUM CARE ONLY, 4-6 VISITS: ICD-10-PCS | Mod: TH,S$GLB,, | Performed by: OBSTETRICS & GYNECOLOGY

## 2022-06-20 PROCEDURE — 80307 DRUG TEST PRSMV CHEM ANLYZR: CPT | Performed by: OBSTETRICS & GYNECOLOGY

## 2022-06-20 PROCEDURE — 85025 COMPLETE CBC W/AUTO DIFF WBC: CPT | Performed by: OBSTETRICS & GYNECOLOGY

## 2022-06-20 PROCEDURE — 59425 ANTEPARTUM CARE ONLY: CPT | Mod: TH,S$GLB,, | Performed by: OBSTETRICS & GYNECOLOGY

## 2022-06-20 RX ORDER — PROMETHAZINE HYDROCHLORIDE 25 MG/1
25 SUPPOSITORY RECTAL EVERY 6 HOURS PRN
Qty: 12 SUPPOSITORY | Refills: 1 | Status: SHIPPED | OUTPATIENT
Start: 2022-06-20 | End: 2022-10-31

## 2022-06-20 RX ORDER — GLUCAGON HYDROCHLORIDE 1 MG
1 KIT INJECTION ONCE AS NEEDED
Qty: 1 EACH | Refills: 2 | Status: SHIPPED | OUTPATIENT
Start: 2022-06-20 | End: 2022-06-20

## 2022-06-20 RX ORDER — GLUCAGON 3 MG/1
1 POWDER NASAL
Qty: 2 EACH | Refills: 3 | Status: SHIPPED | OUTPATIENT
Start: 2022-06-20

## 2022-06-20 NOTE — PROGRESS NOTES
2022  Chief Complaint   Patient presents with    Routine Prenatal Visit     Pt is here for a 16 wk OB visit.Pt has been experiencing nausea, vomiting, pelvic pressure, and cramps.     27 y.o.  at 16w2d  Estimated Date of Delivery: 12/3/2022, by Last Menstrual Period, dating reviewed.      Patient reports: Good fetal movements reported. No Bleeding or contractions . She is doing well.  She is taking prenatal vitamins. Ms. Valles   is adjusting well and has a good support system of family and friends. She is coping with pregnancy and having no difficulty with sleep.  Patient has been followed by endocrinology for treatment of her insulin.  She states she has been dropping very low at night secondary to her inability to tolerate any p.o..  She states for the last 2 days she has been not able to eat any food or keep any food down.  She is drinking some.  She required injection with glucagon at 1 night when she dropped low.  She is being followed by M as well.  She is doing well her blood pressure medication.  She does report some anxiety and depression symptoms but having trouble with her nausea and vomiting.  She states she is out of glucose gone pads and nasal spray and resides refill.  She states she is having significant trouble getting around her blood sugar drops low and transporting to hospital visits.  She is requesting possible use of a wheelchair.  Prenatal labs reviewed and updated today    OB History    Para Term  AB Living   3 0 0 0 2     SAB IAB Ectopic Multiple Live Births   0 0 0          # Outcome Date GA Lbr Alireza/2nd Weight Sex Delivery Anes PTL Lv   3 Current            2 AB            1 AB                Review of Systems:  General ROS: negative for headache or visual changes  Breast ROS: negative for breast lumps  Gastrointestinal ROS: negative for constipation, diarrhea or nausea/vomiting  Musculoskeletal ROS: negative for pain in joints or swelling in face or hands.  "  Neurological ROS: negative for - headaches, numbness/tingling or visual changes      Physical Exam:  BP (!) 144/84   Wt 69.6 kg (153 lb 6.4 oz)   LMP 02/26/2022 (Approximate)   BMI 28.98 kg/m²     Constitutional: She is oriented to person, place, and time. She appears well-developed and well-nourished. No distress.     Pulmonary/Chest: Effort normal. No respiratory distress  Abdominal: Soft, gravid, nontender. No rebound and no guarding.   Genitourinary: Deferred   Musculoskeletal: Normal range of motion, Minimal peripheral edema.   Neurological: She is alert and oriented to person, place, and time. Coordination normal.   Skin: Skin is warm and dry. She is not diaphoretic.  Psychiatric: She has a normal mood and affect.      Assessment:  Overall doing well.   27 y.o.at 16w2d   Patient Active Problem List   Diagnosis    Type 1 diabetes mellitus with other specified complication    First trimester pregnancy    Anemia    Proteinuria    Anxiety    Gastroparesis due to DM    Spotting in early pregnancy    UTI in pregnancy, antepartum, first trimester    Leukocytosis    Hypertensive urgency    12 weeks gestation of pregnancy    Nausea and vomiting during pregnancy    Hypertension affecting pregnancy in first trimester    Pre-existing type 1 diabetes mellitus during pregnancy in first trimester     Current Outpatient Medications on File Prior to Visit   Medication Sig Dispense Refill    acetaminophen (TYLENOL) 500 MG tablet Take 2,000 mg by mouth daily as needed for Pain.      BD ULTRA-FINE LIGIA PEN NEEDLE 32 gauge x 5/32" Ndle To inject insulin 4-6 times daily. 100 each 3    blood-glucose sensor (DEXCOM G6 SENSOR) Sandrine 3 Devices by Misc.(Non-Drug; Combo Route) route every 30 days. 1 each 5    blood-glucose transmitter (DEXCOM G6 TRANSMITTER) Sandrine 2 Devices by Misc.(Non-Drug; Combo Route) route every 6 (six) months. 1 each 5    diphenhydrAMINE (BENADRYL) 25 mg capsule Take 25 mg by mouth daily as " needed for Insomnia ((if Unisom ineffective)).      docusate sodium (COLACE) 100 MG capsule Take 100 mg by mouth once daily.      doxylamine succinate (UNISOM, DOXYLAMINE, ORAL) Take 1 capsule by mouth nightly as needed (Sleep).      famotidine (PEPCID) 40 MG tablet Take 1 tablet (40 mg total) by mouth every evening. 30 tablet 10    hydrALAZINE (APRESOLINE) 25 MG tablet Take 1 tablet (25 mg total) by mouth every 8 (eight) hours. 90 tablet 11    insulin aspart U-100 (NOVOLOG PENFILL U-100 INSULIN) 100 unit/mL Crtg ICR: 1:8 units three times daily with meals and ICR 1:12 with snacks plus correction. Up to 40 units daily. For use with InPen 12 mL 11    insulin glargine (LANTUS U-100 INSULIN) 100 unit/mL injection Inject 8 Units into the skin 2 (two) times a day. 4.8 mL 11    labetaloL (NORMODYNE) 200 MG tablet Take 1 tablet (200 mg total) by mouth every 12 (twelve) hours. 60 tablet 11    promethazine (PHENERGAN) 25 MG tablet Take 1 tablet (25 mg total) by mouth every 4 (four) hours. 60 tablet 0    promethazine (PHENERGAN) 25 MG tablet Take 1 tablet (25 mg total) by mouth every 4 (four) hours as needed for Nausea. 180 tablet 1    pyridoxine, vitamin B6, (B-6) 25 MG Tab Take 1 tablet (25 mg total) by mouth once daily. 30 tablet 6    sertraline (ZOLOFT) 50 MG tablet Take 1 tablet (50 mg total) by mouth once daily. 30 tablet 2    [DISCONTINUED] GLUCAGEN HYPOKIT 1 mg SolR SMARTSIG:Injection As Directed      [DISCONTINUED] glucagon (BAQSIMI) 3 mg/actuation Spry 1 each by Nasal route as needed (For Hypoglycemia). 2 each 3    blood-glucose meter,continuous (DEXCOM G6 ) Misc 1 Device by Misc.(Non-Drug; Combo Route) route once. for 1 dose 1 each 0    insulin admin supplies (INPEN, NOVOLOG OR FIASP, PINK) InPn Inject 1 each into the skin once. for 1 dose 1 each 0    metoclopramide HCl (REGLAN) 10 MG tablet Take 1 tablet (10 mg total) by mouth 4 (four) times daily. 120 tablet 2    NIFEdipine (PROCARDIA-XL)  90 MG (OSM) 24 hr tablet Take 1 tablet (90 mg total) by mouth 2 (two) times a day. 30 tablet 11    PNV,calcium 72/iron/folic acid (PRENATAL VITAMIN) Tab Take 1 tablet by mouth once daily. for 7 days 7 tablet 0    [DISCONTINUED] aspirin 81 MG Chew Take 1 tablet (81 mg total) by mouth once daily. 30 tablet 11    [DISCONTINUED] insulin detemir U-100 (LEVEMIR FLEXTOUCH) 100 unit/mL (3 mL) SubQ InPn pen Inject 6 Units into the skin 2 (two) times daily. 3.6 mL 11    [DISCONTINUED] lisinopriL (PRINIVIL,ZESTRIL) 20 MG tablet TAKE ONE TABLET BY MOUTH ONE TIME DAILY; DO NOT GET PREGNANT ON THIS PILL      [DISCONTINUED] methyldopa (ALDOMET) 250 MG tablet Take 500 mg by mouth 2 (two) times daily.      [DISCONTINUED] pregabalin (LYRICA) 150 MG capsule Take 150 mg by mouth.       No current facility-administered medications on file prior to visit.     Pre-existing type 1 diabetes mellitus during pregnancy in first trimester  -     CBC Auto Differential; Future; Expected date: 06/20/2022  -     GLUCAGEN HYPOKIT 1 mg SolR; Inject 1 mg into the skin once as needed (hypoglycemia).  Dispense: 1 each; Refill: 2  -     glucagon (BAQSIMI) 3 mg/actuation Spry; 1 each by Nasal route as needed (For Hypoglycemia).  Dispense: 2 each; Refill: 3    Hypertension affecting pregnancy in first trimester  -     CBC Auto Differential; Future; Expected date: 06/20/2022    16 weeks gestation of pregnancy  -     Toxicology screen, urine  -     WHEELCHAIR FOR HOME USE  -     Ambulatory referral/consult to Behavioral Health; Future; Expected date: 06/27/2022    Nausea and vomiting during pregnancy  -     promethazine (PHENERGAN) 25 MG suppository; Place 1 suppository (25 mg total) rectally every 6 (six) hours as needed for Nausea.  Dispense: 12 suppository; Refill: 1       Plan:  Overall doing well  Follow up 3 Weeks, bleeding/pain precautions, kick counts, labor precautions discussed.   Dehydration precautions discussed extensively.  Recommend  attempting Phenergan suppository and if no relief then patient may require visit to the hospital for hydration.  Discussed risk of DKA with dehydration as well as hypoglycemia if unable tolerate p.o..  Recommend patient hold her nighttime Levemir if unable to tolerate any nighttime p.o..  Will refill glucagon and recommend patient be evaluated by psychiatry for her depression symptoms.  She agrees.  Will follow-up in 2-3 weeks.  Continue to see Endocrinology and MFM.  Blood pressure is doing well.

## 2022-06-22 ENCOUNTER — TELEPHONE (OUTPATIENT)
Dept: PEDIATRIC CARDIOLOGY | Facility: CLINIC | Age: 27
End: 2022-06-22
Payer: MEDICAID

## 2022-06-22 DIAGNOSIS — E11.9 TYPE 2 DIABETES MELLITUS WITHOUT COMPLICATION: ICD-10-CM

## 2022-06-23 ENCOUNTER — TELEPHONE (OUTPATIENT)
Dept: PEDIATRIC CARDIOLOGY | Facility: CLINIC | Age: 27
End: 2022-06-23
Payer: MEDICAID

## 2022-06-28 DIAGNOSIS — E10.8 TYPE 1 DIABETES MELLITUS WITH COMPLICATIONS: ICD-10-CM

## 2022-06-28 RX ORDER — INSULIN ASPART 100 [IU]/ML
INJECTION, SOLUTION INTRAVENOUS; SUBCUTANEOUS
Qty: 12 ML | Refills: 11 | OUTPATIENT
Start: 2022-06-28

## 2022-07-05 NOTE — ASSESSMENT & PLAN NOTE
"Key History and Diagnostic Findings  - Reviewed that goal is to achieve optimal blood sugar parameters while avoiding hypoglycemia  - Reviewed maternal and fetal health issues related to diabetes  - Discussed eye care and the need for diabetic retinopathy screening during pregnancy. Repeat exam in second and third trimester, then yearly after pregnancy    - Specific Glucose Goals:  - Fasting glucose ? 95 mg/dL (?5.3 mmol/L)  - Premeal glucose levels ? 100 mg/dL (?5.6 mmol/L)  - One-hour postprandial glucose levels ? 140 mg/dL (?7.8 mmol/L)  - Two-hour postprandial glucose levels ? 120 mg/dL (?6.7 mmol/L)    - Other Goals:  - Urine ketones to be negative   - Normalization of A1C to < 6% if possible without hypoglycemia   - Use standard hypoglycemia treatment for blood glucose less than 60 mg/dl ("15-15" rule, please see below)  - Avoidance of severe hypoglycemia (episode in which patient experiences coma, seizure or suspected seizure, or impairment sufficient to require the assistance of another person). Blood glucose goals must be relaxed for patients with hypoglycemia unawareness or recurrent hypoglycemia.    Plan  - Change the carb ratio from 1:8 to 1:9 (weakened to prevent post-meal hypoglycemia)  - Change the correction scale to where you are taking 1 additional unit of insulin for every 50 above 120 mg/dL (changed from 1 unit for every 40 above 150)  - Please log your insulin dosing through your Dexcom lam "events" each time you eat and please keep a paper log of your pre-meal and 2 hour post-meal glucose values. Please take a picture of this log after 1 week (next Wednesday) and send via your [email or web address] lam  - Follow-up virtual visit in 2 weeks  - Please follow the 15-15 rule below to treat low blood glucose and prevent high glucose afterward    - Fingerstick glucose checks seven times per day: before and one hour after each meal and before going to bed. Send in log after a week so appropriate adjustments can be " made  - Referral to diabetes education to provide teaching on meal plan, insulin care and use, activity program, medication schedule, self- monitoring of blood glucose (SMBG), treatment for hypo and hyperglycemia, and sick day management  - Ensure that no statins, ACE/ARB, or any other potentially teratogenic medications are being used to prevent renal dysgenesis, oligohydramnios, or anuric renal failure  - Methyldopa is the first-line agent in treatment of hypertension in pregnancy as it has no long-term adverse effects following in-utero exposure; non selective beta-blockers are generally thought to be safe as well and labetalol is often used over methyldopa because of fewer adverse effects

## 2022-07-05 NOTE — PROGRESS NOTES
[facility] Visit     HPI:Alicia Valles is a 27 y.o. female who was diagnosed with type 1 DM in 2004 and has subsequent gastroparesis, nephropathy, and retinopathy.     Estimated Date of Delivery: 12/3/22  Currently 18 weeks pregnant    Interval History:   Last visit:  06/08/2022  Adjustments made at last visit:  Patient prescribed in pen for dose tracking, asked to continue Levemir 8 units twice daily with current carb ratio 1:8 and to work on bolusing before she eats.    - On review of Dexcom data, patient demonstrates multiple episodes of postprandial hypoglycemia with hyperglycemia after correction.  Overall suboptimal control in the setting of pregnancy.  Patient demonstrates an adequate knowledge in estimation of mealtime carbohydrates; generally takes about 5 units with each meal.    Current diabetes regimen:  - Levemir 8 units twice daily  - Aspart with ICR of 1:8          Timing of prandial insulin: 15 minutes before meals.      Lab Results   Component Value Date    HGBA1C 7.4 (H) 06/08/2022    HGBA1C 9.8 (H) 05/05/2022         Glucose Monitoring:  Meter/CGM: Dexcom G6    Hypoglycemic Episodes:  Yes, several times a week after meals  Since last visit no severe hypoglycemic episodes requiring observer intervention    Dietary/Exercise Comments:  Eats 4-5 meals per day    Screening / DM Complications:  Neuropathy:  Yes, gastroparesis (not affected her recently)  CVD/MI:  No    A complete 14 point review of systems was conducted and negative except for stated above    Diabetes Management Status    - Statin: Not taking (contraindicated in pregnancy)  - ACE/ARB: Not taking (was on lisinopril but discontinued during pregnancy)      Screening or Prevention Patient's value Goal Complete/Controlled?   HgA1C Testing and Control   Lab Results   Component Value Date    HGBA1C 7.4 (H) 06/08/2022    HGBA1C 9.8 (H) 05/05/2022      Annually/Less than 8% Yes   Lipid profile Most Recent Lipid Panel Health  "Maintenance Topic Completion: Not Found Annually No   LDL control No results found for: LDLCALC Annually/Less than 100 mg/dl  No   Nephropathy screening No results found for: LABMICR  Lab Results   Component Value Date    PROTEINUA 3+ (A) 05/21/2022    Annually Yes   Blood pressure BP Readings from Last 1 Encounters:   06/20/22 (!) 144/84    Less than 140/90 No   Dilated retinal exam Most Recent Eye Exam Date: Not Found Annually Yes   Foot exam   : 07/06/2022 Annually No        Lab Results   Component Value Date    HGBA1C 7.4 (H) 06/08/2022    HGBA1C 9.8 (H) 05/05/2022       Vital Signs  LMP 02/26/2022 (Approximate)     Physical Exam  Constitutional:       Appearance: Normal appearance.   Neurological:      General: No focal deficit present.      Mental Status: She is alert and oriented to person, place, and time.   Psychiatric:         Mood and Affect: Mood normal.         Behavior: Behavior normal.           Lab Results   Component Value Date    TSH 1.570 05/05/2022     No results found for: TTGIGA      Assessment/Plan  Pre-existing type 1 diabetes mellitus during pregnancy in first trimester  Key History and Diagnostic Findings  - Reviewed that goal is to achieve optimal blood sugar parameters while avoiding hypoglycemia  - Reviewed maternal and fetal health issues related to diabetes  - Discussed eye care and the need for diabetic retinopathy screening during pregnancy. Repeat exam in second and third trimester, then yearly after pregnancy    - Specific Glucose Goals:  - Fasting glucose ? 95 mg/dL (?5.3 mmol/L)  - Premeal glucose levels ? 100 mg/dL (?5.6 mmol/L)  - One-hour postprandial glucose levels ? 140 mg/dL (?7.8 mmol/L)  - Two-hour postprandial glucose levels ? 120 mg/dL (?6.7 mmol/L)    - Other Goals:  - Urine ketones to be negative   - Normalization of A1C to < 6% if possible without hypoglycemia   - Use standard hypoglycemia treatment for blood glucose less than 60 mg/dl ("15-15" rule, please see " "below)  - Avoidance of severe hypoglycemia (episode in which patient experiences coma, seizure or suspected seizure, or impairment sufficient to require the assistance of another person). Blood glucose goals must be relaxed for patients with hypoglycemia unawareness or recurrent hypoglycemia.    Plan  - Change the carb ratio from 1:8 to 1:9 (weakened to prevent post-meal hypoglycemia)  - Change the correction scale to where you are taking 1 additional unit of insulin for every 50 above 120 mg/dL (changed from 1 unit for every 40 above 150)  - Please log your insulin dosing through your Dexcom lam "events" each time you eat and please keep a paper log of your pre-meal and 2 hour post-meal glucose values. Please take a picture of this log after 1 week (next Wednesday) and send via your [employer] lam  - Follow-up virtual visit in 2 weeks  - Please follow the 15-15 rule below to treat low blood glucose and prevent high glucose afterward    - Fingerstick glucose checks seven times per day: before and one hour after each meal and before going to bed. Send in log after a week so appropriate adjustments can be made  - Referral to diabetes education to provide teaching on meal plan, insulin care and use, activity program, medication schedule, self- monitoring of blood glucose (SMBG), treatment for hypo and hyperglycemia, and sick day management  - Ensure that no statins, ACE/ARB, or any other potentially teratogenic medications are being used to prevent renal dysgenesis, oligohydramnios, or anuric renal failure  - Methyldopa is the first-line agent in treatment of hypertension in pregnancy as it has no long-term adverse effects following in-utero exposure; non selective beta-blockers are generally thought to be safe as well and labetalol is often used over methyldopa because of fewer adverse effects    Gastroparesis due to DM  - Discussed, patient not affected by it at present    Proteinuria  - Off ACE/ARB during pregnancy, " management per OB    The patient location is: Home  The chief complaint leading to consultation is: T1DM    Visit type: audiovisual    Face to Face time with patient:  30 minutes  Forty-five minutes of total time spent on the encounter, which includes face to face time and non-face to face time preparing to see the patient (eg, review of tests), Obtaining and/or reviewing separately obtained history, Documenting clinical information in the electronic or other health record, Independently interpreting results (not separately reported) and communicating results to the patient/family/caregiver, or Care coordination (not separately reported).     Each patient to whom he or she provides medical services by telemedicine is:  (1) informed of the relationship between the physician and patient and the respective role of any other health care provider with respect to management of the patient; and (2) notified that he or she may decline to receive medical services by telemedicine and may withdraw from such care at any time.    Fabián Alejo DO  [facility], 6th [address]  [address] [place], [place], [address]    Office: [phone]  Fax: [phone]    Disclaimer: This note has been generated using voice-recognition software. There may be typographical errors that have been missed during proof-reading.    The above history labs imaging impression and plan were discussed with attending physician who is in agreement and also took part in this patient's care.  I personally reviewed all of the patients available medications, labs, imaging, vitals, allergies, medical history.

## 2022-07-06 ENCOUNTER — CLINICAL SUPPORT (OUTPATIENT)
Dept: ENDOCRINOLOGY | Facility: CLINIC | Age: 27
End: 2022-07-06
Payer: MEDICAID

## 2022-07-06 ENCOUNTER — PATIENT MESSAGE (OUTPATIENT)
Dept: ENDOCRINOLOGY | Facility: CLINIC | Age: 27
End: 2022-07-06

## 2022-07-06 ENCOUNTER — PATIENT MESSAGE (OUTPATIENT)
Dept: PRIMARY CARE CLINIC | Facility: CLINIC | Age: 27
End: 2022-07-06
Payer: MEDICAID

## 2022-07-06 DIAGNOSIS — O24.011 PRE-EXISTING TYPE 1 DIABETES MELLITUS DURING PREGNANCY IN FIRST TRIMESTER: ICD-10-CM

## 2022-07-06 DIAGNOSIS — E11.43 GASTROPARESIS DUE TO DM: Chronic | ICD-10-CM

## 2022-07-06 DIAGNOSIS — K31.84 GASTROPARESIS DUE TO DM: Chronic | ICD-10-CM

## 2022-07-06 DIAGNOSIS — O12.12 GESTATIONAL PROTEINURIA IN SECOND TRIMESTER: ICD-10-CM

## 2022-07-06 PROCEDURE — 3051F HG A1C>EQUAL 7.0%<8.0%: CPT | Mod: CPTII,GT,, | Performed by: INTERNAL MEDICINE

## 2022-07-06 PROCEDURE — 99214 PR OFFICE/OUTPT VISIT, EST, LEVL IV, 30-39 MIN: ICD-10-PCS | Mod: GT,TH,, | Performed by: INTERNAL MEDICINE

## 2022-07-06 PROCEDURE — 3051F PR MOST RECENT HEMOGLOBIN A1C LEVEL 7.0 - < 8.0%: ICD-10-PCS | Mod: CPTII,GT,, | Performed by: INTERNAL MEDICINE

## 2022-07-06 PROCEDURE — 95251 PR GLUCOSE MONITOR, 72 HOUR, PHYS INTERP: ICD-10-PCS | Mod: ,,, | Performed by: INTERNAL MEDICINE

## 2022-07-06 PROCEDURE — 99214 OFFICE O/P EST MOD 30 MIN: CPT | Mod: GT,TH,, | Performed by: INTERNAL MEDICINE

## 2022-07-06 PROCEDURE — 95251 CONT GLUC MNTR ANALYSIS I&R: CPT | Mod: ,,, | Performed by: INTERNAL MEDICINE

## 2022-07-06 NOTE — PATIENT INSTRUCTIONS
"- Change the carb ratio from 1:8 to 1:9 (weakened to prevent post-meal hypoglycemia)    - Change the correction scale to where you are taking 1 additional unit of insulin for every 50 above 120 mg/dL    - Please log your insulin dosing through your Dexcom lam "events" each time you eat and please keep a paper log of your pre-meal and 2 hour post-meal glucose values. Please take a picture of this log after 1 week (next Wednesday) and send via your [employer] lam    - Follow-up virtual visit in 2 weeks    - Please follow the 15-15 rule below to treat low blood glucose and prevent high glucose afterward      "15-15 Rule" for hypoglycemia treatment  - Many people tend to want to eat as much as they can for low blood glucose until they feel better. This can cause blood glucose levels to go very high, which is bad. Using the step-wise approach of the "15-15 Rule" can help you correct a low blood glucose while also preventing subsequent high blood glucose levels  - If you experience an episode of hypoglycemia (glucose less than 70), please follow the "15-15 rule": Take 15 grams of carbohydrate (see examples below) to raise your blood sugar and recheck it after 15 minutes. If still below 70 mg/dL, take another 15 gram serving of carbohydrate  -Repeat these steps until your blood sugar is at least 70 mg/dL. Once your blood sugar is back to normal, eat a small serving of protein to make sure it doesnt lower again    -Examples of 15g of carbohydrate:  4 ounces (1/2 cup) of juice or regular soda (not diet)  1 tablespoon of sugar, honey, or corn syrup  Hard candies, jellybeans, or gumdrops--see food label for how many to consume  Make a note about any episodes of low blood sugar and talk with your health care team if they are recurrent     - Note: When treating a low blood glucose, the choice of carbohydrate source is important. Complex carbohydrates, or foods that contain fats along with carbs (like chocolate) can slow the " absorption of glucose and should not be used to treat an emergency low    - For more information, please visit:  [email or web address]

## 2022-07-08 ENCOUNTER — TELEPHONE (OUTPATIENT)
Dept: MATERNAL FETAL MEDICINE | Facility: CLINIC | Age: 27
End: 2022-07-08
Payer: MEDICAID

## 2022-07-08 NOTE — PROGRESS NOTES
I have reviewed and concur with Dr. Colon's history, physical, assessment, and plan.  I have personally interviewed and examined the patient.      Eunice Schumacher MD

## 2022-07-08 NOTE — TELEPHONE ENCOUNTER
Patient called requesting same day ultrasound with MFM due to abdominal cramping. I advised patient to go to the ER to be checked out and patient declined. Please reach out and advise.

## 2022-07-11 ENCOUNTER — DOCUMENTATION ONLY (OUTPATIENT)
Dept: MATERNAL FETAL MEDICINE | Facility: CLINIC | Age: 27
End: 2022-07-11
Payer: MEDICAID

## 2022-07-11 DIAGNOSIS — E10.8 TYPE 1 DIABETES MELLITUS WITH COMPLICATIONS: ICD-10-CM

## 2022-07-11 RX ORDER — INSULIN PEN,REUSABLE,BT LISPRO
INSULIN PEN (EA) SUBCUTANEOUS
Qty: 1 EACH | Refills: 0 | Status: SHIPPED | OUTPATIENT
Start: 2022-07-11 | End: 2023-03-03

## 2022-07-11 RX ORDER — ENOXAPARIN SODIUM 100 MG/ML
40 INJECTION SUBCUTANEOUS NIGHTLY
Qty: 30 EACH | Refills: 11 | Status: ON HOLD | OUTPATIENT
Start: 2022-07-11 | End: 2022-10-05 | Stop reason: HOSPADM

## 2022-07-11 NOTE — PROGRESS NOTES
Patient is a 27 y.o.  at 19w2d who is being managed by [facility] for high risk pregnancy with T1DM, poorly controlled cHTN, nephrotic range proteinuria. Endocrinology managing her glucoses with good improvement in HbA1c. Patient was contacted via listed phone number and I called in lovenox 40 mg subq daily for VTE ppx given her nephrotic range proteinuria. She was in agreement with plan. Need to follow up at MFM visit if she was able to fill lovenox and start taking it.     Jimenez Escobedo MD  PGY 6  Maternal Fetal Medicine  [facility]

## 2022-07-11 NOTE — TELEPHONE ENCOUNTER
For some reason the pharmacy this was sent to a couple of weeks ago is saying that the Rx was canceled by us. I'm not seeing that on our end but please resend.

## 2022-07-13 ENCOUNTER — PATIENT MESSAGE (OUTPATIENT)
Dept: ENDOCRINOLOGY | Facility: CLINIC | Age: 27
End: 2022-07-13
Payer: MEDICAID

## 2022-07-13 ENCOUNTER — OFFICE VISIT (OUTPATIENT)
Dept: MATERNAL FETAL MEDICINE | Facility: CLINIC | Age: 27
End: 2022-07-13
Payer: MEDICAID

## 2022-07-13 ENCOUNTER — PROCEDURE VISIT (OUTPATIENT)
Dept: MATERNAL FETAL MEDICINE | Facility: CLINIC | Age: 27
End: 2022-07-13
Payer: MEDICAID

## 2022-07-13 VITALS — DIASTOLIC BLOOD PRESSURE: 84 MMHG | BODY MASS INDEX: 28.35 KG/M2 | WEIGHT: 150 LBS | SYSTOLIC BLOOD PRESSURE: 155 MMHG

## 2022-07-13 DIAGNOSIS — O12.12 GESTATIONAL PROTEINURIA IN SECOND TRIMESTER: ICD-10-CM

## 2022-07-13 DIAGNOSIS — O16.9 HYPERTENSION AFFECTING PREGNANCY, ANTEPARTUM: ICD-10-CM

## 2022-07-13 DIAGNOSIS — O26.872 SHORT CERVICAL LENGTH DURING PREGNANCY IN SECOND TRIMESTER: ICD-10-CM

## 2022-07-13 DIAGNOSIS — O24.019 PRE-EXISTING TYPE 1 DIABETES MELLITUS DURING PREGNANCY, ANTEPARTUM: ICD-10-CM

## 2022-07-13 DIAGNOSIS — Z36.89 ENCOUNTER FOR ULTRASOUND TO ASSESS FETAL GROWTH: Primary | ICD-10-CM

## 2022-07-13 DIAGNOSIS — O24.011 PRE-EXISTING TYPE 1 DIABETES MELLITUS DURING PREGNANCY IN FIRST TRIMESTER: Primary | ICD-10-CM

## 2022-07-13 DIAGNOSIS — O24.011 PRE-EXISTING TYPE 1 DIABETES MELLITUS DURING PREGNANCY IN FIRST TRIMESTER: ICD-10-CM

## 2022-07-13 DIAGNOSIS — E11.43 GASTROPARESIS DUE TO DM: Chronic | ICD-10-CM

## 2022-07-13 DIAGNOSIS — K31.84 GASTROPARESIS DUE TO DM: Chronic | ICD-10-CM

## 2022-07-13 PROCEDURE — 76811 PR US, OB FETAL EVAL & EXAM, TRANSABDOM,FIRST GESTATION: ICD-10-PCS | Mod: S$GLB,,, | Performed by: OBSTETRICS & GYNECOLOGY

## 2022-07-13 PROCEDURE — 3051F HG A1C>EQUAL 7.0%<8.0%: CPT | Mod: CPTII,S$GLB,, | Performed by: OBSTETRICS & GYNECOLOGY

## 2022-07-13 PROCEDURE — 99215 OFFICE O/P EST HI 40 MIN: CPT | Mod: TH,25,S$GLB, | Performed by: OBSTETRICS & GYNECOLOGY

## 2022-07-13 PROCEDURE — 3051F PR MOST RECENT HEMOGLOBIN A1C LEVEL 7.0 - < 8.0%: ICD-10-PCS | Mod: CPTII,S$GLB,, | Performed by: OBSTETRICS & GYNECOLOGY

## 2022-07-13 PROCEDURE — 3008F BODY MASS INDEX DOCD: CPT | Mod: CPTII,S$GLB,, | Performed by: OBSTETRICS & GYNECOLOGY

## 2022-07-13 PROCEDURE — 76817 PR US, OB, TRANSVAG APPROACH: ICD-10-PCS | Mod: S$GLB,,, | Performed by: OBSTETRICS & GYNECOLOGY

## 2022-07-13 PROCEDURE — 99215 PR OFFICE/OUTPT VISIT, EST, LEVL V, 40-54 MIN: ICD-10-PCS | Mod: TH,25,S$GLB, | Performed by: OBSTETRICS & GYNECOLOGY

## 2022-07-13 PROCEDURE — 3079F DIAST BP 80-89 MM HG: CPT | Mod: CPTII,S$GLB,, | Performed by: OBSTETRICS & GYNECOLOGY

## 2022-07-13 PROCEDURE — 1159F PR MEDICATION LIST DOCUMENTED IN MEDICAL RECORD: ICD-10-PCS | Mod: CPTII,S$GLB,, | Performed by: OBSTETRICS & GYNECOLOGY

## 2022-07-13 PROCEDURE — 4010F PR ACE/ARB THEARPY RXD/TAKEN: ICD-10-PCS | Mod: CPTII,S$GLB,, | Performed by: OBSTETRICS & GYNECOLOGY

## 2022-07-13 PROCEDURE — 3077F SYST BP >= 140 MM HG: CPT | Mod: CPTII,S$GLB,, | Performed by: OBSTETRICS & GYNECOLOGY

## 2022-07-13 PROCEDURE — 76811 OB US DETAILED SNGL FETUS: CPT | Mod: S$GLB,,, | Performed by: OBSTETRICS & GYNECOLOGY

## 2022-07-13 PROCEDURE — 76817 TRANSVAGINAL US OBSTETRIC: CPT | Mod: S$GLB,,, | Performed by: OBSTETRICS & GYNECOLOGY

## 2022-07-13 PROCEDURE — 3079F PR MOST RECENT DIASTOLIC BLOOD PRESSURE 80-89 MM HG: ICD-10-PCS | Mod: CPTII,S$GLB,, | Performed by: OBSTETRICS & GYNECOLOGY

## 2022-07-13 PROCEDURE — 4010F ACE/ARB THERAPY RXD/TAKEN: CPT | Mod: CPTII,S$GLB,, | Performed by: OBSTETRICS & GYNECOLOGY

## 2022-07-13 PROCEDURE — 3008F PR BODY MASS INDEX (BMI) DOCUMENTED: ICD-10-PCS | Mod: CPTII,S$GLB,, | Performed by: OBSTETRICS & GYNECOLOGY

## 2022-07-13 PROCEDURE — 1159F MED LIST DOCD IN RCRD: CPT | Mod: CPTII,S$GLB,, | Performed by: OBSTETRICS & GYNECOLOGY

## 2022-07-13 PROCEDURE — 3077F PR MOST RECENT SYSTOLIC BLOOD PRESSURE >= 140 MM HG: ICD-10-PCS | Mod: CPTII,S$GLB,, | Performed by: OBSTETRICS & GYNECOLOGY

## 2022-07-13 RX ORDER — INSULIN ASPART 100 [IU]/ML
INJECTION, SOLUTION INTRAVENOUS; SUBCUTANEOUS
COMMUNITY
Start: 2022-07-12 | End: 2022-08-30 | Stop reason: SDUPTHER

## 2022-07-13 RX ORDER — PROGESTERONE 200 MG/1
200 CAPSULE ORAL NIGHTLY
Qty: 30 CAPSULE | Refills: 4 | Status: SHIPPED | OUTPATIENT
Start: 2022-07-13 | End: 2022-10-31

## 2022-07-13 RX ORDER — LISINOPRIL 20 MG/1
20 TABLET ORAL
COMMUNITY
End: 2022-07-13

## 2022-07-13 RX ORDER — GLUCAGON HYDROCHLORIDE 1 MG
KIT INJECTION
COMMUNITY
Start: 2022-05-02

## 2022-07-13 RX ORDER — HYDRALAZINE HYDROCHLORIDE 25 MG/1
50 TABLET, FILM COATED ORAL EVERY 8 HOURS
Qty: 90 TABLET | Refills: 11 | Status: ON HOLD | OUTPATIENT
Start: 2022-07-13 | End: 2022-10-05 | Stop reason: HOSPADM

## 2022-07-13 NOTE — ASSESSMENT & PLAN NOTE
Please see original consultation for full counseling and plan.  Current medications-    Hydralazine 25 mg t.i.d.    Labetalol 200 mg b.i.d.    Procardia 90 mg XL BID    BPs today noted to be above threshold.  Patient denies any previous symptoms.  However, she does continue to complain lower extremity swelling.  I once again counseled patient regarding the extremely high risk of worsening blood pressure issues as well as kidney disease in pregnancy given her uncontrolled baseline comorbidities.  I voiced my concern that she can develop previable or periviable complications that may require a delivery for maternal health.  Both patient and mother voiced understanding.    Recommendations (Please refer to [facility] guidelines):  - Continue  aspirin 81 mg daily for preeclampsia risk reduction  - Given elevated BPs, will increased Hydralazine to 50mg TID. All other meds remain the same.    - Patient to have primary OB provider appointment in 1 week for blood pressure check please reach out to [facility] if blood pressures above threshold below (provider made aware)   - Patient should also have CBC/CMP and PCR ordered at that time.  -Serial fetal growth ultrasounds every 4-6 weeks for remainder of pregnancy.   -Continued close observation of patient's blood pressures. Avoid hypotension as this has been associated with uteroplacental insufficiency.  -Recommend treatment to a goal blood pressure < 130/80 given multiple comorbidities and end organ damage.  - Twice weekly antepartum testing at 32 weeks (NST / BPP) due to CHTN+DM     Delivery timing:  Uncontrolled or requiring ? 2 medications: 36 0/7 - 37 6/7 weeks gestation (CURRENTLY)    Delivery may be recommended earlier pending results of fetal growth ultrasounds, AFV assessment, or antepartum testing results.

## 2022-07-13 NOTE — PROGRESS NOTES
"Maternal Fetal Medicine follow up consult    SUBJECTIVE:     Alicia Valles is a 27 y.o.  female with IUP at 19w4d who is seen in follow up consultation by MFM.  Pregnancy complications include:   Problem   Short Cervical Length During Pregnancy in Second Trimester   Gastroparesis Due to DM   Proteinuria   Hypertension Affecting Pregnancy, Antepartum   Pre-Existing Type 1 Diabetes Mellitus During Pregnancy in First Trimester       Previous notes reviewed.   No changes to medical, surgical, family, social, or obstetric history.    Interval history since last MFM visit:   Patient denies any contractions/cramping, vaginal bleeding or leakage of fluid.    Medications:  Current Outpatient Medications   Medication Instructions    acetaminophen (TYLENOL) 2,000 mg, Oral, Daily PRN    BD ULTRA-FINE LIGIA PEN NEEDLE 32 gauge x 5/32" Ndle To inject insulin 4-6 times daily.    blood-glucose meter,continuous (DEXCOM G6 ) Misc 1 Device, Misc.(Non-Drug; Combo Route), Once    blood-glucose sensor (DEXCOM G6 SENSOR) Sandrine 3 Devices, Misc.(Non-Drug; Combo Route), Every 30 days    blood-glucose transmitter (DEXCOM G6 TRANSMITTER) Sandrine 2 Devices, Misc.(Non-Drug; Combo Route), Every 6 months    diphenhydrAMINE (BENADRYL) 25 mg, Oral, Daily PRN    docusate sodium (COLACE) 100 mg, Oral, Daily    doxylamine succinate (UNISOM, DOXYLAMINE, ORAL) 1 capsule, Oral, Nightly PRN    enoxaparin (LOVENOX) 40 mg, Subcutaneous, Nightly    famotidine (PEPCID) 40 mg, Oral, Nightly    GLUCAGEN HYPOKIT 1 mg SolR USE AS DIRECTED FOR HYPOGLYCEMIA INJECTION AS NEEDED    glucagon (BAQSIMI) 3 mg/actuation Spry 1 each, Nasal, As needed (PRN)    hydrALAZINE (APRESOLINE) 50 mg, Oral, Every 8 hours    insulin admin supplies (INPEN, NOVOLOG OR FIASP, PINK) InPn Use device to inject insulin. Dispense one kit.    insulin aspart U-100 (NOVOLOG PENFILL U-100 INSULIN) 100 unit/mL Crtg ICR: 1:8 units three times daily with meals and ICR " 1:12 with snacks plus correction. Up to 40 units daily. For use with InPen    labetaloL (NORMODYNE) 200 mg, Oral, Every 12 hours    LANTUS U-100 INSULIN 8 Units, Subcutaneous, 2 times daily    metoclopramide HCl (REGLAN) 10 mg, Oral, 4 times daily    NIFEdipine (PROCARDIA-XL) 90 mg, Oral, 2 times daily    NOVOLOG FLEXPEN U-100 INSULIN 100 unit/mL (3 mL) InPn pen Subcutaneous    PNV,calcium 72/iron/folic acid (PRENATAL VITAMIN) Tab 1 tablet, Oral, Daily    progesterone (PROMETRIUM) 200 mg, Oral, Nightly    promethazine (PHENERGAN) 25 mg, Oral, Every 4 hours    promethazine (PHENERGAN) 25 mg, Oral, Every 4 hours PRN    promethazine (PHENERGAN) 25 mg, Rectal, Every 6 hours PRN    pyridoxine (vitamin B6) (B-6) 25 mg, Oral, Daily    sertraline (ZOLOFT) 50 mg, Oral, Daily       Care team members:  Michelle - Primary OB provider  Dr. Alcantara - Endocrine     OBJECTIVE:   BP (!) 155/84   Wt 68.1 kg (150 lb 0.4 oz)   LMP 2022 (Approximate)   BMI 28.35 kg/m²     Physical Exam  Deferred    Ultrasound performed. See viewpoint for full ultrasound report.  A detailed fetal anatomic ultrasound examination was performed for the following high risk indication: T1DM.   No fetal structural malformations are identified; however, fetal imaging is incomplete today.   Fetal size today is consistent with established gestational age ( g).   Transvaginal cervical length is short - 19mm at shortest length  Placental location is anterior without evidence of previa.  A follow-up study will be scheduled to complete the fetal anatomic survey.     Significant labs/imaging:  Lab Results   Component Value Date    HGBA1C 7.4 (H) 2022       ASSESSMENT/PLAN:     27 y.o.  female with IUP at 19w4d    Pre-existing type 1 diabetes mellitus during pregnancy in first trimester  This is being managed by Endocrine, Dr. Alcantara.   Fetal Echo previously scheduled.    Hypertension affecting pregnancy, antepartum  Please see  original consultation for full counseling and plan.  Current medications-    Hydralazine 25 mg t.i.d.    Labetalol 200 mg b.i.d.    Procardia 90 mg XL BID    BPs today noted to be above threshold.  Patient denies any previous symptoms.  However, she does continue to complain lower extremity swelling.  I once again counseled patient regarding the extremely high risk of worsening blood pressure issues as well as kidney disease in pregnancy given her uncontrolled baseline comorbidities.  I voiced my concern that she can develop previable or periviable complications that may require a delivery for maternal health.  Both patient and mother voiced understanding.    Recommendations (Please refer to [facility] guidelines):  - Continue  aspirin 81 mg daily for preeclampsia risk reduction  - Given elevated BPs, will increased Hydralazine to 50mg TID. All other meds remain the same.    - Patient to have primary OB provider appointment in 1 week for blood pressure check please reach out to [facility] if blood pressures above threshold below (provider made aware)   - Patient should also have CBC/CMP and PCR ordered at that time.  -Serial fetal growth ultrasounds every 4-6 weeks for remainder of pregnancy.   -Continued close observation of patient's blood pressures. Avoid hypotension as this has been associated with uteroplacental insufficiency.  -Recommend treatment to a goal blood pressure < 130/80 given multiple comorbidities and end organ damage.  - Twice weekly antepartum testing at 32 weeks (NST / BPP) due to CHTN+DM     Delivery timing:  Uncontrolled or requiring ? 2 medications: 36 0/7 - 37 6/7 weeks gestation (CURRENTLY)    Delivery may be recommended earlier pending results of fetal growth ultrasounds, AFV assessment, or antepartum testing results.      Short cervical length during pregnancy in second trimester  On today's ultrasound, a short cervix is identified. I counseled the patient regarding the potential implications  of a short cervix including the risk of  birth. We reviewed the decreased incidence of  birth in women with a short cervix without a history or  birth who were given nightly vaginal progesterone (prometrium 200mg). Patient was counseled to avoid heavy lifting and moderate to high impact exercise. Pelvic rest can be considered but does not affect  birth risk. Bedrest is not recommended due to risk of thromboembolism and no proven benefit with respect to  delivery. I counseled the patient regarding the signs and symptoms or  labor. We reviewed the thresholds of viability and reviewed prematurity complications. We discussed that based on her current cervical length, closed cervix, and no history of a prior  birth that she is not currently a cerclage candidate. We discussed that if the cervix were to dilate, cerclage could be considered up until a gestational age of 22 weeks 6 days.     Recommendations:   Start vaginal progesterone 200 mg qhs nightly (a prescription was sent to her pharmacy on file)   Repeat CL in 1/2 weeks.   Avoid moderate to high impact exercise and heavy lifting   Strict PTL precautions reviewed with patient   Follow-up with primary OB in 1-2 weeks cervical exam   If any concern for cervical dilation prior to 22 weeks and 6 days, and patient does not have PTL symptoms or significant bleeding, please contact MFM for evaluation for physical exam indicated cerclage    Proteinuria  Nephrotic range proteinuria noted.  This was likely due to an organ damage effects from her uncontrolled diabetes +/- chronic hypertension.  Will plan to refer patient to nephrology to establish long-term care.  Patient was counseled regarding the increased risks in pregnancy related to proteinuria.  Risks include increased risk of DVTs.    Recommendations:   Continue prophylactic Lovenox for remainder pregnancy.   Nephrology referral placed today.      Gastroparesis  due to DM  Likely due to end-organ damage from diabetes.  She recently saw a GI physician (Gladys) who agreed with continued dietary counseling and Reglan.  No further workup was recommended at this time and will be performed once she is not pregnant.      FOLLOW UP:   A follow up ultrasound was made for 4 weeks from today. A follow up MFM MD visit was made for 2 and 4 weeks from today.       The patient was given an opportunity to ask questions about the management of her high risk pregnancy problems. She expressed an understanding of and agreement to the above impression and plan. All questions were answered to her satisfaction.    55 minutes of total time spent on the encounter, which includes face to face time and non-face to face time preparing to see the patient (eg, review of tests), obtaining and/or reviewing separately obtained history, documenting clinical information in the electronic or other health record, independently interpreting results (not separately reported) and communicating results to the patient/family/caregiver, or care coordination (not separately reported).        Jose Martin Funes MD   Maternal-Fetal Medicine      Electronically Signed by Jose Martin Funes July 13, 2022

## 2022-07-13 NOTE — ASSESSMENT & PLAN NOTE
On today's ultrasound, a short cervix is identified. I counseled the patient regarding the potential implications of a short cervix including the risk of  birth. We reviewed the decreased incidence of  birth in women with a short cervix without a history or  birth who were given nightly vaginal progesterone (prometrium 200mg). Patient was counseled to avoid heavy lifting and moderate to high impact exercise. Pelvic rest can be considered but does not affect  birth risk. Bedrest is not recommended due to risk of thromboembolism and no proven benefit with respect to  delivery. I counseled the patient regarding the signs and symptoms or  labor. We reviewed the thresholds of viability and reviewed prematurity complications. We discussed that based on her current cervical length, closed cervix, and no history of a prior  birth that she is not currently a cerclage candidate. We discussed that if the cervix were to dilate, cerclage could be considered up until a gestational age of 22 weeks 6 days.     Recommendations:   Start vaginal progesterone 200 mg qhs nightly (a prescription was sent to her pharmacy on file)   Repeat CL in 1/2 weeks.   Avoid moderate to high impact exercise and heavy lifting   Strict PTL precautions reviewed with patient   Follow-up with primary OB in 1-2 weeks cervical exam   If any concern for cervical dilation prior to 22 weeks and 6 days, and patient does not have PTL symptoms or significant bleeding, please contact M for evaluation for physical exam indicated cerclage

## 2022-07-13 NOTE — ASSESSMENT & PLAN NOTE
Nephrotic range proteinuria noted.  This was likely due to an organ damage effects from her uncontrolled diabetes +/- chronic hypertension.  Will plan to refer patient to nephrology to establish long-term care.  Patient was counseled regarding the increased risks in pregnancy related to proteinuria.  Risks include increased risk of DVTs.    Recommendations:   Continue prophylactic Lovenox for remainder pregnancy.   Nephrology referral placed today.

## 2022-07-13 NOTE — ASSESSMENT & PLAN NOTE
Likely due to end-organ damage from diabetes.  She recently saw a GI physician (Gladys) who agreed with continued dietary counseling and Reglan.  No further workup was recommended at this time and will be performed once she is not pregnant.

## 2022-07-13 NOTE — TELEPHONE ENCOUNTER
Called [facility] stating unfortunately the clinic does not control MFM schedule. Pt can  be worked in  for an appt to be seen . If emergent Pt can go to nearest L&D to be seen. If any questions to please contact clinic.

## 2022-07-19 ENCOUNTER — TELEPHONE (OUTPATIENT)
Dept: PRIMARY CARE CLINIC | Facility: CLINIC | Age: 27
End: 2022-07-19
Payer: MEDICAID

## 2022-07-19 NOTE — TELEPHONE ENCOUNTER
----- Message from Patricia Felder sent at 7/18/2022  2:27 PM CDT -----  Contact: PT  Type: Needs Medical Advice    Who Called: Pt  Best Call Back Number: [phone]  Additional  Information: Pt asking for referral to be put in for her too see a Dermatologist with [facility] in [place], that has earliest availablility  Please Advise- Thank you

## 2022-07-21 ENCOUNTER — ROUTINE PRENATAL (OUTPATIENT)
Dept: OBSTETRICS AND GYNECOLOGY | Facility: CLINIC | Age: 27
End: 2022-07-21
Payer: MEDICAID

## 2022-07-21 ENCOUNTER — PROCEDURE VISIT (OUTPATIENT)
Dept: MATERNAL FETAL MEDICINE | Facility: CLINIC | Age: 27
End: 2022-07-21
Payer: MEDICAID

## 2022-07-21 ENCOUNTER — PATIENT MESSAGE (OUTPATIENT)
Dept: MATERNAL FETAL MEDICINE | Facility: CLINIC | Age: 27
End: 2022-07-21

## 2022-07-21 ENCOUNTER — LAB VISIT (OUTPATIENT)
Dept: LAB | Facility: CLINIC | Age: 27
End: 2022-07-21
Payer: MEDICAID

## 2022-07-21 VITALS
BODY MASS INDEX: 22.64 KG/M2 | SYSTOLIC BLOOD PRESSURE: 128 MMHG | DIASTOLIC BLOOD PRESSURE: 71 MMHG | WEIGHT: 119.81 LBS

## 2022-07-21 VITALS — SYSTOLIC BLOOD PRESSURE: 126 MMHG | WEIGHT: 147 LBS | BODY MASS INDEX: 27.78 KG/M2 | DIASTOLIC BLOOD PRESSURE: 78 MMHG

## 2022-07-21 DIAGNOSIS — O24.011 PRE-EXISTING TYPE 1 DIABETES MELLITUS DURING PREGNANCY IN FIRST TRIMESTER: ICD-10-CM

## 2022-07-21 DIAGNOSIS — O16.9 HYPERTENSION AFFECTING PREGNANCY, ANTEPARTUM: ICD-10-CM

## 2022-07-21 DIAGNOSIS — Z3A.20 20 WEEKS GESTATION OF PREGNANCY: ICD-10-CM

## 2022-07-21 DIAGNOSIS — Z3A.20 20 WEEKS GESTATION OF PREGNANCY: Primary | ICD-10-CM

## 2022-07-21 DIAGNOSIS — Z36.89 ENCOUNTER FOR ULTRASOUND TO ASSESS FETAL GROWTH: ICD-10-CM

## 2022-07-21 DIAGNOSIS — O16.9 HYPERTENSION AFFECTING PREGNANCY, ANTEPARTUM: Primary | ICD-10-CM

## 2022-07-21 PROCEDURE — 59426 PR ANTEPARTUM CARE ONLY, >7 VISITS: ICD-10-PCS | Mod: TH,S$GLB,, | Performed by: OBSTETRICS & GYNECOLOGY

## 2022-07-21 PROCEDURE — 86592 SYPHILIS TEST NON-TREP QUAL: CPT | Performed by: NURSE PRACTITIONER

## 2022-07-21 PROCEDURE — 36415 COLL VENOUS BLD VENIPUNCTURE: CPT | Mod: ,,, | Performed by: STUDENT IN AN ORGANIZED HEALTH CARE EDUCATION/TRAINING PROGRAM

## 2022-07-21 PROCEDURE — 87491 CHLMYD TRACH DNA AMP PROBE: CPT | Performed by: NURSE PRACTITIONER

## 2022-07-21 PROCEDURE — 59425 PR ANTEPARTUM CARE ONLY, 4-6 VISITS: ICD-10-PCS | Mod: TH,S$GLB,, | Performed by: NURSE PRACTITIONER

## 2022-07-21 PROCEDURE — 36415 PR COLLECTION VENOUS BLOOD,VENIPUNCTURE: ICD-10-PCS | Mod: ,,, | Performed by: STUDENT IN AN ORGANIZED HEALTH CARE EDUCATION/TRAINING PROGRAM

## 2022-07-21 PROCEDURE — 86762 RUBELLA ANTIBODY: CPT | Performed by: NURSE PRACTITIONER

## 2022-07-21 PROCEDURE — 76817 US MFM PROCEDURE (VIEWPOINT): ICD-10-PCS | Mod: S$GLB,,, | Performed by: OBSTETRICS & GYNECOLOGY

## 2022-07-21 PROCEDURE — 59425 ANTEPARTUM CARE ONLY: CPT | Mod: TH,S$GLB,, | Performed by: NURSE PRACTITIONER

## 2022-07-21 PROCEDURE — 59426 ANTEPARTUM CARE ONLY: CPT | Mod: TH,S$GLB,, | Performed by: OBSTETRICS & GYNECOLOGY

## 2022-07-21 PROCEDURE — 80074 ACUTE HEPATITIS PANEL: CPT | Performed by: NURSE PRACTITIONER

## 2022-07-21 PROCEDURE — 76817 TRANSVAGINAL US OBSTETRIC: CPT | Mod: S$GLB,,, | Performed by: OBSTETRICS & GYNECOLOGY

## 2022-07-21 PROCEDURE — 87591 N.GONORRHOEAE DNA AMP PROB: CPT | Performed by: NURSE PRACTITIONER

## 2022-07-21 PROCEDURE — 87086 URINE CULTURE/COLONY COUNT: CPT | Performed by: NURSE PRACTITIONER

## 2022-07-21 RX ORDER — NAPROXEN SODIUM 220 MG/1
81 TABLET, FILM COATED ORAL DAILY
Qty: 100 TABLET | Refills: 3 | Status: SHIPPED | OUTPATIENT
Start: 2022-07-21 | End: 2022-10-31

## 2022-07-21 NOTE — PROGRESS NOTES
Patient seen today because she was referred by Maternal-Fetal Medicine to have her cervix checked.  She is being treated with Prometrium because her 1st couple of ultrasound showed that her cervix is shortened.  This is her 1st pregnancy so she has no prior pregnancy problems.  Ultrasound done by Maternal-Fetal Medicine today showed some possible slight funneling of the internal cervix and they wanted to make sure that the external cervix is not dilating.  On cervix checked today by me her external cervix is completely closed and cervix felt fairly firm and I could not feel any funneling on the cervix today.  Patient understands how we were following her and she will return to Maternal-Fetal Medicine for further ultrasounds in 1 week.

## 2022-07-21 NOTE — PROGRESS NOTES
2022  27 y.o. 20w5d Estimated Date of Delivery: 12/3/22, dating reviewed.   OB History    Para Term  AB Living   3 0 0 0 2     SAB IAB Ectopic Multiple Live Births   0 0 0          # Outcome Date GA Lbr Alireza/2nd Weight Sex Delivery Anes PTL Lv   3 Current            2 AB            1 AB              The patient presents with complaints of   Chief Complaint   Patient presents with    Routine Prenatal Visit     , BP check       Reports: Good fetal movements reported. No Bleeding or contractions . She is doing fairly well.  She is taking prenatal vitamins. Ms. Valles   is adjusting well and has a good support system of family and friends. She is coping with pregnancy and having no difficulty with sleep.  She presents today for blood pressure follow-up after being started on medication by [facility].  Blood pressure today is stable and controlled.  She has an ultrasound today to recheck her cervical length.  No exam today.  [facility] is managing her blood pressures.  Endocrinology is managing her blood sugars.  No signs of any acute distress today.  Informed patient glucosuria is noted.    Prenatal labs reviewed and updated today    Review of Systems:  General ROS: negative for headache or visual changes  Breast ROS: negative for breast lumps  Gastrointestinal ROS: negative for constipation, diarrhea or nausea/vomiting  Musculoskeletal ROS: negative for pain in joints or swelling in face or hands.   Neurological ROS: negative for - headaches, numbness/tingling or visual changes      Physical Exam:  /78   Wt 66.7 kg (147 lb)   LMP 2022 (Approximate)   BMI 27.78 kg/m²       Constitutional: She is oriented to person, place, and time. She appears well-developed and well-nourished. No distress.     Pulmonary/Chest: Effort normal. No respiratory distress  Abdominal: Soft, gravid, nontender. No rebound and no guarding.   Genitourinary: Deferred   Musculoskeletal: Normal range of motion, Minimal  "peripheral edema.   Neurological: She is alert and oriented to person, place, and time. Coordination normal.   Skin: Skin is warm and dry. She is not diaphoretic.  Psychiatric: She has a normal mood and affect.        Assessment:  Overall doing well.   27 y.o., at 20w5d Gestation   Patient Active Problem List   Diagnosis    Type 1 diabetes mellitus with other specified complication    First trimester pregnancy    Anemia    Proteinuria    Anxiety    Gastroparesis due to DM    Spotting in early pregnancy    UTI in pregnancy, antepartum, first trimester    Leukocytosis    12 weeks gestation of pregnancy    Nausea and vomiting during pregnancy    Hypertension affecting pregnancy, antepartum    Pre-existing type 1 diabetes mellitus during pregnancy in first trimester    Short cervical length during pregnancy in second trimester     Current Outpatient Medications on File Prior to Visit   Medication Sig Dispense Refill    acetaminophen (TYLENOL) 500 MG tablet Take 2,000 mg by mouth daily as needed for Pain.      BD ULTRA-FINE LIGIA PEN NEEDLE 32 gauge x 5/32" Ndle To inject insulin 4-6 times daily. 100 each 3    blood-glucose sensor (DEXCOM G6 SENSOR) Sandrine 3 Devices by Misc.(Non-Drug; Combo Route) route every 30 days. 1 each 5    blood-glucose transmitter (DEXCOM G6 TRANSMITTER) Sandrine 2 Devices by Misc.(Non-Drug; Combo Route) route every 6 (six) months. 1 each 5    diphenhydrAMINE (BENADRYL) 25 mg capsule Take 25 mg by mouth daily as needed for Insomnia ((if Unisom ineffective)).      docusate sodium (COLACE) 100 MG capsule Take 100 mg by mouth once daily.      doxylamine succinate (UNISOM, DOXYLAMINE, ORAL) Take 1 capsule by mouth nightly as needed (Sleep).      enoxaparin (LOVENOX) 40 mg/0.4 mL Syrg Inject 0.4 mLs (40 mg total) into the skin every evening. 30 each 11    famotidine (PEPCID) 40 MG tablet Take 1 tablet (40 mg total) by mouth every evening. 30 tablet 10    GLUCAGEN HYPOKIT 1 mg SolR USE " AS DIRECTED FOR HYPOGLYCEMIA INJECTION AS NEEDED      glucagon (BAQSIMI) 3 mg/actuation Spry 1 each by Nasal route as needed (For Hypoglycemia). 2 each 3    hydrALAZINE (APRESOLINE) 25 MG tablet Take 2 tablets (50 mg total) by mouth every 8 (eight) hours. 90 tablet 11    insulin admin supplies (INPEN, NOVOLOG OR FIASP, PINK) InPn Use device to inject insulin. Dispense one kit. 1 each 0    insulin aspart U-100 (NOVOLOG PENFILL U-100 INSULIN) 100 unit/mL Crtg ICR: 1:8 units three times daily with meals and ICR 1:12 with snacks plus correction. Up to 40 units daily. For use with InPen 12 mL 11    labetaloL (NORMODYNE) 200 MG tablet Take 1 tablet (200 mg total) by mouth every 12 (twelve) hours. 60 tablet 11    metoclopramide HCl (REGLAN) 10 MG tablet Take 1 tablet (10 mg total) by mouth 4 (four) times daily. 120 tablet 2    NIFEdipine (PROCARDIA-XL) 90 MG (OSM) 24 hr tablet Take 1 tablet (90 mg total) by mouth 2 (two) times a day. 30 tablet 11    NOVOLOG FLEXPEN U-100 INSULIN 100 unit/mL (3 mL) InPn pen Inject into the skin.      progesterone (PROMETRIUM) 200 MG capsule Take 1 capsule (200 mg total) by mouth nightly. 30 capsule 4    promethazine (PHENERGAN) 25 MG suppository Place 1 suppository (25 mg total) rectally every 6 (six) hours as needed for Nausea. 12 suppository 1    promethazine (PHENERGAN) 25 MG tablet Take 1 tablet (25 mg total) by mouth every 4 (four) hours. 60 tablet 0    sertraline (ZOLOFT) 50 MG tablet Take 1 tablet (50 mg total) by mouth once daily. 30 tablet 2    blood-glucose meter,continuous (DEXCOM G6 ) Misc 1 Device by Misc.(Non-Drug; Combo Route) route once. for 1 dose 1 each 0    insulin glargine (LANTUS U-100 INSULIN) 100 unit/mL injection Inject 8 Units into the skin 2 (two) times a day. (Patient not taking: Reported on 7/21/2022) 4.8 mL 11    PNV,calcium 72/iron/folic acid (PRENATAL VITAMIN) Tab Take 1 tablet by mouth once daily. for 7 days 7 tablet 0    promethazine  (PHENERGAN) 25 MG tablet Take 1 tablet (25 mg total) by mouth every 4 (four) hours as needed for Nausea. (Patient not taking: Reported on 7/21/2022) 180 tablet 1    pyridoxine, vitamin B6, (B-6) 25 MG Tab Take 1 tablet (25 mg total) by mouth once daily. (Patient not taking: Reported on 7/21/2022) 30 tablet 6    [DISCONTINUED] aspirin 81 MG Chew Take 1 tablet (81 mg total) by mouth once daily. 30 tablet 11    [DISCONTINUED] insulin detemir U-100 (LEVEMIR FLEXTOUCH) 100 unit/mL (3 mL) SubQ InPn pen Inject 6 Units into the skin 2 (two) times daily. 3.6 mL 11    [DISCONTINUED] methyldopa (ALDOMET) 250 MG tablet Take 500 mg by mouth 2 (two) times daily.      [DISCONTINUED] pregabalin (LYRICA) 150 MG capsule Take 150 mg by mouth.       No current facility-administered medications on file prior to visit.       20 weeks gestation of pregnancy  -     C. trachomatis/N. gonorrhoeae by AMP DNA [facility]; Urine  -     Urine culture  -     Hepatitis Panel, Acute; Future; Expected date: 07/21/2022  -     RPR; Future; Expected date: 07/21/2022  -     Rubella Antibody, IgG; Future; Expected date: 07/21/2022    Hypertension affecting pregnancy, antepartum    Pre-existing type 1 diabetes mellitus during pregnancy in first trimester         Plan:  Overall doing well  Follow up 4Weeks, bleeding/pain precautions, kick counts, labor precautions discussed.   Continue to monitor blood pressures at home and keep follow-up appointment with M.    Continue management with Endocrinology.    Four week follow-up appointment with MD Dr. Min.

## 2022-07-22 LAB
HAV IGM SERPL QL IA: NEGATIVE
HBV CORE IGM SERPL QL IA: NEGATIVE
HBV SURFACE AG SERPL QL IA: NEGATIVE
HCV AB SERPL QL IA: NEGATIVE
RPR SER QL: NORMAL

## 2022-07-23 LAB — BACTERIA UR CULT: NO GROWTH

## 2022-07-25 LAB
C TRACH DNA SPEC QL NAA+PROBE: NOT DETECTED
N GONORRHOEA DNA SPEC QL NAA+PROBE: NOT DETECTED
RUBV IGG SER-ACNC: 17.7 IU/ML
RUBV IGG SER-IMP: REACTIVE

## 2022-07-27 ENCOUNTER — OFFICE VISIT (OUTPATIENT)
Dept: MATERNAL FETAL MEDICINE | Facility: CLINIC | Age: 27
End: 2022-07-27
Payer: MEDICAID

## 2022-07-27 ENCOUNTER — PROCEDURE VISIT (OUTPATIENT)
Dept: MATERNAL FETAL MEDICINE | Facility: CLINIC | Age: 27
End: 2022-07-27
Payer: MEDICAID

## 2022-07-27 VITALS
WEIGHT: 141.13 LBS | BODY MASS INDEX: 26.66 KG/M2 | SYSTOLIC BLOOD PRESSURE: 136 MMHG | DIASTOLIC BLOOD PRESSURE: 81 MMHG

## 2022-07-27 DIAGNOSIS — Z36.89 ENCOUNTER FOR ULTRASOUND TO ASSESS FETAL GROWTH: ICD-10-CM

## 2022-07-27 DIAGNOSIS — O26.872 SHORT CERVICAL LENGTH DURING PREGNANCY IN SECOND TRIMESTER: Primary | ICD-10-CM

## 2022-07-27 PROCEDURE — 3008F BODY MASS INDEX DOCD: CPT | Mod: CPTII,S$GLB,, | Performed by: OBSTETRICS & GYNECOLOGY

## 2022-07-27 PROCEDURE — 3075F PR MOST RECENT SYSTOLIC BLOOD PRESS GE 130-139MM HG: ICD-10-PCS | Mod: CPTII,S$GLB,, | Performed by: OBSTETRICS & GYNECOLOGY

## 2022-07-27 PROCEDURE — 76815 OB US LIMITED FETUS(S): CPT | Mod: S$GLB,,, | Performed by: OBSTETRICS & GYNECOLOGY

## 2022-07-27 PROCEDURE — 76817 TRANSVAGINAL US OBSTETRIC: CPT | Mod: S$GLB,,, | Performed by: OBSTETRICS & GYNECOLOGY

## 2022-07-27 PROCEDURE — 99212 OFFICE O/P EST SF 10 MIN: CPT | Mod: TH,25,S$GLB, | Performed by: OBSTETRICS & GYNECOLOGY

## 2022-07-27 PROCEDURE — 1159F MED LIST DOCD IN RCRD: CPT | Mod: CPTII,S$GLB,, | Performed by: OBSTETRICS & GYNECOLOGY

## 2022-07-27 PROCEDURE — 1160F PR REVIEW ALL MEDS BY PRESCRIBER/CLIN PHARMACIST DOCUMENTED: ICD-10-PCS | Mod: CPTII,S$GLB,, | Performed by: OBSTETRICS & GYNECOLOGY

## 2022-07-27 PROCEDURE — 76817 PR US, OB, TRANSVAG APPROACH: ICD-10-PCS | Mod: S$GLB,,, | Performed by: OBSTETRICS & GYNECOLOGY

## 2022-07-27 PROCEDURE — 3079F DIAST BP 80-89 MM HG: CPT | Mod: CPTII,S$GLB,, | Performed by: OBSTETRICS & GYNECOLOGY

## 2022-07-27 PROCEDURE — 3079F PR MOST RECENT DIASTOLIC BLOOD PRESSURE 80-89 MM HG: ICD-10-PCS | Mod: CPTII,S$GLB,, | Performed by: OBSTETRICS & GYNECOLOGY

## 2022-07-27 PROCEDURE — 99212 PR OFFICE/OUTPT VISIT, EST, LEVL II, 10-19 MIN: ICD-10-PCS | Mod: TH,25,S$GLB, | Performed by: OBSTETRICS & GYNECOLOGY

## 2022-07-27 PROCEDURE — 76815 PR  US,PREGNANT UTERUS,LIMITED, 1/> FETUSES: ICD-10-PCS | Mod: S$GLB,,, | Performed by: OBSTETRICS & GYNECOLOGY

## 2022-07-27 PROCEDURE — 3051F PR MOST RECENT HEMOGLOBIN A1C LEVEL 7.0 - < 8.0%: ICD-10-PCS | Mod: CPTII,S$GLB,, | Performed by: OBSTETRICS & GYNECOLOGY

## 2022-07-27 PROCEDURE — 4010F ACE/ARB THERAPY RXD/TAKEN: CPT | Mod: CPTII,S$GLB,, | Performed by: OBSTETRICS & GYNECOLOGY

## 2022-07-27 PROCEDURE — 4010F PR ACE/ARB THEARPY RXD/TAKEN: ICD-10-PCS | Mod: CPTII,S$GLB,, | Performed by: OBSTETRICS & GYNECOLOGY

## 2022-07-27 PROCEDURE — 3075F SYST BP GE 130 - 139MM HG: CPT | Mod: CPTII,S$GLB,, | Performed by: OBSTETRICS & GYNECOLOGY

## 2022-07-27 PROCEDURE — 3051F HG A1C>EQUAL 7.0%<8.0%: CPT | Mod: CPTII,S$GLB,, | Performed by: OBSTETRICS & GYNECOLOGY

## 2022-07-27 PROCEDURE — 3008F PR BODY MASS INDEX (BMI) DOCUMENTED: ICD-10-PCS | Mod: CPTII,S$GLB,, | Performed by: OBSTETRICS & GYNECOLOGY

## 2022-07-27 PROCEDURE — 1159F PR MEDICATION LIST DOCUMENTED IN MEDICAL RECORD: ICD-10-PCS | Mod: CPTII,S$GLB,, | Performed by: OBSTETRICS & GYNECOLOGY

## 2022-07-27 PROCEDURE — 1160F RVW MEDS BY RX/DR IN RCRD: CPT | Mod: CPTII,S$GLB,, | Performed by: OBSTETRICS & GYNECOLOGY

## 2022-07-27 NOTE — PROGRESS NOTES
See viewpoint US report  No new complaints.  The patient was given an opportunity to ask questions about management and the diease process.  She expressed an understanding of and agreement to the above impression and plan. All questions were answered to her satisfaction.  She was given contact information to the [facility] clinic to address further concerns.      I spent a total of 10 minutes on the day of the visit. This includes face to face time and non-face to face time preparing to see the patient (eg, review of tests), Obtaining and/or reviewing separately obtained history, Documenting clinical information in the electronic or other health record, Independently interpreting results and communicating results to the patient/family/caregiver, or Care coordination.

## 2022-08-02 DIAGNOSIS — O26.872 SHORT CERVICAL LENGTH DURING PREGNANCY IN SECOND TRIMESTER: Primary | ICD-10-CM

## 2022-08-03 ENCOUNTER — PROCEDURE VISIT (OUTPATIENT)
Dept: MATERNAL FETAL MEDICINE | Facility: CLINIC | Age: 27
End: 2022-08-03
Payer: MEDICAID

## 2022-08-03 ENCOUNTER — OFFICE VISIT (OUTPATIENT)
Dept: MATERNAL FETAL MEDICINE | Facility: CLINIC | Age: 27
End: 2022-08-03
Payer: MEDICAID

## 2022-08-03 DIAGNOSIS — O26.872 SHORT CERVICAL LENGTH DURING PREGNANCY IN SECOND TRIMESTER: ICD-10-CM

## 2022-08-03 PROCEDURE — 76817 PR US, OB, TRANSVAG APPROACH: ICD-10-PCS | Mod: S$GLB,,, | Performed by: OBSTETRICS & GYNECOLOGY

## 2022-08-03 PROCEDURE — 4010F ACE/ARB THERAPY RXD/TAKEN: CPT | Mod: CPTII,S$GLB,, | Performed by: OBSTETRICS & GYNECOLOGY

## 2022-08-03 PROCEDURE — 76817 TRANSVAGINAL US OBSTETRIC: CPT | Mod: S$GLB,,, | Performed by: OBSTETRICS & GYNECOLOGY

## 2022-08-03 PROCEDURE — 3051F PR MOST RECENT HEMOGLOBIN A1C LEVEL 7.0 - < 8.0%: ICD-10-PCS | Mod: CPTII,S$GLB,, | Performed by: OBSTETRICS & GYNECOLOGY

## 2022-08-03 PROCEDURE — 3051F HG A1C>EQUAL 7.0%<8.0%: CPT | Mod: CPTII,S$GLB,, | Performed by: OBSTETRICS & GYNECOLOGY

## 2022-08-03 PROCEDURE — 99212 PR OFFICE/OUTPT VISIT, EST, LEVL II, 10-19 MIN: ICD-10-PCS | Mod: 25,TH,S$GLB, | Performed by: OBSTETRICS & GYNECOLOGY

## 2022-08-03 PROCEDURE — 4010F PR ACE/ARB THEARPY RXD/TAKEN: ICD-10-PCS | Mod: CPTII,S$GLB,, | Performed by: OBSTETRICS & GYNECOLOGY

## 2022-08-03 PROCEDURE — 99212 OFFICE O/P EST SF 10 MIN: CPT | Mod: 25,TH,S$GLB, | Performed by: OBSTETRICS & GYNECOLOGY

## 2022-08-03 NOTE — PROGRESS NOTES
"Maternal Fetal Medicine follow up consult    SUBJECTIVE:     Alicia Valles is a 27 y.o.  female with IUP at 22w4d who is seen in follow up consultation by MFM.  Pregnancy complications include:   Problem   Short Cervical Length During Pregnancy in Second Trimester       Previous notes reviewed.   No changes to medical, surgical, family, social, or obstetric history.    Interval history since last MFM visit:   Patient has no complaints today. She is overall feeling well.  Patient denies any contractions/cramping, vaginal bleeding or leakage of fluid.    Medications:  Current Outpatient Medications   Medication Instructions    acetaminophen (TYLENOL) 2,000 mg, Oral, Daily PRN    aspirin 81 mg, Oral, Daily    BD ULTRA-FINE LIGIA PEN NEEDLE 32 gauge x 532" Ndle To inject insulin 4-6 times daily.    blood-glucose meter,continuous (DEXCOM G6 ) Misc 1 Device, Misc.(Non-Drug; Combo Route), Once    blood-glucose sensor (DEXCOM G6 SENSOR) Sandrine 3 Devices, Misc.(Non-Drug; Combo Route), Every 30 days    blood-glucose transmitter (DEXCOM G6 TRANSMITTER) Sandrine 2 Devices, Misc.(Non-Drug; Combo Route), Every 6 months    diphenhydrAMINE (BENADRYL) 25 mg, Oral, Daily PRN    docusate sodium (COLACE) 100 mg, Oral, Daily    doxylamine succinate (UNISOM, DOXYLAMINE, ORAL) 1 capsule, Oral, Nightly PRN    enoxaparin (LOVENOX) 40 mg, Subcutaneous, Nightly    famotidine (PEPCID) 40 mg, Oral, Nightly    GLUCAGEN HYPOKIT 1 mg SolR USE AS DIRECTED FOR HYPOGLYCEMIA INJECTION AS NEEDED    glucagon (BAQSIMI) 3 mg/actuation Spry 1 each, Nasal, As needed (PRN)    hydrALAZINE (APRESOLINE) 50 mg, Oral, Every 8 hours    insulin admin supplies (INPEN, NOVOLOG OR FIASP, PINK) InPn Use device to inject insulin. Dispense one kit.    insulin aspart U-100 (NOVOLOG PENFILL U-100 INSULIN) 100 unit/mL Crtg ICR: 1:8 units three times daily with meals and ICR 1:12 with snacks plus correction. Up to 40 units daily. For use with " InPen    labetaloL (NORMODYNE) 200 mg, Oral, Every 12 hours    LANTUS U-100 INSULIN 8 Units, Subcutaneous, 2 times daily    metoclopramide HCl (REGLAN) 10 mg, Oral, 4 times daily    NIFEdipine (PROCARDIA-XL) 90 mg, Oral, 2 times daily    NOVOLOG FLEXPEN U-100 INSULIN 100 unit/mL (3 mL) InPn pen Subcutaneous    PNV,calcium 72/iron/folic acid (PRENATAL VITAMIN) Tab 1 tablet, Oral, Daily    progesterone (PROMETRIUM) 200 mg, Oral, Nightly    promethazine (PHENERGAN) 25 mg, Oral, Every 4 hours    promethazine (PHENERGAN) 25 mg, Oral, Every 4 hours PRN    promethazine (PHENERGAN) 25 mg, Rectal, Every 6 hours PRN    pyridoxine (vitamin B6) (B-6) 25 mg, Oral, Daily    sertraline (ZOLOFT) 50 mg, Oral, Daily       Care team members:  Michelle - Primary OB     OBJECTIVE:   LMP 2022 (Approximate)     Physical Exam  NAD  SSE - closed, physiologioc discharge noted  SVE - closed, 50%, high.     Ultrasound performed. See viewpoint for full ultrasound report.  Meier live IUP  Transvaginal cervical length demonstrates shortening compared to prior scan.      ASSESSMENT/PLAN:     27 y.o.  female with IUP at 22w4d    Short cervical length during pregnancy in second trimester  This was a brief visit to perform vaginal exam.  Patient remains on prometrium with no complaints.  Transvaginal cervical length was noted to be shorter compared to last U/S, although still >10 mm.  Physical exam performed does not demonstrate any cervical dilation.   Given this, patient does not qualify for a cerclage.  Due to gestational age, no further cervical lengths are indicated.    Recommendations:   Continue Prometrium   No further cervical length will be performed.   Strict  labor precautions were reviewed.   Care per other comorbidities.    Follow-up ultrasound for completion anatomy as well as MD THOMAS visit will be scheduled in 2 weeks.        Jose Martin Funes MD   Maternal-Fetal Medicine

## 2022-08-03 NOTE — ASSESSMENT & PLAN NOTE
This was a brief visit to perform vaginal exam.  Patient remains on prometrium with no complaints.  Transvaginal cervical length was noted to be shorter compared to last U/S, although still >10 mm.  Physical exam performed does not demonstrate any cervical dilation.   Given this, patient does not qualify for a cerclage.  Due to gestational age, no further cervical lengths are indicated.    Recommendations:   Continue Prometrium   No further cervical length will be performed.   Strict  labor precautions were reviewed.   Care per other comorbidities.    Follow-up ultrasound for completion anatomy as well as MD THOMAS visit will be scheduled in 2 weeks.

## 2022-08-04 ENCOUNTER — PATIENT OUTREACH (OUTPATIENT)
Dept: ADMINISTRATIVE | Facility: HOSPITAL | Age: 27
End: 2022-08-04
Payer: MEDICAID

## 2022-08-05 ENCOUNTER — TELEPHONE (OUTPATIENT)
Dept: NEPHROLOGY | Facility: CLINIC | Age: 27
End: 2022-08-05
Payer: MEDICAID

## 2022-08-09 ENCOUNTER — TELEPHONE (OUTPATIENT)
Dept: ENDOCRINOLOGY | Facility: CLINIC | Age: 27
End: 2022-08-09
Payer: MEDICAID

## 2022-08-09 NOTE — TELEPHONE ENCOUNTER
Left voice mail for patient that she is scheduled for pump clinic if this day an time doesn't work to please let us know.

## 2022-08-09 NOTE — TELEPHONE ENCOUNTER
----- Message from Jimenez Escobedo MD sent at 8/9/2022  8:04 AM CDT -----  Abdulaziz,     I spoke with Dr. Lechuga yesterday regarding this patient. She is a very poorly controlled type 1 DM who is pregnant. She was being followed monthly and missed an appointment. I messaged Dr. Lechuga to get her reestablished, and it appears her appointment was scheduled in November. This is too far out for her, as he insulin requirements can change rapidly in pregnancy and will need close monitoring. Is there any way we can get her scheduled in the next week or two?    Thanks!  Jimenez Escobedo MD  PGY 6  Maternal Fetal Medicine  [facility]

## 2022-08-10 DIAGNOSIS — Z36.89 ENCOUNTER FOR ULTRASOUND TO ASSESS FETAL GROWTH: Primary | ICD-10-CM

## 2022-08-12 ENCOUNTER — TELEPHONE (OUTPATIENT)
Dept: MATERNAL FETAL MEDICINE | Facility: CLINIC | Age: 27
End: 2022-08-12
Payer: MEDICAID

## 2022-08-12 NOTE — TELEPHONE ENCOUNTER
Pt verified self by name, . NKA.  RN called patient to confirm appt. Dr. Dias saw her and we had scheduled her reoccuring appts according to his recommendations.   Last time patient came, she saw Dr. Funes, another appt was scheduled, but we prefer patient to have fetal echo done first before she returns to see Dr. Dias again on . Her fetal echo is scheduled for . Patient agreed to cancel appt for .  Pt verbalized understanding. Agreed to POC w intent to comply.

## 2022-08-18 ENCOUNTER — ROUTINE PRENATAL (OUTPATIENT)
Dept: OBSTETRICS AND GYNECOLOGY | Facility: CLINIC | Age: 27
End: 2022-08-18
Payer: MEDICAID

## 2022-08-18 VITALS — SYSTOLIC BLOOD PRESSURE: 136 MMHG | WEIGHT: 155 LBS | BODY MASS INDEX: 29.29 KG/M2 | DIASTOLIC BLOOD PRESSURE: 84 MMHG

## 2022-08-18 DIAGNOSIS — N76.0 ACUTE VAGINITIS: Primary | ICD-10-CM

## 2022-08-18 PROCEDURE — 59426 ANTEPARTUM CARE ONLY: CPT | Mod: TH,S$GLB,, | Performed by: NURSE PRACTITIONER

## 2022-08-18 PROCEDURE — 59426 PR ANTEPARTUM CARE ONLY, >7 VISITS: ICD-10-PCS | Mod: TH,S$GLB,, | Performed by: NURSE PRACTITIONER

## 2022-08-18 RX ORDER — FLUCONAZOLE 150 MG/1
TABLET ORAL
Qty: 2 TABLET | Refills: 1 | Status: ON HOLD | OUTPATIENT
Start: 2022-08-18 | End: 2022-10-05 | Stop reason: HOSPADM

## 2022-08-18 RX ORDER — ONDANSETRON 4 MG/1
4 TABLET, ORALLY DISINTEGRATING ORAL EVERY 8 HOURS PRN
COMMUNITY
Start: 2022-07-27 | End: 2023-02-08

## 2022-08-18 NOTE — PROGRESS NOTES
2022  27 y.o. 24w5d Estimated Date of Delivery: 12/3/22, dating reviewed.   OB History    Para Term  AB Living   3 0 0 0 2     SAB IAB Ectopic Multiple Live Births   0 0 0          # Outcome Date GA Lbr Alireza/2nd Weight Sex Delivery Anes PTL Lv   3 Current            2 AB            1 AB              The patient presents with complaints of   Chief Complaint   Patient presents with    Routine Prenatal Visit     24 , Patient presents with vaginal irritation/itching.     Vulvar Itch       Reports: Fetal movement is reported as Present No Bleeding or contractions . She is doing well.  She is taking prenatal vitamins. Ms. Valles  Is here today for acute vaganitis only. She reported douching with coconut oil and now has itching.    Review of Systems:  General ROS: negative for headache or visual changes  Breast ROS: negative for breast lumps  Gastrointestinal ROS: negative for constipation, diarrhea or nausea/vomiting  Musculoskeletal ROS: negative for pain in joints or swelling in face or hands.   Neurological ROS: negative for - headaches, numbness/tingling or visual changes      Physical Exam:  /84   Wt 70.3 kg (155 lb)   LMP 2022 (Approximate)   BMI 29.29 kg/m²       Constitutional: She is oriented to person, place, and time. She appears well-developed and well-nourished. No distress.     Pulmonary/Chest: Effort normal. No respiratory distress  Abdominal: Soft, gravid, nontender. No rebound and no guarding.   Genitourinary: Deferred   Musculoskeletal: Normal range of motion, Minimal peripheral edema.   Neurological: She is alert and oriented to person, place, and time. Coordination normal.   Skin: Skin is warm and dry. She is not diaphoretic.  Psychiatric: She has a normal mood and affect.        Assessment:  Overall doing well.   27 y.o., at 24w5d Gestation   Patient Active Problem List   Diagnosis    Type 1 diabetes mellitus with other specified complication    First trimester  "pregnancy    Anemia    Proteinuria    Anxiety    Gastroparesis due to DM    Spotting in early pregnancy    UTI in pregnancy, antepartum, first trimester    Leukocytosis    12 weeks gestation of pregnancy    Nausea and vomiting during pregnancy    Hypertension affecting pregnancy, antepartum    Pre-existing type 1 diabetes mellitus during pregnancy in first trimester    Short cervical length during pregnancy in second trimester     Current Outpatient Medications on File Prior to Visit   Medication Sig Dispense Refill    acetaminophen (TYLENOL) 500 MG tablet Take 2,000 mg by mouth daily as needed for Pain.      aspirin 81 MG Chew Take 1 tablet (81 mg total) by mouth once daily. 100 tablet 3    BD ULTRA-FINE LIGIA PEN NEEDLE 32 gauge x 5/32" Ndle To inject insulin 4-6 times daily. 100 each 3    blood-glucose sensor (DEXCOM G6 SENSOR) Sandrine 3 Devices by Misc.(Non-Drug; Combo Route) route every 30 days. 1 each 5    blood-glucose transmitter (DEXCOM G6 TRANSMITTER) Sandrine 2 Devices by Misc.(Non-Drug; Combo Route) route every 6 (six) months. 1 each 5    diphenhydrAMINE (BENADRYL) 25 mg capsule Take 25 mg by mouth daily as needed for Insomnia ((if Unisom ineffective)).      docusate sodium (COLACE) 100 MG capsule Take 100 mg by mouth once daily.      doxylamine succinate (UNISOM, DOXYLAMINE, ORAL) Take 1 capsule by mouth nightly as needed (Sleep).      enoxaparin (LOVENOX) 40 mg/0.4 mL Syrg Inject 0.4 mLs (40 mg total) into the skin every evening. 30 each 11    GLUCAGEN HYPOKIT 1 mg SolR USE AS DIRECTED FOR HYPOGLYCEMIA INJECTION AS NEEDED      hydrALAZINE (APRESOLINE) 25 MG tablet Take 2 tablets (50 mg total) by mouth every 8 (eight) hours. 90 tablet 11    insulin admin supplies (INPEN, NOVOLOG OR FIASP, PINK) InPn Use device to inject insulin. Dispense one kit. 1 each 0    insulin aspart U-100 (NOVOLOG PENFILL U-100 INSULIN) 100 unit/mL Crtg ICR: 1:8 units three times daily with meals and ICR 1:12 " with snacks plus correction. Up to 40 units daily. For use with InPen 12 mL 11    labetaloL (NORMODYNE) 200 MG tablet Take 1 tablet (200 mg total) by mouth every 12 (twelve) hours. 60 tablet 11    metoclopramide HCl (REGLAN) 10 MG tablet Take 1 tablet (10 mg total) by mouth 4 (four) times daily. 120 tablet 2    NIFEdipine (PROCARDIA-XL) 90 MG (OSM) 24 hr tablet Take 1 tablet (90 mg total) by mouth 2 (two) times a day. 30 tablet 11    NOVOLOG FLEXPEN U-100 INSULIN 100 unit/mL (3 mL) InPn pen Inject into the skin.      ondansetron (ZOFRAN-ODT) 4 MG TbDL Take 4 mg by mouth every 8 (eight) hours as needed.      progesterone (PROMETRIUM) 200 MG capsule Take 1 capsule (200 mg total) by mouth nightly. 30 capsule 4    promethazine (PHENERGAN) 25 MG suppository Place 1 suppository (25 mg total) rectally every 6 (six) hours as needed for Nausea. 12 suppository 1    promethazine (PHENERGAN) 25 MG tablet Take 1 tablet (25 mg total) by mouth every 4 (four) hours. 60 tablet 0    sertraline (ZOLOFT) 50 MG tablet Take 1 tablet (50 mg total) by mouth once daily. 30 tablet 2    blood-glucose meter,continuous (DEXCOM G6 ) Misc 1 Device by Misc.(Non-Drug; Combo Route) route once. for 1 dose 1 each 0    famotidine (PEPCID) 40 MG tablet Take 1 tablet (40 mg total) by mouth every evening. (Patient not taking: Reported on 8/18/2022) 30 tablet 10    glucagon (BAQSIMI) 3 mg/actuation Spry 1 each by Nasal route as needed (For Hypoglycemia). (Patient not taking: Reported on 8/18/2022) 2 each 3    insulin glargine (LANTUS U-100 INSULIN) 100 unit/mL injection Inject 8 Units into the skin 2 (two) times a day. (Patient not taking: Reported on 8/18/2022) 4.8 mL 11    PNV,calcium 72/iron/folic acid (PRENATAL VITAMIN) Tab Take 1 tablet by mouth once daily. for 7 days 7 tablet 0    pyridoxine, vitamin B6, (B-6) 25 MG Tab Take 1 tablet (25 mg total) by mouth once daily. (Patient not taking: Reported on 8/18/2022) 30 tablet 6     [DISCONTINUED] insulin detemir U-100 (LEVEMIR FLEXTOUCH) 100 unit/mL (3 mL) SubQ InPn pen Inject 6 Units into the skin 2 (two) times daily. 3.6 mL 11    [DISCONTINUED] methyldopa (ALDOMET) 250 MG tablet Take 500 mg by mouth 2 (two) times daily.      [DISCONTINUED] pregabalin (LYRICA) 150 MG capsule Take 150 mg by mouth.       No current facility-administered medications on file prior to visit.       Hypertension affecting pregnancy, antepartum    Pre-existing type 1 diabetes mellitus during pregnancy in first trimester    Other orders  -     fluconazole (DIFLUCAN) 150 MG Tab; 1 po Now may repeat in 72 hr  Dispense: 2 tablet; Refill: 1         Plan:  Overall doing well  Follow up as scheduled. Weeks, bleeding/pain precautions, kick counts, labor precautions discussed.   Treat today for yeast.  Keep OB follow-up as scheduled

## 2022-08-22 ENCOUNTER — TELEPHONE (OUTPATIENT)
Dept: ENDOCRINOLOGY | Facility: CLINIC | Age: 27
End: 2022-08-22
Payer: MEDICAID

## 2022-08-22 NOTE — TELEPHONE ENCOUNTER
----- Message from Polina Denton MA sent at 8/19/2022  3:56 PM CDT -----  Regarding: FW: PT ADVICE  Contact: pt @ [phone]    ----- Message -----  From: Alcira Griffin  Sent: 8/19/2022   3:54 PM CDT  To: [facility] Staff  Subject: PT ADVICE                                        Pt asking to speak with someone in Dr. Schumacher's office regarding her pump clinic appt, says she does not have a pump. Please call.

## 2022-08-23 ENCOUNTER — ROUTINE PRENATAL (OUTPATIENT)
Dept: OBSTETRICS AND GYNECOLOGY | Facility: CLINIC | Age: 27
End: 2022-08-23
Payer: MEDICAID

## 2022-08-23 VITALS — WEIGHT: 157 LBS | DIASTOLIC BLOOD PRESSURE: 74 MMHG | BODY MASS INDEX: 29.66 KG/M2 | SYSTOLIC BLOOD PRESSURE: 114 MMHG

## 2022-08-23 DIAGNOSIS — O24.012 PRE-EXISTING TYPE 1 DIABETES MELLITUS DURING PREGNANCY IN SECOND TRIMESTER: ICD-10-CM

## 2022-08-23 DIAGNOSIS — Z3A.25 25 WEEKS GESTATION OF PREGNANCY: Primary | ICD-10-CM

## 2022-08-23 DIAGNOSIS — O09.92 HIGH-RISK PREGNANCY IN SECOND TRIMESTER: ICD-10-CM

## 2022-08-23 DIAGNOSIS — O16.2 HYPERTENSION AFFECTING PREGNANCY IN SECOND TRIMESTER: ICD-10-CM

## 2022-08-23 PROCEDURE — 59426 PR ANTEPARTUM CARE ONLY, >7 VISITS: ICD-10-PCS | Mod: TH,S$GLB,,

## 2022-08-23 PROCEDURE — 59426 ANTEPARTUM CARE ONLY: CPT | Mod: TH,S$GLB,,

## 2022-08-23 RX ORDER — INSULIN GLARGINE 100 [IU]/ML
INJECTION, SOLUTION SUBCUTANEOUS
COMMUNITY
Start: 2022-07-27 | End: 2022-09-14

## 2022-08-23 RX ORDER — INSULIN ASPART 100 [IU]/ML
INJECTION, SOLUTION INTRAVENOUS; SUBCUTANEOUS
COMMUNITY
Start: 2022-07-27 | End: 2022-08-30

## 2022-08-23 RX ORDER — NIFEDIPINE 30 MG/1
30 TABLET, EXTENDED RELEASE ORAL DAILY
Status: ON HOLD | COMMUNITY
Start: 2022-07-29 | End: 2022-10-05 | Stop reason: HOSPADM

## 2022-08-23 RX ORDER — AMOXICILLIN 500 MG/1
500 CAPSULE ORAL 3 TIMES DAILY
Status: ON HOLD | COMMUNITY
Start: 2022-08-19 | End: 2022-10-05 | Stop reason: HOSPADM

## 2022-08-23 NOTE — PROGRESS NOTES
Alicia Valles is a 27 y.o. female  presenting for follow-up of T1DM  08/24/2022     The patient's last visit with me was on 7/6/2022.     History of Present Illness  T1DM  Diagnosed at age 9  Known complications: gastroparesis, nephropathy, retinopathy  Frequent DKA admissions    Estimated Date of Delivery: 12/3/22  Currently 25w4d pregnant    On medtronic pump (got it about a year ago, started it in 4/2022 w/o sensor as she did not like the sensor) but stopped following episode of severe hypoglycemia.      Has been admitted multiple times during pregnancy for uncontrolled glucose and HTN     she suffers from gastroparesis and is still having N/V  Eating several meals throughout the day     Current Diabetes Regimen:  Levemir to 8/6 units BID  novolog w/ ICR of 1:9   ISF of 50 >120       Timing of prandial insulin: 15 min before eating  Omitted doses: denies missing doses and reports injecting insulin for all carb intake    Diet/Exercise: >3 meals a day + snacks  recommended gastroparesis diet    Last Hgb A1C:  Lab Results   Component Value Date    HGBA1C 7.4 (H) 06/08/2022         Glucose Monitoring:  Meter/CGM: Dexcom G6- in media  avg 170  Global hyperglycemia, steady overnight    Hypoglycemic Episodes: denies    DKA: multiple episodes in past    Screening / DM Complications:    Nephropathy:  ACEi/ARB: Not taking  No results found for: MICALBCREAT    Last Lipid Panel:  Statin: Not taking  No results found for: LDLCALC    Last foot exam : 07/06/2022  Last eye exam Most Recent Eye Exam Date: Not Found;  no laser surgery or DR    Lab Results   Component Value Date    TSH 1.570 05/05/2022       No results found for: TTGIGA          Current Outpatient Medications:     acetaminophen (TYLENOL) 500 MG tablet, Take 2,000 mg by mouth daily as needed for Pain., Disp: , Rfl:     amoxicillin (AMOXIL) 500 MG capsule, Take 500 mg by mouth 3 (three) times daily., Disp: , Rfl:     aspirin 81 MG Chew, Take 1 tablet (81 mg  "total) by mouth once daily., Disp: 100 tablet, Rfl: 3    BD ULTRA-FINE LIGIA PEN NEEDLE 32 gauge x 5/32" Ndle, To inject insulin 4-6 times daily., Disp: 100 each, Rfl: 3    blood-glucose meter,continuous (DEXCOM G6 ) Misc, 1 Device by Misc.(Non-Drug; Combo Route) route once. for 1 dose, Disp: 1 each, Rfl: 0    blood-glucose sensor (DEXCOM G6 SENSOR) Sandrine, 3 Devices by Misc.(Non-Drug; Combo Route) route every 30 days., Disp: 1 each, Rfl: 5    blood-glucose transmitter (DEXCOM G6 TRANSMITTER) Sandrine, 2 Devices by Misc.(Non-Drug; Combo Route) route every 6 (six) months., Disp: 1 each, Rfl: 5    diphenhydrAMINE (BENADRYL) 25 mg capsule, Take 25 mg by mouth daily as needed for Insomnia ((if Unisom ineffective))., Disp: , Rfl:     docusate sodium (COLACE) 100 MG capsule, Take 100 mg by mouth once daily., Disp: , Rfl:     doxylamine succinate (UNISOM, DOXYLAMINE, ORAL), Take 1 capsule by mouth nightly as needed (Sleep)., Disp: , Rfl:     enoxaparin (LOVENOX) 40 mg/0.4 mL Syrg, Inject 0.4 mLs (40 mg total) into the skin every evening., Disp: 30 each, Rfl: 11    famotidine (PEPCID) 40 MG tablet, Take 1 tablet (40 mg total) by mouth every evening. (Patient not taking: No sig reported), Disp: 30 tablet, Rfl: 10    fluconazole (DIFLUCAN) 150 MG Tab, 1 po Now may repeat in 72 hr (Patient not taking: Reported on 8/23/2022), Disp: 2 tablet, Rfl: 1    GLUCAGEN HYPOKIT 1 mg SolR, USE AS DIRECTED FOR HYPOGLYCEMIA INJECTION AS NEEDED, Disp: , Rfl:     glucagon (BAQSIMI) 3 mg/actuation Spry, 1 each by Nasal route as needed (For Hypoglycemia). (Patient not taking: Reported on 8/18/2022), Disp: 2 each, Rfl: 3    hydrALAZINE (APRESOLINE) 25 MG tablet, Take 2 tablets (50 mg total) by mouth every 8 (eight) hours., Disp: 90 tablet, Rfl: 11    insulin admin supplies (INPEN, NOVOLOG OR FIASP, PINK) InPn, Use device to inject insulin. Dispense one kit., Disp: 1 each, Rfl: 0    insulin aspart U-100 (NOVOLOG PENFILL U-100 " INSULIN) 100 unit/mL Crtg, ICR: 1:8 units three times daily with meals and ICR 1:12 with snacks plus correction. Up to 40 units daily. For use with InPen, Disp: 12 mL, Rfl: 11    insulin aspart U-100 (NOVOLOG) 100 unit/mL (3 mL) InPn pen, SMARTSI Unit(s) SUB-Q Daily, Disp: , Rfl:     insulin glargine (LANTUS U-100 INSULIN) 100 unit/mL injection, Inject 8 Units into the skin 2 (two) times a day. (Patient not taking: Reported on 2022), Disp: 4.8 mL, Rfl: 11    labetaloL (NORMODYNE) 200 MG tablet, Take 1 tablet (200 mg total) by mouth every 12 (twelve) hours., Disp: 60 tablet, Rfl: 11    LANTUS SOLOSTAR U-100 INSULIN glargine 100 units/mL SubQ pen, SMARTSIG:15 Unit(s) SUB-Q Daily, Disp: , Rfl:     metoclopramide HCl (REGLAN) 10 MG tablet, Take 1 tablet (10 mg total) by mouth 4 (four) times daily., Disp: 120 tablet, Rfl: 2    NIFEdipine (PROCARDIA-XL) 30 MG (OSM) 24 hr tablet, Take 30 mg by mouth once daily., Disp: , Rfl:     NIFEdipine (PROCARDIA-XL) 90 MG (OSM) 24 hr tablet, Take 1 tablet (90 mg total) by mouth 2 (two) times a day., Disp: 30 tablet, Rfl: 11    NOVOLOG FLEXPEN U-100 INSULIN 100 unit/mL (3 mL) InPn pen, Inject into the skin., Disp: , Rfl:     ondansetron (ZOFRAN-ODT) 4 MG TbDL, Take 4 mg by mouth every 8 (eight) hours as needed., Disp: , Rfl:     PNV,calcium 72/iron/folic acid (PRENATAL VITAMIN) Tab, Take 1 tablet by mouth once daily. for 7 days, Disp: 7 tablet, Rfl: 0    progesterone (PROMETRIUM) 200 MG capsule, Take 1 capsule (200 mg total) by mouth nightly., Disp: 30 capsule, Rfl: 4    promethazine (PHENERGAN) 25 MG suppository, Place 1 suppository (25 mg total) rectally every 6 (six) hours as needed for Nausea., Disp: 12 suppository, Rfl: 1    promethazine (PHENERGAN) 25 MG tablet, Take 1 tablet (25 mg total) by mouth every 4 (four) hours., Disp: 60 tablet, Rfl: 0    pyridoxine, vitamin B6, (B-6) 25 MG Tab, Take 1 tablet (25 mg total) by mouth once daily. (Patient not taking:  Reported on 8/18/2022), Disp: 30 tablet, Rfl: 6    sertraline (ZOLOFT) 50 MG tablet, Take 1 tablet (50 mg total) by mouth once daily., Disp: 30 tablet, Rfl: 2    ROS as above    Objective:     There were no vitals filed for this visit.  Wt Readings from Last 3 Encounters:   08/23/22 71.2 kg (157 lb)   08/18/22 70.3 kg (155 lb)   07/27/22 64 kg (141 lb 1.5 oz)     There is no height or weight on file to calculate BMI.  Physical Exam  Constitutional:       Appearance: She is well-developed.   HENT:      Head: Normocephalic.   Eyes:      Conjunctiva/sclera: Conjunctivae normal.   Pulmonary:      Effort: Pulmonary effort is normal.   Musculoskeletal:         General: Normal range of motion.   Skin:     General: Skin is warm.      Findings: No rash.   Neurological:      Mental Status: She is alert and oriented to person, place, and time.         LABS    Chemistry        Component Value Date/Time     (L) 05/26/2022 0507    K 3.9 05/26/2022 0507     05/26/2022 0507    CO2 23 05/26/2022 0507    BUN 16 05/26/2022 0507    CREATININE 1.2 05/26/2022 0507     (H) 05/26/2022 0507        Component Value Date/Time    CALCIUM 7.2 (L) 05/26/2022 0507    ALKPHOS 45 (L) 05/26/2022 0507    AST 18 05/26/2022 0507    ALT 16 05/26/2022 0507    BILITOT 0.1 05/26/2022 0507    ESTGFRAFRICA >60.0 05/26/2022 0507    EGFRNONAA >60.0 05/26/2022 0507              Assessment and Plan     Problem List Items Addressed This Visit        1 - High    Type 1 diabetes mellitus with other specified complication (Chronic)     Uncontrolled with global hyperglycemia.  Patient reports giving NovoLog for all carbohydrate intake and reports that she is giving injections 15 minutes prior to eating.  She also continues to have significant nausea and vomiting daily but denies having any hypoglycemia after meals that she is not able to tolerate.      Given that blood glucose is stable overnight (high in the morning as she is going to bed high)  will hold off on changing basal insulin and increase her prandial insulin by strengthening her carb ratio.  I have also requested that she keep a blood glucose log to monitor 2 hour postprandial values as we would like to get these numbers less than 120.     See updated regimen below.              2     Gastroparesis due to DM (Chronic)     Continue to work on dietary modifications.  In the future if she starts to hypoglycemia from not tolerating meals can consider giving half the bolus prior to eating and the other afterwards.               Patient Instructions   Please send me a picture of your sugar log in 1 week and write down blood sugars 2 hours after meals  [email or web address]     Regimen as of 08/24/2022  Levemir 8 units in the morning and 6 units at night  novolog based on carb counting and blood sugar  Carb ratio: 1:7  (divide carbs by 7)   Correction/sliding scale 1 unit for every 50 points above 120      Sliding Scale  If your blood sugar is:  120-169             give extra 1 units of insulin to yourself.  170-219                             2 units  220-269         3 units  370-319                             4 units  320-369                             5 units  >370                                 6 units            RTC in 2 wks, requested patient to send BG log weekly    Eunice Schumacher MD

## 2022-08-23 NOTE — PROGRESS NOTES
SUBJECTIVE:      Alicia Valles is a 27 y.o. female  at 25w3d presenting for a routine pregnancy visit.  Patient reports good fetal movements. She denies cramping, contractions, vaginal bleeding and leaking.  She is taking prenatal vitamins, progesterone, Lovenox, insulin, nifedipine, labetalol, and hydralazine, promethazine, Reglan, and amoxicillin.    Estimated Date of Delivery: 12/3/2022, by Last Menstrual Period, dating reviewed.  Prenatal labs reviewed.    Review of Systems   Constitutional: Negative for activity change, appetite change, chills, fatigue, fever and unexpected weight change.   HENT: Negative for nasal congestion, dental problem, ear pain, postnasal drip, rhinorrhea, sinus pressure/congestion, sneezing and sore throat.    Eyes: Negative for discharge, visual disturbance and eye dryness.   Respiratory: Negative for cough, chest tightness and shortness of breath.    Cardiovascular: Negative for chest pain, palpitations and leg swelling.   Gastrointestinal: Negative for abdominal distention, abdominal pain, change in bowel habit, constipation, diarrhea, nausea, vomiting, reflux and change in bowel habit.  Genitourinary: Negative for difficulty urinating, dyspareunia, dysuria, flank pain, frequency, urgency, cramps, contractions, vaginal bleeding, vaginal discharge and vaginal pain.  Musculoskeletal: Negative for back pain and myalgias.   Integumentary:  Negative for rash, breast mass, and breast discharge.  Neurological: Negative for dizziness, syncope, weakness, light-headedness, numbness, headaches, disturbances or difficulties in coordination.  Hematological: Negative for unexplained bruising or bleeding.  Psychiatric/Behavioral: Negative sleep disturbance. The patient is not nervous/anxious.    OBJECTIVE:      /74   Wt 71.2 kg (157 lb)   LMP 2022 (Approximate)   BMI 29.66 kg/m²     Physical Exam  Constitutional: She is oriented to person, place, and time. She appears  well-developed and well-nourished.    Head: Normocephalic and atraumatic.      Cardiovascular: Normal rate and regular rhythm. BLE 2+ pitting edema.  Pulmonary/Chest: Effort normal.      Abdominal: Soft. Gravid. There is no abdominal tenderness.    Genitourinary: Deferred  Musculoskeletal: Moves all extremeties.       Neurological: She is alert and oriented to person, place, and time. GCS eye subscore is 4. GCS verbal subscore is 5. GCS motor subscore is 6.    Skin: Skin is warm and dry. Capillary refill takes less than 2 seconds. Multiple superficial bruises to abdomen (from Dexcom adhesive removal).  Psychiatric: Her speech is normal and behavior is normal. Mood, affect and thought content normal.    ASSESSMENT:       1. 25 weeks gestation of pregnancy    2. High-risk pregnancy in second trimester    3. Pre-existing type 1 diabetes mellitus during pregnancy in second trimester    4. Hypertension affecting pregnancy in second trimester      PLAN:   25 weeks gestation of pregnancy    High-risk pregnancy in second trimester    Pre-existing type 1 diabetes mellitus during pregnancy in second trimester    Hypertension affecting pregnancy in second trimester    Follow-up with endocrinology and MFM as directed. Continue prenatal vitamins and other medications as prescribed. Fetal movement counts, bleeding, pain, and labor precautions reviewed.    Follow-up in 4 week(s) for routine OB appointment.

## 2022-08-24 ENCOUNTER — PATIENT MESSAGE (OUTPATIENT)
Dept: ENDOCRINOLOGY | Facility: CLINIC | Age: 27
End: 2022-08-24
Payer: MEDICAID

## 2022-08-24 ENCOUNTER — CLINICAL SUPPORT (OUTPATIENT)
Dept: ENDOCRINOLOGY | Facility: CLINIC | Age: 27
End: 2022-08-24
Payer: MEDICAID

## 2022-08-24 ENCOUNTER — PATIENT MESSAGE (OUTPATIENT)
Dept: ADMINISTRATIVE | Facility: HOSPITAL | Age: 27
End: 2022-08-24
Payer: MEDICAID

## 2022-08-24 DIAGNOSIS — K31.84 GASTROPARESIS DUE TO DM: Chronic | ICD-10-CM

## 2022-08-24 DIAGNOSIS — E10.69 TYPE 1 DIABETES MELLITUS WITH OTHER SPECIFIED COMPLICATION: Chronic | ICD-10-CM

## 2022-08-24 DIAGNOSIS — E11.43 GASTROPARESIS DUE TO DM: Chronic | ICD-10-CM

## 2022-08-24 PROBLEM — Z3A.12 12 WEEKS GESTATION OF PREGNANCY: Status: RESOLVED | Noted: 2022-05-23 | Resolved: 2022-08-24

## 2022-08-24 PROBLEM — Z34.91 FIRST TRIMESTER PREGNANCY: Status: RESOLVED | Noted: 2022-05-05 | Resolved: 2022-08-24

## 2022-08-24 PROBLEM — O24.011 PRE-EXISTING TYPE 1 DIABETES MELLITUS DURING PREGNANCY IN FIRST TRIMESTER: Status: RESOLVED | Noted: 2022-05-04 | Resolved: 2022-08-24

## 2022-08-24 PROCEDURE — 99214 OFFICE O/P EST MOD 30 MIN: CPT | Mod: GT,TH,, | Performed by: INTERNAL MEDICINE

## 2022-08-24 PROCEDURE — 99214 PR OFFICE/OUTPT VISIT, EST, LEVL IV, 30-39 MIN: ICD-10-PCS | Mod: GT,TH,, | Performed by: INTERNAL MEDICINE

## 2022-08-24 NOTE — PATIENT INSTRUCTIONS
Please send me a picture of your sugar log in 1 week and write down blood sugars 2 hours after meals  [email or web address]     Regimen as of 08/24/2022  Levemir 8 units in the morning and 6 units at night  novolog based on carb counting and blood sugar  Carb ratio: 1:7  (divide carbs by 7)   Correction/sliding scale 1 unit for every 50 points above 120      Sliding Scale  If your blood sugar is:  120-169             give extra 1 units of insulin to yourself.  170-219                             2 units  220-269         3 units  370-319                             4 units  320-369                             5 units  >370                                 6 units

## 2022-08-24 NOTE — ASSESSMENT & PLAN NOTE
Uncontrolled with global hyperglycemia.  Patient reports giving NovoLog for all carbohydrate intake and reports that she is giving injections 15 minutes prior to eating.  She also continues to have significant nausea and vomiting daily but denies having any hypoglycemia after meals that she is not able to tolerate.      Given that blood glucose is stable overnight (high in the morning as she is going to bed high) will hold off on changing basal insulin and increase her prandial insulin by strengthening her carb ratio.  I have also requested that she keep a blood glucose log to monitor 2 hour postprandial values as we would like to get these numbers less than 120.     See updated regimen below.

## 2022-08-26 ENCOUNTER — OFFICE VISIT (OUTPATIENT)
Dept: PEDIATRIC CARDIOLOGY | Facility: CLINIC | Age: 27
End: 2022-08-26
Payer: MEDICAID

## 2022-08-26 ENCOUNTER — CLINICAL SUPPORT (OUTPATIENT)
Dept: PEDIATRIC CARDIOLOGY | Facility: CLINIC | Age: 27
End: 2022-08-26
Payer: MEDICAID

## 2022-08-26 VITALS
WEIGHT: 148.5 LBS | SYSTOLIC BLOOD PRESSURE: 133 MMHG | HEIGHT: 61 IN | HEART RATE: 101 BPM | BODY MASS INDEX: 28.04 KG/M2 | DIASTOLIC BLOOD PRESSURE: 82 MMHG

## 2022-08-26 DIAGNOSIS — Z03.73 FETAL ANOMALY SUSPECTED BUT NOT FOUND: Primary | ICD-10-CM

## 2022-08-26 DIAGNOSIS — O24.019 PRE-EXISTING TYPE 1 DIABETES MELLITUS DURING PREGNANCY, ANTEPARTUM: ICD-10-CM

## 2022-08-26 PROCEDURE — 3075F PR MOST RECENT SYSTOLIC BLOOD PRESS GE 130-139MM HG: ICD-10-PCS | Mod: CPTII,S$GLB,, | Performed by: PEDIATRICS

## 2022-08-26 PROCEDURE — 93325 DOPPLER ECHO COLOR FLOW MAPG: CPT | Mod: S$GLB,,, | Performed by: PEDIATRICS

## 2022-08-26 PROCEDURE — 3051F PR MOST RECENT HEMOGLOBIN A1C LEVEL 7.0 - < 8.0%: ICD-10-PCS | Mod: CPTII,S$GLB,, | Performed by: PEDIATRICS

## 2022-08-26 PROCEDURE — 76825 PR  SO2 FETAL HEART: ICD-10-PCS | Mod: S$GLB,,, | Performed by: PEDIATRICS

## 2022-08-26 PROCEDURE — 3008F PR BODY MASS INDEX (BMI) DOCUMENTED: ICD-10-PCS | Mod: CPTII,S$GLB,, | Performed by: PEDIATRICS

## 2022-08-26 PROCEDURE — 3075F SYST BP GE 130 - 139MM HG: CPT | Mod: CPTII,S$GLB,, | Performed by: PEDIATRICS

## 2022-08-26 PROCEDURE — 99203 PR OFFICE/OUTPT VISIT, NEW, LEVL III, 30-44 MIN: ICD-10-PCS | Mod: 25,S$GLB,, | Performed by: PEDIATRICS

## 2022-08-26 PROCEDURE — 1160F RVW MEDS BY RX/DR IN RCRD: CPT | Mod: CPTII,S$GLB,, | Performed by: PEDIATRICS

## 2022-08-26 PROCEDURE — 1159F MED LIST DOCD IN RCRD: CPT | Mod: CPTII,S$GLB,, | Performed by: PEDIATRICS

## 2022-08-26 PROCEDURE — 4010F PR ACE/ARB THEARPY RXD/TAKEN: ICD-10-PCS | Mod: CPTII,S$GLB,, | Performed by: PEDIATRICS

## 2022-08-26 PROCEDURE — 76825 ECHO EXAM OF FETAL HEART: CPT | Mod: S$GLB,,, | Performed by: PEDIATRICS

## 2022-08-26 PROCEDURE — 93325 PR DOPPLER COLOR FLOW VELOCITY MAP: ICD-10-PCS | Mod: S$GLB,,, | Performed by: PEDIATRICS

## 2022-08-26 PROCEDURE — 1159F PR MEDICATION LIST DOCUMENTED IN MEDICAL RECORD: ICD-10-PCS | Mod: CPTII,S$GLB,, | Performed by: PEDIATRICS

## 2022-08-26 PROCEDURE — 99203 OFFICE O/P NEW LOW 30 MIN: CPT | Mod: 25,S$GLB,, | Performed by: PEDIATRICS

## 2022-08-26 PROCEDURE — 76827 PR  SO2 FETAL HEART DOPPLER: ICD-10-PCS | Mod: S$GLB,,, | Performed by: PEDIATRICS

## 2022-08-26 PROCEDURE — 3051F HG A1C>EQUAL 7.0%<8.0%: CPT | Mod: CPTII,S$GLB,, | Performed by: PEDIATRICS

## 2022-08-26 PROCEDURE — 3079F PR MOST RECENT DIASTOLIC BLOOD PRESSURE 80-89 MM HG: ICD-10-PCS | Mod: CPTII,S$GLB,, | Performed by: PEDIATRICS

## 2022-08-26 PROCEDURE — 4010F ACE/ARB THERAPY RXD/TAKEN: CPT | Mod: CPTII,S$GLB,, | Performed by: PEDIATRICS

## 2022-08-26 PROCEDURE — 3008F BODY MASS INDEX DOCD: CPT | Mod: CPTII,S$GLB,, | Performed by: PEDIATRICS

## 2022-08-26 PROCEDURE — 1160F PR REVIEW ALL MEDS BY PRESCRIBER/CLIN PHARMACIST DOCUMENTED: ICD-10-PCS | Mod: CPTII,S$GLB,, | Performed by: PEDIATRICS

## 2022-08-26 PROCEDURE — 76827 ECHO EXAM OF FETAL HEART: CPT | Mod: S$GLB,,, | Performed by: PEDIATRICS

## 2022-08-26 PROCEDURE — 3079F DIAST BP 80-89 MM HG: CPT | Mod: CPTII,S$GLB,, | Performed by: PEDIATRICS

## 2022-08-26 NOTE — PROGRESS NOTES
"[facility] - Pediatric Cardiology Fetal Cardiology Clinic    Today, I had the pleasure of evaluating Alicia Valles who is now 27 y.o. and carrying her third pregnancy at 25 6/7 weeks gestation with an BRYAN of 12/3/22. She was referred for evaluation of the fetal heart due to a history of maternal diabetes mellitus.      She is carrying a female fetus, named Dixon.      Obstetric History:    .  Her OB care is by Dr. Bruno.  Her MFM care is by Dr. Dias.    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 for 1 dose, Disp: 1 each, Rfl: 0    blood-glucose sensor (DEXCOM G6 SENSOR) Sandrine, 3 Devices by Misc.(Non-Drug; Combo Route) route every 30 days., Disp: 1 each, Rfl: 5    blood-glucose transmitter (DEXCOM G6 TRANSMITTER) Sandrine, 2 Devices by Misc.(Non-Drug; Combo Route) route every 6 (six) months., Disp: 1 each, Rfl: 5    diphenhydrAMINE (BENADRYL) 25 mg capsule, Take 25 mg by mouth daily as needed for Insomnia ((if Unisom ineffective))., Disp: , Rfl:     docusate sodium (COLACE) 100 MG capsule, Take 100 mg by mouth once daily., Disp: , Rfl:     doxylamine " succinate (UNISOM, DOXYLAMINE, ORAL), Take 1 capsule by mouth nightly as needed (Sleep)., Disp: , Rfl:     enoxaparin (LOVENOX) 40 mg/0.4 mL Syrg, Inject 0.4 mLs (40 mg total) into the skin every evening., Disp: 30 each, Rfl: 11    hydrALAZINE (APRESOLINE) 25 MG tablet, Take 2 tablets (50 mg total) by mouth every 8 (eight) hours., Disp: 90 tablet, Rfl: 11    insulin admin supplies (INPEN, NOVOLOG OR FIASP, PINK) InPn, Use device to inject insulin. Dispense one kit., Disp: 1 each, Rfl: 0    insulin aspart U-100 (NOVOLOG PENFILL U-100 INSULIN) 100 unit/mL Crtg, ICR: 1:8 units three times daily with meals and ICR 1:12 with snacks plus correction. Up to 40 units daily. For use with InPen, Disp: 12 mL, Rfl: 11    insulin aspart U-100 (NOVOLOG) 100 unit/mL (3 mL) InPn pen, SMARTSI Unit(s) SUB-Q Daily, Disp: , Rfl:     labetaloL (NORMODYNE) 200 MG tablet, Take 1 tablet (200 mg total) by mouth every 12 (twelve) hours., Disp: 60 tablet, Rfl: 11    metoclopramide HCl (REGLAN) 10 MG tablet, Take 1 tablet (10 mg total) by mouth 4 (four) times daily., Disp: 120 tablet, Rfl: 2    NIFEdipine (PROCARDIA-XL) 90 MG (OSM) 24 hr tablet, Take 1 tablet (90 mg total) by mouth 2 (two) times a day., Disp: 30 tablet, Rfl: 11    ondansetron (ZOFRAN-ODT) 4 MG TbDL, Take 4 mg by mouth every 8 (eight) hours as needed., Disp: , Rfl:     progesterone (PROMETRIUM) 200 MG capsule, Take 1 capsule (200 mg total) by mouth nightly., Disp: 30 capsule, Rfl: 4    promethazine (PHENERGAN) 25 MG suppository, Place 1 suppository (25 mg total) rectally every 6 (six) hours as needed for Nausea., Disp: 12 suppository, Rfl: 1    promethazine (PHENERGAN) 25 MG tablet, Take 1 tablet (25 mg total) by mouth every 4 (four) hours., Disp: 60 tablet, Rfl: 0    famotidine (PEPCID) 40 MG tablet, Take 1 tablet (40 mg total) by mouth every evening. (Patient not taking: No sig reported), Disp: 30 tablet, Rfl: 10    fluconazole (DIFLUCAN) 150 MG Tab, 1 po Now  may repeat in 72 hr (Patient not taking: No sig reported), Disp: 2 tablet, Rfl: 1    GLUCAGEN HYPOKIT 1 mg SolR, USE AS DIRECTED FOR HYPOGLYCEMIA INJECTION AS NEEDED, Disp: , Rfl:     glucagon (BAQSIMI) 3 mg/actuation Spry, 1 each by Nasal route as needed (For Hypoglycemia). (Patient not taking: No sig reported), Disp: 2 each, Rfl: 3    insulin glargine (LANTUS U-100 INSULIN) 100 unit/mL injection, Inject 8 Units into the skin 2 (two) times a day. (Patient not taking: No sig reported), Disp: 4.8 mL, Rfl: 11    LANTUS SOLOSTAR U-100 INSULIN glargine 100 units/mL SubQ pen, SMARTSIG:15 Unit(s) SUB-Q Daily, Disp: , Rfl:     NIFEdipine (PROCARDIA-XL) 30 MG (OSM) 24 hr tablet, Take 30 mg by mouth once daily., Disp: , Rfl:     NOVOLOG FLEXPEN U-100 INSULIN 100 unit/mL (3 mL) InPn pen, Inject into the skin., Disp: , Rfl:     PNV,calcium 72/iron/folic acid (PRENATAL VITAMIN) Tab, Take 1 tablet by mouth once daily. for 7 days, Disp: 7 tablet, Rfl: 0    pyridoxine, vitamin B6, (B-6) 25 MG Tab, Take 1 tablet (25 mg total) by mouth once daily. (Patient not taking: No sig reported), Disp: 30 tablet, Rfl: 6    sertraline (ZOLOFT) 50 MG tablet, Take 1 tablet (50 mg total) by mouth once daily. (Patient not taking: Reported on 8/26/2022), Disp: 30 tablet, Rfl: 2    Family History: Negative for congenital heart disease, early coronary artery disease, sudden unexplained death, connective tissues disorders, genetic syndromes, multiple miscarriages or other congenital anomalies.    Social History: Ms. Valles is single.      FETAL ECHOCARDIOGRAM (summary):  Fetal echocardiogram at 25 6/7 weeks gestation for a history of maternal diabetes mellitus. BRYAN 12/3/22.  Normally connected heart.  Normal sinus rhythm.  No ectopy or sustained arrhythmia demonstrated throughout the study.  Normal fetal atrial and ductal level shunting.  No ventricular level shunting.  Normal atrioventricular and semilunar valve structure and  function.  Normal ductal and aortic arches.  Very mild hypertrophy of the interventricular septum and both right and left free walls.  Normal biventricular systolic function.  No pericardial effusion  (Full report in electronic medical record)    Impression:  Single active female fetus at 25 wga.  Normal fetal echocardiogram.  There is very mild biventricular hypertrophy, as to be expected in a patient with poorly controlled diabetes.  This will resolve spontaneously after birth.    Todays fetal echocardiogram is normal, within the limitations of fetal echocardiography.  I discussed with her that fetal echocardiography is insufficiently sensitive to rule out all septal defects, anomalies of pulmonary and systemic veins, arch anomalies, and some valvar abnormalities, nor can it ensure that the ductus arteriosus and foramen ovale will spontaneously close.     Recommendations:  Location, timing, and mode of delivery will be determined by the obstetrical team.  She does not require further follow-up in the fetal echocardiography clinic, but I would be happy to see her again if additional questions or concerns arise.    Should there be any concerns about the baby's heart after birth, a post- echocardiogram and cardiology consultation are recommended.     The above information was discussed in detail including the use of diagrams, with 30 minutes of total face to face time, with greater than 50% with counseling and coordination of care.  The discussion of the diagnosis and treatment options is as described above.      Aramis Diaz MD, MPH  Pediatric and Fetal Cardiology  [facility]   [address] [place] [address]    Office: [phone]  Cell: [phone]

## 2022-08-29 ENCOUNTER — TELEPHONE (OUTPATIENT)
Dept: MATERNAL FETAL MEDICINE | Facility: CLINIC | Age: 27
End: 2022-08-29
Payer: MEDICAID

## 2022-08-29 ENCOUNTER — TELEPHONE (OUTPATIENT)
Dept: ENDOCRINOLOGY | Facility: CLINIC | Age: 27
End: 2022-08-29
Payer: MEDICAID

## 2022-08-29 ENCOUNTER — PATIENT MESSAGE (OUTPATIENT)
Dept: OBSTETRICS AND GYNECOLOGY | Facility: CLINIC | Age: 27
End: 2022-08-29
Payer: MEDICAID

## 2022-08-29 NOTE — TELEPHONE ENCOUNTER
Pt verified self by name, . NKA.  Pt returned called. Pt states she needed a refill on her insulin. Her prior authorization was signed by me. Notified patient that she needs to call her endocrinologist who is currently managing her blood sugar with insulin to refill her medication since we are no longer managing it with MFM. The prior authorization may have been an old one, she needs to call her endo to place new refill.   Pt verbalized understanding. Agreed to POC w intent to comply.

## 2022-08-29 NOTE — TELEPHONE ENCOUNTER
----- Message from Tomasa Rushing MA sent at 8/29/2022  3:58 PM CDT -----  Contact: pt    ----- Message -----  From: Polina Denton MA  Sent: 8/29/2022   3:52 PM CDT  To: Endy Dawson Staff    Dr chen is no longer handling any care for this pt, she cannot since pt has [place] medicaid.    ----- Message -----  From: Guerrero Morton  Sent: 8/29/2022   3:31 PM CDT  To: Maricruz Rosas Staff    Pt requesting a prior auth be done today for RX below, please call and speak with pt    insulin admin supplies (INPEN, NOVOLOG OR FIASP, PINK) In        [id number] - [place], [place] - [address] AT [address] OF [address]  [address]  [facility] [address]  Phone: [phone] Fax: [phone]    Confirmed contact below:  Contact Name:Alicia Valles  Phone Number: [phone]

## 2022-08-30 ENCOUNTER — PATIENT MESSAGE (OUTPATIENT)
Dept: ENDOCRINOLOGY | Facility: CLINIC | Age: 27
End: 2022-08-30
Payer: MEDICAID

## 2022-08-30 DIAGNOSIS — E10.8 TYPE 1 DIABETES MELLITUS WITH COMPLICATIONS: ICD-10-CM

## 2022-08-30 RX ORDER — INSULIN ASPART 100 [IU]/ML
INJECTION, SOLUTION INTRAVENOUS; SUBCUTANEOUS
Qty: 5 EACH | Refills: 3 | Status: SHIPPED | OUTPATIENT
Start: 2022-08-30 | End: 2022-09-14 | Stop reason: SDUPTHER

## 2022-08-30 RX ORDER — INSULIN PEN,REUSABLE,BT LISPRO
INSULIN PEN (EA) SUBCUTANEOUS
Qty: 1 EACH | Refills: 0 | Status: CANCELLED | OUTPATIENT
Start: 2022-08-30 | End: 2023-08-30

## 2022-08-31 ENCOUNTER — LAB VISIT (OUTPATIENT)
Dept: LAB | Facility: CLINIC | Age: 27
End: 2022-08-31
Payer: MEDICAID

## 2022-08-31 ENCOUNTER — PROCEDURE VISIT (OUTPATIENT)
Dept: MATERNAL FETAL MEDICINE | Facility: CLINIC | Age: 27
End: 2022-08-31
Payer: MEDICAID

## 2022-08-31 ENCOUNTER — OFFICE VISIT (OUTPATIENT)
Dept: MATERNAL FETAL MEDICINE | Facility: CLINIC | Age: 27
End: 2022-08-31
Payer: MEDICAID

## 2022-08-31 VITALS
WEIGHT: 147.94 LBS | BODY MASS INDEX: 27.97 KG/M2 | SYSTOLIC BLOOD PRESSURE: 138 MMHG | DIASTOLIC BLOOD PRESSURE: 85 MMHG

## 2022-08-31 DIAGNOSIS — Z36.89 ENCOUNTER FOR ULTRASOUND TO ASSESS FETAL GROWTH: Primary | ICD-10-CM

## 2022-08-31 DIAGNOSIS — O24.012 PRE-EXISTING TYPE 1 DIABETES MELLITUS DURING PREGNANCY IN SECOND TRIMESTER: ICD-10-CM

## 2022-08-31 DIAGNOSIS — Z36.89 ENCOUNTER FOR ULTRASOUND TO ASSESS FETAL GROWTH: ICD-10-CM

## 2022-08-31 LAB
ESTIMATED AVG GLUCOSE: 146 MG/DL (ref 68–131)
HBA1C MFR BLD: 6.7 % (ref 4–5.6)

## 2022-08-31 PROCEDURE — 76816 OB US FOLLOW-UP PER FETUS: CPT | Mod: S$GLB,,, | Performed by: OBSTETRICS & GYNECOLOGY

## 2022-08-31 PROCEDURE — 83036 HEMOGLOBIN GLYCOSYLATED A1C: CPT | Performed by: OBSTETRICS & GYNECOLOGY

## 2022-08-31 PROCEDURE — 1159F PR MEDICATION LIST DOCUMENTED IN MEDICAL RECORD: ICD-10-PCS | Mod: CPTII,S$GLB,, | Performed by: OBSTETRICS & GYNECOLOGY

## 2022-08-31 PROCEDURE — 3075F SYST BP GE 130 - 139MM HG: CPT | Mod: CPTII,S$GLB,, | Performed by: OBSTETRICS & GYNECOLOGY

## 2022-08-31 PROCEDURE — 99214 PR OFFICE/OUTPT VISIT, EST, LEVL IV, 30-39 MIN: ICD-10-PCS | Mod: TH,25,S$GLB, | Performed by: OBSTETRICS & GYNECOLOGY

## 2022-08-31 PROCEDURE — 1160F PR REVIEW ALL MEDS BY PRESCRIBER/CLIN PHARMACIST DOCUMENTED: ICD-10-PCS | Mod: CPTII,S$GLB,, | Performed by: OBSTETRICS & GYNECOLOGY

## 2022-08-31 PROCEDURE — 3079F DIAST BP 80-89 MM HG: CPT | Mod: CPTII,S$GLB,, | Performed by: OBSTETRICS & GYNECOLOGY

## 2022-08-31 PROCEDURE — 4010F PR ACE/ARB THEARPY RXD/TAKEN: ICD-10-PCS | Mod: CPTII,S$GLB,, | Performed by: OBSTETRICS & GYNECOLOGY

## 2022-08-31 PROCEDURE — 3051F HG A1C>EQUAL 7.0%<8.0%: CPT | Mod: CPTII,S$GLB,, | Performed by: OBSTETRICS & GYNECOLOGY

## 2022-08-31 PROCEDURE — 1160F RVW MEDS BY RX/DR IN RCRD: CPT | Mod: CPTII,S$GLB,, | Performed by: OBSTETRICS & GYNECOLOGY

## 2022-08-31 PROCEDURE — 76816 PR  US,PREGNANT UTERUS,F/U,TRANSABD APP: ICD-10-PCS | Mod: S$GLB,,, | Performed by: OBSTETRICS & GYNECOLOGY

## 2022-08-31 PROCEDURE — 4010F ACE/ARB THERAPY RXD/TAKEN: CPT | Mod: CPTII,S$GLB,, | Performed by: OBSTETRICS & GYNECOLOGY

## 2022-08-31 PROCEDURE — 36415 COLL VENOUS BLD VENIPUNCTURE: CPT | Mod: ,,, | Performed by: STUDENT IN AN ORGANIZED HEALTH CARE EDUCATION/TRAINING PROGRAM

## 2022-08-31 PROCEDURE — 3008F PR BODY MASS INDEX (BMI) DOCUMENTED: ICD-10-PCS | Mod: CPTII,S$GLB,, | Performed by: OBSTETRICS & GYNECOLOGY

## 2022-08-31 PROCEDURE — 1159F MED LIST DOCD IN RCRD: CPT | Mod: CPTII,S$GLB,, | Performed by: OBSTETRICS & GYNECOLOGY

## 2022-08-31 PROCEDURE — 3051F PR MOST RECENT HEMOGLOBIN A1C LEVEL 7.0 - < 8.0%: ICD-10-PCS | Mod: CPTII,S$GLB,, | Performed by: OBSTETRICS & GYNECOLOGY

## 2022-08-31 PROCEDURE — 3008F BODY MASS INDEX DOCD: CPT | Mod: CPTII,S$GLB,, | Performed by: OBSTETRICS & GYNECOLOGY

## 2022-08-31 PROCEDURE — 36415 PR COLLECTION VENOUS BLOOD,VENIPUNCTURE: ICD-10-PCS | Mod: ,,, | Performed by: STUDENT IN AN ORGANIZED HEALTH CARE EDUCATION/TRAINING PROGRAM

## 2022-08-31 PROCEDURE — 3075F PR MOST RECENT SYSTOLIC BLOOD PRESS GE 130-139MM HG: ICD-10-PCS | Mod: CPTII,S$GLB,, | Performed by: OBSTETRICS & GYNECOLOGY

## 2022-08-31 PROCEDURE — 99214 OFFICE O/P EST MOD 30 MIN: CPT | Mod: TH,25,S$GLB, | Performed by: OBSTETRICS & GYNECOLOGY

## 2022-08-31 PROCEDURE — 3079F PR MOST RECENT DIASTOLIC BLOOD PRESSURE 80-89 MM HG: ICD-10-PCS | Mod: CPTII,S$GLB,, | Performed by: OBSTETRICS & GYNECOLOGY

## 2022-08-31 NOTE — PROGRESS NOTES
See viewpoint US report  The patient states that her blood sugars are now improving somewhat.  Her endocrinologist has changed her carb ratio to 7 carbs for 1 unit of insulin with correction factor of 1 unit 1/120.  Her last hemoglobin A1c was 7.5 which is obviously not very good control.  I repeated her hemoglobin A1c on today's visit.  I reviewed her fetal echocardiogram.  Although anatomically normal it was noted that both the septum and ventricular walls were mildly hypertrophied which could be an indication of poor diabetic control.  With this in mind her diabetes should be optimally controlled to prevent any further problems.  If we need to become more involved in her diabetic care please let us know.  The patient was given an opportunity to ask questions about management and the diease process.  She expressed an understanding of and agreement to the above impression and plan. All questions were answered to her satisfaction.  She was given contact information to the [facility] clinic to address further concerns.    I spent a total of 20 minutes on the day of the visit. This includes face to face time and non-face to face time preparing to see the patient (eg, review of tests), Obtaining and/or reviewing separately obtained history, Documenting clinical information in the electronic or other health record, Independently interpreting results and communicating results to the patient/family/caregiver, or Care coordination.

## 2022-09-13 ENCOUNTER — PATIENT MESSAGE (OUTPATIENT)
Dept: MATERNAL FETAL MEDICINE | Facility: CLINIC | Age: 27
End: 2022-09-13
Payer: MEDICAID

## 2022-09-14 ENCOUNTER — CLINICAL SUPPORT (OUTPATIENT)
Dept: ENDOCRINOLOGY | Facility: CLINIC | Age: 27
End: 2022-09-14
Payer: MEDICAID

## 2022-09-14 ENCOUNTER — TELEPHONE (OUTPATIENT)
Dept: ENDOCRINOLOGY | Facility: CLINIC | Age: 27
End: 2022-09-14
Payer: MEDICAID

## 2022-09-14 ENCOUNTER — PATIENT MESSAGE (OUTPATIENT)
Dept: ENDOCRINOLOGY | Facility: HOSPITAL | Age: 27
End: 2022-09-14
Payer: MEDICAID

## 2022-09-14 DIAGNOSIS — E10.8 TYPE 1 DIABETES MELLITUS WITH COMPLICATIONS: ICD-10-CM

## 2022-09-14 DIAGNOSIS — E10.69 TYPE 1 DIABETES MELLITUS WITH OTHER SPECIFIED COMPLICATION: Chronic | ICD-10-CM

## 2022-09-14 PROCEDURE — 99214 OFFICE O/P EST MOD 30 MIN: CPT | Mod: 25,GT,, | Performed by: INTERNAL MEDICINE

## 2022-09-14 PROCEDURE — 99214 PR OFFICE/OUTPT VISIT, EST, LEVL IV, 30-39 MIN: ICD-10-PCS | Mod: 25,GT,, | Performed by: INTERNAL MEDICINE

## 2022-09-14 RX ORDER — INSULIN ASPART 100 [IU]/ML
INJECTION, SOLUTION INTRAVENOUS; SUBCUTANEOUS
Qty: 5 EACH | Refills: 3 | Status: SHIPPED | OUTPATIENT
Start: 2022-09-14 | End: 2023-03-03

## 2022-09-14 NOTE — PATIENT INSTRUCTIONS
"Dexcom:    Please make sure you have your phone by you all the time so that you don't lose data on your dexcom report.  Currently there are some days missing.      Try to enter in all doses of novolog into the dexcom lam under "events" when you are giving the injections.    Regimen as of 09/14/2022    Increase to Levemir 10 units in the morning and 8 units at night  novolog based on carb counting and blood sugar   *if feeling nauseated give half of the meal dose 10-15 min before the meal and if you are able to keep the food down give the other half of the meal dose after you finish eating*     You need to give novolog for any carbs you eat including snacks  Carb ratio: 1:7  (divide carbs by 7)   Correction/sliding scale 1 unit for every 50 points above 120    Sliding Scale  If your blood sugar is:  120-169             give extra 1 units of insulin to yourself.  170-219                             2 units  220-269         3 units  370-319                             4 units  320-369                             5 units  >370                                 6 units  "

## 2022-09-14 NOTE — PROGRESS NOTES
Alicia Valles is a 27 y.o. female  presenting for follow-up of T1DM  09/14/2022     The patient's last visit with me was on 8/24/2022.     History of Present Illness  T1DM  Diagnosed at age 9  Known complications: gastroparesis, nephropathy, retinopathy  Frequent DKA admissions    Estimated Date of Delivery: 12/3/22  Currently 28w4d pregnant    On medtronic pump (got it about a year ago, started it in 4/2022 w/o sensor as she did not like the sensor) but stopped following episode of severe hypoglycemia.      Has been admitted multiple times during pregnancy for uncontrolled glucose and HTN     she suffers from gastroparesis and is still having N/V  Eating several meals throughout the day   Now giving boluses after meals due to concerns about having lows if not able to keep down the meal    She is not bolusing for snacks at night    Still with global hyperglycemia, salazar with rise in BG overnight     Current Diabetes Regimen:  Levemir 8 units in the morning and 6 units at night  novolog based on carb counting and blood sugar  Carb ratio: 1:7  (divide carbs by 7)   Correction/sliding scale 1 unit for every 50 points above 120    Sliding Scale  If your blood sugar is:  120-169             give extra 1 units of insulin to yourself.  170-219                             2 units  220-269         3 units  370-319                             4 units  320-369                             5 units  >370                                 6 units    Timing of prandial insulin: after meals  Omitted doses: denies missing doses and reports injecting insulin for all carb intake    Diet/Exercise: >3 meals a day + snacks  recommended gastroparesis diet    Last Hgb A1C:  Lab Results   Component Value Date    HGBA1C 6.7 (H) 08/31/2022         Glucose Monitoring:  Meter/CGM: Dexcom G6- in media  avg 170  Global hyperglycemia,       Hypoglycemic Episodes: denies    DKA: multiple episodes in past    Screening / DM  "Complications:    Nephropathy:  ACEi/ARB: Not taking  No results found for: MICALBCREAT    Last Lipid Panel:  Statin: Not taking  No results found for: LDLCALC    Last foot exam : 07/06/2022  Last eye exam Most Recent Eye Exam Date: Not Found;  no laser surgery or DR    Lab Results   Component Value Date    TSH 1.570 05/05/2022       No results found for: TTGIGA          Current Outpatient Medications:     acetaminophen (TYLENOL) 500 MG tablet, Take 2,000 mg by mouth daily as needed for Pain., Disp: , Rfl:     amoxicillin (AMOXIL) 500 MG capsule, Take 500 mg by mouth 3 (three) times daily., Disp: , Rfl:     aspirin 81 MG Chew, Take 1 tablet (81 mg total) by mouth once daily., Disp: 100 tablet, Rfl: 3    BD ULTRA-FINE LIGIA PEN NEEDLE 32 gauge x 5/32" Ndle, To inject insulin 4-6 times daily., Disp: 100 each, Rfl: 3    blood-glucose meter,continuous (DEXCOM G6 ) Misc, 1 Device by Misc.(Non-Drug; Combo Route) route once. for 1 dose, Disp: 1 each, Rfl: 0    blood-glucose sensor (DEXCOM G6 SENSOR) Sandrine, 3 Devices by Misc.(Non-Drug; Combo Route) route every 30 days., Disp: 1 each, Rfl: 5    blood-glucose transmitter (DEXCOM G6 TRANSMITTER) Sandrine, 2 Devices by Misc.(Non-Drug; Combo Route) route every 6 (six) months., Disp: 1 each, Rfl: 5    diphenhydrAMINE (BENADRYL) 25 mg capsule, Take 25 mg by mouth daily as needed for Insomnia ((if Unisom ineffective))., Disp: , Rfl:     docusate sodium (COLACE) 100 MG capsule, Take 100 mg by mouth once daily., Disp: , Rfl:     doxylamine succinate (UNISOM, DOXYLAMINE, ORAL), Take 1 capsule by mouth nightly as needed (Sleep)., Disp: , Rfl:     enoxaparin (LOVENOX) 40 mg/0.4 mL Syrg, Inject 0.4 mLs (40 mg total) into the skin every evening., Disp: 30 each, Rfl: 11    famotidine (PEPCID) 40 MG tablet, Take 1 tablet (40 mg total) by mouth every evening. (Patient not taking: No sig reported), Disp: 30 tablet, Rfl: 10    fluconazole (DIFLUCAN) 150 MG Tab, 1 po Now may repeat in 72 hr " (Patient not taking: No sig reported), Disp: 2 tablet, Rfl: 1    GLUCAGEN HYPOKIT 1 mg SolR, USE AS DIRECTED FOR HYPOGLYCEMIA INJECTION AS NEEDED, Disp: , Rfl:     glucagon (BAQSIMI) 3 mg/actuation Spry, 1 each by Nasal route as needed (For Hypoglycemia). (Patient not taking: No sig reported), Disp: 2 each, Rfl: 3    hydrALAZINE (APRESOLINE) 25 MG tablet, Take 2 tablets (50 mg total) by mouth every 8 (eight) hours., Disp: 90 tablet, Rfl: 11    insulin admin supplies (INPEN, NOVOLOG OR FIASP, PINK) InPn, Use device to inject insulin. Dispense one kit., Disp: 1 each, Rfl: 0    insulin glargine (LANTUS U-100 INSULIN) 100 unit/mL injection, Inject 8 Units into the skin 2 (two) times a day. (Patient not taking: No sig reported), Disp: 4.8 mL, Rfl: 11    labetaloL (NORMODYNE) 200 MG tablet, Take 1 tablet (200 mg total) by mouth every 12 (twelve) hours., Disp: 60 tablet, Rfl: 11    LANTUS SOLOSTAR U-100 INSULIN glargine 100 units/mL SubQ pen, SMARTSIG:15 Unit(s) SUB-Q Daily, Disp: , Rfl:     metoclopramide HCl (REGLAN) 10 MG tablet, Take 1 tablet (10 mg total) by mouth 4 (four) times daily., Disp: 120 tablet, Rfl: 2    NIFEdipine (PROCARDIA-XL) 30 MG (OSM) 24 hr tablet, Take 30 mg by mouth once daily., Disp: , Rfl:     NIFEdipine (PROCARDIA-XL) 90 MG (OSM) 24 hr tablet, Take 1 tablet (90 mg total) by mouth 2 (two) times a day., Disp: 30 tablet, Rfl: 11    NOVOLOG FLEXPEN U-100 INSULIN 100 unit/mL (3 mL) InPn pen, Inject under the skin three times a day based on carb counting. Max daily dose of 60 units, Disp: 5 each, Rfl: 3    ondansetron (ZOFRAN-ODT) 4 MG TbDL, Take 4 mg by mouth every 8 (eight) hours as needed., Disp: , Rfl:     PNV,calcium 72/iron/folic acid (PRENATAL VITAMIN) Tab, Take 1 tablet by mouth once daily. for 7 days, Disp: 7 tablet, Rfl: 0    progesterone (PROMETRIUM) 200 MG capsule, Take 1 capsule (200 mg total) by mouth nightly., Disp: 30 capsule, Rfl: 4    promethazine (PHENERGAN) 25 MG suppository, Place  1 suppository (25 mg total) rectally every 6 (six) hours as needed for Nausea., Disp: 12 suppository, Rfl: 1    promethazine (PHENERGAN) 25 MG tablet, Take 1 tablet (25 mg total) by mouth every 4 (four) hours., Disp: 60 tablet, Rfl: 0    pyridoxine, vitamin B6, (B-6) 25 MG Tab, Take 1 tablet (25 mg total) by mouth once daily., Disp: 30 tablet, Rfl: 6    sertraline (ZOLOFT) 50 MG tablet, Take 1 tablet (50 mg total) by mouth once daily., Disp: 30 tablet, Rfl: 2    ROS as above    Objective:     There were no vitals filed for this visit.  Wt Readings from Last 3 Encounters:   08/31/22 67.1 kg (147 lb 14.9 oz)   08/26/22 67.4 kg (148 lb 7.7 oz)   08/23/22 71.2 kg (157 lb)     There is no height or weight on file to calculate BMI.    Constitutional:  Pleasant,  in no acute distress.   HENT:   Eyes:     No scleral icterus.   Respiratory:   Effort normal   Neurological:  normal speech  Psych:  Normal mood and affect.      LABS    Chemistry        Component Value Date/Time     (L) 05/26/2022 0507    K 3.9 05/26/2022 0507     05/26/2022 0507    CO2 23 05/26/2022 0507    BUN 16 05/26/2022 0507    CREATININE 1.2 05/26/2022 0507     (H) 05/26/2022 0507        Component Value Date/Time    CALCIUM 7.2 (L) 05/26/2022 0507    ALKPHOS 45 (L) 05/26/2022 0507    AST 18 05/26/2022 0507    ALT 16 05/26/2022 0507    BILITOT 0.1 05/26/2022 0507    ESTGFRAFRICA >60.0 05/26/2022 0507    EGFRNONAA >60.0 05/26/2022 0507          Assessment and Plan     Problem List Items Addressed This Visit          1 - High    Type 1 diabetes mellitus with other specified complication (Chronic)     uncontrolled with global hyperglycemia.  Some missing dexcom data but likely running high related to bolusing after meals and not giving insulin for snacks.      Will increase basal insulin and have patient try to give half of prandial bolus prior to meal then remainder of the bolus after the meal if she is able to tolerate it.  Will hold off  "on changing ICR and ISF for now.      We have requested that she enter all doses of novolog into dexcom lam.               Relevant Medications    NOVOLOG FLEXPEN U-100 INSULIN 100 unit/mL (3 mL) InPn pen     Other Visit Diagnoses       Type 1 diabetes mellitus with complications        Relevant Medications    NOVOLOG FLEXPEN U-100 INSULIN 100 unit/mL (3 mL) InPn pen        There are no Patient Instructions on file for this visit.      Patient Instructions   Dexcom:    Please make sure you have your phone by you all the time so that you don't lose data on your dexcom report.  Currently there are some days missing.      Try to enter in all doses of novolog into the dexcom lam under "events" when you are giving the injections.    Regimen as of 09/14/2022    Increase to Levemir 10 units in the morning and 8 units at night  novolog based on carb counting and blood sugar   *if feeling nauseated give half of the meal dose 10-15 min before the meal and if you are able to keep the food down give the other half of the meal dose after you finish eating*     You need to give novolog for any carbs you eat including snacks  Carb ratio: 1:7  (divide carbs by 7)   Correction/sliding scale 1 unit for every 50 points above 120    Sliding Scale  If your blood sugar is:  120-169             give extra 1 units of insulin to yourself.  170-219                             2 units  220-269         3 units  370-319                             4 units  320-369                             5 units  >370                                 6 units      RTC in 2 wks virtual wed am pump clinic    Eunice Schumacher MD     "

## 2022-09-14 NOTE — ASSESSMENT & PLAN NOTE
uncontrolled with global hyperglycemia.  Some missing dexcom data but likely running high related to bolusing after meals and not giving insulin for snacks.      Will increase basal insulin and have patient try to give half of prandial bolus prior to meal then remainder of the bolus after the meal if she is able to tolerate it.  Will hold off on changing ICR and ISF for now.      We have requested that she enter all doses of novolog into dexcom lam.

## 2022-09-19 ENCOUNTER — ROUTINE PRENATAL (OUTPATIENT)
Dept: OBSTETRICS AND GYNECOLOGY | Facility: CLINIC | Age: 27
End: 2022-09-19
Payer: MEDICAID

## 2022-09-19 DIAGNOSIS — O24.012 PRE-EXISTING TYPE 1 DIABETES MELLITUS DURING PREGNANCY IN SECOND TRIMESTER: ICD-10-CM

## 2022-09-19 DIAGNOSIS — Z3A.29 29 WEEKS GESTATION OF PREGNANCY: Primary | ICD-10-CM

## 2022-09-19 DIAGNOSIS — O16.9 HYPERTENSION AFFECTING PREGNANCY, ANTEPARTUM: ICD-10-CM

## 2022-09-19 PROCEDURE — 59426 ANTEPARTUM CARE ONLY: CPT | Mod: TH,S$GLB,, | Performed by: OBSTETRICS & GYNECOLOGY

## 2022-09-19 PROCEDURE — 59426 PR ANTEPARTUM CARE ONLY, >7 VISITS: ICD-10-PCS | Mod: TH,S$GLB,, | Performed by: OBSTETRICS & GYNECOLOGY

## 2022-09-19 NOTE — PROGRESS NOTES
2022  No chief complaint on file.    27 y.o.  at 29w2d  Estimated Date of Delivery: Not found., dating reviewed.    Patient states her blood sugars have been under improved control.  Her recent endocrinologist did increase her carb ratio as well as her basal rate.  Recent hemoglobin A1c was 6.7 which is improved from 7.43 months ago.  She states she has had some issues with her insulin pens where they do not seem to be delivering any insulin and her blood sugar does run high however she changes her pad now and her blood sugar goes down to normal.  She is being followed by M and is scheduled to receive weekly  testing.  Patient reports: Good fetal movements reported. No Bleeding or contractions . She is doing well.  She is taking prenatal vitamins. Ms. Valles   is adjusting well and has a good support system of family and friends. She is coping with pregnancy and having no difficulty with sleep.    Prenatal labs reviewed and updated today    OB History    Para Term  AB Living   4 0 0 0 2     SAB IAB Ectopic Multiple Live Births   0 0 0          # Outcome Date GA Lbr Alireza/2nd Weight Sex Delivery Anes PTL Lv   4 Current            3 AB            2 AB            1                 Review of Systems:  General ROS: negative for headache or visual changes  Breast ROS: negative for breast lumps  Gastrointestinal ROS: negative for constipation, diarrhea or nausea/vomiting  Musculoskeletal ROS: negative for pain in joints or swelling in face or hands.   Neurological ROS: negative for - headaches, numbness/tingling or visual changes      Physical Exam:  LMP 2022 (Approximate)     Constitutional: She is oriented to person, place, and time. She appears well-developed and well-nourished. No distress.     Pulmonary/Chest: Effort normal. No respiratory distress  Abdominal: Soft, gravid, nontender. No rebound and no guarding.   Genitourinary: Deferred   Musculoskeletal: Normal  "range of motion, Minimal peripheral edema.   Neurological: She is alert and oriented to person, place, and time. Coordination normal.   Skin: Skin is warm and dry. She is not diaphoretic.  Psychiatric: She has a normal mood and affect.  Fetal heart tones:  150s beats per minute  Fundal height:  29 cm  Urine: 2+ proteinuria, negative for glucose    Assessment:  Overall doing well.   27 y.o.at 29w2d   Patient Active Problem List   Diagnosis    Type 1 diabetes mellitus with other specified complication    Anemia    Proteinuria    Anxiety    Gastroparesis due to DM    Spotting in early pregnancy    UTI in pregnancy, antepartum, first trimester    Leukocytosis    Nausea and vomiting during pregnancy    Hypertension affecting pregnancy, antepartum    Pre-existing type 1 diabetes mellitus during pregnancy in second trimester    Short cervical length during pregnancy in second trimester     Current Outpatient Medications on File Prior to Visit   Medication Sig Dispense Refill    acetaminophen (TYLENOL) 500 MG tablet Take 2,000 mg by mouth daily as needed for Pain.      amoxicillin (AMOXIL) 500 MG capsule Take 500 mg by mouth 3 (three) times daily.      aspirin 81 MG Chew Take 1 tablet (81 mg total) by mouth once daily. 100 tablet 3    BD ULTRA-FINE LIGIA PEN NEEDLE 32 gauge x 5/32" Ndle To inject insulin 4-6 times daily. 100 each 3    blood-glucose meter,continuous (DEXCOM G6 ) Misc 1 Device by Misc.(Non-Drug; Combo Route) route once. for 1 dose 1 each 0    blood-glucose sensor (DEXCOM G6 SENSOR) Sandrine 3 Devices by Misc.(Non-Drug; Combo Route) route every 30 days. 1 each 5    blood-glucose transmitter (DEXCOM G6 TRANSMITTER) Sandrine 2 Devices by Misc.(Non-Drug; Combo Route) route every 6 (six) months. 1 each 5    diphenhydrAMINE (BENADRYL) 25 mg capsule Take 25 mg by mouth daily as needed for Insomnia ((if Unisom ineffective)).      docusate sodium (COLACE) 100 MG capsule Take 100 mg by mouth once daily.      doxylamine " succinate (UNISOM, DOXYLAMINE, ORAL) Take 1 capsule by mouth nightly as needed (Sleep).      enoxaparin (LOVENOX) 40 mg/0.4 mL Syrg Inject 0.4 mLs (40 mg total) into the skin every evening. 30 each 11    famotidine (PEPCID) 40 MG tablet Take 1 tablet (40 mg total) by mouth every evening. (Patient not taking: No sig reported) 30 tablet 10    fluconazole (DIFLUCAN) 150 MG Tab 1 po Now may repeat in 72 hr (Patient not taking: No sig reported) 2 tablet 1    GLUCAGEN HYPOKIT 1 mg SolR USE AS DIRECTED FOR HYPOGLYCEMIA INJECTION AS NEEDED      glucagon (BAQSIMI) 3 mg/actuation Spry 1 each by Nasal route as needed (For Hypoglycemia). (Patient not taking: No sig reported) 2 each 3    hydrALAZINE (APRESOLINE) 25 MG tablet Take 2 tablets (50 mg total) by mouth every 8 (eight) hours. 90 tablet 11    insulin admin supplies (INPEN, NOVOLOG OR FIASP, PINK) InPn Use device to inject insulin. Dispense one kit. 1 each 0    insulin glargine (LANTUS U-100 INSULIN) 100 unit/mL injection Inject 8 Units into the skin 2 (two) times a day. (Patient not taking: No sig reported) 4.8 mL 11    labetaloL (NORMODYNE) 200 MG tablet Take 1 tablet (200 mg total) by mouth every 12 (twelve) hours. 60 tablet 11    metoclopramide HCl (REGLAN) 10 MG tablet Take 1 tablet (10 mg total) by mouth 4 (four) times daily. 120 tablet 2    NIFEdipine (PROCARDIA-XL) 30 MG (OSM) 24 hr tablet Take 30 mg by mouth once daily.      NIFEdipine (PROCARDIA-XL) 90 MG (OSM) 24 hr tablet Take 1 tablet (90 mg total) by mouth 2 (two) times a day. 30 tablet 11    NOVOLOG FLEXPEN U-100 INSULIN 100 unit/mL (3 mL) InPn pen Inject under the skin three times a day based on carb counting. Max daily dose of 70 units 5 each 3    ondansetron (ZOFRAN-ODT) 4 MG TbDL Take 4 mg by mouth every 8 (eight) hours as needed.      PNV,calcium 72/iron/folic acid (PRENATAL VITAMIN) Tab Take 1 tablet by mouth once daily. for 7 days 7 tablet 0    progesterone (PROMETRIUM) 200 MG capsule Take 1 capsule  (200 mg total) by mouth nightly. 30 capsule 4    promethazine (PHENERGAN) 25 MG suppository Place 1 suppository (25 mg total) rectally every 6 (six) hours as needed for Nausea. 12 suppository 1    promethazine (PHENERGAN) 25 MG tablet Take 1 tablet (25 mg total) by mouth every 4 (four) hours. 60 tablet 0    pyridoxine, vitamin B6, (B-6) 25 MG Tab Take 1 tablet (25 mg total) by mouth once daily. 30 tablet 6    sertraline (ZOLOFT) 50 MG tablet Take 1 tablet (50 mg total) by mouth once daily. 30 tablet 2    [DISCONTINUED] insulin detemir U-100 (LEVEMIR FLEXTOUCH) 100 unit/mL (3 mL) SubQ InPn pen Inject 6 Units into the skin 2 (two) times daily. 3.6 mL 11    [DISCONTINUED] methyldopa (ALDOMET) 250 MG tablet Take 500 mg by mouth 2 (two) times daily.      [DISCONTINUED] pregabalin (LYRICA) 150 MG capsule Take 150 mg by mouth.       No current facility-administered medications on file prior to visit.     29 weeks gestation of pregnancy    Pre-existing type 1 diabetes mellitus during pregnancy in second trimester    Hypertension affecting pregnancy, antepartum     Plan:  Overall doing well  Follow up 3 Weeks, bleeding/pain precautions, kick counts, labor precautions discussed.  Continue close blood sugar monitoring.  Continue to follow-up with Endocrinology.  Blood pressure stable however there is some confusion whether she should be on Procardia XL 30 daily or Procardia XL 90 b.i.d..  Her blood pressure is currently stable and will not make any adjustments.  Weekly  testing as well as follow-up growth scan at 32 weeks.  Continue to follow-up with MFM.  Appreciate assistance.

## 2022-09-22 ENCOUNTER — ANESTHESIA EVENT (OUTPATIENT)
Dept: EMERGENCY MEDICINE | Facility: OTHER | Age: 27
End: 2022-09-22

## 2022-09-22 ENCOUNTER — HOSPITAL ENCOUNTER (INPATIENT)
Facility: OTHER | Age: 27
LOS: 13 days | Discharge: HOME | End: 2022-10-05
Attending: OBSTETRICS & GYNECOLOGY | Admitting: OBSTETRICS & GYNECOLOGY
Payer: MEDICAID

## 2022-09-22 ENCOUNTER — ANESTHESIA (OUTPATIENT)
Dept: EMERGENCY MEDICINE | Facility: OTHER | Age: 27
End: 2022-09-22

## 2022-09-22 ENCOUNTER — OFFICE VISIT (OUTPATIENT)
Dept: NEPHROLOGY | Facility: CLINIC | Age: 27
End: 2022-09-22
Payer: MEDICAID

## 2022-09-22 VITALS
BODY MASS INDEX: 31.22 KG/M2 | WEIGHT: 159 LBS | DIASTOLIC BLOOD PRESSURE: 116 MMHG | SYSTOLIC BLOOD PRESSURE: 186 MMHG | HEIGHT: 60 IN | HEART RATE: 108 BPM | OXYGEN SATURATION: 98 %

## 2022-09-22 DIAGNOSIS — I16.1 HYPERTENSIVE EMERGENCY: ICD-10-CM

## 2022-09-22 DIAGNOSIS — E10.65 TYPE 1 DIABETES MELLITUS WITH HYPERGLYCEMIA: ICD-10-CM

## 2022-09-22 DIAGNOSIS — K31.84 GASTROPARESIS DUE TO DM: ICD-10-CM

## 2022-09-22 DIAGNOSIS — R73.9 HYPERGLYCEMIA: ICD-10-CM

## 2022-09-22 DIAGNOSIS — E87.1 HYPONATREMIA: ICD-10-CM

## 2022-09-22 DIAGNOSIS — O36.8390 NON-REASSURING FETAL HEART RATE OR RHYTHM AFFECTING MANAGEMENT OF MOTHER: ICD-10-CM

## 2022-09-22 DIAGNOSIS — R80.9 NEPHROTIC RANGE PROTEINURIA: ICD-10-CM

## 2022-09-22 DIAGNOSIS — Z3A.28 28 WEEKS GESTATION OF PREGNANCY: ICD-10-CM

## 2022-09-22 DIAGNOSIS — O99.012 ANEMIA AFFECTING PREGNANCY IN SECOND TRIMESTER: ICD-10-CM

## 2022-09-22 DIAGNOSIS — O21.9 NAUSEA AND VOMITING DURING PREGNANCY: ICD-10-CM

## 2022-09-22 DIAGNOSIS — Z98.891 S/P PRIMARY LOW TRANSVERSE C-SECTION: ICD-10-CM

## 2022-09-22 DIAGNOSIS — O16.9 HYPERTENSION AFFECTING PREGNANCY, ANTEPARTUM: ICD-10-CM

## 2022-09-22 DIAGNOSIS — Z30.9 ENCOUNTER FOR CONTRACEPTIVE MANAGEMENT, UNSPECIFIED TYPE: ICD-10-CM

## 2022-09-22 DIAGNOSIS — E87.5 HYPERKALEMIA: ICD-10-CM

## 2022-09-22 DIAGNOSIS — O24.011 PRE-EXISTING TYPE 1 DIABETES MELLITUS DURING PREGNANCY IN FIRST TRIMESTER: ICD-10-CM

## 2022-09-22 DIAGNOSIS — O11.9 CHRONIC HYPERTENSION WITH SUPERIMPOSED PRE-ECLAMPSIA: ICD-10-CM

## 2022-09-22 DIAGNOSIS — O11.9 CHRONIC HYPERTENSION WITH SUPERIMPOSED PREECLAMPSIA: ICD-10-CM

## 2022-09-22 DIAGNOSIS — O24.319 PREEXISTING DIABETES COMPLICATING PREGNANCY, ANTEPARTUM: ICD-10-CM

## 2022-09-22 DIAGNOSIS — N17.9 AKI (ACUTE KIDNEY INJURY): ICD-10-CM

## 2022-09-22 DIAGNOSIS — R80.9 NEPHROTIC RANGE PROTEINURIA: Primary | ICD-10-CM

## 2022-09-22 DIAGNOSIS — O16.9 HYPERTENSION AFFECTING PREGNANCY, ANTEPARTUM: Primary | ICD-10-CM

## 2022-09-22 DIAGNOSIS — E11.43 GASTROPARESIS DUE TO DM: ICD-10-CM

## 2022-09-22 PROBLEM — Z3A.29 29 WEEKS GESTATION OF PREGNANCY: Status: ACTIVE | Noted: 2022-09-22

## 2022-09-22 PROBLEM — N88.3 SHORT CERVIX: Status: ACTIVE | Noted: 2022-09-22

## 2022-09-22 PROBLEM — N39.0 UTI (URINARY TRACT INFECTION): Status: ACTIVE | Noted: 2022-09-22

## 2022-09-22 LAB
ALBUMIN SERPL BCP-MCNC: 1.8 G/DL (ref 3.5–5.2)
ALP SERPL-CCNC: 86 U/L (ref 55–135)
ALT SERPL W/O P-5'-P-CCNC: 28 U/L (ref 10–44)
ANION GAP SERPL CALC-SCNC: 9 MMOL/L (ref 8–16)
AST SERPL-CCNC: 25 U/L (ref 10–40)
BASOPHILS # BLD AUTO: 0.03 K/UL (ref 0–0.2)
BASOPHILS NFR BLD: 0.2 % (ref 0–1.9)
BILIRUB SERPL-MCNC: 0.1 MG/DL (ref 0.1–1)
BUN SERPL-MCNC: 22 MG/DL (ref 6–20)
CALCIUM SERPL-MCNC: 7.4 MG/DL (ref 8.7–10.5)
CHLORIDE SERPL-SCNC: 108 MMOL/L (ref 95–110)
CO2 SERPL-SCNC: 18 MMOL/L (ref 23–29)
CREAT SERPL-MCNC: 1.5 MG/DL (ref 0.5–1.4)
CREAT UR-MCNC: 45.7 MG/DL (ref 15–325)
DIFFERENTIAL METHOD: ABNORMAL
EOSINOPHIL # BLD AUTO: 0.1 K/UL (ref 0–0.5)
EOSINOPHIL NFR BLD: 0.6 % (ref 0–8)
ERYTHROCYTE [DISTWIDTH] IN BLOOD BY AUTOMATED COUNT: 12.8 % (ref 11.5–14.5)
EST. GFR  (NO RACE VARIABLE): 48 ML/MIN/1.73 M^2
GLUCOSE SERPL-MCNC: 183 MG/DL (ref 70–110)
HCT VFR BLD AUTO: 22.9 % (ref 37–48.5)
HGB BLD-MCNC: 7.9 G/DL (ref 12–16)
HIV1+2 IGG SERPL QL IA.RAPID: NORMAL
IMM GRANULOCYTES # BLD AUTO: 0.31 K/UL (ref 0–0.04)
IMM GRANULOCYTES NFR BLD AUTO: 2.2 % (ref 0–0.5)
LYMPHOCYTES # BLD AUTO: 1.7 K/UL (ref 1–4.8)
LYMPHOCYTES NFR BLD: 11.8 % (ref 18–48)
MCH RBC QN AUTO: 31.9 PG (ref 27–31)
MCHC RBC AUTO-ENTMCNC: 34.5 G/DL (ref 32–36)
MCV RBC AUTO: 92 FL (ref 82–98)
MONOCYTES # BLD AUTO: 1 K/UL (ref 0.3–1)
MONOCYTES NFR BLD: 6.7 % (ref 4–15)
NEUTROPHILS # BLD AUTO: 11.2 K/UL (ref 1.8–7.7)
NEUTROPHILS NFR BLD: 78.5 % (ref 38–73)
NRBC BLD-RTO: 0 /100 WBC
PLATELET # BLD AUTO: 226 K/UL (ref 150–450)
PMV BLD AUTO: 9.6 FL (ref 9.2–12.9)
POCT GLUCOSE: 192 MG/DL (ref 70–110)
POTASSIUM SERPL-SCNC: 5.8 MMOL/L (ref 3.5–5.1)
PROT SERPL-MCNC: 5.1 G/DL (ref 6–8.4)
PROT UR-MCNC: 814 MG/DL (ref 0–15)
PROT/CREAT UR: 17.81 MG/G{CREAT} (ref 0–0.2)
RBC # BLD AUTO: 2.48 M/UL (ref 4–5.4)
SODIUM SERPL-SCNC: 135 MMOL/L (ref 136–145)
WBC # BLD AUTO: 14.19 K/UL (ref 3.9–12.7)

## 2022-09-22 PROCEDURE — 82962 GLUCOSE BLOOD TEST: CPT

## 2022-09-22 PROCEDURE — 86850 RBC ANTIBODY SCREEN: CPT | Performed by: STUDENT IN AN ORGANIZED HEALTH CARE EDUCATION/TRAINING PROGRAM

## 2022-09-22 PROCEDURE — 59025 FETAL NON-STRESS TEST: CPT

## 2022-09-22 PROCEDURE — 25000003 PHARM REV CODE 250

## 2022-09-22 PROCEDURE — 93010 EKG 12-LEAD: ICD-10-PCS | Mod: 59,,, | Performed by: INTERNAL MEDICINE

## 2022-09-22 PROCEDURE — 59025 FETAL NON-STRESS TEST: CPT | Mod: 26,59,, | Performed by: OBSTETRICS & GYNECOLOGY

## 2022-09-22 PROCEDURE — 99999 PR PBB SHADOW E&M-EST. PATIENT-LVL V: CPT | Mod: PBBFAC,,, | Performed by: STUDENT IN AN ORGANIZED HEALTH CARE EDUCATION/TRAINING PROGRAM

## 2022-09-22 PROCEDURE — 3062F PR POS MACROALBUMINURIA RESULT DOCUMENTED/REVIEW: ICD-10-PCS | Mod: CPTII,,, | Performed by: INTERNAL MEDICINE

## 2022-09-22 PROCEDURE — 99203 OFFICE O/P NEW LOW 30 MIN: CPT | Mod: S$PBB,,, | Performed by: INTERNAL MEDICINE

## 2022-09-22 PROCEDURE — 99999 PR PBB SHADOW E&M-EST. PATIENT-LVL V: ICD-10-PCS | Mod: PBBFAC,,, | Performed by: STUDENT IN AN ORGANIZED HEALTH CARE EDUCATION/TRAINING PROGRAM

## 2022-09-22 PROCEDURE — 3066F PR DOCUMENTATION OF TREATMENT FOR NEPHROPATHY: ICD-10-PCS | Mod: CPTII,,, | Performed by: INTERNAL MEDICINE

## 2022-09-22 PROCEDURE — 85025 COMPLETE CBC W/AUTO DIFF WBC: CPT | Mod: 91 | Performed by: STUDENT IN AN ORGANIZED HEALTH CARE EDUCATION/TRAINING PROGRAM

## 2022-09-22 PROCEDURE — 84156 ASSAY OF PROTEIN URINE: CPT

## 2022-09-22 PROCEDURE — 86703 HIV-1/HIV-2 1 RESULT ANTBDY: CPT | Performed by: STUDENT IN AN ORGANIZED HEALTH CARE EDUCATION/TRAINING PROGRAM

## 2022-09-22 PROCEDURE — 93010 ELECTROCARDIOGRAM REPORT: CPT | Mod: 59,,, | Performed by: INTERNAL MEDICINE

## 2022-09-22 PROCEDURE — 96374 THER/PROPH/DIAG INJ IV PUSH: CPT

## 2022-09-22 PROCEDURE — 99222 PR INITIAL HOSPITAL CARE,LEVL II: ICD-10-PCS | Mod: 25,TH,, | Performed by: OBSTETRICS & GYNECOLOGY

## 2022-09-22 PROCEDURE — 11000001 HC ACUTE MED/SURG PRIVATE ROOM

## 2022-09-22 PROCEDURE — 25000003 PHARM REV CODE 250: Performed by: STUDENT IN AN ORGANIZED HEALTH CARE EDUCATION/TRAINING PROGRAM

## 2022-09-22 PROCEDURE — 82043 UR ALBUMIN QUANTITATIVE: CPT

## 2022-09-22 PROCEDURE — 63600175 PHARM REV CODE 636 W HCPCS: Performed by: STUDENT IN AN ORGANIZED HEALTH CARE EDUCATION/TRAINING PROGRAM

## 2022-09-22 PROCEDURE — 99215 OFFICE O/P EST HI 40 MIN: CPT | Mod: PBBFAC,25 | Performed by: STUDENT IN AN ORGANIZED HEALTH CARE EDUCATION/TRAINING PROGRAM

## 2022-09-22 PROCEDURE — 3062F POS MACROALBUMINURIA REV: CPT | Mod: CPTII,,, | Performed by: INTERNAL MEDICINE

## 2022-09-22 PROCEDURE — 63600175 PHARM REV CODE 636 W HCPCS

## 2022-09-22 PROCEDURE — 99222 1ST HOSP IP/OBS MODERATE 55: CPT | Mod: 25,TH,, | Performed by: OBSTETRICS & GYNECOLOGY

## 2022-09-22 PROCEDURE — 4010F PR ACE/ARB THEARPY RXD/TAKEN: ICD-10-PCS | Mod: CPTII,,, | Performed by: INTERNAL MEDICINE

## 2022-09-22 PROCEDURE — 3066F NEPHROPATHY DOC TX: CPT | Mod: CPTII,,, | Performed by: INTERNAL MEDICINE

## 2022-09-22 PROCEDURE — 59025 PR FETAL 2N-STRESS TEST: ICD-10-PCS | Mod: 26,59,, | Performed by: OBSTETRICS & GYNECOLOGY

## 2022-09-22 PROCEDURE — 4010F ACE/ARB THERAPY RXD/TAKEN: CPT | Mod: CPTII,,, | Performed by: INTERNAL MEDICINE

## 2022-09-22 PROCEDURE — 93005 ELECTROCARDIOGRAM TRACING: CPT

## 2022-09-22 PROCEDURE — 99203 PR OFFICE/OUTPT VISIT, NEW, LEVL III, 30-44 MIN: ICD-10-PCS | Mod: S$PBB,,, | Performed by: INTERNAL MEDICINE

## 2022-09-22 PROCEDURE — 80053 COMPREHEN METABOLIC PANEL: CPT | Mod: 91 | Performed by: STUDENT IN AN ORGANIZED HEALTH CARE EDUCATION/TRAINING PROGRAM

## 2022-09-22 PROCEDURE — 99285 EMERGENCY DEPT VISIT HI MDM: CPT | Mod: 25,27

## 2022-09-22 PROCEDURE — 86592 SYPHILIS TEST NON-TREP QUAL: CPT | Performed by: STUDENT IN AN ORGANIZED HEALTH CARE EDUCATION/TRAINING PROGRAM

## 2022-09-22 RX ORDER — FAMOTIDINE 20 MG/1
40 TABLET, FILM COATED ORAL NIGHTLY
Status: DISCONTINUED | OUTPATIENT
Start: 2022-09-22 | End: 2022-10-03

## 2022-09-22 RX ORDER — HYDRALAZINE HYDROCHLORIDE 20 MG/ML
50 INJECTION INTRAMUSCULAR; INTRAVENOUS ONCE
Status: DISCONTINUED | OUTPATIENT
Start: 2022-09-22 | End: 2022-09-22

## 2022-09-22 RX ORDER — ACETAMINOPHEN 325 MG/1
650 TABLET ORAL EVERY 6 HOURS PRN
Status: DISCONTINUED | OUTPATIENT
Start: 2022-09-22 | End: 2022-09-25

## 2022-09-22 RX ORDER — LABETALOL HYDROCHLORIDE 5 MG/ML
80 INJECTION, SOLUTION INTRAVENOUS ONCE
Status: COMPLETED | OUTPATIENT
Start: 2022-09-22 | End: 2022-09-22

## 2022-09-22 RX ORDER — HYDRALAZINE HYDROCHLORIDE 25 MG/1
50 TABLET, FILM COATED ORAL ONCE
Status: COMPLETED | OUTPATIENT
Start: 2022-09-22 | End: 2022-09-22

## 2022-09-22 RX ORDER — NAPROXEN SODIUM 220 MG/1
81 TABLET, FILM COATED ORAL DAILY
Status: DISCONTINUED | OUTPATIENT
Start: 2022-09-23 | End: 2022-09-25

## 2022-09-22 RX ORDER — PRENATAL WITH FERROUS FUM AND FOLIC ACID 3080; 920; 120; 400; 22; 1.84; 3; 20; 10; 1; 12; 200; 27; 25; 2 [IU]/1; [IU]/1; MG/1; [IU]/1; MG/1; MG/1; MG/1; MG/1; MG/1; MG/1; UG/1; MG/1; MG/1; MG/1; MG/1
1 TABLET ORAL DAILY
Status: DISCONTINUED | OUTPATIENT
Start: 2022-09-23 | End: 2022-09-25

## 2022-09-22 RX ORDER — DIPHENHYDRAMINE HCL 25 MG
25 CAPSULE ORAL EVERY 4 HOURS PRN
Status: DISCONTINUED | OUTPATIENT
Start: 2022-09-22 | End: 2022-09-25

## 2022-09-22 RX ORDER — SIMETHICONE 80 MG
1 TABLET,CHEWABLE ORAL EVERY 6 HOURS PRN
Status: DISCONTINUED | OUTPATIENT
Start: 2022-09-22 | End: 2022-09-25

## 2022-09-22 RX ORDER — AMOXICILLIN 250 MG
1 CAPSULE ORAL NIGHTLY PRN
Status: DISCONTINUED | OUTPATIENT
Start: 2022-09-22 | End: 2022-09-25

## 2022-09-22 RX ORDER — LABETALOL 200 MG/1
200 TABLET, FILM COATED ORAL EVERY 12 HOURS
Status: DISCONTINUED | OUTPATIENT
Start: 2022-09-23 | End: 2022-09-25

## 2022-09-22 RX ORDER — LABETALOL HYDROCHLORIDE 5 MG/ML
20 INJECTION, SOLUTION INTRAVENOUS ONCE
Status: COMPLETED | OUTPATIENT
Start: 2022-09-22 | End: 2022-09-22

## 2022-09-22 RX ORDER — HYDRALAZINE HYDROCHLORIDE 25 MG/1
50 TABLET, FILM COATED ORAL EVERY 8 HOURS
Status: DISCONTINUED | OUTPATIENT
Start: 2022-09-23 | End: 2022-09-23

## 2022-09-22 RX ORDER — NIFEDIPINE 30 MG/1
90 TABLET, EXTENDED RELEASE ORAL 2 TIMES DAILY
Status: DISCONTINUED | OUTPATIENT
Start: 2022-09-22 | End: 2022-09-23

## 2022-09-22 RX ORDER — LABETALOL 200 MG/1
200 TABLET, FILM COATED ORAL ONCE
Status: COMPLETED | OUTPATIENT
Start: 2022-09-22 | End: 2022-09-22

## 2022-09-22 RX ORDER — PROCHLORPERAZINE EDISYLATE 5 MG/ML
5 INJECTION INTRAMUSCULAR; INTRAVENOUS EVERY 6 HOURS PRN
Status: DISCONTINUED | OUTPATIENT
Start: 2022-09-22 | End: 2022-09-25

## 2022-09-22 RX ORDER — IRON POLYSACCHARIDE COMPLEX 150 MG
150 CAPSULE ORAL DAILY
Status: DISCONTINUED | OUTPATIENT
Start: 2022-09-23 | End: 2022-10-05 | Stop reason: HOSPADM

## 2022-09-22 RX ORDER — HYDRALAZINE HYDROCHLORIDE 25 MG/1
25 TABLET, FILM COATED ORAL ONCE
Status: DISCONTINUED | OUTPATIENT
Start: 2022-09-22 | End: 2022-09-22

## 2022-09-22 RX ORDER — DOCUSATE SODIUM 100 MG/1
100 CAPSULE, LIQUID FILLED ORAL DAILY
Status: DISCONTINUED | OUTPATIENT
Start: 2022-09-23 | End: 2022-09-25

## 2022-09-22 RX ORDER — METOCLOPRAMIDE 10 MG/1
10 TABLET ORAL 4 TIMES DAILY
Status: DISCONTINUED | OUTPATIENT
Start: 2022-09-23 | End: 2022-10-03

## 2022-09-22 RX ORDER — PROGESTERONE 100 MG/1
200 CAPSULE ORAL NIGHTLY
Refills: 4 | Status: DISCONTINUED | OUTPATIENT
Start: 2022-09-22 | End: 2022-09-25

## 2022-09-22 RX ORDER — LABETALOL HYDROCHLORIDE 5 MG/ML
40 INJECTION, SOLUTION INTRAVENOUS ONCE
Status: COMPLETED | OUTPATIENT
Start: 2022-09-22 | End: 2022-09-22

## 2022-09-22 RX ORDER — HYDRALAZINE HYDROCHLORIDE 20 MG/ML
5 INJECTION INTRAMUSCULAR; INTRAVENOUS ONCE
Status: COMPLETED | OUTPATIENT
Start: 2022-09-22 | End: 2022-09-23

## 2022-09-22 RX ORDER — DIPHENHYDRAMINE HYDROCHLORIDE 50 MG/ML
25 INJECTION INTRAMUSCULAR; INTRAVENOUS EVERY 4 HOURS PRN
Status: DISCONTINUED | OUTPATIENT
Start: 2022-09-22 | End: 2022-09-25

## 2022-09-22 RX ORDER — INSULIN ASPART 100 [IU]/ML
1 INJECTION, SOLUTION INTRAVENOUS; SUBCUTANEOUS ONCE
Status: COMPLETED | OUTPATIENT
Start: 2022-09-22 | End: 2022-09-22

## 2022-09-22 RX ORDER — PROMETHAZINE HYDROCHLORIDE 25 MG/1
25 TABLET ORAL EVERY 6 HOURS PRN
Status: DISCONTINUED | OUTPATIENT
Start: 2022-09-22 | End: 2022-09-25

## 2022-09-22 RX ORDER — SODIUM CHLORIDE 0.9 % (FLUSH) 0.9 %
10 SYRINGE (ML) INJECTION
Status: DISCONTINUED | OUTPATIENT
Start: 2022-09-22 | End: 2022-09-25

## 2022-09-22 RX ORDER — NIFEDIPINE 30 MG/1
90 TABLET, EXTENDED RELEASE ORAL ONCE
Status: COMPLETED | OUTPATIENT
Start: 2022-09-22 | End: 2022-09-22

## 2022-09-22 RX ADMIN — LABETALOL HYDROCHLORIDE 20 MG: 5 INJECTION INTRAVENOUS at 10:09

## 2022-09-22 RX ADMIN — INSULIN ASPART 1 UNITS: 100 INJECTION, SOLUTION INTRAVENOUS; SUBCUTANEOUS at 10:09

## 2022-09-22 RX ADMIN — PROGESTERONE 200 MG: 100 CAPSULE ORAL at 11:09

## 2022-09-22 RX ADMIN — HYDRALAZINE HYDROCHLORIDE 50 MG: 25 TABLET, FILM COATED ORAL at 10:09

## 2022-09-22 RX ADMIN — LABETALOL HYDROCHLORIDE 40 MG: 5 INJECTION INTRAVENOUS at 10:09

## 2022-09-22 RX ADMIN — LABETALOL HYDROCHLORIDE 200 MG: 200 TABLET, FILM COATED ORAL at 09:09

## 2022-09-22 RX ADMIN — CEFTRIAXONE 1 G: 1 INJECTION, SOLUTION INTRAVENOUS at 10:09

## 2022-09-22 RX ADMIN — LABETALOL HYDROCHLORIDE 80 MG: 5 INJECTION INTRAVENOUS at 11:09

## 2022-09-22 RX ADMIN — NIFEDIPINE 90 MG: 30 TABLET, FILM COATED, EXTENDED RELEASE ORAL at 09:09

## 2022-09-22 RX ADMIN — FAMOTIDINE 40 MG: 20 TABLET ORAL at 10:09

## 2022-09-22 NOTE — PROGRESS NOTES
Name: Alicia Valles  : 1995  Date of Service: 2022     Reason for visit: Referral for nephrotic range proteinuria     HPI: Ms Valles is a 27-year-old woman currently 29 weeks 5 days pregnant, type 1 diabetes mellitus on insulin (most recent hemoglobin A1c 6.7%) with retinopathy, gastroparesis & numerous admissions for DKA, HTN, hyperemesis gravidarum and history seizure in 2022 attributed to hypoglycemia who presents to clinic today for evaluation of nephrotic range proteinuria. On presentation patient with complaints of nausea, emesis and decreased PO intake for the past 2-3 days and now reportedly experiencing diaphoresis and weakness. She is accompanied by her mother who states she has not been able to tolerated any of her at home PO medications including her anti-hypertensive mediations with systolic readings in the 180s and diastolic readings in the 110s mmHg. Witnessed ~800 mL minium of watery emesis while in clinic that continued throughout visit. She also reports lower than normal glucose readings on her Dexcom ranging from 110-90s. Review of her most recent laboratory studies in  & May of this year remarkable for anemia with hemoglobin of 6.7, hyponatremia with corrected sodium of 133, glucose 281 and albumin 1.7. After discussion with patient and mother regarding the acuity of Ms Valles's condition decision was made to refer patient to the ED for STAT laboratory studies, IVFs and IV anti-emetics.    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        Surgical History:   Past Surgical History:   Procedure Laterality Date    RETINAL DETACHMENT SURGERY  2018    RETINAL DETACHMENT SURGERY         Allergies:   Review of  "patient's allergies indicates:  No Known Allergies    Medications:     Current Outpatient Medications:     acetaminophen (TYLENOL) 500 MG tablet, Take 2,000 mg by mouth daily as needed for Pain., Disp: , Rfl:     amoxicillin (AMOXIL) 500 MG capsule, Take 500 mg by mouth 3 (three) times daily., Disp: , Rfl:     aspirin 81 MG Chew, Take 1 tablet (81 mg total) by mouth once daily., Disp: 100 tablet, Rfl: 3    BD ULTRA-FINE LIGIA PEN NEEDLE 32 gauge x 5/32" Ndle, To inject insulin 4-6 times daily., Disp: 100 each, Rfl: 3    blood-glucose meter,continuous (DEXCOM G6 ) Misc, 1 Device by Misc.(Non-Drug; Combo Route) route once. for 1 dose, Disp: 1 each, Rfl: 0    blood-glucose sensor (DEXCOM G6 SENSOR) Sandrine, 3 Devices by Misc.(Non-Drug; Combo Route) route every 30 days., Disp: 1 each, Rfl: 5    blood-glucose transmitter (DEXCOM G6 TRANSMITTER) Sandrine, 2 Devices by Misc.(Non-Drug; Combo Route) route every 6 (six) months., Disp: 1 each, Rfl: 5    diphenhydrAMINE (BENADRYL) 25 mg capsule, Take 25 mg by mouth daily as needed for Insomnia ((if Unisom ineffective))., Disp: , Rfl:     docusate sodium (COLACE) 100 MG capsule, Take 100 mg by mouth once daily., Disp: , Rfl:     doxylamine succinate (UNISOM, DOXYLAMINE, ORAL), Take 1 capsule by mouth nightly as needed (Sleep)., Disp: , Rfl:     enoxaparin (LOVENOX) 40 mg/0.4 mL Syrg, Inject 0.4 mLs (40 mg total) into the skin every evening., Disp: 30 each, Rfl: 11    famotidine (PEPCID) 40 MG tablet, Take 1 tablet (40 mg total) by mouth every evening. (Patient not taking: No sig reported), Disp: 30 tablet, Rfl: 10    fluconazole (DIFLUCAN) 150 MG Tab, 1 po Now may repeat in 72 hr (Patient not taking: No sig reported), Disp: 2 tablet, Rfl: 1    GLUCAGEN HYPOKIT 1 mg SolR, USE AS DIRECTED FOR HYPOGLYCEMIA INJECTION AS NEEDED, Disp: , Rfl:     glucagon (BAQSIMI) 3 mg/actuation Spry, 1 each by Nasal route as needed (For Hypoglycemia). (Patient not taking: No sig reported), Disp: 2 " each, Rfl: 3    hydrALAZINE (APRESOLINE) 25 MG tablet, Take 2 tablets (50 mg total) by mouth every 8 (eight) hours., Disp: 90 tablet, Rfl: 11    insulin admin supplies (INPEN, NOVOLOG OR FIASP, PINK) InPn, Use device to inject insulin. Dispense one kit., Disp: 1 each, Rfl: 0    insulin glargine (LANTUS U-100 INSULIN) 100 unit/mL injection, Inject 8 Units into the skin 2 (two) times a day. (Patient not taking: No sig reported), Disp: 4.8 mL, Rfl: 11    labetaloL (NORMODYNE) 200 MG tablet, Take 1 tablet (200 mg total) by mouth every 12 (twelve) hours., Disp: 60 tablet, Rfl: 11    metoclopramide HCl (REGLAN) 10 MG tablet, Take 1 tablet (10 mg total) by mouth 4 (four) times daily., Disp: 120 tablet, Rfl: 2    NIFEdipine (PROCARDIA-XL) 30 MG (OSM) 24 hr tablet, Take 30 mg by mouth once daily., Disp: , Rfl:     NIFEdipine (PROCARDIA-XL) 90 MG (OSM) 24 hr tablet, Take 1 tablet (90 mg total) by mouth 2 (two) times a day., Disp: 30 tablet, Rfl: 11    NOVOLOG FLEXPEN U-100 INSULIN 100 unit/mL (3 mL) InPn pen, Inject under the skin three times a day based on carb counting. Max daily dose of 70 units, Disp: 5 each, Rfl: 3    ondansetron (ZOFRAN-ODT) 4 MG TbDL, Take 4 mg by mouth every 8 (eight) hours as needed., Disp: , Rfl:     PNV,calcium 72/iron/folic acid (PRENATAL VITAMIN) Tab, Take 1 tablet by mouth once daily. for 7 days, Disp: 7 tablet, Rfl: 0    progesterone (PROMETRIUM) 200 MG capsule, Take 1 capsule (200 mg total) by mouth nightly., Disp: 30 capsule, Rfl: 4    promethazine (PHENERGAN) 25 MG suppository, Place 1 suppository (25 mg total) rectally every 6 (six) hours as needed for Nausea., Disp: 12 suppository, Rfl: 1    promethazine (PHENERGAN) 25 MG tablet, Take 1 tablet (25 mg total) by mouth every 4 (four) hours., Disp: 60 tablet, Rfl: 0    pyridoxine, vitamin B6, (B-6) 25 MG Tab, Take 1 tablet (25 mg total) by mouth once daily., Disp: 30 tablet, Rfl: 6    sertraline (ZOLOFT) 50 MG tablet, Take 1 tablet (50 mg  total) by mouth once daily., Disp: 30 tablet, Rfl: 2    Family History:   Family History   Problem Relation Age of Onset    No Known Problems Father     Cancer Paternal Grandfather         unsure    Diabetes Maternal Grandfather     Hypertension Mother        Social History:   Social History     Socioeconomic History    Marital status: Single   Tobacco Use    Smoking status: Never    Smokeless tobacco: Never   Substance and Sexual Activity    Alcohol use: Not Currently    Drug use: Never    Sexual activity: Yes     Partners: Male     Birth control/protection: None   Social History Narrative    ** Merged History Encounter **            Review of Systems:   Review of Systems   Unable to perform ROS: Acuity of condition (limited review of systems, patient actively vomiting throughout exam)   Constitutional:  Positive for chills, diaphoresis and malaise/fatigue. Negative for fever and weight loss.   Gastrointestinal:  Positive for nausea and vomiting.   Skin:  Positive for rash.   Neurological:  Positive for weakness.        She reports lightheadedness.     Vitals:   Vitals:    09/22/22 1519   BP: (!) 186/116   Pulse: 108   SpO2: 98%   Weight: 72.1 kg (159 lb)  Comment: per pt   Height: 5' (1.524 m)     Body mass index is 31.05 kg/m².    Physical Exam:   Physical Exam  Vitals and nursing note reviewed.   Constitutional:       General: She is awake. She is in acute distress.      Appearance: She is ill-appearing and diaphoretic.      Comments: In wheelchair as she reports she is too weak to ambulate throughout hospital.   HENT:      Head: Normocephalic and atraumatic.      Right Ear: External ear normal.      Left Ear: External ear normal.      Nose: Nose normal.   Eyes:      General: No scleral icterus.        Right eye: Discharge present.         Left eye: Discharge present.     Extraocular Movements: Extraocular movements intact.      Conjunctiva/sclera: Conjunctivae normal.      Comments: Watery discharge secondary  to active vomiting noted.   Cardiovascular:      Rate and Rhythm: Tachycardia present.      Pulses: Normal pulses.      Heart sounds: No murmur heard.    No friction rub. No gallop.   Pulmonary:      Effort: Pulmonary effort is normal. No respiratory distress.      Breath sounds: No wheezing, rhonchi or rales.   Abdominal:      General: Abdomen is protuberant. Bowel sounds are normal.      Comments: Gravid abdomen.   Musculoskeletal:      Right lower leg: Edema present.      Left lower leg: Edema present.   Skin:     General: Skin is warm.      Coloration: Skin is not jaundiced.      Findings: Rash present.   Neurological:      General: No focal deficit present.      Mental Status: She is alert. Mental status is at baseline.      Cranial Nerves: No cranial nerve deficit.   Psychiatric:         Mood and Affect: Mood normal.         Behavior: Behavior is cooperative.       Urinary sediment on 09/22/2022:  Many WBCs, bacteria and squamous cells. Few RBCs and tubular epithelial cells. Occasional hyaline and WBC casts. No overt muddy brown casts or dysmorphic appearing RBCs.              Assessment/Plan:   Diagnoses and all orders for this visit:    Nephrotic range proteinuria  -     Patient with history of worsening proteinuria with UPCRs as follows 05/05/2022 with value of 3.75 and 08/24/2020 with value of 1.76   - Most recent 24-hour urine with 6.27 grams (2.85 liters of urine)  - No history of thalassemia or sickle disease confirmed on hemoglobin electrophoresis on 5/6  - Proteinuria may represent diabetic nephropathy although superimposed pre-eclampsia not excluded  - Protein / creatinine ratio, urine  -     Microalbumin/creatinine urine ratio  -     Urinalysis  -     Cancel: Lipid Panel  -     Lipid Panel; Future  -     Renal Function Panel; Future  -     YARON Screen w/Reflex; Future  -     ANTI-DNA ANTIBODY, DOUBLE-STRANDED; Future    Hyponatremia  -     Osmolality, urine  -     Cancel: Osmolality, Serum  -      Sodium, urine, random  -     Osmolality, Serum; Future    Pre-existing type 1 diabetes mellitus during pregnancy in first trimester  -     History of poorly controlled T1DM although improved recently with most recent A1c 6.7%  - Hemoglobin A1c in August 2021 was 12%  - She is followed by Endocrinology and is on insulin currently  -     Microalbumin/creatinine urine ratio    Gastroparesis due to DM  Nausea and vomiting during pregnancy  - Complaints of continued emesis for the past 2-3 days with inability to tolerated PO mediations  -     Magnesium; Future  -     Renal Function Panel; Future    Hypertension affecting pregnancy, antepartum        - Unclear if patient's blood pressure is elevated in the setting of missed anti-hypertensive medications versus superimposed pre-eclampsia component        - Referral to ED    Anemia affecting pregnancy in second trimester  -     Unclear etiology for her normocytic anemia, most recent iron panel with iron of 84, transferrin 180, TIBC 266, 32% saturation and ferritin 79  - FOLATE; Future  -     CBC Auto Differential; Future  -     VITAMIN B12; Future  -     METHYLMALONIC ACID, SERUM; Future  -     HOMOCYSTEINE, SERUM; Future     Disposition: Follow-up in one month.    Discussed with Dr. Cheek.    Niko Momin MD  Nephrology

## 2022-09-23 PROBLEM — E87.5 HYPERKALEMIA: Status: ACTIVE | Noted: 2022-09-23

## 2022-09-23 LAB
ABO + RH BLD: NORMAL
ALBUMIN SERPL BCP-MCNC: 1.8 G/DL (ref 3.5–5.2)
ALBUMIN/CREAT UR: ABNORMAL UG/MG (ref 0–30)
ALP SERPL-CCNC: 82 U/L (ref 55–135)
ALT SERPL W/O P-5'-P-CCNC: 27 U/L (ref 10–44)
ANION GAP SERPL CALC-SCNC: 8 MMOL/L (ref 8–16)
ANION GAP SERPL CALC-SCNC: 8 MMOL/L (ref 8–16)
ANION GAP SERPL CALC-SCNC: 9 MMOL/L (ref 8–16)
AST SERPL-CCNC: 22 U/L (ref 10–40)
B-OH-BUTYR BLD STRIP-SCNC: 0 MMOL/L (ref 0–0.5)
BILIRUB SERPL-MCNC: 0.1 MG/DL (ref 0.1–1)
BLD GP AB SCN CELLS X3 SERPL QL: NORMAL
BUN SERPL-MCNC: 24 MG/DL (ref 6–20)
BUN SERPL-MCNC: 24 MG/DL (ref 6–20)
BUN SERPL-MCNC: 26 MG/DL (ref 6–20)
CALCIUM SERPL-MCNC: 7.3 MG/DL (ref 8.7–10.5)
CALCIUM SERPL-MCNC: 7.5 MG/DL (ref 8.7–10.5)
CALCIUM SERPL-MCNC: 7.6 MG/DL (ref 8.7–10.5)
CHLORIDE SERPL-SCNC: 105 MMOL/L (ref 95–110)
CHLORIDE SERPL-SCNC: 107 MMOL/L (ref 95–110)
CHLORIDE SERPL-SCNC: 108 MMOL/L (ref 95–110)
CHOLEST SERPL-MCNC: 269 MG/DL (ref 120–199)
CHOLEST/HDLC SERPL: 2.6 {RATIO} (ref 2–5)
CO2 SERPL-SCNC: 17 MMOL/L (ref 23–29)
CREAT SERPL-MCNC: 1.7 MG/DL (ref 0.5–1.4)
CREAT SERPL-MCNC: 1.7 MG/DL (ref 0.5–1.4)
CREAT SERPL-MCNC: 1.9 MG/DL (ref 0.5–1.4)
CREAT UR-MCNC: 45.7 MG/DL (ref 15–325)
EST. GFR  (NO RACE VARIABLE): 36 ML/MIN/1.73 M^2
EST. GFR  (NO RACE VARIABLE): 41 ML/MIN/1.73 M^2
EST. GFR  (NO RACE VARIABLE): 41 ML/MIN/1.73 M^2
FOLATE SERPL-MCNC: 11.8 NG/ML (ref 4–24)
GLUCOSE SERPL-MCNC: 157 MG/DL (ref 70–110)
GLUCOSE SERPL-MCNC: 210 MG/DL (ref 70–110)
GLUCOSE SERPL-MCNC: 270 MG/DL (ref 70–110)
HAPTOGLOB SERPL-MCNC: <10 MG/DL (ref 30–250)
HCYS SERPL-SCNC: 6 UMOL/L (ref 4–15.5)
HDLC SERPL-MCNC: 105 MG/DL (ref 40–75)
HDLC SERPL: 39 % (ref 20–50)
LDLC SERPL CALC-MCNC: 125.6 MG/DL (ref 63–159)
MAGNESIUM SERPL-MCNC: 4.6 MG/DL (ref 1.6–2.6)
MICROALBUMIN UR DL<=1MG/L-MCNC: 4967 UG/ML
NONHDLC SERPL-MCNC: 164 MG/DL
POCT GLUCOSE: 159 MG/DL (ref 70–110)
POCT GLUCOSE: 165 MG/DL (ref 70–110)
POCT GLUCOSE: 172 MG/DL (ref 70–110)
POCT GLUCOSE: 173 MG/DL (ref 70–110)
POCT GLUCOSE: 180 MG/DL (ref 70–110)
POCT GLUCOSE: 180 MG/DL (ref 70–110)
POCT GLUCOSE: 203 MG/DL (ref 70–110)
POCT GLUCOSE: 207 MG/DL (ref 70–110)
POCT GLUCOSE: 208 MG/DL (ref 70–110)
POCT GLUCOSE: 222 MG/DL (ref 70–110)
POCT GLUCOSE: 228 MG/DL (ref 70–110)
POCT GLUCOSE: 231 MG/DL (ref 70–110)
POCT GLUCOSE: 258 MG/DL (ref 70–110)
POCT GLUCOSE: 269 MG/DL (ref 70–110)
POCT GLUCOSE: 280 MG/DL (ref 70–110)
POCT GLUCOSE: 283 MG/DL (ref 70–110)
POCT GLUCOSE: 327 MG/DL (ref 70–110)
POCT GLUCOSE: 407 MG/DL (ref 70–110)
POCT GLUCOSE: 428 MG/DL (ref 70–110)
POTASSIUM SERPL-SCNC: 4.8 MMOL/L (ref 3.5–5.1)
POTASSIUM SERPL-SCNC: 5.8 MMOL/L (ref 3.5–5.1)
POTASSIUM SERPL-SCNC: 6 MMOL/L (ref 3.5–5.1)
POTASSIUM SERPL-SCNC: 6 MMOL/L (ref 3.5–5.1)
PROT SERPL-MCNC: 5.5 G/DL (ref 6–8.4)
RPR SER QL: NORMAL
SODIUM SERPL-SCNC: 130 MMOL/L (ref 136–145)
SODIUM SERPL-SCNC: 132 MMOL/L (ref 136–145)
SODIUM SERPL-SCNC: 134 MMOL/L (ref 136–145)
TRIGL SERPL-MCNC: 192 MG/DL (ref 30–150)
VIT B12 SERPL-MCNC: 250 PG/ML (ref 210–950)

## 2022-09-23 PROCEDURE — 83735 ASSAY OF MAGNESIUM: CPT | Performed by: STUDENT IN AN ORGANIZED HEALTH CARE EDUCATION/TRAINING PROGRAM

## 2022-09-23 PROCEDURE — 80061 LIPID PANEL: CPT | Performed by: STUDENT IN AN ORGANIZED HEALTH CARE EDUCATION/TRAINING PROGRAM

## 2022-09-23 PROCEDURE — 80048 BASIC METABOLIC PNL TOTAL CA: CPT | Mod: 91,XB

## 2022-09-23 PROCEDURE — 63600175 PHARM REV CODE 636 W HCPCS

## 2022-09-23 PROCEDURE — 82010 KETONE BODYS QUAN: CPT | Performed by: STUDENT IN AN ORGANIZED HEALTH CARE EDUCATION/TRAINING PROGRAM

## 2022-09-23 PROCEDURE — 86235 NUCLEAR ANTIGEN ANTIBODY: CPT | Mod: 59 | Performed by: STUDENT IN AN ORGANIZED HEALTH CARE EDUCATION/TRAINING PROGRAM

## 2022-09-23 PROCEDURE — 11000001 HC ACUTE MED/SURG PRIVATE ROOM

## 2022-09-23 PROCEDURE — 36415 COLL VENOUS BLD VENIPUNCTURE: CPT | Performed by: STUDENT IN AN ORGANIZED HEALTH CARE EDUCATION/TRAINING PROGRAM

## 2022-09-23 PROCEDURE — 25000003 PHARM REV CODE 250: Performed by: OBSTETRICS & GYNECOLOGY

## 2022-09-23 PROCEDURE — 83010 ASSAY OF HAPTOGLOBIN QUANT: CPT

## 2022-09-23 PROCEDURE — 99284 EMERGENCY DEPT VISIT MOD MDM: CPT | Mod: 25,,, | Performed by: OBSTETRICS & GYNECOLOGY

## 2022-09-23 PROCEDURE — 83921 ORGANIC ACID SINGLE QUANT: CPT | Performed by: STUDENT IN AN ORGANIZED HEALTH CARE EDUCATION/TRAINING PROGRAM

## 2022-09-23 PROCEDURE — 82746 ASSAY OF FOLIC ACID SERUM: CPT | Performed by: STUDENT IN AN ORGANIZED HEALTH CARE EDUCATION/TRAINING PROGRAM

## 2022-09-23 PROCEDURE — 82607 VITAMIN B-12: CPT | Performed by: STUDENT IN AN ORGANIZED HEALTH CARE EDUCATION/TRAINING PROGRAM

## 2022-09-23 PROCEDURE — 36415 COLL VENOUS BLD VENIPUNCTURE: CPT

## 2022-09-23 PROCEDURE — 76818 PR US, OB, FETAL BIOPHYSICAL, W/NST: ICD-10-PCS | Mod: 26,,, | Performed by: OBSTETRICS & GYNECOLOGY

## 2022-09-23 PROCEDURE — 25000003 PHARM REV CODE 250: Performed by: STUDENT IN AN ORGANIZED HEALTH CARE EDUCATION/TRAINING PROGRAM

## 2022-09-23 PROCEDURE — 99284 PR EMERGENCY DEPT VISIT,LEVEL IV: ICD-10-PCS | Mod: 25,,, | Performed by: OBSTETRICS & GYNECOLOGY

## 2022-09-23 PROCEDURE — 99233 SBSQ HOSP IP/OBS HIGH 50: CPT | Mod: GT,TH,, | Performed by: OBSTETRICS & GYNECOLOGY

## 2022-09-23 PROCEDURE — 76816 PR  US,PREGNANT UTERUS,F/U,TRANSABD APP: ICD-10-PCS | Mod: 26,,, | Performed by: OBSTETRICS & GYNECOLOGY

## 2022-09-23 PROCEDURE — 63600175 PHARM REV CODE 636 W HCPCS: Performed by: STUDENT IN AN ORGANIZED HEALTH CARE EDUCATION/TRAINING PROGRAM

## 2022-09-23 PROCEDURE — 25000003 PHARM REV CODE 250

## 2022-09-23 PROCEDURE — 25000003 PHARM REV CODE 250: Performed by: NURSE PRACTITIONER

## 2022-09-23 PROCEDURE — 59025 PR FETAL 2N-STRESS TEST: ICD-10-PCS | Mod: 26,,, | Performed by: OBSTETRICS & GYNECOLOGY

## 2022-09-23 PROCEDURE — 76818 FETAL BIOPHYS PROFILE W/NST: CPT | Mod: 26,,, | Performed by: OBSTETRICS & GYNECOLOGY

## 2022-09-23 PROCEDURE — 86038 ANTINUCLEAR ANTIBODIES: CPT | Performed by: STUDENT IN AN ORGANIZED HEALTH CARE EDUCATION/TRAINING PROGRAM

## 2022-09-23 PROCEDURE — 86225 DNA ANTIBODY NATIVE: CPT | Performed by: STUDENT IN AN ORGANIZED HEALTH CARE EDUCATION/TRAINING PROGRAM

## 2022-09-23 PROCEDURE — 80053 COMPREHEN METABOLIC PANEL: CPT | Performed by: STUDENT IN AN ORGANIZED HEALTH CARE EDUCATION/TRAINING PROGRAM

## 2022-09-23 PROCEDURE — 80048 BASIC METABOLIC PNL TOTAL CA: CPT | Mod: XB | Performed by: STUDENT IN AN ORGANIZED HEALTH CARE EDUCATION/TRAINING PROGRAM

## 2022-09-23 PROCEDURE — 76816 OB US FOLLOW-UP PER FETUS: CPT | Mod: 26,,, | Performed by: OBSTETRICS & GYNECOLOGY

## 2022-09-23 PROCEDURE — 63600175 PHARM REV CODE 636 W HCPCS: Performed by: OBSTETRICS & GYNECOLOGY

## 2022-09-23 PROCEDURE — 99233 PR SUBSEQUENT HOSPITAL CARE,LEVL III: ICD-10-PCS | Mod: GT,TH,, | Performed by: OBSTETRICS & GYNECOLOGY

## 2022-09-23 PROCEDURE — 59025 FETAL NON-STRESS TEST: CPT | Mod: 26,,, | Performed by: OBSTETRICS & GYNECOLOGY

## 2022-09-23 PROCEDURE — 83090 ASSAY OF HOMOCYSTEINE: CPT | Performed by: STUDENT IN AN ORGANIZED HEALTH CARE EDUCATION/TRAINING PROGRAM

## 2022-09-23 PROCEDURE — 86039 ANTINUCLEAR ANTIBODIES (ANA): CPT | Performed by: STUDENT IN AN ORGANIZED HEALTH CARE EDUCATION/TRAINING PROGRAM

## 2022-09-23 RX ORDER — IBUPROFEN 200 MG
16 TABLET ORAL
Status: DISCONTINUED | OUTPATIENT
Start: 2022-09-23 | End: 2022-09-23

## 2022-09-23 RX ORDER — INSULIN ASPART 100 [IU]/ML
1-20 INJECTION, SOLUTION INTRAVENOUS; SUBCUTANEOUS
Status: DISCONTINUED | OUTPATIENT
Start: 2022-09-23 | End: 2022-09-23

## 2022-09-23 RX ORDER — LANOLIN ALCOHOL/MO/W.PET/CERES
1000 CREAM (GRAM) TOPICAL DAILY
Status: DISCONTINUED | OUTPATIENT
Start: 2022-09-23 | End: 2022-10-05 | Stop reason: HOSPADM

## 2022-09-23 RX ORDER — INSULIN ASPART 100 [IU]/ML
1-10 INJECTION, SOLUTION INTRAVENOUS; SUBCUTANEOUS
Status: DISCONTINUED | OUTPATIENT
Start: 2022-09-23 | End: 2022-09-23

## 2022-09-23 RX ORDER — NIFEDIPINE 30 MG/1
60 TABLET, EXTENDED RELEASE ORAL 2 TIMES DAILY
Status: DISCONTINUED | OUTPATIENT
Start: 2022-09-23 | End: 2022-10-05 | Stop reason: HOSPADM

## 2022-09-23 RX ORDER — BETAMETHASONE SODIUM PHOSPHATE AND BETAMETHASONE ACETATE 3; 3 MG/ML; MG/ML
12 INJECTION, SUSPENSION INTRA-ARTICULAR; INTRALESIONAL; INTRAMUSCULAR; SOFT TISSUE
Status: COMPLETED | OUTPATIENT
Start: 2022-09-23 | End: 2022-09-24

## 2022-09-23 RX ORDER — INSULIN ASPART 100 [IU]/ML
4 INJECTION, SOLUTION INTRAVENOUS; SUBCUTANEOUS ONCE
Status: COMPLETED | OUTPATIENT
Start: 2022-09-23 | End: 2022-09-23

## 2022-09-23 RX ORDER — INSULIN ASPART 100 [IU]/ML
1 INJECTION, SOLUTION INTRAVENOUS; SUBCUTANEOUS ONCE
Status: COMPLETED | OUTPATIENT
Start: 2022-09-23 | End: 2022-09-23

## 2022-09-23 RX ORDER — INSULIN ASPART 100 [IU]/ML
3 INJECTION, SOLUTION INTRAVENOUS; SUBCUTANEOUS ONCE
Status: DISCONTINUED | OUTPATIENT
Start: 2022-09-23 | End: 2022-09-23

## 2022-09-23 RX ORDER — MAGNESIUM SULFATE HEPTAHYDRATE 40 MG/ML
6 INJECTION, SOLUTION INTRAVENOUS ONCE
Status: DISCONTINUED | OUTPATIENT
Start: 2022-09-23 | End: 2022-09-23

## 2022-09-23 RX ORDER — HYDRALAZINE HYDROCHLORIDE 20 MG/ML
5 INJECTION INTRAMUSCULAR; INTRAVENOUS ONCE
Status: COMPLETED | OUTPATIENT
Start: 2022-09-23 | End: 2022-09-23

## 2022-09-23 RX ORDER — INSULIN ASPART 100 [IU]/ML
6 INJECTION, SOLUTION INTRAVENOUS; SUBCUTANEOUS ONCE
Status: COMPLETED | OUTPATIENT
Start: 2022-09-23 | End: 2022-09-23

## 2022-09-23 RX ORDER — INSULIN ASPART 100 [IU]/ML
14 INJECTION, SOLUTION INTRAVENOUS; SUBCUTANEOUS ONCE
Status: COMPLETED | OUTPATIENT
Start: 2022-09-23 | End: 2022-09-23

## 2022-09-23 RX ORDER — PROMETHAZINE HYDROCHLORIDE 25 MG/1
25 TABLET ORAL ONCE
Status: DISCONTINUED | OUTPATIENT
Start: 2022-09-23 | End: 2022-09-23

## 2022-09-23 RX ORDER — SODIUM CHLORIDE, SODIUM LACTATE, POTASSIUM CHLORIDE, CALCIUM CHLORIDE 600; 310; 30; 20 MG/100ML; MG/100ML; MG/100ML; MG/100ML
INJECTION, SOLUTION INTRAVENOUS CONTINUOUS
Status: DISCONTINUED | OUTPATIENT
Start: 2022-09-23 | End: 2022-09-23

## 2022-09-23 RX ORDER — CALCIUM GLUCONATE 98 MG/ML
1 INJECTION, SOLUTION INTRAVENOUS
Status: DISCONTINUED | OUTPATIENT
Start: 2022-09-23 | End: 2022-09-23

## 2022-09-23 RX ORDER — INSULIN ASPART 100 [IU]/ML
13 INJECTION, SOLUTION INTRAVENOUS; SUBCUTANEOUS ONCE
Status: COMPLETED | OUTPATIENT
Start: 2022-09-23 | End: 2022-09-23

## 2022-09-23 RX ORDER — GLUCAGON 1 MG
1 KIT INJECTION
Status: DISCONTINUED | OUTPATIENT
Start: 2022-09-23 | End: 2022-09-23

## 2022-09-23 RX ORDER — ALBUTEROL SULFATE 2.5 MG/.5ML
10 SOLUTION RESPIRATORY (INHALATION) ONCE
Status: DISCONTINUED | OUTPATIENT
Start: 2022-09-23 | End: 2022-09-23

## 2022-09-23 RX ORDER — IBUPROFEN 200 MG
24 TABLET ORAL
Status: DISCONTINUED | OUTPATIENT
Start: 2022-09-23 | End: 2022-09-23

## 2022-09-23 RX ORDER — CALCIUM GLUCONATE 98 MG/ML
1 INJECTION, SOLUTION INTRAVENOUS
Status: DISCONTINUED | OUTPATIENT
Start: 2022-09-23 | End: 2022-09-25

## 2022-09-23 RX ORDER — MAGNESIUM SULFATE HEPTAHYDRATE 40 MG/ML
4 INJECTION, SOLUTION INTRAVENOUS ONCE
Status: DISCONTINUED | OUTPATIENT
Start: 2022-09-23 | End: 2022-09-23

## 2022-09-23 RX ORDER — MAGNESIUM SULFATE HEPTAHYDRATE 40 MG/ML
2 INJECTION, SOLUTION INTRAVENOUS CONTINUOUS
Status: DISCONTINUED | OUTPATIENT
Start: 2022-09-23 | End: 2022-09-23

## 2022-09-23 RX ORDER — SODIUM BICARBONATE 650 MG/1
1300 TABLET ORAL 2 TIMES DAILY
Status: DISCONTINUED | OUTPATIENT
Start: 2022-09-23 | End: 2022-09-26

## 2022-09-23 RX ORDER — INSULIN ASPART 100 [IU]/ML
3 INJECTION, SOLUTION INTRAVENOUS; SUBCUTANEOUS ONCE
Status: COMPLETED | OUTPATIENT
Start: 2022-09-23 | End: 2022-09-23

## 2022-09-23 RX ORDER — INSULIN ASPART 100 [IU]/ML
5 INJECTION, SOLUTION INTRAVENOUS; SUBCUTANEOUS ONCE
Status: COMPLETED | OUTPATIENT
Start: 2022-09-23 | End: 2022-09-23

## 2022-09-23 RX ORDER — INSULIN ASPART 100 [IU]/ML
8 INJECTION, SOLUTION INTRAVENOUS; SUBCUTANEOUS ONCE
Status: DISCONTINUED | OUTPATIENT
Start: 2022-09-23 | End: 2022-09-23

## 2022-09-23 RX ORDER — HYDRALAZINE HYDROCHLORIDE 25 MG/1
75 TABLET, FILM COATED ORAL EVERY 8 HOURS
Status: DISCONTINUED | OUTPATIENT
Start: 2022-09-23 | End: 2022-09-26

## 2022-09-23 RX ORDER — MAGNESIUM SULFATE HEPTAHYDRATE 40 MG/ML
6 INJECTION, SOLUTION INTRAVENOUS ONCE
Status: COMPLETED | OUTPATIENT
Start: 2022-09-23 | End: 2022-09-23

## 2022-09-23 RX ADMIN — MAGNESIUM SULFATE HEPTAHYDRATE 6 G: 40 INJECTION, SOLUTION INTRAVENOUS at 01:09

## 2022-09-23 RX ADMIN — INSULIN ASPART 3 UNITS: 100 INJECTION, SOLUTION INTRAVENOUS; SUBCUTANEOUS at 10:09

## 2022-09-23 RX ADMIN — METOCLOPRAMIDE 10 MG: 10 TABLET ORAL at 09:09

## 2022-09-23 RX ADMIN — NIFEDIPINE 60 MG: 30 TABLET, FILM COATED, EXTENDED RELEASE ORAL at 09:09

## 2022-09-23 RX ADMIN — PROGESTERONE 200 MG: 100 CAPSULE ORAL at 09:09

## 2022-09-23 RX ADMIN — INSULIN HUMAN 1 UNITS/HR: 1 INJECTION, SOLUTION INTRAVENOUS at 02:09

## 2022-09-23 RX ADMIN — INSULIN DETEMIR 10 UNITS: 100 INJECTION, SOLUTION SUBCUTANEOUS at 08:09

## 2022-09-23 RX ADMIN — ASPIRIN 81 MG CHEWABLE TABLET 81 MG: 81 TABLET CHEWABLE at 08:09

## 2022-09-23 RX ADMIN — SODIUM ZIRCONIUM CYCLOSILICATE 10 G: 10 POWDER, FOR SUSPENSION ORAL at 07:09

## 2022-09-23 RX ADMIN — SODIUM ZIRCONIUM CYCLOSILICATE 10 G: 10 POWDER, FOR SUSPENSION ORAL at 09:09

## 2022-09-23 RX ADMIN — HYDRALAZINE HYDROCHLORIDE 5 MG: 20 INJECTION, SOLUTION INTRAMUSCULAR; INTRAVENOUS at 12:09

## 2022-09-23 RX ADMIN — SODIUM CHLORIDE, SODIUM LACTATE, POTASSIUM CHLORIDE, AND CALCIUM CHLORIDE 1000 ML: .6; .31; .03; .02 INJECTION, SOLUTION INTRAVENOUS at 01:09

## 2022-09-23 RX ADMIN — LABETALOL HYDROCHLORIDE 200 MG: 200 TABLET, FILM COATED ORAL at 09:09

## 2022-09-23 RX ADMIN — METOCLOPRAMIDE 10 MG: 10 TABLET ORAL at 12:09

## 2022-09-23 RX ADMIN — HYDRALAZINE HYDROCHLORIDE 75 MG: 25 TABLET, FILM COATED ORAL at 10:09

## 2022-09-23 RX ADMIN — SODIUM BICARBONATE 650 MG TABLET 1300 MG: at 09:09

## 2022-09-23 RX ADMIN — HYDRALAZINE HYDROCHLORIDE 50 MG: 25 TABLET, FILM COATED ORAL at 06:09

## 2022-09-23 RX ADMIN — BETAMETHASONE SODIUM PHOSPHATE AND BETAMETHASONE ACETATE 12 MG: 3; 3 INJECTION, SUSPENSION INTRA-ARTICULAR; INTRALESIONAL; INTRAMUSCULAR at 02:09

## 2022-09-23 RX ADMIN — NIFEDIPINE 90 MG: 30 TABLET, FILM COATED, EXTENDED RELEASE ORAL at 08:09

## 2022-09-23 RX ADMIN — HYDRALAZINE HYDROCHLORIDE 75 MG: 25 TABLET, FILM COATED ORAL at 02:09

## 2022-09-23 RX ADMIN — FAMOTIDINE 40 MG: 20 TABLET ORAL at 09:09

## 2022-09-23 RX ADMIN — INSULIN ASPART 2 UNITS: 100 INJECTION, SOLUTION INTRAVENOUS; SUBCUTANEOUS at 07:09

## 2022-09-23 RX ADMIN — CYANOCOBALAMIN TAB 1000 MCG 1000 MCG: 1000 TAB at 11:09

## 2022-09-23 RX ADMIN — INSULIN ASPART 14 UNITS: 100 INJECTION, SOLUTION INTRAVENOUS; SUBCUTANEOUS at 01:09

## 2022-09-23 RX ADMIN — POLYSACCHARIDE-IRON COMPLEX 150 MG: 150 CAPSULE ORAL at 08:09

## 2022-09-23 RX ADMIN — INSULIN ASPART 4 UNITS: 100 INJECTION, SOLUTION INTRAVENOUS; SUBCUTANEOUS at 09:09

## 2022-09-23 RX ADMIN — INSULIN ASPART 3 UNITS: 100 INJECTION, SOLUTION INTRAVENOUS; SUBCUTANEOUS at 02:09

## 2022-09-23 RX ADMIN — METOCLOPRAMIDE 10 MG: 10 TABLET ORAL at 08:09

## 2022-09-23 RX ADMIN — SODIUM ZIRCONIUM CYCLOSILICATE 10 G: 10 POWDER, FOR SUSPENSION ORAL at 11:09

## 2022-09-23 RX ADMIN — INSULIN ASPART 1 UNITS: 100 INJECTION, SOLUTION INTRAVENOUS; SUBCUTANEOUS at 07:09

## 2022-09-23 RX ADMIN — PROMETHAZINE HYDROCHLORIDE 25 MG: 25 TABLET ORAL at 09:09

## 2022-09-23 RX ADMIN — INSULIN ASPART 1 UNITS: 100 INJECTION, SOLUTION INTRAVENOUS; SUBCUTANEOUS at 06:09

## 2022-09-23 RX ADMIN — INSULIN ASPART 6 UNITS: 100 INJECTION, SOLUTION INTRAVENOUS; SUBCUTANEOUS at 12:09

## 2022-09-23 RX ADMIN — INSULIN ASPART 13 UNITS: 100 INJECTION, SOLUTION INTRAVENOUS; SUBCUTANEOUS at 05:09

## 2022-09-23 RX ADMIN — PROMETHAZINE HYDROCHLORIDE 25 MG: 25 TABLET ORAL at 02:09

## 2022-09-23 RX ADMIN — INSULIN DETEMIR 8 UNITS: 100 INJECTION, SOLUTION SUBCUTANEOUS at 02:09

## 2022-09-23 RX ADMIN — LABETALOL HYDROCHLORIDE 200 MG: 200 TABLET, FILM COATED ORAL at 08:09

## 2022-09-23 RX ADMIN — METOCLOPRAMIDE 10 MG: 10 TABLET ORAL at 06:09

## 2022-09-23 RX ADMIN — ACETAMINOPHEN 650 MG: 325 TABLET, FILM COATED ORAL at 11:09

## 2022-09-23 RX ADMIN — DOCUSATE SODIUM 100 MG: 100 CAPSULE, LIQUID FILLED ORAL at 08:09

## 2022-09-23 RX ADMIN — INSULIN ASPART 5 UNITS: 100 INJECTION, SOLUTION INTRAVENOUS; SUBCUTANEOUS at 04:09

## 2022-09-23 NOTE — HPI
Alicia Valles is a 27 y.o.  at 29w5d gestation who presented to the [facility] as a transfer from [facility] for elevated BP. She was in nephrology clinic today and reported significant nausea and vomiting. She had been unable to tolerate anything PO since 11am. As such, she had not taken any of her long acting antihypertensive agents. She was noted to have severe range BP in the 180s/100s and was sent to the ED for further evaluation. While in the ED she received zofran and phenergan for nausea with resolution of symptoms. Her BP remained severely elevated with readings in the 170-180s/90-100s. She received labetalol 20 and was transferred to the [facility] for further evaluation.    On arrival to the [facility] she reported feeling well. She denied headaches, vision changes, CP, SOB, RUQ pain. Nausea and vomiting had resolved. She had multiple sustained severe range BP requiring labetalol 20/40/80. As such, decision was made to admit for further BP monitoring in the setting of cHTN exacerbation vs. ARANZA.     Denies contractions, vaginal bleeding, or loss of fluid.Reports normal fetal movement. This IUP is complicated by cHTN (hdyral 50 TID, labetalol 200 BID, Procardia 90 BID), T1DM, anemia, short cervix (15mm, vag progesterone), proteinuria, gastroparesis, MO (BMI 31).

## 2022-09-23 NOTE — ASSESSMENT & PLAN NOTE
- H/H 8.3/25.4 > 7.9/22.9  - normocytic anemia with most recent iron panel with iron of 84, transferrin 180, TIBC 266, 32% saturation and ferritin 79  - scheduled iron/colace  - Will collect recommended nephrology labs: folate, B12, methylmalonic acid, homocysteine

## 2022-09-23 NOTE — ASSESSMENT & PLAN NOTE
- follows with nephrology as outpatient  - Initial P:C of 3.75 downtrending to 1.76  - most recent 24 hour urine with 6.27g  - repeat P:C pending  - Will order labs per nephrology: microalbumin/creatinine ratio, lipid panel, YARON, anti-ds-DNA  - will hold home lovenox at this time

## 2022-09-23 NOTE — ED PROVIDER NOTES
Encounter Date: 2022       History     Chief Complaint   Patient presents with    Hypertension     Alicia Valles is a 27 y.o.  at 29w5d gestation who presented to the [facility] as a transfer from [facility] for elevated BP. She was in nephrology clinic today and reported significant nausea and vomiting. She had been unable to tolerate anything PO since 11am. As such, she had not taken any of her long acting antihypertensive agents. She was noted to have severe range BP in the 180s/100s and was sent to the ED for further evaluation. While in the ED she received zofran and phenergan for nausea with resolution of symptoms. Her BP remained severely elevated with readings in the 170-180s/90-100s. She received labetalol 20 and was transferred to the [facility] for further evaluation.     On arrival to the [facility] she reported feeling well. She denied headaches, vision changes, CP, SOB, RUQ pain. Nausea and vomiting had resolved. Denies contractions, vaginal bleeding, or loss of fluid.Reports normal fetal movement. This IUP is complicated by cHTN (hdyral 50 TID, labetalol 200 BID, Procardia 90 BID), T1DM, anemia, short cervix (15mm, vag progesterone), proteinuria, gastroparesis, MO (BMI 31).     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   HENT:  Negative for congestion and sore throat.    Eyes:  Negative for visual disturbance.   Respiratory:  Negative for cough and shortness of breath.    Cardiovascular:  Negative for chest pain.   Gastrointestinal:  Negative for abdominal pain, constipation, diarrhea, nausea and vomiting.   Genitourinary:  Negative for dysuria, vaginal bleeding and vaginal discharge.   Musculoskeletal:  Negative for back pain.   Skin:  Negative for rash.   Neurological:  Negative for weakness and headaches.   Hematological:  Does not bruise/bleed easily.     Physical Exam     Initial Vitals [09/22/22 2140]   BP Pulse Resp Temp SpO2   (!) 181/97 99 18 98.1 °F (36.7 °C) 100 %      MAP       --         Physical Exam    Constitutional: She appears well-developed and well-nourished. No distress.   HENT:   Head: Normocephalic and atraumatic.   Eyes: EOM are normal.   Neck:   Normal range of motion.  Cardiovascular:  Normal rate.           Pulmonary/Chest: No respiratory distress.   Abdominal: There is no abdominal tenderness. There is no rebound and no guarding.   Musculoskeletal:         General: No edema.      Cervical back: Normal range of motion.     Neurological: She is alert and oriented to person, place, and time.   Skin: Skin is warm and dry.   Psychiatric: She has a normal mood and affect. Thought content normal.       ED Course   Obtain Fetal nonstress test (NST)    Date/Time: 9/23/2022 3:57 AM  Performed by: Kim Barnes MD  Authorized by: Archana Will MD     Nonstress Test:     Variability:  6-25 BPM    Decelerations:  None    Accelerations:  10 bpm    Acoustic Stimulator: No      Baseline:  145    Uterine Irritability: No      Contractions:  Irregular  Biophysical Profile:      Nonstress Test Interpretation: reactive      Overall Impression:  Reassuring  Post-procedure:     Patient tolerance:  Patient tolerated the procedure well with no immediate complications  Labs Reviewed   PROTEIN / CREATININE RATIO, URINE - Abnormal; Notable for the following components:       Result Value    Protein, Urine Random 814 (*)     Prot/Creat Ratio, Urine 17.81 (*)     All other components within normal limits    Narrative:     Specimen Source->Urine   CBC W/ AUTO DIFFERENTIAL - Abnormal; Notable for the following components:    WBC 14.19 (*)     RBC 2.48 (*)     Hemoglobin 7.9 (*)     Hematocrit 22.9 (*)     MCH 31.9 (*)     Immature Granulocytes 2.2 (*)     Gran # (ANC) 11.2 (*)     Immature Grans (Abs) 0.31 (*)     Gran % 78.5 (*)     Lymph % 11.8 (*)     All other components within normal limits    Narrative:     Release to patient->Immediate   COMPREHENSIVE METABOLIC PANEL - Abnormal; Notable for the following components:    Sodium 135 (*)     Potassium 5.8 (*)     CO2 18 (*)     Glucose 183 (*)     BUN 22 (*)     Creatinine 1.5 (*)     Calcium 7.4 (*)     Total Protein 5.1 (*)     Albumin 1.8 (*)     eGFR 48 (*)     All other components within normal limits    Narrative:     Release to patient->Immediate   POCT GLUCOSE - Abnormal; Notable for the following components:    POCT Glucose 192 (*)     All other components within normal limits   STREP B SCREEN, VAGINAL / RECTAL   RAPID HIV    Narrative:     Release to patient->Immediate   MICROALBUMIN / CREATININE RATIO URINE   MICROALBUMIN / CREATININE RATIO URINE    Narrative:     Specimen Source->Urine   RPR   FOLATE   VITAMIN B12   METHYLMALONIC ACID, SERUM   HOMOCYSTEINE, SERUM   LIPID PANEL   YARON PROFILE I (SCREEN) W/ REFLEX   ANTI-DNA ANTIBODY, DOUBLE-STRANDED   MAGNESIUM   BASIC METABOLIC PANEL   TYPE & SCREEN   POCT GLUCOSE MONITORING CONTINUOUS        ECG Results              EKG 12-lead (Final result)  Result time 09/23/22 03:15:57      Final  result by Max Paige, FELY (09/23/22 03:15:57)                   Narrative:    Max Paige RRT     9/23/2022  3:15 AM  Discussed with RN, this has been done or is not needed. Please   reorder and call us if new tracing is desired.                                  Imaging Results    None          Medications   aspirin chewable tablet 81 mg (has no administration in time range)   famotidine tablet 40 mg (40 mg Oral Given 9/22/22 2237)   hydrALAZINE tablet 50 mg (has no administration in time range)   labetaloL tablet 200 mg (has no administration in time range)   metoclopramide HCl tablet 10 mg (has no administration in time range)   NIFEdipine 24 hr tablet 90 mg (90 mg Oral Not Given 9/22/22 2230)   progesterone capsule 200 mg (200 mg Oral Given 9/22/22 2314)   sodium chloride 0.9% flush 10 mL (has no administration in time range)   acetaminophen tablet 650 mg (has no administration in time range)   prochlorperazine injection Soln 5 mg (has no administration in time range)   senna-docusate 8.6-50 mg per tablet 1 tablet (has no administration in time range)   simethicone chewable tablet 80 mg (has no administration in time range)   diphenhydrAMINE capsule 25 mg (has no administration in time range)   diphenhydrAMINE injection 25 mg (has no administration in time range)   prenatal vitamin oral tablet (has no administration in time range)   promethazine tablet 25 mg (25 mg Oral Given 9/23/22 0255)   insulin detemir U-100 pen 10 Units (has no administration in time range)   insulin detemir U-100 pen 8 Units (8 Units Subcutaneous Given 9/23/22 0239)   iron polysaccharides capsule 150 mg (has no administration in time range)   docusate sodium capsule 100 mg (has no administration in time range)   calcium gluconate 100 mg/mL (10%) injection 1 g (has no administration in time range)   lactated ringers infusion (1,000 mLs Intravenous New Bag 9/23/22 0132)   promethazine (PHENERGAN) 12.5 mg in dextrose 5 % 50 mL IVPB  (12.5 mg Intravenous Not Given 9/23/22 0200)   promethazine tablet 25 mg (has no administration in time range)   insulin aspart U-100 pen 3 Units (has no administration in time range)   labetaloL tablet 200 mg (200 mg Oral Given 9/22/22 2155)   NIFEdipine 24 hr tablet 90 mg (90 mg Oral Given 9/22/22 2155)   labetaloL injection 20 mg (20 mg Intravenous Given 9/22/22 2205)   hydrALAZINE tablet 50 mg (50 mg Oral Given 9/22/22 2209)   insulin aspart U-100 pen 1 Units (1 Units Subcutaneous Given 9/22/22 2232)   cefTRIAXone (ROCEPHIN) 1 g/50 mL D5W IVPB (0 g Intravenous Stopped 9/22/22 2306)   labetaloL injection 40 mg (40 mg Intravenous Given 9/22/22 2233)   labetaloL injection 80 mg (80 mg Intravenous Given 9/22/22 2311)   hydrALAZINE injection 5 mg (5 mg Intravenous Given 9/23/22 0019)   hydrALAZINE injection 5 mg (5 mg Intravenous Given 9/23/22 0055)   magnesium sulfate in water 40 gram/1,000 mL (4 %) bolus from bag 6 g (6 g Intravenous Bolus from Bag 9/23/22 0132)   insulin aspart U-100 pen 3 Units (3 Units Subcutaneous Given 9/23/22 0219)     Medical Decision Making:   ED Management:  - Afebrile  - Sustained severe range BP requiring labetalol 20/40/80  - As patient had not taken scheduled antihypertensives hydral 50, labetalol 200, and Procardia 90 administered   - NST RR, irregular contractions on TOCO  - Discussed with MFM who recommended admission for BP monitoring in the setting of cHTN exacerbation vs ARANZA   - Will initiate magnesium if patient requires 2 additional pushes of antihypertensive agents   - Will not administer BMZ at this time   - Consents of delivery and blood transfusion signed and to chart   - Rocephin for UTI   - Patient and mother voiced understanding and agreement with the plan                         Clinical Impression:   Final diagnoses:  [O16.9] Hypertension affecting pregnancy, antepartum (Primary)  [Z3A.28] 28 weeks gestation of pregnancy  [O24.319] Preexisting diabetes complicating  pregnancy, antepartum      ED Disposition Condition    Send to L&D                Kim Barnes MD  OBGYN, PGY-2       Kim Barnes MD  Resident  09/23/22 9091  The patient has been seen and examined by me, and I agree with the Resident assessment and plan as above.  Patient stable for admission to L&D/antepartum.       Christina Roach MD  09/23/22 5508

## 2022-09-23 NOTE — CARE UPDATE
To bedside to discuss plan of care with patient. Patient has required an additional hydral 5/5 for sustained severe range BP. She has now received labetalol 20/20/40/80, hydral 5/5. After discussion with MFM will start magnesium at this time. Patient will receive 6g loading dose. Due to Cr of 1.6 will not initiate maintenance dosing but rather trend magnesium level with additional boluses as needed to maintain therapeutic range.    Kim Barnes MD  OBGYN, PGY-2

## 2022-09-23 NOTE — NURSING
BG at 0808: 280 mg/dL. MD notified. No new orders. Will give scheduled long acting insulin at 0900.

## 2022-09-23 NOTE — ASSESSMENT & PLAN NOTE
- K 5.8  - Will place on telemetry  - Hyperkalemia order set in place  - Appreciate nephrology assistance

## 2022-09-23 NOTE — SUBJECTIVE & OBJECTIVE
Obstetric HPI:  Patient reports irregular contractions. She denies any LOF or vaginal bleeding. She reports normal fetal movement. She denies any headaches, vision changes, RUQ pain or SOB.      Objective:     Vital Signs (Most Recent):  Temp: 98.4 °F (36.9 °C) (09/23/22 0252)  Pulse: 90 (09/23/22 0605)  Resp: 17 (09/23/22 0519)  BP: (!) 147/82 (09/23/22 0605)  SpO2: 98 % (09/23/22 0601)   Vital Signs (24h Range):  Temp:  [98.1 °F (36.7 °C)-98.6 °F (37 °C)] 98.4 °F (36.9 °C)  Pulse:  [] 90  Resp:  [16-21] 17  SpO2:  [98 %-100 %] 98 %  BP: (133-189)/() 147/82     Weight: 72.1 kg (158 lb 15.9 oz)  Body mass index is 31.05 kg/m².    FHT: 135 Cat 1 (reassuring)  TOCO:  Occasional contraction      Intake/Output Summary (Last 24 hours) at 9/23/2022 0653  Last data filed at 9/23/2022 0632  Gross per 24 hour   Intake 1276.96 ml   Output 400 ml   Net 876.96 ml       Cervical Exam:  deferred  Presentation: Vertex     Significant Labs:  Recent Lab Results  (Last 5 results in the past 24 hours)        09/23/22  0613   09/23/22  0604   09/23/22  0454   09/23/22  0419   09/23/22  0346        Anion Gap 9               BUN 24               Calcium 7.6               Chloride 108               CO2 17               Creatinine 1.7               eGFR 41               Glucose 210               Magnesium 4.6               POCT Glucose   203   231   207   228       Potassium 5.8               Sodium 134                                      Physical Exam:   Constitutional: She is oriented to person, place, and time. She appears well-developed and well-nourished.       Cardiovascular:  Normal rate.             Pulmonary/Chest: Effort normal. No respiratory distress.        Abdominal: Soft. She exhibits no distension. There is no abdominal tenderness. There is no rebound and no guarding.             Musculoskeletal: Normal range of motion.       Neurological: She is alert and oriented to person, place, and time.    Skin: Skin is  warm and dry.    Psychiatric: She has a normal mood and affect.

## 2022-09-23 NOTE — ANESTHESIA PREPROCEDURE EVALUATION
[facility]  Anesthesia Pre-Operative Evaluation         Patient Name: Alicia Valles  YOB: 1995  MRN: [id number]    2022      Alicia Valles is a 27 y.o. female  @ 29w5d who presents to the [facility] with severe range blood pressures. PMHx and pregnancy c/b type 1 diabetes mellitus on insulin (most recent hemoglobin A1c 6.7%) with retinopathy, gastroparesis & numerous admissions for DKA, cHTN, hyperemesis gravidarum and history of seizure in 2022 attributed to hypoglycemia.     OB History    Para Term  AB Living   4 0 0 0 2     SAB IAB Ectopic Multiple Live Births   0 0 0          # Outcome Date GA Lbr Alireza/2nd Weight Sex Delivery Anes PTL Lv   4 Current            3 AB            2 AB            1                 Review of patient's allergies indicates:  No Known Allergies    Wt Readings from Last 1 Encounters:   22 1621 72.1 kg (159 lb)       BP Readings from Last 3 Encounters:   22 (!) 186/108   22 (!) 159/90   22 (!) 186/116       Patient Active Problem List   Diagnosis    Type 1 diabetes mellitus with other specified complication    Anemia    Proteinuria    Anxiety    Gastroparesis due to DM    Spotting in early pregnancy    UTI in pregnancy, antepartum, first trimester    Leukocytosis    Nausea and vomiting during pregnancy    Hypertension affecting pregnancy, antepartum    Pre-existing type 1 diabetes mellitus during pregnancy in second trimester    Short cervical length during pregnancy in second trimester    Short cervix    29 weeks gestation of pregnancy    UTI (urinary tract infection)       Past Surgical History:   Procedure Laterality Date    RETINAL DETACHMENT SURGERY  2018    RETINAL DETACHMENT SURGERY         Social History     Socioeconomic History    Marital status: Single   Tobacco Use    Smoking status: Never    Smokeless tobacco: Never   Substance and Sexual  Activity    Alcohol use: Not Currently    Drug use: Never    Sexual activity: Yes     Partners: Male     Birth control/protection: None   Social History Narrative    ** Merged History Encounter **              Chemistry        Component Value Date/Time     (L) 09/22/2022 1640    K 5.8 (H) 09/22/2022 1640     09/22/2022 1640    CO2 16 (L) 09/22/2022 1640    BUN 21 (H) 09/22/2022 1640    CREATININE 1.6 (H) 09/22/2022 1640     09/22/2022 1640        Component Value Date/Time    CALCIUM 7.7 (L) 09/22/2022 1640    ALKPHOS 92 09/22/2022 1640    AST 38 09/22/2022 1640    ALT 29 09/22/2022 1640    BILITOT 0.1 09/22/2022 1640    ESTGFRAFRICA >60.0 05/26/2022 0507    EGFRNONAA >60.0 05/26/2022 0507            Lab Results   Component Value Date    WBC 13.51 (H) 09/22/2022    HGB 8.3 (L) 09/22/2022    HCT 25.4 (L) 09/22/2022    MCV 95 09/22/2022     09/22/2022       No results for input(s): PT, INR, PROTIME, APTT in the last 72 hours.                                                                                               09/22/2022  Alicia Valles is a 27 y.o., female.      Pre-op Assessment    I have reviewed the Patient Summary Reports.     I have reviewed the Nursing Notes. I have reviewed the NPO Status.   I have reviewed the Medications.     Review of Systems  Anesthesia Hx:  Denies Family Hx of Anesthesia complications.   Denies Personal Hx of Anesthesia complications.   Social:  Non-Smoker    Hematology/Oncology:  Hematology Normal   Oncology Normal     EENT/Dental:EENT/Dental Normal   Cardiovascular:  Cardiovascular Normal     Pulmonary:  Pulmonary Normal    Renal/:  Renal/ Normal     Hepatic/GI:  Hepatic/GI Normal    Musculoskeletal:  Musculoskeletal Normal    Neurological:  Neurology Normal    Endocrine:  Endocrine Normal    Dermatological:  Skin Normal    Psych:  Psychiatric Normal           Physical Exam  General: Well nourished, Cooperative, Alert and  Oriented    Airway:  Mallampati: III / II  Mouth Opening: Normal  TM Distance: Normal  Tongue: Normal  Neck ROM: Normal ROM    Dental:  Intact    Chest/Lungs:  Normal Respiratory Rate    Heart:  Rate: Normal    Abdomen:  Normal        Anesthesia Plan  Type of Anesthesia, risks & benefits discussed:    Anesthesia Type: Gen ETT, Epidural, Spinal, CSE  Intra-op Monitoring Plan: Standard ASA Monitors  Post Op Pain Control Plan: multimodal analgesia  Induction:  IV  Airway Plan: Direct, Post-Induction  Informed Consent: Informed consent signed with the Patient and all parties understand the risks and agree with anesthesia plan.  All questions answered.   ASA Score: 3    Ready For Surgery From Anesthesia Perspective.     .

## 2022-09-23 NOTE — ASSESSMENT & PLAN NOTE
- H/H 8.3/25.4  - normocytic anemia with most recent iron panel with iron of 84, transferrin 180, TIBC 266, 32% saturation and ferritin 79  - scheduled iron/colace  - Will collect recommended nephrology labs: folate, B12, methylmalonic acid, homocysteine

## 2022-09-23 NOTE — NURSING
Component      Latest Ref Rng & Units 9/23/2022           2:53 AM   POCT Glucose      70 - 110 mg/dL 269 (H)   Md notified of  repeat Bgl at 0253 after aspart admin. Md ordered 3 U of aspart. RN at bedside with pt. Pt refused the second 3 U of aspart admin. Md notified of pt's refusal of medications and pt's fear of her Bgl dropping too low. Md to bedside to discuss care with patient. Pt safety maintained.

## 2022-09-23 NOTE — ASSESSMENT & PLAN NOTE
- required labetalol 20/20/40/80 at [facility] and [facility]  - patient took her morning BP meds yesterday but was unable to take remainder of medications yesterday secondary to vomiting  - continue home regimen of hydralazine 50 mg TID, labetalol 200 mg BID, and procardia 90 mg BID, will up titrate as necessary  - Pre-E labs: Plt 226, AST/ALT 25/28, PC 17.81, Cr 1.6 > 1.5 > 1.7  - Baseline P:C 3.75 > 1.76  - currently asymptomatic  - Given that patient did not take all BP medications today, likely cHTN exacerbation, however low threshold to treat as cHTN w/ARANZA w/SF if patient becomes symptomatic, develops lab abnormalities, or BP remains uncontrolled. Magnesium started for seizure prophylaxis and fetal neuroprotection  - Diet: CLD  - Continuous monitoring

## 2022-09-23 NOTE — NURSING
Component      Latest Ref Rng & Units 9/23/2022           3:46 AM   POCT Glucose      70 - 110 mg/dL 228 (H)   MD notified, Md ordered to repeat BGL in 30 minutes. Pt safety maintained.

## 2022-09-23 NOTE — ASSESSMENT & PLAN NOTE
- required labetalol 20/20/40/80 at [facility] and [facility]  - patient did not take any BP medications today  - continue home regimen of hydralazine 50 mg TID, labetalol 200 mg BID, and procardia 90 mg BID, titrate as necessary  - Platelets 282, Cr 1.6, LFTs 38/29 at [facility] ED, repeat preE labs pending  - Baseline P:C 3.75 > 1.76  - currently asymptomatic  - Given no BP medications today, likely cHTN exacerbation, however low threshold to treat as cHTN w/ARANZA w/SF if patient becomes symptomatic, develops lab abnormalities, or BP remains uncontrolled. If suspicious of ARANZA w/SF, would start magnesium for seizure ppx.  - Diet: CLD  - Continuous monitoring

## 2022-09-23 NOTE — NURSING
Component      Latest Ref Rng & Units 9/23/2022           6:54 AM   POCT Glucose      70 - 110 mg/dL 258 (H)     Md notified of BGL. 3U of aspart ordered, Day shift RN notified of order. Pt safety maintained

## 2022-09-23 NOTE — NURSING
MD ordered 3U aspart for pt BG of 258. Pt refused 3U, said she will accept 2U. MD notified, ok to give 2U aspart. Telemetry ordered for pt due to Potassium level of 5.8. Pt refused telemetry, RN educated pt on need for cardiac monitoring, pt still refused. MD notified. 6g Mangesim bolus ordered for pt due to Mag level of 4.6. Pt declined, states it made her sick before and she does not want it. Pt educated on common side effects of Magnesium bolus and indications for Magnesium, pt still refuses. MD notified.

## 2022-09-23 NOTE — H&P
[facility] Emergency Dept (OB)  Obstetrics  History & Physical    Patient Name: Alicia Valles  MRN: [id number]  Admission Date: 2022  Primary Care Provider: Giovanna Hyatt MD    Subjective:     Principal Problem:Hypertension affecting pregnancy, antepartum    History of Present Illness:  Alicia Valles is a 27 y.o.  at 29w5d gestation who presented to the [facility] as a transfer from [facility] for elevated BP. She was in nephrology clinic today and reported significant nausea and vomiting. She had been unable to tolerate anything PO since 11am. As such, she had not taken any of her long acting antihypertensive agents. She was noted to have severe range BP in the 180s/100s and was sent to the ED for further evaluation. While in the ED she received zofran and phenergan for nausea with resolution of symptoms. Her BP remained severely elevated with readings in the 170-180s/90-100s. She received labetalol 20 and was transferred to the [facility] for further evaluation.    On arrival to the [facility] she reported feeling well. She denied headaches, vision changes, CP, SOB, RUQ pain. Nausea and vomiting had resolved. She had multiple sustained severe range BP requiring labetalol 20/40/80. As such, decision was made to admit for further BP monitoring in the setting of cHTN exacerbation vs. ARANZA.     Denies contractions, vaginal bleeding, or loss of fluid.Reports normal fetal movement. This IUP is complicated by cHTN (hdyral 50 TID, labetalol 200 BID, Procardia 90 BID), T1DM, anemia, short cervix (15mm, vag progesterone), proteinuria, gastroparesis, MO (BMI 31).         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      Past Surgical History:   Procedure Laterality Date    RETINAL DETACHMENT SURGERY  2018    RETINAL DETACHMENT SURGERY         (Not in a hospital admission)      Review of patient's allergies indicates:  No Known Allergies     Family History       Problem Relation (Age of Onset)    Cancer Paternal Grandfather    Diabetes Maternal Grandfather    Hypertension Mother    No Known Problems Father          Tobacco Use    Smoking status: Never    Smokeless tobacco: Never   Substance and Sexual Activity    Alcohol use: Not Currently    Drug use: Never    Sexual activity: Yes     Partners: Male     Birth control/protection: None     Review of Systems   Constitutional:  Negative for chills and fever.   HENT:  Negative for nasal congestion.    Eyes:  Negative for visual disturbance.   Respiratory:  Negative for shortness of breath.    Cardiovascular:  Negative for chest pain.   Gastrointestinal:  Negative for abdominal pain, constipation, nausea and vomiting.   Endocrine: Negative for diabetes.   Genitourinary:  Negative for dysuria, frequency, vaginal bleeding and vaginal discharge.   Musculoskeletal:  Negative for back pain.   Neurological:  Negative for headaches.   Hematological:  Does not bruise/bleed easily.    Objective:     Vital Signs (Most Recent):  Temp: 98.1 °F (36.7 °C) (09/22/22 2140)  Pulse: 88 (09/22/22 2325)  Resp: 18 (09/22/22 2140)  BP: (!) 184/104 (09/22/22 2325)  SpO2: 99 % (09/22/22 2325)   Vital Signs (24h Range):  Temp:  [98.1 °F (36.7 °C)-98.6 °F (37 °C)] 98.1 °F (36.7 °C)  Pulse:  [] 88  Resp:  [16-21] 18  SpO2:  [98 %-100 %] 99 %  BP: (159-189)/() 184/104        There is no height or weight on file to calculate BMI.    FHT: 145, modBTBV, +accels, -decels,  reactive and reassuring, appropriate for gestational age   TOCO:  Irregular contractions, patient not feeling     Physical Exam:   Constitutional: She is oriented to person, place, and time. She appears well-developed and well-nourished.    HENT:   Head: Normocephalic and atraumatic.    Eyes: EOM are normal.     Cardiovascular:  Normal rate.             Pulmonary/Chest: Effort normal.        Abdominal: Soft. There is no abdominal tenderness. There is no rebound and no guarding.   Gravid             Musculoskeletal: Normal range of motion.       Neurological: She is alert and oriented to person, place, and time.    Skin: Skin is warm and dry.    Psychiatric: She has a normal mood and affect. Her behavior is normal.     Cervix: Deferred   Presentation: Vertex     Significant Labs:  Lab Results   Component Value Date    GROUPTRH A POS 05/26/2022    HEPBSAG Negative 07/21/2022       I have personallly reviewed all pertinent lab results from the last 24 hours.  Recent Lab Results  (Last 5 results in the past 24 hours)        09/22/22  2305   09/22/22  2201   09/22/22  2145   09/22/22  1839   09/22/22  1831        Albumin 1.8               Alkaline Phosphatase 86               ALT 28               Anion Gap 9               Appearance, UA         Hazy       AST 25               Bacteria, UA         Few       Baso # 0.03               Basophil % 0.2               Bilirubin (UA)         Negative       BILIRUBIN TOTAL 0.1  Comment: For infants and newborns, interpretation of results should be based  on gestational age, weight and in agreement with clinical  observations.    Premature Infant recommended reference ranges:  Up to 24 hours.............<8.0 mg/dL  Up to 48 hours............<12.0 mg/dL  3-5 days..................<15.0 mg/dL  6-29 days.................<15.0 mg/dL                 BUN 22               Calcium 7.4               Chloride 108               CO2 18               Color, UA         Yellow       Creatinine  1.5               Creatinine, Urine   45.7                45.7             Differential Method Automated               eGFR 48               Eos # 0.1               Eosinophil % 0.6               Glucose 183               Glucose, UA         2+       Gran # (ANC) 11.2               Gran % 78.5               Hematocrit 22.9               Hemoglobin 7.9               HIV Rapid Testing Non-Reactive  Comment: Interpretation: Negative for HIV-1 and HIV-2 antibodies.               Hyaline Casts, UA         0       Immature Grans (Abs) 0.31  Comment: Mild elevation in immature granulocytes is non specific and   can be seen in a variety of conditions including stress response,   acute inflammation, trauma and pregnancy. Correlation with other   laboratory and clinical findings is essential.                 Immature Granulocytes 2.2               Ketones, UA         Negative       Leukocytes, UA         2+       Lymph # 1.7               Lymph % 11.8               MCH 31.9               MCHC 34.5               MCV 92               Microscopic Comment         SEE COMMENT  Comment: Other formed elements not mentioned in the report are not   present in the microscopic examination.          Mono # 1.0               Mono % 6.7               MPV 9.6               NITRITE UA         Negative       nRBC 0               Occult Blood UA         2+       pH, UA         6.0       Platelets 226               POCT Glucose     192           Potassium 5.8               Preg Test, Ur       Positive         Prot/Creat Ratio, Urine   17.81             PROTEIN TOTAL 5.1               Protein, UA         3+  Comment: Recommend a 24 hour urine protein or a urine   protein/creatinine ratio if globulin induced proteinuria is  clinically suspected.         Protein, Urine Random   814              Acceptable       Yes         RBC 2.48               RBC, UA         21       RDW 12.8               Sodium 135               Specific  Crescent, UA         1.015       Specimen UA         Urine, Clean Catch       Squam Epithel, UA         18       WBC, UA         50       WBC 14.19                                    Assessment/Plan:     27 y.o. female  at 29w5d for:    * Hypertension affecting pregnancy, antepartum  - required labetalol 20/20/40/80 at [facility] and [facility]  - patient did not take any BP medications today  - continue home regimen of hydralazine 50 mg TID, labetalol 200 mg BID, and procardia 90 mg BID, titrate as necessary  - Platelets 282, Cr 1.6, LFTs 38/ at [facility] ED, repeat preE labs pending  - Baseline P:C 3.75 > 1.76  - currently asymptomatic  - Given no BP medications today, likely cHTN exacerbation, however low threshold to treat as cHTN w/ARANZA w/SF if patient becomes symptomatic, develops lab abnormalities, or BP remains uncontrolled. If suspicious of ARANZA w/SF, would start magnesium for seizure ppx.  - Diet: CLD  - Continuous monitoring    UTI (urinary tract infection)  - UA consistent with UTI  - urine culture pending  - s/p rocephin in [facility]    29 weeks gestation of pregnancy  - FHT appropriate for gestational age  - [place] quiet  - continuous monitoring  - daily PNV  - vertex presentation  - 1349g on bedside growth on admission  - formal growth in AM  - consents for delivery and blood transfusion signed and scanned  - 3T labs pending  - will need flu vaccine and Tdap  - Undecided on contraception at this time    Short cervix  - most recently 15 mm  - continue nightly prometrium    Gastroparesis due to DM  - continue home famotidine and scheduled reglan    Anxiety  - no medications  - mood stable    Proteinuria  - follows with nephrology as outpatient  - Initial P:C of 3.75 downtrending to 1.76  - most recent 24 hour urine with 6.27g  - repeat P:C pending  - Will order labs per nephrology: microalbumin/creatinine ratio, lipid panel, YARON, anti-ds-DNA  - will hold home lovenox at this time    Anemia  - H/H 8.3/25.4  - normocytic  anemia with most recent iron panel with iron of 84, transferrin 180, TIBC 266, 32% saturation and ferritin 79  - scheduled iron/colace  - Will collect recommended nephrology labs: folate, B12, methylmalonic acid, homocysteine    Type 1 diabetes mellitus with other specified complication  - BG on arrival 191  - continue home levemir 10/8  - CR 1:7  - ISF: 1:50 >120  - BG q4 hours while CLD  - Most recent A1C 6.7%  - follows closely with endocrine        Kim Barnes MD  Obstetrics  [facility] - Emergency Dept (OB)

## 2022-09-23 NOTE — NURSING
Md notified Rn after discussing care with pt to recheck BGL 30 minutes after discussion (@ 0861) and report BGL. Pt safety maintained

## 2022-09-23 NOTE — ASSESSMENT & PLAN NOTE
- FHT appropriate for gestational age  - [place] quiet  - continuous monitoring  - daily PNV  - vertex presentation  - 1349g on bedside growth on admission  - formal growth in AM  - consents for delivery and blood transfusion signed and scanned  - 3T labs pending  - will need flu vaccine and Tdap  - Undecided on contraception at this time

## 2022-09-23 NOTE — ASSESSMENT & PLAN NOTE
- FHT appropriate for gestational age  - [place] quiet  - continuous monitoring  - daily PNV  - vertex presentation  - 1349g on bedside growth on admission  - will order formal growth today  - consents for delivery and blood transfusion signed and scanned  - 3T labs pending  - will need flu vaccine and Tdap  - Undecided on contraception at this time

## 2022-09-23 NOTE — NURSING
Component      Latest Ref Rng & Units 9/23/2022           4:54 AM   POCT Glucose      70 - 110 mg/dL 231 (H)   Md notified; Ordered to repeat BGL in 1 hour. Will continue to monitor

## 2022-09-23 NOTE — ASSESSMENT & PLAN NOTE
- BG on arrival 191  - continue home levemir 10/8  - CR 1:7  - ISF: 1:50 >120  - BG q4 hours while CLD  - Most recent A1C 6.7%  - follows closely with endocrine

## 2022-09-23 NOTE — PROGRESS NOTES
09/23/22 0036   Vital Signs   BP (!) 166/95   MAP (mmHg) 123     MD notified. Orders received for 5mg Hydralazine

## 2022-09-23 NOTE — HOSPITAL COURSE
09/22/2022: admit for BP monitoring for cHTN w/ ARANZA vs cHTN exacerbation. CLD, cont monitoring. Rocephin for UTI. Continue levimir 10/8, BG Q4 while on CLD. Labs per nephrology recs pending.   09/23/2022 - HD#2. Received 5 units of aspart since admission for elevated BG. No obstetric complaints. Monitoring reassuring. Magnesium boluses continued for seizure prophylaxis and fetal neuroprotection in setting of elevated Cr (1.7). K 5.8 this AM  09/24/2022 - HD#3. BP meds changed yesterday to labetalol 200 mg BID, procardia 60 BID, and hydralazine 75 mg TID. Patient had persistently elevated blood sugars yesterday requiring multiple slides with insulin aspart. BMZ 1/2 given. Insulin drip started at 1 u/hr in anticipation of hyperglycemia secondary to steroids. Patient had hypoglycemic episode overnight. Reported left sided pelvic pain overnight, SVE closed. No other obstetric complaints. No pre-E symptoms.   09/25/2022 - HD#4. IOL started secondary to cHTN with ARANZA with SF (Cr).   09/26/2022 - HD#5. POD#1 s/p pLTCS. Multiple episodes of hypoglycemia overnight. Admitted to ICU for cardene drip in setting of uncontrolled sustained severe range BP requiring hydral 5/10/5/10, procardia 10/20, and labetalol 20/40/80 after delivery.   09/27/2022 - HD#6. POD#2 s/p pLTCS. Continued on cardene drip for elevated BP. BP meds adjusted yesterday - labetalol discontinued, hydralazine up-titrated and co-reg added. Started on levemir 3/3 with CR of 1:20 and ISF 1:50 > 180.  09/28/2022 - HD#7. POD#3 s/p pLTCS. PECed by psych yesterday. S/p cardene drip. Lasix drip started by nephro. Will increase levemir to 4/4.   09/29/2022 - HD#8. POD#4 s/p pLTCS. Stepped down from ICU yesterday. Lasix drip continued. Meeting post-op milestones. PEC continued per psych. Will increase CR to 1:15 with breakfast and dinner.   09/30/2022 - HD#9. POD#5 s/p pLTCS. Insulin regimen adjusted yesterday. Lasix drip continued. PEC in place per psych.     10/01/2022 - HD#10, POD#6 s/p pLTCS. Patient doing well this morning. Has lot 30 pounds of fluid since delivery. She feels much more like her old safe. She desires a nexplanon before discharge. Discussed plan for admission for a few more days pending renal biopsy.

## 2022-09-23 NOTE — PROGRESS NOTES
[facility] - Labor & Delivery  Obstetrics  Antepartum Progress Note    Patient Name: Alicia Valles  MRN: [id number]  Admission Date: 2022  Hospital Length of Stay: 1 days  Attending Physician: Jose Martin Funes MD  Primary Care Provider: Giovanna Hyatt MD    Subjective:     Principal Problem:Hypertension affecting pregnancy, antepartum    HPI:  Alicia Valles is a 27 y.o.  at 29w5d gestation who presented to the [facility] as a transfer from [facility] for elevated BP. She was in nephrology clinic today and reported significant nausea and vomiting. She had been unable to tolerate anything PO since 11am. As such, she had not taken any of her long acting antihypertensive agents. She was noted to have severe range BP in the 180s/100s and was sent to the ED for further evaluation. While in the ED she received zofran and phenergan for nausea with resolution of symptoms. Her BP remained severely elevated with readings in the 170-180s/90-100s. She received labetalol 20 and was transferred to the [facility] for further evaluation.    On arrival to the [facility] she reported feeling well. She denied headaches, vision changes, CP, SOB, RUQ pain. Nausea and vomiting had resolved. She had multiple sustained severe range BP requiring labetalol 20/40/80. As such, decision was made to admit for further BP monitoring in the setting of cHTN exacerbation vs. ARANZA.     Denies contractions, vaginal bleeding, or loss of fluid.Reports normal fetal movement. This IUP is complicated by cHTN (hdyral 50 TID, labetalol 200 BID, Procardia 90 BID), T1DM, anemia, short cervix (15mm, vag progesterone), proteinuria, gastroparesis, MO (BMI 31).       Hospital Course:  2022: admit for BP monitoring for cHTN w/ ARANZA vs cHTN exacerbation. CLD, cont monitoring. Rocephin for UTI. Continue levimir 10/8, BG Q4 while on CLD. Labs per nephrology recs pending.   2022 - HD#2. Received 5 units of aspart since admission for elevated BG. No  obstetric complaints. Monitoring reassuring. Magnesium boluses continued for seizure prophylaxis and fetal neuroprotection in setting of elevated Cr (1.7). K 5.8 this AM      Obstetric HPI:  Patient reports irregular contractions. She denies any LOF or vaginal bleeding. She reports normal fetal movement. She denies any headaches, vision changes, RUQ pain or SOB.      Objective:     Vital Signs (Most Recent):  Temp: 98.4 °F (36.9 °C) (09/23/22 0252)  Pulse: 90 (09/23/22 0605)  Resp: 17 (09/23/22 0519)  BP: (!) 147/82 (09/23/22 0605)  SpO2: 98 % (09/23/22 0601)   Vital Signs (24h Range):  Temp:  [98.1 °F (36.7 °C)-98.6 °F (37 °C)] 98.4 °F (36.9 °C)  Pulse:  [] 90  Resp:  [16-21] 17  SpO2:  [98 %-100 %] 98 %  BP: (133-189)/() 147/82     Weight: 72.1 kg (158 lb 15.9 oz)  Body mass index is 31.05 kg/m².    FHT: 135 Cat 1 (reassuring)  TOCO:  Occasional contraction      Intake/Output Summary (Last 24 hours) at 9/23/2022 0653  Last data filed at 9/23/2022 0632  Gross per 24 hour   Intake 1276.96 ml   Output 400 ml   Net 876.96 ml       Cervical Exam:  deferred  Presentation: Vertex     Significant Labs:  Recent Lab Results  (Last 5 results in the past 24 hours)        09/23/22  0613   09/23/22  0604   09/23/22  0454   09/23/22  0419   09/23/22  0346        Anion Gap 9               BUN 24               Calcium 7.6               Chloride 108               CO2 17               Creatinine 1.7               eGFR 41               Glucose 210               Magnesium 4.6               POCT Glucose   203   231   207   228       Potassium 5.8               Sodium 134                                      Physical Exam:   Constitutional: She is oriented to person, place, and time. She appears well-developed and well-nourished.       Cardiovascular:  Normal rate.             Pulmonary/Chest: Effort normal. No respiratory distress.        Abdominal: Soft. She exhibits no distension. There is no abdominal tenderness. There  is no rebound and no guarding.             Musculoskeletal: Normal range of motion.       Neurological: She is alert and oriented to person, place, and time.    Skin: Skin is warm and dry.    Psychiatric: She has a normal mood and affect.     Assessment/Plan:     27 y.o. female  at 29w6d for:    * Hypertension affecting pregnancy, antepartum  - required labetalol 20/20/40/80 at [facility] and [facility]  - patient took her morning BP meds yesterday but was unable to take remainder of medications yesterday secondary to vomiting  - continue home regimen of hydralazine 50 mg TID, labetalol 200 mg BID, and procardia 90 mg BID, will up titrate as necessary  - Pre-E labs: Plt 226, AST/ALT /, PC 17.81, Cr 1.6 > 1.5 > 1.7  - Baseline P:C 3.75 > 1.76  - currently asymptomatic  - Given that patient did not take all BP medications today, likely cHTN exacerbation, however low threshold to treat as cHTN w/ARANZA w/SF if patient becomes symptomatic, develops lab abnormalities, or BP remains uncontrolled. Magnesium started for seizure prophylaxis and fetal neuroprotection  - Diet: CLD  - Continuous monitoring           Hyperkalemia  - K 5.8  - Will place on telemetry  - Hyperkalemia order set in place  - Appreciate nephrology assistance    UTI (urinary tract infection)  - UA consistent with UTI  - urine culture pending  - s/p rocephin in [facility]    29 weeks gestation of pregnancy  - FHT appropriate for gestational age  - Steele quiet  - continuous monitoring  - daily PNV  - vertex presentation  - 1349g on bedside growth on admission  - will order formal growth today  - consents for delivery and blood transfusion signed and scanned  - 3T labs pending  - will need flu vaccine and Tdap  - Undecided on contraception at this time    Short cervix  - most recently 15 mm  - continue nightly prometrium    Gastroparesis due to DM  - continue home famotidine and scheduled reglan    Anxiety  - no medications  - mood stable    Proteinuria  - follows  with nephrology as outpatient  - Initial P:C of 3.75 downtrending to 1.76  - most recent 24 hour urine with 6.27g  - Repeat PC 17.81  - Will order labs per nephrology: microalbumin/creatinine ratio, lipid panel, YARON, anti-ds-DNA  - will hold home lovenox at this time   - Nephrology consulted, appreciate assistance    Anemia  - H/H 8.3/25.4 > 7.9/22.9  - normocytic anemia with most recent iron panel with iron of 84, transferrin 180, TIBC 266, 32% saturation and ferritin 79  - scheduled iron/colace  - Will collect recommended nephrology labs: folate, B12, methylmalonic acid, homocysteine       Type 1 diabetes mellitus with other specified complication  -  - 283  - Has received 8 units of insulin aspart for persistently elevated BG  - continue home levemir 10/8  - CR 1:7  - ISF: 1:50 >120  - Most recent A1C 6.7%  - follows closely with endocrine  - Will pattern fasting and 2 hour postprandial sugars. Will slide pre-meal based on carb ratio  - Diabetic, low potassium diet ordered  - Will consult endocrine        Carly Maria MD  Obstetrics  [facility] - Labor & Delivery

## 2022-09-23 NOTE — SUBJECTIVE & OBJECTIVE
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      Past Surgical History:   Procedure Laterality Date    RETINAL DETACHMENT SURGERY  2018    RETINAL DETACHMENT SURGERY         (Not in a hospital admission)      Review of patient's allergies indicates:  No Known Allergies     Family History       Problem Relation (Age of Onset)    Cancer Paternal Grandfather    Diabetes Maternal Grandfather    Hypertension Mother    No Known Problems Father          Tobacco Use    Smoking status: Never    Smokeless tobacco: Never   Substance and Sexual Activity    Alcohol use: Not Currently    Drug use: Never    Sexual activity: Yes     Partners: Male     Birth control/protection: None     Review of Systems   Constitutional:  Negative for chills and fever.   HENT:  Negative for nasal congestion.    Eyes:  Negative for visual disturbance.   Respiratory:  Negative for shortness of breath.    Cardiovascular:  Negative for chest pain.   Gastrointestinal:  Negative for abdominal pain, constipation, nausea and vomiting.   Endocrine: Negative for diabetes.   Genitourinary:  Negative for dysuria, frequency, vaginal bleeding and vaginal discharge.   Musculoskeletal:  Negative for back pain.   Neurological:  Negative for headaches.   Hematological:  Does not bruise/bleed easily.    Objective:     Vital Signs (Most Recent):  Temp: 98.1 °F (36.7 °C) (22)  Pulse: 88 (22 2325)  Resp: 18 (22)  BP: (!) 184/104  (09/22/22 2325)  SpO2: 99 % (09/22/22 2325)   Vital Signs (24h Range):  Temp:  [98.1 °F (36.7 °C)-98.6 °F (37 °C)] 98.1 °F (36.7 °C)  Pulse:  [] 88  Resp:  [16-21] 18  SpO2:  [98 %-100 %] 99 %  BP: (159-189)/() 184/104        There is no height or weight on file to calculate BMI.    FHT: 145, modBTBV, +accels, -decels, reactive and reassuring, appropriate for gestational age   TOCO:  Irregular contractions, patient not feeling     Physical Exam:   Constitutional: She is oriented to person, place, and time. She appears well-developed and well-nourished.    HENT:   Head: Normocephalic and atraumatic.    Eyes: EOM are normal.     Cardiovascular:  Normal rate.             Pulmonary/Chest: Effort normal.        Abdominal: Soft. There is no abdominal tenderness. There is no rebound and no guarding.   Gravid             Musculoskeletal: Normal range of motion.       Neurological: She is alert and oriented to person, place, and time.    Skin: Skin is warm and dry.    Psychiatric: She has a normal mood and affect. Her behavior is normal.     Cervix: Deferred   Presentation: Vertex     Significant Labs:  Lab Results   Component Value Date    GROUPTRH A POS 05/26/2022    HEPBSAG Negative 07/21/2022       I have personallly reviewed all pertinent lab results from the last 24 hours.  Recent Lab Results  (Last 5 results in the past 24 hours)        09/22/22  2305   09/22/22  2201   09/22/22  2145   09/22/22  1839   09/22/22  1831        Albumin 1.8               Alkaline Phosphatase 86               ALT 28               Anion Gap 9               Appearance, UA         Hazy       AST 25               Bacteria, UA         Few       Baso # 0.03               Basophil % 0.2               Bilirubin (UA)         Negative       BILIRUBIN TOTAL 0.1  Comment: For infants and newborns, interpretation of results should be based  on gestational age, weight and in agreement with clinical  observations.    Premature Infant  recommended reference ranges:  Up to 24 hours.............<8.0 mg/dL  Up to 48 hours............<12.0 mg/dL  3-5 days..................<15.0 mg/dL  6-29 days.................<15.0 mg/dL                 BUN 22               Calcium 7.4               Chloride 108               CO2 18               Color, UA         Yellow       Creatinine 1.5               Creatinine, Urine   45.7                45.7             Differential Method Automated               eGFR 48               Eos # 0.1               Eosinophil % 0.6               Glucose 183               Glucose, UA         2+       Gran # (ANC) 11.2               Gran % 78.5               Hematocrit 22.9               Hemoglobin 7.9               HIV Rapid Testing Non-Reactive  Comment: Interpretation: Negative for HIV-1 and HIV-2 antibodies.               Hyaline Casts, UA         0       Immature Grans (Abs) 0.31  Comment: Mild elevation in immature granulocytes is non specific and   can be seen in a variety of conditions including stress response,   acute inflammation, trauma and pregnancy. Correlation with other   laboratory and clinical findings is essential.                 Immature Granulocytes 2.2               Ketones, UA         Negative       Leukocytes, UA         2+       Lymph # 1.7               Lymph % 11.8               MCH 31.9               MCHC 34.5               MCV 92               Microscopic Comment         SEE COMMENT  Comment: Other formed elements not mentioned in the report are not   present in the microscopic examination.          Mono # 1.0               Mono % 6.7               MPV 9.6               NITRITE UA         Negative       nRBC 0               Occult Blood UA         2+       pH, UA         6.0       Platelets 226               POCT Glucose     192           Potassium 5.8               Preg Test, Ur       Positive         Prot/Creat Ratio, Urine   17.81             PROTEIN TOTAL 5.1               Protein, UA          3+  Comment: Recommend a 24 hour urine protein or a urine   protein/creatinine ratio if globulin induced proteinuria is  clinically suspected.         Protein, Urine Random   814              Acceptable       Yes         RBC 2.48               RBC, UA         21       RDW 12.8               Sodium 135               Specific [place], UA         1.015       Specimen UA         Urine, Clean Catch       Squam Epithel, UA         18       WBC, UA         50       WBC 14.19

## 2022-09-23 NOTE — ASSESSMENT & PLAN NOTE
- follows with nephrology as outpatient  - Initial P:C of 3.75 downtrending to 1.76  - most recent 24 hour urine with 6.27g  - Repeat PC 17.81  - Will order labs per nephrology: microalbumin/creatinine ratio, lipid panel, YARON, anti-ds-DNA  - will hold home lovenox at this time   - Nephrology consulted, appreciate assistance

## 2022-09-23 NOTE — CONSULTS
Consult Note  Nephrology    Consult Requested By: Jose Martin Funes MD  Reason for Consult: KOBY/>K/Nephrotic Syn    SUBJECTIVE:     History of Present Illness:  Patient is a 27 y.o. female presents with admission from Nephrology appt yesterday with N/V/Acc HTN and feeling poorly.  Pt is 29+W accidental pregnancy with uncontrolled type 1 dm, nephrotic syn, uncontrolled HTN.  Initial /100, Creat 1.5, K 5.8, urine PC 17 (falsely high with blood but hx of 6+).  Admitted to OB floor for mgmt of HTN.  Trends noted and now with Creat 1.7, K 6.0, Bicarb 17.  Consulted for evaluation.  Pt seen and examined with [facility] staff-team at bedside.  Of note pt has been refusing some meds/treatment modalities/etc.  Extensive discussion about plan of care and consequences of refusal.  Nephrotic w/up ordered earlier but suspect due to uncontrolled Type 1 DM/Uncontrolled HTN. Pt admits to taking Excedrin and occasional NSAIDS for her gastroparesis.  Diet habits are uncontrolled .      Epic reviewed.    No c/o currently.  No CP/SOB/N/V/D/F/C.      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      Past Surgical History:   Procedure Laterality Date    RETINAL DETACHMENT SURGERY  2018    RETINAL DETACHMENT SURGERY       Family History   Problem Relation Age of Onset    No Known Problems Father     Cancer Paternal Grandfather         unsure    Diabetes Maternal Grandfather     Hypertension Mother      Social History     Tobacco Use    Smoking status: Never    Smokeless tobacco: Never   Substance Use Topics    Alcohol use: Not Currently    Drug use: Never       Review of patient's allergies indicates:  No Known Allergies     Review of Systems:  Constitutional: No fever or  chills  Respiratory: No cough or shortness of breath  Cardiovascular: No chest pain or palpitations  Gastrointestinal: No nausea or vomiting  Neurological: No confusion or weakness    OBJECTIVE:     Vital Signs (Most Recent)  Temp: 98 °F (36.7 °C) (09/23/22 0735)  Pulse: 88 (09/23/22 0938)  Resp: 16 (09/23/22 0735)  BP: (!) 157/95 (09/23/22 0744)  SpO2: 100 % (09/23/22 0937)    Vital Signs Range (Last 24H):  Temp:  [98 °F (36.7 °C)-98.6 °F (37 °C)]   Pulse:  []   Resp:  [16-21]   BP: (133-189)/()   SpO2:  [98 %-100 %]       Intake/Output Summary (Last 24 hours) at 9/23/2022 1009  Last data filed at 9/23/2022 0944  Gross per 24 hour   Intake 1563.44 ml   Output 500 ml   Net 1063.44 ml       Physical Exam:  General appearance: Well developed, well nourished  Eyes:  Conjunctivae/corneas clear. PERRL.  Lungs: Normal respiratory effort,   clear to auscultation bilaterally   Heart: Regular rate and rhythm, S1, S2 normal, no murmur, rub or linden.  Abdomen: Soft, non-tender non-distended; bowel sounds normal; no masses,  no organomegaly  Extremities: No cyanosis or clubbing. 2+ edema.    Skin: Skin color, texture, turgor normal. No rashes or lesions  Neurologic: Normal strength and tone. No focal numbness or weakness       Laboratory:  Recent Labs   Lab 09/22/22  2305   WBC 14.19*   RBC 2.48*   HGB 7.9*   HCT 22.9*      MCV 92   MCH 31.9*   MCHC 34.5     BMP:   Recent Labs   Lab 09/23/22  0613 09/23/22  0816   * 270*   * 130*   K 5.8* 6.0*  6.0*    105   CO2 17* 17*   BUN 24* 24*   CREATININE 1.7* 1.7*   CALCIUM 7.6* 7.5*   MG 4.6*  --      Lab Results   Component Value Date    CALCIUM 7.5 (L) 09/23/2022    PHOS 3.5 05/26/2022     BNP  No results for input(s): BNP, BNPTRIAGEBLO in the last 168 hours.No results found for: URICACID  Lab Results   Component Value Date    IRON 84 05/06/2022    TIBC 266 05/06/2022    FERRITIN 79 05/06/2022     Lab Results   Component Value Date    CALCIUM  7.5 (L) 09/23/2022    PHOS 3.5 05/26/2022       Diagnostic Results:  No orders to display       ASSESSMENT/PLAN:     Oliguric KOBY on CKD II with nephrotic syndrome secondary to uncontrolled DM, uncontrolled HTN,  early pre-e, pregnancy, etc:  -saw nephrology yesterday at [facility] Dr. Cheek/Liliane  -proteinuria labs ordered and pending.   -Creat trends noted. 1.5-1.7.  -current Urine PC is 17.8 but falsely high with blood in urine.  Hx of 6+ gm.    -avoid NSAIDS.    -no need for RRT but will depend on trends/pregnancy-delivery time frame.  -trend closely.  -Renally dose meds, avoid nephrotoxins, and monitor I/O's closely.      Hyperkalemia with NAGMA:  -no EKG changes.  -discussed risk with pt.   LOW K DIET  DC LR  Lokelma TID and added bicarb tabs.  Use insulin to shift as needed    Uncontrolled HTN:  -max procardia is 60 BID  -increase hydralazine to 75 TID  -labetalol as now but may need to change to coreg for antiprotenuric effects.     Uncontrolled Type 1 DM:  -formerly on insulin pump until she had a hypoglycemic seizure and then transitioned to MDI  -she was not wearing her sensor was cause of this but highly recommend retry with closed loop system.  -defer to endo but using steroids to mature fetal lungs may tip into DKA needing insulin drip.    Pregnancy:  -per MFM/OB    Anemia of pregnancy/CKD:  -no signs of aHUS currently.  -checking B12.  -on iron.      Thanks for consult  See above  Will follow along.   Discussed with team.

## 2022-09-23 NOTE — ED NOTES
09/22/22 2300   Vital Signs   BP (!) 172/105   MAP (mmHg) 132     MD notified. Orders received for 80mg of Labetalol IV.

## 2022-09-23 NOTE — NURSING
Component      Latest Ref Rng & Units 9/23/2022           4:19 AM   POCT Glucose      70 - 110 mg/dL 207 (H)   Md notified of BGL, MD ordered to repeat BGL in 30 minutes. Pt safety maintained.

## 2022-09-23 NOTE — NURSING
Component      Latest Ref Rng & Units 9/23/2022             POCT Glucose      70 - 110 mg/dL 283 (H)   MD notified of BGL at 0157; Ordered 3 unit of aspart; see mar for admin. Will recheck bgl 30 min after admin. Pt safety maintained

## 2022-09-23 NOTE — ASSESSMENT & PLAN NOTE
-  - 283  - Has received 8 units of insulin aspart for persistently elevated BG  - continue home levemir 10/8  - CR 1:7  - ISF: 1:50 >120  - Most recent A1C 6.7%  - follows closely with endocrine  - Will pattern fasting and 2 hour postprandial sugars. Will slide pre-meal based on carb ratio  - Diabetic, low potassium diet ordered  - Will consult endocrine

## 2022-09-23 NOTE — PROCEDURES
Discussed with RN, this has been done or is not needed. Please reorder and call us if new tracing is desired.

## 2022-09-23 NOTE — NURSING
Component      Latest Ref Rng & Units 9/23/2022           6:04 AM   POCT Glucose      70 - 110 mg/dL 203 (H)    Md notified; ordered one unit of aspart. See MAR for admin. Will repeat BGL in 30 minutes after admin.

## 2022-09-23 NOTE — ED NOTES
09/22/22 2140 09/22/22 2155   Vital Signs   BP (!) 181/97 (!) 187/98  (Meds given)   MAP (mmHg) 132 131     MD notified of two severe BP. Orders received for 20mg Labetalol IV.

## 2022-09-23 NOTE — PROGRESS NOTES
Pt 2 hr PP  mg/dL. Dr. Funes notified. Verbal order for 6U aspart from . MD to bedside to discuss plan of care with pt and mother.

## 2022-09-24 ENCOUNTER — ANESTHESIA EVENT (OUTPATIENT)
Dept: OBSTETRICS AND GYNECOLOGY | Facility: OTHER | Age: 27
End: 2022-09-24
Payer: MEDICAID

## 2022-09-24 ENCOUNTER — ANESTHESIA (OUTPATIENT)
Dept: OBSTETRICS AND GYNECOLOGY | Facility: OTHER | Age: 27
End: 2022-09-24
Payer: MEDICAID

## 2022-09-24 PROBLEM — N17.9 AKI (ACUTE KIDNEY INJURY): Status: ACTIVE | Noted: 2022-09-24

## 2022-09-24 LAB
ABO + RH BLD: NORMAL
ALBUMIN SERPL BCP-MCNC: 1.7 G/DL (ref 3.5–5.2)
ALBUMIN SERPL BCP-MCNC: 1.9 G/DL (ref 3.5–5.2)
ALP SERPL-CCNC: 78 U/L (ref 55–135)
ALP SERPL-CCNC: 85 U/L (ref 55–135)
ALT SERPL W/O P-5'-P-CCNC: 26 U/L (ref 10–44)
ALT SERPL W/O P-5'-P-CCNC: 29 U/L (ref 10–44)
ANION GAP SERPL CALC-SCNC: 10 MMOL/L (ref 8–16)
ANION GAP SERPL CALC-SCNC: 9 MMOL/L (ref 8–16)
ANION GAP SERPL CALC-SCNC: 9 MMOL/L (ref 8–16)
AST SERPL-CCNC: 23 U/L (ref 10–40)
AST SERPL-CCNC: 28 U/L (ref 10–40)
BASOPHILS # BLD AUTO: 0.02 K/UL (ref 0–0.2)
BASOPHILS NFR BLD: 0.2 % (ref 0–1.9)
BILIRUB SERPL-MCNC: 0.1 MG/DL (ref 0.1–1)
BILIRUB SERPL-MCNC: 0.1 MG/DL (ref 0.1–1)
BLD GP AB SCN CELLS X3 SERPL QL: NORMAL
BUN SERPL-MCNC: 31 MG/DL (ref 6–20)
BUN SERPL-MCNC: 31 MG/DL (ref 6–20)
BUN SERPL-MCNC: 34 MG/DL (ref 6–20)
CALCIUM SERPL-MCNC: 7.1 MG/DL (ref 8.7–10.5)
CALCIUM SERPL-MCNC: 7.5 MG/DL (ref 8.7–10.5)
CALCIUM SERPL-MCNC: 7.6 MG/DL (ref 8.7–10.5)
CHLORIDE SERPL-SCNC: 104 MMOL/L (ref 95–110)
CHLORIDE SERPL-SCNC: 105 MMOL/L (ref 95–110)
CHLORIDE SERPL-SCNC: 107 MMOL/L (ref 95–110)
CO2 SERPL-SCNC: 16 MMOL/L (ref 23–29)
CO2 SERPL-SCNC: 17 MMOL/L (ref 23–29)
CO2 SERPL-SCNC: 19 MMOL/L (ref 23–29)
CREAT SERPL-MCNC: 2 MG/DL (ref 0.5–1.4)
CREAT SERPL-MCNC: 2.1 MG/DL (ref 0.5–1.4)
CREAT SERPL-MCNC: 2.3 MG/DL (ref 0.5–1.4)
CREAT UR-MCNC: 50.1 MG/DL (ref 15–325)
CREAT UR-MCNC: 79.6 MG/DL (ref 15–325)
DIFFERENTIAL METHOD: ABNORMAL
EOSINOPHIL # BLD AUTO: 0 K/UL (ref 0–0.5)
EOSINOPHIL NFR BLD: 0.1 % (ref 0–8)
ERYTHROCYTE [DISTWIDTH] IN BLOOD BY AUTOMATED COUNT: 12.9 % (ref 11.5–14.5)
EST. GFR  (NO RACE VARIABLE): 29 ML/MIN/1.73 M^2
EST. GFR  (NO RACE VARIABLE): 32 ML/MIN/1.73 M^2
EST. GFR  (NO RACE VARIABLE): 34 ML/MIN/1.73 M^2
GLUCOSE SERPL-MCNC: 154 MG/DL (ref 70–110)
GLUCOSE SERPL-MCNC: 390 MG/DL (ref 70–110)
GLUCOSE SERPL-MCNC: 69 MG/DL (ref 70–110)
HCT VFR BLD AUTO: 20 % (ref 37–48.5)
HGB BLD-MCNC: 6.8 G/DL (ref 12–16)
IMM GRANULOCYTES # BLD AUTO: 0.48 K/UL (ref 0–0.04)
IMM GRANULOCYTES NFR BLD AUTO: 3.7 % (ref 0–0.5)
LYMPHOCYTES # BLD AUTO: 1.4 K/UL (ref 1–4.8)
LYMPHOCYTES NFR BLD: 10.4 % (ref 18–48)
MAGNESIUM SERPL-MCNC: 3.5 MG/DL (ref 1.6–2.6)
MAGNESIUM SERPL-MCNC: 4.2 MG/DL (ref 1.6–2.6)
MCH RBC QN AUTO: 32.2 PG (ref 27–31)
MCHC RBC AUTO-ENTMCNC: 34 G/DL (ref 32–36)
MCV RBC AUTO: 95 FL (ref 82–98)
MONOCYTES # BLD AUTO: 0.9 K/UL (ref 0.3–1)
MONOCYTES NFR BLD: 6.8 % (ref 4–15)
NEUTROPHILS # BLD AUTO: 10.3 K/UL (ref 1.8–7.7)
NEUTROPHILS NFR BLD: 78.8 % (ref 38–73)
NRBC BLD-RTO: 0 /100 WBC
PHOSPHATE SERPL-MCNC: 5 MG/DL (ref 2.7–4.5)
PLATELET # BLD AUTO: 206 K/UL (ref 150–450)
PMV BLD AUTO: 10 FL (ref 9.2–12.9)
POCT GLUCOSE: 103 MG/DL (ref 70–110)
POCT GLUCOSE: 105 MG/DL (ref 70–110)
POCT GLUCOSE: 111 MG/DL (ref 70–110)
POCT GLUCOSE: 130 MG/DL (ref 70–110)
POCT GLUCOSE: 134 MG/DL (ref 70–110)
POCT GLUCOSE: 138 MG/DL (ref 70–110)
POCT GLUCOSE: 152 MG/DL (ref 70–110)
POCT GLUCOSE: 155 MG/DL (ref 70–110)
POCT GLUCOSE: 159 MG/DL (ref 70–110)
POCT GLUCOSE: 173 MG/DL (ref 70–110)
POCT GLUCOSE: 183 MG/DL (ref 70–110)
POCT GLUCOSE: 190 MG/DL (ref 70–110)
POCT GLUCOSE: 220 MG/DL (ref 70–110)
POCT GLUCOSE: 241 MG/DL (ref 70–110)
POCT GLUCOSE: 305 MG/DL (ref 70–110)
POCT GLUCOSE: 359 MG/DL (ref 70–110)
POCT GLUCOSE: 396 MG/DL (ref 70–110)
POCT GLUCOSE: 409 MG/DL (ref 70–110)
POCT GLUCOSE: 51 MG/DL (ref 70–110)
POCT GLUCOSE: 63 MG/DL (ref 70–110)
POCT GLUCOSE: 66 MG/DL (ref 70–110)
POCT GLUCOSE: 74 MG/DL (ref 70–110)
POCT GLUCOSE: 86 MG/DL (ref 70–110)
POCT GLUCOSE: 89 MG/DL (ref 70–110)
POTASSIUM SERPL-SCNC: 4.8 MMOL/L (ref 3.5–5.1)
POTASSIUM SERPL-SCNC: 5 MMOL/L (ref 3.5–5.1)
POTASSIUM SERPL-SCNC: 5 MMOL/L (ref 3.5–5.1)
PROT SERPL-MCNC: 5.3 G/DL (ref 6–8.4)
PROT SERPL-MCNC: 5.7 G/DL (ref 6–8.4)
PROT UR-MCNC: 446 MG/DL (ref 0–15)
PROT UR-MCNC: 723 MG/DL (ref 0–15)
PROT/CREAT UR: 8.9 MG/G{CREAT} (ref 0–0.2)
PROT/CREAT UR: 9.08 MG/G{CREAT} (ref 0–0.2)
RBC # BLD AUTO: 2.11 M/UL (ref 4–5.4)
SODIUM SERPL-SCNC: 129 MMOL/L (ref 136–145)
SODIUM SERPL-SCNC: 133 MMOL/L (ref 136–145)
SODIUM SERPL-SCNC: 134 MMOL/L (ref 136–145)
WBC # BLD AUTO: 13.08 K/UL (ref 3.9–12.7)

## 2022-09-24 PROCEDURE — 80048 BASIC METABOLIC PNL TOTAL CA: CPT

## 2022-09-24 PROCEDURE — 25000003 PHARM REV CODE 250

## 2022-09-24 PROCEDURE — 86901 BLOOD TYPING SEROLOGIC RH(D): CPT | Performed by: STUDENT IN AN ORGANIZED HEALTH CARE EDUCATION/TRAINING PROGRAM

## 2022-09-24 PROCEDURE — 59025 FETAL NON-STRESS TEST: CPT | Mod: 26,,, | Performed by: OBSTETRICS & GYNECOLOGY

## 2022-09-24 PROCEDURE — 86920 COMPATIBILITY TEST SPIN: CPT | Performed by: STUDENT IN AN ORGANIZED HEALTH CARE EDUCATION/TRAINING PROGRAM

## 2022-09-24 PROCEDURE — 86920 COMPATIBILITY TEST SPIN: CPT

## 2022-09-24 PROCEDURE — 80053 COMPREHEN METABOLIC PANEL: CPT | Performed by: OBSTETRICS & GYNECOLOGY

## 2022-09-24 PROCEDURE — 87081 CULTURE SCREEN ONLY: CPT

## 2022-09-24 PROCEDURE — 63600175 PHARM REV CODE 636 W HCPCS: Performed by: STUDENT IN AN ORGANIZED HEALTH CARE EDUCATION/TRAINING PROGRAM

## 2022-09-24 PROCEDURE — C1751 CATH, INF, PER/CENT/MIDLINE: HCPCS | Performed by: ANESTHESIOLOGY

## 2022-09-24 PROCEDURE — 59410 PRA OBSTE CARE,VAG DELIV+POSTPARTUM: ICD-10-PCS | Mod: AA,TH,, | Performed by: ANESTHESIOLOGY

## 2022-09-24 PROCEDURE — 59410 OBSTETRICAL CARE: CPT | Mod: AA,TH,, | Performed by: ANESTHESIOLOGY

## 2022-09-24 PROCEDURE — 99233 PR SUBSEQUENT HOSPITAL CARE,LEVL III: ICD-10-PCS | Mod: 25,,, | Performed by: OBSTETRICS & GYNECOLOGY

## 2022-09-24 PROCEDURE — 25000003 PHARM REV CODE 250: Performed by: STUDENT IN AN ORGANIZED HEALTH CARE EDUCATION/TRAINING PROGRAM

## 2022-09-24 PROCEDURE — 99233 SBSQ HOSP IP/OBS HIGH 50: CPT | Mod: 25,,, | Performed by: OBSTETRICS & GYNECOLOGY

## 2022-09-24 PROCEDURE — 84100 ASSAY OF PHOSPHORUS: CPT | Performed by: STUDENT IN AN ORGANIZED HEALTH CARE EDUCATION/TRAINING PROGRAM

## 2022-09-24 PROCEDURE — 59200 INSERT CERVICAL DILATOR: CPT

## 2022-09-24 PROCEDURE — 01968 PR INSERT CATH,ART,PERCUT,SHORTTERM: ICD-10-PCS | Mod: AA,TH,, | Performed by: ANESTHESIOLOGY

## 2022-09-24 PROCEDURE — 63600175 PHARM REV CODE 636 W HCPCS: Performed by: OBSTETRICS & GYNECOLOGY

## 2022-09-24 PROCEDURE — 63600175 PHARM REV CODE 636 W HCPCS

## 2022-09-24 PROCEDURE — 25000003 PHARM REV CODE 250: Performed by: NURSE PRACTITIONER

## 2022-09-24 PROCEDURE — 11000001 HC ACUTE MED/SURG PRIVATE ROOM

## 2022-09-24 PROCEDURE — S5010 5% DEXTROSE AND 0.45% SALINE: HCPCS

## 2022-09-24 PROCEDURE — 62326 NJX INTERLAMINAR LMBR/SAC: CPT | Performed by: STUDENT IN AN ORGANIZED HEALTH CARE EDUCATION/TRAINING PROGRAM

## 2022-09-24 PROCEDURE — 82570 ASSAY OF URINE CREATININE: CPT | Performed by: INTERNAL MEDICINE

## 2022-09-24 PROCEDURE — 85025 COMPLETE CBC W/AUTO DIFF WBC: CPT | Performed by: STUDENT IN AN ORGANIZED HEALTH CARE EDUCATION/TRAINING PROGRAM

## 2022-09-24 PROCEDURE — 27200710 HC EPIDURAL INFUSION PUMP SET: Performed by: ANESTHESIOLOGY

## 2022-09-24 PROCEDURE — 84156 ASSAY OF PROTEIN URINE: CPT | Mod: 91 | Performed by: STUDENT IN AN ORGANIZED HEALTH CARE EDUCATION/TRAINING PROGRAM

## 2022-09-24 PROCEDURE — 80053 COMPREHEN METABOLIC PANEL: CPT | Mod: 91

## 2022-09-24 PROCEDURE — 51702 INSERT TEMP BLADDER CATH: CPT

## 2022-09-24 PROCEDURE — 83735 ASSAY OF MAGNESIUM: CPT | Mod: 91 | Performed by: OBSTETRICS & GYNECOLOGY

## 2022-09-24 PROCEDURE — 59025 PR FETAL 2N-STRESS TEST: ICD-10-PCS | Mod: 26,,, | Performed by: OBSTETRICS & GYNECOLOGY

## 2022-09-24 PROCEDURE — 01968 ANES/ANALG CS DLVR NEURAXIAL: CPT | Mod: AA,TH,, | Performed by: ANESTHESIOLOGY

## 2022-09-24 PROCEDURE — 25000003 PHARM REV CODE 250: Performed by: OBSTETRICS & GYNECOLOGY

## 2022-09-24 PROCEDURE — 83735 ASSAY OF MAGNESIUM: CPT | Performed by: STUDENT IN AN ORGANIZED HEALTH CARE EDUCATION/TRAINING PROGRAM

## 2022-09-24 PROCEDURE — 36415 COLL VENOUS BLD VENIPUNCTURE: CPT

## 2022-09-24 RX ORDER — SODIUM CHLORIDE, SODIUM LACTATE, POTASSIUM CHLORIDE, CALCIUM CHLORIDE 600; 310; 30; 20 MG/100ML; MG/100ML; MG/100ML; MG/100ML
INJECTION, SOLUTION INTRAVENOUS CONTINUOUS
Status: DISCONTINUED | OUTPATIENT
Start: 2022-09-24 | End: 2022-09-24

## 2022-09-24 RX ORDER — LIDOCAINE HYDROCHLORIDE AND EPINEPHRINE 15; 5 MG/ML; UG/ML
INJECTION, SOLUTION EPIDURAL
Status: DISCONTINUED | OUTPATIENT
Start: 2022-09-24 | End: 2022-09-25

## 2022-09-24 RX ORDER — CALCIUM GLUCONATE 98 MG/ML
1 INJECTION, SOLUTION INTRAVENOUS
Status: DISCONTINUED | OUTPATIENT
Start: 2022-09-24 | End: 2022-09-25

## 2022-09-24 RX ORDER — GLUCAGON 1 MG
1 KIT INJECTION CONTINUOUS PRN
Status: DISCONTINUED | OUTPATIENT
Start: 2022-09-24 | End: 2022-09-24

## 2022-09-24 RX ORDER — DEXTROSE, SODIUM CHLORIDE, SODIUM LACTATE, POTASSIUM CHLORIDE, AND CALCIUM CHLORIDE 5; .6; .31; .03; .02 G/100ML; G/100ML; G/100ML; G/100ML; G/100ML
INJECTION, SOLUTION INTRAVENOUS CONTINUOUS
Status: DISCONTINUED | OUTPATIENT
Start: 2022-09-24 | End: 2022-09-24

## 2022-09-24 RX ORDER — LABETALOL HYDROCHLORIDE 5 MG/ML
INJECTION, SOLUTION INTRAVENOUS
Status: COMPLETED
Start: 2022-09-24 | End: 2022-09-24

## 2022-09-24 RX ORDER — MAGNESIUM SULFATE HEPTAHYDRATE 40 MG/ML
2 INJECTION, SOLUTION INTRAVENOUS ONCE
Status: DISCONTINUED | OUTPATIENT
Start: 2022-09-24 | End: 2022-09-24

## 2022-09-24 RX ORDER — IBUPROFEN 200 MG
24 TABLET ORAL
Status: DISCONTINUED | OUTPATIENT
Start: 2022-09-24 | End: 2022-09-25

## 2022-09-24 RX ORDER — INSULIN ASPART 100 [IU]/ML
4 INJECTION, SOLUTION INTRAVENOUS; SUBCUTANEOUS ONCE
Status: COMPLETED | OUTPATIENT
Start: 2022-09-24 | End: 2022-09-24

## 2022-09-24 RX ORDER — IBUPROFEN 200 MG
24 TABLET ORAL
Status: DISCONTINUED | OUTPATIENT
Start: 2022-09-24 | End: 2022-09-24

## 2022-09-24 RX ORDER — HYDROCODONE BITARTRATE AND ACETAMINOPHEN 500; 5 MG/1; MG/1
TABLET ORAL
Status: DISCONTINUED | OUTPATIENT
Start: 2022-09-24 | End: 2022-09-25

## 2022-09-24 RX ORDER — FENTANYL/BUPIVACAINE/NS/PF 2MCG/ML-.1
PLASTIC BAG, INJECTION (ML) INJECTION
Status: COMPLETED
Start: 2022-09-24 | End: 2022-09-24

## 2022-09-24 RX ORDER — OXYTOCIN/RINGER'S LACTATE 30/500 ML
0-30 PLASTIC BAG, INJECTION (ML) INTRAVENOUS CONTINUOUS
Status: DISCONTINUED | OUTPATIENT
Start: 2022-09-24 | End: 2022-09-24

## 2022-09-24 RX ORDER — LABETALOL HYDROCHLORIDE 5 MG/ML
20 INJECTION, SOLUTION INTRAVENOUS ONCE
Status: COMPLETED | OUTPATIENT
Start: 2022-09-24 | End: 2022-09-24

## 2022-09-24 RX ORDER — FENTANYL/BUPIVACAINE/NS/PF 2MCG/ML-.1
PLASTIC BAG, INJECTION (ML) INJECTION CONTINUOUS PRN
Status: DISCONTINUED | OUTPATIENT
Start: 2022-09-24 | End: 2022-09-25

## 2022-09-24 RX ORDER — INSULIN ASPART 100 [IU]/ML
9 INJECTION, SOLUTION INTRAVENOUS; SUBCUTANEOUS ONCE
Status: COMPLETED | OUTPATIENT
Start: 2022-09-24 | End: 2022-09-24

## 2022-09-24 RX ORDER — LABETALOL HYDROCHLORIDE 5 MG/ML
40 INJECTION, SOLUTION INTRAVENOUS ONCE
Status: COMPLETED | OUTPATIENT
Start: 2022-09-24 | End: 2022-09-24

## 2022-09-24 RX ORDER — MAGNESIUM SULFATE HEPTAHYDRATE 40 MG/ML
2 INJECTION, SOLUTION INTRAVENOUS ONCE
Status: COMPLETED | OUTPATIENT
Start: 2022-09-24 | End: 2022-09-25

## 2022-09-24 RX ORDER — DEXTROSE MONOHYDRATE AND SODIUM CHLORIDE 5; .45 G/100ML; G/100ML
INJECTION, SOLUTION INTRAVENOUS CONTINUOUS
Status: DISCONTINUED | OUTPATIENT
Start: 2022-09-24 | End: 2022-09-25

## 2022-09-24 RX ORDER — GLUCAGON 1 MG
1 KIT INJECTION CONTINUOUS PRN
Status: DISCONTINUED | OUTPATIENT
Start: 2022-09-24 | End: 2022-09-25

## 2022-09-24 RX ORDER — IBUPROFEN 200 MG
16 TABLET ORAL
Status: DISCONTINUED | OUTPATIENT
Start: 2022-09-24 | End: 2022-09-24

## 2022-09-24 RX ORDER — DEXTROSE MONOHYDRATE 50 MG/ML
INJECTION, SOLUTION INTRAVENOUS
Status: DISPENSED
Start: 2022-09-24 | End: 2022-09-24

## 2022-09-24 RX ORDER — GLUCAGON 1 MG
1 KIT INJECTION
Status: DISCONTINUED | OUTPATIENT
Start: 2022-09-24 | End: 2022-09-24

## 2022-09-24 RX ORDER — LABETALOL HYDROCHLORIDE 5 MG/ML
80 INJECTION, SOLUTION INTRAVENOUS ONCE
Status: COMPLETED | OUTPATIENT
Start: 2022-09-24 | End: 2022-09-24

## 2022-09-24 RX ORDER — IBUPROFEN 200 MG
TABLET ORAL
Status: COMPLETED
Start: 2022-09-24 | End: 2022-09-24

## 2022-09-24 RX ORDER — OXYTOCIN/RINGER'S LACTATE 30/500 ML
0-30 PLASTIC BAG, INJECTION (ML) INTRAVENOUS CONTINUOUS
Status: DISCONTINUED | OUTPATIENT
Start: 2022-09-24 | End: 2022-09-25

## 2022-09-24 RX ORDER — MAGNESIUM SULFATE HEPTAHYDRATE 40 MG/ML
2 INJECTION, SOLUTION INTRAVENOUS ONCE
Status: COMPLETED | OUTPATIENT
Start: 2022-09-24 | End: 2022-09-24

## 2022-09-24 RX ADMIN — LABETALOL HYDROCHLORIDE 200 MG: 200 TABLET, FILM COATED ORAL at 08:09

## 2022-09-24 RX ADMIN — LABETALOL HYDROCHLORIDE 20 MG: 5 INJECTION INTRAVENOUS at 11:09

## 2022-09-24 RX ADMIN — POLYSACCHARIDE-IRON COMPLEX 150 MG: 150 CAPSULE ORAL at 08:09

## 2022-09-24 RX ADMIN — SODIUM CHLORIDE 500 ML: 0.9 INJECTION, SOLUTION INTRAVENOUS at 09:09

## 2022-09-24 RX ADMIN — INSULIN HUMAN 1.5 UNITS/HR: 1 INJECTION, SOLUTION INTRAVENOUS at 09:09

## 2022-09-24 RX ADMIN — INSULIN HUMAN 1 UNITS/HR: 1 INJECTION, SOLUTION INTRAVENOUS at 08:09

## 2022-09-24 RX ADMIN — HYDRALAZINE HYDROCHLORIDE 75 MG: 25 TABLET, FILM COATED ORAL at 10:09

## 2022-09-24 RX ADMIN — DEXTROSE AND SODIUM CHLORIDE: 5; .45 INJECTION, SOLUTION INTRAVENOUS at 08:09

## 2022-09-24 RX ADMIN — LABETALOL HYDROCHLORIDE 20 MG: 5 INJECTION, SOLUTION INTRAVENOUS at 08:09

## 2022-09-24 RX ADMIN — DEXTROSE, SODIUM CHLORIDE, SODIUM LACTATE, POTASSIUM CHLORIDE, AND CALCIUM CHLORIDE: 5; .6; .31; .03; .02 INJECTION, SOLUTION INTRAVENOUS at 04:09

## 2022-09-24 RX ADMIN — Medication 2.5 ML: at 09:09

## 2022-09-24 RX ADMIN — LABETALOL HYDROCHLORIDE 40 MG: 5 INJECTION, SOLUTION INTRAVENOUS at 12:09

## 2022-09-24 RX ADMIN — INSULIN HUMAN 6 UNITS/HR: 1 INJECTION, SOLUTION INTRAVENOUS at 04:09

## 2022-09-24 RX ADMIN — BETAMETHASONE SODIUM PHOSPHATE AND BETAMETHASONE ACETATE 12 MG: 3; 3 INJECTION, SUSPENSION INTRA-ARTICULAR; INTRALESIONAL; INTRAMUSCULAR at 01:09

## 2022-09-24 RX ADMIN — MAGNESIUM SULFATE HEPTAHYDRATE 2 G: 40 INJECTION, SOLUTION INTRAVENOUS at 04:09

## 2022-09-24 RX ADMIN — HYDRALAZINE HYDROCHLORIDE 75 MG: 25 TABLET, FILM COATED ORAL at 01:09

## 2022-09-24 RX ADMIN — DEXTROSE 5 MILLION UNITS: 50 INJECTION, SOLUTION INTRAVENOUS at 08:09

## 2022-09-24 RX ADMIN — METOCLOPRAMIDE 10 MG: 10 TABLET ORAL at 08:09

## 2022-09-24 RX ADMIN — DEXTROSE 3 MILLION UNITS: 50 INJECTION, SOLUTION INTRAVENOUS at 11:09

## 2022-09-24 RX ADMIN — DOCUSATE SODIUM 100 MG: 100 CAPSULE, LIQUID FILLED ORAL at 08:09

## 2022-09-24 RX ADMIN — LABETALOL HYDROCHLORIDE 80 MG: 5 INJECTION, SOLUTION INTRAVENOUS at 12:09

## 2022-09-24 RX ADMIN — SODIUM ZIRCONIUM CYCLOSILICATE 10 G: 10 POWDER, FOR SUSPENSION ORAL at 08:09

## 2022-09-24 RX ADMIN — NIFEDIPINE 60 MG: 30 TABLET, FILM COATED, EXTENDED RELEASE ORAL at 08:09

## 2022-09-24 RX ADMIN — METOCLOPRAMIDE 10 MG: 10 TABLET ORAL at 06:09

## 2022-09-24 RX ADMIN — PRENATAL VIT W/ FE FUMARATE-FA TAB 27-0.8 MG 1 TABLET: 27-0.8 TAB at 08:09

## 2022-09-24 RX ADMIN — ASPIRIN 81 MG CHEWABLE TABLET 81 MG: 81 TABLET CHEWABLE at 08:09

## 2022-09-24 RX ADMIN — METOCLOPRAMIDE 10 MG: 10 TABLET ORAL at 01:09

## 2022-09-24 RX ADMIN — LIDOCAINE HYDROCHLORIDE,EPINEPHRINE BITARTRATE 3 ML: 15; .005 INJECTION, SOLUTION EPIDURAL; INFILTRATION; INTRACAUDAL; PERINEURAL at 09:09

## 2022-09-24 RX ADMIN — HYDRALAZINE HYDROCHLORIDE 75 MG: 25 TABLET, FILM COATED ORAL at 06:09

## 2022-09-24 RX ADMIN — Medication 16 G: at 03:09

## 2022-09-24 RX ADMIN — Medication 15 G: at 08:09

## 2022-09-24 RX ADMIN — FAMOTIDINE 40 MG: 20 TABLET ORAL at 08:09

## 2022-09-24 RX ADMIN — Medication 10 ML/HR: at 09:09

## 2022-09-24 RX ADMIN — LABETALOL HYDROCHLORIDE 40 MG: 5 INJECTION, SOLUTION INTRAVENOUS at 09:09

## 2022-09-24 RX ADMIN — SODIUM ZIRCONIUM CYCLOSILICATE 10 G: 10 POWDER, FOR SUSPENSION ORAL at 06:09

## 2022-09-24 RX ADMIN — INSULIN DETEMIR 10 UNITS: 100 INJECTION, SOLUTION SUBCUTANEOUS at 09:09

## 2022-09-24 RX ADMIN — INSULIN ASPART 4 UNITS: 100 INJECTION, SOLUTION INTRAVENOUS; SUBCUTANEOUS at 12:09

## 2022-09-24 RX ADMIN — INSULIN ASPART 9 UNITS: 100 INJECTION, SOLUTION INTRAVENOUS; SUBCUTANEOUS at 10:09

## 2022-09-24 RX ADMIN — MAGNESIUM SULFATE HEPTAHYDRATE 2 G: 40 INJECTION, SOLUTION INTRAVENOUS at 10:09

## 2022-09-24 RX ADMIN — LABETALOL HYDROCHLORIDE 20 MG: 5 INJECTION, SOLUTION INTRAVENOUS at 06:09

## 2022-09-24 RX ADMIN — SODIUM BICARBONATE 650 MG TABLET 1300 MG: at 08:09

## 2022-09-24 RX ADMIN — CYANOCOBALAMIN TAB 1000 MCG 1000 MCG: 1000 TAB at 08:09

## 2022-09-24 RX ADMIN — Medication 2 MILLI-UNITS/MIN: at 08:09

## 2022-09-24 NOTE — SUBJECTIVE & OBJECTIVE
Obstetric HPI:  Patient reports occasional left sided pelvic pain. She denies any regular contractions, LOF or vaginal bleeding. She reports normal fetal movement. She denies any headaches, vision changes, RUQ pain or SOB.      Objective:     Vital Signs (Most Recent):  Temp: 98.5 °F (36.9 °C) (09/23/22 2304)  Pulse: 99 (09/24/22 0151)  Resp: 18 (09/23/22 2304)  BP: (!) 143/87 (09/23/22 2304)  SpO2: 100 % (09/24/22 0237)   Vital Signs (24h Range):  Temp:  [98 °F (36.7 °C)-98.5 °F (36.9 °C)] 98.5 °F (36.9 °C)  Pulse:  [] 99  Resp:  [16-18] 18  SpO2:  [98 %-100 %] 100 %  BP: (138-157)/(81-95) 143/87     Weight: 72.1 kg (158 lb 15.9 oz)  Body mass index is 31.05 kg/m².    FHT: 140 Cat 1 (reassuring)  TOCO:  Occasional contraction      Intake/Output Summary (Last 24 hours) at 9/24/2022 0348  Last data filed at 9/23/2022 2155  Gross per 24 hour   Intake 513.44 ml   Output 1300 ml   Net -786.56 ml         Cervical Exam:  deferred  Presentation: Vertex on admit     Significant Labs:  Recent Lab Results  (Last 5 results in the past 24 hours)        09/24/22  0328   09/24/22  0308   09/24/22  0254   09/24/22  0202   09/24/22  0039        POCT Glucose 130   63   51   89   138                              Physical Exam:   Constitutional: She is oriented to person, place, and time. She appears well-developed.    HENT:   Head: Normocephalic and atraumatic.      Cardiovascular:  Normal rate.             Pulmonary/Chest: Effort normal. No respiratory distress.        Abdominal: Soft. She exhibits no distension. There is no abdominal tenderness. There is no rebound and no guarding.             Musculoskeletal: Normal range of motion.       Neurological: She is alert and oriented to person, place, and time.    Skin: Skin is warm and dry.    Psychiatric: She has a normal mood and affect.

## 2022-09-24 NOTE — CARE UPDATE
Patient labs repeated at 1200 due to KOBY with worsening Cr. Creatine worsened from 2.0-->2.3. Will plan for induction of labor at this time for cHTN with superimposed preeclampsia with severe features. Will administer BMZ dose #2 at this time. Position check to confirm vertex presentation. Plan for cervical champagne and oxytocin induction. Additionally patient has poorly controlled type 1 DM, see below insulin management for labor.     Insulin management during labor and delivery  Patient to be made NPO at this time  Patient received her levemir 10 units this am, hold further levemir at this time  Check glucose every 1 hour throughout induction process  Will initiate IV insulin infusion, as her last glucose level was 409, will hold D5 1/2 NS at this time  Type 1 diabetics require an infusion of glucose (D5 ½ NS) and insulin to avoid ketosis and hypoglycemia; the insulin infusion can be temporarily paused if hypoglycemia is noted and additional dextrose (D10 or ½ amp of D50) can be administered  Postpartum management to be ordered by MFM pending glucose control around delivery process.     Will also need magnesium sulfate for seizure ppx. Recent magnesium level 3.5. Will administer 2 g IV bolus of magnesium at this time, trend mag levels q 4 hours and administer IV bolus doses to maintain in therapeutic range.     Strict intake output during labor. Monitor closely for signs of volume overload.     IV antihypertensives for management of severe range BP PRN per protocol    Jimenez Escobedo MD  PGY 6  Maternal Fetal Medicine  [facility]

## 2022-09-24 NOTE — CARE UPDATE
Resident to bedside. Patient complaining of left sided pelvic pain in groin area. Patient unsure if having contractions. Abdominal exam benign. SVE closed/thick/high. Will give dose of tylenol for likely round ligament pain.    Carly Maria MD  PGY-2 OB/GYN

## 2022-09-24 NOTE — PROGRESS NOTES
"Progress Note  Nephrology    Consult Requested By: Jose Martin Funes MD  Reason for Consult: KOBY/>K/Nephrotic Syn    SUBJECTIVE:     History of Present Illness from initial consultation:  "Patient is a 27 y.o. female presents with admission from Nephrology appt yesterday with N/V/Acc HTN and feeling poorly.  Pt is 27+W accidental pregnancy with uncontrolled type 1 dm, nephrotic syn, uncontrolled HTN.  Initial /100, Creat 1.5, K 5.8, urine PC 17 (falsely high with blood but hx of 6+).  Admitted to OB floor for mgmt of HTN.  Trends noted and now with Creat 1.7, K 6.0, Bicarb 17.  Consulted for evaluation.  Pt seen and examined with [facility] staff-team at bedside.  Of note pt has been refusing some meds/treatment modalities/etc.  Extensive discussion about plan of care and consequences of refusal.  Nephrotic w/up ordered earlier but suspect due to uncontrolled Type 1 DM/Uncontrolled HTN. Pt admits to taking Excedrin and occasional NSAIDS for her gastroparesis.  Diet habits are uncontrolled."     Interval History:  No c/o currently.  No CP/SOB/N/V/D/F/C.  Noted Dr. Benítez's plan of care.      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      Past Surgical History:   Procedure Laterality Date    RETINAL DETACHMENT SURGERY  2018    RETINAL DETACHMENT SURGERY       Family History   Problem Relation Age of Onset    No Known Problems Father     Cancer Paternal Grandfather         unsure    Diabetes Maternal Grandfather     Hypertension Mother      Social History     Tobacco Use    Smoking status: Never    Smokeless tobacco: Never   Substance Use Topics    Alcohol use: Not Currently    Drug use: Never       Review of patient's allergies indicates:  No Known Allergies "     Review of Systems:  Constitutional: No fever or chills  Respiratory: No cough or shortness of breath  Cardiovascular: No chest pain or palpitations  Gastrointestinal: No nausea or vomiting  Neurological: No confusion or weakness    OBJECTIVE:     Vital Signs (Most Recent)  Temp: 98.5 °F (36.9 °C) (09/23/22 1910)  Pulse: 106 (09/23/22 1910)  Resp: 18 (09/23/22 1910)  BP: (!) 145/94 (09/23/22 1606)  SpO2: 100 % (09/23/22 1910)    Vital Signs Range (Last 24H):  Temp:  [98 °F (36.7 °C)-98.5 °F (36.9 °C)]   Pulse:  []   Resp:  [16-18]   BP: (133-187)/()   SpO2:  [98 %-100 %]       Intake/Output Summary (Last 24 hours) at 9/23/2022 2020  Last data filed at 9/23/2022 1745  Gross per 24 hour   Intake 513.44 ml   Output 1200 ml   Net -686.56 ml         Physical Exam:  General appearance: Well developed, well nourished  Eyes:  Conjunctivae/corneas clear. PERRL.  Lungs: Normal respiratory effort,   clear to auscultation bilaterally   Heart: Regular rate and rhythm, S1, S2 normal, no murmur, rub or linden.  Abdomen: Soft, non-tender non-distended; bowel sounds normal; no masses,  no organomegaly  Extremities: No cyanosis or clubbing. 2+ edema.    Skin: Skin color, texture, turgor normal. No rashes or lesions  Neurologic: Normal strength and tone. No focal numbness or weakness       Laboratory:  Recent Labs   Lab 09/22/22  2305   WBC 14.19*   RBC 2.48*   HGB 7.9*   HCT 22.9*      MCV 92   MCH 31.9*   MCHC 34.5       BMP:   Recent Labs   Lab 09/23/22  0613 09/23/22  0816 09/23/22  1603   *   < > 157*   *   < > 132*   K 5.8*   < > 4.8      < > 107   CO2 17*   < > 17*   BUN 24*   < > 26*   CREATININE 1.7*   < > 1.9*   CALCIUM 7.6*   < > 7.3*   MG 4.6*  --   --     < > = values in this interval not displayed.       Lab Results   Component Value Date    CALCIUM 7.3 (L) 09/23/2022    PHOS 3.5 05/26/2022     BNP  No results for input(s): BNP, BNPTRIAGEBLO in the last 168 hours.No results found  for: URICACID  Lab Results   Component Value Date    IRON 84 05/06/2022    TIBC 266 05/06/2022    FERRITIN 79 05/06/2022     Lab Results   Component Value Date    CALCIUM 7.3 (L) 09/23/2022    PHOS 3.5 05/26/2022       Diagnostic Results:  No orders to display       ASSESSMENT/PLAN:     Oliguric KOBY on CKD II with nephrotic syndrome secondary to uncontrolled DM, uncontrolled HTN, Preeclampsia with severe features.  -Followed with  nephrology at [facility] Dr. Cheek/Liliane   -Creat trends noted. 1.5-1.9-2.0 today  -Latest UPCR >17 g but falsely high with blood in urine, UPCR in past 6g  -Will order another UPCR now to see how it is trending.  -Creat ordered for 12:00 noon today; if creat >2 induction will begin  -avoid NSAIDS.    -no need for RRT but will depend on trends/delivery timeframe.  -Renally dose meds, avoid nephrotoxins, and monitor I/O's closely.  -Given albumin less than 2.5 would recommend LMWH as these patients are at high risk of clotting complications  -Given she may need to be delivered in next day or so it is reasonable to hold off on this for now.  -Would need to check anti-factor Xa intermittently due to her KOBY/CKD    Hyperkalemia with NAGMA, hyponatremia  -Lokelma TID and sodium bicarb to shift and correct acidosis  -Use insulin to shift as needed but suspect above measures will resolve issues  -Sodium load should help hyponatremia    Uncontrolled HTN  -max procardia 60 BID  -increased hydralazine to 75 TID  -labetalol as now but may need to change to coreg for antiprotenuric effects.     Uncontrolled Type 1 DM  -formerly on insulin pump until she had a hypoglycemic seizure and then transitioned to MDI  -she was not wearing her sensor was cause of this but highly recommend retry with closed loop system.  -defer to Endo but use of steroids to accelerate fetal lung maturity may tip into DKA needing insulin drip.    Pregnancy, Extremely high risk  -per MFM/OB    Leukocystosis without fever or S/S  infection  -Due to steroid therapy    Anemia of pregnancy/CKD  -no signs of aHUS currently.  -B12 and folate both on low side now on supplementation  -on iron  -Epo is contraindicated

## 2022-09-24 NOTE — NURSING
"1906: , MD notified and ordered 1 unit insulin aspart. Pt education and gave 1 unit aspart.  2010: , MD notified. No new orders.  2120: . MD notified, no new orders.  2128: Pt called for snack. Pt's snack serving 20g of carbs. MD notified, okay with snack and will order 4 units insulin aspart. 4 units aspart given with snack.   2258: . MD notified, no new orders. MD would like BG at 0030 and 0200.  2315: MD to bedside. Pt complaint of left lower pelvic pain and unsure if it is contractions. Request SVE, closed/thick/high. Will give 1 dose Tylenol for likely round ligament pain.  0039: . MD notified. Pt request sugar free jello. 2g of carbs, MD ok with 2g of carb without sliding.  0202: BG 89. MD notified and patient given mark crackers.  0254: Pt call out stating " I can tell my blood sugar is dropping." RN to bedside. BG 51 and pt feeling symptomatic. MD and charge nurse notified. Pt given juice and 16g in glucose tabs. Pt now minimally responsive to RN. MD to bedside, pt minimally responsive and staff assist called. Insulin drip stopped.  0308: BG 63. RN x3 and MD x 2 at bedside. Juice with 2 sugar packets given.  0328: . MD notified. No new orders. Pt feeling better.  0406: . MD notified, no new orders at this time.   0503: . MD notified, no new orders.  0606: , MD notified. No new orders.  Pt safety maintained. Plan of care to be discussed with MFM and day team.      "

## 2022-09-24 NOTE — CARE UPDATE
Resident to bedside for hypoglycemia episode. Patient noted to have BG of 51 and was minimally responsive to RN. Upon entry into room, patient was minimally responsive. Staff assist called. Patient had already been given juice and 16g in glucose tabs. Insulin drip paused. Repeat sugar 63. IV dextrose ordered to room. Patient given additional juice with 2 sugar packs added. BG improved to 79 and patient reported improvement in symptoms. Case discussed with MFM fellow on call. Insulin drip to remain off. Will order long acting insulin for the AM. Will continue q 1 hour glucose checks per patient request.    Blood glucose trends  0039 138 (sugar free jello with 2g of carbs, did not slide)  0200 89 > patient given mark crackers  0254 51 > patient given juice and 16g in glucose tabs  Patient minimally responsive, staff assist called. Insulin pump stopped  0300 63 > juice with 2 sugar packets  0310 79  0330: 130  0400: 190    Carly Maria MD  PGY-2 OB/GYN

## 2022-09-24 NOTE — ASSESSMENT & PLAN NOTE
- Home regimen: levemir 10/8, CR 1:7, ISF: 1:50 > 120  - Most recent A1c 6.7%  - Home levemir discontinued yesterday and insulin drip started at 1 unit/hr in anticipation of hyperglycemia secondary to BMZ course  - Patient with hypoglycemic episode overnight to 50s --> improved with glucose tabs and juice  - BG patterning: fasting, pre-prandials, 2 hours post-prandial, and QHS   - Pre- prandial: CR 1:5, ISF 1:50 > 120   - Post-prandial: ISF 1:50 > 120  - follows closely with endocrine  - Diabetic, low potassium diet ordered  - Endocrine consulted, appreciated recommendations  - Hypoglycemia order set in place

## 2022-09-24 NOTE — ASSESSMENT & PLAN NOTE
- K 5.8 > 4.8  - Telemetry discontinued  - Hyperkalemia order set in place  - Repeat BMP ordered for this AM   - Appreciate nephrology assistance

## 2022-09-24 NOTE — ASSESSMENT & PLAN NOTE
- H/H 8.3/25.4 > 7.9/22.9  - normocytic anemia with most recent iron panel with iron of 84, transferrin 180, TIBC 266, 32% saturation and ferritin 79  - scheduled iron/colace  - Recommended nephrology labs:   - Folate, B12, homocysteine - WNL   - methylmalonic acid pending

## 2022-09-24 NOTE — ASSESSMENT & PLAN NOTE
- FHT appropriate for gestational age  - [place] quiet  - continuous monitoring  - daily PNV  - vertex presentation on admit  - 1349g on bedside growth on admission  - Formal growth: 1395g (9/23) at 29w6d  - consents for delivery and blood transfusion signed and scanned  - will need flu vaccine and Tdap  - Undecided on contraception at this time  - GBS pending

## 2022-09-24 NOTE — ASSESSMENT & PLAN NOTE
- required labetalol 20/20/40/80 at [facility] and [facility] on admission  - Home regimen: hydralazine 50 mg TID, labetalol 200 mg BID, and procardia 90 mg BID  - patient took her morning BP meds on day of admission but was unable to take remainder of medications that day secondary to vomiting  - Pre-E labs: Plt 226, AST/ALT 22/27, PC 17.81, Cr 1.6 > 1.5 > 1.7 > 1.9  - Baseline P:C 3.75 > 1.76  - currently asymptomatic  - BP regimen: hydralazine 75 mg TID, procardia 60 BID, and labetalol 200 mg BID, will up-titrate as necessary   - Given that patient did not take all BP medications on day of admit, likely cHTN exacerbation, however low threshold to treat as cHTN w/ARANZA w/SF if patient becomes symptomatic, develops lab abnormalities, or BP remains uncontrolled  - Diet: diabetic, low potassium  - Continuous monitoring

## 2022-09-24 NOTE — ASSESSMENT & PLAN NOTE
- Baseline Cr 1.1 - 1.2  - Cr on admit 1.5 > 1.9  - Avoid nephrotoxic agents  - Nephrology consulted, appreciate assistance

## 2022-09-24 NOTE — NURSING
0950- Dr. Mcfarlane notified of repeat /87. MD states to recheck BP q30 mins for 2 hours and if stable can go back to [id number].    1007- Dr. Mcfarlane notified of pre meal accucheck of 241. 9 units Aspart ordered to be given with meal.    1015-Spoke with Dr. Mcfarlane. MD ok with RN taking patient off monitor for shower.    1208- Dr. Mcfarlane notified of accucheck 396 2hours postprandial. 4 units Aspart ordered by MD. Will recheck glucose in 1 hour.    1328- Dr. Orr notified of repeat accucheck 1hour post 4 units Aspart. Glucose 409. MD also asked to review FHR strip due to variable decels occurring with last 30 mins. MD states they will come to bedside to discuss induction with patient.     1330- Dr. Escobedo at bedside discussing induction plan with patient. Patient made aware of plan for induction of labor with insulin drip and magnesium therapy.    1341- Dr. Orr at bedside with ultrasound.    1703- Dr. Mcfarlane notified of glucose 305 1 hour after insulin drip administration.    1810- Dr. Orr notified of glucose 159. MD states to decrease insulin to 3 units/hr.

## 2022-09-24 NOTE — PROGRESS NOTES
[facility] - Labor & Delivery  Obstetrics  Antepartum Progress Note    Patient Name: Alicia Valles  MRN: [id number]  Admission Date: 2022  Hospital Length of Stay: 2 days  Attending Physician: Jose Martin Funes MD  Primary Care Provider: Giovanna Hyatt MD    Subjective:     Principal Problem:Hypertension affecting pregnancy, antepartum    HPI:  Alicia Valles is a 27 y.o.  at 29w5d gestation who presented to the [facility] as a transfer from [facility] for elevated BP. She was in nephrology clinic today and reported significant nausea and vomiting. She had been unable to tolerate anything PO since 11am. As such, she had not taken any of her long acting antihypertensive agents. She was noted to have severe range BP in the 180s/100s and was sent to the ED for further evaluation. While in the ED she received zofran and phenergan for nausea with resolution of symptoms. Her BP remained severely elevated with readings in the 170-180s/90-100s. She received labetalol 20 and was transferred to the [facility] for further evaluation.    On arrival to the [facility] she reported feeling well. She denied headaches, vision changes, CP, SOB, RUQ pain. Nausea and vomiting had resolved. She had multiple sustained severe range BP requiring labetalol 20/40/80. As such, decision was made to admit for further BP monitoring in the setting of cHTN exacerbation vs. ARANZA.     Denies contractions, vaginal bleeding, or loss of fluid.Reports normal fetal movement. This IUP is complicated by cHTN (hdyral 50 TID, labetalol 200 BID, Procardia 90 BID), T1DM, anemia, short cervix (15mm, vag progesterone), proteinuria, gastroparesis, MO (BMI 31).       Hospital Course:  2022: admit for BP monitoring for cHTN w/ ARANZA vs cHTN exacerbation. CLD, cont monitoring. Rocephin for UTI. Continue levimir 10/8, BG Q4 while on CLD. Labs per nephrology recs pending.   2022 - HD#2. Received 5 units of aspart since admission for elevated BG. No  obstetric complaints. Monitoring reassuring. Magnesium boluses continued for seizure prophylaxis and fetal neuroprotection in setting of elevated Cr (1.7). K 5.8 this AM  09/24/2022 - HD#3. BP meds changed yesterday to labetalol 200 mg BID, procardia 60 BID, and hydralazine 75 mg TID. Patient had persistently elevated blood sugars yesterday requiring multiple slides with insulin aspart. BMZ 1/2 given. Insulin drip started at 1 u/hr in anticipation of hyperglycemia secondary to steroids. Patient had hypoglycemic episode overnight. Reported left sided pelvic pain overnight, SVE closed. No other obstetric complaints. No pre-E symptoms.       Obstetric HPI:  Patient reports occasional left sided pelvic pain. She denies any regular contractions, LOF or vaginal bleeding. She reports normal fetal movement. She denies any headaches, vision changes, RUQ pain or SOB.      Objective:     Vital Signs (Most Recent):  Temp: 98.5 °F (36.9 °C) (09/23/22 2304)  Pulse: 99 (09/24/22 0151)  Resp: 18 (09/23/22 2304)  BP: (!) 143/87 (09/23/22 2304)  SpO2: 100 % (09/24/22 0237)   Vital Signs (24h Range):  Temp:  [98 °F (36.7 °C)-98.5 °F (36.9 °C)] 98.5 °F (36.9 °C)  Pulse:  [] 99  Resp:  [16-18] 18  SpO2:  [98 %-100 %] 100 %  BP: (138-157)/(81-95) 143/87     Weight: 72.1 kg (158 lb 15.9 oz)  Body mass index is 31.05 kg/m².    FHT: 140 Cat 1 (reassuring)  TOCO:  Occasional contraction      Intake/Output Summary (Last 24 hours) at 9/24/2022 0348  Last data filed at 9/23/2022 2155  Gross per 24 hour   Intake 513.44 ml   Output 1300 ml   Net -786.56 ml         Cervical Exam:  deferred  Presentation: Vertex on admit     Significant Labs:  Recent Lab Results  (Last 5 results in the past 24 hours)        09/24/22  0328   09/24/22  0308   09/24/22  0254   09/24/22  0202   09/24/22  0039        POCT Glucose 130   63   51   89   138                              Physical Exam:   Constitutional: She is oriented to person, place, and time. She  appears well-developed.    HENT:   Head: Normocephalic and atraumatic.      Cardiovascular:  Normal rate.             Pulmonary/Chest: Effort normal. No respiratory distress.        Abdominal: Soft. She exhibits no distension. There is no abdominal tenderness. There is no rebound and no guarding.             Musculoskeletal: Normal range of motion.       Neurological: She is alert and oriented to person, place, and time.    Skin: Skin is warm and dry.    Psychiatric: She has a normal mood and affect.   Assessment/Plan:     27 y.o. female  at 30w0d for:    * Hypertension affecting pregnancy, antepartum  - required labetalol 20/20/40/80 at [facility] on admission  - Home regimen: hydralazine 50 mg TID, labetalol 200 mg BID, and procardia 90 mg BID  - patient took her morning BP meds on day of admission but was unable to take remainder of medications that day secondary to vomiting  - Pre-E labs: Plt 226, AST/ALT 22/27, PC 17.81, Cr 1.6 > 1.5 > 1.7 > 1.9  - Baseline P:C 3.75 > 1.76  - currently asymptomatic  - BP regimen: hydralazine 75 mg TID, procardia 60 BID, and labetalol 200 mg BID, will up-titrate as necessary   - Given that patient did not take all BP medications on day of admit, likely cHTN exacerbation, however low threshold to treat as cHTN w/ARANZA w/SF if patient becomes symptomatic, develops lab abnormalities, or BP remains uncontrolled  - Diet: diabetic, low potassium  - Continuous monitoring          KOBY (acute kidney injury)  - Baseline Cr 1.1 - 1.2  - Cr on admit 1.5 > 1.9  - Avoid nephrotoxic agents  - Nephrology consulted, appreciate assistance    Hyperkalemia  - K 5.8 > 4.8  - Telemetry discontinued  - Hyperkalemia order set in place  - Repeat BMP ordered for this AM   - Appreciate nephrology assistance    UTI (urinary tract infection)  - UA consistent with UTI  - urine culture negative  - s/p rocephin in [facility]    29 weeks gestation of pregnancy  - FHT appropriate for gestational age  - [place]  quiet  - continuous monitoring  - daily PNV  - vertex presentation on admit  - 1349g on bedside growth on admission  - Formal growth: 1395g (9/23) at 29w6d  - consents for delivery and blood transfusion signed and scanned  - will need flu vaccine and Tdap  - Undecided on contraception at this time  - GBS pending      Short cervix  - most recently 15 mm  - continue nightly prometrium    Gastroparesis due to DM  - continue home famotidine and scheduled reglan    Anxiety  - no medications  - mood stable    Proteinuria  - follows with nephrology as outpatient  - Initial P:C of 3.75 downtrending to 1.76  - most recent 24 hour urine with 6.27g  - Repeat PC 17.81  - Will order labs per nephrology: microalbumin/creatinine ratio, lipid panel, YARON, anti-ds-DNA  - will hold home lovenox at this time   - Nephrology consulted, appreciate assistance    Anemia  - H/H 8.3/25.4 > 7.9/22.9  - normocytic anemia with most recent iron panel with iron of 84, transferrin 180, TIBC 266, 32% saturation and ferritin 79  - scheduled iron/colace  - Recommended nephrology labs:   - Folate, B12, homocysteine - WNL   - methylmalonic acid pending           Type 1 diabetes mellitus with other specified complication  - Home regimen: levemir 10/8, CR 1:7, ISF: 1:50 > 120  - Most recent A1c 6.7%  - Home levemir discontinued yesterday and insulin drip started at 1 unit/hr in anticipation of hyperglycemia secondary to BMZ course  - Patient with hypoglycemic episode overnight to 50s --> improved with glucose tabs and juice  - BG patterning: fasting, pre-prandials, 2 hours post-prandial, and QHS   - Pre- prandial: CR 1:5, ISF 1:50 > 120   - Post-prandial: ISF 1:50 > 120  - follows closely with endocrine  - Diabetic, low potassium diet ordered  - Endocrine consulted, appreciated recommendations  - Hypoglycemia order set in place           Carly Maria MD  Obstetrics  Scientologist - Labor & Delivery    I have reviewed and agree with progress note, assessment  and plan as written by Dr. Maria. Patient is a 27 y.o.  at 30w0d originally admitted to [facility] service for poorly controlled HTN and DM due to persistent vomiting preventing eating or tolerating PO antihypertensive medications. Patient's vomiting has resolved and is now tolerating PO. However, now has KOBY of uncertain etiology (likely preeclampsia given numerous risk factors however laboratory and proteinuria diagnosis difficult given comorbidities). At bedside this am she has no acute complaints. Hypoglycemic episode overnight, insulin infusion has since been discontinued. Denies pre-e ROS but does have significant facial and upper extremity swelling.     Temp:  [98 °F (36.7 °C)-98.5 °F (36.9 °C)] 98.5 °F (36.9 °C)  Pulse:  [] 113  Resp:  [16-18] 18  SpO2:  [98 %-100 %] 98 %  BP: (143-182)/(84-94) 160/89    NST: baseline 140, mod variability, age appropriate  [place]: no ctx    Poorly controlled type 1 DM  -tolerating diabetic diet  -insulin infusion was discontinued overnight, resume home regimen   -Levemir 10u am & 8 u qhs   -CR 1:5   -ISF 1:50 > 120  -fasting, premeal and 2 hr pp glucose checks throughout the day with prn checks for symptoms  -s/p BMZ dose 1 @ 1425 , anticipate hyperglycemia will occur throughout the day so will monitor closely    2. cHTN with superimposed preeclampsia with severe features  -BP elevations on admit suspected to be due to inability to tolerate PO medications, however patient had severe range BP this am requiring IV antihypertensives despite PO medications and has KOBY with creatinine 1.5 --> 1.9 --> 2.0.   -continue current antihypertensive regimen   -hydralazine 75 mg TID   -labetalol 200 BID   -procardia 60 BID  -repeat pre-e labs, mag, phos in 6 hours. If worsening renal function will proceed with delivery  -if renal function stable will plan to continue to trend lab values and attempt to give BMZ dose 2 at 1400 and continue pregnancy until tomorrow at 24 hour post BMZ  [place], likely with induction starting tomorrow am   -nephrology on board, appreciate recommendations. Agree patient will need lovenox, but given that delivery is imminent will hold lovenox at this time. SCD's in place. Will initiate lovenox postpartum  -will need magnesium prophylaxis intrapartum, given KOBY will need to trend mag levels and give intermittent mag boluses rather than continuous infusion (per [facility] Protocol) to avoid mag toxicity  -position check this am, if remains vertex will plan for induction of labor. Counseled on possible need for  delivery, expressed understanding  -discussed contraception planning with patient yesterday, is considering IUD placement will follow up contraception plan.     Jimenez Escobedo MD  PGY 6  Maternal Fetal Medicine  [facility]

## 2022-09-25 PROBLEM — N88.3 SHORT CERVIX: Status: RESOLVED | Noted: 2022-09-22 | Resolved: 2022-09-25

## 2022-09-25 PROBLEM — E87.5 HYPERKALEMIA: Status: RESOLVED | Noted: 2022-09-23 | Resolved: 2022-09-25

## 2022-09-25 PROBLEM — Z3A.29 29 WEEKS GESTATION OF PREGNANCY: Status: RESOLVED | Noted: 2022-09-22 | Resolved: 2022-09-25

## 2022-09-25 PROBLEM — O16.9 HYPERTENSION AFFECTING PREGNANCY, ANTEPARTUM: Status: RESOLVED | Noted: 2022-05-04 | Resolved: 2022-09-25

## 2022-09-25 PROBLEM — Z98.891 S/P PRIMARY LOW TRANSVERSE C-SECTION: Status: ACTIVE | Noted: 2022-09-25

## 2022-09-25 PROBLEM — I10 CHRONIC HYPERTENSION: Status: ACTIVE | Noted: 2022-09-25

## 2022-09-25 LAB
ALBUMIN SERPL BCP-MCNC: 1.7 G/DL (ref 3.5–5.2)
ALBUMIN SERPL BCP-MCNC: 1.8 G/DL (ref 3.5–5.2)
ALBUMIN SERPL BCP-MCNC: 1.9 G/DL (ref 3.5–5.2)
ALLENS TEST: ABNORMAL
ALP SERPL-CCNC: 72 U/L (ref 55–135)
ALP SERPL-CCNC: 81 U/L (ref 55–135)
ALP SERPL-CCNC: 87 U/L (ref 55–135)
ALT SERPL W/O P-5'-P-CCNC: 26 U/L (ref 10–44)
ALT SERPL W/O P-5'-P-CCNC: 32 U/L (ref 10–44)
ALT SERPL W/O P-5'-P-CCNC: 38 U/L (ref 10–44)
ANION GAP SERPL CALC-SCNC: 10 MMOL/L (ref 8–16)
ANION GAP SERPL CALC-SCNC: 10 MMOL/L (ref 8–16)
ANION GAP SERPL CALC-SCNC: 8 MMOL/L (ref 8–16)
ANION GAP SERPL CALC-SCNC: 8 MMOL/L (ref 8–16)
AST SERPL-CCNC: 28 U/L (ref 10–40)
AST SERPL-CCNC: 49 U/L (ref 10–40)
AST SERPL-CCNC: 52 U/L (ref 10–40)
BASOPHILS # BLD AUTO: 0.01 K/UL (ref 0–0.2)
BASOPHILS # BLD AUTO: 0.02 K/UL (ref 0–0.2)
BASOPHILS # BLD AUTO: 0.02 K/UL (ref 0–0.2)
BASOPHILS # BLD AUTO: 0.03 K/UL (ref 0–0.2)
BASOPHILS # BLD AUTO: 0.03 K/UL (ref 0–0.2)
BASOPHILS NFR BLD: 0.1 % (ref 0–1.9)
BASOPHILS NFR BLD: 0.2 % (ref 0–1.9)
BILIRUB SERPL-MCNC: 0.1 MG/DL (ref 0.1–1)
BILIRUB SERPL-MCNC: 0.2 MG/DL (ref 0.1–1)
BILIRUB SERPL-MCNC: 0.2 MG/DL (ref 0.1–1)
BLD PROD TYP BPU: NORMAL
BLOOD UNIT EXPIRATION DATE: NORMAL
BLOOD UNIT TYPE CODE: 6200
BLOOD UNIT TYPE: NORMAL
BUN SERPL-MCNC: 30 MG/DL (ref 6–20)
BUN SERPL-MCNC: 31 MG/DL (ref 6–20)
BUN SERPL-MCNC: 33 MG/DL (ref 6–20)
BUN SERPL-MCNC: 33 MG/DL (ref 6–20)
CALCIUM SERPL-MCNC: 6.6 MG/DL (ref 8.7–10.5)
CALCIUM SERPL-MCNC: 6.8 MG/DL (ref 8.7–10.5)
CALCIUM SERPL-MCNC: 7.2 MG/DL (ref 8.7–10.5)
CALCIUM SERPL-MCNC: 7.3 MG/DL (ref 8.7–10.5)
CHLORIDE SERPL-SCNC: 106 MMOL/L (ref 95–110)
CHLORIDE SERPL-SCNC: 106 MMOL/L (ref 95–110)
CHLORIDE SERPL-SCNC: 107 MMOL/L (ref 95–110)
CHLORIDE SERPL-SCNC: 108 MMOL/L (ref 95–110)
CO2 SERPL-SCNC: 16 MMOL/L (ref 23–29)
CO2 SERPL-SCNC: 16 MMOL/L (ref 23–29)
CO2 SERPL-SCNC: 17 MMOL/L (ref 23–29)
CO2 SERPL-SCNC: 17 MMOL/L (ref 23–29)
CODING SYSTEM: NORMAL
CREAT SERPL-MCNC: 1.9 MG/DL (ref 0.5–1.4)
CREAT SERPL-MCNC: 1.9 MG/DL (ref 0.5–1.4)
CREAT SERPL-MCNC: 2 MG/DL (ref 0.5–1.4)
CREAT SERPL-MCNC: 2.1 MG/DL (ref 0.5–1.4)
DIFFERENTIAL METHOD: ABNORMAL
DISPENSE STATUS: NORMAL
EOSINOPHIL # BLD AUTO: 0 K/UL (ref 0–0.5)
EOSINOPHIL NFR BLD: 0 % (ref 0–8)
EOSINOPHIL NFR BLD: 0.1 % (ref 0–8)
EOSINOPHIL NFR BLD: 0.1 % (ref 0–8)
ERYTHROCYTE [DISTWIDTH] IN BLOOD BY AUTOMATED COUNT: 12.6 % (ref 11.5–14.5)
ERYTHROCYTE [DISTWIDTH] IN BLOOD BY AUTOMATED COUNT: 12.8 % (ref 11.5–14.5)
ERYTHROCYTE [DISTWIDTH] IN BLOOD BY AUTOMATED COUNT: 12.8 % (ref 11.5–14.5)
ERYTHROCYTE [DISTWIDTH] IN BLOOD BY AUTOMATED COUNT: 13.2 % (ref 11.5–14.5)
ERYTHROCYTE [DISTWIDTH] IN BLOOD BY AUTOMATED COUNT: 13.3 % (ref 11.5–14.5)
EST. GFR  (NO RACE VARIABLE): 32 ML/MIN/1.73 M^2
EST. GFR  (NO RACE VARIABLE): 34 ML/MIN/1.73 M^2
EST. GFR  (NO RACE VARIABLE): 36 ML/MIN/1.73 M^2
EST. GFR  (NO RACE VARIABLE): 36 ML/MIN/1.73 M^2
GLUCOSE SERPL-MCNC: 122 MG/DL (ref 70–110)
GLUCOSE SERPL-MCNC: 125 MG/DL (ref 70–110)
GLUCOSE SERPL-MCNC: 129 MG/DL (ref 70–110)
GLUCOSE SERPL-MCNC: 169 MG/DL (ref 70–110)
HCO3 UR-SCNC: 19 MMOL/L (ref 24–28)
HCO3 UR-SCNC: 21.7 MMOL/L (ref 24–28)
HCT VFR BLD AUTO: 19.5 % (ref 37–48.5)
HCT VFR BLD AUTO: 20.8 % (ref 37–48.5)
HCT VFR BLD AUTO: 20.8 % (ref 37–48.5)
HCT VFR BLD AUTO: 21.7 % (ref 37–48.5)
HCT VFR BLD AUTO: 27.2 % (ref 37–48.5)
HCT VFR BLD CALC: 43 %PCV (ref 36–54)
HCT VFR BLD CALC: 44 %PCV (ref 36–54)
HGB BLD-MCNC: 15 G/DL
HGB BLD-MCNC: 15 G/DL
HGB BLD-MCNC: 6.7 G/DL (ref 12–16)
HGB BLD-MCNC: 7.2 G/DL (ref 12–16)
HGB BLD-MCNC: 7.2 G/DL (ref 12–16)
HGB BLD-MCNC: 7.5 G/DL (ref 12–16)
HGB BLD-MCNC: 9.7 G/DL (ref 12–16)
IMM GRANULOCYTES # BLD AUTO: 0.24 K/UL (ref 0–0.04)
IMM GRANULOCYTES # BLD AUTO: 0.33 K/UL (ref 0–0.04)
IMM GRANULOCYTES # BLD AUTO: 0.38 K/UL (ref 0–0.04)
IMM GRANULOCYTES # BLD AUTO: 0.66 K/UL (ref 0–0.04)
IMM GRANULOCYTES # BLD AUTO: 0.66 K/UL (ref 0–0.04)
IMM GRANULOCYTES NFR BLD AUTO: 1.4 % (ref 0–0.5)
IMM GRANULOCYTES NFR BLD AUTO: 1.6 % (ref 0–0.5)
IMM GRANULOCYTES NFR BLD AUTO: 2.1 % (ref 0–0.5)
IMM GRANULOCYTES NFR BLD AUTO: 3.8 % (ref 0–0.5)
IMM GRANULOCYTES NFR BLD AUTO: 3.8 % (ref 0–0.5)
LYMPHOCYTES # BLD AUTO: 1.2 K/UL (ref 1–4.8)
LYMPHOCYTES # BLD AUTO: 1.2 K/UL (ref 1–4.8)
LYMPHOCYTES # BLD AUTO: 1.4 K/UL (ref 1–4.8)
LYMPHOCYTES # BLD AUTO: 1.4 K/UL (ref 1–4.8)
LYMPHOCYTES # BLD AUTO: 1.6 K/UL (ref 1–4.8)
LYMPHOCYTES NFR BLD: 5.6 % (ref 18–48)
LYMPHOCYTES NFR BLD: 6.7 % (ref 18–48)
LYMPHOCYTES NFR BLD: 8.3 % (ref 18–48)
LYMPHOCYTES NFR BLD: 8.3 % (ref 18–48)
LYMPHOCYTES NFR BLD: 8.6 % (ref 18–48)
MAGNESIUM SERPL-MCNC: 4.5 MG/DL (ref 1.6–2.6)
MAGNESIUM SERPL-MCNC: 4.5 MG/DL (ref 1.6–2.6)
MAGNESIUM SERPL-MCNC: 4.7 MG/DL (ref 1.6–2.6)
MAGNESIUM SERPL-MCNC: 4.8 MG/DL (ref 1.6–2.6)
MCH RBC QN AUTO: 31.9 PG (ref 27–31)
MCH RBC QN AUTO: 31.9 PG (ref 27–31)
MCH RBC QN AUTO: 32 PG (ref 27–31)
MCH RBC QN AUTO: 32.3 PG (ref 27–31)
MCH RBC QN AUTO: 32.3 PG (ref 27–31)
MCHC RBC AUTO-ENTMCNC: 34.4 G/DL (ref 32–36)
MCHC RBC AUTO-ENTMCNC: 34.6 G/DL (ref 32–36)
MCHC RBC AUTO-ENTMCNC: 35.7 G/DL (ref 32–36)
MCV RBC AUTO: 90 FL (ref 82–98)
MCV RBC AUTO: 92 FL (ref 82–98)
MCV RBC AUTO: 93 FL (ref 82–98)
MONOCYTES # BLD AUTO: 0.8 K/UL (ref 0.3–1)
MONOCYTES # BLD AUTO: 0.8 K/UL (ref 0.3–1)
MONOCYTES # BLD AUTO: 1.1 K/UL (ref 0.3–1)
MONOCYTES # BLD AUTO: 1.3 K/UL (ref 0.3–1)
MONOCYTES # BLD AUTO: 1.6 K/UL (ref 0.3–1)
MONOCYTES NFR BLD: 4.5 % (ref 4–15)
MONOCYTES NFR BLD: 4.5 % (ref 4–15)
MONOCYTES NFR BLD: 6.1 % (ref 4–15)
MONOCYTES NFR BLD: 7.4 % (ref 4–15)
MONOCYTES NFR BLD: 7.6 % (ref 4–15)
NEUTROPHILS # BLD AUTO: 14.4 K/UL (ref 1.8–7.7)
NEUTROPHILS # BLD AUTO: 14.4 K/UL (ref 1.8–7.7)
NEUTROPHILS # BLD AUTO: 14.8 K/UL (ref 1.8–7.7)
NEUTROPHILS # BLD AUTO: 15.1 K/UL (ref 1.8–7.7)
NEUTROPHILS # BLD AUTO: 18.1 K/UL (ref 1.8–7.7)
NEUTROPHILS NFR BLD: 83.1 % (ref 38–73)
NEUTROPHILS NFR BLD: 83.3 % (ref 38–73)
NEUTROPHILS NFR BLD: 83.3 % (ref 38–73)
NEUTROPHILS NFR BLD: 84 % (ref 38–73)
NEUTROPHILS NFR BLD: 85.2 % (ref 38–73)
NRBC BLD-RTO: 0 /100 WBC
NUM UNITS TRANS PACKED RBC: NORMAL
NUM UNITS TRANS PACKED RBC: NORMAL
PCO2 BLDA: 38.5 MMHG (ref 35–45)
PCO2 BLDA: 47.4 MMHG (ref 35–45)
PH SMN: 7.27 [PH] (ref 7.35–7.45)
PH SMN: 7.3 [PH] (ref 7.35–7.45)
PLATELET # BLD AUTO: 181 K/UL (ref 150–450)
PLATELET # BLD AUTO: 185 K/UL (ref 150–450)
PLATELET # BLD AUTO: 247 K/UL (ref 150–450)
PLATELET # BLD AUTO: 249 K/UL (ref 150–450)
PLATELET # BLD AUTO: 249 K/UL (ref 150–450)
PMV BLD AUTO: 10 FL (ref 9.2–12.9)
PMV BLD AUTO: 10.1 FL (ref 9.2–12.9)
PMV BLD AUTO: 10.5 FL (ref 9.2–12.9)
PO2 BLDA: 16 MMHG (ref 80–100)
PO2 BLDA: 22 MMHG (ref 80–100)
POC BE: -5 MMOL/L
POC BE: -7 MMOL/L
POC IONIZED CALCIUM: 1.32 MMOL/L (ref 1.06–1.42)
POC IONIZED CALCIUM: 1.33 MMOL/L (ref 1.06–1.42)
POC SATURATED O2: 17 % (ref 95–100)
POC SATURATED O2: 31 % (ref 95–100)
POC TCO2: 20 MMOL/L (ref 23–27)
POC TCO2: 23 MMOL/L (ref 23–27)
POCT GLUCOSE: 100 MG/DL (ref 70–110)
POCT GLUCOSE: 100 MG/DL (ref 70–110)
POCT GLUCOSE: 101 MG/DL (ref 70–110)
POCT GLUCOSE: 101 MG/DL (ref 70–110)
POCT GLUCOSE: 102 MG/DL (ref 70–110)
POCT GLUCOSE: 102 MG/DL (ref 70–110)
POCT GLUCOSE: 103 MG/DL (ref 70–110)
POCT GLUCOSE: 107 MG/DL (ref 70–110)
POCT GLUCOSE: 109 MG/DL (ref 70–110)
POCT GLUCOSE: 109 MG/DL (ref 70–110)
POCT GLUCOSE: 111 MG/DL (ref 70–110)
POCT GLUCOSE: 113 MG/DL (ref 70–110)
POCT GLUCOSE: 119 MG/DL (ref 70–110)
POCT GLUCOSE: 124 MG/DL (ref 70–110)
POCT GLUCOSE: 130 MG/DL (ref 70–110)
POCT GLUCOSE: 130 MG/DL (ref 70–110)
POCT GLUCOSE: 140 MG/DL (ref 70–110)
POCT GLUCOSE: 143 MG/DL (ref 70–110)
POCT GLUCOSE: 146 MG/DL (ref 70–110)
POCT GLUCOSE: 174 MG/DL (ref 70–110)
POCT GLUCOSE: 188 MG/DL (ref 70–110)
POCT GLUCOSE: 189 MG/DL (ref 70–110)
POCT GLUCOSE: 192 MG/DL (ref 70–110)
POCT GLUCOSE: 194 MG/DL (ref 70–110)
POCT GLUCOSE: 208 MG/DL (ref 70–110)
POCT GLUCOSE: 82 MG/DL (ref 70–110)
POCT GLUCOSE: 88 MG/DL (ref 70–110)
POCT GLUCOSE: 90 MG/DL (ref 70–110)
POCT GLUCOSE: 90 MG/DL (ref 70–110)
POCT GLUCOSE: 92 MG/DL (ref 70–110)
POCT GLUCOSE: 95 MG/DL (ref 70–110)
POCT GLUCOSE: 97 MG/DL (ref 70–110)
POCT GLUCOSE: 97 MG/DL (ref 70–110)
POCT GLUCOSE: 98 MG/DL (ref 70–110)
POTASSIUM BLD-SCNC: 5.2 MMOL/L (ref 3.5–5.1)
POTASSIUM BLD-SCNC: 5.6 MMOL/L (ref 3.5–5.1)
POTASSIUM SERPL-SCNC: 4.2 MMOL/L (ref 3.5–5.1)
POTASSIUM SERPL-SCNC: 4.7 MMOL/L (ref 3.5–5.1)
POTASSIUM SERPL-SCNC: 4.8 MMOL/L (ref 3.5–5.1)
POTASSIUM SERPL-SCNC: 4.8 MMOL/L (ref 3.5–5.1)
PROT SERPL-MCNC: 4.3 G/DL (ref 6–8.4)
PROT SERPL-MCNC: 4.9 G/DL (ref 6–8.4)
PROT SERPL-MCNC: 5.6 G/DL (ref 6–8.4)
RBC # BLD AUTO: 2.1 M/UL (ref 4–5.4)
RBC # BLD AUTO: 2.23 M/UL (ref 4–5.4)
RBC # BLD AUTO: 2.23 M/UL (ref 4–5.4)
RBC # BLD AUTO: 2.35 M/UL (ref 4–5.4)
RBC # BLD AUTO: 3.03 M/UL (ref 4–5.4)
SAMPLE: ABNORMAL
SAMPLE: ABNORMAL
SITE: ABNORMAL
SODIUM BLD-SCNC: 132 MMOL/L (ref 136–145)
SODIUM BLD-SCNC: 132 MMOL/L (ref 136–145)
SODIUM SERPL-SCNC: 130 MMOL/L (ref 136–145)
SODIUM SERPL-SCNC: 132 MMOL/L (ref 136–145)
SODIUM SERPL-SCNC: 133 MMOL/L (ref 136–145)
SODIUM SERPL-SCNC: 134 MMOL/L (ref 136–145)
TRANS ERYTHROCYTES VOL PATIENT: NORMAL ML
WBC # BLD AUTO: 17.34 K/UL (ref 3.9–12.7)
WBC # BLD AUTO: 17.34 K/UL (ref 3.9–12.7)
WBC # BLD AUTO: 17.59 K/UL (ref 3.9–12.7)
WBC # BLD AUTO: 18.11 K/UL (ref 3.9–12.7)
WBC # BLD AUTO: 21.24 K/UL (ref 3.9–12.7)

## 2022-09-25 PROCEDURE — 82803 BLOOD GASES ANY COMBINATION: CPT

## 2022-09-25 PROCEDURE — P9045 ALBUMIN (HUMAN), 5%, 250 ML: HCPCS | Mod: JG,UD | Performed by: STUDENT IN AN ORGANIZED HEALTH CARE EDUCATION/TRAINING PROGRAM

## 2022-09-25 PROCEDURE — P9016 RBC LEUKOCYTES REDUCED: HCPCS | Performed by: STUDENT IN AN ORGANIZED HEALTH CARE EDUCATION/TRAINING PROGRAM

## 2022-09-25 PROCEDURE — 88307 TISSUE EXAM BY PATHOLOGIST: CPT | Mod: 26,,, | Performed by: PATHOLOGY

## 2022-09-25 PROCEDURE — 63600175 PHARM REV CODE 636 W HCPCS: Performed by: STUDENT IN AN ORGANIZED HEALTH CARE EDUCATION/TRAINING PROGRAM

## 2022-09-25 PROCEDURE — 25000003 PHARM REV CODE 250

## 2022-09-25 PROCEDURE — 85025 COMPLETE CBC W/AUTO DIFF WBC: CPT | Mod: 91

## 2022-09-25 PROCEDURE — 25000003 PHARM REV CODE 250: Performed by: STUDENT IN AN ORGANIZED HEALTH CARE EDUCATION/TRAINING PROGRAM

## 2022-09-25 PROCEDURE — 71000039 HC RECOVERY, EACH ADD'L HOUR: Performed by: OBSTETRICS & GYNECOLOGY

## 2022-09-25 PROCEDURE — P9021 RED BLOOD CELLS UNIT: HCPCS

## 2022-09-25 PROCEDURE — 63600175 PHARM REV CODE 636 W HCPCS

## 2022-09-25 PROCEDURE — 59515 PR CESAREAN DELIVERY+POSTPARTUM CARE: ICD-10-PCS | Mod: TH,,, | Performed by: OBSTETRICS & GYNECOLOGY

## 2022-09-25 PROCEDURE — 80048 BASIC METABOLIC PNL TOTAL CA: CPT | Mod: XB | Performed by: STUDENT IN AN ORGANIZED HEALTH CARE EDUCATION/TRAINING PROGRAM

## 2022-09-25 PROCEDURE — 25000003 PHARM REV CODE 250: Performed by: NURSE PRACTITIONER

## 2022-09-25 PROCEDURE — 88304 PR  SURG PATH,LEVEL III: ICD-10-PCS | Mod: 26,,, | Performed by: PATHOLOGY

## 2022-09-25 PROCEDURE — 88307 PR  SURG PATH,LEVEL V: ICD-10-PCS | Mod: 26,,, | Performed by: PATHOLOGY

## 2022-09-25 PROCEDURE — 80053 COMPREHEN METABOLIC PANEL: CPT | Mod: 91

## 2022-09-25 PROCEDURE — 37000008 HC ANESTHESIA 1ST 15 MINUTES: Performed by: OBSTETRICS & GYNECOLOGY

## 2022-09-25 PROCEDURE — 59515 CESAREAN DELIVERY: CPT | Mod: TH,,, | Performed by: OBSTETRICS & GYNECOLOGY

## 2022-09-25 PROCEDURE — 83735 ASSAY OF MAGNESIUM: CPT | Mod: 91 | Performed by: STUDENT IN AN ORGANIZED HEALTH CARE EDUCATION/TRAINING PROGRAM

## 2022-09-25 PROCEDURE — 27000181 HC CABLE, IUPC

## 2022-09-25 PROCEDURE — 71000033 HC RECOVERY, INTIAL HOUR: Performed by: OBSTETRICS & GYNECOLOGY

## 2022-09-25 PROCEDURE — S5010 5% DEXTROSE AND 0.45% SALINE: HCPCS | Performed by: OBSTETRICS & GYNECOLOGY

## 2022-09-25 PROCEDURE — 25000003 PHARM REV CODE 250: Performed by: ANESTHESIOLOGY

## 2022-09-25 PROCEDURE — 36004724 HC OB OR TIME LEV III - 1ST 15 MIN: Performed by: OBSTETRICS & GYNECOLOGY

## 2022-09-25 PROCEDURE — 88304 TISSUE EXAM BY PATHOLOGIST: CPT | Performed by: PATHOLOGY

## 2022-09-25 PROCEDURE — 11000001 HC ACUTE MED/SURG PRIVATE ROOM

## 2022-09-25 PROCEDURE — 36004725 HC OB OR TIME LEV III - EA ADD 15 MIN: Performed by: OBSTETRICS & GYNECOLOGY

## 2022-09-25 PROCEDURE — 63600175 PHARM REV CODE 636 W HCPCS: Performed by: ANESTHESIOLOGY

## 2022-09-25 PROCEDURE — 85025 COMPLETE CBC W/AUTO DIFF WBC: CPT | Mod: 91 | Performed by: STUDENT IN AN ORGANIZED HEALTH CARE EDUCATION/TRAINING PROGRAM

## 2022-09-25 PROCEDURE — 88307 TISSUE EXAM BY PATHOLOGIST: CPT | Performed by: PATHOLOGY

## 2022-09-25 PROCEDURE — 36416 COLLJ CAPILLARY BLOOD SPEC: CPT

## 2022-09-25 PROCEDURE — 80053 COMPREHEN METABOLIC PANEL: CPT | Mod: 91 | Performed by: STUDENT IN AN ORGANIZED HEALTH CARE EDUCATION/TRAINING PROGRAM

## 2022-09-25 PROCEDURE — 88304 TISSUE EXAM BY PATHOLOGIST: CPT | Mod: 26,,, | Performed by: PATHOLOGY

## 2022-09-25 PROCEDURE — 25000003 PHARM REV CODE 250: Performed by: OBSTETRICS & GYNECOLOGY

## 2022-09-25 PROCEDURE — 83735 ASSAY OF MAGNESIUM: CPT | Performed by: OBSTETRICS & GYNECOLOGY

## 2022-09-25 PROCEDURE — 99900035 HC TECH TIME PER 15 MIN (STAT)

## 2022-09-25 PROCEDURE — 85025 COMPLETE CBC W/AUTO DIFF WBC: CPT

## 2022-09-25 PROCEDURE — 37000009 HC ANESTHESIA EA ADD 15 MINS: Performed by: OBSTETRICS & GYNECOLOGY

## 2022-09-25 PROCEDURE — 80053 COMPREHEN METABOLIC PANEL: CPT | Performed by: STUDENT IN AN ORGANIZED HEALTH CARE EDUCATION/TRAINING PROGRAM

## 2022-09-25 PROCEDURE — 83735 ASSAY OF MAGNESIUM: CPT | Mod: 91

## 2022-09-25 RX ORDER — BUPIVACAINE HYDROCHLORIDE 2.5 MG/ML
INJECTION, SOLUTION EPIDURAL; INFILTRATION; INTRACAUDAL
Status: DISPENSED
Start: 2022-09-25 | End: 2022-09-25

## 2022-09-25 RX ORDER — LORAZEPAM 2 MG/ML
INJECTION INTRAMUSCULAR
Status: COMPLETED
Start: 2022-09-25 | End: 2022-09-25

## 2022-09-25 RX ORDER — HYDROCODONE BITARTRATE AND ACETAMINOPHEN 500; 5 MG/1; MG/1
TABLET ORAL
Status: DISCONTINUED | OUTPATIENT
Start: 2022-09-25 | End: 2022-10-05 | Stop reason: HOSPADM

## 2022-09-25 RX ORDER — HYDRALAZINE HYDROCHLORIDE 20 MG/ML
5 INJECTION INTRAMUSCULAR; INTRAVENOUS ONCE
Status: COMPLETED | OUTPATIENT
Start: 2022-09-25 | End: 2022-09-25

## 2022-09-25 RX ORDER — ONDANSETRON HYDROCHLORIDE 2 MG/ML
INJECTION, SOLUTION INTRAMUSCULAR; INTRAVENOUS
Status: DISCONTINUED | OUTPATIENT
Start: 2022-09-25 | End: 2022-09-25

## 2022-09-25 RX ORDER — NIFEDIPINE 10 MG/1
10 CAPSULE ORAL ONCE
Status: COMPLETED | OUTPATIENT
Start: 2022-09-25 | End: 2022-09-25

## 2022-09-25 RX ORDER — ONDANSETRON 8 MG/1
8 TABLET, ORALLY DISINTEGRATING ORAL EVERY 8 HOURS PRN
Status: DISCONTINUED | OUTPATIENT
Start: 2022-09-25 | End: 2022-10-05 | Stop reason: HOSPADM

## 2022-09-25 RX ORDER — MAGNESIUM SULFATE HEPTAHYDRATE 40 MG/ML
2 INJECTION, SOLUTION INTRAVENOUS ONCE
Status: COMPLETED | OUTPATIENT
Start: 2022-09-25 | End: 2022-09-25

## 2022-09-25 RX ORDER — LABETALOL HYDROCHLORIDE 5 MG/ML
40 INJECTION, SOLUTION INTRAVENOUS ONCE
Status: COMPLETED | OUTPATIENT
Start: 2022-09-25 | End: 2022-09-25

## 2022-09-25 RX ORDER — FENTANYL CITRATE 50 UG/ML
INJECTION, SOLUTION INTRAMUSCULAR; INTRAVENOUS
Status: DISCONTINUED | OUTPATIENT
Start: 2022-09-25 | End: 2022-09-25

## 2022-09-25 RX ORDER — HYDROCODONE BITARTRATE AND ACETAMINOPHEN 500; 5 MG/1; MG/1
TABLET ORAL
Status: DISCONTINUED | OUTPATIENT
Start: 2022-09-25 | End: 2022-09-25

## 2022-09-25 RX ORDER — CEFAZOLIN SODIUM 1 G/3ML
INJECTION, POWDER, FOR SOLUTION INTRAMUSCULAR; INTRAVENOUS
Status: DISCONTINUED | OUTPATIENT
Start: 2022-09-25 | End: 2022-09-25

## 2022-09-25 RX ORDER — DEXTROSE MONOHYDRATE AND SODIUM CHLORIDE 5; .9 G/100ML; G/100ML
INJECTION, SOLUTION INTRAVENOUS CONTINUOUS
Status: DISCONTINUED | OUTPATIENT
Start: 2022-09-25 | End: 2022-09-25

## 2022-09-25 RX ORDER — LABETALOL HYDROCHLORIDE 5 MG/ML
INJECTION, SOLUTION INTRAVENOUS
Status: DISCONTINUED | OUTPATIENT
Start: 2022-09-25 | End: 2022-09-25

## 2022-09-25 RX ORDER — INSULIN ASPART 100 [IU]/ML
2 INJECTION, SOLUTION INTRAVENOUS; SUBCUTANEOUS ONCE
Status: COMPLETED | OUTPATIENT
Start: 2022-09-25 | End: 2022-09-25

## 2022-09-25 RX ORDER — SODIUM CITRATE AND CITRIC ACID MONOHYDRATE 334; 500 MG/5ML; MG/5ML
30 SOLUTION ORAL ONCE
Status: COMPLETED | OUTPATIENT
Start: 2022-09-25 | End: 2022-09-25

## 2022-09-25 RX ORDER — GLUCAGON 1 MG
1 KIT INJECTION
Status: DISCONTINUED | OUTPATIENT
Start: 2022-09-25 | End: 2022-10-05 | Stop reason: HOSPADM

## 2022-09-25 RX ORDER — SODIUM CHLORIDE 9 MG/ML
INJECTION, SOLUTION INTRAVENOUS
Status: DISCONTINUED | OUTPATIENT
Start: 2022-09-25 | End: 2022-09-25

## 2022-09-25 RX ORDER — BUPIVACAINE HYDROCHLORIDE 2.5 MG/ML
INJECTION, SOLUTION INFILTRATION; PERINEURAL
Status: DISCONTINUED | OUTPATIENT
Start: 2022-09-25 | End: 2022-09-25

## 2022-09-25 RX ORDER — LABETALOL HYDROCHLORIDE 5 MG/ML
80 INJECTION, SOLUTION INTRAVENOUS ONCE
Status: COMPLETED | OUTPATIENT
Start: 2022-09-25 | End: 2022-09-25

## 2022-09-25 RX ORDER — OXYCODONE HYDROCHLORIDE 5 MG/1
5 TABLET ORAL EVERY 4 HOURS PRN
Status: DISCONTINUED | OUTPATIENT
Start: 2022-09-25 | End: 2022-09-25

## 2022-09-25 RX ORDER — FENTANYL CITRATE 50 UG/ML
INJECTION, SOLUTION INTRAMUSCULAR; INTRAVENOUS
Status: COMPLETED
Start: 2022-09-25 | End: 2022-09-25

## 2022-09-25 RX ORDER — MAGNESIUM SULFATE HEPTAHYDRATE 40 MG/ML
2 INJECTION, SOLUTION INTRAVENOUS ONCE
Status: DISCONTINUED | OUTPATIENT
Start: 2022-09-25 | End: 2022-09-25

## 2022-09-25 RX ORDER — OXYTOCIN/RINGER'S LACTATE 30/500 ML
95 PLASTIC BAG, INJECTION (ML) INTRAVENOUS ONCE
Status: COMPLETED | OUTPATIENT
Start: 2022-09-25 | End: 2022-09-25

## 2022-09-25 RX ORDER — MORPHINE SULFATE 0.5 MG/ML
INJECTION, SOLUTION EPIDURAL; INTRATHECAL; INTRAVENOUS
Status: DISCONTINUED | OUTPATIENT
Start: 2022-09-25 | End: 2022-09-25

## 2022-09-25 RX ORDER — MISOPROSTOL 200 UG/1
TABLET ORAL
Status: DISCONTINUED
Start: 2022-09-25 | End: 2022-09-25 | Stop reason: WASHOUT

## 2022-09-25 RX ORDER — HYDRALAZINE HYDROCHLORIDE 20 MG/ML
10 INJECTION INTRAMUSCULAR; INTRAVENOUS ONCE
Status: COMPLETED | OUTPATIENT
Start: 2022-09-25 | End: 2022-09-25

## 2022-09-25 RX ORDER — DIPHENOXYLATE HYDROCHLORIDE AND ATROPINE SULFATE 2.5; .025 MG/1; MG/1
1 TABLET ORAL 4 TIMES DAILY PRN
Status: DISCONTINUED | OUTPATIENT
Start: 2022-09-25 | End: 2022-10-05 | Stop reason: HOSPADM

## 2022-09-25 RX ORDER — PROMETHAZINE HYDROCHLORIDE 25 MG/ML
INJECTION, SOLUTION INTRAMUSCULAR; INTRAVENOUS
Status: DISCONTINUED | OUTPATIENT
Start: 2022-09-25 | End: 2022-09-25

## 2022-09-25 RX ORDER — ENOXAPARIN SODIUM 100 MG/ML
40 INJECTION SUBCUTANEOUS EVERY 24 HOURS
Status: DISCONTINUED | OUTPATIENT
Start: 2022-09-26 | End: 2022-09-26

## 2022-09-25 RX ORDER — PROCHLORPERAZINE EDISYLATE 5 MG/ML
5 INJECTION INTRAMUSCULAR; INTRAVENOUS EVERY 6 HOURS PRN
Status: DISCONTINUED | OUTPATIENT
Start: 2022-09-25 | End: 2022-10-05 | Stop reason: HOSPADM

## 2022-09-25 RX ORDER — DIPHENHYDRAMINE HCL 25 MG
25 CAPSULE ORAL EVERY 4 HOURS PRN
Status: DISCONTINUED | OUTPATIENT
Start: 2022-09-25 | End: 2022-10-05 | Stop reason: HOSPADM

## 2022-09-25 RX ORDER — IBUPROFEN 200 MG
16 TABLET ORAL
Status: DISCONTINUED | OUTPATIENT
Start: 2022-09-25 | End: 2022-10-05 | Stop reason: HOSPADM

## 2022-09-25 RX ORDER — OXYCODONE HYDROCHLORIDE 5 MG/1
10 TABLET ORAL EVERY 4 HOURS PRN
Status: DISCONTINUED | OUTPATIENT
Start: 2022-09-25 | End: 2022-09-26

## 2022-09-25 RX ORDER — ACETAMINOPHEN 10 MG/ML
INJECTION, SOLUTION INTRAVENOUS
Status: DISCONTINUED | OUTPATIENT
Start: 2022-09-25 | End: 2022-09-25

## 2022-09-25 RX ORDER — HYDROXYZINE HYDROCHLORIDE 25 MG/1
50 TABLET, FILM COATED ORAL 3 TIMES DAILY PRN
Status: DISCONTINUED | OUTPATIENT
Start: 2022-09-25 | End: 2022-09-25

## 2022-09-25 RX ORDER — LABETALOL 200 MG/1
200 TABLET, FILM COATED ORAL ONCE
Status: COMPLETED | OUTPATIENT
Start: 2022-09-25 | End: 2022-09-25

## 2022-09-25 RX ORDER — ALBUMIN HUMAN 50 G/1000ML
SOLUTION INTRAVENOUS CONTINUOUS PRN
Status: DISCONTINUED | OUTPATIENT
Start: 2022-09-25 | End: 2022-09-25

## 2022-09-25 RX ORDER — AMOXICILLIN 250 MG
1 CAPSULE ORAL NIGHTLY PRN
Status: DISCONTINUED | OUTPATIENT
Start: 2022-09-25 | End: 2022-10-05 | Stop reason: HOSPADM

## 2022-09-25 RX ORDER — BISACODYL 10 MG
10 SUPPOSITORY, RECTAL RECTAL ONCE AS NEEDED
Status: DISCONTINUED | OUTPATIENT
Start: 2022-09-25 | End: 2022-10-05 | Stop reason: HOSPADM

## 2022-09-25 RX ORDER — DEXTROSE MONOHYDRATE AND SODIUM CHLORIDE 5; .45 G/100ML; G/100ML
INJECTION, SOLUTION INTRAVENOUS CONTINUOUS
Status: DISCONTINUED | OUTPATIENT
Start: 2022-09-25 | End: 2022-09-25

## 2022-09-25 RX ORDER — NALBUPHINE HYDROCHLORIDE 20 MG/ML
2.5 INJECTION, SOLUTION INTRAMUSCULAR; INTRAVENOUS; SUBCUTANEOUS
Status: DISCONTINUED | OUTPATIENT
Start: 2022-09-25 | End: 2022-10-05 | Stop reason: HOSPADM

## 2022-09-25 RX ORDER — IBUPROFEN 200 MG
24 TABLET ORAL
Status: DISCONTINUED | OUTPATIENT
Start: 2022-09-25 | End: 2022-10-05 | Stop reason: HOSPADM

## 2022-09-25 RX ORDER — OXYCODONE HYDROCHLORIDE 5 MG/1
5 TABLET ORAL EVERY 4 HOURS PRN
Status: DISCONTINUED | OUTPATIENT
Start: 2022-09-25 | End: 2022-09-26

## 2022-09-25 RX ORDER — ADHESIVE BANDAGE
30 BANDAGE TOPICAL 2 TIMES DAILY PRN
Status: DISCONTINUED | OUTPATIENT
Start: 2022-09-26 | End: 2022-10-05 | Stop reason: HOSPADM

## 2022-09-25 RX ORDER — FENTANYL/BUPIVACAINE/NS/PF 2MCG/ML-.1
PLASTIC BAG, INJECTION (ML) INJECTION CONTINUOUS
Status: DISCONTINUED | OUTPATIENT
Start: 2022-09-25 | End: 2022-09-25

## 2022-09-25 RX ORDER — HYDRALAZINE HYDROCHLORIDE 20 MG/ML
INJECTION INTRAMUSCULAR; INTRAVENOUS
Status: DISCONTINUED | OUTPATIENT
Start: 2022-09-25 | End: 2022-09-25

## 2022-09-25 RX ORDER — FAMOTIDINE 10 MG/ML
20 INJECTION INTRAVENOUS ONCE
Status: COMPLETED | OUTPATIENT
Start: 2022-09-25 | End: 2022-09-25

## 2022-09-25 RX ORDER — ACETAMINOPHEN 325 MG/1
650 TABLET ORAL EVERY 6 HOURS
Status: DISCONTINUED | OUTPATIENT
Start: 2022-09-25 | End: 2022-09-26

## 2022-09-25 RX ORDER — PRENATAL WITH FERROUS FUM AND FOLIC ACID 3080; 920; 120; 400; 22; 1.84; 3; 20; 10; 1; 12; 200; 27; 25; 2 [IU]/1; [IU]/1; MG/1; [IU]/1; MG/1; MG/1; MG/1; MG/1; MG/1; MG/1; UG/1; MG/1; MG/1; MG/1; MG/1
1 TABLET ORAL DAILY
Status: DISCONTINUED | OUTPATIENT
Start: 2022-09-26 | End: 2022-10-05 | Stop reason: HOSPADM

## 2022-09-25 RX ORDER — SIMETHICONE 80 MG
1 TABLET,CHEWABLE ORAL EVERY 6 HOURS PRN
Status: DISCONTINUED | OUTPATIENT
Start: 2022-09-25 | End: 2022-10-05 | Stop reason: HOSPADM

## 2022-09-25 RX ORDER — SODIUM CHLORIDE 0.9 % (FLUSH) 0.9 %
10 SYRINGE (ML) INJECTION
Status: DISCONTINUED | OUTPATIENT
Start: 2022-09-25 | End: 2022-10-05 | Stop reason: HOSPADM

## 2022-09-25 RX ORDER — LIDOCAINE HCL/EPINEPHRINE/PF 2%-1:200K
VIAL (ML) INJECTION
Status: DISCONTINUED | OUTPATIENT
Start: 2022-09-25 | End: 2022-09-25

## 2022-09-25 RX ORDER — LABETALOL 200 MG/1
400 TABLET, FILM COATED ORAL EVERY 12 HOURS
Status: DISCONTINUED | OUTPATIENT
Start: 2022-09-26 | End: 2022-09-26

## 2022-09-25 RX ORDER — DOCUSATE SODIUM 100 MG/1
200 CAPSULE, LIQUID FILLED ORAL 2 TIMES DAILY
Status: DISCONTINUED | OUTPATIENT
Start: 2022-09-25 | End: 2022-10-05 | Stop reason: HOSPADM

## 2022-09-25 RX ORDER — LABETALOL HYDROCHLORIDE 5 MG/ML
20 INJECTION, SOLUTION INTRAVENOUS ONCE
Status: COMPLETED | OUTPATIENT
Start: 2022-09-25 | End: 2022-09-25

## 2022-09-25 RX ORDER — DIPHENHYDRAMINE HYDROCHLORIDE 50 MG/ML
25 INJECTION INTRAMUSCULAR; INTRAVENOUS ONCE AS NEEDED
Status: COMPLETED | OUTPATIENT
Start: 2022-09-25 | End: 2022-09-25

## 2022-09-25 RX ADMIN — LABETALOL HYDROCHLORIDE 40 MG: 5 INJECTION, SOLUTION INTRAVENOUS at 07:09

## 2022-09-25 RX ADMIN — SODIUM CHLORIDE 300 ML: 0.9 INJECTION, SOLUTION INTRAVENOUS at 09:09

## 2022-09-25 RX ADMIN — ACETAMINOPHEN 1000 MG: 1000 INJECTION, SOLUTION INTRAVENOUS at 03:09

## 2022-09-25 RX ADMIN — INSULIN DETEMIR 6 UNITS: 100 INJECTION, SOLUTION SUBCUTANEOUS at 10:09

## 2022-09-25 RX ADMIN — DEXTROSE 3 MILLION UNITS: 50 INJECTION, SOLUTION INTRAVENOUS at 01:09

## 2022-09-25 RX ADMIN — LIDOCAINE HYDROCHLORIDE,EPINEPHRINE BITARTRATE 5 ML: 20; .005 INJECTION, SOLUTION EPIDURAL; INFILTRATION; INTRACAUDAL; PERINEURAL at 02:09

## 2022-09-25 RX ADMIN — METOCLOPRAMIDE 10 MG: 10 TABLET ORAL at 09:09

## 2022-09-25 RX ADMIN — PROMETHAZINE HYDROCHLORIDE 6.25 MG: 25 INJECTION INTRAMUSCULAR; INTRAVENOUS at 03:09

## 2022-09-25 RX ADMIN — SODIUM BICARBONATE 650 MG TABLET 1300 MG: at 09:09

## 2022-09-25 RX ADMIN — LABETALOL HYDROCHLORIDE 80 MG: 5 INJECTION INTRAVENOUS at 07:09

## 2022-09-25 RX ADMIN — LABETALOL HYDROCHLORIDE 7 MG: 5 INJECTION INTRAVENOUS at 03:09

## 2022-09-25 RX ADMIN — HYDRALAZINE HYDROCHLORIDE 75 MG: 25 TABLET, FILM COATED ORAL at 06:09

## 2022-09-25 RX ADMIN — DIPHENOXYLATE HYDROCHLORIDE AND ATROPINE SULFATE 1 TABLET: 2.5; .025 TABLET ORAL at 05:09

## 2022-09-25 RX ADMIN — DIPHENHYDRAMINE HYDROCHLORIDE 25 MG: 50 INJECTION, SOLUTION INTRAMUSCULAR; INTRAVENOUS at 02:09

## 2022-09-25 RX ADMIN — HYDRALAZINE HYDROCHLORIDE 5 MG: 20 INJECTION, SOLUTION INTRAMUSCULAR; INTRAVENOUS at 09:09

## 2022-09-25 RX ADMIN — INSULIN HUMAN 1.5 UNITS/HR: 1 INJECTION, SOLUTION INTRAVENOUS at 04:09

## 2022-09-25 RX ADMIN — DEXTROSE AND SODIUM CHLORIDE: 5; .45 INJECTION, SOLUTION INTRAVENOUS at 11:09

## 2022-09-25 RX ADMIN — INSULIN ASPART 2 UNITS: 100 INJECTION, SOLUTION INTRAVENOUS; SUBCUTANEOUS at 07:09

## 2022-09-25 RX ADMIN — MAGNESIUM SULFATE 2 G: 2 INJECTION INTRAVENOUS at 06:09

## 2022-09-25 RX ADMIN — DIPHENHYDRAMINE HYDROCHLORIDE 25 MG: 25 CAPSULE ORAL at 09:09

## 2022-09-25 RX ADMIN — MAGNESIUM SULFATE 2 G: 2 INJECTION INTRAVENOUS at 10:09

## 2022-09-25 RX ADMIN — INSULIN HUMAN 1 UNITS/HR: 1 INJECTION, SOLUTION INTRAVENOUS at 08:09

## 2022-09-25 RX ADMIN — NALBUPHINE HYDROCHLORIDE 2.6 MG: 20 INJECTION, SOLUTION INTRAMUSCULAR; INTRAVENOUS; SUBCUTANEOUS at 08:09

## 2022-09-25 RX ADMIN — BUPIVACAINE HYDROCHLORIDE 5 ML: 2.5 INJECTION, SOLUTION INFILTRATION; PERINEURAL at 03:09

## 2022-09-25 RX ADMIN — NIFEDIPINE 60 MG: 30 TABLET, FILM COATED, EXTENDED RELEASE ORAL at 09:09

## 2022-09-25 RX ADMIN — LABETALOL HYDROCHLORIDE 20 MG: 5 INJECTION, SOLUTION INTRAVENOUS at 06:09

## 2022-09-25 RX ADMIN — HYDRALAZINE HYDROCHLORIDE 10 MG: 20 INJECTION, SOLUTION INTRAMUSCULAR; INTRAVENOUS at 11:09

## 2022-09-25 RX ADMIN — METOCLOPRAMIDE 10 MG: 10 TABLET ORAL at 01:09

## 2022-09-25 RX ADMIN — DOCUSATE SODIUM 200 MG: 100 CAPSULE, LIQUID FILLED ORAL at 09:09

## 2022-09-25 RX ADMIN — OXYCODONE 10 MG: 5 TABLET ORAL at 09:09

## 2022-09-25 RX ADMIN — LABETALOL HYDROCHLORIDE 10 MG: 5 INJECTION INTRAVENOUS at 03:09

## 2022-09-25 RX ADMIN — LABETALOL HYDROCHLORIDE 200 MG: 200 TABLET, FILM COATED ORAL at 09:09

## 2022-09-25 RX ADMIN — CYANOCOBALAMIN TAB 1000 MCG 1000 MCG: 1000 TAB at 09:09

## 2022-09-25 RX ADMIN — PROMETHAZINE HYDROCHLORIDE 6.25 MG: 25 INJECTION INTRAMUSCULAR; INTRAVENOUS at 04:09

## 2022-09-25 RX ADMIN — CEFAZOLIN 2 G: 1 INJECTION, POWDER, FOR SOLUTION INTRAMUSCULAR; INTRAVENOUS at 03:09

## 2022-09-25 RX ADMIN — LABETALOL HYDROCHLORIDE 5 MG: 5 INJECTION INTRAVENOUS at 03:09

## 2022-09-25 RX ADMIN — DEXTROSE 3 MILLION UNITS: 50 INJECTION, SOLUTION INTRAVENOUS at 03:09

## 2022-09-25 RX ADMIN — OXYCODONE 10 MG: 5 TABLET ORAL at 06:09

## 2022-09-25 RX ADMIN — AZITHROMYCIN MONOHYDRATE 500 MG: 500 INJECTION, POWDER, LYOPHILIZED, FOR SOLUTION INTRAVENOUS at 02:09

## 2022-09-25 RX ADMIN — SODIUM CITRATE AND CITRIC ACID MONOHYDRATE 30 ML: 500; 334 SOLUTION ORAL at 02:09

## 2022-09-25 RX ADMIN — FENTANYL CITRATE 100 MCG: 50 INJECTION, SOLUTION INTRAMUSCULAR; INTRAVENOUS at 02:09

## 2022-09-25 RX ADMIN — ONDANSETRON 4 MG: 2 INJECTION, SOLUTION INTRAMUSCULAR; INTRAVENOUS at 02:09

## 2022-09-25 RX ADMIN — LABETALOL HYDROCHLORIDE 20 MG: 5 INJECTION INTRAVENOUS at 03:09

## 2022-09-25 RX ADMIN — MAGNESIUM SULFATE 2 G: 2 INJECTION INTRAVENOUS at 05:09

## 2022-09-25 RX ADMIN — LORAZEPAM 0.5 MG: 2 INJECTION INTRAMUSCULAR; INTRAVENOUS at 10:09

## 2022-09-25 RX ADMIN — FAMOTIDINE 20 MG: 10 INJECTION INTRAVENOUS at 02:09

## 2022-09-25 RX ADMIN — Medication 1.5 MG: at 03:09

## 2022-09-25 RX ADMIN — DEXTROSE 3 MILLION UNITS: 50 INJECTION, SOLUTION INTRAVENOUS at 08:09

## 2022-09-25 RX ADMIN — INSULIN HUMAN 0.6 UNITS/HR: 1 INJECTION, SOLUTION INTRAVENOUS at 03:09

## 2022-09-25 RX ADMIN — ACETAMINOPHEN 650 MG: 325 TABLET, FILM COATED ORAL at 11:09

## 2022-09-25 RX ADMIN — HYDRALAZINE HYDROCHLORIDE 75 MG: 25 TABLET, FILM COATED ORAL at 10:09

## 2022-09-25 RX ADMIN — ALBUMIN (HUMAN): 2.5 SOLUTION INTRAVENOUS at 03:09

## 2022-09-25 RX ADMIN — DIPHENHYDRAMINE HYDROCHLORIDE 25 MG: 50 INJECTION, SOLUTION INTRAMUSCULAR; INTRAVENOUS at 09:09

## 2022-09-25 RX ADMIN — Medication 95 MILLI-UNITS/MIN: at 04:09

## 2022-09-25 RX ADMIN — HYDRALAZINE HYDROCHLORIDE 75 MG: 25 TABLET, FILM COATED ORAL at 01:09

## 2022-09-25 RX ADMIN — HYDROXYZINE HYDROCHLORIDE 50 MG: 25 TABLET ORAL at 03:09

## 2022-09-25 RX ADMIN — NIFEDIPINE 10 MG: 10 CAPSULE ORAL at 11:09

## 2022-09-25 RX ADMIN — HYDRALAZINE HYDROCHLORIDE 5 MG: 20 INJECTION INTRAMUSCULAR; INTRAVENOUS at 03:09

## 2022-09-25 RX ADMIN — HYDRALAZINE HYDROCHLORIDE 5 MG: 20 INJECTION, SOLUTION INTRAMUSCULAR; INTRAVENOUS at 10:09

## 2022-09-25 RX ADMIN — FENTANYL CITRATE 100 MCG: 50 INJECTION, SOLUTION INTRAMUSCULAR; INTRAVENOUS at 03:09

## 2022-09-25 NOTE — CARE UPDATE
FHT reviewed with MFM fellow. SVE unchanged 7/90/0. Plan to recheck at 1340. If remote from delivery will proceed with pLTCS. Discussed recommendation with patient and family who are agreeable with plan.     Carly Maria MD  PGY-2 OB/GYN

## 2022-09-25 NOTE — L&D DELIVERY NOTE
[facility] - Labor & Delivery   Section   Operative Note    SUMMARY           Section Procedure Note    Procedure:   1. Primary  Section via Pfannensteil skin incision    Indications:   1. non-reassuring fetal status    Pre-operative Diagnosis:   1. IUP at 30 week 1 day pregnancy  2. Type I DM  3. cHTN with ARANZA with SF (Cr)  4. KOBY  5. Anemia  6. Nephrotic syndrome  7. Gastroparesis    Post-operative Diagnosis:   S/p pLTCS  2-7 same    Surgeon: Quoc Pryor     Assistants: Carly Maria MD - PGY2    Anesthesia: Epidural anesthesia    Findings:    1. Single viable  female infant, with APGARS 3/8, weight 1290g.   2. Fascia noted to be edematous.   3. Upon entry into peritoneum, 100 cc clear fluid noted and expelled from abdomen.   4. BL paratubal cysts noted. Right paratubal cyst removed with Bovie cautery and sent to pathology. Otherwise, normal appearing uterus, ovaries, and fallopian tubes.  5. Normal appearing placenta, sent to pathology    Estimated Blood Loss:  400 mL           Total IV Fluids: see anesthesia note     UOP: see anesthesia note    Specimens: placenta, right paratubal cyst    PreOp CBC:   Lab Results   Component Value Date    WBC 18.11 (H) 2022    HGB 6.7 (L) 2022    HCT 43 2022    MCV 93 2022     2022                     Complications:  None; patient tolerated the procedure well.           Disposition: PACU - hemodynamically stable.           Condition: stable    Procedure Details   The patient was seen in her labor room. SVE unchanged for 3 hours with continued minimal variability with both late and variable decelerations.  recommended.  risks, benefits, complications, treatment options, and expected outcomes were discussed with the patient.  The patient concurred with the proposed plan, giving informed consent.  The patient was taken to Operating Room, identified as Alicia Valles and the procedure verified as   Delivery. A Time Out was held and the above information confirmed. Prior to prepping for , repeat vaginal exam performed, SVE unchanged 7/90/0.     After induction of anesthesia, the patient was prepped and draped in the usual sterile manner while placed in a dorsal supine position with a left lateral tilt.  A champagne catheter was also placed per nursing. Preoperative antibiotics Ancef 2 g and azithromycin 500 mg were administered. An allis test was performed to confirm adequate anesthesia.  A Pfannenstiel skin incision was made and carried down through the subcutaneous tissue to the fascia. The fascia was noted to be edematous. Fascial incision was made and extended transversely. The fascia was grasped with [facility] clamps and  from the underlying rectus tissue superiorly and inferiorly. The peritoneum was identified, found to be free of adherent bowel, and entered bluntly. Upon entry into the peritoneum, approximately 100 cc of clear fluid was noted to expel from the abdomen. The peritoneal incision was extended longitudinally. The vesico-uterine peritoneum was identified, and bladder blade was inserted. The vesico-uterine peritoneum was incised transversely and the bladder flap was bluntly freed from the lower uterine segment. The bladder blade was reinserted to keep the bladder out of the operative field. A low transverse uterine incision was made with knife and extended with cephalocaudad traction. The amniotic sac was ruptured prior to creation of the hysterotomy and the infant was noted to be in vertex presentation. The fetal head was brought to the incision and elevated out of the pelvis. The patient delivered a single viable female infant without difficulty.  Infant weighed 1290 grams with Apgar scores of 3/8 at one and five minutes respectively. After the umbilical cord was clamped and cut, cord blood was obtained for evaluation. The placenta was removed intact, appeared normal,  and was sent to pathology in the setting of cHTN with ARANZA with SF. The uterus was exteriorized. BL paratubal cysts were noted. The right paratubal cyst was removed with Bovie cautery and sent to pathology. The uterine outline, tubes and ovaries appeared normal. The uterine incision was closed with running locked sutures of 1-0 chromic. One additional figure of eight stitch was required in the middle of the lower aspect of the hysterotomy for hemostasis using 1-0 chromic. Excellent hemostasis was observed.  The posterior abdomen was mopped with a wet lap to remove clots. The uterus was then returned to the abdominal cavity. Incision was reinspected and good hemostasis was noted. The muscle was re-approximated with 1-0 chromic. The fascia was then reapproximated with running sutures of 0 looped PDS. The subcutaneous fat was reapproximated with 2-0 plain gut and was reapproximated with 4-0 monocryl in a subcuticular fashion.    Instrument, sponge, and needle counts were correct prior the abdominal closure and at the conclusion of the case.     The patient tolerated procedure well and was taken to the recovery room in stable condition after the procedure.    **NOTE: This patient IS a candidate for trial of labor after  delivery**    Carly Maria MD  PGY-2 OB/GYN           Delivery Information for Zo Valles    Birth information:  YOB: 2024   Time of birth: 3:20 PM   Sex: female   Head Delivery Date/Time:     Delivery type:    Gestational Age: 30w1d    Delivery Providers    Delivering clinician:            Measurements    Weight:   Length:          Apgars    Living status: Living  Apgars:  1 min.:  5 min.:  10 min.:  15 min.:  20 min.:    Skin color:         Heart rate:         Reflex irritability:         Muscle tone:         Respiratory effort:         Total:                                  Interventions/Resuscitation           Cord    No data filed       Placenta    Placenta  delivery date/time:   Placenta removal:            Labor Events:       labor:       Labor Onset Date/Time:         Dilation Complete Date/Time:         Start Pushing Date/Time:         Start Pushing Date/Time:       Rupture Date/Time: 22  0455         Rupture type:            Fluid Amount:         Fluid Color: Clear        Fluid Odor:         Membrane Status: ARM (Artificial Rupture)                 steroids:       Antibiotics given for GBS:       Induction:       Indications for induction:        Augmentation:       Indications for augmentation:       Labor complications:       Additional complications:          Cervical ripening:                     Delivery:      Episiotomy:       Indication for Episiotomy:       Perineal Lacerations:   Repaired:      Periurethral Laceration:   Repaired:     Labial Laceration:   Repaired:     Sulcus Laceration:   Repaired:     Vaginal Laceration:   Repaired:     Cervical Laceration:   Repaired:     Repair suture:       Repair # of packets:       Last Value - EBL - Nursing (mL):       Sum - EBL - Nursing (mL): 0     Last Value - EBL - Anesthesia (mL):        Calculated QBL (mL): 310        Vaginal Sweep Performed:       Surgicount Correct:         Other providers:            Details (if applicable):  Trial of Labor      Categorization:      Priority:     Indications for :     Incision Type:       Additional  information:  Forceps:    Vacuum:    Breech:    Observed anomalies    Other (Comments):       Quoc Pryor MD

## 2022-09-25 NOTE — TRANSFER OF CARE
Anesthesia Transfer of Care Note    Patient: Alicia Valles    Procedure(s) Performed: * No procedures listed *    Patient location: Labor and Delivery    Anesthesia Type: spinal    Transport from OR: Transported from OR on room air with adequate spontaneous ventilation    Post pain: adequate analgesia    Post assessment: no apparent anesthetic complications    Post vital signs: stable    Level of consciousness: awake, alert and oriented    Nausea/Vomiting: no nausea/vomiting    Complications: none    Transfer of care protocol was followed      Last vitals:   Visit Vitals  BP (!) 146/88   Pulse 100   Temp 36.9 °C (98.5 °F) (Oral)   Resp 18   Ht 5' (1.524 m)   Wt 72.1 kg (158 lb 15.9 oz)   LMP 02/26/2022 (Approximate)   SpO2 100%   Breastfeeding Yes   BMI 31.05 kg/m²

## 2022-09-25 NOTE — PROGRESS NOTES
Per Dr. Mcfarlane, will draw CMP/CBC 4 hours after mag bolus finished, which will be 0400   Lot #: [id number] Lot #: [id number]

## 2022-09-25 NOTE — CARE UPDATE
MD to bedside for repeat SVE. SVE unchanged . Discussed case with MFM fellow who recommends proceeding with  at this time given lack of cervical change and persistent category II tracing with minimal variability and decelerations. Patient agreeable with plan. Discussed recommendation of receiving 1 other unit of pRBC during surgery. Patient agreeable. Pitocin paused. To OR for pLTCS     Carly Maria MD  PGY-2 OB/GYN

## 2022-09-25 NOTE — PROGRESS NOTES
LABOR NOTE    S:  Complaints: No.  Epidural working:  yes    O: BP (!) 144/78   Pulse 104   Temp 98.6 °F (37 °C) (Oral)   Resp 18   Ht 5' (1.524 m)   Wt 72.1 kg (158 lb 15.9 oz)   LMP 2022 (Approximate)   SpO2 100%   Breastfeeding Yes   BMI 31.05 kg/m²       FHT: 140, minimal variability - accels, + variable decels Cat 2 (reassuring)  CTX: q1-3  SVE: , IUPC in place      ASSESSMENT:   27 y.o.  at 30w0d, IOL in setting of PreE w SF (Cr)    FHT reassuring    Active Hospital Problems    Diagnosis  POA    *Hypertension affecting pregnancy, antepartum [O16.9]  Yes    KOBY (acute kidney injury) [N17.9]  Unknown    Hyperkalemia [E87.5]  Unknown    Short cervix [N88.3]  Yes    29 weeks gestation of pregnancy [Z3A.29]  Not Applicable    UTI (urinary tract infection) [N39.0]  Yes    Gastroparesis due to DM [E11.43, K31.84]  Yes     Chronic    Anxiety [F41.9]  Yes     Chronic    Anemia [D64.9]  Yes    Proteinuria [R80.9]  Yes    Type 1 diabetes mellitus with other specified complication [E10.69]  Yes     Chronic      Resolved Hospital Problems   No resolved problems to display.     Course:  2000: 0/-4  2200: /-2  2300: , pit at 16  0000: , AROM clear, IUPC place, pit at 18, decreased to 4 after rupture  0200:   0445:   0730: , amnioinfusion running    PLAN:  Continue Close Maternal/Fetal Monitoring  Pit per protocol   Recheck 2-4 hours    Carly Maria MD  PGY-2 OB/GYN

## 2022-09-25 NOTE — CARE UPDATE
MD to bedside for Valera balloon placement and IOL. Vertex by BSU. NICHO cl th h. Valera balloon placed without issue.     Plan:   - Starting pitocin   - Recheck Q4H   - Discussed post partum BC. Patient declines IUD at this time. Discussed Depo vs Nexplanon vs IUD. Patient considering her options.   - PCN for GSB unknown     Christine Orr MD  PGY 2  Obstetrics and Gynecology

## 2022-09-25 NOTE — PROGRESS NOTES
09/25/22 1122   [facility] [place] Note - Strip   Strip Reviewed by [place] Nurse? Yes   [facility] [place] Note - Communication   [place] Nurse Communicated with Bedside Nurse Regarding: Fetal Status   [facility] [place] Note - Notification   Nurse Notified? Yes  (will assess)   Name of Nurse Thuy

## 2022-09-25 NOTE — PROGRESS NOTES
LABOR NOTE    S:  Complaints: No.  Epidural working:  yes    O: BP (!) 163/87   Pulse 101   Temp 97.9 °F (36.6 °C) (Oral)   Resp 18   Ht 5' (1.524 m)   Wt 72.1 kg (158 lb 15.9 oz)   LMP 2022 (Approximate)   SpO2 100%   Breastfeeding Yes   BMI 31.05 kg/m²       FHT: 130, moderate variability - accels, + variable decels occasional Cat 2 (reassuring)  CTX: difficult to evaluate on toco, IUPC Placed  SVE: , AROM clear      ASSESSMENT:   27 y.o.  at 30w0d, IOL in setting of PreE w SF (Cr)    FHT reassuring    Active Hospital Problems    Diagnosis  POA    *Hypertension affecting pregnancy, antepartum [O16.9]  Yes    KOBY (acute kidney injury) [N17.9]  Unknown    Hyperkalemia [E87.5]  Unknown    Short cervix [N88.3]  Yes    29 weeks gestation of pregnancy [Z3A.29]  Not Applicable    UTI (urinary tract infection) [N39.0]  Yes    Gastroparesis due to DM [E11.43, K31.84]  Yes     Chronic    Anxiety [F41.9]  Yes     Chronic    Anemia [D64.9]  Yes    Proteinuria [R80.9]  Yes    Type 1 diabetes mellitus with other specified complication [E10.69]  Yes     Chronic      Resolved Hospital Problems   No resolved problems to display.     Course:  : /-4  2200: /-2  2300: , pit at 16  0000: , AROM clear, IUPC place, pit at 18, decreased to 4 after rupture    PLAN:  Continue Close Maternal/Fetal Monitoring  Discussed with patient and family risk/benefits of . Before entering room, again discussed patient with WILLIAM Forrest, and Anesthesia team on call. Currently patient epidural level excellent and if any fetal indication, patient would be able to undergo  without requiring intubation. Discussed this with patient. And that with this, my recommendation would be to proceed with labor course. Specifically, I recommended AROM (as fetal station low and appropriate) which would allow labor to progress more rapidly. I also specifically discussed that the fetus may not  tolerate AROM (cord prolapse) and if this was the case, it was an ideal time to have to go to  given the level and current edema, as more time progresses there is increased risk for further edema. Patient and family agreed, and patient was subsequently AROM.   After AROM, I placed an IUPC. Immediate deep variables were noted, pitocin was decreased from 18 to 4. An amnioinfusion was started. I remained at bedside for about 10 minutes. I then discussed the patient encounter and tracing with Dr. Escobedo who was watching the tracing. He agreed with the pitocin and amnioinfusion plan.     I then discussed the situation with both LD charge, on call staff MD, and again anesthesia team to make them aware. Everyone on board with current situation. Again anesthesia reinforced to me they would do 1-2 hour check on patient to ensure she had an adequate level. Of note, patient instructed by anesthesia that if she ever needs to click her button for an epidural bolus that anesthesia should be called to re-evaluate.     Will re-check in 2 hours or PRN based on tracing      Karo Mcfarlane MD  OBGYN PGY-4

## 2022-09-25 NOTE — PROGRESS NOTES
09/25/22 1232   [facility] [place] Note - Strip   Strip Reviewed by [place] Nurse? Yes   [facility] [place] Note - Communication   [place] Nurse Communicated with Bedside Nurse Regarding: Fetal Status   [facility] [place] Note - Notification   Nurse Notified? Yes  (at bedside)   Name of Nurse Thuy

## 2022-09-25 NOTE — ANESTHESIA PROCEDURE NOTES
CSE    Patient location during procedure: OB  Start time: 9/24/2022 9:36 PM  Timeout: 9/24/2022 9:36 PM  End time: 9/24/2022 9:36 PM    Reason for block: labor analgesia requested by patient and obstetrician    Staffing  Authorizing Provider: Fracisco Gill MD  Performing Provider: Sammy Licea MD    Preanesthetic Checklist  Completed: patient identified, IV checked, site marked, risks and benefits discussed, surgical consent, monitors and equipment checked, pre-op evaluation and timeout performed  CSE  Patient position: sitting  Prep: ChloraPrep  Patient monitoring: heart rate, continuous pulse ox and frequent blood pressure checks  Approach: midline  Spinal Needle  Needle type: pencil-tip   Needle gauge: 25 G  Needle length: 5 in  Epidural Needle  Injection technique: EMILY air  Needle type: Tuohy   Needle gauge: 17 G  Needle length: 3.5 in  Needle insertion depth: 8 cm  Location: L3-4  Needle localization: anatomical landmarks   Catheter  Catheter type: springwound  Catheter size: 19 G  Catheter at skin depth: 13 cm  Test dose: lidocaine 1.5% with Epi 1-to-200,000  Test dose: 3 mL  Additional Documentation: incremental injection, no paresthesia on injection and negative test dose  Additional Notes  Patient with significant pitting edema throughout entire body including legs, back, arms, and face. Deep depth of epidural likely related to severity of edema. Derma bond applied to epidural catheter to ensure it is not lost given the severity of edema and the risks of difficult airway.

## 2022-09-25 NOTE — PROGRESS NOTES
"LABOR NOTE    S:  Complaints: No.  Epidural working:  yes    Pt starting to have some abdominal pain in one "hot spot" Noting it with contractions. She has never pushed her epidural button    O: BP (!) 159/86   Pulse 96   Temp 98.7 °F (37.1 °C) (Oral)   Resp 18   Ht 5' (1.524 m)   Wt 72.1 kg (158 lb 15.9 oz)   LMP 2022 (Approximate)   SpO2 99%   Breastfeeding Yes   BMI 31.05 kg/m²       FHT: 140, minimal variability - accels, + variable decels occasional Cat 2 (reassuring)  CTX: q1-3  SVE:       ASSESSMENT:   27 y.o.  at 30w0d, IOL in setting of PreE w SF (Cr)    FHT reassuring    Active Hospital Problems    Diagnosis  POA    *Hypertension affecting pregnancy, antepartum [O16.9]  Yes    KOBY (acute kidney injury) [N17.9]  Unknown    Hyperkalemia [E87.5]  Unknown    Short cervix [N88.3]  Yes    29 weeks gestation of pregnancy [Z3A.29]  Not Applicable    UTI (urinary tract infection) [N39.0]  Yes    Gastroparesis due to DM [E11.43, K31.84]  Yes     Chronic    Anxiety [F41.9]  Yes     Chronic    Anemia [D64.9]  Yes    Proteinuria [R80.9]  Yes    Type 1 diabetes mellitus with other specified complication [E10.69]  Yes     Chronic      Resolved Hospital Problems   No resolved problems to display.     Course:  2000: 040/-4  2200: /-2  2300: , pit at 16  0000: , AROM clear, IUPC place, pit at 18, decreased to 4 after rupture  0200:     PLAN:  Continue Close Maternal/Fetal Monitoring  Pit at 6, continue pit per protocol  Will call anesthesia about hot spot and tell them she hit her button  Recheck 2 hours or PRN    Karo Mcfarlane MD  OBGYN PGY-4      "

## 2022-09-25 NOTE — ANESTHESIA PREPROCEDURE EVALUATION
[facility]  Anesthesia Pre-Operative Evaluation         Patient Name: Alicia Valles  YOB: 1995  MRN: [id number]    2022      Alicia Valles is a 27 y.o. female  @ 29w5d who presents to the [facility] with severe range blood pressures. PMHx and pregnancy c/b type 1 diabetes mellitus on insulin (most recent hemoglobin A1c 6.7%) with retinopathy, gastroparesis & numerous admissions for DKA, cHTN, hyperemesis gravidarum and history of seizure in 2022 attributed to hypoglycemia.     OB History    Para Term  AB Living   4 0 0 0 2     SAB IAB Ectopic Multiple Live Births   0 0 0          # Outcome Date GA Lbr Alireza/2nd Weight Sex Delivery Anes PTL Lv   4 Current            3 AB            2 AB            1                 Review of patient's allergies indicates:  No Known Allergies    Wt Readings from Last 1 Encounters:   22 2327 72.1 kg (158 lb 15.9 oz)       BP Readings from Last 3 Encounters:   22 (!) 161/83   22 (!) 159/90   22 (!) 186/116       Patient Active Problem List   Diagnosis    Type 1 diabetes mellitus with other specified complication    Anemia    Proteinuria    Anxiety    Gastroparesis due to DM    Spotting in early pregnancy    UTI in pregnancy, antepartum, first trimester    Leukocytosis    Nausea and vomiting during pregnancy    Hypertension affecting pregnancy, antepartum    Pre-existing type 1 diabetes mellitus during pregnancy in second trimester    Short cervical length during pregnancy in second trimester    Short cervix    29 weeks gestation of pregnancy    UTI (urinary tract infection)    Hyperkalemia    KOBY (acute kidney injury)       Past Surgical History:   Procedure Laterality Date    RETINAL DETACHMENT SURGERY  2018    RETINAL DETACHMENT SURGERY         Social History     Socioeconomic History    Marital status: Single   Tobacco Use    Smoking status: Never     Smokeless tobacco: Never   Substance and Sexual Activity    Alcohol use: Not Currently    Drug use: Never    Sexual activity: Yes     Partners: Male     Birth control/protection: None   Social History Narrative    ** Merged History Encounter **              Chemistry        Component Value Date/Time     (L) 09/24/2022 2010    K 4.8 09/24/2022 2010     09/24/2022 2010    CO2 19 (L) 09/24/2022 2010    BUN 34 (H) 09/24/2022 2010    CREATININE 2.1 (H) 09/24/2022 2010    GLU 69 (L) 09/24/2022 2010        Component Value Date/Time    CALCIUM 7.6 (L) 09/24/2022 2010    ALKPHOS 85 09/24/2022 2010    AST 28 09/24/2022 2010    ALT 29 09/24/2022 2010    BILITOT 0.1 09/24/2022 2010    ESTGFRAFRICA >60.0 05/26/2022 0507    EGFRNONAA >60.0 05/26/2022 0507            Lab Results   Component Value Date    WBC 13.08 (H) 09/24/2022    HGB 6.8 (L) 09/24/2022    HCT 20.0 (L) 09/24/2022    MCV 95 09/24/2022     09/24/2022       No results for input(s): PT, INR, PROTIME, APTT in the last 72 hours.                                                                                               09/24/2022  Alicia Valles is a 27 y.o., female.      Pre-op Assessment    I have reviewed the Patient Summary Reports.     I have reviewed the Nursing Notes. I have reviewed the NPO Status.   I have reviewed the Medications.     Review of Systems  Anesthesia Hx:  Denies Family Hx of Anesthesia complications.   Denies Personal Hx of Anesthesia complications.   Social:  Non-Smoker    Hematology/Oncology:  Hematology Normal   Oncology Normal     EENT/Dental:EENT/Dental Normal   Cardiovascular:  Cardiovascular Normal     Pulmonary:  Pulmonary Normal    Renal/:  Renal/ Normal     Hepatic/GI:  Hepatic/GI Normal    Musculoskeletal:  Musculoskeletal Normal    Neurological:  Neurology Normal    Endocrine:  Endocrine Normal    Dermatological:  Skin Normal    Psych:  Psychiatric Normal           Physical Exam  General: Well  nourished, Cooperative, Alert and Oriented    Airway:  Mallampati: III / II  Mouth Opening: Normal  TM Distance: Normal  Tongue: Normal  Neck ROM: Normal ROM    Dental:  Intact    Chest/Lungs:  Normal Respiratory Rate    Heart:  Rate: Normal    Abdomen:  Normal        Anesthesia Plan  Type of Anesthesia, risks & benefits discussed:    Anesthesia Type: Gen ETT, Epidural, Spinal, CSE  Intra-op Monitoring Plan: Standard ASA Monitors  Post Op Pain Control Plan: multimodal analgesia  Induction:  IV  Airway Plan: Direct, Post-Induction  Informed Consent: Informed consent signed with the Patient and all parties understand the risks and agree with anesthesia plan.  All questions answered.   ASA Score: 3    Ready For Surgery From Anesthesia Perspective.     .

## 2022-09-25 NOTE — PROGRESS NOTES
LABOR NOTE    S:  Complaints: yes, pain and pressure.  Epidural working:  yes    O: /81   Pulse 101   Temp 98.1 °F (36.7 °C) (Oral)   Resp 18   Ht 5' (1.524 m)   Wt 72.1 kg (158 lb 15.9 oz)   LMP 2022 (Approximate)   SpO2 99%   Breastfeeding Yes   BMI 31.05 kg/m²       FHT: 140, minimal variability - accels, + occasional late andvariable decels Cat 2 (reassuring)  CTX: q2-3 minutes  SVE: , IUPC replaced      ASSESSMENT:   27 y.o.  at 30w0d, IOL in setting of PreE w SF (Cr)    FHT reassuring    Active Hospital Problems    Diagnosis  POA    *Hypertension affecting pregnancy, antepartum [O16.9]  Yes    KOBY (acute kidney injury) [N17.9]  Unknown    Hyperkalemia [E87.5]  Unknown    Short cervix [N88.3]  Yes    29 weeks gestation of pregnancy [Z3A.29]  Not Applicable    UTI (urinary tract infection) [N39.0]  Yes    Gastroparesis due to DM [E11.43, K31.84]  Yes     Chronic    Anxiety [F41.9]  Yes     Chronic    Anemia [D64.9]  Yes    Proteinuria [R80.9]  Yes    Type 1 diabetes mellitus with other specified complication [E10.69]  Yes     Chronic      Resolved Hospital Problems   No resolved problems to display.     Course:  2000: /-4  2200: /-2  2300: 1, pit at 16  0000: -1, AROM clear, IUPC place, pit at 18, decreased to 4 after rupture  0200:   0445:   0730: 1, amnioinfusion running  0930:   1100: 0, IUPC replaced    PLAN:  Continue Close Maternal/Fetal Monitoring  Pit per protocol   Recheck 2 hours  Continue amnioinfusion  H/h 6.7/19.5, recommended transfusion of 1 unit pRBC. Patient agreeable. Blood consents signed  FHT reviewed with MFM fellow Dr. Escobedo and staff Dr. Benítez, FHT with minimal variability and late/variable decels. In clinic setting with multiple patient comorbidities, will continue to monitor with caution.       Carly Maria MD  PGY-2 OB/GYN

## 2022-09-25 NOTE — PROGRESS NOTES
LABOR NOTE    S:  Complaints: yes, pain and pressure.  Epidural working:  yes    O: BP (!) 144/78   Pulse 104   Temp 98.6 °F (37 °C) (Oral)   Resp 18   Ht 5' (1.524 m)   Wt 72.1 kg (158 lb 15.9 oz)   LMP 2022 (Approximate)   SpO2 100%   Breastfeeding Yes   BMI 31.05 kg/m²       FHT: 140, minimal variability - accels, + variable decels Cat 2 (reassuring)  CTX: q1-3  SVE:       ASSESSMENT:   27 y.o.  at 30w0d, IOL in setting of PreE w SF (Cr)    FHT reassuring    Active Hospital Problems    Diagnosis  POA    *Hypertension affecting pregnancy, antepartum [O16.9]  Yes    KOBY (acute kidney injury) [N17.9]  Unknown    Hyperkalemia [E87.5]  Unknown    Short cervix [N88.3]  Yes    29 weeks gestation of pregnancy [Z3A.29]  Not Applicable    UTI (urinary tract infection) [N39.0]  Yes    Gastroparesis due to DM [E11.43, K31.84]  Yes     Chronic    Anxiety [F41.9]  Yes     Chronic    Anemia [D64.9]  Yes    Proteinuria [R80.9]  Yes    Type 1 diabetes mellitus with other specified complication [E10.69]  Yes     Chronic      Resolved Hospital Problems   No resolved problems to display.     Course:  2000: 0/-4  2200: -2  2300: , pit at 16  0000: , AROM clear, IUPC place, pit at 18, decreased to 4 after rupture  0200:   0445:   0730: , amnioinfusion running    PLAN:  Continue Close Maternal/Fetal Monitoring  Pit per protocol   Recheck 2 hours  Will restart amnio infusion    Carly Maria MD  PGY-2 OB/GYN

## 2022-09-25 NOTE — PLAN OF CARE
Problem:  Fall Injury Risk  Goal: Absence of Fall, Infant Drop and Related Injury  Outcome: Ongoing, Progressing     Problem: Adult Inpatient Plan of Care  Goal: Plan of Care Review  Outcome: Ongoing, Progressing  Goal: Patient-Specific Goal (Individualized)  Outcome: Ongoing, Progressing  Goal: Absence of Hospital-Acquired Illness or Injury  Outcome: Ongoing, Progressing  Goal: Optimal Comfort and Wellbeing  Outcome: Ongoing, Progressing  Goal: Readiness for Transition of Care  Outcome: Ongoing, Progressing     Problem: Diabetes Comorbidity  Goal: Blood Glucose Level Within Targeted Range  Outcome: Ongoing, Progressing     Problem: Hyperglycemia  Goal: Blood Glucose Level Within Targeted Range  Outcome: Ongoing, Progressing     Problem: Fluid and Electrolyte Imbalance (Acute Kidney Injury/Impairment)  Goal: Fluid and Electrolyte Balance  Outcome: Ongoing, Progressing     Problem: Oral Intake Inadequate (Acute Kidney Injury/Impairment)  Goal: Optimal Nutrition Intake  Outcome: Ongoing, Progressing     Problem: Renal Function Impairment (Acute Kidney Injury/Impairment)  Goal: Effective Renal Function  Outcome: Ongoing, Progressing     Problem: Infection  Goal: Absence of Infection Signs and Symptoms  Outcome: Ongoing, Progressing     Problem: Diabetes in Pregnancy  Goal: Blood Glucose Level Within Targeted Range  Outcome: Ongoing, Progressing     Problem: Hypertensive Disorders in Pregnancy  Goal: Maternal-Fetal Stabilization  Outcome: Ongoing, Progressing     Pt doing well. Currently undergoing IOL on pitocin. Verbalized understanding of plan of care. Without pain concerns at this time and appearing in NAD.

## 2022-09-25 NOTE — PROGRESS NOTES
"Progress Note  Nephrology    Consult Requested By: Praveen Benítez MD  Reason for Consult: KOBY/>K/Nephrotic Syn    SUBJECTIVE:     History of Present Illness from initial consultation:  "Patient is a 27 y.o. female presents with admission from Nephrology appt yesterday with N/V/Acc HTN and feeling poorly.  Pt is 29+W accidental pregnancy with uncontrolled type 1 dm, nephrotic syn, uncontrolled HTN.  Initial /100, Creat 1.5, K 5.8, urine PC 17 (falsely high with blood but hx of 6+).  Admitted to OB floor for mgmt of HTN.  Trends noted and now with Creat 1.7, K 6.0, Bicarb 17.  Consulted for evaluation.  Pt seen and examined with [facility] staff-team at bedside.  Of note pt has been refusing some meds/treatment modalities/etc.  Extensive discussion about plan of care and consequences of refusal.  Nephrotic w/up ordered earlier but suspect due to uncontrolled Type 1 DM/Uncontrolled HTN. Pt admits to taking Excedrin and occasional NSAIDS for her gastroparesis.  Diet habits are uncontrolled."     Interval History:  Patient in active labor after induced labor in light of preeclampsia with severe features.  OB notes reviewed.  UOP excellent but creatinine continued to worsen yesterday and induction was initiated.  SBP's in 130s-140s.        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      Past Surgical History:   Procedure Laterality Date    RETINAL DETACHMENT SURGERY  2018    RETINAL DETACHMENT SURGERY       Family History   Problem Relation Age of Onset    No Known Problems Father     Cancer Paternal Grandfather         unsure    Diabetes Maternal Grandfather     Hypertension Mother      Social History     Tobacco Use    Smoking status: Never    Smokeless tobacco: " Never   Substance Use Topics    Alcohol use: Not Currently    Drug use: Never       Review of patient's allergies indicates:  No Known Allergies     Review of Systems:  Constitutional: No fever or chills  Respiratory: No cough or shortness of breath  Cardiovascular: No chest pain or palpitations  Gastrointestinal: No nausea or vomiting  Neurological: No confusion or weakness    OBJECTIVE:     Vital Signs (Most Recent)  Temp: 98.1 °F (36.7 °C) (09/25/22 0943)  Pulse: 101 (09/25/22 0946)  Resp: 18 (09/25/22 0711)  BP: 136/81 (09/25/22 0943)  SpO2: 99 % (09/25/22 0946)    Vital Signs Range (Last 24H):  Temp:  [97.5 °F (36.4 °C)-98.7 °F (37.1 °C)]   Pulse:  []   Resp:  [18]   BP: (126-176)/(61-97)   SpO2:  [96 %-100 %]       Intake/Output Summary (Last 24 hours) at 9/25/2022 1040  Last data filed at 9/25/2022 0930  Gross per 24 hour   Intake 2799.65 ml   Output 1965 ml   Net 834.65 ml         Physical Exam:  Deferred    Laboratory:  Recent Labs   Lab 09/25/22 0914   WBC 18.11*   RBC 2.10*   HGB 6.7*   HCT 19.5*      MCV 93   MCH 31.9*   MCHC 34.4       BMP:   Recent Labs   Lab 09/25/22 0914   *   *   K 4.2      CO2 17*   BUN 33*   CREATININE 2.0*   CALCIUM 7.2*   MG 4.7*       Lab Results   Component Value Date    CALCIUM 7.2 (L) 09/25/2022    PHOS 5.0 (H) 09/24/2022     BNP  No results for input(s): BNP, BNPTRIAGEBLO in the last 168 hours.No results found for: URICACID  Lab Results   Component Value Date    IRON 84 05/06/2022    TIBC 266 05/06/2022    FERRITIN 79 05/06/2022     Lab Results   Component Value Date    CALCIUM 7.2 (L) 09/25/2022    PHOS 5.0 (H) 09/24/2022       Diagnostic Results:  No orders to display       ASSESSMENT/PLAN:     Oliguric KOBY on CKD II with nephrotic syndrome secondary to uncontrolled DM, uncontrolled HTN, Preeclampsia with severe features.  -Followed with  [place] [facility] [place]ek/Liliane Jones trends noted. 1.5 -1.9 - 2.0 - 2.3 - 2.1 - 2.0  -Repeat  UPCR 9 grams - baseline historically 6 grams  -avoid NSAIDS.    -no need for RRT  -Renally dose meds, avoid nephrotoxins, and monitor I/O's closely.  -Given albumin less than 2.5 would recommend LMWH as these patients are at high risk of clotting complications  -Given she is being actively induced it is reasonable to hold off on this for now.  -Would need to check anti-factor Xa intermittently due to her KOBY/CKD    Hyperkalemia with NAGMA, hyponatremia  -Lokelma TID and sodium bicarb to shift and correct acidosis  -Lokelma does not have systemic absorption so okay in pregnancy/breastfeeding  -Sodium load should help hyponatremia    Uncontrolled HTN  -max procardia 60 BID  -increased hydralazine to 75 TID  -labetalol as now but may need to change to coreg for antiprotenuric effects.     Uncontrolled Type 1 DM  -formerly on insulin pump until she had a hypoglycemic seizure and then transitioned to MDI  -she was not wearing her sensor was cause of this but highly recommend retry with closed loop system.  -defer to Endo but use of steroids to accelerate fetal lung maturity may tip into DKA needing insulin drip.    Pregnancy, Extremely high risk  -per MFM/OB    Leukocystosis without fever or S/S infection  -Due to steroid therapy    Anemia of pregnancy/CKD  -no signs of aHUS currently.  -B12 and folate both on low side now on supplementation  -on iron  -Epo is contraindicated

## 2022-09-25 NOTE — PROGRESS NOTES
LABOR NOTE    S:  Complaints: yes, pain and pressure.  Epidural working:  yes    O: /81   Pulse 101   Temp 98.1 °F (36.7 °C) (Oral)   Resp 18   Ht 5' (1.524 m)   Wt 72.1 kg (158 lb 15.9 oz)   LMP 2022 (Approximate)   SpO2 99%   Breastfeeding Yes   BMI 31.05 kg/m²       FHT: 140, minimal variability - accels, + late andvariable decels Cat 2 (reassuring)  CTX: q2-3 minutes  SVE: , IUPC replaced      ASSESSMENT:   27 y.o.  at 30w0d, IOL in setting of PreE w SF (Cr)    FHT reassuring    Active Hospital Problems    Diagnosis  POA    *Hypertension affecting pregnancy, antepartum [O16.9]  Yes    KOBY (acute kidney injury) [N17.9]  Unknown    Hyperkalemia [E87.5]  Unknown    Short cervix [N88.3]  Yes    29 weeks gestation of pregnancy [Z3A.29]  Not Applicable    UTI (urinary tract infection) [N39.0]  Yes    Gastroparesis due to DM [E11.43, K31.84]  Yes     Chronic    Anxiety [F41.9]  Yes     Chronic    Anemia [D64.9]  Yes    Proteinuria [R80.9]  Yes    Type 1 diabetes mellitus with other specified complication [E10.69]  Yes     Chronic      Resolved Hospital Problems   No resolved problems to display.     Course:  2000: 0/-4  2200: /-2  2300: 1, pit at 16  0000: 1, AROM clear, IUPC place, pit at 18, decreased to 4 after rupture  0200:   0445:   0730: , amnioinfusion running  0930:   1100: , IUPC replaced  1200:      PLAN:  Continue Close Maternal/Fetal Monitoring  Pit per protocol   Discussed with MFM fellow plan to recheck at 1300. If still remote from delivery or FHT worsens, will re-address mode of delivery    Carly Maria MD  PGY-2 OB/GYN

## 2022-09-25 NOTE — PROGRESS NOTES
LABOR NOTE    S:  Complaints: No.  Epidural working:  yes    O: BP (!) 149/75   Pulse 106   Temp 98.1 °F (36.7 °C) (Oral)   Resp 18   Ht 5' (1.524 m)   Wt 72.1 kg (158 lb 15.9 oz)   LMP 2022 (Approximate)   SpO2 100%   Breastfeeding Yes   BMI 31.05 kg/m²       FHT: 130, minimal variability - accels, + variable decels Cat 2 (reassuring)  CTX: q1-3  SVE:       ASSESSMENT:   27 y.o.  at 30w0d, IOL in setting of PreE w SF (Cr)    FHT reassuring    Active Hospital Problems    Diagnosis  POA    *Hypertension affecting pregnancy, antepartum [O16.9]  Yes    KOBY (acute kidney injury) [N17.9]  Unknown    Hyperkalemia [E87.5]  Unknown    Short cervix [N88.3]  Yes    29 weeks gestation of pregnancy [Z3A.29]  Not Applicable    UTI (urinary tract infection) [N39.0]  Yes    Gastroparesis due to DM [E11.43, K31.84]  Yes     Chronic    Anxiety [F41.9]  Yes     Chronic    Anemia [D64.9]  Yes    Proteinuria [R80.9]  Yes    Type 1 diabetes mellitus with other specified complication [E10.69]  Yes     Chronic      Resolved Hospital Problems   No resolved problems to display.     Course:  2000: 0/-4  2200: -2  2300: , pit at 16  0000: , AROM clear, IUPC place, pit at 18, decreased to 4 after rupture  0200:   0445:     PLAN:  Continue Close Maternal/Fetal Monitoring  Pit per protocol   Recheck 4 hours    Karo Mcfaralne MD  OBGYN PGY-4

## 2022-09-25 NOTE — PROGRESS NOTES
LABOR NOTE    S:  Complaints: No.  Epidural working:  yes    O: /75   Pulse 95   Temp 97.9 °F (36.6 °C) (Oral)   Resp 18   Ht 5' (1.524 m)   Wt 72.1 kg (158 lb 15.9 oz)   LMP 2022 (Approximate)   SpO2 99%   Breastfeeding Yes   BMI 31.05 kg/m²       FHT: 130, moderate variability - accels, - decels Cat 1 (reassuring)  CTX: difficult to evaluate on toco  SVE:       ASSESSMENT:   27 y.o.  at 30w0d, IOL in setting of PreE w SF (Cr)    FHT reassuring    Active Hospital Problems    Diagnosis  POA    *Hypertension affecting pregnancy, antepartum [O16.9]  Yes    KOBY (acute kidney injury) [N17.9]  Unknown    Hyperkalemia [E87.5]  Unknown    Short cervix [N88.3]  Yes    29 weeks gestation of pregnancy [Z3A.29]  Not Applicable    UTI (urinary tract infection) [N39.0]  Yes    Gastroparesis due to DM [E11.43, K31.84]  Yes     Chronic    Anxiety [F41.9]  Yes     Chronic    Anemia [D64.9]  Yes    Proteinuria [R80.9]  Yes    Type 1 diabetes mellitus with other specified complication [E10.69]  Yes     Chronic      Resolved Hospital Problems   No resolved problems to display.     Course:  : -4  2200:   2300: , pit at 16    PLAN:  Continue Close Maternal/Fetal Monitoring  Pitocin Augmentation per protocol     ml/hr per nurse  Last weight : 72 kg  Unable to obtain a new weight now as bed does not have that capability and patient has epidural thus cannot stand on scale.     Long discussion with patient, her mother, her partner and with nurse present about new onset edema. Patient has noticed that since the last few hours she now has edema in her hands, forearms, legs up to hips, abdomen. Her face still looks the same. Discussed that our resident team, on call OBGYN, on call MFM have all been in touch with anesthesia about their concerns for the edema/anasarca. Discussed that currently patient is making excellent progress in her labor course, that the fetal heart  tracing looks reassuring. Her epidural is currently working and she has a good level. Anesthesia is comfortable with her course right now.    However we discussed that if her level wore off or was not enough for a  and the fetal heart tracing indicated to us to move to  emergently/urgently, it may be difficult for anesthesia to intubate due to the swelling. Thus a risk of fetal demise as we would not be able to operate without her having appropriate anesthesia from epidural/spinal.    We reviewed all the measures all the teams are taking to ensure that her labor coruse goes smoothly  Communication among all teams (anesthesia, nursing, charge, MFM, L&D)  Epidural in place with dermabond and excellent level currently  Anesthesia will check on her frequently to ensure level adequate.   Tracing reviewed constantly by nursing and resident to ensure nothing emergent/urgent and will adjust pitocin accordingly. Will tell anesthesia directly if tracing is starting to become non-reassuring so they can assure an adequate epidural level    Patient and family taking time to discuss it among themselves.     Will continue to closely monitor patient. Will return for any further questions and labor checks at this time.       Karo Mcfarlane MD  OBGYN PGY-4

## 2022-09-25 NOTE — PROGRESS NOTES
09/25/22 1158   [facility] [place] Note - Strip   Strip Reviewed by [place] Nurse? Yes   [facility] [place] Note - Communication   [place] Nurse Communicated with Bedside Nurse Regarding: Fetal Status   [facility] [place] Note - Notification   Nurse Notified? Yes   Name of Nurse Thuy

## 2022-09-25 NOTE — PROGRESS NOTES
Patient seen and examined at bedside this am. Doing well in labor with no current complaints. Epidural is functioning well, pain well controlled. Glucoses have been well controlled overnight.     Temp:  [97.9 °F (36.6 °C)-98.7 °F (37.1 °C)] 98.6 °F (37 °C)  Pulse:  [] 104  SpO2:  [97 %-100 %] 100 %  BP: (135-176)/(66-97) 144/78    Monitoring:  Baseline 140, mod variability with areas of minimal variability. Repetitive variable decelerations   Glendo: irregular contraction pattern with runs of 4-5 ctx over 5 min with 3-4 min break without contractions    Plan:  -continue insulin infusion and D5 1/2 NS per protocol, glucoses well controlled  -amnioinfusion prn for variable decelerations  -continue to monitor labor progression closely  -BP currently mild range, occasional severes, IV antihypertensives prn for control of BP  -continue PO antihypertensive regimen  -mag sulfate boluses with q 4 hour mag levels to achieve therapeutic level of mag due to increased creatinine    Jimenez Escobedo MD  PGY 6  Maternal Fetal Medicine  [facility]

## 2022-09-25 NOTE — PROGRESS NOTES
09/25/22 1248   [facility] [place] Note - Strip   Strip Reviewed by [place] Nurse? Yes   [facility] [place] Note - Communication   [place] Nurse Communicated with Bedside Nurse Regarding: Fetal Status   [facility] [place] Note - Notification   Doctor Notified? Yes   Name of Doctor Dr. Maria

## 2022-09-26 PROBLEM — R73.9 HYPERGLYCEMIA: Status: ACTIVE | Noted: 2022-09-26

## 2022-09-26 PROBLEM — E10.65 TYPE 1 DIABETES MELLITUS WITH HYPERGLYCEMIA: Status: ACTIVE | Noted: 2022-09-26

## 2022-09-26 PROBLEM — O11.9 CHRONIC HYPERTENSION WITH SUPERIMPOSED PRE-ECLAMPSIA: Status: ACTIVE | Noted: 2022-09-25

## 2022-09-26 PROBLEM — I16.1 HYPERTENSIVE EMERGENCY: Status: ACTIVE | Noted: 2022-09-26

## 2022-09-26 LAB
ALBUMIN SERPL BCP-MCNC: 1.8 G/DL (ref 3.5–5.2)
ALP SERPL-CCNC: 102 U/L (ref 55–135)
ALP SERPL-CCNC: 85 U/L (ref 55–135)
ALP SERPL-CCNC: 88 U/L (ref 55–135)
ALP SERPL-CCNC: 92 U/L (ref 55–135)
ALT SERPL W/O P-5'-P-CCNC: 28 U/L (ref 10–44)
ALT SERPL W/O P-5'-P-CCNC: 30 U/L (ref 10–44)
ALT SERPL W/O P-5'-P-CCNC: 32 U/L (ref 10–44)
ALT SERPL W/O P-5'-P-CCNC: 33 U/L (ref 10–44)
ANION GAP SERPL CALC-SCNC: 10 MMOL/L (ref 8–16)
ANION GAP SERPL CALC-SCNC: 11 MMOL/L (ref 8–16)
ANION GAP SERPL CALC-SCNC: 7 MMOL/L (ref 8–16)
ANION GAP SERPL CALC-SCNC: 8 MMOL/L (ref 8–16)
AST SERPL-CCNC: 40 U/L (ref 10–40)
AST SERPL-CCNC: 43 U/L (ref 10–40)
AST SERPL-CCNC: 46 U/L (ref 10–40)
AST SERPL-CCNC: 47 U/L (ref 10–40)
BACTERIA SPEC AEROBE CULT: NORMAL
BASOPHILS # BLD AUTO: 0.02 K/UL (ref 0–0.2)
BASOPHILS # BLD AUTO: 0.03 K/UL (ref 0–0.2)
BASOPHILS NFR BLD: 0.1 % (ref 0–1.9)
BASOPHILS NFR BLD: 0.2 % (ref 0–1.9)
BILIRUB SERPL-MCNC: 0.1 MG/DL (ref 0.1–1)
BILIRUB SERPL-MCNC: 0.1 MG/DL (ref 0.1–1)
BILIRUB SERPL-MCNC: 0.2 MG/DL (ref 0.1–1)
BILIRUB SERPL-MCNC: 0.2 MG/DL (ref 0.1–1)
BUN SERPL-MCNC: 32 MG/DL (ref 6–20)
BUN SERPL-MCNC: 32 MG/DL (ref 6–20)
BUN SERPL-MCNC: 33 MG/DL (ref 6–20)
BUN SERPL-MCNC: 34 MG/DL (ref 6–20)
CALCIUM SERPL-MCNC: 7 MG/DL (ref 8.7–10.5)
CALCIUM SERPL-MCNC: 7.1 MG/DL (ref 8.7–10.5)
CALCIUM SERPL-MCNC: 7.2 MG/DL (ref 8.7–10.5)
CALCIUM SERPL-MCNC: 7.4 MG/DL (ref 8.7–10.5)
CHLORIDE SERPL-SCNC: 105 MMOL/L (ref 95–110)
CHLORIDE SERPL-SCNC: 106 MMOL/L (ref 95–110)
CHLORIDE SERPL-SCNC: 107 MMOL/L (ref 95–110)
CHLORIDE SERPL-SCNC: 109 MMOL/L (ref 95–110)
CO2 SERPL-SCNC: 16 MMOL/L (ref 23–29)
CO2 SERPL-SCNC: 16 MMOL/L (ref 23–29)
CO2 SERPL-SCNC: 17 MMOL/L (ref 23–29)
CO2 SERPL-SCNC: 18 MMOL/L (ref 23–29)
CREAT SERPL-MCNC: 2 MG/DL (ref 0.5–1.4)
DIFFERENTIAL METHOD: ABNORMAL
DIFFERENTIAL METHOD: ABNORMAL
DSDNA AB SER-ACNC: NORMAL [IU]/ML
EOSINOPHIL # BLD AUTO: 0 K/UL (ref 0–0.5)
EOSINOPHIL # BLD AUTO: 0.1 K/UL (ref 0–0.5)
EOSINOPHIL NFR BLD: 0.2 % (ref 0–8)
EOSINOPHIL NFR BLD: 0.3 % (ref 0–8)
ERYTHROCYTE [DISTWIDTH] IN BLOOD BY AUTOMATED COUNT: 13.8 % (ref 11.5–14.5)
ERYTHROCYTE [DISTWIDTH] IN BLOOD BY AUTOMATED COUNT: 14 % (ref 11.5–14.5)
EST. GFR  (NO RACE VARIABLE): 34 ML/MIN/1.73 M^2
GLUCOSE SERPL-MCNC: 118 MG/DL (ref 70–110)
GLUCOSE SERPL-MCNC: 144 MG/DL (ref 70–110)
GLUCOSE SERPL-MCNC: 152 MG/DL (ref 70–110)
GLUCOSE SERPL-MCNC: 65 MG/DL (ref 70–110)
HCT VFR BLD AUTO: 26.4 % (ref 37–48.5)
HCT VFR BLD AUTO: 27.5 % (ref 37–48.5)
HGB BLD-MCNC: 9.5 G/DL (ref 12–16)
HGB BLD-MCNC: 9.8 G/DL (ref 12–16)
IMM GRANULOCYTES # BLD AUTO: 0.15 K/UL (ref 0–0.04)
IMM GRANULOCYTES # BLD AUTO: 0.24 K/UL (ref 0–0.04)
IMM GRANULOCYTES NFR BLD AUTO: 0.9 % (ref 0–0.5)
IMM GRANULOCYTES NFR BLD AUTO: 1.4 % (ref 0–0.5)
LYMPHOCYTES # BLD AUTO: 1.5 K/UL (ref 1–4.8)
LYMPHOCYTES # BLD AUTO: 1.5 K/UL (ref 1–4.8)
LYMPHOCYTES NFR BLD: 8.6 % (ref 18–48)
LYMPHOCYTES NFR BLD: 9.2 % (ref 18–48)
MAGNESIUM SERPL-MCNC: 4.7 MG/DL (ref 1.6–2.6)
MAGNESIUM SERPL-MCNC: 4.9 MG/DL (ref 1.6–2.6)
MAGNESIUM SERPL-MCNC: 5 MG/DL (ref 1.6–2.6)
MCH RBC QN AUTO: 32 PG (ref 27–31)
MCH RBC QN AUTO: 32.2 PG (ref 27–31)
MCHC RBC AUTO-ENTMCNC: 35.6 G/DL (ref 32–36)
MCHC RBC AUTO-ENTMCNC: 36 G/DL (ref 32–36)
MCV RBC AUTO: 90 FL (ref 82–98)
MCV RBC AUTO: 90 FL (ref 82–98)
MONOCYTES # BLD AUTO: 1.2 K/UL (ref 0.3–1)
MONOCYTES # BLD AUTO: 1.3 K/UL (ref 0.3–1)
MONOCYTES NFR BLD: 7.1 % (ref 4–15)
MONOCYTES NFR BLD: 7.5 % (ref 4–15)
NEUTROPHILS # BLD AUTO: 13.2 K/UL (ref 1.8–7.7)
NEUTROPHILS # BLD AUTO: 14.4 K/UL (ref 1.8–7.7)
NEUTROPHILS NFR BLD: 82 % (ref 38–73)
NEUTROPHILS NFR BLD: 82.5 % (ref 38–73)
NRBC BLD-RTO: 0 /100 WBC
NRBC BLD-RTO: 0 /100 WBC
PLATELET # BLD AUTO: 193 K/UL (ref 150–450)
PLATELET # BLD AUTO: 200 K/UL (ref 150–450)
PMV BLD AUTO: 10.4 FL (ref 9.2–12.9)
PMV BLD AUTO: 11 FL (ref 9.2–12.9)
POCT GLUCOSE: 101 MG/DL (ref 70–110)
POCT GLUCOSE: 120 MG/DL (ref 70–110)
POCT GLUCOSE: 127 MG/DL (ref 70–110)
POCT GLUCOSE: 133 MG/DL (ref 70–110)
POCT GLUCOSE: 138 MG/DL (ref 70–110)
POCT GLUCOSE: 140 MG/DL (ref 70–110)
POCT GLUCOSE: 149 MG/DL (ref 70–110)
POCT GLUCOSE: 167 MG/DL (ref 70–110)
POCT GLUCOSE: 174 MG/DL (ref 70–110)
POCT GLUCOSE: 249 MG/DL (ref 70–110)
POCT GLUCOSE: 49 MG/DL (ref 70–110)
POCT GLUCOSE: 55 MG/DL (ref 70–110)
POCT GLUCOSE: 66 MG/DL (ref 70–110)
POCT GLUCOSE: 71 MG/DL (ref 70–110)
POCT GLUCOSE: 85 MG/DL (ref 70–110)
POCT GLUCOSE: 89 MG/DL (ref 70–110)
POCT GLUCOSE: 92 MG/DL (ref 70–110)
POTASSIUM SERPL-SCNC: 4.7 MMOL/L (ref 3.5–5.1)
POTASSIUM SERPL-SCNC: 5.1 MMOL/L (ref 3.5–5.1)
POTASSIUM SERPL-SCNC: 5.1 MMOL/L (ref 3.5–5.1)
POTASSIUM SERPL-SCNC: 5.5 MMOL/L (ref 3.5–5.1)
PROT SERPL-MCNC: 4.9 G/DL (ref 6–8.4)
PROT SERPL-MCNC: 4.9 G/DL (ref 6–8.4)
PROT SERPL-MCNC: 5.4 G/DL (ref 6–8.4)
PROT SERPL-MCNC: 5.4 G/DL (ref 6–8.4)
RBC # BLD AUTO: 2.95 M/UL (ref 4–5.4)
RBC # BLD AUTO: 3.06 M/UL (ref 4–5.4)
SODIUM SERPL-SCNC: 131 MMOL/L (ref 136–145)
SODIUM SERPL-SCNC: 132 MMOL/L (ref 136–145)
SODIUM SERPL-SCNC: 133 MMOL/L (ref 136–145)
SODIUM SERPL-SCNC: 134 MMOL/L (ref 136–145)
WBC # BLD AUTO: 16.12 K/UL (ref 3.9–12.7)
WBC # BLD AUTO: 17.49 K/UL (ref 3.9–12.7)

## 2022-09-26 PROCEDURE — 80053 COMPREHEN METABOLIC PANEL: CPT | Mod: 91 | Performed by: STUDENT IN AN ORGANIZED HEALTH CARE EDUCATION/TRAINING PROGRAM

## 2022-09-26 PROCEDURE — 25000003 PHARM REV CODE 250: Performed by: OBSTETRICS & GYNECOLOGY

## 2022-09-26 PROCEDURE — 20000000 HC ICU ROOM

## 2022-09-26 PROCEDURE — 63600175 PHARM REV CODE 636 W HCPCS: Performed by: STUDENT IN AN ORGANIZED HEALTH CARE EDUCATION/TRAINING PROGRAM

## 2022-09-26 PROCEDURE — 63600175 PHARM REV CODE 636 W HCPCS: Performed by: INTERNAL MEDICINE

## 2022-09-26 PROCEDURE — 80053 COMPREHEN METABOLIC PANEL: CPT | Performed by: STUDENT IN AN ORGANIZED HEALTH CARE EDUCATION/TRAINING PROGRAM

## 2022-09-26 PROCEDURE — 25000003 PHARM REV CODE 250: Performed by: NURSE PRACTITIONER

## 2022-09-26 PROCEDURE — 99232 PR SUBSEQUENT HOSPITAL CARE,LEVL II: ICD-10-PCS | Mod: 24,,, | Performed by: OBSTETRICS & GYNECOLOGY

## 2022-09-26 PROCEDURE — 36415 COLL VENOUS BLD VENIPUNCTURE: CPT | Performed by: NURSE PRACTITIONER

## 2022-09-26 PROCEDURE — 25000003 PHARM REV CODE 250

## 2022-09-26 PROCEDURE — 85025 COMPLETE CBC W/AUTO DIFF WBC: CPT

## 2022-09-26 PROCEDURE — 36415 COLL VENOUS BLD VENIPUNCTURE: CPT

## 2022-09-26 PROCEDURE — 36415 COLL VENOUS BLD VENIPUNCTURE: CPT | Performed by: STUDENT IN AN ORGANIZED HEALTH CARE EDUCATION/TRAINING PROGRAM

## 2022-09-26 PROCEDURE — 63600175 PHARM REV CODE 636 W HCPCS: Performed by: NURSE PRACTITIONER

## 2022-09-26 PROCEDURE — 83735 ASSAY OF MAGNESIUM: CPT | Performed by: STUDENT IN AN ORGANIZED HEALTH CARE EDUCATION/TRAINING PROGRAM

## 2022-09-26 PROCEDURE — 80053 COMPREHEN METABOLIC PANEL: CPT | Mod: 91

## 2022-09-26 PROCEDURE — 25000003 PHARM REV CODE 250: Performed by: STUDENT IN AN ORGANIZED HEALTH CARE EDUCATION/TRAINING PROGRAM

## 2022-09-26 PROCEDURE — 83735 ASSAY OF MAGNESIUM: CPT | Mod: 91 | Performed by: STUDENT IN AN ORGANIZED HEALTH CARE EDUCATION/TRAINING PROGRAM

## 2022-09-26 PROCEDURE — 99232 SBSQ HOSP IP/OBS MODERATE 35: CPT | Mod: 24,,, | Performed by: OBSTETRICS & GYNECOLOGY

## 2022-09-26 PROCEDURE — 85397 CLOTTING FUNCT ACTIVITY: CPT | Performed by: NURSE PRACTITIONER

## 2022-09-26 PROCEDURE — 63600175 PHARM REV CODE 636 W HCPCS

## 2022-09-26 PROCEDURE — 85025 COMPLETE CBC W/AUTO DIFF WBC: CPT | Mod: 91 | Performed by: STUDENT IN AN ORGANIZED HEALTH CARE EDUCATION/TRAINING PROGRAM

## 2022-09-26 RX ORDER — NICARDIPINE HYDROCHLORIDE 0.2 MG/ML
0-15 INJECTION INTRAVENOUS CONTINUOUS
Status: DISCONTINUED | OUTPATIENT
Start: 2022-09-26 | End: 2022-09-30

## 2022-09-26 RX ORDER — FUROSEMIDE 10 MG/ML
80 INJECTION INTRAMUSCULAR; INTRAVENOUS ONCE
Status: COMPLETED | OUTPATIENT
Start: 2022-09-26 | End: 2022-09-26

## 2022-09-26 RX ORDER — MUPIROCIN 20 MG/G
OINTMENT TOPICAL 2 TIMES DAILY
Status: DISPENSED | OUTPATIENT
Start: 2022-09-26 | End: 2022-10-01

## 2022-09-26 RX ORDER — LORAZEPAM 2 MG/ML
1 INJECTION INTRAMUSCULAR ONCE
Status: COMPLETED | OUTPATIENT
Start: 2022-09-26 | End: 2022-09-26

## 2022-09-26 RX ORDER — HYDROCODONE BITARTRATE AND ACETAMINOPHEN 5; 325 MG/1; MG/1
1 TABLET ORAL EVERY 4 HOURS PRN
Status: DISCONTINUED | OUTPATIENT
Start: 2022-09-26 | End: 2022-09-26

## 2022-09-26 RX ORDER — HYDROCODONE BITARTRATE AND ACETAMINOPHEN 10; 325 MG/1; MG/1
1 TABLET ORAL EVERY 4 HOURS PRN
Status: DISCONTINUED | OUTPATIENT
Start: 2022-09-26 | End: 2022-10-04

## 2022-09-26 RX ORDER — HYDROMORPHONE HYDROCHLORIDE 1 MG/ML
0.5 INJECTION, SOLUTION INTRAMUSCULAR; INTRAVENOUS; SUBCUTANEOUS
Status: DISCONTINUED | OUTPATIENT
Start: 2022-09-26 | End: 2022-09-28

## 2022-09-26 RX ORDER — INSULIN ASPART 100 [IU]/ML
1 INJECTION, SOLUTION INTRAVENOUS; SUBCUTANEOUS ONCE
Status: DISCONTINUED | OUTPATIENT
Start: 2022-09-26 | End: 2022-09-27

## 2022-09-26 RX ORDER — NIFEDIPINE 20 MG/1
20 CAPSULE ORAL ONCE
Status: COMPLETED | OUTPATIENT
Start: 2022-09-26 | End: 2022-09-26

## 2022-09-26 RX ORDER — HEPARIN SODIUM 5000 [USP'U]/ML
5000 INJECTION, SOLUTION INTRAVENOUS; SUBCUTANEOUS EVERY 12 HOURS
Status: DISCONTINUED | OUTPATIENT
Start: 2022-09-26 | End: 2022-09-28

## 2022-09-26 RX ORDER — SODIUM BICARBONATE 650 MG/1
1300 TABLET ORAL 3 TIMES DAILY
Status: DISCONTINUED | OUTPATIENT
Start: 2022-09-26 | End: 2022-09-28

## 2022-09-26 RX ORDER — MAGNESIUM SULFATE HEPTAHYDRATE 40 MG/ML
2 INJECTION, SOLUTION INTRAVENOUS ONCE
Status: COMPLETED | OUTPATIENT
Start: 2022-09-26 | End: 2022-09-26

## 2022-09-26 RX ORDER — INSULIN ASPART 100 [IU]/ML
5 INJECTION, SOLUTION INTRAVENOUS; SUBCUTANEOUS ONCE
Status: COMPLETED | OUTPATIENT
Start: 2022-09-26 | End: 2022-09-26

## 2022-09-26 RX ORDER — INSULIN ASPART 100 [IU]/ML
6 INJECTION, SOLUTION INTRAVENOUS; SUBCUTANEOUS
Status: DISCONTINUED | OUTPATIENT
Start: 2022-09-26 | End: 2022-09-26

## 2022-09-26 RX ORDER — CARVEDILOL 12.5 MG/1
25 TABLET ORAL 2 TIMES DAILY
Status: DISCONTINUED | OUTPATIENT
Start: 2022-09-26 | End: 2022-10-05 | Stop reason: HOSPADM

## 2022-09-26 RX ORDER — HYDROCODONE BITARTRATE AND ACETAMINOPHEN 5; 325 MG/1; MG/1
1 TABLET ORAL EVERY 4 HOURS
Status: DISCONTINUED | OUTPATIENT
Start: 2022-09-26 | End: 2022-10-04

## 2022-09-26 RX ORDER — HYDRALAZINE HYDROCHLORIDE 25 MG/1
100 TABLET, FILM COATED ORAL EVERY 8 HOURS
Status: DISCONTINUED | OUTPATIENT
Start: 2022-09-26 | End: 2022-10-05 | Stop reason: HOSPADM

## 2022-09-26 RX ORDER — INSULIN ASPART 100 [IU]/ML
3 INJECTION, SOLUTION INTRAVENOUS; SUBCUTANEOUS
Status: COMPLETED | OUTPATIENT
Start: 2022-09-26 | End: 2022-09-26

## 2022-09-26 RX ORDER — INSULIN ASPART 100 [IU]/ML
3 INJECTION, SOLUTION INTRAVENOUS; SUBCUTANEOUS ONCE
Status: COMPLETED | OUTPATIENT
Start: 2022-09-26 | End: 2022-09-26

## 2022-09-26 RX ADMIN — HYDROCODONE BITARTRATE AND ACETAMINOPHEN 1 TABLET: 5; 325 TABLET ORAL at 09:09

## 2022-09-26 RX ADMIN — POLYSACCHARIDE-IRON COMPLEX 150 MG: 150 CAPSULE ORAL at 09:09

## 2022-09-26 RX ADMIN — FUROSEMIDE 80 MG: 10 INJECTION, SOLUTION INTRAMUSCULAR; INTRAVENOUS at 08:09

## 2022-09-26 RX ADMIN — METOCLOPRAMIDE 10 MG: 10 TABLET ORAL at 08:09

## 2022-09-26 RX ADMIN — METOCLOPRAMIDE 10 MG: 10 TABLET ORAL at 12:09

## 2022-09-26 RX ADMIN — INSULIN DETEMIR 3 UNITS: 100 INJECTION, SOLUTION SUBCUTANEOUS at 09:09

## 2022-09-26 RX ADMIN — HYDRALAZINE HYDROCHLORIDE 100 MG: 25 TABLET, FILM COATED ORAL at 09:09

## 2022-09-26 RX ADMIN — HYDROMORPHONE HYDROCHLORIDE 0.5 MG: 1 INJECTION, SOLUTION INTRAMUSCULAR; INTRAVENOUS; SUBCUTANEOUS at 09:09

## 2022-09-26 RX ADMIN — CARVEDILOL 25 MG: 12.5 TABLET, FILM COATED ORAL at 08:09

## 2022-09-26 RX ADMIN — FAMOTIDINE 40 MG: 20 TABLET ORAL at 08:09

## 2022-09-26 RX ADMIN — HYDROCODONE BITARTRATE AND ACETAMINOPHEN 1 TABLET: 10; 325 TABLET ORAL at 02:09

## 2022-09-26 RX ADMIN — INSULIN DETEMIR 3 UNITS: 100 INJECTION, SOLUTION SUBCUTANEOUS at 01:09

## 2022-09-26 RX ADMIN — DOCUSATE SODIUM 200 MG: 100 CAPSULE, LIQUID FILLED ORAL at 08:09

## 2022-09-26 RX ADMIN — HYDROCODONE BITARTRATE AND ACETAMINOPHEN 1 TABLET: 10; 325 TABLET ORAL at 06:09

## 2022-09-26 RX ADMIN — INSULIN ASPART 3 UNITS: 100 INJECTION, SOLUTION INTRAVENOUS; SUBCUTANEOUS at 08:09

## 2022-09-26 RX ADMIN — DEXTROSE 250 ML: 10 SOLUTION INTRAVENOUS at 06:09

## 2022-09-26 RX ADMIN — CYANOCOBALAMIN TAB 1000 MCG 1000 MCG: 1000 TAB at 08:09

## 2022-09-26 RX ADMIN — SODIUM ZIRCONIUM CYCLOSILICATE 10 G: 10 POWDER, FOR SUSPENSION ORAL at 03:09

## 2022-09-26 RX ADMIN — NICARDIPINE HYDROCHLORIDE 2 MG/HR: 0.2 INJECTION, SOLUTION INTRAVENOUS at 01:09

## 2022-09-26 RX ADMIN — Medication 16 G: at 03:09

## 2022-09-26 RX ADMIN — HYDRALAZINE HYDROCHLORIDE 75 MG: 25 TABLET, FILM COATED ORAL at 06:09

## 2022-09-26 RX ADMIN — PRENATAL VIT W/ FE FUMARATE-FA TAB 27-0.8 MG 1 TABLET: 27-0.8 TAB at 08:09

## 2022-09-26 RX ADMIN — HYDROMORPHONE HYDROCHLORIDE 0.5 MG: 1 INJECTION, SOLUTION INTRAMUSCULAR; INTRAVENOUS; SUBCUTANEOUS at 01:09

## 2022-09-26 RX ADMIN — HYDRALAZINE HYDROCHLORIDE 100 MG: 25 TABLET, FILM COATED ORAL at 01:09

## 2022-09-26 RX ADMIN — HYDROCODONE BITARTRATE AND ACETAMINOPHEN 1 TABLET: 5; 325 TABLET ORAL at 01:09

## 2022-09-26 RX ADMIN — LORAZEPAM 1 MG: 2 INJECTION INTRAMUSCULAR; INTRAVENOUS at 09:09

## 2022-09-26 RX ADMIN — SODIUM BICARBONATE 650 MG TABLET 1300 MG: at 03:09

## 2022-09-26 RX ADMIN — HYDROMORPHONE HYDROCHLORIDE 0.5 MG: 1 INJECTION, SOLUTION INTRAMUSCULAR; INTRAVENOUS; SUBCUTANEOUS at 05:09

## 2022-09-26 RX ADMIN — HEPARIN SODIUM 5000 UNITS: 5000 INJECTION INTRAVENOUS; SUBCUTANEOUS at 05:09

## 2022-09-26 RX ADMIN — NIFEDIPINE 20 MG: 20 CAPSULE ORAL at 12:09

## 2022-09-26 RX ADMIN — HYDROMORPHONE HYDROCHLORIDE 0.5 MG: 1 INJECTION, SOLUTION INTRAMUSCULAR; INTRAVENOUS; SUBCUTANEOUS at 08:09

## 2022-09-26 RX ADMIN — HYDROCODONE BITARTRATE AND ACETAMINOPHEN 1 TABLET: 10; 325 TABLET ORAL at 11:09

## 2022-09-26 RX ADMIN — HYDROCODONE BITARTRATE AND ACETAMINOPHEN 1 TABLET: 5; 325 TABLET ORAL at 05:09

## 2022-09-26 RX ADMIN — METOCLOPRAMIDE 10 MG: 10 TABLET ORAL at 05:09

## 2022-09-26 RX ADMIN — INSULIN ASPART 5 UNITS: 100 INJECTION, SOLUTION INTRAVENOUS; SUBCUTANEOUS at 12:09

## 2022-09-26 RX ADMIN — INSULIN ASPART 3 UNITS: 100 INJECTION, SOLUTION INTRAVENOUS; SUBCUTANEOUS at 06:09

## 2022-09-26 RX ADMIN — NIFEDIPINE 60 MG: 30 TABLET, FILM COATED, EXTENDED RELEASE ORAL at 08:09

## 2022-09-26 RX ADMIN — SIMETHICONE 80 MG: 80 TABLET, CHEWABLE ORAL at 08:09

## 2022-09-26 RX ADMIN — SODIUM BICARBONATE 650 MG TABLET 1300 MG: at 08:09

## 2022-09-26 RX ADMIN — NICARDIPINE HYDROCHLORIDE 5 MG/HR: 0.2 INJECTION, SOLUTION INTRAVENOUS at 01:09

## 2022-09-26 RX ADMIN — MAGNESIUM SULFATE HEPTAHYDRATE 2 G: 40 INJECTION, SOLUTION INTRAVENOUS at 04:09

## 2022-09-26 RX ADMIN — MUPIROCIN: 20 OINTMENT TOPICAL at 08:09

## 2022-09-26 RX ADMIN — MUPIROCIN: 20 OINTMENT TOPICAL at 09:09

## 2022-09-26 NOTE — ASSESSMENT & PLAN NOTE
- Decision made to proceed with delivery in setting of cHTN with ARANZA with SF (Cr)  - Home regimen: hydralazine 50 mg TID, labetalol 200 mg BID, and procardia 90 mg BID  - Current BP regimen: hydralazine 100 mg TID, labetalol 200 mg BID, and procardia 60 mg BID --> per nephrology d/c labetalol and add coreg 25 mg BID  - Currently in ICU on cardene drip secondary to continued sustained severe range BP after delivery requiring labetalol 20/40/80, hydral 5/5/10/10, and procardia 10/20  - Continue mag level checks q 4 hours, will rebolus with 2g IV mag if subtherapeutic  - most recent Mag 5, repeat labs due at 1300  - Pre-E labs: AST/ALT 43/30, Cr 2.0, Plt 193, PC 8.90  - Will continue to up-titrate BP meds as needed  - Will start prophylactic heparin 5000 mg BID tonight for DVT prophylaxis   - Asymptomatic  - UOP: .76 cc/kg/hr over last 24 hours

## 2022-09-26 NOTE — NURSING TRANSFER
Nursing Transfer Note      9/26/2022     Reason patient is being transferred: needs Cardene drip.     Transfer From: L&D    Transfer via stretcher    Transfer with cardiac monitoring    Transported by NIDHI Berry    Medicines sent: no    Any special needs or follow-up needed: no    Chart send with patient: Yes    Notified: family at bedside    Patient reassessed at: 9/26 0200 (date, time)    Upon arrival to floor: cardiac monitor applied, patient oriented to room, call bell in reach, and bed in lowest position

## 2022-09-26 NOTE — CARE UPDATE
Reviewed elevated BP with patient and MFM fellow on call.    Patient has been persistently severe range. During her CS, she required labetalol 45, then hydralazine 5/10 with improvement of BP to mild range.    Around 1830, patient became severe range again. She required labetalol 20/40/80 to control BP to mild range. At 2115, she again became severe, requiring hydralazine 5/5/10 and now procardia IR 10/20 with continued severe range BP.    Discussed BP with MFM fellow on call. Due to poorly controlled BP, will move patient to ICU for cardene gtt for better BP control. Pulmonary critical care consulted, appreciate assistance.    Archana Will MD/MPH  OB/GYN PGY3

## 2022-09-26 NOTE — PROGRESS NOTES
[facility]ensive [facility]  Obstetrics  Postpartum Progress Note    Patient Name: Alicia Valles  MRN: [id number]  Admission Date: 2022  Hospital Length of Stay: 4 days  Attending Physician: Praveen Benítez MD  Primary Care Provider: Giovanna Haytt MD    Subjective:     Principal Problem:S/P primary low transverse     Hospital Course:  2022: admit for BP monitoring for cHTN w/ ARANZA vs cHTN exacerbation. CLD, cont monitoring. Rocephin for UTI. Continue levimir 10/8, BG Q4 while on CLD. Labs per nephrology recs pending.   2022 - HD#2. Received 5 units of aspart since admission for elevated BG. No obstetric complaints. Monitoring reassuring. Magnesium boluses continued for seizure prophylaxis and fetal neuroprotection in setting of elevated Cr (1.7). K 5.8 this AM  2022 - HD#3. BP meds changed yesterday to labetalol 200 mg BID, procardia 60 BID, and hydralazine 75 mg TID. Patient had persistently elevated blood sugars yesterday requiring multiple slides with insulin aspart. BMZ 1/2 given. Insulin drip started at 1 u/hr in anticipation of hyperglycemia secondary to steroids. Patient had hypoglycemic episode overnight. Reported left sided pelvic pain overnight, SVE closed. No other obstetric complaints. No pre-E symptoms.   2022 - HD#4. IOL started secondary to cHTN with ARANZA with SF (Cr).   2022 - HD#5. POD#1 s/p pLTCS. Multiple episodes of hypoglycemia overnight. Admitted to ICU for cardene drip in setting of uncontrolled sustained severe range BP requiring hydral 5/10//10, procardia 10/20, and labetalol 20/40/80 after delivery.       Interval History:     She is doing well this morning. She has not had anything to eat since delivery. She is voiding via champagne catheter. She is not ambulating. She has not passed flatus, and has not a BM. Vaginal bleeding is mild. She denies fever or chills. Abdominal pain is moderate and somewhat controlled with oral medications.      Objective:     Vital Signs (Most Recent):  Temp: 98.4 °F (36.9 °C) (09/26/22 0200)  Pulse: 96 (09/26/22 0645)  Resp: (!) 23 (09/26/22 0645)  BP: (!) 156/91 (09/26/22 0645)  SpO2: 99 % (09/26/22 0645)   Vital Signs (24h Range):  Temp:  [97.6 °F (36.4 °C)-98.7 °F (37.1 °C)] 98.4 °F (36.9 °C)  Pulse:  [] 96  Resp:  [12-23] 23  SpO2:  [93 %-100 %] 99 %  BP: (126-199)/() 156/91     Weight: 81.2 kg (179 lb 0.2 oz)  Body mass index is 33.82 kg/m².      Intake/Output Summary (Last 24 hours) at 9/26/2022 0711  Last data filed at 9/26/2022 0642  Gross per 24 hour   Intake 2645.92 ml   Output 1795 ml   Net 850.92 ml         Significant Labs:  Lab Results   Component Value Date    GROUPTRH A POS 09/24/2022    HEPBSAG Negative 07/21/2022    STREPBCULT Normal cervicovaginal patricia present 09/24/2022     Recent Labs   Lab 09/25/22  2230   HGB 9.7*   HCT 27.2*       CBC:   Recent Labs   Lab 09/25/22  2230   WBC 17.59*   RBC 3.03*   HGB 9.7*   HCT 27.2*      MCV 90   MCH 32.0*   MCHC 35.7     CMP:   Recent Labs   Lab 09/26/22  0327   GLU 65*   CALCIUM 7.1*   ALBUMIN 1.8*   PROT 4.9*   *   K 4.7   CO2 18*      BUN 32*   CREATININE 2.0*   ALKPHOS 88   ALT 33   AST 46*   BILITOT 0.2     I have personallly reviewed all pertinent lab results from the last 24 hours.    Physical Exam:   Constitutional: She is oriented to person, place, and time. She appears well-developed and well-nourished.    HENT:   Head: Normocephalic and atraumatic.       Pulmonary/Chest: Effort normal. No respiratory distress.        Abdominal: Soft. She exhibits abdominal incision (bandage in place, no areas of saturation). She exhibits no distension. There is no abdominal tenderness. There is no rebound and no guarding.   Abdomen appropriately tender, no fundal tenderness appreciated     Genitourinary:    Genitourinary Comments: BL labial swelling noted, much improved from yesterday's exam. RN present as chaperone for exam              Musculoskeletal: Normal range of motion. Edema (pitting edema up to hips BL) present.       Neurological: She is alert and oriented to person, place, and time.    Skin: Skin is warm and dry.    Psychiatric: She has a normal mood and affect.     Assessment/Plan:     27 y.o. female  for:    * S/P primary low transverse   - POD#1 s/p pLTCS  - Pain somewhat controlled on current regimen, will schedule norco & add dilaudid for breakthrough pain  - Continue routine management and advances    Chronic hypertension with superimposed pre-eclampsia  - Decision made to proceed with delivery in setting of cHTN with ARANZA with SF (Cr)  - Home regimen: hydralazine 50 mg TID, labetalol 200 mg BID, and procardia 90 mg BID  - Current BP regimen: hydralazine 100 mg TID, labetalol 200 mg BID, and procardia 60 mg BID --> per nephrology d/c labetalol and add coreg 25 mg BID  - Currently in ICU on cardene drip secondary to continued sustained severe range BP after delivery requiring labetalol 20/40/80, hydral 5/5/10/10, and procardia 10/20  - Continue mag level checks q 4 hours, will rebolus with 2g IV mag if subtherapeutic  - most recent Mag 5, repeat labs due at 1300  - Pre-E labs: AST/ALT 43/30, Cr 2.0, Plt 193, PC 8.90  - Will continue to up-titrate BP meds as needed  - Will start prophylactic heparin 5000 mg BID tonight for DVT prophylaxis   - Asymptomatic  - UOP: .76 cc/kg/hr over last 24 hours    KOBY (acute kidney injury)  - Baseline Cr 1.1 - 1.2  - Cr on admit 1.5 > 1.9 > 2.3 > 2.0 > 1.9 > 2.0  - Avoid nephrotoxic agents  - Nephrology consulted, appreciate assistance  - Continue 2g mag boluses if mag level subtherapeutic (< 4.8)       UTI (urinary tract infection)  - UA consistent with UTI  - urine culture negative  - s/p rocephin in [facility]    Gastroparesis due to DM  - continue home famotidine and scheduled reglan    Anxiety  - no medications  - mood stable    Proteinuria  - follows with nephrology as  outpatient  - Initial P:C of 3.75 downtrending to 1.76  - most recent 24 hour urine with 6.27g  - Repeat PC 17.81  - Will order labs per nephrology: microalbumin/creatinine ratio, YARON, anti-ds-DNA pending   - lipid panel: elevated cholesterol and HDL  - Lovenox 40 mg qd for DVT prophylaxis  - Nephrology consulted, appreciate assistance    Anemia  - normocytic anemia with most recent iron panel with iron of 84, transferrin 180, TIBC 266, 32% saturation and ferritin 79  - Recommended nephrology labs:   - Folate, B12, homocysteine - WNL   - methylmalonic acid pending  - H/h 6.7/19.5 yesterday, given 1 unit during IOL  - Received additional unit of pRBC intra-op   -  cc  - H/h post delivery 7.5/21.7, given one additional unit  - H/h post 3 units pRBC 9.5/27.2  - Repeat CBC ordered for today    - Continue iron/colace     Type 1 diabetes mellitus with other specified complication  - Home regimen: levemir 10/8, CR 1:7, ISF: 1:50 > 120  - Most recent A1c 6.7%  - BG patterning: q 1 hour overnight    - Range   - Patient with hypoglycemic episode overnight to 40- 50s --> improved with glucose tabs D10 bolus  - BG patterning: continue q 1 hour check    - CR 1:20, ISF 1:50 > 180  - follows closely with endocrine  - Diabetic diabetic ordered   - Endocrine consulted, appreciated recommendations  - Hypoglycemia order set in place               Disposition: As patient meets milestones, will plan to discharge as appropriate    Carly Maria MD  Obstetrics  [facility] ([place])

## 2022-09-26 NOTE — ASSESSMENT & PLAN NOTE
- follows with nephrology as outpatient  - Initial P:C of 3.75 downtrending to 1.76  - most recent 24 hour urine with 6.27g  - Repeat PC 17.81  - Will order labs per nephrology: microalbumin/creatinine ratio, YARON, anti-ds-DNA pending   - lipid panel: elevated cholesterol and HDL  - Lovenox 40 mg qd for DVT prophylaxis  - Nephrology consulted, appreciate assistance

## 2022-09-26 NOTE — ASSESSMENT & PLAN NOTE
- Baseline Cr 1.1 - 1.2  - Cr on admit 1.5 > 1.9 > 2.3 > 2.0 > 1.9 > 2.0  - Avoid nephrotoxic agents  - Nephrology consulted, appreciate assistance  - Continue 2g mag boluses if mag level subtherapeutic (< 4.8)

## 2022-09-26 NOTE — ASSESSMENT & PLAN NOTE
- Home regimen: levemir 10/8, CR 1:7, ISF: 1:50 > 120  - Most recent A1c 6.7%  - BG patterning: q 1 hour overnight    - Range   - Patient with hypoglycemic episode overnight to 40- 50s --> improved with glucose tabs D10 bolus  - BG patterning: continue q 1 hour check    - CR 1:20, ISF 1:50 > 180  - follows closely with endocrine  - Diabetic diabetic ordered   - Endocrine consulted, appreciated recommendations  - Hypoglycemia order set in place

## 2022-09-26 NOTE — PROGRESS NOTES
Pt being transported to ICU via stretcher. RN and charge RN escorting pt. Pt belongings with partner and mother. Side rails up, safety precautions maintained.

## 2022-09-26 NOTE — PLAN OF CARE
Plan of Care     Critical Care consulted for hypertensive emergency in the setting of pre-eclampsia. Patient is a 27yoF  with pre eclampsia with severe features. The patient was delivered yesterday via  given persistently elevated BP. The pt subsequently developed elevated BP 170s/110s. The pt was unable to be controlled with labetalol and hydralazine pushes. She was started on nicardipine with magnesium boluses due to kidney injury and concern for toxicity. The pt is currently in the Medical ICU for monitoring and nicardipine ggt titration. Pt continued on home meds.   - continue cardene with goal SBP <140/90   - mg level in am   - continue home po meds   - continue BID BMP   - strict I/Os   - DTR and respiratory monitoring   - consider nephrology consult     Full consult note to come in the morning.     Kristen Newby MD  Critical Care Fellow  3:02 AM

## 2022-09-26 NOTE — SUBJECTIVE & OBJECTIVE
Interval History:     She is doing well this morning. She has not had anything to eat since delivery. She is voiding via champagne catheter. She is not ambulating. She has not passed flatus, and has not a BM. Vaginal bleeding is mild. She denies fever or chills. Abdominal pain is moderate and somewhat controlled with oral medications.     Objective:     Vital Signs (Most Recent):  Temp: 98.4 °F (36.9 °C) (09/26/22 0200)  Pulse: 96 (09/26/22 0645)  Resp: (!) 23 (09/26/22 0645)  BP: (!) 156/91 (09/26/22 0645)  SpO2: 99 % (09/26/22 0645)   Vital Signs (24h Range):  Temp:  [97.6 °F (36.4 °C)-98.7 °F (37.1 °C)] 98.4 °F (36.9 °C)  Pulse:  [] 96  Resp:  [12-23] 23  SpO2:  [93 %-100 %] 99 %  BP: (126-199)/() 156/91     Weight: 81.2 kg (179 lb 0.2 oz)  Body mass index is 33.82 kg/m².      Intake/Output Summary (Last 24 hours) at 9/26/2022 0711  Last data filed at 9/26/2022 0642  Gross per 24 hour   Intake 2645.92 ml   Output 1795 ml   Net 850.92 ml         Significant Labs:  Lab Results   Component Value Date    GROUPTRH A POS 09/24/2022    HEPBSAG Negative 07/21/2022    STREPBCULT Normal cervicovaginal patricia present 09/24/2022     Recent Labs   Lab 09/25/22 2230   HGB 9.7*   HCT 27.2*       CBC:   Recent Labs   Lab 09/25/22 2230   WBC 17.59*   RBC 3.03*   HGB 9.7*   HCT 27.2*      MCV 90   MCH 32.0*   MCHC 35.7     CMP:   Recent Labs   Lab 09/26/22  0327   GLU 65*   CALCIUM 7.1*   ALBUMIN 1.8*   PROT 4.9*   *   K 4.7   CO2 18*      BUN 32*   CREATININE 2.0*   ALKPHOS 88   ALT 33   AST 46*   BILITOT 0.2     I have personallly reviewed all pertinent lab results from the last 24 hours.    Physical Exam:   Constitutional: She is oriented to person, place, and time. She appears well-developed and well-nourished.    HENT:   Head: Normocephalic and atraumatic.       Pulmonary/Chest: Effort normal. No respiratory distress.        Abdominal: Soft. She exhibits abdominal incision (bandage in place, no  areas of saturation). She exhibits no distension. There is no abdominal tenderness. There is no rebound and no guarding.   Abdomen appropriately tender, no fundal tenderness appreciated     Genitourinary:    Genitourinary Comments: BL labial swelling noted, much improved from yesterday's exam. RN present as chaperone for exam             Musculoskeletal: Normal range of motion. Edema (pitting edema up to hips BL) present.       Neurological: She is alert and oriented to person, place, and time.    Skin: Skin is warm and dry.    Psychiatric: She has a normal mood and affect.

## 2022-09-26 NOTE — ASSESSMENT & PLAN NOTE
- POD#1 s/p pLTCS  - Pain somewhat controlled on current regimen, will schedule norco & add dilaudid for breakthrough pain  - Continue routine management and advances

## 2022-09-26 NOTE — CONSULTS
"Pulm/CC Fellow Consult Note    Attending Physician: Praveen Benítez MD      Date of Admit: 2022    Chief Complaint: Preeclampsia    History of Present Illness:  Alicia Valles is a 27 y.o.  female  with a PMH of poorly controlled HTN and DM1 who presented for pre-eclampsia, delivered via C section . She was stepped up to the ICU overnight  for cardene gtt for BP control.     Patient complains only of pain related to edema. At baseline has hypertension that per mom may not be well controlled.     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        Past Surgical History:  Past Surgical History:   Procedure Laterality Date    RETINAL DETACHMENT SURGERY  2018    RETINAL DETACHMENT SURGERY         Allergies:  Review of patient's allergies indicates:  No Known Allergies      Family History:  Family History   Problem Relation Age of Onset    No Known Problems Father     Cancer Paternal Grandfather         unsure    Diabetes Maternal Grandfather     Hypertension Mother        Social History:  Social History     Tobacco Use    Smoking status: Never    Smokeless tobacco: Never   Substance Use Topics    Alcohol use: Not Currently    Drug use: Never       Review of Systems:  Comprehensive ROS negative except as per HPI       Objective:   Last 24 Hour Vital Signs:  BP  Min: 126/69  Max: 199/117  Temp  Av.2 °F (36.8 °C)  Min: 97.6 °F (36.4 °C)  Max: 98.7 °F (37.1 °C)  Pulse  Av.8  Min: 79  Max: 103  Resp  Av.3  Min: 12  Max: 23  SpO2  Av.5 %  Min: 93 %  Max: 100 %  Height  Av' 1" (154.9 cm)  Min: 5' 1" (154.9 cm)  Max: 5' 1" (154.9 cm)  Weight  Av.2 kg (179 lb 0.2 oz)  Min: 81.2 kg (179 lb 0.2 oz)  " Max: 81.2 kg (179 lb 0.2 oz)  Body mass index is 33.82 kg/m².  I/O last 3 completed shifts:  In: 4226.5 [P.O.:360; I.V.:2290.6; Blood:818.7; IV Piggyback:757.2]  Out: 2820 [Urine:2510; Blood:310]    Physical Exam:  General: Alert and awake in NAD  HENT:  Periorbital edema, EOMI  Cardio:  Regular rate and rhythm with normal S1 and S2; no murmurs or rubs  Resp:  CTAB; respirations unlabored; no wheezes, crackles or rhonchi  Abdom:  Soft, tender to palpation around c section incision  Extrem:  Anasarcic, 3+ edema  Pulses:  2+ and symmetric distally  Neuro:  AAOx3; cooperative and pleasant with no focal deficits    Personal Review and Summary of Interval Diagnostics    Laboratory Studies:   Recent Labs   Lab 22  1540   PH 7.302*   PCO2 38.5   PO2 22*   HCO3 19.0*   POCSATURATED 31*   BE -7     Recent Labs   Lab 22  2230   WBC 17.59*   RBC 3.03*   HGB 9.7*   HCT 27.2*      MCV 90   MCH 32.0*   MCHC 35.7     Recent Labs   Lab 22  0327   *   K 4.7      CO2 18*   BUN 32*   CREATININE 2.0*   MG 4.7*       Microbiology Data:   Microbiology Results (last 7 days)       Procedure Component Value Units Date/Time    Strep B Screen, Vaginal / Rectal [[id number]] Collected: 22 0207    Order Status: Completed Specimen: Vaginal/Rectal Updated: 22 1240     Strep B Culture Normal cervicovaginal patricia present    Narrative:      Allergic to Penicillin->No    Strep B Screen, Vaginal / Rectal [[id number]] Collected: 22 2335    Order Status: Canceled Specimen: Vaginal/Rectal             Chest Imaging:   None     Assessment & Plan:     Alicia Valles is a 27 y.o.  female  with a PMH of poorly controlled HTN and DM1 who presented for pre-eclampsia, delivered via C section . She was stepped up to the ICU overnight  for cardene gtt for BP control.     #Hypertension   #Volume overload  #KOBY  - Being managed by nephrology  - Outpatient patient is on nifedipine, labetalol,  hydralazine  - Here on nifedipine 60mg BID, hydral increased to 100 q8 and coreg 25 BID. Agree with uptitration of oral antihypertensives.  - Strongly agree with aggressive diuresis. Goal net neg 2L / day  - Anasarca likely related to ecclampsia/proteinuria and volume overload, but recommend TTE to rule out any cardiac component    #DM1  - Labile BG, endocrinology consulted will defer management to them    Thank you for the consult, please call with any questions.     Jahaira Flowers MD  Fellow  Pulmonary & CCM

## 2022-09-26 NOTE — ASSESSMENT & PLAN NOTE
- normocytic anemia with most recent iron panel with iron of 84, transferrin 180, TIBC 266, 32% saturation and ferritin 79  - Recommended nephrology labs:   - Folate, B12, homocysteine - WNL   - methylmalonic acid pending  - H/h 6.7/19.5 yesterday, given 1 unit during IOL  - Received additional unit of pRBC intra-op   -  cc  - H/h post delivery 7.5/21.7, given one additional unit  - H/h post 3 units pRBC 9.5/27.2  - Repeat CBC ordered for today    - Continue iron/colace

## 2022-09-26 NOTE — PROGRESS NOTES
"Progress Note  Nephrology    Consult Requested By: Praveen Benítez MD  Reason for Consult: KOBY/>K/Nephrotic Syn    SUBJECTIVE:     History of Present Illness from initial consultation:  "Patient is a 27 y.o. female presents with admission from Nephrology appt yesterday with N/V/Acc HTN and feeling poorly.  Pt is 27+W accidental pregnancy with uncontrolled type 1 dm, nephrotic syn, uncontrolled HTN.  Initial /100, Creat 1.5, K 5.8, urine PC 17 (falsely high with blood but hx of 6+).  Admitted to OB floor for mgmt of HTN.  Trends noted and now with Creat 1.7, K 6.0, Bicarb 17.  Consulted for evaluation.  Pt seen and examined with [facility] staff-team at bedside.  Of note pt has been refusing some meds/treatment modalities/etc.  Extensive discussion about plan of care and consequences of refusal.  Nephrotic w/up ordered earlier but suspect due to uncontrolled Type 1 DM/Uncontrolled HTN. Pt admits to taking Excedrin and occasional NSAIDS for her gastroparesis.  Diet habits are uncontrolled."     Interval History:    Events noted.  S/P emergent  for Pre-E.  Transferred to ICU for cardene drip.  Now with severe anasarca and s/s volume overload.  Discussed with CCT.  Labs noted.        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      Past Surgical History:   Procedure Laterality Date    RETINAL DETACHMENT SURGERY  2018    RETINAL DETACHMENT SURGERY       Family History   Problem Relation Age of Onset    No Known Problems Father     Cancer Paternal Grandfather         unsure    Diabetes Maternal Grandfather     Hypertension Mother      Social History     Tobacco Use    Smoking status: Never    Smokeless tobacco: Never   Substance Use Topics    Alcohol use: " Not Currently    Drug use: Never       Review of patient's allergies indicates:  No Known Allergies     Review of Systems:  Constitutional: No fever or chills  Respiratory: No cough or shortness of breath  Cardiovascular: No chest pain or palpitations  Gastrointestinal: No nausea or vomiting  Neurological: No confusion or weakness    OBJECTIVE:     Vital Signs (Most Recent)  Temp: 98.4 °F (36.9 °C) (09/26/22 0200)  Pulse: 96 (09/26/22 0645)  Resp: (!) 23 (09/26/22 0645)  BP: (!) 156/91 (09/26/22 0645)  SpO2: 99 % (09/26/22 0645)    Vital Signs Range (Last 24H):  Temp:  [97.6 °F (36.4 °C)-98.7 °F (37.1 °C)]   Pulse:  []   Resp:  [12-23]   BP: (126-199)/()   SpO2:  [93 %-100 %]       Intake/Output Summary (Last 24 hours) at 9/26/2022 0848  Last data filed at 9/26/2022 0642  Gross per 24 hour   Intake 2499.86 ml   Output 1605 ml   Net 894.86 ml         Physical Exam:  Anasarca  RRR  Diminished lungs.  +distention, hypo BS  -champagne  Edema ++++    Laboratory:  Recent Labs   Lab 09/25/22  2230   WBC 17.59*   RBC 3.03*   HGB 9.7*   HCT 27.2*      MCV 90   MCH 32.0*   MCHC 35.7       BMP:   Recent Labs   Lab 09/26/22  0327   GLU 65*   *   K 4.7      CO2 18*   BUN 32*   CREATININE 2.0*   CALCIUM 7.1*   MG 4.7*       Lab Results   Component Value Date    CALCIUM 7.1 (L) 09/26/2022    PHOS 5.0 (H) 09/24/2022     BNP  No results for input(s): BNP, BNPTRIAGEBLO in the last 168 hours.No results found for: URICACID  Lab Results   Component Value Date    IRON 84 05/06/2022    TIBC 266 05/06/2022    FERRITIN 79 05/06/2022     Lab Results   Component Value Date    CALCIUM 7.1 (L) 09/26/2022    PHOS 5.0 (H) 09/24/2022       Diagnostic Results:  No orders to display       ASSESSMENT/PLAN:     Oliguric/nonoliguric KOBY on CKD II with nephrotic syndrome secondary to uncontrolled DM, uncontrolled HTN, Preeclampsia with severe features.  -Followed with  nephrology at [facility] Dr. Cheek/Liliane Jones trends noted.  1.5 -1.9 - 2.0 - 2.3 - 2.1 - 2.0  -Repeat UPCR 9 grams - baseline historically 6 grams  -avoid NSAIDS.    -no need for RRT yet  -continue lovenox for now.  -platelets dropping, anemia, <Hapto, worsening KOBY:  checking ADAMTS now.  -now with worsening anasarca and volume overload.  Dose lasix now.   -Renally dose meds, avoid nephrotoxins, and monitor I/O's closely.      Hyperkalemia with NAGMA, hyponatremia  -s/ p Lokelma TID and sodium bicarb to shift and correct acidosis  -stable.     Uncontrolled HTN  -max procardia 60 BID  -increased hydralazine to 100 TID  -change to coreg for antiprotenuric effects.   -wean off cardene    Uncontrolled Type 1 DM  -formerly on insulin pump until she had a hypoglycemic seizure and then transitioned to MDI  -she was not wearing her sensor was cause of this but highly recommend retry with closed loop system.  -defer to Endo     Pregnancy, Extremely high risk  -per MFM/OB  -s/p emergent .    Leukocystosis without fever or S/S infection  -Due to steroid therapy    Anemia of pregnancy/CKD  -B12 and folate both on low side now on supplementation  -on iron  -Epo is contraindicated  -as above.  Checking ADAMTS.      Total critical care time (exclusive of procedural time) : 35 minutes  Critical care was necessary to treat or prevent imminent or life-threatening deterioration of the following conditions: renal failure, Pre-E, HTN, Anasarca, Nephrotic syn     Critical care was time spent personally by me on the following activities: development of treatment plan with patient or surrogate, discussions with consultants, interpretation of cardiac output measurements, examination of patient, ordering and performing treatments and interventions, evaluation of patient's response to treatment, obtaining history from patient or surrogate, ordering and review of laboratory studies, ordering and review of radiographic studies, re-evaluation of patient's condition, pulse oximetry and blood  draw for specimens

## 2022-09-26 NOTE — EICU
I have reviewed the chart, spoke with the nurse, the patient's chemical, the blood work.    This is a 27-year-old who came in with preeclampsia she had a  and she was transferred on the cart currently his blood pressure is controlled at 132/72 is afebrile temperature of 98.4  1897% on room air.    Patient looks to be comfortable there are no active issues of note the patient has a degree of acute renal failure most likely secondary to preeclampsia to be watched

## 2022-09-27 PROBLEM — E10.65 TYPE 1 DIABETES MELLITUS WITH HYPERGLYCEMIA: Status: ACTIVE | Noted: 2022-09-27

## 2022-09-27 PROBLEM — E10.65 TYPE 1 DIABETES MELLITUS WITH HYPERGLYCEMIA: Status: RESOLVED | Noted: 2022-09-26 | Resolved: 2022-09-27

## 2022-09-27 LAB
ADAMTS13 ACTIVITY: NORMAL %
ALBUMIN SERPL BCP-MCNC: 1.8 G/DL (ref 3.5–5.2)
ALBUMIN SERPL BCP-MCNC: 1.8 G/DL (ref 3.5–5.2)
ALP SERPL-CCNC: 100 U/L (ref 55–135)
ALP SERPL-CCNC: 97 U/L (ref 55–135)
ALT SERPL W/O P-5'-P-CCNC: 23 U/L (ref 10–44)
ALT SERPL W/O P-5'-P-CCNC: 26 U/L (ref 10–44)
ANA PATTERN 1: NORMAL
ANA PATTERN 2: NORMAL
ANA SER QL IF: POSITIVE
ANA TITER 2: NORMAL
ANA TITR SER IF: NORMAL {TITER}
ANION GAP SERPL CALC-SCNC: 10 MMOL/L (ref 8–16)
ANION GAP SERPL CALC-SCNC: 8 MMOL/L (ref 8–16)
ASCENDING AORTA: 2.2 CM
AST SERPL-CCNC: 29 U/L (ref 10–40)
AST SERPL-CCNC: 35 U/L (ref 10–40)
AV INDEX (PROSTH): 0.6
AV MEAN GRADIENT: 11 MMHG
AV PEAK GRADIENT: 19 MMHG
AV VALVE AREA: 1.36 CM2
AV VELOCITY RATIO: 0.65
BILIRUB SERPL-MCNC: 0.2 MG/DL (ref 0.1–1)
BILIRUB SERPL-MCNC: 0.2 MG/DL (ref 0.1–1)
BSA FOR ECHO PROCEDURE: 1.91 M2
BUN SERPL-MCNC: 34 MG/DL (ref 6–20)
BUN SERPL-MCNC: 37 MG/DL (ref 6–20)
CALCIUM SERPL-MCNC: 7.5 MG/DL (ref 8.7–10.5)
CALCIUM SERPL-MCNC: 7.7 MG/DL (ref 8.7–10.5)
CHLORIDE SERPL-SCNC: 105 MMOL/L (ref 95–110)
CHLORIDE SERPL-SCNC: 106 MMOL/L (ref 95–110)
CO2 SERPL-SCNC: 18 MMOL/L (ref 23–29)
CO2 SERPL-SCNC: 20 MMOL/L (ref 23–29)
CREAT SERPL-MCNC: 2.1 MG/DL (ref 0.5–1.4)
CREAT SERPL-MCNC: 2.1 MG/DL (ref 0.5–1.4)
CV ECHO LV RWT: 0.43 CM
DOP CALC AO PEAK VEL: 2.19 M/S
DOP CALC AO VTI: 38.9 CM
DOP CALC LVOT AREA: 2.3 CM2
DOP CALC LVOT DIAMETER: 1.7 CM
DOP CALC LVOT PEAK VEL: 1.43 M/S
DOP CALC LVOT STROKE VOLUME: 53.09 CM3
DOP CALCLVOT PEAK VEL VTI: 23.4 CM
E WAVE DECELERATION TIME: 191.16 MSEC
E/E' RATIO: 12.18 M/S
ECHO LV POSTERIOR WALL: 0.9 CM (ref 0.6–1.1)
EJECTION FRACTION: 60 %
EST. GFR  (NO RACE VARIABLE): 32 ML/MIN/1.73 M^2
EST. GFR  (NO RACE VARIABLE): 32 ML/MIN/1.73 M^2
FRACTIONAL SHORTENING: 33 % (ref 28–44)
GLUCOSE SERPL-MCNC: 185 MG/DL (ref 70–110)
GLUCOSE SERPL-MCNC: 210 MG/DL (ref 70–110)
INTERVENTRICULAR SEPTUM: 0.92 CM (ref 0.6–1.1)
IVC DIAMETER: 1.87 CM
IVRT: 64.7 MSEC
LA MAJOR: 5.86 CM
LA MINOR: 4.7 CM
LA WIDTH: 3.7 CM
LEFT ATRIUM SIZE: 2.48 CM
LEFT ATRIUM VOLUME INDEX MOD: 27.9 ML/M2
LEFT ATRIUM VOLUME INDEX: 22.2 ML/M2
LEFT ATRIUM VOLUME MOD: 51 CM3
LEFT ATRIUM VOLUME: 40.68 CM3
LEFT INTERNAL DIMENSION IN SYSTOLE: 2.76 CM (ref 2.1–4)
LEFT VENTRICLE DIASTOLIC VOLUME INDEX: 41.67 ML/M2
LEFT VENTRICLE DIASTOLIC VOLUME: 76.26 ML
LEFT VENTRICLE MASS INDEX: 65 G/M2
LEFT VENTRICLE SYSTOLIC VOLUME INDEX: 15.6 ML/M2
LEFT VENTRICLE SYSTOLIC VOLUME: 28.62 ML
LEFT VENTRICULAR INTERNAL DIMENSION IN DIASTOLE: 4.15 CM (ref 3.5–6)
LEFT VENTRICULAR MASS: 118.16 G
LV LATERAL E/E' RATIO: 12.18 M/S
LV SEPTAL E/E' RATIO: 12.18 M/S
LVOT MG: 4.5 MMHG
LVOT MV: 0.98 CM/S
METHYLMALONATE SERPL-SCNC: 0.38 UMOL/L
MV PEAK E VEL: 1.34 M/S
MV STENOSIS PRESSURE HALF TIME: 55.44 MS
MV VALVE AREA P 1/2 METHOD: 3.97 CM2
PISA TR MAX VEL: 1.98 M/S
POCT GLUCOSE: 138 MG/DL (ref 70–110)
POCT GLUCOSE: 146 MG/DL (ref 70–110)
POCT GLUCOSE: 170 MG/DL (ref 70–110)
POCT GLUCOSE: 184 MG/DL (ref 70–110)
POCT GLUCOSE: 210 MG/DL (ref 70–110)
POCT GLUCOSE: 217 MG/DL (ref 70–110)
POTASSIUM SERPL-SCNC: 4.9 MMOL/L (ref 3.5–5.1)
POTASSIUM SERPL-SCNC: 5.1 MMOL/L (ref 3.5–5.1)
PROT SERPL-MCNC: 5.7 G/DL (ref 6–8.4)
PROT SERPL-MCNC: 5.7 G/DL (ref 6–8.4)
PULM VEIN S/D RATIO: 2
PV PEAK D VEL: 0.36 M/S
PV PEAK S VEL: 0.72 M/S
PV PEAK VELOCITY: 1.27 CM/S
RA MAJOR: 4.53 CM
RA PRESSURE: 3 MMHG
RA WIDTH: 3.5 CM
SODIUM SERPL-SCNC: 133 MMOL/L (ref 136–145)
SODIUM SERPL-SCNC: 134 MMOL/L (ref 136–145)
STJ: 2.11 CM
TDI LATERAL: 0.11 M/S
TDI SEPTAL: 0.11 M/S
TDI: 0.11 M/S
TR MAX PG: 16 MMHG
TV REST PULMONARY ARTERY PRESSURE: 19 MMHG

## 2022-09-27 PROCEDURE — 80053 COMPREHEN METABOLIC PANEL: CPT | Mod: 91

## 2022-09-27 PROCEDURE — 63600175 PHARM REV CODE 636 W HCPCS

## 2022-09-27 PROCEDURE — 63600175 PHARM REV CODE 636 W HCPCS: Performed by: INTERNAL MEDICINE

## 2022-09-27 PROCEDURE — 25000003 PHARM REV CODE 250: Performed by: STUDENT IN AN ORGANIZED HEALTH CARE EDUCATION/TRAINING PROGRAM

## 2022-09-27 PROCEDURE — 99232 SBSQ HOSP IP/OBS MODERATE 35: CPT | Mod: 24,,, | Performed by: OBSTETRICS & GYNECOLOGY

## 2022-09-27 PROCEDURE — 25000003 PHARM REV CODE 250

## 2022-09-27 PROCEDURE — 63600175 PHARM REV CODE 636 W HCPCS: Performed by: NURSE PRACTITIONER

## 2022-09-27 PROCEDURE — 25000003 PHARM REV CODE 250: Performed by: PSYCHIATRY & NEUROLOGY

## 2022-09-27 PROCEDURE — 20000000 HC ICU ROOM

## 2022-09-27 PROCEDURE — 36415 COLL VENOUS BLD VENIPUNCTURE: CPT

## 2022-09-27 PROCEDURE — 63600175 PHARM REV CODE 636 W HCPCS: Performed by: STUDENT IN AN ORGANIZED HEALTH CARE EDUCATION/TRAINING PROGRAM

## 2022-09-27 PROCEDURE — 25000003 PHARM REV CODE 250: Performed by: NURSE PRACTITIONER

## 2022-09-27 PROCEDURE — 25000003 PHARM REV CODE 250: Performed by: INTERNAL MEDICINE

## 2022-09-27 PROCEDURE — 99232 PR SUBSEQUENT HOSPITAL CARE,LEVL II: ICD-10-PCS | Mod: 24,,, | Performed by: OBSTETRICS & GYNECOLOGY

## 2022-09-27 RX ORDER — HALOPERIDOL 1 MG/1
2 TABLET ORAL EVERY 8 HOURS PRN
Status: DISCONTINUED | OUTPATIENT
Start: 2022-09-27 | End: 2022-10-05 | Stop reason: HOSPADM

## 2022-09-27 RX ORDER — HALOPERIDOL 5 MG/ML
5 INJECTION INTRAMUSCULAR EVERY 6 HOURS PRN
Status: DISCONTINUED | OUTPATIENT
Start: 2022-09-27 | End: 2022-10-05 | Stop reason: HOSPADM

## 2022-09-27 RX ORDER — FUROSEMIDE 10 MG/ML
80 INJECTION INTRAMUSCULAR; INTRAVENOUS
Status: DISCONTINUED | OUTPATIENT
Start: 2022-09-27 | End: 2022-09-27

## 2022-09-27 RX ORDER — INSULIN ASPART 100 [IU]/ML
2 INJECTION, SOLUTION INTRAVENOUS; SUBCUTANEOUS ONCE
Status: COMPLETED | OUTPATIENT
Start: 2022-09-27 | End: 2022-09-27

## 2022-09-27 RX ORDER — METOLAZONE 5 MG/1
5 TABLET ORAL ONCE
Status: COMPLETED | OUTPATIENT
Start: 2022-09-27 | End: 2022-09-27

## 2022-09-27 RX ORDER — DIVALPROEX SODIUM 250 MG/1
500 TABLET, DELAYED RELEASE ORAL NIGHTLY
Status: DISCONTINUED | OUTPATIENT
Start: 2022-09-27 | End: 2022-09-29

## 2022-09-27 RX ORDER — LORAZEPAM 2 MG/ML
0.5 INJECTION INTRAMUSCULAR ONCE
Status: COMPLETED | OUTPATIENT
Start: 2022-09-27 | End: 2022-09-27

## 2022-09-27 RX ORDER — DIPHENHYDRAMINE HYDROCHLORIDE 50 MG/ML
25 INJECTION INTRAMUSCULAR; INTRAVENOUS EVERY 6 HOURS PRN
Status: DISCONTINUED | OUTPATIENT
Start: 2022-09-27 | End: 2022-10-05 | Stop reason: HOSPADM

## 2022-09-27 RX ORDER — CLONIDINE HYDROCHLORIDE 0.1 MG/1
0.1 TABLET ORAL EVERY 6 HOURS PRN
Status: DISCONTINUED | OUTPATIENT
Start: 2022-09-27 | End: 2022-09-28

## 2022-09-27 RX ORDER — INSULIN ASPART 100 [IU]/ML
3 INJECTION, SOLUTION INTRAVENOUS; SUBCUTANEOUS ONCE
Status: COMPLETED | OUTPATIENT
Start: 2022-09-27 | End: 2022-09-27

## 2022-09-27 RX ORDER — INSULIN ASPART 100 [IU]/ML
4 INJECTION, SOLUTION INTRAVENOUS; SUBCUTANEOUS ONCE
Status: COMPLETED | OUTPATIENT
Start: 2022-09-27 | End: 2022-09-27

## 2022-09-27 RX ORDER — DIVALPROEX SODIUM 250 MG/1
250 TABLET, DELAYED RELEASE ORAL DAILY
Status: DISCONTINUED | OUTPATIENT
Start: 2022-09-28 | End: 2022-09-29

## 2022-09-27 RX ORDER — CLONIDINE 0.1 MG/24H
1 PATCH, EXTENDED RELEASE TRANSDERMAL
Status: DISCONTINUED | OUTPATIENT
Start: 2022-09-27 | End: 2022-09-27

## 2022-09-27 RX ORDER — CLONIDINE 0.3 MG/24H
1 PATCH, EXTENDED RELEASE TRANSDERMAL
Status: DISCONTINUED | OUTPATIENT
Start: 2022-09-28 | End: 2022-09-27

## 2022-09-27 RX ORDER — DIPHENHYDRAMINE HCL 25 MG
25 CAPSULE ORAL EVERY 6 HOURS PRN
Status: DISCONTINUED | OUTPATIENT
Start: 2022-09-27 | End: 2022-10-05 | Stop reason: HOSPADM

## 2022-09-27 RX ORDER — LORAZEPAM 1 MG/1
1 TABLET ORAL EVERY 12 HOURS PRN
Status: DISCONTINUED | OUTPATIENT
Start: 2022-09-27 | End: 2022-09-28

## 2022-09-27 RX ORDER — CLONIDINE 0.3 MG/24H
1 PATCH, EXTENDED RELEASE TRANSDERMAL
Status: COMPLETED | OUTPATIENT
Start: 2022-09-27 | End: 2022-10-05

## 2022-09-27 RX ADMIN — INSULIN ASPART 4 UNITS: 100 INJECTION, SOLUTION INTRAVENOUS; SUBCUTANEOUS at 05:09

## 2022-09-27 RX ADMIN — NALBUPHINE HYDROCHLORIDE 2.6 MG: 20 INJECTION, SOLUTION INTRAMUSCULAR; INTRAVENOUS; SUBCUTANEOUS at 11:09

## 2022-09-27 RX ADMIN — HYDROCODONE BITARTRATE AND ACETAMINOPHEN 1 TABLET: 5; 325 TABLET ORAL at 04:09

## 2022-09-27 RX ADMIN — CARVEDILOL 25 MG: 12.5 TABLET, FILM COATED ORAL at 08:09

## 2022-09-27 RX ADMIN — HYDROCODONE BITARTRATE AND ACETAMINOPHEN 1 TABLET: 5; 325 TABLET ORAL at 10:09

## 2022-09-27 RX ADMIN — CLONIDINE 1 PATCH: 0.1 PATCH TRANSDERMAL at 08:09

## 2022-09-27 RX ADMIN — INSULIN ASPART 2 UNITS: 100 INJECTION, SOLUTION INTRAVENOUS; SUBCUTANEOUS at 08:09

## 2022-09-27 RX ADMIN — LORAZEPAM 0.5 MG: 2 INJECTION INTRAMUSCULAR; INTRAVENOUS at 01:09

## 2022-09-27 RX ADMIN — HEPARIN SODIUM 5000 UNITS: 5000 INJECTION INTRAVENOUS; SUBCUTANEOUS at 08:09

## 2022-09-27 RX ADMIN — METOCLOPRAMIDE 10 MG: 10 TABLET ORAL at 02:09

## 2022-09-27 RX ADMIN — HYDRALAZINE HYDROCHLORIDE 100 MG: 25 TABLET, FILM COATED ORAL at 10:09

## 2022-09-27 RX ADMIN — HYDROMORPHONE HYDROCHLORIDE 0.5 MG: 1 INJECTION, SOLUTION INTRAMUSCULAR; INTRAVENOUS; SUBCUTANEOUS at 03:09

## 2022-09-27 RX ADMIN — INSULIN DETEMIR 3 UNITS: 100 INJECTION, SOLUTION SUBCUTANEOUS at 08:09

## 2022-09-27 RX ADMIN — METOCLOPRAMIDE 10 MG: 10 TABLET ORAL at 05:09

## 2022-09-27 RX ADMIN — FUROSEMIDE 80 MG: 10 INJECTION, SOLUTION INTRAMUSCULAR; INTRAVENOUS at 08:09

## 2022-09-27 RX ADMIN — FUROSEMIDE 10 MG/HR: 10 INJECTION, SOLUTION INTRAMUSCULAR; INTRAVENOUS at 06:09

## 2022-09-27 RX ADMIN — DIVALPROEX SODIUM 500 MG: 250 TABLET, DELAYED RELEASE ORAL at 08:09

## 2022-09-27 RX ADMIN — SODIUM BICARBONATE 650 MG TABLET 1300 MG: at 08:09

## 2022-09-27 RX ADMIN — HYDROMORPHONE HYDROCHLORIDE 0.5 MG: 1 INJECTION, SOLUTION INTRAMUSCULAR; INTRAVENOUS; SUBCUTANEOUS at 06:09

## 2022-09-27 RX ADMIN — MUPIROCIN: 20 OINTMENT TOPICAL at 08:09

## 2022-09-27 RX ADMIN — CLONIDINE 1 PATCH: 0.3 PATCH TRANSDERMAL at 04:09

## 2022-09-27 RX ADMIN — HYDROCODONE BITARTRATE AND ACETAMINOPHEN 1 TABLET: 5; 325 TABLET ORAL at 05:09

## 2022-09-27 RX ADMIN — DOCUSATE SODIUM 200 MG: 100 CAPSULE, LIQUID FILLED ORAL at 08:09

## 2022-09-27 RX ADMIN — FAMOTIDINE 40 MG: 20 TABLET ORAL at 08:09

## 2022-09-27 RX ADMIN — NIFEDIPINE 60 MG: 30 TABLET, FILM COATED, EXTENDED RELEASE ORAL at 08:09

## 2022-09-27 RX ADMIN — METOLAZONE 5 MG: 5 TABLET ORAL at 08:09

## 2022-09-27 RX ADMIN — HYDRALAZINE HYDROCHLORIDE 100 MG: 25 TABLET, FILM COATED ORAL at 05:09

## 2022-09-27 RX ADMIN — SODIUM BICARBONATE 650 MG TABLET 1300 MG: at 02:09

## 2022-09-27 RX ADMIN — POLYSACCHARIDE-IRON COMPLEX 150 MG: 150 CAPSULE ORAL at 08:09

## 2022-09-27 RX ADMIN — CLONIDINE HYDROCHLORIDE 0.1 MG: 0.1 TABLET ORAL at 02:09

## 2022-09-27 RX ADMIN — HYDROMORPHONE HYDROCHLORIDE 0.5 MG: 1 INJECTION, SOLUTION INTRAMUSCULAR; INTRAVENOUS; SUBCUTANEOUS at 10:09

## 2022-09-27 RX ADMIN — HYDROMORPHONE HYDROCHLORIDE 0.5 MG: 1 INJECTION, SOLUTION INTRAMUSCULAR; INTRAVENOUS; SUBCUTANEOUS at 08:09

## 2022-09-27 RX ADMIN — NICARDIPINE HYDROCHLORIDE 3.5 MG/HR: 0.2 INJECTION, SOLUTION INTRAVENOUS at 07:09

## 2022-09-27 RX ADMIN — CYANOCOBALAMIN TAB 1000 MCG 1000 MCG: 1000 TAB at 08:09

## 2022-09-27 RX ADMIN — NICARDIPINE HYDROCHLORIDE 4 MG/HR: 0.2 INJECTION, SOLUTION INTRAVENOUS at 05:09

## 2022-09-27 RX ADMIN — METOCLOPRAMIDE 10 MG: 10 TABLET ORAL at 08:09

## 2022-09-27 RX ADMIN — HYDROCODONE BITARTRATE AND ACETAMINOPHEN 1 TABLET: 5; 325 TABLET ORAL at 02:09

## 2022-09-27 RX ADMIN — HYDRALAZINE HYDROCHLORIDE 100 MG: 25 TABLET, FILM COATED ORAL at 02:09

## 2022-09-27 RX ADMIN — HYDROCODONE BITARTRATE AND ACETAMINOPHEN 1 TABLET: 10; 325 TABLET ORAL at 07:09

## 2022-09-27 RX ADMIN — INSULIN ASPART 3 UNITS: 100 INJECTION, SOLUTION INTRAVENOUS; SUBCUTANEOUS at 12:09

## 2022-09-27 NOTE — ASSESSMENT & PLAN NOTE
- normocytic anemia with most recent iron panel with iron of 84, transferrin 180, TIBC 266, 32% saturation and ferritin 79  - Recommended nephrology labs:   - Folate, B12, homocysteine - WNL   - methylmalonic WNL  - s/p 3 units pRBC  - CBC stable this morning H/h 9.8/27.5
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
## 2022-09-27 NOTE — ASSESSMENT & PLAN NOTE
- Decision made to proceed with delivery in setting of cHTN with ARANZA with SF (Cr)  - Home regimen: hydralazine 50 mg TID, labetalol 200 mg BID, and procardia 90 mg BID  - Current BP regimen: hydralazine 100 mg TID, co reg 25 mg BID, procardia 60 mg BID, clonidine patch added  - s/p cardene drip  - Continue lasix drip and minoxidil per nephrology   - s/p magnesium for seizure prophylaxis  - Will continue to up-titrate BP meds as needed  - heparin increased to 5000 mg q8 for DVT prophylaxis   - Asymptomatic  - UOP: 1.13 cc/kg/hr over last 24 hours

## 2022-09-27 NOTE — ASSESSMENT & PLAN NOTE
- Home regimen: levemir 10/8, CR 1:7, ISF: 1:50 > 120  - Most recent A1c 6.7%  - BG patterning: fasting, pre-prandial, 2 hour post-prandial and 2 AM  - Current regimen: levemir 3/3, will increase levemir to 4/4 due to hyperglycemia   - CR 1:20, ISF 1:50 > 180        F          pB/ppB              pL/ppL          pD/ppD        2 AM  9/26                                             133(5)/167       101(3)/120      170  9/27         138       146/(2)               217/184          184/210          74  9/28         186        254(4)/308(3)   199(3)  - follows closely with endocrine  - Diabetic diabetic ordered   - Discussed plan of care with endocrine yesterday, will continue to discuss as needed  - Hypoglycemia order set in place

## 2022-09-27 NOTE — PROGRESS NOTES
Pulmonary & Critical Care Medicine Progress Note    Subjective:   NAEON. Patient remains on 3.5 of cardene. Net neg 380    Past medical, surgical, family, and social history from initial consult was reviewed and verified during today's visit.     Vital Signs:   Vitals:    09/27/22 1400   BP: (!) 160/105   Pulse:    Resp:    Temp:        Fluid Balance:     Intake/Output Summary (Last 24 hours) at 9/27/2022 1412  Last data filed at 9/27/2022 0745  Gross per 24 hour   Intake 1508.83 ml   Output 1420 ml   Net 88.83 ml       Review of Systems:   A comprehensive 12-point review of systems was performed, and is negative except for those items mentioned above in the HPI section of this note.     Physical Exam:   General:              Alert and awake in NAD  HENT:                   Periorbital edema, EOMI  Cardio:                 Regular rate and rhythm with normal S1 and S2; no murmurs or rubs  Resp:                    CTAB; respirations unlabored; no wheezes, crackles or rhonchi  Abdom:                Soft, tender to palpation around c section incision  Extrem:                Anasarcic, 3+ edema  Pulses:                  2+ and symmetric distally  Neuro:                  AAOx3; cooperative and pleasant with no focal deficits    Personal Review and Summary of Interval Diagnostics    Laboratory Studies:   No results for input(s): PH, PCO2, PO2, HCO3, POCSATURATED, BE in the last 24 hours.  No results for input(s): WBC, RBC, HGB, HCT, PLT, MCV, MCH, MCHC in the last 24 hours.  Recent Labs   Lab 09/27/22  0754   *   K 5.1      CO2 18*   BUN 34*   CREATININE 2.1*       Microbiology Data:   Microbiology Results (last 7 days)       Procedure Component Value Units Date/Time    Strep B Screen, Vaginal / Rectal [[id number]] Collected: 09/24/22 0207    Order Status: Completed Specimen: Vaginal/Rectal Updated: 09/26/22 0748     Strep B Culture No Group B Streptococcus isolated    Narrative:      Allergic to Penicillin->No     Strep B Screen, Vaginal / Rectal [[id number]] Collected: 22 9995    Order Status: Canceled Specimen: Vaginal/Rectal           Chest Imaging:     Infusions:     niCARdipine 3.5 mg/hr (22 0704)       Scheduled Medications:    carvediloL  25 mg Oral BID    cloNIDine 0.1 mg/24 hr td ptwk  1 patch Transdermal Q7 Days    cyanocobalamin  1,000 mcg Oral Daily    docusate sodium  200 mg Oral BID    famotidine  40 mg Oral QHS    furosemide (LASIX) injection  80 mg Intravenous Q12H    heparin (porcine)  5,000 Units Subcutaneous Q12H    hydrALAZINE  100 mg Oral Q8H    HYDROcodone-acetaminophen  1 tablet Oral Q4H    insulin aspart U-100  1 Units Subcutaneous Once    insulin detemir U-100  3 Units Subcutaneous Daily    insulin detemir U-100  3 Units Subcutaneous QHS    iron polysaccharides  150 mg Oral Daily    metoclopramide HCl  10 mg Oral QID    mupirocin   Nasal BID    NIFEdipine  60 mg Oral BID    prenatal vitamin  1 tablet Oral Daily    sodium bicarbonate  1,300 mg Oral TID       PRN Medications:   sodium chloride, bisacodyL, cloNIDine, dextrose 10%, dextrose 10%, diphenhydrAMINE, diphenoxylate-atropine 2.5-0.025 mg, glucagon (human recombinant), glucose, glucose, HYDROcodone-acetaminophen, HYDROmorphone, lanolin, magnesium hydroxide 400 mg/5 ml, nalbuphine, ondansetron, prochlorperazine, senna-docusate 8.6-50 mg, simethicone, sodium chloride 0.9%    Impression & Recommendations    Alicia Valles is a 27 y.o.  female  with a PMH of poorly controlled HTN and DM1 who presented for pre-eclampsia, delivered via C section . She was stepped up to the ICU overnight  for cardene gtt for BP control.      #Hypertension   #Volume overload  #KOBY  - Being managed by nephrology  - Outpatient patient is on nifedipine, labetalol, hydralazine  - Here on nifedipine 60mg BID, hydral increased to 100 q8 and coreg 25 BID. Clonidine patch added.  - Strongly agree with aggressive diuresis. Goal net neg 2L / day  -  Anasarca likely related to ecclampsia/proteinuria and volume overload, but recommend TTE to rule out any cardiac component  - Of note, KOBY is not a contraindication to lovenox use for DVT ppx. Lovenox can be used unless ESRD requiring dialysis, can be renally dosed if GFR <30.     #DM1  - Labile BG, endocrinology consulted will defer management to them     Thank you for the consult, pulmonology will sign off. Please call with any questions.     Jahaira Flowers MD  Pulmonary and Critical Care Fellow  09/27/2022  2:12 PM

## 2022-09-27 NOTE — LACTATION NOTE
This note was copied from a baby's chart.  Mother/Baby being followed by lactation.  LC spoke with mother. Mother voiced desire to provide breast milk for baby. Mother very sleepy and not feeling well. LC reviewed mother's medications and assisted mother with initiation of pumping in ICU room. No milk produced with first pumping session. Encouraged mother, grandmother and RNs to assist mother with pumping every 3-4 hours as able. Discussed cleaning of pump parts with mother and ICU RNs. Pump, pumping supplies and labels at bedside. Mother also filled out visitor list for grandmother to visit.   Roxana Eagle, BSN, RNC, CLC, IBCLC

## 2022-09-27 NOTE — SUBJECTIVE & OBJECTIVE
Interval History:     She reports she is doing well this morning. She is tolerating her diabetic diet.. She is voiding via champagne catheter. She is ambulating. She has passed flatus, and has not a BM. Vaginal bleeding is mild. She denies fever or chills. Abdominal pain is mild and well controlled on her current pain regimen.     Objective:     Vital Signs (Most Recent):  Temp: 98.8 °F (37.1 °C) (09/28/22 0701)  Pulse: 106 (09/28/22 1101)  Resp: 11 (09/28/22 1101)  BP: (!) 140/83 (09/28/22 1101)  SpO2: 98 % (09/28/22 1101)   Vital Signs (24h Range):  Temp:  [97.8 °F (36.6 °C)-98.8 °F (37.1 °C)] 98.8 °F (37.1 °C)  Pulse:  [] 106  Resp:  [11-30] 11  SpO2:  [85 %-100 %] 98 %  BP: (140-188)/() 140/83     Weight: 84.3 kg (185 lb 13.6 oz)  Body mass index is 35.12 kg/m².      Intake/Output Summary (Last 24 hours) at 9/28/2022 1148  Last data filed at 9/28/2022 0945  Gross per 24 hour   Intake 1413.7 ml   Output 2400 ml   Net -986.3 ml           Significant Labs:  Lab Results   Component Value Date    GROUPTRH A POS 09/24/2022    HEPBSAG Negative 07/21/2022    STREPBCULT No Group B Streptococcus isolated 09/24/2022     Recent Labs   Lab 09/28/22  0409   HGB 8.9*   HCT 25.4*         CBC:   Recent Labs   Lab 09/28/22  0409   WBC 14.70*   RBC 2.86*   HGB 8.9*   HCT 25.4*      MCV 89   MCH 31.1*   MCHC 35.0       CMP:   Recent Labs   Lab 09/28/22  1034   *   CALCIUM 7.6*   ALBUMIN 1.6*   PROT 5.4*   *   K 4.8   CO2 19*      BUN 40*   CREATININE 2.3*   ALKPHOS 93   ALT 22   AST 29   BILITOT 0.1       I have personallly reviewed all pertinent lab results from the last 24 hours.    Physical Exam:   Constitutional: She is oriented to person, place, and time. She appears well-developed and well-nourished.    HENT:   Head: Normocephalic and atraumatic.       Pulmonary/Chest: Effort normal. No respiratory distress.        Abdominal: Soft. She exhibits abdominal incision (bandage in place, no areas  of saturation). She exhibits no distension. There is no abdominal tenderness. There is no rebound and no guarding.   Abdomen appropriately tender, no fundal tenderness appreciated             Musculoskeletal: Normal range of motion. Edema (pitting edema up to hips BL, much improved from yesterday) present.       Neurological: She is alert and oriented to person, place, and time.    Skin: Skin is warm and dry.    Psychiatric: She has a normal mood and affect.

## 2022-09-27 NOTE — ASSESSMENT & PLAN NOTE
- POD#2 s/p pLTCS  - Pain better controlled on current regimen  - Continue routine management and advances   Pt is here for covid swab only

## 2022-09-27 NOTE — SUBJECTIVE & OBJECTIVE
Interval History:     She is doing well this morning. She is tolerating her diabetic diet.. She is voiding via champagne catheter. She is not ambulating. She has not passed flatus, and has not a BM. Vaginal bleeding is mild. She denies fever or chills. Abdominal pain is moderate and more controlled today than yesterday.     Objective:     Vital Signs (Most Recent):  Temp: 98.2 °F (36.8 °C) (09/27/22 0330)  Pulse: 103 (09/27/22 0515)  Resp: 16 (09/27/22 0659)  BP: (!) 160/93 (09/27/22 0500)  SpO2: 97 % (09/27/22 0515)   Vital Signs (24h Range):  Temp:  [97.7 °F (36.5 °C)-98.6 °F (37 °C)] 98.2 °F (36.8 °C)  Pulse:  [] 103  Resp:  [13-32] 16  SpO2:  [94 %-98 %] 97 %  BP: (141-174)/(74-97) 160/93     Weight: 84.8 kg (186 lb 15.2 oz)  Body mass index is 35.32 kg/m².      Intake/Output Summary (Last 24 hours) at 9/27/2022 0719  Last data filed at 9/27/2022 0545  Gross per 24 hour   Intake 1378.83 ml   Output 1975 ml   Net -596.17 ml           Significant Labs:  Lab Results   Component Value Date    GROUPTRH A POS 09/24/2022    HEPBSAG Negative 07/21/2022    STREPBCULT No Group B Streptococcus isolated 09/24/2022     Recent Labs   Lab 09/26/22 1202   HGB 9.8*   HCT 27.5*         CBC:   Recent Labs   Lab 09/26/22 1202   WBC 17.49*   RBC 3.06*   HGB 9.8*   HCT 27.5*      MCV 90   MCH 32.0*   MCHC 35.6       CMP:   Recent Labs   Lab 09/26/22 1952   *   CALCIUM 7.2*   ALBUMIN 1.8*   PROT 5.4*   *   K 5.1   CO2 16*      BUN 33*   CREATININE 2.0*   ALKPHOS 92   ALT 28   AST 40   BILITOT 0.1       I have personallly reviewed all pertinent lab results from the last 24 hours.    Physical Exam:   Constitutional: She is oriented to person, place, and time. She appears well-developed and well-nourished.    HENT:   Head: Normocephalic and atraumatic.       Pulmonary/Chest: Effort normal. No respiratory distress.        Abdominal: Soft. She exhibits abdominal incision (bandage in place, no areas of  saturation). She exhibits no distension. There is no abdominal tenderness. There is no rebound and no guarding.   Abdomen appropriately tender, no fundal tenderness appreciated             Musculoskeletal: Normal range of motion. Edema (pitting edema up to hips BL) present.       Neurological: She is alert and oriented to person, place, and time.    Skin: Skin is warm and dry.    Psychiatric: She has a normal mood and affect.

## 2022-09-27 NOTE — PROGRESS NOTES
[facility]ensive [facility]  Obstetrics  Postpartum Progress Note    Patient Name: Alicia Valles  MRN: [id number]  Admission Date: 2022  Hospital Length of Stay: 5 days  Attending Physician: Praveen Benítez MD  Primary Care Provider: Giovanna Hyatt MD    Subjective:     Principal Problem:S/P primary low transverse     Hospital Course:  2022: admit for BP monitoring for cHTN w/ ARANZA vs cHTN exacerbation. CLD, cont monitoring. Rocephin for UTI. Continue levimir 10/8, BG Q4 while on CLD. Labs per nephrology recs pending.   2022 - HD#2. Received 5 units of aspart since admission for elevated BG. No obstetric complaints. Monitoring reassuring. Magnesium boluses continued for seizure prophylaxis and fetal neuroprotection in setting of elevated Cr (1.7). K 5.8 this AM  2022 - HD#3. BP meds changed yesterday to labetalol 200 mg BID, procardia 60 BID, and hydralazine 75 mg TID. Patient had persistently elevated blood sugars yesterday requiring multiple slides with insulin aspart. BMZ 1/2 given. Insulin drip started at 1 u/hr in anticipation of hyperglycemia secondary to steroids. Patient had hypoglycemic episode overnight. Reported left sided pelvic pain overnight, SVE closed. No other obstetric complaints. No pre-E symptoms.   2022 - HD#4. IOL started secondary to cHTN with ARANZA with SF (Cr).   2022 - HD#5. POD#1 s/p pLTCS. Multiple episodes of hypoglycemia overnight. Admitted to ICU for cardene drip in setting of uncontrolled sustained severe range BP requiring hydral /10/5/10, procardia 10/20, and labetalol 20/40/80 after delivery.   2022 - HD#6. POD#2 s/p pLTCS. Continued on cardene drip for elevated BP. BP meds adjusted yesterday - labetalol discontinued, hydralazine up-titrated and co-reg added. Started on levemir 3/3 with CR of 1:20 and ISF 1:50 > 180.      Interval History:     She is doing well this morning. She is tolerating her diabetic diet.. She is  voiding via champagne catheter. She is ambulating. She has not passed flatus, and has not a BM. Vaginal bleeding is mild. She denies fever or chills. Abdominal pain is moderate and more controlled today than yesterday. Per RN patient had a panic attack overnight that resolved with ativan. Patient states that her mood is much improved today.     Objective:     Vital Signs (Most Recent):  Temp: 98.9 °F (37.2 °C) (09/27/22 0730)  Pulse: 106 (09/27/22 0901)  Resp: 15 (09/27/22 0901)  BP: (!) 159/94 (09/27/22 0901)  SpO2: 96 % (09/27/22 0901)   Vital Signs (24h Range):  Temp:  [97.7 °F (36.5 °C)-98.9 °F (37.2 °C)] 98.9 °F (37.2 °C)  Pulse:  [] 106  Resp:  [13-32] 15  SpO2:  [94 %-98 %] 96 %  BP: (142-174)/(76-97) 159/94     Weight: 84.8 kg (186 lb 15.2 oz)  Body mass index is 35.32 kg/m².      Intake/Output Summary (Last 24 hours) at 9/27/2022 1000  Last data filed at 9/27/2022 0545  Gross per 24 hour   Intake 1378.83 ml   Output 1975 ml   Net -596.17 ml           Significant Labs:  Lab Results   Component Value Date    GROUPTRH A POS 09/24/2022    HEPBSAG Negative 07/21/2022    STREPBCULT No Group B Streptococcus isolated 09/24/2022     Recent Labs   Lab 09/26/22  1202   HGB 9.8*   HCT 27.5*         CBC:   Recent Labs   Lab 09/26/22  1202   WBC 17.49*   RBC 3.06*   HGB 9.8*   HCT 27.5*      MCV 90   MCH 32.0*   MCHC 35.6       CMP:   Recent Labs   Lab 09/27/22  0754   *   CALCIUM 7.5*   ALBUMIN 1.8*   PROT 5.7*   *   K 5.1   CO2 18*      BUN 34*   CREATININE 2.1*   ALKPHOS 100   ALT 26   AST 35   BILITOT 0.2       I have personallly reviewed all pertinent lab results from the last 24 hours.    Physical Exam:   Constitutional: She is oriented to person, place, and time. She appears well-developed and well-nourished.    HENT:   Head: Normocephalic and atraumatic.       Pulmonary/Chest: Effort normal. No respiratory distress.        Abdominal: Soft. She exhibits abdominal incision (bandage in  place, no areas of saturation). She exhibits no distension. There is no abdominal tenderness. There is no rebound and no guarding.   Abdomen appropriately tender, no fundal tenderness appreciated             Musculoskeletal: Normal range of motion. Edema (pitting edema up to hips BL) present.       Neurological: She is alert and oriented to person, place, and time.    Skin: Skin is warm and dry.    Psychiatric: She has a normal mood and affect.   Assessment/Plan:     27 y.o. female  for:    * S/P primary low transverse   - POD#2 s/p pLTCS  - Pain better controlled on current regimen  - Continue routine management and advances    Hyperglycemia  - See type 1 DM for full plan    Hypertensive emergency  - see cHTN with ARANZA for full plan  - In ICU on cardene drip    Chronic hypertension with superimposed pre-eclampsia  - Decision made to proceed with delivery in setting of cHTN with ARANZA with SF (Cr)  - Home regimen: hydralazine 50 mg TID, labetalol 200 mg BID, and procardia 90 mg BID  - Current BP regimen: hydralazine 100 mg TID, co reg 25 mg BID and procardia 60 mg BID   - Cardene drip continued for persistently elevated BP  - s/p magnesium for seizure prophylaxis  - Most recent Pre-E labs: Ast/ALT 40/28, Cr 2.1, Plt 200  - Will continue to up-titrate BP meds as needed  - continue heparin 5000 mg BID  for DVT prophylaxis   - Asymptomatic  - UOP: 1.1 cc/kg/hr over last 24 hours           KOBY (acute kidney injury)  - Baseline Cr 1.1 - 1.2  - Cr on admit 1.5 > 1.9 > 2.3 > 2.0 > 1.9 > 2.0 > 2.1  - Avoid nephrotoxic agents  - Nephrology consulted, appreciate assistance  - s/p Mag for seizure prophylaxis       UTI (urinary tract infection)  - UA consistent with UTI  - urine culture negative  - s/p rocephin in [facility]    Gastroparesis due to DM  - continue home famotidine and scheduled reglan    Anxiety  - no medications  - mood stable  - Patient had anxiety attack per RN overnight that resolved with ativan x  1  - Ativan ordered PRN  - Discussed initiating lose dose zoloft for anxiety, patient declines at this time as her mood is much improved today     Proteinuria  - follows with nephrology as outpatient  - Initial P:C of 3.75 downtrending to 1.76  - most recent 24 hour urine with 6.27g  - Repeat PC 17.81  - Will order labs per nephrology: microalbumin/creatinine ratio, YARON pending   - lipid panel: elevated cholesterol and HDL   - anti-ds DNA negative  - Heparin 5000 mg BID for DVT prophylaxis  - Nephrology consulted, appreciate assistance    Anemia  - normocytic anemia with most recent iron panel with iron of 84, transferrin 180, TIBC 266, 32% saturation and ferritin 79  - Recommended nephrology labs:   - Folate, B12, homocysteine - WNL   - methylmalonic WNL  - s/p 3 units pRBC  - CBC stable this morning H/h 9.8/27.5       Type 1 diabetes mellitus with other specified complication  - Home regimen: levemir 10/8, CR 1:7, ISF: 1:50 > 120  - Most recent A1c 6.7%  - BG patterning: fasting, pre-prandial, 2 hour post-prandial and 2 AM  - Current regimen: levemir 3/3   - CR 1:20, ISF 1:50 > 180        F          pB/ppB         pL/ppL          pD/ppD         AM  9/26                                      133(5)/167       101(3)/120      170  9/27         138       146/(2)  - follows closely with endocrine  - Diabetic diabetic ordered   - Discussed plan of care with endocrine yesterday, will continue to discuss as needed  - Hypoglycemia order set in place       Disposition: As patient meets milestones, will plan to discharge as appropriate.    Carly Maria MD  Obstetrics  [facility] - Intensive Care ([place])

## 2022-09-27 NOTE — ASSESSMENT & PLAN NOTE
- Home regimen: levemir 10/8, CR 1:7, ISF: 1:50 > 120  - Most recent A1c 6.7%  - BG patterning: fasting, pre-prandial, 2 hour post-prandial and 2 AM  - Current regimen: levemir 3/3   - CR 1:20, ISF 1:50 > 180        F          pB/ppB         pL/ppL          pD/ppD         AM  9/26                                      133(5)/167       101(3)/120      170  9/27         138       146/  - follows closely with endocrine  - Diabetic diabetic ordered   - Discussed plan of care with endocrine yesterday, will continue to discuss as needed  - Hypoglycemia order set in place

## 2022-09-27 NOTE — ASSESSMENT & PLAN NOTE
- Baseline Cr 1.1 - 1.2  - Cr on admit 1.5 > 1.9 > 2.3 > 2.0 > 1.9 > 2.0 > 2.0  - Avoid nephrotoxic agents  - Nephrology consulted, appreciate assistance  - s/p Mag for seizure prophylaxis

## 2022-09-27 NOTE — LACTATION NOTE
This note was copied from a baby's chart.  LC left message on mother's phone regarding the benefits of breast milk for her preemie.  LC also spoke with father and grandmother regarding the benefits of breast milk for baby and the importance of initiating pumping as soon as possible. LC offered to assist mother with initiation of pumping. Grandmother to notify LC of mother's decision to pump or not.  Roxana Eagle, BSN, RNC, CLC, IBCLC

## 2022-09-27 NOTE — ASSESSMENT & PLAN NOTE
- follows with nephrology as outpatient  - Initial P:C of 3.75 downtrending to 1.76  - most recent 24 hour urine with 6.27g  - Repeat PC 17.81  - Will order labs per nephrology: microalbumin/creatinine ratio, YARON pending   - lipid panel: elevated cholesterol and HDL   - anti-ds DNA negative  - Heparin 5000 mg BID for DVT prophylaxis  - Nephrology consulted, appreciate assistance

## 2022-09-27 NOTE — PLAN OF CARE
CM spoke with pt regarding the following information.     Patient is alert and oriented with no communication barriers.     Prior to admission patient is independent and lives with mother. Patient denies the use of HH or DME.     Patients PCP is correct on the face sheet. Patient choice pharmacy is [facility] bedside delivery.    Advance directives: Unknown    Patients family will transport the patient home at discharge.     No CM needs identified at this time.     CM team will continue to follow.          09/27/22 9689   Discharge Assessment   Assessment Type Discharge Planning Assessment   Confirmed/corrected address, phone number and insurance Yes   Confirmed Demographics Correct on Facesheet   Source of Information patient   Lives With parent(s)   Do you expect to return to your current living situation? Yes   Do you have help at home or someone to help you manage your care at home? Yes   Prior to hospitilization cognitive status: Alert/Oriented   Current cognitive status: Alert/Oriented   Walking or Climbing Stairs Difficulty none   Dressing/Bathing Difficulty bathing difficulty, assistance 1 person   Equipment Currently Used at Home none   Readmission within 30 days? Yes   Patient currently being followed by outpatient case management? No   Do you currently have service(s) that help you manage your care at home? No   Do you take prescription medications? Yes   Do you have prescription coverage? Yes   Do you have any problems affording any of your prescribed medications? No   Is the patient taking medications as prescribed? yes   Who is going to help you get home at discharge? Mother   How do you get to doctors appointments? car, drives self   Are you on dialysis? No   Do you take coumadin? No   Discharge Plan A Home with family   Discharge Plan B Home   DME Needed Upon Discharge  none   Discharge Plan discussed with: Patient   Discharge Barriers Identified None   Physical Activity   On average, how many  days per week do you engage in moderate to strenuous exercise (like a brisk walk)? 0 days   On average, how many minutes do you engage in exercise at this level? 0 min   Financial Resource Strain   How hard is it for you to pay for the very basics like food, housing, medical care, and heating? Very Hard   Housing Stability   In the last 12 months, was there a time when you were not able to pay the mortgage or rent on time? N   In the last 12 months, how many places have you lived? 1   In the last 12 months, was there a time when you did not have a steady place to sleep or slept in a shelter (including now)? N   Transportation Needs   In the past 12 months, has lack of transportation kept you from medical appointments or from getting medications? no   In the past 12 months, has lack of transportation kept you from meetings, work, or from getting things needed for daily living? No   Food Insecurity   Within the past 12 months, you worried that your food would run out before you got the money to buy more. Often true   Within the past 12 months, the food you bought just didn't last and you didn't have money to get more. Often true   Stress   Do you feel stress - tense, restless, nervous, or anxious, or unable to sleep at night because your mind is troubled all the time - these days? Only a littl   Social Connections   In a typical week, how many times do you talk on the phone with family, friends, or neighbors? More than 3   How often do you get together with friends or relatives? More than 3   How often do you attend Latter-day or Church services? More than 4   Do you belong to any clubs or organizations such as Latter-day groups, unions, fraternal or athletic groups, or school groups? No   How often do you attend meetings of the clubs or organizations you belong to? Never   Are you , , , , never , or living with a partner? Never marrie   Alcohol Use   Q1: How often do you have a drink  containing alcohol? Never   Q2: How many drinks containing alcohol do you have on a typical day when you are drinking? None   Q3: How often do you have six or more drinks on one occasion? Never

## 2022-09-27 NOTE — ASSESSMENT & PLAN NOTE
- Patient PECed by psychiatry yesterday  - Medications per psychiatry: depakote 250/500, haldol q8 PRN, ativan q12 PRN, benadyrl PRN  - Psychiatry consulted, appreciate recommendations

## 2022-09-27 NOTE — ASSESSMENT & PLAN NOTE
- Decision made to proceed with delivery in setting of cHTN with ARANZA with SF (Cr)  - Home regimen: hydralazine 50 mg TID, labetalol 200 mg BID, and procardia 90 mg BID  - Current BP regimen: hydralazine 100 mg TID, co reg 25 mg BID and procardia 60 mg BID   - Cardene drip continued for persistently elevated BP  - s/p magnesium for seizure prophylaxis  - Most recent Pre-E labs: Ast/ALT 40/28, Cr 2.0, Plt 200  - Will continue to up-titrate BP meds as needed  - Heparin 5000 mg BID tonight for DVT prophylaxis   - Asymptomatic  - UOP: 1.1 cc/kg/hr over last 24 hours

## 2022-09-27 NOTE — CARE UPDATE
Resident to bedside. RN reported that patient had a panic attack. Patient takes ativan PRN at home for panic attacks. Upon entry to the room patient was noted to be sleeping. Will reassess when patient no longer sleeping. Psych consult placed.     Carly Maria MD  PGY-2 OB/GYN

## 2022-09-27 NOTE — PROGRESS NOTES
"Progress Note  Nephrology    Consult Requested By: Praveen Benítez MD  Reason for Consult: KOBY/>K/Nephrotic Syn    SUBJECTIVE:     History of Present Illness from initial consultation:  "Patient is a 27 y.o. female presents with admission from Nephrology appt yesterday with N/V/Acc HTN and feeling poorly.  Pt is 27+W accidental pregnancy with uncontrolled type 1 dm, nephrotic syn, uncontrolled HTN.  Initial /100, Creat 1.5, K 5.8, urine PC 17 (falsely high with blood but hx of 6+).  Admitted to OB floor for mgmt of HTN.  Trends noted and now with Creat 1.7, K 6.0, Bicarb 17.  Consulted for evaluation.  Pt seen and examined with [facility] staff-team at bedside.  Of note pt has been refusing some meds/treatment modalities/etc.  Extensive discussion about plan of care and consequences of refusal.  Nephrotic w/up ordered earlier but suspect due to uncontrolled Type 1 DM/Uncontrolled HTN. Pt admits to taking Excedrin and occasional NSAIDS for her gastroparesis.  Diet habits are uncontrolled."     Interval History:    Still on cardene drip.  Feels/looks better today but still with loads of edema.  Discussed with team.      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      Past Surgical History:   Procedure Laterality Date     SECTION N/A 2022    Procedure:  SECTION;  Surgeon: Quoc Pryor Jr., MD;  Location: [facility];  Service: OB/GYN;  Laterality: N/A;    RETINAL DETACHMENT SURGERY  2018    RETINAL DETACHMENT SURGERY       Family History   Problem Relation Age of Onset    No Known Problems Father     Cancer Paternal Grandfather         unsure    Diabetes Maternal Grandfather     Hypertension Mother      Social History     Tobacco Use    " Smoking status: Never    Smokeless tobacco: Never   Substance Use Topics    Alcohol use: Not Currently    Drug use: Never       Review of patient's allergies indicates:  No Known Allergies     Review of Systems:  Constitutional: No fever or chills  Respiratory: No cough or shortness of breath  Cardiovascular: No chest pain or palpitations  Gastrointestinal: No nausea or vomiting  Neurological: No confusion or weakness    OBJECTIVE:     Vital Signs (Most Recent)  Temp: 98.9 °F (37.2 °C) (09/27/22 0730)  Pulse: 107 (09/27/22 0730)  Resp: 15 (09/27/22 0730)  BP: (!) 164/95 (09/27/22 0730)  SpO2: 96 % (09/27/22 0730)    Vital Signs Range (Last 24H):  Temp:  [97.7 °F (36.5 °C)-98.9 °F (37.2 °C)]   Pulse:  []   Resp:  [13-32]   BP: (141-174)/(74-97)   SpO2:  [94 %-98 %]       Intake/Output Summary (Last 24 hours) at 9/27/2022 0755  Last data filed at 9/27/2022 0545  Gross per 24 hour   Intake 1378.83 ml   Output 1975 ml   Net -596.17 ml         Physical Exam:  NAD  Anasarca  RRR  Diminished lungs.  +distention, hypo BS  -champagne  Edema ++    Laboratory:  Recent Labs   Lab 09/26/22 1202   WBC 17.49*   RBC 3.06*   HGB 9.8*   HCT 27.5*      MCV 90   MCH 32.0*   MCHC 35.6       BMP:   Recent Labs   Lab 09/26/22  1202 09/26/22 1952   * 152*   * 132*   K 5.5* 5.1    105   CO2 16* 16*   BUN 34* 33*   CREATININE 2.0* 2.0*   CALCIUM 7.4* 7.2*   MG 4.9*  --        Lab Results   Component Value Date    CALCIUM 7.2 (L) 09/26/2022    PHOS 5.0 (H) 09/24/2022     BNP  No results for input(s): BNP, BNPTRIAGEBLO in the last 168 hours.No results found for: URICACID  Lab Results   Component Value Date    IRON 84 05/06/2022    TIBC 266 05/06/2022    FERRITIN 79 05/06/2022     Lab Results   Component Value Date    CALCIUM 7.2 (L) 09/26/2022    PHOS 5.0 (H) 09/24/2022       Diagnostic Results:  No orders to display       ASSESSMENT/PLAN:     Oliguric/nonoliguric KOBY on CKD II with nephrotic syndrome secondary to  uncontrolled DM, uncontrolled HTN, Preeclampsia with severe features.  -[place] [facility] [place]   -Creat trends noted. 1.5 -1.9 - 2.0 - 2.3 - 2.1 - 2.0  -Repeat UPCR 9 grams - baseline historically 6 grams  -avoid NSAIDS.    -no need for RRT yet  -continue lovenox for now.  -<Hapto, anemia, KOBY.  ADAMTS  76%  -now with worsening anasarca and volume overload started lasix.  Dose metolazone to help also.   -YARON pending but DSDNA neg  -creat stable.   -Renally dose meds, avoid nephrotoxins, and monitor I/O's closely.      Hyperkalemia with NAGMA, hyponatremia  -s/ p Lokelma TID and sodium bicarb to shift and correct acidosis  -stable.   -low k diet.  -some are hemolyzed.     Uncontrolled HTN  -max procardia 60 BID  -increased hydralazine to 100 TID  -changed to coreg for antiprotenuric effects.   -add catapres patch and prn tabs.  -lasix/metolazone to help with volume removal which should help BP also  -wean off cardene    Uncontrolled Type 1 DM  -formerly on insulin pump until she had a hypoglycemic seizure and then transitioned to MDI  -she was not wearing her sensor was cause of this but highly recommend retry with closed loop system.  -defer to Endo     Pregnancy, Extremely high risk  -per MFM/OB  -s/p emergent .    Leukocystosis without fever or S/S infection  -Due to steroid therapy    Anemia of pregnancy/CKD  -B12 and folate both on low side now on supplementation  -on iron  -Epo is contraindicated        Total critical care time (exclusive of procedural time) : 35 minutes  Critical care was necessary to treat or prevent imminent or life-threatening deterioration of the following conditions: renal failure, Pre-E, HTN, Anasarca, Nephrotic syn     Critical care was time spent personally by me on the following activities: development of treatment plan with patient or surrogate, discussions with consultants, interpretation of cardiac output measurements, examination of patient,  ordering and performing treatments and interventions, evaluation of patient's response to treatment, obtaining history from patient or surrogate, ordering and review of laboratory studies, ordering and review of radiographic studies, re-evaluation of patient's condition, pulse oximetry and blood draw for specimens

## 2022-09-27 NOTE — ASSESSMENT & PLAN NOTE
- Baseline Cr 1.1 - 1.2  - Cr on admit 1.5 > 1.9 > 2.3 > 2.0 > 1.9 > 2.0 > 2.1 > 2.3  - Avoid nephrotoxic agents  - Nephrology following, appreciate assistance  - s/p Mag for seizure prophylaxis   - Renally dose medications  - On lasix drip per nephrology

## 2022-09-28 PROBLEM — E10.65 TYPE 1 DIABETES MELLITUS WITH HYPERGLYCEMIA: Status: RESOLVED | Noted: 2022-09-27 | Resolved: 2022-09-28

## 2022-09-28 LAB
ALBUMIN SERPL BCP-MCNC: 1.6 G/DL (ref 3.5–5.2)
ALP SERPL-CCNC: 93 U/L (ref 55–135)
ALT SERPL W/O P-5'-P-CCNC: 22 U/L (ref 10–44)
ANION GAP SERPL CALC-SCNC: 10 MMOL/L (ref 8–16)
ANTI SM ANTIBODY: 0.08 RATIO (ref 0–0.99)
ANTI SM/RNP ANTIBODY: 0.07 RATIO (ref 0–0.99)
ANTI-SM INTERPRETATION: NEGATIVE
ANTI-SM/RNP INTERPRETATION: NEGATIVE
ANTI-SSA ANTIBODY: 0.07 RATIO (ref 0–0.99)
ANTI-SSA INTERPRETATION: NEGATIVE
ANTI-SSB ANTIBODY: 0.07 RATIO (ref 0–0.99)
ANTI-SSB INTERPRETATION: NEGATIVE
AST SERPL-CCNC: 29 U/L (ref 10–40)
BASOPHILS # BLD AUTO: 0.05 K/UL (ref 0–0.2)
BASOPHILS NFR BLD: 0.3 % (ref 0–1.9)
BILIRUB SERPL-MCNC: 0.1 MG/DL (ref 0.1–1)
BUN SERPL-MCNC: 40 MG/DL (ref 6–20)
CALCIUM SERPL-MCNC: 7.6 MG/DL (ref 8.7–10.5)
CHLORIDE SERPL-SCNC: 104 MMOL/L (ref 95–110)
CO2 SERPL-SCNC: 19 MMOL/L (ref 23–29)
CREAT SERPL-MCNC: 2.3 MG/DL (ref 0.5–1.4)
DIFFERENTIAL METHOD: ABNORMAL
DSDNA AB SER-ACNC: NORMAL [IU]/ML
EOSINOPHIL # BLD AUTO: 0.2 K/UL (ref 0–0.5)
EOSINOPHIL NFR BLD: 1.2 % (ref 0–8)
ERYTHROCYTE [DISTWIDTH] IN BLOOD BY AUTOMATED COUNT: 13.5 % (ref 11.5–14.5)
EST. GFR  (NO RACE VARIABLE): 29 ML/MIN/1.73 M^2
FINAL PATHOLOGIC DIAGNOSIS: NORMAL
FINAL PATHOLOGIC DIAGNOSIS: NORMAL
GLUCOSE SERPL-MCNC: 287 MG/DL (ref 70–110)
GROSS: NORMAL
GROSS: NORMAL
HCT VFR BLD AUTO: 25.4 % (ref 37–48.5)
HGB BLD-MCNC: 8.9 G/DL (ref 12–16)
IMM GRANULOCYTES # BLD AUTO: 0.74 K/UL (ref 0–0.04)
IMM GRANULOCYTES NFR BLD AUTO: 5 % (ref 0–0.5)
LYMPHOCYTES # BLD AUTO: 1.7 K/UL (ref 1–4.8)
LYMPHOCYTES NFR BLD: 11.5 % (ref 18–48)
Lab: NORMAL
Lab: NORMAL
MCH RBC QN AUTO: 31.1 PG (ref 27–31)
MCHC RBC AUTO-ENTMCNC: 35 G/DL (ref 32–36)
MCV RBC AUTO: 89 FL (ref 82–98)
MONOCYTES # BLD AUTO: 1.2 K/UL (ref 0.3–1)
MONOCYTES NFR BLD: 8 % (ref 4–15)
NEUTROPHILS # BLD AUTO: 10.9 K/UL (ref 1.8–7.7)
NEUTROPHILS NFR BLD: 74 % (ref 38–73)
NRBC BLD-RTO: 0 /100 WBC
PLATELET # BLD AUTO: 194 K/UL (ref 150–450)
PMV BLD AUTO: 11.1 FL (ref 9.2–12.9)
POCT GLUCOSE: 132 MG/DL (ref 70–110)
POCT GLUCOSE: 186 MG/DL (ref 70–110)
POCT GLUCOSE: 199 MG/DL (ref 70–110)
POCT GLUCOSE: 210 MG/DL (ref 70–110)
POCT GLUCOSE: 254 MG/DL (ref 70–110)
POCT GLUCOSE: 298 MG/DL (ref 70–110)
POCT GLUCOSE: 308 MG/DL (ref 70–110)
POCT GLUCOSE: 74 MG/DL (ref 70–110)
POTASSIUM SERPL-SCNC: 4.8 MMOL/L (ref 3.5–5.1)
PROT SERPL-MCNC: 5.4 G/DL (ref 6–8.4)
RBC # BLD AUTO: 2.86 M/UL (ref 4–5.4)
SODIUM SERPL-SCNC: 133 MMOL/L (ref 136–145)
WBC # BLD AUTO: 14.7 K/UL (ref 3.9–12.7)

## 2022-09-28 PROCEDURE — 25000003 PHARM REV CODE 250: Performed by: OBSTETRICS & GYNECOLOGY

## 2022-09-28 PROCEDURE — 25000003 PHARM REV CODE 250

## 2022-09-28 PROCEDURE — 99232 PR SUBSEQUENT HOSPITAL CARE,LEVL II: ICD-10-PCS | Mod: 24,,, | Performed by: OBSTETRICS & GYNECOLOGY

## 2022-09-28 PROCEDURE — 11000001 HC ACUTE MED/SURG PRIVATE ROOM

## 2022-09-28 PROCEDURE — 63600175 PHARM REV CODE 636 W HCPCS

## 2022-09-28 PROCEDURE — 63600175 PHARM REV CODE 636 W HCPCS: Performed by: NURSE PRACTITIONER

## 2022-09-28 PROCEDURE — 63600175 PHARM REV CODE 636 W HCPCS: Performed by: STUDENT IN AN ORGANIZED HEALTH CARE EDUCATION/TRAINING PROGRAM

## 2022-09-28 PROCEDURE — 25000003 PHARM REV CODE 250: Performed by: NURSE PRACTITIONER

## 2022-09-28 PROCEDURE — 85027 COMPLETE CBC AUTOMATED: CPT

## 2022-09-28 PROCEDURE — 99233 SBSQ HOSP IP/OBS HIGH 50: CPT | Mod: ,,, | Performed by: PSYCHIATRY & NEUROLOGY

## 2022-09-28 PROCEDURE — 80053 COMPREHEN METABOLIC PANEL: CPT | Performed by: OBSTETRICS & GYNECOLOGY

## 2022-09-28 PROCEDURE — 25000003 PHARM REV CODE 250: Performed by: STUDENT IN AN ORGANIZED HEALTH CARE EDUCATION/TRAINING PROGRAM

## 2022-09-28 PROCEDURE — 94761 N-INVAS EAR/PLS OXIMETRY MLT: CPT

## 2022-09-28 PROCEDURE — 99233 PR SUBSEQUENT HOSPITAL CARE,LEVL III: ICD-10-PCS | Mod: ,,, | Performed by: PSYCHIATRY & NEUROLOGY

## 2022-09-28 PROCEDURE — 25000003 PHARM REV CODE 250: Performed by: PSYCHIATRY & NEUROLOGY

## 2022-09-28 PROCEDURE — 36415 COLL VENOUS BLD VENIPUNCTURE: CPT | Performed by: OBSTETRICS & GYNECOLOGY

## 2022-09-28 PROCEDURE — 36415 COLL VENOUS BLD VENIPUNCTURE: CPT

## 2022-09-28 PROCEDURE — 99232 SBSQ HOSP IP/OBS MODERATE 35: CPT | Mod: 24,,, | Performed by: OBSTETRICS & GYNECOLOGY

## 2022-09-28 PROCEDURE — 85007 BL SMEAR W/DIFF WBC COUNT: CPT

## 2022-09-28 RX ORDER — INSULIN ASPART 100 [IU]/ML
4 INJECTION, SOLUTION INTRAVENOUS; SUBCUTANEOUS ONCE
Status: COMPLETED | OUTPATIENT
Start: 2022-09-28 | End: 2022-09-28

## 2022-09-28 RX ORDER — CLONIDINE HYDROCHLORIDE 0.1 MG/1
0.2 TABLET ORAL EVERY 6 HOURS PRN
Status: DISCONTINUED | OUTPATIENT
Start: 2022-09-28 | End: 2022-10-05 | Stop reason: HOSPADM

## 2022-09-28 RX ORDER — HEPARIN SODIUM 5000 [USP'U]/ML
5000 INJECTION, SOLUTION INTRAVENOUS; SUBCUTANEOUS EVERY 8 HOURS
Status: DISCONTINUED | OUTPATIENT
Start: 2022-09-28 | End: 2022-10-02

## 2022-09-28 RX ORDER — MINOXIDIL 2.5 MG/1
5 TABLET ORAL 2 TIMES DAILY
Status: DISCONTINUED | OUTPATIENT
Start: 2022-09-28 | End: 2022-10-02

## 2022-09-28 RX ORDER — HEPARIN SODIUM 5000 [USP'U]/ML
5000 INJECTION, SOLUTION INTRAVENOUS; SUBCUTANEOUS EVERY 8 HOURS
Status: DISCONTINUED | OUTPATIENT
Start: 2022-09-28 | End: 2022-09-28

## 2022-09-28 RX ORDER — SODIUM BICARBONATE 650 MG/1
1300 TABLET ORAL 2 TIMES DAILY
Status: DISCONTINUED | OUTPATIENT
Start: 2022-09-28 | End: 2022-10-03

## 2022-09-28 RX ORDER — POLYETHYLENE GLYCOL 3350 17 G/17G
17 POWDER, FOR SOLUTION ORAL DAILY
Status: DISCONTINUED | OUTPATIENT
Start: 2022-09-28 | End: 2022-10-05 | Stop reason: HOSPADM

## 2022-09-28 RX ORDER — INSULIN ASPART 100 [IU]/ML
3 INJECTION, SOLUTION INTRAVENOUS; SUBCUTANEOUS ONCE
Status: COMPLETED | OUTPATIENT
Start: 2022-09-28 | End: 2022-09-28

## 2022-09-28 RX ORDER — MINOXIDIL 2.5 MG/1
2.5 TABLET ORAL 2 TIMES DAILY
Status: DISCONTINUED | OUTPATIENT
Start: 2022-09-28 | End: 2022-09-28

## 2022-09-28 RX ORDER — METOLAZONE 5 MG/1
5 TABLET ORAL DAILY
Status: COMPLETED | OUTPATIENT
Start: 2022-09-28 | End: 2022-09-30

## 2022-09-28 RX ORDER — HYDROCODONE BITARTRATE AND ACETAMINOPHEN 5; 325 MG/1; MG/1
1 TABLET ORAL EVERY 6 HOURS PRN
Status: DISCONTINUED | OUTPATIENT
Start: 2022-09-28 | End: 2022-09-28

## 2022-09-28 RX ADMIN — NICARDIPINE HYDROCHLORIDE 5.5 MG/HR: 0.2 INJECTION, SOLUTION INTRAVENOUS at 01:09

## 2022-09-28 RX ADMIN — DOCUSATE SODIUM 200 MG: 100 CAPSULE, LIQUID FILLED ORAL at 09:09

## 2022-09-28 RX ADMIN — HYDROCODONE BITARTRATE AND ACETAMINOPHEN 1 TABLET: 10; 325 TABLET ORAL at 05:09

## 2022-09-28 RX ADMIN — HYDROCODONE BITARTRATE AND ACETAMINOPHEN 1 TABLET: 5; 325 TABLET ORAL at 09:09

## 2022-09-28 RX ADMIN — NIFEDIPINE 60 MG: 30 TABLET, FILM COATED, EXTENDED RELEASE ORAL at 08:09

## 2022-09-28 RX ADMIN — METOCLOPRAMIDE 10 MG: 10 TABLET ORAL at 08:09

## 2022-09-28 RX ADMIN — POLYETHYLENE GLYCOL 3350 17 G: 17 POWDER, FOR SOLUTION ORAL at 08:09

## 2022-09-28 RX ADMIN — HYDRALAZINE HYDROCHLORIDE 100 MG: 25 TABLET, FILM COATED ORAL at 06:09

## 2022-09-28 RX ADMIN — DOCUSATE SODIUM 200 MG: 100 CAPSULE, LIQUID FILLED ORAL at 08:09

## 2022-09-28 RX ADMIN — PRENATAL VIT W/ FE FUMARATE-FA TAB 27-0.8 MG 1 TABLET: 27-0.8 TAB at 08:09

## 2022-09-28 RX ADMIN — INSULIN DETEMIR 4 UNITS: 100 INJECTION, SOLUTION SUBCUTANEOUS at 09:09

## 2022-09-28 RX ADMIN — FAMOTIDINE 40 MG: 20 TABLET ORAL at 09:09

## 2022-09-28 RX ADMIN — METOCLOPRAMIDE 10 MG: 10 TABLET ORAL at 02:09

## 2022-09-28 RX ADMIN — NIFEDIPINE 60 MG: 30 TABLET, FILM COATED, EXTENDED RELEASE ORAL at 09:09

## 2022-09-28 RX ADMIN — METOLAZONE 5 MG: 5 TABLET ORAL at 08:09

## 2022-09-28 RX ADMIN — POLYSACCHARIDE-IRON COMPLEX 150 MG: 150 CAPSULE ORAL at 08:09

## 2022-09-28 RX ADMIN — MINOXIDIL 5 MG: 2.5 TABLET ORAL at 09:09

## 2022-09-28 RX ADMIN — INSULIN ASPART 4 UNITS: 100 INJECTION, SOLUTION INTRAVENOUS; SUBCUTANEOUS at 02:09

## 2022-09-28 RX ADMIN — CYANOCOBALAMIN TAB 1000 MCG 1000 MCG: 1000 TAB at 08:09

## 2022-09-28 RX ADMIN — INSULIN ASPART 3 UNITS: 100 INJECTION, SOLUTION INTRAVENOUS; SUBCUTANEOUS at 06:09

## 2022-09-28 RX ADMIN — HYDROCODONE BITARTRATE AND ACETAMINOPHEN 1 TABLET: 10; 325 TABLET ORAL at 06:09

## 2022-09-28 RX ADMIN — SODIUM BICARBONATE 650 MG TABLET 1300 MG: at 08:09

## 2022-09-28 RX ADMIN — CARVEDILOL 25 MG: 12.5 TABLET, FILM COATED ORAL at 09:09

## 2022-09-28 RX ADMIN — HYDRALAZINE HYDROCHLORIDE 100 MG: 25 TABLET, FILM COATED ORAL at 02:09

## 2022-09-28 RX ADMIN — MUPIROCIN: 20 OINTMENT TOPICAL at 08:09

## 2022-09-28 RX ADMIN — METOCLOPRAMIDE 10 MG: 10 TABLET ORAL at 09:09

## 2022-09-28 RX ADMIN — MINOXIDIL 2.5 MG: 2.5 TABLET ORAL at 08:09

## 2022-09-28 RX ADMIN — INSULIN ASPART 3 UNITS: 100 INJECTION, SOLUTION INTRAVENOUS; SUBCUTANEOUS at 09:09

## 2022-09-28 RX ADMIN — METOCLOPRAMIDE 10 MG: 10 TABLET ORAL at 05:09

## 2022-09-28 RX ADMIN — CARVEDILOL 25 MG: 12.5 TABLET, FILM COATED ORAL at 08:09

## 2022-09-28 RX ADMIN — INSULIN DETEMIR 3 UNITS: 100 INJECTION, SOLUTION SUBCUTANEOUS at 08:09

## 2022-09-28 RX ADMIN — HYDROCODONE BITARTRATE AND ACETAMINOPHEN 1 TABLET: 5; 325 TABLET ORAL at 02:09

## 2022-09-28 RX ADMIN — HEPARIN SODIUM 5000 UNITS: 5000 INJECTION INTRAVENOUS; SUBCUTANEOUS at 09:09

## 2022-09-28 RX ADMIN — DIPHENHYDRAMINE HYDROCHLORIDE 25 MG: 25 CAPSULE ORAL at 09:09

## 2022-09-28 RX ADMIN — HYDROCODONE BITARTRATE AND ACETAMINOPHEN 1 TABLET: 10; 325 TABLET ORAL at 09:09

## 2022-09-28 RX ADMIN — HEPARIN SODIUM 5000 UNITS: 5000 INJECTION INTRAVENOUS; SUBCUTANEOUS at 08:09

## 2022-09-28 RX ADMIN — DIVALPROEX SODIUM 250 MG: 250 TABLET, DELAYED RELEASE ORAL at 08:09

## 2022-09-28 RX ADMIN — SODIUM BICARBONATE 650 MG TABLET 1300 MG: at 09:09

## 2022-09-28 RX ADMIN — HYDROCODONE BITARTRATE AND ACETAMINOPHEN 1 TABLET: 5; 325 TABLET ORAL at 06:09

## 2022-09-28 RX ADMIN — HYDRALAZINE HYDROCHLORIDE 100 MG: 25 TABLET, FILM COATED ORAL at 09:09

## 2022-09-28 RX ADMIN — HYDROMORPHONE HYDROCHLORIDE 0.5 MG: 1 INJECTION, SOLUTION INTRAMUSCULAR; INTRAVENOUS; SUBCUTANEOUS at 01:09

## 2022-09-28 NOTE — ASSESSMENT & PLAN NOTE
- POD#3 s/p pLTCS  - Pain better controlled on current regimen  - Continue routine management and advances

## 2022-09-28 NOTE — PROGRESS NOTES
Subsequent Psychiatric Session    27 y.o. female    Possible delirium superimposed on cognitive disorder  Possible psychosis  Unspecified anxiety  History of depression    Current Medications:    Current Facility-Administered Medications:     0.9%  NaCl infusion (for blood administration), , Intravenous, Q24H PRN, Archana Will MD    bisacodyL suppository 10 mg, 10 mg, Rectal, Once PRN, Carly Maria MD    carvediloL tablet 25 mg, 25 mg, Oral, BID, Kosta Mccloud NP, 25 mg at 09/28/22 0810    cloNIDine 0.3 mg/24 hr td ptwk 1 patch, 1 patch, Transdermal, Q7 Days, Kosta Mccloud NP, 1 patch at 09/27/22 1610    cloNIDine tablet 0.2 mg, 0.2 mg, Oral, Q6H PRN, Kosta Mccloud NP    cyanocobalamin tablet 1,000 mcg, 1,000 mcg, Oral, Daily, Carly Maria MD, 1,000 mcg at 09/28/22 0810    dextrose 10% bolus 125 mL, 12.5 g, Intravenous, PRN, Carly Maria MD    dextrose 10% bolus 250 mL, 25 g, Intravenous, PRN, Carly Maria MD, Stopped at 09/26/22 0711    diphenhydrAMINE capsule 25 mg, 25 mg, Oral, Q4H PRN, Carly Maria MD, 25 mg at 09/25/22 2135    diphenhydrAMINE capsule 25 mg, 25 mg, Oral, Q6H PRN, Lauren Del Toro MD    diphenhydrAMINE injection 25 mg, 25 mg, Intramuscular, Q6H PRN, Lauren Del Toro MD    diphenoxylate-atropine 2.5-0.025 mg per tablet 1 tablet, 1 tablet, Oral, QID PRN, Archana Will MD, 1 tablet at 09/25/22 1750    divalproex EC tablet 250 mg, 250 mg, Oral, Daily, Lauren Del Toro MD, 250 mg at 09/28/22 0809    divalproex EC tablet 500 mg, 500 mg, Oral, QHS, Lauren Del Toro MD, 500 mg at 09/27/22 2019    docusate sodium capsule 200 mg, 200 mg, Oral, BID, Carly Maria MD, 200 mg at 09/28/22 0810    famotidine tablet 40 mg, 40 mg, Oral, QHS, Carly Maria MD, 40 mg at 09/27/22 2021    furosemide (LASIX) 200 mg in sodium chloride 0.9% 100 mL continuous infusion (conc: 2 mg/mL), 12 mg/hr, Intravenous, Continuous, Kosta Mccloud NP, Last Rate: 6 mL/hr at 09/28/22 0821, 12 mg/hr at  09/28/22 0821    glucagon (human recombinant) injection 1 mg, 1 mg, Intramuscular, PRN, Carly Maria MD    glucose chewable tablet 16 g, 16 g, Oral, PRN, Carly Maria MD, 16 g at 09/26/22 0311    glucose chewable tablet 24 g, 24 g, Oral, PRN, Carly Maria MD    haloperidol lactate injection 5 mg, 5 mg, Intramuscular, Q6H PRN, Lauren Del Toro MD    haloperidoL tablet 2 mg, 2 mg, Oral, Q8H PRN, Lauren Del Toro MD    heparin (porcine) injection 5,000 Units, 5,000 Units, Subcutaneous, Q8H, Kosta Mccloud NP    hydrALAZINE tablet 100 mg, 100 mg, Oral, Q8H, Kosta Mccloud NP, 100 mg at 09/28/22 1402    HYDROcodone-acetaminophen  mg per tablet 1 tablet, 1 tablet, Oral, Q4H PRN, Archana Will MD, 1 tablet at 09/28/22 0938    HYDROcodone-acetaminophen 5-325 mg per tablet 1 tablet, 1 tablet, Oral, Q4H, Carly Maria MD, 1 tablet at 09/28/22 0938    insulin detemir U-100 pen 4 Units, 4 Units, Subcutaneous, QHS, Carly Mccallum MD    [START ON 9/29/2022] insulin detemir U-100 pen 4 Units, 4 Units, Subcutaneous, Daily, Carly Mccallum MD    iron polysaccharides capsule 150 mg, 150 mg, Oral, Daily, Carly Maria MD, 150 mg at 09/28/22 0824    lanolin cream, , Topical (Top), PRN, Craly Maria MD    magnesium hydroxide 400 mg/5 ml suspension 2,400 mg, 30 mL, Oral, BID PRN, Carly Maria MD    metoclopramide HCl tablet 10 mg, 10 mg, Oral, QID, Carly Maria MD, 10 mg at 09/28/22 1403    metOLazone tablet 5 mg, 5 mg, Oral, Daily, Kosta Mccloud NP, 5 mg at 09/28/22 0843    minoxidiL tablet 5 mg, 5 mg, Oral, BID, Kosta Rogers, NP    mupirocin 2 % ointment, , Nasal, BID, Praveen Benítez MD, Given at 09/28/22 0843    nalbuphine injection 2.6 mg, 2.6 mg, Intravenous, Q3H PRN, Gordy Guzman MD, 2.6 mg at 09/27/22 2337    niCARdipine 40 mg/200 mL (0.2 mg/mL) infusion, 0-15 mg/hr, Intravenous, Continuous, Archana Will MD, Last Rate: 17.5 mL/hr at 09/28/22 0623, 3.5 mg/hr at 09/28/22 0623     "NIFEdipine 24 hr tablet 60 mg, 60 mg, Oral, BID, Carly Maria MD, 60 mg at 09/28/22 0809    ondansetron disintegrating tablet 8 mg, 8 mg, Oral, Q8H PRN, Carly Maria MD    polyethylene glycol packet 17 g, 17 g, Oral, Daily, Kosta Mccloud NP, 17 g at 09/28/22 0806    prenatal vitamin oral tablet, 1 tablet, Oral, Daily, Carly Maria MD, 1 tablet at 09/28/22 0809    prochlorperazine injection Soln 5 mg, 5 mg, Intravenous, Q6H PRN, Carly Maria MD    senna-docusate 8.6-50 mg per tablet 1 tablet, 1 tablet, Oral, Nightly PRN, Carly Maria MD    simethicone chewable tablet 80 mg, 1 tablet, Oral, Q6H PRN, Carly Maria MD, 80 mg at 09/26/22 2044    sodium bicarbonate tablet 1,300 mg, 1,300 mg, Oral, BID, Kosta Mccloud NP, 1,300 mg at 09/28/22 0810    sodium chloride 0.9% flush 10 mL, 10 mL, Intravenous, PRN, Carly Maria MD     Interval History  Spoke with Dr. Keane about my concerns that hypoglycemic episode earlier this admission could have been intentional. Sitter was seated outside of patient's darkened room with blinds half drawn and door closed. Sitter was observed to be looking down at personal device. Spoke with Dr. Keane, the sitter, and charge nurse about needing sitter to pay close attention to patient and monitoring her at all times, including while patient is in bathroom. From NP's note this morning: "Feels better overall just really anxious and wants to take shower and see her baby."     On interview, patient is either sleeping or feigning sleep and cannot or will not participate in interview.    Constitutional     VITAL SIGNS  Vitals:    09/28/22 1045 09/28/22 1101 09/28/22 1201 09/28/22 1402   BP: (!) 156/89 (!) 140/83 (!) 142/87 (!) 159/89   BP Location: Right arm Right arm Right arm    Patient Position: Lying Lying Lying    Pulse: 102 106 103    Resp: 18 11 18    Temp:  98.8 °F (37.1 °C)     TempSrc:  Oral     SpO2: 98% 98% 97%    Weight:       Height:          No results " displayed because visit has over 200 results.      Admission on 09/22/2022, Discharged on 09/22/2022   Component Date Value Ref Range Status    WBC 09/22/2022 13.51 (H)  3.90 - 12.70 K/uL Final    RBC 09/22/2022 2.68 (L)  4.00 - 5.40 M/uL Final    Hemoglobin 09/22/2022 8.3 (L)  12.0 - 16.0 g/dL Final    Hematocrit 09/22/2022 25.4 (L)  37.0 - 48.5 % Final    MCV 09/22/2022 95  82 - 98 fL Final    MCH 09/22/2022 31.0  27.0 - 31.0 pg Final    MCHC 09/22/2022 32.7  32.0 - 36.0 g/dL Final    RDW 09/22/2022 12.8  11.5 - 14.5 % Final    Platelets 09/22/2022 282  150 - 450 K/uL Final    MPV 09/22/2022 10.7  9.2 - 12.9 fL Final    Immature Granulocytes 09/22/2022 4.9 (H)  0.0 - 0.5 % Final    Gran # (ANC) 09/22/2022 9.7 (H)  1.8 - 7.7 K/uL Final    Immature Grans (Abs) 09/22/2022 0.66 (H)  0.00 - 0.04 K/uL Final    Comment: Mild elevation in immature granulocytes is non specific and   can be seen in a variety of conditions including stress response,   acute inflammation, trauma and pregnancy. Correlation with other   laboratory and clinical findings is essential.      Lymph # 09/22/2022 1.9  1.0 - 4.8 K/uL Final    Mono # 09/22/2022 1.0  0.3 - 1.0 K/uL Final    Eos # 09/22/2022 0.2  0.0 - 0.5 K/uL Final    Baso # 09/22/2022 0.08  0.00 - 0.20 K/uL Final    nRBC 09/22/2022 0  0 /100 WBC Final    Gran % 09/22/2022 71.5  38.0 - 73.0 % Final    Lymph % 09/22/2022 14.1 (L)  18.0 - 48.0 % Final    Mono % 09/22/2022 7.7  4.0 - 15.0 % Final    Eosinophil % 09/22/2022 1.2  0.0 - 8.0 % Final    Basophil % 09/22/2022 0.6  0.0 - 1.9 % Final    Differential Method 09/22/2022 Automated   Final    Sodium 09/22/2022 133 (L)  136 - 145 mmol/L Final    Potassium 09/22/2022 5.8 (H)  3.5 - 5.1 mmol/L Final    *Result may be interfered by visible hemolysis    Chloride 09/22/2022 109  95 - 110 mmol/L Final    CO2 09/22/2022 16 (L)  23 - 29 mmol/L Final    Glucose 09/22/2022 102  70 - 110 mg/dL Final    BUN 09/22/2022 21 (H)  6 - 20 mg/dL Final     Creatinine 09/22/2022 1.6 (H)  0.5 - 1.4 mg/dL Final    Calcium 09/22/2022 7.7 (L)  8.7 - 10.5 mg/dL Final    Total Protein 09/22/2022 6.3  6.0 - 8.4 g/dL Final    *Result may be interfered by visible hemolysis    Albumin 09/22/2022 1.9 (L)  3.5 - 5.2 g/dL Final    Total Bilirubin 09/22/2022 0.1  0.1 - 1.0 mg/dL Final    Comment: For infants and newborns, interpretation of results should be based  on gestational age, weight and in agreement with clinical  observations.    Premature Infant recommended reference ranges:  Up to 24 hours.............<8.0 mg/dL  Up to 48 hours............<12.0 mg/dL  3-5 days..................<15.0 mg/dL  6-29 days.................<15.0 mg/dL      Alkaline Phosphatase 09/22/2022 92  55 - 135 U/L Final    AST 09/22/2022 38  10 - 40 U/L Final    *Result may be interfered by visible hemolysis    ALT 09/22/2022 29  10 - 44 U/L Final    Anion Gap 09/22/2022 8  8 - 16 mmol/L Final    eGFR 09/22/2022 44.5 (A)  >60 mL/min/1.73 m^2 Final    Magnesium 09/22/2022 2.1  1.6 - 2.6 mg/dL Final    Lipase 09/22/2022 13  4 - 60 U/L Final    Specimen UA 09/22/2022 Urine, Clean Catch   Final    Color, UA 09/22/2022 Yellow  Yellow, Straw, Lucy Final    Appearance, UA 09/22/2022 Hazy (A)  Clear Final    pH, UA 09/22/2022 6.0  5.0 - 8.0 Final    Specific Gravity, UA 09/22/2022 1.015  1.005 - 1.030 Final    Protein, UA 09/22/2022 3+ (A)  Negative Final    Comment: Recommend a 24 hour urine protein or a urine   protein/creatinine ratio if globulin induced proteinuria is  clinically suspected.      Glucose, UA 09/22/2022 2+ (A)  Negative Final    Ketones, UA 09/22/2022 Negative  Negative Final    Bilirubin (UA) 09/22/2022 Negative  Negative Final    Occult Blood UA 09/22/2022 2+ (A)  Negative Final    Nitrite, UA 09/22/2022 Negative  Negative Final    Leukocytes, UA 09/22/2022 2+ (A)  Negative Final    Lactate (Lactic Acid) 09/22/2022 1.1  0.5 - 2.2 mmol/L Final    Comment: Falsely low lactic acid results can be  found in samples   containing >=13.0 mg/dL total bilirubin and/or >=3.5 mg/dL   direct bilirubin.      POC Preg Test, Ur 09/22/2022 Positive (A)  Negative Final     Acceptable 09/22/2022 Yes   Final    RBC, UA 09/22/2022 21 (H)  0 - 4 /hpf Final    WBC, UA 09/22/2022 50 (H)  0 - 5 /hpf Final    Bacteria 09/22/2022 Few (A)  None-Occ /hpf Final    Squam Epithel, UA 09/22/2022 18  /hpf Final    Hyaline Casts, UA 09/22/2022 0  0-1/lpf /lpf Final    Microscopic Comment 09/22/2022 SEE COMMENT   Final    Comment: Other formed elements not mentioned in the report are not   present in the microscopic examination.       Urine Culture, Routine 09/22/2022 No growth   Final    POCT Glucose 09/24/2022 185 (H)  70 - 110 mg/dL Final   Lab Visit on 08/31/2022   Component Date Value Ref Range Status    Hemoglobin A1C 08/31/2022 6.7 (H)  4.0 - 5.6 % Final    Comment: ADA Screening Guidelines:  5.7-6.4%  Consistent with prediabetes  >or=6.5%  Consistent with diabetes    High levels of fetal hemoglobin interfere with the HbA1C  assay. Heterozygous hemoglobin variants (HbS, HgC, etc)do  not significantly interfere with this assay.   However, presence of multiple variants may affect accuracy.      Estimated Avg Glucose 08/31/2022 146 (H)  68 - 131 mg/dL Final   Lab Visit on 07/21/2022   Component Date Value Ref Range Status    Hepatitis B Surface Ag 07/21/2022 Negative  Negative Final    Hep B C IgM 07/21/2022 Negative  Negative Final    Hep A IgM 07/21/2022 Negative  Negative Final    Hepatitis C Ab 07/21/2022 Negative  Negative Final    RPR 07/21/2022 Non-reactive  Non-reactive Final    Rubella IgG Antibodies 07/22/2022 17.7  >=10.0 IU/mL Final    Rubella Immune Status 07/22/2022 Reactive   Final    Comment: 0.0-4.9 IU/mL  Nonreactive (Non-Immune)  5.0-9.9 IU/mL  Indeterminate  10.0-400.0 IU/mL Reactive (Immune)    These results were obtained with the IMMULITE 2000 Rubella  Quantitative IgG assay. Values obtained from  other   manufacturers' assay methods may not be used interchangeably.      Routine Prenatal on 07/21/2022   Component Date Value Ref Range Status    Chlamydia, Amplified DNA 07/21/2022 Not Detected  Not Detected Final    N gonorrhoeae, amplified DNA 07/21/2022 Not Detected  Not Detected Final    Urine Culture, Routine 07/21/2022 No growth   Final        MENTAL STATUS EXAM  General:  Alert and oriented x 4 Appearance is poor grooming and hygiene, multiple marks on face, appears stated age, Body mass index is 35.12 kg/m². . Behavior:  uncooperative  Gait, Posture and Muscle Strength/Tone: Seen lying in bed. No gross abnormal movements noted.  Psychomotor Agitation and Retardation: none evident  Speech: Normal rate, volume, articulation, and tone.  Mood: unable to assess  Affect: blunted  Thought Process: Unable to fully assess.  Associations:  Unable to assess  Thought content: Unable to assess  Perceptions: Unable to assess  Orientation: Unable to assess  Attention and Concentration: Poor  Memory: Unable to assess  Language: No deficits noted   Fund of Knowledge: unable to assess  Insight:   unable to assess  Judgment: unable to assess  Impulse control: unable to assess      ASSESSMENT  Possible delirium superimposed on cognitive disorder  Possible psychosis  Unspecified anxiety  History of depression      PLAN        Continue depakote 250mg in the morning and 500mg in the evening. Will need level on 09/30.  2.   Continue PRN Haldol 2mg PO and 5mg IM for agitation  3.  Continue PRN Benadryl 25mg PO or IM for agitation     Psychiatry to continue to follow.      -------------------------------------------------------------------------------    Lauren Del Toro  [facility] - INTENSIVE CARE ([place])    Risks, Benefits, Side Effects and Alternatives were discussed with the patient today and consent was obtained for the current medication regimen. The patient was encouraged to ask questions and these questions were answered  and the patient was engaged in psychoeducation regarding diagnosis, course of illness, accuracy of the diagnosis, and other general elements regarding overall diagnosis and treatment consideration.     Any medications being used off-label were discussed with the patient inclusive of the evidence base for the use of the medications and consent was obtained for the off-label use of the medication.     Brief suicide risk assessment was performed with the patient today and safety planning was performed if indicated.    Note completed by: Lauren Del Toro MD, 9/28/2022 2:05 PM

## 2022-09-28 NOTE — NURSING
Refused Midline at this time. States that her arms are too sore. Educated on importance of midline but refused for now, will consider it later however.

## 2022-09-28 NOTE — PROGRESS NOTES
"Progress Note  Nephrology    Consult Requested By: Praveen Benítez MD  Reason for Consult: KOBY/>K/Nephrotic Syn    SUBJECTIVE:     History of Present Illness from initial consultation:  "Patient is a 27 y.o. female presents with admission from Nephrology appt yesterday with N/V/Acc HTN and feeling poorly.  Pt is 27+W accidental pregnancy with uncontrolled type 1 dm, nephrotic syn, uncontrolled HTN.  Initial /100, Creat 1.5, K 5.8, urine PC 17 (falsely high with blood but hx of 6+).  Admitted to OB floor for mgmt of HTN.  Trends noted and now with Creat 1.7, K 6.0, Bicarb 17.  Consulted for evaluation.  Pt seen and examined with [facility] staff-team at bedside.  Of note pt has been refusing some meds/treatment modalities/etc.  Extensive discussion about plan of care and consequences of refusal.  Nephrotic w/up ordered earlier but suspect due to uncontrolled Type 1 DM/Uncontrolled HTN. Pt admits to taking Excedrin and occasional NSAIDS for her gastroparesis.  Diet habits are uncontrolled."     Interval History:    Still on cardene.  Lost IV access.  BP improving.  Discussed with mom/CCT at bedside.  Feels better overall just really anxious and wants to take shower and see her baby.  Updated team on plan.   +YARON noted.    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      Past Surgical History:   Procedure Laterality Date     SECTION N/A 2022    Procedure:  SECTION;  Surgeon: Quoc Pryor Jr., MD;  Location: [facility] L&D;  Service: OB/GYN;  Laterality: N/A;    RETINAL DETACHMENT SURGERY  2018    RETINAL DETACHMENT SURGERY       Family History   Problem Relation Age of Onset    No Known Problems Father     Cancer Paternal Grandfather         " unsure    Diabetes Maternal Grandfather     Hypertension Mother      Social History     Tobacco Use    Smoking status: Never    Smokeless tobacco: Never   Substance Use Topics    Alcohol use: Not Currently    Drug use: Never       Review of patient's allergies indicates:  No Known Allergies     Review of Systems:  Constitutional: No fever or chills  Respiratory: No cough or shortness of breath  Cardiovascular: No chest pain or palpitations  Gastrointestinal: No nausea or vomiting  Neurological: No confusion or weakness    OBJECTIVE:     Vital Signs (Most Recent)  Temp: 98.8 °F (37.1 °C) (09/28/22 0701)  Pulse: (!) 114 (09/28/22 0701)  Resp: 13 (09/28/22 0701)  BP: (!) 158/88 (09/28/22 0809)  SpO2: 97 % (09/28/22 0701)    Vital Signs Range (Last 24H):  Temp:  [97.8 °F (36.6 °C)-98.8 °F (37.1 °C)]   Pulse:  []   Resp:  [13-30]   BP: (147-188)/()   SpO2:  [85 %-100 %]       Intake/Output Summary (Last 24 hours) at 9/28/2022 0819  Last data filed at 9/28/2022 0624  Gross per 24 hour   Intake 1243.7 ml   Output 2150 ml   Net -906.3 ml         Physical Exam:  NAD  Anasarca-slowly improving  RRR  Diminished lungs.  +distention, hypo BS  -champagne  Edema ++    Laboratory:  Recent Labs   Lab 09/28/22  0409   WBC 14.70*   RBC 2.86*   HGB 8.9*   HCT 25.4*      MCV 89   MCH 31.1*   MCHC 35.0       BMP:   Recent Labs   Lab 09/26/22  1202 09/26/22  1952 09/27/22 1942   *   < > 210*   *   < > 133*   K 5.5*   < > 4.9      < > 105   CO2 16*   < > 20*   BUN 34*   < > 37*   CREATININE 2.0*   < > 2.1*   CALCIUM 7.4*   < > 7.7*   MG 4.9*  --   --     < > = values in this interval not displayed.       Lab Results   Component Value Date    CALCIUM 7.7 (L) 09/27/2022    PHOS 5.0 (H) 09/24/2022     BNP  No results for input(s): BNP, BNPTRIAGEBLO in the last 168 hours.No results found for: URICACID  Lab Results   Component Value Date    IRON 84 05/06/2022    TIBC 266 05/06/2022    FERRITIN 79 05/06/2022      Lab Results   Component Value Date    CALCIUM 7.7 (L) 2022    PHOS 5.0 (H) 2022       Diagnostic Results:  No orders to display       ASSESSMENT/PLAN:     Oliguric/nonoliguric KOBY on CKD II with nephrotic syndrome secondary to uncontrolled DM, uncontrolled HTN, Preeclampsia with severe features.  -Followed with  nephrology at [facility] Dr. Cheek/Liliane   -Creat trends noted. 1.5 -1.9 - 2.0 - 2.3 - 2.1 - 2.0  -Repeat UPCR 9 grams - baseline historically 6 grams  -avoid NSAIDS.    -no need for RRT yet  -increase Heparin to Q8 and ok for lovenox.    -<Hapto, anemia, KOBY.  ADAMTS  76%  -now with worsening anasarca and volume overload started lasix.    -changed to lasix drip with metolazone  -YARON + but neg DsDNA.  Reflex pending.    -trending Q12.    -consider biopsy pending above  -Renally dose meds, avoid nephrotoxins, and monitor I/O's closely.      Hyperkalemia with NAGMA, hyponatremia  -s/ p Lokelma TID and sodium bicarb to shift and correct acidosis  -stable.   -low k diet.  -some are hemolyzed.     Uncontrolled HTN  -max procardia 60 BID  -increased hydralazine to 100 TID  -changed to coreg for antiprotenuric effects.   -added catapres patch and prn tabs.  -lasix/metolazone to help with volume removal which should help BP also  -wean off cardene  -add minoxidil this am.      Uncontrolled Type 1 DM  -formerly on insulin pump until she had a hypoglycemic seizure and then transitioned to MDI  -she was not wearing her sensor was cause of this but highly recommend retry with closed loop system.  -defer to Endo     Pregnancy, Extremely high risk  -per MFM/OB  -s/p emergent .    Leukocystosis without fever or S/S infection  -Due to steroid therapy    Anemia of pregnancy/CKD  -B12 and folate both on low side now on supplementation  -on iron  -Epo is contraindicated    Anxiety:  -defer to psych/OB        Total critical care time (exclusive of procedural time) : 35 minutes  Critical care was necessary to  treat or prevent imminent or life-threatening deterioration of the following conditions: renal failure, Pre-E, HTN, Anasarca, Nephrotic syn     Critical care was time spent personally by me on the following activities: development of treatment plan with patient or surrogate, discussions with consultants, interpretation of cardiac output measurements, examination of patient, ordering and performing treatments and interventions, evaluation of patient's response to treatment, obtaining history from patient or surrogate, ordering and review of laboratory studies, ordering and review of radiographic studies, re-evaluation of patient's condition, pulse oximetry and blood draw for specimens

## 2022-09-28 NOTE — CARE UPDATE
Resident to bedside per request of RN with MFM fellow Dr. Escobedo. Patient is agitated that lasix drip is continued now that she is out of ICU. Patient is requesting champagne removed. Patient informed that nephrology recommends keeping the champagne in place to accurately measure voids while on Lasix drip. Patient informed us that she would rip her champagne out if we did not remove it. Discussed case with Nephrologist Dr. Keane - okay to remove champagne if patient able to measure voids as they need accurate measurement. If patient not able to accurately measure voids, then will need Pure wick catheter. Informed RN that it was okay to remove the champagne.    Carly Maria MD  PGY-2 OB/GYN

## 2022-09-28 NOTE — PROGRESS NOTES
[facility] Intensive Care [facility]  Obstetrics  Postpartum Progress Note    Patient Name: Alicia Valles  MRN: [id number]  Admission Date: 2022  Hospital Length of Stay: 6 days  Attending Physician: Praveen Benítez MD  Primary Care Provider: Giovanna Hyatt MD    Subjective:     Principal Problem:S/P primary low transverse     Hospital Course:  2022: admit for BP monitoring for cHTN w/ ARANZA vs cHTN exacerbation. CLD, cont monitoring. Rocephin for UTI. Continue levimir 10/, BG Q4 while on CLD. Labs per nephrology recs pending.   2022 - HD#2. Received 5 units of aspart since admission for elevated BG. No obstetric complaints. Monitoring reassuring. Magnesium boluses continued for seizure prophylaxis and fetal neuroprotection in setting of elevated Cr (1.7). K 5.8 this AM  2022 - HD#3. BP meds changed yesterday to labetalol 200 mg BID, procardia 60 BID, and hydralazine 75 mg TID. Patient had persistently elevated blood sugars yesterday requiring multiple slides with insulin aspart. BMZ 1/2 given. Insulin drip started at 1 u/hr in anticipation of hyperglycemia secondary to steroids. Patient had hypoglycemic episode overnight. Reported left sided pelvic pain overnight, SVE closed. No other obstetric complaints. No pre-E symptoms.   2022 - HD#4. IOL started secondary to cHTN with ARANZA with SF (Cr).   2022 - HD#5. POD#1 s/p pLTCS. Multiple episodes of hypoglycemia overnight. Admitted to ICU for cardene drip in setting of uncontrolled sustained severe range BP requiring hydral 10/5/10, procardia 10/20, and labetalol 20/40/80 after delivery.   2022 - HD#6. POD#2 s/p pLTCS. Continued on cardene drip for elevated BP. BP meds adjusted yesterday - labetalol discontinued, hydralazine up-titrated and co-reg added. Started on levemir 3/3 with CR of 1:20 and ISF 1:50 > 180.  2022 - HD#7. POD#3 s/p pLTCS. PECed by psych yesterday. S/p cardene drip. Lasix drip started by  nephro. Will increase levemir to 4/4.       Interval History:     She reports she is doing well this morning. She is tolerating her diabetic diet.. She is voiding via champagne catheter. She is ambulating. She has passed flatus, and has not a BM. Vaginal bleeding is mild. She denies fever or chills. Abdominal pain is mild and well controlled on her current pain regimen.     Objective:     Vital Signs (Most Recent):  Temp: 98.8 °F (37.1 °C) (09/28/22 0701)  Pulse: 106 (09/28/22 1101)  Resp: 11 (09/28/22 1101)  BP: (!) 140/83 (09/28/22 1101)  SpO2: 98 % (09/28/22 1101)   Vital Signs (24h Range):  Temp:  [97.8 °F (36.6 °C)-98.8 °F (37.1 °C)] 98.8 °F (37.1 °C)  Pulse:  [] 106  Resp:  [11-30] 11  SpO2:  [85 %-100 %] 98 %  BP: (140-188)/() 140/83     Weight: 84.3 kg (185 lb 13.6 oz)  Body mass index is 35.12 kg/m².      Intake/Output Summary (Last 24 hours) at 9/28/2022 1148  Last data filed at 9/28/2022 0945  Gross per 24 hour   Intake 1413.7 ml   Output 2400 ml   Net -986.3 ml           Significant Labs:  Lab Results   Component Value Date    GROUPTRH A POS 09/24/2022    HEPBSAG Negative 07/21/2022    STREPBCULT No Group B Streptococcus isolated 09/24/2022     Recent Labs   Lab 09/28/22  0409   HGB 8.9*   HCT 25.4*         CBC:   Recent Labs   Lab 09/28/22  0409   WBC 14.70*   RBC 2.86*   HGB 8.9*   HCT 25.4*      MCV 89   MCH 31.1*   MCHC 35.0       CMP:   Recent Labs   Lab 09/28/22  1034   *   CALCIUM 7.6*   ALBUMIN 1.6*   PROT 5.4*   *   K 4.8   CO2 19*      BUN 40*   CREATININE 2.3*   ALKPHOS 93   ALT 22   AST 29   BILITOT 0.1       I have personallly reviewed all pertinent lab results from the last 24 hours.    Physical Exam:   Constitutional: She is oriented to person, place, and time. She appears well-developed and well-nourished.    HENT:   Head: Normocephalic and atraumatic.       Pulmonary/Chest: Effort normal. No respiratory distress.        Abdominal: Soft. She exhibits  abdominal incision (bandage in place, no areas of saturation). She exhibits no distension. There is no abdominal tenderness. There is no rebound and no guarding.   Abdomen appropriately tender, no fundal tenderness appreciated             Musculoskeletal: Normal range of motion. Edema (pitting edema up to hips BL, much improved from yesterday) present.       Neurological: She is alert and oriented to person, place, and time.    Skin: Skin is warm and dry.    Psychiatric: She has a normal mood and affect.     Assessment/Plan:     27 y.o. female  for:    * S/P primary low transverse   - POD#3 s/p pLTCS  - Pain better controlled on current regimen  - Continue routine management and advances    Hyperglycemia  - See type 1 DM for full plan    Hypertensive emergency  - see cHTN with ARANZA for full plan  - In ICU on lasix drip, s/p cardene drip    Chronic hypertension with superimposed pre-eclampsia  - Decision made to proceed with delivery in setting of cHTN with ARANZA with SF (Cr)  - Home regimen: hydralazine 50 mg TID, labetalol 200 mg BID, and procardia 90 mg BID  - Current BP regimen: hydralazine 100 mg TID, co reg 25 mg BID, procardia 60 mg BID, clonidine patch added  - s/p cardene drip  - Continue lasix drip and minoxidil per nephrology   - s/p magnesium for seizure prophylaxis  - Will continue to up-titrate BP meds as needed  - heparin increased to 5000 mg q8 for DVT prophylaxis   - Asymptomatic  - UOP: 1.13 cc/kg/hr over last 24 hours           KOBY (acute kidney injury)  - Baseline Cr 1.1 - 1.2  - Cr on admit 1.5 > 1.9 > 2.3 > 2.0 > 1.9 > 2.0 > 2.1 > 2.3  - Avoid nephrotoxic agents  - Nephrology following, appreciate assistance  - s/p Mag for seizure prophylaxis   - Renally dose medications  - On lasix drip per nephrology       UTI (urinary tract infection)  - UA consistent with UTI  - urine culture negative  - s/p rocephin in [facility]    Gastroparesis due to DM  - continue home famotidine and scheduled  reglan    Anxiety  - Patient PECed by psychiatry yesterday  - Medications per psychiatry: depakote 250/500, haldol q8 PRN, ativan q12 PRN, benadyrl PRN  - Psychiatry consulted, appreciate recommendations    Proteinuria  - follows with nephrology as outpatient  - Initial P:C of 3.75 downtrending to 1.76  - most recent 24 hour urine with 6.27g  - Repeat PC 17.81  - Labs per nephrology:    - lipid panel: elevated cholesterol and HDL   - anti-ds DNA negative   - YARON positive, reflex pending   - microalbumin/cr 47067  - Heparin 5000 mg q8 for DVT prophylaxis  - Nephrology consulted, appreciate assistance    Anemia  - normocytic anemia with most recent iron panel with iron of 84, transferrin 180, TIBC 266, 32% saturation and ferritin 79  - Recommended nephrology labs:   - Folate, B12, homocysteine - WNL   - methylmalonic WNL  - s/p 3 units pRBC  - CBC stable      Type 1 diabetes mellitus with other specified complication  - Home regimen: levemir 10/8, CR 1:7, ISF: 1:50 > 120  - Most recent A1c 6.7%  - BG patterning: fasting, pre-prandial, 2 hour post-prandial and 2 AM  - Current regimen: levemir 3/3, will increase levemir to 4/4 due to hyperglycemia   - CR 1:20, ISF 1:50 > 180        F          pB/ppB              pL/ppL          pD/ppD        2 AM  9/26                                             133(5)/167       101(3)/120      170  9/27         138       146/(2)               217/184          184/210          74  9/28         186        254(4)/308(3)   199(3)  - follows closely with endocrine  - Diabetic diabetic ordered   - Discussed plan of care with endocrine yesterday, will continue to discuss as needed  - Hypoglycemia order set in place       Disposition: As patient meets milestones, will plan to discharge as appropriate.    Carly Maria MD  Obstetrics  [facility] ([place])

## 2022-09-28 NOTE — NURSING TRANSFER
Nursing Transfer Note      9/28/2022     Reason patient is being transferred:     Transfer To: Rm [id number]    Transfer via wheelchair    Transfer with lasix drip    Transported by NIDHI Rodrigues and NIDHI Christianson     Medicines sent: Lasix infusion, novolog and detemir insulin pens     Any special needs or follow-up needed: N/a    Chart send with patient: Yes    Notified: Pt's mother at bedside during transfer    Patient reassessed at: 9/28/22 @ 1650    Upon arrival to floor: patient oriented to room, call bell in reach, and bed in lowest position

## 2022-09-28 NOTE — ASSESSMENT & PLAN NOTE
- follows with nephrology as outpatient  - Initial P:C of 3.75 downtrending to 1.76  - most recent 24 hour urine with 6.27g  - Repeat PC 17.81  - Labs per nephrology:    - lipid panel: elevated cholesterol and HDL   - anti-ds DNA negative   - YARON positive, reflex pending   - microalbumin/cr 14297  - Heparin 5000 mg q8 for DVT prophylaxis  - Nephrology consulted, appreciate assistance

## 2022-09-28 NOTE — ASSESSMENT & PLAN NOTE
- normocytic anemia with most recent iron panel with iron of 84, transferrin 180, TIBC 266, 32% saturation and ferritin 79  - Recommended nephrology labs:   - Folate, B12, homocysteine - WNL   - methylmalonic WNL  - s/p 3 units pRBC  - CBC stable

## 2022-09-28 NOTE — NURSING
2 hours post-prandial blood glucose obtained. Result of 308. Notified Dr. Maria. 3 U of insulin aspart ordered.

## 2022-09-29 PROBLEM — Z86.59 HISTORY OF DEPRESSION: Status: ACTIVE | Noted: 2022-09-29

## 2022-09-29 PROBLEM — R41.844 EXECUTIVE FUNCTION DEFICIT: Status: ACTIVE | Noted: 2022-09-29

## 2022-09-29 LAB
ALBUMIN SERPL BCP-MCNC: 1.6 G/DL (ref 3.5–5.2)
ALP SERPL-CCNC: 84 U/L (ref 55–135)
ALT SERPL W/O P-5'-P-CCNC: 20 U/L (ref 10–44)
ANION GAP SERPL CALC-SCNC: 12 MMOL/L (ref 8–16)
ANISOCYTOSIS BLD QL SMEAR: SLIGHT
AST SERPL-CCNC: 24 U/L (ref 10–40)
BASOPHILS # BLD AUTO: ABNORMAL K/UL (ref 0–0.2)
BASOPHILS NFR BLD: 1 % (ref 0–1.9)
BILIRUB SERPL-MCNC: 0.2 MG/DL (ref 0.1–1)
BUN SERPL-MCNC: 39 MG/DL (ref 6–20)
CALCIUM SERPL-MCNC: 7.8 MG/DL (ref 8.7–10.5)
CHLORIDE SERPL-SCNC: 100 MMOL/L (ref 95–110)
CO2 SERPL-SCNC: 21 MMOL/L (ref 23–29)
CREAT SERPL-MCNC: 2.1 MG/DL (ref 0.5–1.4)
DACRYOCYTES BLD QL SMEAR: ABNORMAL
DIFFERENTIAL METHOD: ABNORMAL
EOSINOPHIL # BLD AUTO: ABNORMAL K/UL (ref 0–0.5)
EOSINOPHIL NFR BLD: 2 % (ref 0–8)
ERYTHROCYTE [DISTWIDTH] IN BLOOD BY AUTOMATED COUNT: 13.2 % (ref 11.5–14.5)
EST. GFR  (NO RACE VARIABLE): 32 ML/MIN/1.73 M^2
GLUCOSE SERPL-MCNC: 306 MG/DL (ref 70–110)
HAPTOGLOB SERPL-MCNC: 203 MG/DL (ref 30–250)
HCT VFR BLD AUTO: 24.5 % (ref 37–48.5)
HGB BLD-MCNC: 8.5 G/DL (ref 12–16)
IMM GRANULOCYTES # BLD AUTO: ABNORMAL K/UL (ref 0–0.04)
IMM GRANULOCYTES NFR BLD AUTO: ABNORMAL % (ref 0–0.5)
LDH SERPL L TO P-CCNC: 481 U/L (ref 110–260)
LYMPHOCYTES # BLD AUTO: ABNORMAL K/UL (ref 1–4.8)
LYMPHOCYTES NFR BLD: 17 % (ref 18–48)
MCH RBC QN AUTO: 31.1 PG (ref 27–31)
MCHC RBC AUTO-ENTMCNC: 34.7 G/DL (ref 32–36)
MCV RBC AUTO: 90 FL (ref 82–98)
METAMYELOCYTES NFR BLD MANUAL: 1 %
MONOCYTES # BLD AUTO: ABNORMAL K/UL (ref 0.3–1)
MONOCYTES NFR BLD: 6 % (ref 4–15)
NEUTROPHILS NFR BLD: 73 % (ref 38–73)
NRBC BLD-RTO: 0 /100 WBC
PLATELET # BLD AUTO: 249 K/UL (ref 150–450)
PLATELET BLD QL SMEAR: ABNORMAL
PMV BLD AUTO: 10.6 FL (ref 9.2–12.9)
POCT GLUCOSE: 126 MG/DL (ref 70–110)
POCT GLUCOSE: 153 MG/DL (ref 70–110)
POCT GLUCOSE: 161 MG/DL (ref 70–110)
POCT GLUCOSE: 181 MG/DL (ref 70–110)
POCT GLUCOSE: 226 MG/DL (ref 70–110)
POCT GLUCOSE: 238 MG/DL (ref 70–110)
POCT GLUCOSE: 296 MG/DL (ref 70–110)
POCT GLUCOSE: 298 MG/DL (ref 70–110)
POLYCHROMASIA BLD QL SMEAR: ABNORMAL
POTASSIUM SERPL-SCNC: 4.2 MMOL/L (ref 3.5–5.1)
PROT SERPL-MCNC: 5.3 G/DL (ref 6–8.4)
RBC # BLD AUTO: 2.73 M/UL (ref 4–5.4)
SODIUM SERPL-SCNC: 133 MMOL/L (ref 136–145)
SPHEROCYTES BLD QL SMEAR: ABNORMAL
WBC # BLD AUTO: 10.65 K/UL (ref 3.9–12.7)

## 2022-09-29 PROCEDURE — 25000003 PHARM REV CODE 250

## 2022-09-29 PROCEDURE — 83615 LACTATE (LD) (LDH) ENZYME: CPT | Performed by: INTERNAL MEDICINE

## 2022-09-29 PROCEDURE — 63600175 PHARM REV CODE 636 W HCPCS

## 2022-09-29 PROCEDURE — 83010 ASSAY OF HAPTOGLOBIN QUANT: CPT | Performed by: INTERNAL MEDICINE

## 2022-09-29 PROCEDURE — 76937 US GUIDE VASCULAR ACCESS: CPT

## 2022-09-29 PROCEDURE — 25000003 PHARM REV CODE 250: Performed by: NURSE PRACTITIONER

## 2022-09-29 PROCEDURE — 85027 COMPLETE CBC AUTOMATED: CPT | Performed by: INTERNAL MEDICINE

## 2022-09-29 PROCEDURE — 25000003 PHARM REV CODE 250: Performed by: PSYCHIATRY & NEUROLOGY

## 2022-09-29 PROCEDURE — 63600175 PHARM REV CODE 636 W HCPCS: Performed by: NURSE PRACTITIONER

## 2022-09-29 PROCEDURE — 85007 BL SMEAR W/DIFF WBC COUNT: CPT | Performed by: INTERNAL MEDICINE

## 2022-09-29 PROCEDURE — 80053 COMPREHEN METABOLIC PANEL: CPT | Performed by: OBSTETRICS & GYNECOLOGY

## 2022-09-29 PROCEDURE — 25000003 PHARM REV CODE 250: Performed by: OBSTETRICS & GYNECOLOGY

## 2022-09-29 PROCEDURE — 11000001 HC ACUTE MED/SURG PRIVATE ROOM

## 2022-09-29 PROCEDURE — C1751 CATH, INF, PER/CENT/MIDLINE: HCPCS

## 2022-09-29 PROCEDURE — 36410 VNPNXR 3YR/> PHY/QHP DX/THER: CPT

## 2022-09-29 PROCEDURE — 36415 COLL VENOUS BLD VENIPUNCTURE: CPT | Performed by: INTERNAL MEDICINE

## 2022-09-29 PROCEDURE — 63600175 PHARM REV CODE 636 W HCPCS: Performed by: STUDENT IN AN ORGANIZED HEALTH CARE EDUCATION/TRAINING PROGRAM

## 2022-09-29 RX ORDER — INSULIN ASPART 100 [IU]/ML
1 INJECTION, SOLUTION INTRAVENOUS; SUBCUTANEOUS ONCE
Status: COMPLETED | OUTPATIENT
Start: 2022-09-29 | End: 2022-09-29

## 2022-09-29 RX ORDER — INSULIN ASPART 100 [IU]/ML
3 INJECTION, SOLUTION INTRAVENOUS; SUBCUTANEOUS
Status: COMPLETED | OUTPATIENT
Start: 2022-09-29 | End: 2022-09-29

## 2022-09-29 RX ORDER — INSULIN ASPART 100 [IU]/ML
2 INJECTION, SOLUTION INTRAVENOUS; SUBCUTANEOUS ONCE
Status: COMPLETED | OUTPATIENT
Start: 2022-09-29 | End: 2022-09-29

## 2022-09-29 RX ORDER — INSULIN ASPART 100 [IU]/ML
5 INJECTION, SOLUTION INTRAVENOUS; SUBCUTANEOUS ONCE
Status: COMPLETED | OUTPATIENT
Start: 2022-09-29 | End: 2022-09-29

## 2022-09-29 RX ADMIN — HYDRALAZINE HYDROCHLORIDE 100 MG: 25 TABLET, FILM COATED ORAL at 01:09

## 2022-09-29 RX ADMIN — METOLAZONE 5 MG: 5 TABLET ORAL at 09:09

## 2022-09-29 RX ADMIN — POLYSACCHARIDE-IRON COMPLEX 150 MG: 150 CAPSULE ORAL at 09:09

## 2022-09-29 RX ADMIN — SODIUM BICARBONATE 650 MG TABLET 1300 MG: at 09:09

## 2022-09-29 RX ADMIN — INSULIN ASPART 1 UNITS: 100 INJECTION, SOLUTION INTRAVENOUS; SUBCUTANEOUS at 07:09

## 2022-09-29 RX ADMIN — PRENATAL VIT W/ FE FUMARATE-FA TAB 27-0.8 MG 1 TABLET: 27-0.8 TAB at 09:09

## 2022-09-29 RX ADMIN — HYDROCODONE BITARTRATE AND ACETAMINOPHEN 1 TABLET: 5; 325 TABLET ORAL at 05:09

## 2022-09-29 RX ADMIN — SODIUM BICARBONATE 650 MG TABLET 1300 MG: at 08:09

## 2022-09-29 RX ADMIN — DOCUSATE SODIUM 200 MG: 100 CAPSULE, LIQUID FILLED ORAL at 08:09

## 2022-09-29 RX ADMIN — HYDROCODONE BITARTRATE AND ACETAMINOPHEN 1 TABLET: 5; 325 TABLET ORAL at 04:09

## 2022-09-29 RX ADMIN — METOCLOPRAMIDE 10 MG: 10 TABLET ORAL at 01:09

## 2022-09-29 RX ADMIN — MUPIROCIN: 20 OINTMENT TOPICAL at 08:09

## 2022-09-29 RX ADMIN — METOCLOPRAMIDE 10 MG: 10 TABLET ORAL at 05:09

## 2022-09-29 RX ADMIN — HEPARIN SODIUM 5000 UNITS: 5000 INJECTION INTRAVENOUS; SUBCUTANEOUS at 06:09

## 2022-09-29 RX ADMIN — INSULIN DETEMIR 5 UNITS: 100 INJECTION, SOLUTION SUBCUTANEOUS at 08:09

## 2022-09-29 RX ADMIN — MUPIROCIN: 20 OINTMENT TOPICAL at 09:09

## 2022-09-29 RX ADMIN — FUROSEMIDE 12 MG/HR: 10 INJECTION, SOLUTION INTRAMUSCULAR; INTRAVENOUS at 03:09

## 2022-09-29 RX ADMIN — POLYETHYLENE GLYCOL 3350 17 G: 17 POWDER, FOR SOLUTION ORAL at 09:09

## 2022-09-29 RX ADMIN — INSULIN ASPART 2 UNITS: 100 INJECTION, SOLUTION INTRAVENOUS; SUBCUTANEOUS at 11:09

## 2022-09-29 RX ADMIN — HEPARIN SODIUM 5000 UNITS: 5000 INJECTION INTRAVENOUS; SUBCUTANEOUS at 01:09

## 2022-09-29 RX ADMIN — DIVALPROEX SODIUM 500 MG: 250 TABLET, DELAYED RELEASE ORAL at 12:09

## 2022-09-29 RX ADMIN — MINOXIDIL 5 MG: 2.5 TABLET ORAL at 09:09

## 2022-09-29 RX ADMIN — NIFEDIPINE 60 MG: 30 TABLET, FILM COATED, EXTENDED RELEASE ORAL at 08:09

## 2022-09-29 RX ADMIN — HEPARIN SODIUM 5000 UNITS: 5000 INJECTION INTRAVENOUS; SUBCUTANEOUS at 09:09

## 2022-09-29 RX ADMIN — HYDROCODONE BITARTRATE AND ACETAMINOPHEN 1 TABLET: 5; 325 TABLET ORAL at 01:09

## 2022-09-29 RX ADMIN — MINOXIDIL 5 MG: 2.5 TABLET ORAL at 08:09

## 2022-09-29 RX ADMIN — DIPHENHYDRAMINE HYDROCHLORIDE 25 MG: 25 CAPSULE ORAL at 08:09

## 2022-09-29 RX ADMIN — INSULIN ASPART 1 UNITS: 100 INJECTION, SOLUTION INTRAVENOUS; SUBCUTANEOUS at 05:09

## 2022-09-29 RX ADMIN — DIPHENHYDRAMINE HYDROCHLORIDE 25 MG: 25 CAPSULE ORAL at 01:09

## 2022-09-29 RX ADMIN — FUROSEMIDE 12 MG/HR: 10 INJECTION, SOLUTION INTRAMUSCULAR; INTRAVENOUS at 11:09

## 2022-09-29 RX ADMIN — FAMOTIDINE 40 MG: 20 TABLET ORAL at 08:09

## 2022-09-29 RX ADMIN — METOCLOPRAMIDE 10 MG: 10 TABLET ORAL at 09:09

## 2022-09-29 RX ADMIN — HYDRALAZINE HYDROCHLORIDE 100 MG: 25 TABLET, FILM COATED ORAL at 09:09

## 2022-09-29 RX ADMIN — HYDRALAZINE HYDROCHLORIDE 100 MG: 25 TABLET, FILM COATED ORAL at 06:09

## 2022-09-29 RX ADMIN — CARVEDILOL 25 MG: 12.5 TABLET, FILM COATED ORAL at 09:09

## 2022-09-29 RX ADMIN — INSULIN DETEMIR 4 UNITS: 100 INJECTION, SOLUTION SUBCUTANEOUS at 09:09

## 2022-09-29 RX ADMIN — INSULIN ASPART 2 UNITS: 100 INJECTION, SOLUTION INTRAVENOUS; SUBCUTANEOUS at 10:09

## 2022-09-29 RX ADMIN — INSULIN ASPART 3 UNITS: 100 INJECTION, SOLUTION INTRAVENOUS; SUBCUTANEOUS at 01:09

## 2022-09-29 RX ADMIN — HYDROCODONE BITARTRATE AND ACETAMINOPHEN 1 TABLET: 5; 325 TABLET ORAL at 09:09

## 2022-09-29 RX ADMIN — HYDROCODONE BITARTRATE AND ACETAMINOPHEN 1 TABLET: 5; 325 TABLET ORAL at 12:09

## 2022-09-29 RX ADMIN — DOCUSATE SODIUM 200 MG: 100 CAPSULE, LIQUID FILLED ORAL at 09:09

## 2022-09-29 RX ADMIN — NIFEDIPINE 60 MG: 30 TABLET, FILM COATED, EXTENDED RELEASE ORAL at 09:09

## 2022-09-29 RX ADMIN — DIVALPROEX SODIUM 250 MG: 250 TABLET, DELAYED RELEASE ORAL at 09:09

## 2022-09-29 RX ADMIN — METOCLOPRAMIDE 10 MG: 10 TABLET ORAL at 08:09

## 2022-09-29 RX ADMIN — INSULIN ASPART 5 UNITS: 100 INJECTION, SOLUTION INTRAVENOUS; SUBCUTANEOUS at 09:09

## 2022-09-29 RX ADMIN — CYANOCOBALAMIN TAB 1000 MCG 1000 MCG: 1000 TAB at 09:09

## 2022-09-29 RX ADMIN — CARVEDILOL 25 MG: 12.5 TABLET, FILM COATED ORAL at 08:09

## 2022-09-29 NOTE — PROGRESS NOTES
"Progress Note  Nephrology    Consult Requested By: Praveen Benítez MD  Reason for Consult: KOBY/>K/Nephrotic Syn    SUBJECTIVE:     History of Present Illness from initial consultation:  "Patient is a 27 y.o. female presents with admission from Nephrology appt yesterday with N/V/Acc HTN and feeling poorly.  Pt is 27+W accidental pregnancy with uncontrolled type 1 dm, nephrotic syn, uncontrolled HTN.  Initial /100, Creat 1.5, K 5.8, urine PC 17 (falsely high with blood but hx of 6+).  Admitted to OB floor for mgmt of HTN.  Trends noted and now with Creat 1.7, K 6.0, Bicarb 17.  Consulted for evaluation.  Pt seen and examined with [facility] staff-team at bedside.  Of note pt has been refusing some meds/treatment modalities/etc.  Extensive discussion about plan of care and consequences of refusal.  Nephrotic w/up ordered earlier but suspect due to uncontrolled Type 1 DM/Uncontrolled HTN. Pt admits to taking Excedrin and occasional NSAIDS for her gastroparesis.  Diet habits are uncontrolled."     Interval History:    Transferred to floor yesterday and doing well so far.  Asked for champagne to be removed and voiding well.  Refused labs this am and explained importance.  Updated RN's.  BP better.      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      Past Surgical History:   Procedure Laterality Date     SECTION N/A 2022    Procedure:  SECTION;  Surgeon: Quoc Pryor Jr., MD;  Location: [facility] L&D;  Service: OB/GYN;  Laterality: N/A;    RETINAL DETACHMENT SURGERY  2018    RETINAL DETACHMENT SURGERY       Family History   Problem Relation Age of Onset    No Known Problems Father     Cancer Paternal Grandfather         unsure    Diabetes Maternal " Grandfather     Hypertension Mother      Social History     Tobacco Use    Smoking status: Never    Smokeless tobacco: Never   Substance Use Topics    Alcohol use: Not Currently    Drug use: Never       Review of patient's allergies indicates:  No Known Allergies     Review of Systems:  Constitutional: No fever or chills  Respiratory: No cough or shortness of breath  Cardiovascular: No chest pain or palpitations  Gastrointestinal: No nausea or vomiting  Neurological: No confusion or weakness    OBJECTIVE:     Vital Signs (Most Recent)  Temp: 98.3 °F (36.8 °C) (09/29/22 0450)  Pulse: 100 (09/29/22 0450)  Resp: 16 (09/29/22 0450)  BP: (!) 140/79 (09/29/22 0658)  SpO2: 98 % (09/29/22 0450)    Vital Signs Range (Last 24H):  Temp:  [98.2 °F (36.8 °C)-98.8 °F (37.1 °C)]   Pulse:  []   Resp:  [11-23]   BP: (123-163)/()   SpO2:  [97 %-99 %]       Intake/Output Summary (Last 24 hours) at 9/29/2022 0901  Last data filed at 9/29/2022 0441  Gross per 24 hour   Intake 42.44 ml   Output 4025 ml   Net -3982.56 ml         Physical Exam:  NAD  Anasarca-slowly improving  RRR  Diminished lungs.  +distention, hypo BS  Edema ++    Laboratory:  No results for input(s): WBC, RBC, HGB, HCT, PLT, MCV, MCH, MCHC in the last 24 hours.    BMP:   Recent Labs   Lab 09/26/22  1202 09/26/22  1952 09/28/22  1034   *   < > 287*   *   < > 133*   K 5.5*   < > 4.8      < > 104   CO2 16*   < > 19*   BUN 34*   < > 40*   CREATININE 2.0*   < > 2.3*   CALCIUM 7.4*   < > 7.6*   MG 4.9*  --   --     < > = values in this interval not displayed.       Lab Results   Component Value Date    CALCIUM 7.6 (L) 09/28/2022    PHOS 5.0 (H) 09/24/2022     BNP  No results for input(s): BNP, BNPTRIAGEBLO in the last 168 hours.No results found for: URICACID  Lab Results   Component Value Date    IRON 84 05/06/2022    TIBC 266 05/06/2022    FERRITIN 79 05/06/2022     Lab Results   Component Value Date    CALCIUM 7.6 (L) 09/28/2022    PHOS 5.0  (H) 2022       Diagnostic Results:  No orders to display       ASSESSMENT/PLAN:     Oliguric/nonoliguric KOBY on CKD II with nephrotic syndrome secondary to uncontrolled DM, uncontrolled HTN, Preeclampsia with severe features.  -[place] [facility] [place]   -Creat trends noted. 1.5 -1.9 - 2.0 - 2.3 - 2.1 - 2.0  -Repeat UPCR 9 grams - baseline historically 6 grams  -avoid NSAIDS.    -no need for RRT yet  -increase Heparin to Q8 and ok for lovenox.    -<Hapto, anemia, KOBY.  ADAMTS  76%  -now with worsening anasarca and volume overload started lasix.    -changed to lasix drip with metolazone  -YARON mildy + but neg DsDNA.     -trending   -consider biopsy when less edematous.   -Renally dose meds, avoid nephrotoxins, and monitor I/O's closely.      Hyperkalemia with NAGMA, hyponatremia  -s/ p Lokelma TID and sodium bicarb to shift and correct acidosis  -stable.   -low k diet.  -some are hemolyzed.     Uncontrolled HTN  -max procardia 60 BID  -increased hydralazine to 100 TID  -changed to coreg for antiprotenuric effects.   -added catapres patch and prn tabs.  -lasix/metolazone to help with volume removal which should help BP also  -weaned off cardene  -added minoxidil with improvement    Uncontrolled Type 1 DM  -formerly on insulin pump until she had a hypoglycemic seizure and then transitioned to MDI  -she was not wearing her sensor was cause of this but highly recommend retry with closed loop system.  -defer to Endo     Pregnancy, Extremely high risk  -per MFM/OB  -s/p emergent .    Leukocystosis without fever or S/S infection  -Due to steroid therapy    Anemia of pregnancy/CKD  -B12 and folate both on low side now on supplementation  -on iron  -Epo is contraindicated    Anxiety:  -defer to psych/OB  -PEC'd          Awaiting labs to decide if any changes need to be made.

## 2022-09-29 NOTE — SUBJECTIVE & OBJECTIVE
Interval History:     She reports she is doing well this morning. She is tolerating her diabetic diet.. She is voiding spontaneously. She is ambulating. She has passed flatus, and has not a BM. Vaginal bleeding is mild. She denies fever or chills. Abdominal pain is mild and well controlled on her current pain regimen.     Objective:     Vital Signs (Most Recent):  Temp: 98.3 °F (36.8 °C) (09/29/22 0450)  Pulse: 100 (09/29/22 0450)  Resp: 16 (09/29/22 0450)  BP: (!) 163/87 (09/29/22 0608)  SpO2: 98 % (09/29/22 0450)   Vital Signs (24h Range):  Temp:  [98.2 °F (36.8 °C)-98.8 °F (37.1 °C)] 98.3 °F (36.8 °C)  Pulse:  [] 100  Resp:  [11-30] 16  SpO2:  [96 %-100 %] 98 %  BP: (123-167)/() 163/87     Weight: 84.3 kg (185 lb 13.6 oz)  Body mass index is 35.12 kg/m².      Intake/Output Summary (Last 24 hours) at 9/29/2022 0644  Last data filed at 9/29/2022 0441  Gross per 24 hour   Intake 521.59 ml   Output 4025 ml   Net -3503.41 ml           Significant Labs:  Lab Results   Component Value Date    GROUPTRH A POS 09/24/2022    HEPBSAG Negative 07/21/2022    STREPBCULT No Group B Streptococcus isolated 09/24/2022     Recent Labs   Lab 09/28/22  0409   HGB 8.9*   HCT 25.4*         CBC:   Recent Labs   Lab 09/28/22  0409   WBC 14.70*   RBC 2.86*   HGB 8.9*   HCT 25.4*      MCV 89   MCH 31.1*   MCHC 35.0       CMP:   Recent Labs   Lab 09/28/22  1034   *   CALCIUM 7.6*   ALBUMIN 1.6*   PROT 5.4*   *   K 4.8   CO2 19*      BUN 40*   CREATININE 2.3*   ALKPHOS 93   ALT 22   AST 29   BILITOT 0.1       I have personallly reviewed all pertinent lab results from the last 24 hours.    Physical Exam:   Constitutional: She is oriented to person, place, and time. She appears well-developed and well-nourished. She is cooperative.    HENT:   Head: Normocephalic and atraumatic.       Pulmonary/Chest: Effort normal. No respiratory distress.        Abdominal: Soft. She exhibits abdominal incision (bandage in  place, no areas of saturation). She exhibits no distension. There is no abdominal tenderness. There is no rebound and no guarding.   Abdomen appropriately tender, no fundal tenderness appreciated             Musculoskeletal: Normal range of motion. Edema (pitting edema up to hips BL, much improved from yesterday) present.       Neurological: She is alert and oriented to person, place, and time.    Skin: Skin is warm and dry.

## 2022-09-29 NOTE — NURSING
1026-spoke with pt, informed mother was on her way to hospital, would talk to doctors about allowing a visitor, pt is ok with mother going down to security and getting bag with paperwork in it. Informed pt would let security know       1120-notified YOSEPH Mccloud of no IV access

## 2022-09-29 NOTE — NURSING
Pt awakened and remains calm. She had spoken with her mother on the hospital phone and is not tearful. Requests more benadryl as her skin itches where she has some scabs from picking and scratching. Another benadryl given and pt remains cooperative. TONY Burroughs RN

## 2022-09-29 NOTE — ASSESSMENT & PLAN NOTE
- Patient PECed by psychiatry  - Medications per psychiatry: depakote 250/500, haldol q8 PRN, ativan q12 PRN, benadyrl PRN  - Psychiatry following, appreciate recommendations

## 2022-09-29 NOTE — NURSING
Pt was tearful but was easily calmed after her mother left. I explained the plan of care and that she will need to continue focusing on her health and blood pressure in order to be able to heal and get stronger. Answered appropriately and calmly to me and the aide. She is walking well to bathroom and assisted her to bath in shower while keeping her Iv covered and dry and her incision covered and dry as well. She denied any dizziness or weakness. Voiding well. Assisted her back to bed and provided calm environment while keeping door open at all times and monitoring closely. Pt has taken a PRN benadryl at 2131 so that she can sleep and rest well. TONY Burroughs RN

## 2022-09-29 NOTE — PROGRESS NOTES
Subsequent Psychiatric Session    27 y.o. female    Possible delirium superimposed on cognitive disorder  Possible psychosis  Unspecified anxiety  History of depression    Current Medications:    Current Facility-Administered Medications:     0.9%  NaCl infusion (for blood administration), , Intravenous, Q24H PRN, Archana Will MD    bisacodyL suppository 10 mg, 10 mg, Rectal, Once PRN, Carly Maria MD    carvediloL tablet 25 mg, 25 mg, Oral, BID, Kosta Mccloud NP, 25 mg at 09/29/22 0906    cloNIDine 0.3 mg/24 hr td ptwk 1 patch, 1 patch, Transdermal, Q7 Days, Kosta Mccloud NP, 1 patch at 09/27/22 1610    cloNIDine tablet 0.2 mg, 0.2 mg, Oral, Q6H PRN, Kosta Mccloud NP    cyanocobalamin tablet 1,000 mcg, 1,000 mcg, Oral, Daily, Carly Maria MD, 1,000 mcg at 09/29/22 0906    dextrose 10% bolus 125 mL, 12.5 g, Intravenous, PRN, Carly Maria MD    dextrose 10% bolus 250 mL, 25 g, Intravenous, PRN, Carly Maria MD, Stopped at 09/26/22 0711    diphenhydrAMINE capsule 25 mg, 25 mg, Oral, Q4H PRN, Carly Maria MD, 25 mg at 09/29/22 0133    diphenhydrAMINE capsule 25 mg, 25 mg, Oral, Q6H PRN, Lauren Del Toro MD    diphenhydrAMINE injection 25 mg, 25 mg, Intramuscular, Q6H PRN, Lauren Del Toro MD    diphenoxylate-atropine 2.5-0.025 mg per tablet 1 tablet, 1 tablet, Oral, QID PRN, Archana Will MD, 1 tablet at 09/25/22 1750    divalproex EC tablet 250 mg, 250 mg, Oral, Daily, Lauren Del Toro MD, 250 mg at 09/29/22 0906    divalproex EC tablet 500 mg, 500 mg, Oral, QHS, Lauren Del Toro MD, 500 mg at 09/29/22 0049    docusate sodium capsule 200 mg, 200 mg, Oral, BID, Carly Maria MD, 200 mg at 09/29/22 0905    famotidine tablet 40 mg, 40 mg, Oral, QHS, Carly Maria MD, 40 mg at 09/28/22 2116    furosemide (LASIX) 200 mg in sodium chloride 0.9% 100 mL continuous infusion (conc: 2 mg/mL), 12 mg/hr, Intravenous, Continuous, Kosta Mccloud NP, Last Rate: 6 mL/hr at 09/29/22 0307, 12 mg/hr at  09/29/22 0307    glucagon (human recombinant) injection 1 mg, 1 mg, Intramuscular, PRN, Carly Maria MD    glucose chewable tablet 16 g, 16 g, Oral, PRN, Carly Maria MD, 16 g at 09/26/22 0311    glucose chewable tablet 24 g, 24 g, Oral, PRN, Carly Maria MD    haloperidol lactate injection 5 mg, 5 mg, Intramuscular, Q6H PRN, Lauren Del Toro MD    haloperidoL tablet 2 mg, 2 mg, Oral, Q8H PRN, Lauren Del Toro MD    heparin (porcine) injection 5,000 Units, 5,000 Units, Subcutaneous, Q8H, Kosta Mccloud NP, 5,000 Units at 09/29/22 0612    hydrALAZINE tablet 100 mg, 100 mg, Oral, Q8H, Kosta Mccloud NP, 100 mg at 09/29/22 0608    HYDROcodone-acetaminophen  mg per tablet 1 tablet, 1 tablet, Oral, Q4H PRN, Archana Will MD, 1 tablet at 09/28/22 1714    HYDROcodone-acetaminophen 5-325 mg per tablet 1 tablet, 1 tablet, Oral, Q4H, Carly Maria MD, 1 tablet at 09/29/22 0932    insulin detemir U-100 pen 4 Units, 4 Units, Subcutaneous, QHS, Carly Mccallum MD, 4 Units at 09/28/22 2100    insulin detemir U-100 pen 4 Units, 4 Units, Subcutaneous, Daily, Carly Mccallum MD, 4 Units at 09/29/22 0911    iron polysaccharides capsule 150 mg, 150 mg, Oral, Daily, Carly Maria MD, 150 mg at 09/29/22 0906    lanolin cream, , Topical (Top), PRN, Carly Maria MD    magnesium hydroxide 400 mg/5 ml suspension 2,400 mg, 30 mL, Oral, BID PRN, Carly Maria MD    metoclopramide HCl tablet 10 mg, 10 mg, Oral, QID, Carly Maria MD, 10 mg at 09/29/22 0906    metOLazone tablet 5 mg, 5 mg, Oral, Daily, Kosta Mccloud NP, 5 mg at 09/29/22 0911    minoxidiL tablet 5 mg, 5 mg, Oral, BID, Kosta Mccloud NP, 5 mg at 09/29/22 0906    mupirocin 2 % ointment, , Nasal, BID, Praveen Benítez MD, Given at 09/29/22 0906    nalbuphine injection 2.6 mg, 2.6 mg, Intravenous, Q3H PRN, Gordy Guzman MD, 2.6 mg at 09/27/22 1798    niCARdipine 40 mg/200 mL (0.2 mg/mL) infusion, 0-15 mg/hr, Intravenous, Continuous, Archana RHOADES  "MD Suman, Last Rate: 17.5 mL/hr at 09/28/22 0623, 3.5 mg/hr at 09/28/22 0623    NIFEdipine 24 hr tablet 60 mg, 60 mg, Oral, BID, Carly Maria MD, 60 mg at 09/29/22 0906    ondansetron disintegrating tablet 8 mg, 8 mg, Oral, Q8H PRN, Carly Maria MD    polyethylene glycol packet 17 g, 17 g, Oral, Daily, Kosta Mccloud NP, 17 g at 09/29/22 0905    prenatal vitamin oral tablet, 1 tablet, Oral, Daily, Carly Maria MD, 1 tablet at 09/29/22 0905    prochlorperazine injection Soln 5 mg, 5 mg, Intravenous, Q6H PRN, Carly Maria MD    senna-docusate 8.6-50 mg per tablet 1 tablet, 1 tablet, Oral, Nightly PRN, Carly Maria MD    simethicone chewable tablet 80 mg, 1 tablet, Oral, Q6H PRN, Carly Maria MD, 80 mg at 09/26/22 2044    sodium bicarbonate tablet 1,300 mg, 1,300 mg, Oral, BID, Kosta Mccloud NP, 1,300 mg at 09/29/22 0905    sodium chloride 0.9% flush 10 mL, 10 mL, Intravenous, PRN, aCrly Maria MD       Interval History  Patient is seen with mother. She is alert and willing to participate in interview today. She and mother are upset that mother was asked to leave yesterday. Patient wants to have her phone and to be able to see her baby. Patient was more depressed a few months ago, but recently "not as much as I used to." She says she last had SI "years ago." Her anxiety is "better." She denies a history of childhood sexual abuse. She remembers dissociative episodes mentioned by mother as being "weird." Patient says of hypoglycemic episode this admission that she tried to tell the staff she needed less insulin. She denies April incident being a suicide attempt. She thinks her pump was malfunctioning. She looks forward to taking baby home and is grateful for mother's help. Denies SI, HI, AVH.    MOCA: 25/30 (trouble with executive functioning, registration, delayed recall 3/5 words)    Of note, patient's long cath IV came out and patient refused midline.    Constitutional     VITAL SIGNS  Temp 98.7 " °F (37.1 °C)   BP (!) 153/87        RR 17    SpO2 97 %         No results displayed because visit has over 200 results.      Admission on 09/22/2022, Discharged on 09/22/2022   Component Date Value Ref Range Status    WBC 09/22/2022 13.51 (H)  3.90 - 12.70 K/uL Final    RBC 09/22/2022 2.68 (L)  4.00 - 5.40 M/uL Final    Hemoglobin 09/22/2022 8.3 (L)  12.0 - 16.0 g/dL Final    Hematocrit 09/22/2022 25.4 (L)  37.0 - 48.5 % Final    MCV 09/22/2022 95  82 - 98 fL Final    MCH 09/22/2022 31.0  27.0 - 31.0 pg Final    MCHC 09/22/2022 32.7  32.0 - 36.0 g/dL Final    RDW 09/22/2022 12.8  11.5 - 14.5 % Final    Platelets 09/22/2022 282  150 - 450 K/uL Final    MPV 09/22/2022 10.7  9.2 - 12.9 fL Final    Immature Granulocytes 09/22/2022 4.9 (H)  0.0 - 0.5 % Final    Gran # (ANC) 09/22/2022 9.7 (H)  1.8 - 7.7 K/uL Final    Immature Grans (Abs) 09/22/2022 0.66 (H)  0.00 - 0.04 K/uL Final    Comment: Mild elevation in immature granulocytes is non specific and   can be seen in a variety of conditions including stress response,   acute inflammation, trauma and pregnancy. Correlation with other   laboratory and clinical findings is essential.      Lymph # 09/22/2022 1.9  1.0 - 4.8 K/uL Final    Mono # 09/22/2022 1.0  0.3 - 1.0 K/uL Final    Eos # 09/22/2022 0.2  0.0 - 0.5 K/uL Final    Baso # 09/22/2022 0.08  0.00 - 0.20 K/uL Final    nRBC 09/22/2022 0  0 /100 WBC Final    Gran % 09/22/2022 71.5  38.0 - 73.0 % Final    Lymph % 09/22/2022 14.1 (L)  18.0 - 48.0 % Final    Mono % 09/22/2022 7.7  4.0 - 15.0 % Final    Eosinophil % 09/22/2022 1.2  0.0 - 8.0 % Final    Basophil % 09/22/2022 0.6  0.0 - 1.9 % Final    Differential Method 09/22/2022 Automated   Final    Sodium 09/22/2022 133 (L)  136 - 145 mmol/L Final    Potassium 09/22/2022 5.8 (H)  3.5 - 5.1 mmol/L Final    *Result may be interfered by visible hemolysis    Chloride 09/22/2022 109  95 - 110 mmol/L Final    CO2 09/22/2022 16 (L)  23 - 29 mmol/L Final    Glucose  09/22/2022 102  70 - 110 mg/dL Final    BUN 09/22/2022 21 (H)  6 - 20 mg/dL Final    Creatinine 09/22/2022 1.6 (H)  0.5 - 1.4 mg/dL Final    Calcium 09/22/2022 7.7 (L)  8.7 - 10.5 mg/dL Final    Total Protein 09/22/2022 6.3  6.0 - 8.4 g/dL Final    *Result may be interfered by visible hemolysis    Albumin 09/22/2022 1.9 (L)  3.5 - 5.2 g/dL Final    Total Bilirubin 09/22/2022 0.1  0.1 - 1.0 mg/dL Final    Comment: For infants and newborns, interpretation of results should be based  on gestational age, weight and in agreement with clinical  observations.    Premature Infant recommended reference ranges:  Up to 24 hours.............<8.0 mg/dL  Up to 48 hours............<12.0 mg/dL  3-5 days..................<15.0 mg/dL  6-29 days.................<15.0 mg/dL      Alkaline Phosphatase 09/22/2022 92  55 - 135 U/L Final    AST 09/22/2022 38  10 - 40 U/L Final    *Result may be interfered by visible hemolysis    ALT 09/22/2022 29  10 - 44 U/L Final    Anion Gap 09/22/2022 8  8 - 16 mmol/L Final    eGFR 09/22/2022 44.5 (A)  >60 mL/min/1.73 m^2 Final    Magnesium 09/22/2022 2.1  1.6 - 2.6 mg/dL Final    Lipase 09/22/2022 13  4 - 60 U/L Final    Specimen UA 09/22/2022 Urine, Clean Catch   Final    Color, UA 09/22/2022 Yellow  Yellow, Straw, Lucy Final    Appearance, UA 09/22/2022 Hazy (A)  Clear Final    pH, UA 09/22/2022 6.0  5.0 - 8.0 Final    Specific Gravity, UA 09/22/2022 1.015  1.005 - 1.030 Final    Protein, UA 09/22/2022 3+ (A)  Negative Final    Comment: Recommend a 24 hour urine protein or a urine   protein/creatinine ratio if globulin induced proteinuria is  clinically suspected.      Glucose, UA 09/22/2022 2+ (A)  Negative Final    Ketones, UA 09/22/2022 Negative  Negative Final    Bilirubin (UA) 09/22/2022 Negative  Negative Final    Occult Blood UA 09/22/2022 2+ (A)  Negative Final    Nitrite, UA 09/22/2022 Negative  Negative Final    Leukocytes, UA 09/22/2022 2+ (A)  Negative Final    Lactate (Lactic Acid)  09/22/2022 1.1  0.5 - 2.2 mmol/L Final    Comment: Falsely low lactic acid results can be found in samples   containing >=13.0 mg/dL total bilirubin and/or >=3.5 mg/dL   direct bilirubin.      POC Preg Test, Ur 09/22/2022 Positive (A)  Negative Final     Acceptable 09/22/2022 Yes   Final    RBC, UA 09/22/2022 21 (H)  0 - 4 /hpf Final    WBC, UA 09/22/2022 50 (H)  0 - 5 /hpf Final    Bacteria 09/22/2022 Few (A)  None-Occ /hpf Final    Squam Epithel, UA 09/22/2022 18  /hpf Final    Hyaline Casts, UA 09/22/2022 0  0-1/lpf /lpf Final    Microscopic Comment 09/22/2022 SEE COMMENT   Final    Comment: Other formed elements not mentioned in the report are not   present in the microscopic examination.       Urine Culture, Routine 09/22/2022 No growth   Final    POCT Glucose 09/24/2022 185 (H)  70 - 110 mg/dL Final   Lab Visit on 08/31/2022   Component Date Value Ref Range Status    Hemoglobin A1C 08/31/2022 6.7 (H)  4.0 - 5.6 % Final    Comment: ADA Screening Guidelines:  5.7-6.4%  Consistent with prediabetes  >or=6.5%  Consistent with diabetes    High levels of fetal hemoglobin interfere with the HbA1C  assay. Heterozygous hemoglobin variants (HbS, HgC, etc)do  not significantly interfere with this assay.   However, presence of multiple variants may affect accuracy.      Estimated Avg Glucose 08/31/2022 146 (H)  68 - 131 mg/dL Final   Lab Visit on 07/21/2022   Component Date Value Ref Range Status    Hepatitis B Surface Ag 07/21/2022 Negative  Negative Final    Hep B C IgM 07/21/2022 Negative  Negative Final    Hep A IgM 07/21/2022 Negative  Negative Final    Hepatitis C Ab 07/21/2022 Negative  Negative Final    RPR 07/21/2022 Non-reactive  Non-reactive Final    Rubella IgG Antibodies 07/22/2022 17.7  >=10.0 IU/mL Final    Rubella Immune Status 07/22/2022 Reactive   Final    Comment: 0.0-4.9 IU/mL  Nonreactive (Non-Immune)  5.0-9.9 IU/mL  Indeterminate  10.0-400.0 IU/mL Reactive (Immune)    These results were  obtained with the IMMULITE 2000 Rubella  Quantitative IgG assay. Values obtained from other   manufacturers' assay methods may not be used interchangeably.      Routine Prenatal on 07/21/2022   Component Date Value Ref Range Status    Chlamydia, Amplified DNA 07/21/2022 Not Detected  Not Detected Final    N gonorrhoeae, amplified DNA 07/21/2022 Not Detected  Not Detected Final    Urine Culture, Routine 07/21/2022 No growth   Final        MENTAL STATUS EXAM  General:  Alert and oriented x 4 Appearance is poor grooming and hygiene, multiple marks on face, appears stated age, Body mass index is 35.12 kg/m². . Behavior:  uncooperative  Gait, Posture and Muscle Strength/Tone: Seen lying in bed. No gross abnormal movements noted.  Psychomotor Agitation and Retardation: none evident  Speech: Normal rate, volume, articulation, and tone.  Mood: denies depression, improved anxiety  Affect: full  Thought Process: Linear, coherent  Associations:  Intact  Thought content: No delusions elicited. Denies SI, HI, AVH  Perceptions: Denies AVH. Does not appear internally preoccupied  Orientation: [id number]  Attention and Concentration: Improved, some attention difficulty with serial 7s  Memory: poor, 3/5 short term recall  Language: Excellent fluency  Fund of Knowledge: good  Insight:   fair  Judgment: fair  Impulse control: good      ASSESSMENT  Executive function deficit  Unspecified anxiety  History of depression    Patient's willingness to engage in interview after her phone was taken away makes it more likely that patient was deliberately feigning sleep over the past two days. Because of mother's concerns about her behavior and because there has been only one day where patient is able/willing to engage, this writer will interview her again tomorrow. PEC remains until then. Will defer decision of whether or not patient can have mother as a visitor or visit baby with sitter present to the floor nursing staff. Would recommend not  allowing patient access to phone until PEC is rescinded. Encouraged patient and mother to find a local psychiatrist in [place] for outpatient follow up.    PLAN  Discontinue depakote as patient is no longer confused.  Continue PRN Haldol 2mg PO and 5mg IM for agitation  Continue PRN Benadryl 25mg PO or IM for agitation     Psychiatry to continue to follow.      -------------------------------------------------------------------------------    Lauren Del Toro  [facility] - [facility] ([facility])    Risks, Benefits, Side Effects and Alternatives were discussed with the patient today and consent was obtained for the current medication regimen. The patient was encouraged to ask questions and these questions were answered and the patient was engaged in psychoeducation regarding diagnosis, course of illness, accuracy of the diagnosis, and other general elements regarding overall diagnosis and treatment consideration.     Any medications being used off-label were discussed with the patient inclusive of the evidence base for the use of the medications and consent was obtained for the off-label use of the medication.     Brief suicide risk assessment was performed with the patient today and safety planning was performed if indicated.    Note completed by: Lauren Del Toro MD, 9/29/2022 12:50 PM

## 2022-09-29 NOTE — ASSESSMENT & PLAN NOTE
- POD#4 s/p pLTCS  - Pain better controlled on current regimen  - Continue routine management and advances  - Meeting post-op milestones

## 2022-09-29 NOTE — PROGRESS NOTES
[facility] ([place])  Obstetrics  Antepartum Progress Note    Patient Name: Alicia Valles  MRN: [id number]  Admission Date: 2022  Hospital Length of Stay: 7 days  Attending Physician: Praveen Benítez MD  Primary Care Provider: Giovanna Hyatt MD    Subjective:     Principal Problem:S/P primary low transverse     HPI:  Alicia Valles is a 27 y.o.  at 29w5d gestation who presented to the [facility] as a transfer from [facility] for elevated BP. She was in nephrology clinic today and reported significant nausea and vomiting. She had been unable to tolerate anything PO since 11am. As such, she had not taken any of her long acting antihypertensive agents. She was noted to have severe range BP in the 180s/100s and was sent to the ED for further evaluation. While in the ED she received zofran and phenergan for nausea with resolution of symptoms. Her BP remained severely elevated with readings in the 170-180s/90-100s. She received labetalol 20 and was transferred to the [facility] for further evaluation.    On arrival to the [facility] she reported feeling well. She denied headaches, vision changes, CP, SOB, RUQ pain. Nausea and vomiting had resolved. She had multiple sustained severe range BP requiring labetalol 20/40/80. As such, decision was made to admit for further BP monitoring in the setting of cHTN exacerbation vs. ARANZA.     Denies contractions, vaginal bleeding, or loss of fluid.Reports normal fetal movement. This IUP is complicated by cHTN (hdyral 50 TID, labetalol 200 BID, Procardia 90 BID), T1DM, anemia, short cervix (15mm, vag progesterone), proteinuria, gastroparesis, MO (BMI 31).       Hospital Course:  2022: admit for BP monitoring for cHTN w/ ARANZA vs cHTN exacerbation. CLD, cont monitoring. Rocephin for UTI. Continue levimir 10/8, BG Q4 while on CLD. Labs per nephrology recs pending.   2022 - HD#2. Received 5 units of aspart since admission for elevated BG. No obstetric  complaints. Monitoring reassuring. Magnesium boluses continued for seizure prophylaxis and fetal neuroprotection in setting of elevated Cr (1.7). K 5.8 this AM  09/24/2022 - HD#3. BP meds changed yesterday to labetalol 200 mg BID, procardia 60 BID, and hydralazine 75 mg TID. Patient had persistently elevated blood sugars yesterday requiring multiple slides with insulin aspart. BMZ 1/2 given. Insulin drip started at 1 u/hr in anticipation of hyperglycemia secondary to steroids. Patient had hypoglycemic episode overnight. Reported left sided pelvic pain overnight, SVE closed. No other obstetric complaints. No pre-E symptoms.   09/25/2022 - HD#4. IOL started secondary to cHTN with ARANZA with SF (Cr).   09/26/2022 - HD#5. POD#1 s/p pLTCS. Multiple episodes of hypoglycemia overnight. Admitted to ICU for cardene drip in setting of uncontrolled sustained severe range BP requiring hydral 5/10/5/10, procardia 10/20, and labetalol 20/40/80 after delivery.   09/27/2022 - HD#6. POD#2 s/p pLTCS. Continued on cardene drip for elevated BP. BP meds adjusted yesterday - labetalol discontinued, hydralazine up-titrated and co-reg added. Started on levemir 3/3 with CR of 1:20 and ISF 1:50 > 180.  09/28/2022 - HD#7. POD#3 s/p pLTCS. PECed by psych yesterday. S/p cardene drip. Lasix drip started by nephro. Will increase levemir to 4/4.   09/29/2022 - HD#8. Stepped down from ICU yesterday. Lasix drip continued. Meeting post-op milestones. PEC continued per psych. Will increase CR to 1:15 with breakfast and dinner.       Interval History:     She reports she is doing well this morning. She is tolerating her diabetic diet.. She is voiding spontaneously. She is ambulating. She has passed flatus, and has not a BM. Vaginal bleeding is mild. She denies fever or chills. Abdominal pain is mild and well controlled on her current pain regimen.     Objective:     Vital Signs (Most Recent):  Temp: 98.3 °F (36.8 °C) (09/29/22 0450)  Pulse: 100  (09/29/22 0450)  Resp: 16 (09/29/22 0450)  BP: (!) 163/87 (09/29/22 0608)  SpO2: 98 % (09/29/22 0450)   Vital Signs (24h Range):  Temp:  [98.2 °F (36.8 °C)-98.8 °F (37.1 °C)] 98.3 °F (36.8 °C)  Pulse:  [] 100  Resp:  [11-30] 16  SpO2:  [96 %-100 %] 98 %  BP: (123-167)/() 163/87     Weight: 84.3 kg (185 lb 13.6 oz)  Body mass index is 35.12 kg/m².      Intake/Output Summary (Last 24 hours) at 9/29/2022 0644  Last data filed at 9/29/2022 0441  Gross per 24 hour   Intake 521.59 ml   Output 4025 ml   Net -3503.41 ml           Significant Labs:  Lab Results   Component Value Date    GROUPTRH A POS 09/24/2022    HEPBSAG Negative 07/21/2022    STREPBCULT No Group B Streptococcus isolated 09/24/2022     Recent Labs   Lab 09/28/22  0409   HGB 8.9*   HCT 25.4*         CBC:   Recent Labs   Lab 09/28/22  0409   WBC 14.70*   RBC 2.86*   HGB 8.9*   HCT 25.4*      MCV 89   MCH 31.1*   MCHC 35.0       CMP:   Recent Labs   Lab 09/28/22  1034   *   CALCIUM 7.6*   ALBUMIN 1.6*   PROT 5.4*   *   K 4.8   CO2 19*      BUN 40*   CREATININE 2.3*   ALKPHOS 93   ALT 22   AST 29   BILITOT 0.1       I have personallly reviewed all pertinent lab results from the last 24 hours.    Physical Exam:   Constitutional: She is oriented to person, place, and time. She appears well-developed and well-nourished. She is cooperative.    HENT:   Head: Normocephalic and atraumatic.       Pulmonary/Chest: Effort normal. No respiratory distress.        Abdominal: Soft. She exhibits abdominal incision (bandage in place, no areas of saturation). She exhibits no distension. There is no abdominal tenderness. There is no rebound and no guarding.   Abdomen appropriately tender, no fundal tenderness appreciated             Musculoskeletal: Normal range of motion. Edema (pitting edema up to hips BL, much improved from yesterday) present.       Neurological: She is alert and oriented to person, place, and time.    Skin: Skin is warm  and dry.    Assessment/Plan:     27 y.o. female  at 30w1d for:    * S/P primary low transverse   - POD#4 s/p pLTCS  - Pain better controlled on current regimen  - Continue routine management and advances  - Meeting post-op milestones    Hyperglycemia  - See type 1 DM for full plan    Hypertensive emergency  - see cHTN with ARANZA for full plan  - On lasix drip, s/p cardene drip    Chronic hypertension with superimposed pre-eclampsia  - Decision made to proceed with delivery in setting of cHTN with ARANZA with SF (Cr)  - Home regimen: hydralazine 50 mg TID, labetalol 200 mg BID, and procardia 90 mg BID  - Current BP regimen: hydralazine 100 mg TID, co reg 25 mg BID, procardia 60 mg BID, clonidine patch added  - s/p cardene drip  - Continue lasix drip and minoxidil per nephrology   - s/p magnesium for seizure prophylaxis  - Will continue to up-titrate BP meds as needed  - heparin increased to 5000 mg q8 for DVT prophylaxis   - Asymptomatic  - UOP: 2.57 cc/kg/hr over last 24 hours           KOBY (acute kidney injury)  - Baseline Cr 1.1 - 1.2  - Cr on admit 1.5 > 1.9 > 2.3 > 2.0 > 1.9 > 2.0 > 2.1 > 2.3  - Avoid nephrotoxic agents  - Nephrology following, appreciate assistance  - s/p Mag for seizure prophylaxis   - Renally dose medications  - On lasix drip per nephrology       UTI (urinary tract infection)  - UA consistent with UTI  - urine culture negative  - s/p rocephin in [facility]    Gastroparesis due to DM  - continue home famotidine and scheduled reglan    Anxiety  - Patient PECed by psychiatry  - Medications per psychiatry: depakote 250/500, haldol q8 PRN, ativan q12 PRN, benadyrl PRN  - Psychiatry following, appreciate recommendations    Proteinuria  - follows with nephrology as outpatient  - Initial P:C of 3.75 downtrending to 1.76  - most recent 24 hour urine with 6.27g  - Repeat PC 17.81  - Labs per nephrology:    - lipid panel: elevated cholesterol and HDL   - anti-ds DNA negative   - YARON positive,  reflex pending   - microalbumin/cr [id number]  - Heparin 5000 mg q8 for DVT prophylaxis  - Nephrology consulted, appreciate assistance    Anemia  - normocytic anemia with most recent iron panel with iron of 84, transferrin 180, TIBC 266, 32% saturation and ferritin 79  - Recommended nephrology labs:   - Folate, B12, homocysteine - WNL   - methylmalonic WNL  - s/p 3 units pRBC  - CBC stable       Type 1 diabetes mellitus with other specified complication  - Home regimen: levemir 10/8, CR 1:7, ISF: 1:50 > 120  - Most recent A1c 6.7%  - BG patterning: fasting, pre-prandial, 2 hour post-prandial and 2 AM  - Current regimen: levemir 4/4   - CR 1:20, ISF 1:50 > 180   - Will change CR to 1:15 with breakfast and dinner, will maintain 1:20 CR with lunch        F          pB/ppB                pL/ppL                  pD/ppD           2 AM  9/28         186        254(4)/308(3)   199(3)/132          210(4)/298(3)       181  - follows closely with endocrine  - Diabetic diabetic ordered   - Discussed plan of care with endocrine yesterday, will continue to discuss as needed  - Hypoglycemia order set in place         Carly Maria MD  Obstetrics  [facility] ([place])

## 2022-09-29 NOTE — ANESTHESIA POSTPROCEDURE EVALUATION
Anesthesia Post Evaluation    Patient: Alicia Valles    Procedure(s) Performed: Procedure(s) (LRB):   SECTION (N/A)    Final Anesthesia Type: CSE      Patient location during evaluation: floor  Patient participation: Yes- Able to Participate  Level of consciousness: awake and alert  Post-procedure vital signs: reviewed and stable  Pain management: adequate  Airway patency: patent  JAYLEEN mitigation strategies: Multimodal analgesia  PONV status at discharge: No PONV  Anesthetic complications: no      Cardiovascular status: blood pressure returned to baseline  Respiratory status: unassisted and spontaneous ventilation  Hydration status: hypervolemic  Follow-up not needed.          Vitals Value Taken Time   /87 22 1211   Temp 37.1 °C (98.7 °F) 22 1211   Pulse 101 22 1211   Resp 17 22 1211   SpO2 97 % 22 1211         Event Time   Out of Recovery 2022 18:10:00         Pain/Robin Score: Pain Rating Prior to Med Admin: 10 (2022  9:32 AM)  Pain Rating Post Med Admin: 2 (2022  6:02 AM)

## 2022-09-29 NOTE — CARE UPDATE
Discussed plan of care with Dr. Mccallum and Dr. Del Toro, both of whom are okay with patient having a visitor.    Carly Maria MD  PGY-2 OB/GYN

## 2022-09-29 NOTE — LACTATION NOTE
LC did DC teaching for NICU mother pumping for her baby. Pt has NICU Mothers Lactation Booklet for lactation. Reviewed how to work pump, keep track of pumpings, label breastmilk, clean pump parts and safely store and transport milk. Pt aware to pump 8 or more times a day for 15-20 minutes. NEMO provided Pt information to obtain personal use pump at home and is aware that she can use the [employer] breastpump at the baby's bedside when she visits. Mother has lactation phone number to call for further questions. Pt verbalized understanding and questions answered.

## 2022-09-29 NOTE — CARE UPDATE
"Resident to bedside to discuss plan of care with patient and mother. PEC was placed by Dr. Del Toro on 9/27. Patient was informed all belongings will need to be removed from room and mother will not be allowed to stay. Patient and mother became very agitated and upset, stating we were not allowed to do any of the above. Again informed patient and mother of regulations regarding PEC and our concern for her health, safety, and wellbeing. Patient's mother states we "took everything they said too far" and that Quenisha does not need to be PEC'd. Patient's mother states Quenisha is not a threat to other people but by "keeping her in the hospital against her will we are going to worsen her depression and make her want to kill herself" to which the patient stated "yes, seriously." Informed patient security will gather her belongings shortly. Patient will have sitter at all times, nursing will continue to provide care with BG/BP monitoring, assistance with pumping, etc.    Kim Barnes MD  OBGYN, PGY-2    "

## 2022-09-29 NOTE — ASSESSMENT & PLAN NOTE
- Home regimen: levemir 10/8, CR 1:7, ISF: 1:50 > 120  - Most recent A1c 6.7%  - BG patterning: fasting, pre-prandial, 2 hour post-prandial and 2 AM  - Current regimen: levemir 4/4   - CR 1:20, ISF 1:50 > 180   - Will change CR to 1:15 with breakfast and dinner, will maintain 1:20 CR with lunch        F          pB/ppB                pL/ppL                  pD/ppD           2 AM  9/28         186        254(4)/308(3)   199(3)/132          210(4)/298(3)       181  - follows closely with endocrine  - Diabetic diabetic ordered   - Discussed plan of care with endocrine yesterday, will continue to discuss as needed  - Hypoglycemia order set in place

## 2022-09-29 NOTE — ASSESSMENT & PLAN NOTE
- Decision made to proceed with delivery in setting of cHTN with ARANZA with SF (Cr)  - Home regimen: hydralazine 50 mg TID, labetalol 200 mg BID, and procardia 90 mg BID  - Current BP regimen: hydralazine 100 mg TID, co reg 25 mg BID, procardia 60 mg BID, clonidine patch added  - s/p cardene drip  - Continue lasix drip and minoxidil per nephrology   - s/p magnesium for seizure prophylaxis  - Will continue to up-titrate BP meds as needed  - heparin increased to 5000 mg q8 for DVT prophylaxis   - Asymptomatic  - UOP: 2.57 cc/kg/hr over last 24 hours

## 2022-09-29 NOTE — ASSESSMENT & PLAN NOTE
- follows with nephrology as outpatient  - Initial P:C of 3.75 downtrending to 1.76  - most recent 24 hour urine with 6.27g  - Repeat PC 17.81  - Labs per nephrology:    - lipid panel: elevated cholesterol and HDL   - anti-ds DNA negative   - YARON positive, reflex pending   - microalbumin/cr 82916  - Heparin 5000 mg q8 for DVT prophylaxis  - Nephrology consulted, appreciate assistance

## 2022-09-30 LAB
ALBUMIN SERPL BCP-MCNC: 1.6 G/DL (ref 3.5–5.2)
ALP SERPL-CCNC: 74 U/L (ref 55–135)
ALT SERPL W/O P-5'-P-CCNC: 17 U/L (ref 10–44)
ANION GAP SERPL CALC-SCNC: 7 MMOL/L (ref 8–16)
AST SERPL-CCNC: 20 U/L (ref 10–40)
BILIRUB SERPL-MCNC: 0.2 MG/DL (ref 0.1–1)
BUN SERPL-MCNC: 43 MG/DL (ref 6–20)
CALCIUM SERPL-MCNC: 7.9 MG/DL (ref 8.7–10.5)
CHLORIDE SERPL-SCNC: 102 MMOL/L (ref 95–110)
CO2 SERPL-SCNC: 28 MMOL/L (ref 23–29)
CREAT SERPL-MCNC: 2.2 MG/DL (ref 0.5–1.4)
EST. GFR  (NO RACE VARIABLE): 30 ML/MIN/1.73 M^2
GLUCOSE SERPL-MCNC: 102 MG/DL (ref 70–110)
GLUCOSE SERPL-MCNC: 109 MG/DL (ref 70–110)
GLUCOSE SERPL-MCNC: 191 MG/DL (ref 70–110)
GLUCOSE SERPL-MCNC: 55 MG/DL (ref 70–110)
POCT GLUCOSE: 106 MG/DL (ref 70–110)
POCT GLUCOSE: 135 MG/DL (ref 70–110)
POCT GLUCOSE: 213 MG/DL (ref 70–110)
POCT GLUCOSE: 248 MG/DL (ref 70–110)
POCT GLUCOSE: 93 MG/DL (ref 70–110)
POTASSIUM SERPL-SCNC: 3.9 MMOL/L (ref 3.5–5.1)
PROT SERPL-MCNC: 5 G/DL (ref 6–8.4)
SODIUM SERPL-SCNC: 137 MMOL/L (ref 136–145)

## 2022-09-30 PROCEDURE — 25000003 PHARM REV CODE 250: Performed by: PSYCHIATRY & NEUROLOGY

## 2022-09-30 PROCEDURE — 36415 COLL VENOUS BLD VENIPUNCTURE: CPT | Performed by: OBSTETRICS & GYNECOLOGY

## 2022-09-30 PROCEDURE — 11000001 HC ACUTE MED/SURG PRIVATE ROOM

## 2022-09-30 PROCEDURE — 63600175 PHARM REV CODE 636 W HCPCS: Performed by: OBSTETRICS & GYNECOLOGY

## 2022-09-30 PROCEDURE — 63600175 PHARM REV CODE 636 W HCPCS

## 2022-09-30 PROCEDURE — 25000003 PHARM REV CODE 250

## 2022-09-30 PROCEDURE — 63600175 PHARM REV CODE 636 W HCPCS: Performed by: NURSE PRACTITIONER

## 2022-09-30 PROCEDURE — 99232 PR SUBSEQUENT HOSPITAL CARE,LEVL II: ICD-10-PCS | Mod: 24,,, | Performed by: OBSTETRICS & GYNECOLOGY

## 2022-09-30 PROCEDURE — 80053 COMPREHEN METABOLIC PANEL: CPT | Performed by: OBSTETRICS & GYNECOLOGY

## 2022-09-30 PROCEDURE — 25000003 PHARM REV CODE 250: Performed by: NURSE PRACTITIONER

## 2022-09-30 PROCEDURE — 63600175 PHARM REV CODE 636 W HCPCS: Performed by: STUDENT IN AN ORGANIZED HEALTH CARE EDUCATION/TRAINING PROGRAM

## 2022-09-30 PROCEDURE — 99232 SBSQ HOSP IP/OBS MODERATE 35: CPT | Mod: 24,,, | Performed by: OBSTETRICS & GYNECOLOGY

## 2022-09-30 RX ORDER — INSULIN ASPART 100 [IU]/ML
2 INJECTION, SOLUTION INTRAVENOUS; SUBCUTANEOUS ONCE
Status: COMPLETED | OUTPATIENT
Start: 2022-09-30 | End: 2022-09-30

## 2022-09-30 RX ORDER — INSULIN ASPART 100 [IU]/ML
6 INJECTION, SOLUTION INTRAVENOUS; SUBCUTANEOUS
Status: DISCONTINUED | OUTPATIENT
Start: 2022-09-30 | End: 2022-09-30

## 2022-09-30 RX ORDER — INSULIN ASPART 100 [IU]/ML
4 INJECTION, SOLUTION INTRAVENOUS; SUBCUTANEOUS
Status: COMPLETED | OUTPATIENT
Start: 2022-09-30 | End: 2022-09-30

## 2022-09-30 RX ORDER — INSULIN ASPART 100 [IU]/ML
3 INJECTION, SOLUTION INTRAVENOUS; SUBCUTANEOUS
Status: COMPLETED | OUTPATIENT
Start: 2022-10-01 | End: 2022-09-30

## 2022-09-30 RX ORDER — INSULIN ASPART 100 [IU]/ML
1 INJECTION, SOLUTION INTRAVENOUS; SUBCUTANEOUS ONCE
Status: COMPLETED | OUTPATIENT
Start: 2022-09-30 | End: 2022-09-30

## 2022-09-30 RX ORDER — SERTRALINE HYDROCHLORIDE 50 MG/1
50 TABLET, FILM COATED ORAL DAILY
Status: DISCONTINUED | OUTPATIENT
Start: 2022-09-30 | End: 2022-10-05 | Stop reason: HOSPADM

## 2022-09-30 RX ADMIN — INSULIN DETEMIR 5 UNITS: 100 INJECTION, SOLUTION SUBCUTANEOUS at 09:09

## 2022-09-30 RX ADMIN — HYDROCODONE BITARTRATE AND ACETAMINOPHEN 1 TABLET: 5; 325 TABLET ORAL at 05:09

## 2022-09-30 RX ADMIN — CARVEDILOL 25 MG: 12.5 TABLET, FILM COATED ORAL at 08:09

## 2022-09-30 RX ADMIN — NIFEDIPINE 60 MG: 30 TABLET, FILM COATED, EXTENDED RELEASE ORAL at 08:09

## 2022-09-30 RX ADMIN — SERTRALINE HYDROCHLORIDE 50 MG: 50 TABLET ORAL at 01:09

## 2022-09-30 RX ADMIN — POLYETHYLENE GLYCOL 3350 17 G: 17 POWDER, FOR SOLUTION ORAL at 08:09

## 2022-09-30 RX ADMIN — FAMOTIDINE 40 MG: 20 TABLET ORAL at 09:09

## 2022-09-30 RX ADMIN — HYDROCODONE BITARTRATE AND ACETAMINOPHEN 1 TABLET: 5; 325 TABLET ORAL at 01:09

## 2022-09-30 RX ADMIN — CYANOCOBALAMIN TAB 1000 MCG 1000 MCG: 1000 TAB at 08:09

## 2022-09-30 RX ADMIN — HYDRALAZINE HYDROCHLORIDE 100 MG: 25 TABLET, FILM COATED ORAL at 05:09

## 2022-09-30 RX ADMIN — METOCLOPRAMIDE 10 MG: 10 TABLET ORAL at 01:09

## 2022-09-30 RX ADMIN — HEPARIN SODIUM 5000 UNITS: 5000 INJECTION INTRAVENOUS; SUBCUTANEOUS at 05:09

## 2022-09-30 RX ADMIN — INSULIN DETEMIR 5 UNITS: 100 INJECTION, SOLUTION SUBCUTANEOUS at 08:09

## 2022-09-30 RX ADMIN — MUPIROCIN: 20 OINTMENT TOPICAL at 08:09

## 2022-09-30 RX ADMIN — SODIUM BICARBONATE 650 MG TABLET 1300 MG: at 09:09

## 2022-09-30 RX ADMIN — DIPHENHYDRAMINE HYDROCHLORIDE 25 MG: 25 CAPSULE ORAL at 01:09

## 2022-09-30 RX ADMIN — INSULIN ASPART 4 UNITS: 100 INJECTION, SOLUTION INTRAVENOUS; SUBCUTANEOUS at 06:09

## 2022-09-30 RX ADMIN — METOLAZONE 5 MG: 5 TABLET ORAL at 08:09

## 2022-09-30 RX ADMIN — DOCUSATE SODIUM 200 MG: 100 CAPSULE, LIQUID FILLED ORAL at 08:09

## 2022-09-30 RX ADMIN — METOCLOPRAMIDE 10 MG: 10 TABLET ORAL at 08:09

## 2022-09-30 RX ADMIN — HEPARIN SODIUM 5000 UNITS: 5000 INJECTION INTRAVENOUS; SUBCUTANEOUS at 01:09

## 2022-09-30 RX ADMIN — MINOXIDIL 5 MG: 2.5 TABLET ORAL at 08:09

## 2022-09-30 RX ADMIN — HYDROCODONE BITARTRATE AND ACETAMINOPHEN 1 TABLET: 5; 325 TABLET ORAL at 09:09

## 2022-09-30 RX ADMIN — HYDROCODONE BITARTRATE AND ACETAMINOPHEN 1 TABLET: 5; 325 TABLET ORAL at 10:09

## 2022-09-30 RX ADMIN — PRENATAL VIT W/ FE FUMARATE-FA TAB 27-0.8 MG 1 TABLET: 27-0.8 TAB at 08:09

## 2022-09-30 RX ADMIN — CARVEDILOL 25 MG: 12.5 TABLET, FILM COATED ORAL at 09:09

## 2022-09-30 RX ADMIN — FUROSEMIDE 12 MG/HR: 10 INJECTION, SOLUTION INTRAMUSCULAR; INTRAVENOUS at 01:09

## 2022-09-30 RX ADMIN — CLONIDINE HYDROCHLORIDE 0.2 MG: 0.1 TABLET ORAL at 09:09

## 2022-09-30 RX ADMIN — INSULIN ASPART 6 UNITS: 100 INJECTION, SOLUTION INTRAVENOUS; SUBCUTANEOUS at 02:09

## 2022-09-30 RX ADMIN — DOCUSATE SODIUM 200 MG: 100 CAPSULE, LIQUID FILLED ORAL at 09:09

## 2022-09-30 RX ADMIN — HYDRALAZINE HYDROCHLORIDE 100 MG: 25 TABLET, FILM COATED ORAL at 10:09

## 2022-09-30 RX ADMIN — METOCLOPRAMIDE 10 MG: 10 TABLET ORAL at 05:09

## 2022-09-30 RX ADMIN — HEPARIN SODIUM 5000 UNITS: 5000 INJECTION INTRAVENOUS; SUBCUTANEOUS at 10:09

## 2022-09-30 RX ADMIN — POLYSACCHARIDE-IRON COMPLEX 150 MG: 150 CAPSULE ORAL at 08:09

## 2022-09-30 RX ADMIN — MUPIROCIN: 20 OINTMENT TOPICAL at 10:09

## 2022-09-30 RX ADMIN — HYDRALAZINE HYDROCHLORIDE 100 MG: 25 TABLET, FILM COATED ORAL at 01:09

## 2022-09-30 RX ADMIN — INSULIN ASPART 1 UNITS: 100 INJECTION, SOLUTION INTRAVENOUS; SUBCUTANEOUS at 03:09

## 2022-09-30 RX ADMIN — INSULIN ASPART 3 UNITS: 100 INJECTION, SOLUTION INTRAVENOUS; SUBCUTANEOUS at 08:09

## 2022-09-30 RX ADMIN — SODIUM BICARBONATE 650 MG TABLET 1300 MG: at 08:09

## 2022-09-30 RX ADMIN — INSULIN ASPART 2 UNITS: 100 INJECTION, SOLUTION INTRAVENOUS; SUBCUTANEOUS at 11:09

## 2022-09-30 RX ADMIN — MINOXIDIL 5 MG: 2.5 TABLET ORAL at 09:09

## 2022-09-30 NOTE — NURSING
Pt asking not to be stuck for accucheck and be able to use own sugar monitor 9dexcom) agreed to correlate her own machine with ours   our machine was 93  pt/s dexcom was 100  Dr Mccallum was okay that pt may use her dexcom in place of accucheck per rn   6 units asparat ordered for lunch carb rate of 81.  Discussed with pt

## 2022-09-30 NOTE — NURSING
PEC lifted per dr groves   pt to nicu via w/c with PCT   NICU RN given my spectralink number to contact when visit is completed

## 2022-09-30 NOTE — ASSESSMENT & PLAN NOTE
- Baseline Cr 1.1 - 1.2  - Cr on admit 1.5 >>>> > 2.2  - Avoid nephrotoxic agents  - Nephrology following, appreciate assistance  - s/p Mag for seizure prophylaxis   - Renally dose medications  - On lasix drip per nephrology

## 2022-09-30 NOTE — NURSING
2 hour pp accucheck 55   dr brambila notified  pt requesting cranberry juice and mark crackers before glucose tabs or dextrose push  dr brambila notified will recheck BS in 10 minutes

## 2022-09-30 NOTE — PLAN OF CARE
Recommendations  1) Suggest adding laxative - LBM 9/21   2) Continue Diabetic diet - Low K as needed    Goals: Pt to have BM by RD follow up  Nutrition Goal Status: new  Communication of RD Recs: other (comment) (POC)    Assessment and Plan  Nutrition Problem  Increased nutrient needs    Related to (etiology):   Wound healing, breastfeeding    Signs and Symptoms (as evidenced by):   +400 kcal/day for breastfeeding  Surgical wound to abdomen    Interventions(treatment strategy):  Collaboration with other provides  Potassium modified diet  Carbohydrate modified diet (diabetic)    Nutrition Diagnosis Status:   New

## 2022-09-30 NOTE — ASSESSMENT & PLAN NOTE
- Decision made to proceed with delivery in setting of cHTN with ARANZA with SF (Cr)  - Home regimen: hydralazine 50 mg TID, labetalol 200 mg BID, and procardia 90 mg BID  - Current BP regimen: hydralazine 100 mg TID, co reg 25 mg BID, procardia 60 mg BID, clonidine patch, minoxidil, lasix gtt, metolazone  - s/p magnesium for seizure prophylaxis  - Will continue to up-titrate BP meds as needed  - heparin 5000 mg q8 for DVT prophylaxis   - Asymptomatic  - UOP: 2.02 cc/kg/hr over last 12 hours

## 2022-09-30 NOTE — ASSESSMENT & PLAN NOTE
- POD#5 s/p pLTCS  - Pain better controlled on current regimen  - Continue routine management and advances  - Meeting post-op milestones

## 2022-09-30 NOTE — PROGRESS NOTES
"[facility] - Antepartum ([place])  Adult Nutrition  Progress Note    SUMMARY     Recommendations  1) Suggest adding laxative - LBM    2) Continue Diabetic diet - Low K as needed    Goals: Pt to have BM by RD follow up  Nutrition Goal Status: new  Communication of RD Recs: other (comment) (POC)    Assessment and Plan  Nutrition Problem  Increased nutrient needs    Related to (etiology):   Wound healing, breastfeeding    Signs and Symptoms (as evidenced by):   +400 kcal/day for breastfeeding  Surgical wound to abdomen    Interventions(treatment strategy):  Collaboration with other provides  Potassium modified diet  Carbohydrate modified diet (diabetic)    Nutrition Diagnosis Status:   New      Malnutrition Assessment  ZEE NFPE due to remote assessment       Reason for Assessment  Reason For Assessment: length of stay  Diagnosis: other (see comments) ()  Relevant Medical History: T2DM, anxiety, HTN, anemia, gastroparesis, seizures  Interdisciplinary Rounds: did not attend  General Information Comments: RD remote for coverage, pt admitted for , with incision to abdomen, eating 75% meals per chart, LBM . +29 lbs wt gain x 6 months - consistent with pregnancy. ZEE NFPE at this time.  Nutrition Discharge Planning: Discharge on diabetic diet    Nutrition Risk Screen  Nutrition Risk Screen: no indicators present    Nutrition/Diet History  Food Allergies: NKFA  Factors Affecting Nutritional Intake: None identified at this time    Anthropometrics  Temp: 98.6 °F (37 °C)  Height Method: Stated  Height: 5' 1" (154.9 cm)  Height (inches): 61 in  Weight Method: Standard Scale  Weight: 76.8 kg (169 lb 3.3 oz)  Weight (lb): 169.2 lb  Ideal Body Weight (IBW), Female: 105 lb  % Ideal Body Weight, Female (lb): 161.14 %  BMI (Calculated): 32       Lab/Procedures/Meds  Pertinent Labs Reviewed: reviewed  Pertinent Labs Comments: Cr 2.2, GFR 30, BUN 43, Alb 1.6, Hgb A1c 6.7 on 22  Pertinent Medications Reviewed: " reviewed  Pertinent Medications Comments: carvedilol, cyanocobalamin, docusate sodium, famotidine, heparin, insulin, iron, polyethylene glycol, prenatal vitamin, sodium bicarbonate    Estimated/Assessed Needs  Weight Used For Calorie Calculations: 76.7 kg (169 lb)  Energy Calorie Requirements (kcal): 2316 kcal/day based on 25 kcal/kg + 400 kcal for lactation  Energy Need Method: Kcal/kg  Protein Requirements: 85 g/day based on 1.1 g/kg  Weight Used For Protein Calculations: 76.7 kg (169 lb)  Fluid Requirements (mL): 1 mL/kcal or per MD  Estimated Fluid Requirement Method: RDA Method  RDA Method (mL): 2316  CHO Requirement: 290 g CHO/day based on 50% kcal from CHO    Nutrition Prescription Ordered  Current Diet Order: Diabetic, low K    Evaluation of Received Nutrient/Fluid Intake    Intake/Output Summary (Last 24 hours) at 9/30/2022 1413  Last data filed at 9/30/2022 1203  Gross per 24 hour   Intake 1344.1 ml   Output 4600 ml   Net -3255.9 ml       I/O: -3.6 L since admit  Energy Calories Required: meeting needs  Protein Required: meeting needs  Fluid Required: meeting needs  Comments: LBM 9/21  Tolerance: tolerating  % Intake of Estimated Energy Needs: 75 - 100 %  % Meal Intake: 75 - 100 %    Nutrition Risk  Level of Risk/Frequency of Follow-up:  (1x weekly)     Monitor and Evaluation  Food and Nutrient Intake: energy intake, food and beverage intake  Food and Nutrient Adminstration: diet order  Knowledge/Beliefs/Attitudes: beliefs and attitudes  Physical Activity and Function: nutrition-related ADLs and IADLs  Anthropometric Measurements: body mass index, weight change, weight  Biochemical Data, Medical Tests and Procedures: electrolyte and renal panel, lipid profile, gastrointestinal profile, glucose/endocrine profile, inflammatory profile  Nutrition-Focused Physical Findings: overall appearance, extremities, muscles and bones, skin     Nutrition Follow-Up  RD Follow-up?: Yes

## 2022-09-30 NOTE — SUBJECTIVE & OBJECTIVE
Interval History:     She reports she is doing well this morning. She is tolerating her diabetic diet.. She is voiding spontaneously. She is ambulating. She has passed flatus, and has not a BM. Vaginal bleeding is mild. She denies fever or chills. Abdominal pain is mild and well controlled on her current pain regimen.     Objective:     Vital Signs (Most Recent):  Temp: 98.8 °F (37.1 °C) (09/30/22 0500)  Pulse: 102 (09/30/22 0500)  Resp: 18 (09/30/22 0552)  BP: (!) 145/92 (09/30/22 0553)  SpO2: 98 % (09/30/22 0500)   Vital Signs (24h Range):  Temp:  [98.5 °F (36.9 °C)-98.8 °F (37.1 °C)] 98.8 °F (37.1 °C)  Pulse:  [] 102  Resp:  [17-18] 18  SpO2:  [96 %-99 %] 98 %  BP: (133-159)/(71-99) 145/92     Weight: 84.3 kg (185 lb 13.6 oz)  Body mass index is 35.12 kg/m².      Intake/Output Summary (Last 24 hours) at 9/30/2022 0647  Last data filed at 9/30/2022 0600  Gross per 24 hour   Intake 950 ml   Output 3850 ml   Net -2900 ml           Significant Labs:  Lab Results   Component Value Date    GROUPTRH A POS 09/24/2022    HEPBSAG Negative 07/21/2022    STREPBCULT No Group B Streptococcus isolated 09/24/2022     Recent Labs   Lab 09/29/22  0948   HGB 8.5*   HCT 24.5*         CBC:   Recent Labs   Lab 09/29/22  0948   WBC 10.65   RBC 2.73*   HGB 8.5*   HCT 24.5*      MCV 90   MCH 31.1*   MCHC 34.7       CMP:   Recent Labs   Lab 09/30/22  0551      CALCIUM 7.9*   ALBUMIN 1.6*   PROT 5.0*      K 3.9   CO2 28      BUN 43*   CREATININE 2.2*   ALKPHOS 74   ALT 17   AST 20   BILITOT 0.2       I have personallly reviewed all pertinent lab results from the last 24 hours.    Physical Exam:   Constitutional: She is oriented to person, place, and time. She appears well-developed and well-nourished. She is cooperative.    HENT:   Head: Normocephalic and atraumatic.       Pulmonary/Chest: Effort normal. No respiratory distress.        Abdominal: Soft. She exhibits abdominal incision (bandage in place, no  areas of saturation). She exhibits no distension. There is no abdominal tenderness. There is no rebound and no guarding.   Abdomen appropriately tender, no fundal tenderness appreciated             Musculoskeletal: Normal range of motion. Edema (pitting edema up to hips BL, much improved from yesterday) present.       Neurological: She is alert and oriented to person, place, and time.    Skin: Skin is warm and dry.

## 2022-09-30 NOTE — ASSESSMENT & PLAN NOTE
- Patient PECed by psychiatry  - Medications per psychiatry: haldol q8 PRN, ativan q12 PRN, benadyrl PRN  - Psychiatry following, appreciate recommendations

## 2022-09-30 NOTE — PROGRESS NOTES
"Progress Note  Nephrology    Consult Requested By: Praveen Benítez MD  Reason for Consult: KOBY/>K/Nephrotic Syn    SUBJECTIVE:     History of Present Illness from initial consultation:  "Patient is a 27 y.o. female presents with admission from Nephrology appt yesterday with N/V/Acc HTN and feeling poorly.  Pt is 27+W accidental pregnancy with uncontrolled type 1 dm, nephrotic syn, uncontrolled HTN.  Initial /100, Creat 1.5, K 5.8, urine PC 17 (falsely high with blood but hx of 6+).  Admitted to OB floor for mgmt of HTN.  Trends noted and now with Creat 1.7, K 6.0, Bicarb 17.  Consulted for evaluation.  Pt seen and examined with [facility] staff-team at bedside.  Of note pt has been refusing some meds/treatment modalities/etc.  Extensive discussion about plan of care and consequences of refusal.  Nephrotic w/up ordered earlier but suspect due to uncontrolled Type 1 DM/Uncontrolled HTN. Pt admits to taking Excedrin and occasional NSAIDS for her gastroparesis.  Diet habits are uncontrolled."     Interval History:    Diuresing well.  Asking to go home and tried to explain plan and not sure she truly understands (volume removal, BP mgmt, biopsy early next week, etc).  Currently pumping.  No other complaints.     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      Past Surgical History:   Procedure Laterality Date     SECTION N/A 2022    Procedure:  SECTION;  Surgeon: Quoc Pryor Jr., MD;  Location: [facility], [place], operated by [facility] L&D;  Service: OB/GYN;  Laterality: N/A;    RETINAL DETACHMENT SURGERY  2018    RETINAL DETACHMENT SURGERY       Family History   Problem Relation Age of Onset    No Known Problems Father     Cancer Paternal Grandfather         unsure    " Diabetes Maternal Grandfather     Hypertension Mother      Social History     Tobacco Use    Smoking status: Never    Smokeless tobacco: Never   Substance Use Topics    Alcohol use: Not Currently    Drug use: Never       Review of patient's allergies indicates:  No Known Allergies     Review of Systems:  Constitutional: No fever or chills  Respiratory: No cough or shortness of breath  Cardiovascular: No chest pain or palpitations  Gastrointestinal: No nausea or vomiting  Neurological: No confusion or weakness    OBJECTIVE:     Vital Signs (Most Recent)  Temp: 98.3 °F (36.8 °C) (09/29/22 0450)  Pulse: 100 (09/29/22 0450)  Resp: 16 (09/29/22 0450)  BP: (!) 140/79 (09/29/22 0658)  SpO2: 98 % (09/29/22 0450)    Vital Signs Range (Last 24H):  Temp:  [98.2 °F (36.8 °C)-98.8 °F (37.1 °C)]   Pulse:  []   Resp:  [11-23]   BP: (123-163)/()   SpO2:  [97 %-99 %]       Intake/Output Summary (Last 24 hours) at 9/29/2022 0901  Last data filed at 9/29/2022 0441  Gross per 24 hour   Intake 42.44 ml   Output 4025 ml   Net -3982.56 ml         Physical Exam:  NAD  Anasarca-slowly improving  RRR  Diminished lungs.  +distention, hypo BS  Edema ++    Laboratory:  No results for input(s): WBC, RBC, HGB, HCT, PLT, MCV, MCH, MCHC in the last 24 hours.    BMP:   Recent Labs   Lab 09/26/22  1202 09/26/22  1952 09/28/22  1034   *   < > 287*   *   < > 133*   K 5.5*   < > 4.8      < > 104   CO2 16*   < > 19*   BUN 34*   < > 40*   CREATININE 2.0*   < > 2.3*   CALCIUM 7.4*   < > 7.6*   MG 4.9*  --   --     < > = values in this interval not displayed.       Lab Results   Component Value Date    CALCIUM 7.6 (L) 09/28/2022    PHOS 5.0 (H) 09/24/2022     BNP  No results for input(s): BNP, BNPTRIAGEBLO in the last 168 hours.No results found for: URICACID  Lab Results   Component Value Date    IRON 84 05/06/2022    TIBC 266 05/06/2022    FERRITIN 79 05/06/2022     Lab Results   Component Value Date    CALCIUM 7.6 (L)  2022    PHOS 5.0 (H) 2022       Diagnostic Results:  No orders to display       ASSESSMENT/PLAN:     Oliguric/nonoliguric KOBY on CKD II with nephrotic syndrome secondary to uncontrolled DM, uncontrolled HTN, Preeclampsia with severe features.  -Followed with  nephrology at [facility] Dr. Cheek/Liliane   -Creat trends noted. 1.5 -1.9 - 2.0 - 2.3 - 2.1 - 2.0  -Repeat UPCR 9 grams - baseline historically 6 grams  -avoid NSAIDS.    -no need for RRT yet  -increase Heparin to Q8 and ok for lovenox.    -<Hapto, anemia, KOBY.  ADAMTS  76%  -now with worsening anasarca and volume overload started lasix.    -changed to lasix drip with metolazone  -YARON mildy + but neg DsDNA.     -trending with Creat 2.1-2.3  -consider biopsy when less edematous and hopefully early next week.    -Renally dose meds, avoid nephrotoxins, and monitor I/O's closely.      Hyperkalemia with NAGMA, hyponatremia  -s/ p Lokelma TID and sodium bicarb to shift and correct acidosis  -stable.   -low k diet.  -some are hemolyzed.       Uncontrolled HTN  -max procardia 60 BID  -increased hydralazine to 100 TID  -changed to coreg for antiprotenuric effects.   -added catapres patch and prn tabs.  -lasix/metolazone to help with volume removal which should help BP also  -weaned off cardene  -added minoxidil with improvement    Uncontrolled Type 1 DM  -formerly on insulin pump until she had a hypoglycemic seizure and then transitioned to MDI  -she was not wearing her sensor was cause of this but highly recommend retry with closed loop system.  -defer to Endo     Pregnancy, Extremely high risk  -per MFM/OB  -s/p emergent .    Leukocystosis without fever or S/S infection  -Due to steroid therapy    Anemia of pregnancy/CKD  -B12 and folate both on low side now on supplementation  -on iron  -Epo is contraindicated    Anxiety:  -defer to psych/OB  -PEC'd    -asking to go home.....      See above

## 2022-09-30 NOTE — PROGRESS NOTES
[facility] ([place])  Obstetrics  Postpartum Progress Note    Patient Name: Alicia Valles  MRN: [id number]  Admission Date: 2022  Hospital Length of Stay: 8 days  Attending Physician: Praveen Benítez MD  Primary Care Provider: Giovanna Hyatt MD    Subjective:     Principal Problem:S/P primary low transverse     Hospital Course:  2022: admit for BP monitoring for cHTN w/ ARANZA vs cHTN exacerbation. CLD, cont monitoring. Rocephin for UTI. Continue levimir 10/8, BG Q4 while on CLD. Labs per nephrology recs pending.   2022 - HD#2. Received 5 units of aspart since admission for elevated BG. No obstetric complaints. Monitoring reassuring. Magnesium boluses continued for seizure prophylaxis and fetal neuroprotection in setting of elevated Cr (1.7). K 5.8 this AM  2022 - HD#3. BP meds changed yesterday to labetalol 200 mg BID, procardia 60 BID, and hydralazine 75 mg TID. Patient had persistently elevated blood sugars yesterday requiring multiple slides with insulin aspart. BMZ 1/2 given. Insulin drip started at 1 u/hr in anticipation of hyperglycemia secondary to steroids. Patient had hypoglycemic episode overnight. Reported left sided pelvic pain overnight, SVE closed. No other obstetric complaints. No pre-E symptoms.   2022 - HD#4. IOL started secondary to cHTN with ARANZA with SF (Cr).   2022 - HD#5. POD#1 s/p pLTCS. Multiple episodes of hypoglycemia overnight. Admitted to ICU for cardene drip in setting of uncontrolled sustained severe range BP requiring hydral 5/10/5/10, procardia 10/20, and labetalol 20/40/80 after delivery.   2022 - HD#6. POD#2 s/p pLTCS. Continued on cardene drip for elevated BP. BP meds adjusted yesterday - labetalol discontinued, hydralazine up-titrated and co-reg added. Started on levemir 3/3 with CR of 1:20 and ISF 1:50 > 180.  2022 - HD#7. POD#3 s/p pLTCS. PECed by psych yesterday. S/p cardene drip. Lasix drip started by nephro. Will  increase levemir to 4/4.   09/29/2022 - HD#8. POD#4 s/p pLTCS. Stepped down from ICU yesterday. Lasix drip continued. Meeting post-op milestones. PEC continued per psych. Will increase CR to 1:15 with breakfast and dinner.   09/30/2022 - HD#9. POD#5 s/p pLTCS. Insulin regimen adjusted yesterday. Lasix drip continued. PEC in place per psych.        Interval History:     She reports she is doing well this morning. She is tolerating her diabetic diet.. She is voiding spontaneously. She is ambulating. She has passed flatus, and has not a BM. Vaginal bleeding is mild. She denies fever or chills. Abdominal pain is mild and well controlled on her current pain regimen.     Objective:     Vital Signs (Most Recent):  Temp: 98.8 °F (37.1 °C) (09/30/22 0500)  Pulse: 102 (09/30/22 0500)  Resp: 18 (09/30/22 0552)  BP: (!) 145/92 (09/30/22 0553)  SpO2: 98 % (09/30/22 0500)   Vital Signs (24h Range):  Temp:  [98.5 °F (36.9 °C)-98.8 °F (37.1 °C)] 98.8 °F (37.1 °C)  Pulse:  [] 102  Resp:  [17-18] 18  SpO2:  [96 %-99 %] 98 %  BP: (133-159)/(71-99) 145/92     Weight: 84.3 kg (185 lb 13.6 oz)  Body mass index is 35.12 kg/m².      Intake/Output Summary (Last 24 hours) at 9/30/2022 0647  Last data filed at 9/30/2022 0600  Gross per 24 hour   Intake 950 ml   Output 3850 ml   Net -2900 ml           Significant Labs:  Lab Results   Component Value Date    GROUPTRH A POS 09/24/2022    HEPBSAG Negative 07/21/2022    STREPBCULT No Group B Streptococcus isolated 09/24/2022     Recent Labs   Lab 09/29/22  0948   HGB 8.5*   HCT 24.5*         CBC:   Recent Labs   Lab 09/29/22  0948   WBC 10.65   RBC 2.73*   HGB 8.5*   HCT 24.5*      MCV 90   MCH 31.1*   MCHC 34.7       CMP:   Recent Labs   Lab 09/30/22  0551      CALCIUM 7.9*   ALBUMIN 1.6*   PROT 5.0*      K 3.9   CO2 28      BUN 43*   CREATININE 2.2*   ALKPHOS 74   ALT 17   AST 20   BILITOT 0.2       I have personallly reviewed all pertinent lab results from the  last 24 hours.    Physical Exam:   Constitutional: She is oriented to person, place, and time. She appears well-developed and well-nourished. She is cooperative.    HENT:   Head: Normocephalic and atraumatic.       Pulmonary/Chest: Effort normal. No respiratory distress.        Abdominal: Soft. She exhibits abdominal incision (bandage in place, no areas of saturation). She exhibits no distension. There is no abdominal tenderness. There is no rebound and no guarding.   Abdomen appropriately tender, no fundal tenderness appreciated             Musculoskeletal: Normal range of motion. Edema (pitting edema up to hips BL, much improved from yesterday) present.       Neurological: She is alert and oriented to person, place, and time.    Skin: Skin is warm and dry.      Assessment/Plan:     27 y.o. female  for:    * S/P primary low transverse   - POD#5 s/p pLTCS  - Pain better controlled on current regimen  - Continue routine management and advances  - Meeting post-op milestones    History of depression  - PEC in place per psychiatry  - See anxiety for full plan     Executive function deficit  - PEC in place per psychiatry  - See anxiety for full plan     Hyperglycemia  - See type 1 DM for full plan    Hypertensive emergency  - see cHTN with ARANZA for full plan  - On lasix drip, s/p cardene drip    Chronic hypertension with superimposed pre-eclampsia  - Decision made to proceed with delivery in setting of cHTN with ARANZA with SF (Cr)  - Home regimen: hydralazine 50 mg TID, labetalol 200 mg BID, and procardia 90 mg BID  - Current BP regimen: hydralazine 100 mg TID, co reg 25 mg BID, procardia 60 mg BID, clonidine patch, minoxidil, lasix gtt, metolazone  - s/p magnesium for seizure prophylaxis  - Will continue to up-titrate BP meds as needed  - heparin 5000 mg q8 for DVT prophylaxis   - Asymptomatic  - UOP: 2.02 cc/kg/hr over last 12 hours           KOBY (acute kidney injury)  - Baseline Cr 1.1 - 1.2  - Cr on  admit 1.5 >>>> > 2.2  - Avoid nephrotoxic agents  - Nephrology following, appreciate assistance  - s/p Mag for seizure prophylaxis   - Renally dose medications  - On lasix drip per nephrology     UTI (urinary tract infection)  - UA consistent with UTI  - urine culture negative  - s/p rocephin in [facility]    Gastroparesis due to DM  - continue home famotidine and scheduled reglan    Anxiety  - Patient PECed by psychiatry  - Medications per psychiatry: haldol q8 PRN, ativan q12 PRN, benadyrl PRN  - Psychiatry following, appreciate recommendations    Proteinuria  - follows with nephrology as outpatient  - Initial P:C of 3.75 downtrending to 1.76  - most recent 24 hour urine with 6.27g  - Repeat PC 17.81  - Labs per nephrology:    - lipid panel: elevated cholesterol and HDL   - anti-ds DNA negative   - YARON positive, reflex pending   - microalbumin/cr 06548  - Heparin 5000 mg q8 for DVT prophylaxis  - Nephrology consulted, appreciate assistance    Anemia  - normocytic anemia with most recent iron panel with iron of 84, transferrin 180, TIBC 266, 32% saturation and ferritin 79  - Recommended nephrology labs:   - Folate, B12, homocysteine - WNL   - methylmalonic WNL  - s/p 3 units pRBC  - CBC stable       Type 1 diabetes mellitus with other specified complication  - Home regimen: levemir 10/8, CR 1:7, ISF: 1:50 > 120  - Most recent A1c 6.7%  - BG patterning: fasting, pre-prandial, 2 hour post-prandial and 2 AM  - Current regimen: levemir 5/5   - Carb ratio adjusted yesterday: CR 1:12 B, 1:15 L/D   - ISF 1:50 > 180        F          pB/ppB                pL/ppL                  pD/ppD               2 AM  9/29                    213(5)/298(2)    126(3)/161          153(1)/238(1)        248(1)  9/30        106  - follows closely with endocrine  - Diabetic diabetic ordered   - Discussed plan of care with endocrine yesterday, will continue to discuss as needed  - Hypoglycemia order set in place         Disposition: As patient meets  milestones, will plan to discharge as appropriate.    Carly Maria MD  Obstetrics  [facility] ([place])

## 2022-09-30 NOTE — ASSESSMENT & PLAN NOTE
- follows with nephrology as outpatient  - Initial P:C of 3.75 downtrending to 1.76  - most recent 24 hour urine with 6.27g  - Repeat PC 17.81  - Labs per nephrology:    - lipid panel: elevated cholesterol and HDL   - anti-ds DNA negative   - YARON positive, reflex pending   - microalbumin/cr 91695  - Heparin 5000 mg q8 for DVT prophylaxis  - Nephrology consulted, appreciate assistance

## 2022-09-30 NOTE — ASSESSMENT & PLAN NOTE
- Home regimen: levemir 10/8, CR 1:7, ISF: 1:50 > 120  - Most recent A1c 6.7%  - BG patterning: fasting, pre-prandial, 2 hour post-prandial and 2 AM  - Current regimen: levemir 5/5   - Carb ratio adjusted yesterday: CR 1:12 B, 1:15 L/D   - ISF 1:50 > 180        F          pB/ppB                pL/ppL                  pD/ppD               2 AM  9/29                    213(5)/298(2)    126(3)/161          153(1)/238(1)        248(1)  9/30        106  - follows closely with endocrine  - Diabetic diabetic ordered   - Discussed plan of care with endocrine yesterday, will continue to discuss as needed  - Hypoglycemia order set in place

## 2022-10-01 PROBLEM — R41.844 EXECUTIVE FUNCTION DEFICIT: Status: RESOLVED | Noted: 2022-09-29 | Resolved: 2022-10-01

## 2022-10-01 LAB
ABO + RH BLD: NORMAL
ALBUMIN SERPL BCP-MCNC: 1.6 G/DL (ref 3.5–5.2)
ALP SERPL-CCNC: 81 U/L (ref 55–135)
ALT SERPL W/O P-5'-P-CCNC: 19 U/L (ref 10–44)
ANION GAP SERPL CALC-SCNC: 7 MMOL/L (ref 8–16)
AST SERPL-CCNC: 22 U/L (ref 10–40)
BASOPHILS NFR BLD: 0 % (ref 0–1.9)
BILIRUB SERPL-MCNC: 0.2 MG/DL (ref 0.1–1)
BLD GP AB SCN CELLS X3 SERPL QL: NORMAL
BUN SERPL-MCNC: 45 MG/DL (ref 6–20)
CALCIUM SERPL-MCNC: 7.9 MG/DL (ref 8.7–10.5)
CHLORIDE SERPL-SCNC: 100 MMOL/L (ref 95–110)
CO2 SERPL-SCNC: 31 MMOL/L (ref 23–29)
CREAT SERPL-MCNC: 2 MG/DL (ref 0.5–1.4)
DIFFERENTIAL METHOD: ABNORMAL
EOSINOPHIL NFR BLD: 1 % (ref 0–8)
ERYTHROCYTE [DISTWIDTH] IN BLOOD BY AUTOMATED COUNT: 12.9 % (ref 11.5–14.5)
EST. GFR  (NO RACE VARIABLE): 34 ML/MIN/1.73 M^2
GLUCOSE SERPL-MCNC: 151 MG/DL (ref 70–110)
GLUCOSE SERPL-MCNC: 219 MG/DL (ref 70–110)
GLUCOSE SERPL-MCNC: 291 MG/DL (ref 70–110)
HCT VFR BLD AUTO: 20.9 % (ref 37–48.5)
HGB BLD-MCNC: 7.1 G/DL (ref 12–16)
IMM GRANULOCYTES # BLD AUTO: ABNORMAL K/UL (ref 0–0.04)
IMM GRANULOCYTES NFR BLD AUTO: ABNORMAL % (ref 0–0.5)
LDH SERPL L TO P-CCNC: 450 U/L (ref 110–260)
LYMPHOCYTES NFR BLD: 20 % (ref 18–48)
MCH RBC QN AUTO: 30.9 PG (ref 27–31)
MCHC RBC AUTO-ENTMCNC: 34 G/DL (ref 32–36)
MCV RBC AUTO: 91 FL (ref 82–98)
METAMYELOCYTES NFR BLD MANUAL: 1 %
MONOCYTES NFR BLD: 13 % (ref 4–15)
MYELOCYTES NFR BLD MANUAL: 1 %
NEUTROPHILS NFR BLD: 63 % (ref 38–73)
NEUTS BAND NFR BLD MANUAL: 1 %
NRBC BLD-RTO: 0 /100 WBC
PLATELET # BLD AUTO: 239 K/UL (ref 150–450)
PLATELET BLD QL SMEAR: ABNORMAL
PMV BLD AUTO: 10.2 FL (ref 9.2–12.9)
POCT GLUCOSE: 107 MG/DL (ref 70–110)
POCT GLUCOSE: 131 MG/DL (ref 70–110)
POCT GLUCOSE: 138 MG/DL (ref 70–110)
POCT GLUCOSE: 153 MG/DL (ref 70–110)
POCT GLUCOSE: 184 MG/DL (ref 70–110)
POCT GLUCOSE: 293 MG/DL (ref 70–110)
POCT GLUCOSE: 48 MG/DL (ref 70–110)
POCT GLUCOSE: 55 MG/DL (ref 70–110)
POTASSIUM SERPL-SCNC: 3.8 MMOL/L (ref 3.5–5.1)
PROT SERPL-MCNC: 5 G/DL (ref 6–8.4)
RBC # BLD AUTO: 2.3 M/UL (ref 4–5.4)
SODIUM SERPL-SCNC: 138 MMOL/L (ref 136–145)
WBC # BLD AUTO: 8.02 K/UL (ref 3.9–12.7)

## 2022-10-01 PROCEDURE — 85027 COMPLETE CBC AUTOMATED: CPT

## 2022-10-01 PROCEDURE — 86920 COMPATIBILITY TEST SPIN: CPT | Performed by: INTERNAL MEDICINE

## 2022-10-01 PROCEDURE — 25000003 PHARM REV CODE 250: Performed by: PSYCHIATRY & NEUROLOGY

## 2022-10-01 PROCEDURE — 25000003 PHARM REV CODE 250: Performed by: NURSE PRACTITIONER

## 2022-10-01 PROCEDURE — 99223 1ST HOSP IP/OBS HIGH 75: CPT | Mod: ,,, | Performed by: PHYSICIAN ASSISTANT

## 2022-10-01 PROCEDURE — 99232 PR SUBSEQUENT HOSPITAL CARE,LEVL II: ICD-10-PCS | Mod: ,,, | Performed by: OBSTETRICS & GYNECOLOGY

## 2022-10-01 PROCEDURE — 83010 ASSAY OF HAPTOGLOBIN QUANT: CPT | Performed by: INTERNAL MEDICINE

## 2022-10-01 PROCEDURE — 85007 BL SMEAR W/DIFF WBC COUNT: CPT

## 2022-10-01 PROCEDURE — 63600175 PHARM REV CODE 636 W HCPCS: Performed by: NURSE PRACTITIONER

## 2022-10-01 PROCEDURE — 80053 COMPREHEN METABOLIC PANEL: CPT

## 2022-10-01 PROCEDURE — 25000003 PHARM REV CODE 250: Performed by: STUDENT IN AN ORGANIZED HEALTH CARE EDUCATION/TRAINING PROGRAM

## 2022-10-01 PROCEDURE — 99223 PR INITIAL HOSPITAL CARE,LEVL III: ICD-10-PCS | Mod: ,,, | Performed by: PHYSICIAN ASSISTANT

## 2022-10-01 PROCEDURE — 11000001 HC ACUTE MED/SURG PRIVATE ROOM

## 2022-10-01 PROCEDURE — 86901 BLOOD TYPING SEROLOGIC RH(D): CPT | Performed by: INTERNAL MEDICINE

## 2022-10-01 PROCEDURE — 99232 SBSQ HOSP IP/OBS MODERATE 35: CPT | Mod: ,,, | Performed by: OBSTETRICS & GYNECOLOGY

## 2022-10-01 PROCEDURE — 25000003 PHARM REV CODE 250

## 2022-10-01 PROCEDURE — 63600175 PHARM REV CODE 636 W HCPCS: Performed by: STUDENT IN AN ORGANIZED HEALTH CARE EDUCATION/TRAINING PROGRAM

## 2022-10-01 PROCEDURE — 63600175 PHARM REV CODE 636 W HCPCS

## 2022-10-01 PROCEDURE — 83615 LACTATE (LD) (LDH) ENZYME: CPT | Performed by: INTERNAL MEDICINE

## 2022-10-01 PROCEDURE — 36415 COLL VENOUS BLD VENIPUNCTURE: CPT

## 2022-10-01 RX ORDER — HYDROCODONE BITARTRATE AND ACETAMINOPHEN 500; 5 MG/1; MG/1
TABLET ORAL
Status: DISCONTINUED | OUTPATIENT
Start: 2022-10-01 | End: 2022-10-05 | Stop reason: HOSPADM

## 2022-10-01 RX ORDER — INSULIN ASPART 100 [IU]/ML
3 INJECTION, SOLUTION INTRAVENOUS; SUBCUTANEOUS ONCE
Status: DISCONTINUED | OUTPATIENT
Start: 2022-10-01 | End: 2022-10-05 | Stop reason: HOSPADM

## 2022-10-01 RX ORDER — INSULIN ASPART 100 [IU]/ML
2 INJECTION, SOLUTION INTRAVENOUS; SUBCUTANEOUS ONCE
Status: COMPLETED | OUTPATIENT
Start: 2022-10-01 | End: 2022-10-01

## 2022-10-01 RX ORDER — INSULIN ASPART 100 [IU]/ML
10 INJECTION, SOLUTION INTRAVENOUS; SUBCUTANEOUS
Status: COMPLETED | OUTPATIENT
Start: 2022-10-01 | End: 2022-10-01

## 2022-10-01 RX ORDER — INSULIN ASPART 100 [IU]/ML
3 INJECTION, SOLUTION INTRAVENOUS; SUBCUTANEOUS
Status: COMPLETED | OUTPATIENT
Start: 2022-10-01 | End: 2022-10-01

## 2022-10-01 RX ADMIN — INSULIN ASPART 10 UNITS: 100 INJECTION, SOLUTION INTRAVENOUS; SUBCUTANEOUS at 10:10

## 2022-10-01 RX ADMIN — HYDROCODONE BITARTRATE AND ACETAMINOPHEN 1 TABLET: 5; 325 TABLET ORAL at 11:10

## 2022-10-01 RX ADMIN — DOCUSATE SODIUM 200 MG: 100 CAPSULE, LIQUID FILLED ORAL at 08:10

## 2022-10-01 RX ADMIN — METOCLOPRAMIDE 10 MG: 10 TABLET ORAL at 09:10

## 2022-10-01 RX ADMIN — HEPARIN SODIUM 5000 UNITS: 5000 INJECTION INTRAVENOUS; SUBCUTANEOUS at 11:10

## 2022-10-01 RX ADMIN — HYDROCODONE BITARTRATE AND ACETAMINOPHEN 1 TABLET: 5; 325 TABLET ORAL at 06:10

## 2022-10-01 RX ADMIN — DOCUSATE SODIUM 200 MG: 100 CAPSULE, LIQUID FILLED ORAL at 09:10

## 2022-10-01 RX ADMIN — FUROSEMIDE 12 MG/HR: 10 INJECTION, SOLUTION INTRAMUSCULAR; INTRAVENOUS at 05:10

## 2022-10-01 RX ADMIN — MINOXIDIL 5 MG: 2.5 TABLET ORAL at 08:10

## 2022-10-01 RX ADMIN — METOCLOPRAMIDE 10 MG: 10 TABLET ORAL at 01:10

## 2022-10-01 RX ADMIN — METOCLOPRAMIDE 10 MG: 10 TABLET ORAL at 08:10

## 2022-10-01 RX ADMIN — HEPARIN SODIUM 5000 UNITS: 5000 INJECTION INTRAVENOUS; SUBCUTANEOUS at 02:10

## 2022-10-01 RX ADMIN — INSULIN ASPART 3 UNITS: 100 INJECTION, SOLUTION INTRAVENOUS; SUBCUTANEOUS at 05:10

## 2022-10-01 RX ADMIN — CARVEDILOL 25 MG: 12.5 TABLET, FILM COATED ORAL at 09:10

## 2022-10-01 RX ADMIN — INSULIN ASPART 2 UNITS: 100 INJECTION, SOLUTION INTRAVENOUS; SUBCUTANEOUS at 12:10

## 2022-10-01 RX ADMIN — HYDROCODONE BITARTRATE AND ACETAMINOPHEN 1 TABLET: 5; 325 TABLET ORAL at 05:10

## 2022-10-01 RX ADMIN — NIFEDIPINE 60 MG: 30 TABLET, FILM COATED, EXTENDED RELEASE ORAL at 09:10

## 2022-10-01 RX ADMIN — CYANOCOBALAMIN TAB 1000 MCG 1000 MCG: 1000 TAB at 09:10

## 2022-10-01 RX ADMIN — HEPARIN SODIUM 5000 UNITS: 5000 INJECTION INTRAVENOUS; SUBCUTANEOUS at 05:10

## 2022-10-01 RX ADMIN — HYDROCODONE BITARTRATE AND ACETAMINOPHEN 1 TABLET: 10; 325 TABLET ORAL at 08:10

## 2022-10-01 RX ADMIN — HYDRALAZINE HYDROCHLORIDE 100 MG: 25 TABLET, FILM COATED ORAL at 11:10

## 2022-10-01 RX ADMIN — CARVEDILOL 25 MG: 12.5 TABLET, FILM COATED ORAL at 11:10

## 2022-10-01 RX ADMIN — INSULIN DETEMIR 5 UNITS: 100 INJECTION, SOLUTION SUBCUTANEOUS at 09:10

## 2022-10-01 RX ADMIN — FAMOTIDINE 40 MG: 20 TABLET ORAL at 08:10

## 2022-10-01 RX ADMIN — POLYETHYLENE GLYCOL 3350 17 G: 17 POWDER, FOR SOLUTION ORAL at 09:10

## 2022-10-01 RX ADMIN — INSULIN ASPART 2 UNITS: 100 INJECTION, SOLUTION INTRAVENOUS; SUBCUTANEOUS at 02:10

## 2022-10-01 RX ADMIN — HYDRALAZINE HYDROCHLORIDE 100 MG: 25 TABLET, FILM COATED ORAL at 02:10

## 2022-10-01 RX ADMIN — MINOXIDIL 5 MG: 2.5 TABLET ORAL at 09:10

## 2022-10-01 RX ADMIN — HYDRALAZINE HYDROCHLORIDE 100 MG: 25 TABLET, FILM COATED ORAL at 05:10

## 2022-10-01 RX ADMIN — SODIUM BICARBONATE 650 MG TABLET 1300 MG: at 08:10

## 2022-10-01 RX ADMIN — NIFEDIPINE 60 MG: 30 TABLET, FILM COATED, EXTENDED RELEASE ORAL at 08:10

## 2022-10-01 RX ADMIN — METOCLOPRAMIDE 10 MG: 10 TABLET ORAL at 05:10

## 2022-10-01 RX ADMIN — HYDROCODONE BITARTRATE AND ACETAMINOPHEN 1 TABLET: 5; 325 TABLET ORAL at 02:10

## 2022-10-01 RX ADMIN — PRENATAL VIT W/ FE FUMARATE-FA TAB 27-0.8 MG 1 TABLET: 27-0.8 TAB at 09:10

## 2022-10-01 RX ADMIN — SERTRALINE HYDROCHLORIDE 50 MG: 50 TABLET ORAL at 09:10

## 2022-10-01 RX ADMIN — INSULIN DETEMIR 5 UNITS: 100 INJECTION, SOLUTION SUBCUTANEOUS at 08:10

## 2022-10-01 RX ADMIN — POLYSACCHARIDE-IRON COMPLEX 150 MG: 150 CAPSULE ORAL at 09:10

## 2022-10-01 RX ADMIN — HYDROCODONE BITARTRATE AND ACETAMINOPHEN 1 TABLET: 10; 325 TABLET ORAL at 11:10

## 2022-10-01 RX ADMIN — SODIUM BICARBONATE 650 MG TABLET 1300 MG: at 09:10

## 2022-10-01 NOTE — LACTATION NOTE
This note was copied from a baby's chart.  Mom's medication list placed in baby's bedside chart with 's Lactation risks categories for each, for provider to review and place order in EMR re: baby receiving mom's milk or not.

## 2022-10-01 NOTE — PROGRESS NOTES
[facility] ([place])  Obstetrics  Postpartum Progress Note    Patient Name: Alicia Valles  MRN: [id number]  Admission Date: 2022  Hospital Length of Stay: 9 days  Attending Physician: Praveen Benítez MD  Primary Care Provider: Giovanna Hyatt MD    Subjective:     Principal Problem:S/P primary low transverse     Hospital Course:  2022: admit for BP monitoring for cHTN w/ ARANZA vs cHTN exacerbation. CLD, cont monitoring. Rocephin for UTI. Continue levimir 10, BG Q4 while on CLD. Labs per nephrology recs pending.   2022 - HD#2. Received 5 units of aspart since admission for elevated BG. No obstetric complaints. Monitoring reassuring. Magnesium boluses continued for seizure prophylaxis and fetal neuroprotection in setting of elevated Cr (1.7). K 5.8 this AM  2022 - HD#3. BP meds changed yesterday to labetalol 200 mg BID, procardia 60 BID, and hydralazine 75 mg TID. Patient had persistently elevated blood sugars yesterday requiring multiple slides with insulin aspart. BMZ 1/2 given. Insulin drip started at 1 u/hr in anticipation of hyperglycemia secondary to steroids. Patient had hypoglycemic episode overnight. Reported left sided pelvic pain overnight, SVE closed. No other obstetric complaints. No pre-E symptoms.   2022 - HD#4. IOL started secondary to cHTN with ARANZA with SF (Cr).   2022 - HD#5. POD#1 s/p pLTCS. Multiple episodes of hypoglycemia overnight. Admitted to ICU for cardene drip in setting of uncontrolled sustained severe range BP requiring hydral 5/10/5/10, procardia 10/20, and labetalol 20/40/80 after delivery.   2022 - HD#6. POD#2 s/p pLTCS. Continued on cardene drip for elevated BP. BP meds adjusted yesterday - labetalol discontinued, hydralazine up-titrated and co-reg added. Started on levemir 3/3 with CR of 1:20 and ISF 1:50 > 180.  2022 - HD#7. POD#3 s/p pLTCS. PECed by psych yesterday. S/p cardene drip. Lasix drip started by nephro. Will  increase levemir to 4/4.   09/29/2022 - HD#8. POD#4 s/p pLTCS. Stepped down from ICU yesterday. Lasix drip continued. Meeting post-op milestones. PEC continued per psych. Will increase CR to 1:15 with breakfast and dinner.   09/30/2022 - HD#9. POD#5 s/p pLTCS. Insulin regimen adjusted yesterday. Lasix drip continued. PEC in place per psych.    10/01/2022 - HD#10, POD#6 s/p pLTCS. Patient doing well this morning. Has lot 30 pounds of fluid since delivery. She feels much more like her old safe. She desires a nexplanon before discharge. Discussed plan for admission for a few more days pending renal biopsy.      Interval History:     She reports she is doing well this morning. She is tolerating her diabetic diet.. She is voiding spontaneously. She is ambulating. She has passed flatus, and has not a BM. Vaginal bleeding is mild. She denies fever or chills. Abdominal pain is mild and well controlled on her current pain regimen.     Objective:     Vital Signs (Most Recent):  Temp: 98.6 °F (37 °C) (10/01/22 0805)  Pulse: 98 (10/01/22 0805)  Resp: 18 (10/01/22 1101)  BP: 134/84 (10/01/22 0805)  SpO2: 97 % (10/01/22 0245)   Vital Signs (24h Range):  Temp:  [98.6 °F (37 °C)] 98.6 °F (37 °C)  Pulse:  [] 98  Resp:  [16-18] 18  SpO2:  [97 %-99 %] 97 %  BP: (134-167)/(71-87) 134/84     Weight: 74.6 kg (164 lb 5.7 oz)  Body mass index is 31.05 kg/m².      Intake/Output Summary (Last 24 hours) at 10/1/2022 1207  Last data filed at 10/1/2022 1105  Gross per 24 hour   Intake 1362.4 ml   Output 3500 ml   Net -2137.6 ml           Significant Labs:  Lab Results   Component Value Date    GROUPTRH A POS 09/24/2022    HEPBSAG Negative 07/21/2022    STREPBCULT No Group B Streptococcus isolated 09/24/2022     Recent Labs   Lab 10/01/22  0609   HGB 7.1*   HCT 20.9*         CBC:   Recent Labs   Lab 10/01/22  0609   WBC 8.02   RBC 2.30*   HGB 7.1*   HCT 20.9*      MCV 91   MCH 30.9   MCHC 34.0       CMP:   Recent Labs   Lab  10/01/22  0725   *   CALCIUM 7.9*   ALBUMIN 1.6*   PROT 5.0*      K 3.8   CO2 31*      BUN 45*   CREATININE 2.0*   ALKPHOS 81   ALT 19   AST 22   BILITOT 0.2       I have personallly reviewed all pertinent lab results from the last 24 hours.    Physical Exam:   Constitutional: She is oriented to person, place, and time. She appears well-developed and well-nourished. She is cooperative.    HENT:   Head: Normocephalic and atraumatic.       Pulmonary/Chest: Effort normal. No respiratory distress.        Abdominal: Soft. She exhibits abdominal incision (bandage in place, no areas of saturation). She exhibits no distension. There is no abdominal tenderness. There is no rebound and no guarding.   Abdomen appropriately tender, no fundal tenderness appreciated             Musculoskeletal: Normal range of motion. Edema (pitting edema up to hips BL, much improved from yesterday) present.       Neurological: She is alert and oriented to person, place, and time.    Skin: Skin is warm and dry.      Assessment/Plan:     27 y.o. female  for:    * S/P primary low transverse   - POD#6 s/p pLTCS  - Pain better controlled on current regimen  - Continue routine management and advances  - Meeting post-op milestones    History of depression  - Psychiatry following, appreciate recommendations  - Mood stable    Hyperglycemia  - See type 1 DM for full plan    Hypertensive emergency  - see cHTN with ARANZA for full plan  - On lasix drip, s/p cardene drip    Chronic hypertension with superimposed pre-eclampsia  - Decision made to proceed with delivery in setting of cHTN with ARANZA with SF (Cr)  - Home regimen: hydralazine 50 mg TID, labetalol 200 mg BID, and procardia 90 mg BID  - Current BP regimen: hydralazine 100 mg TID, co reg 25 mg BID, procardia 60 mg BID, clonidine patch, minoxidil, lasix gtt, metolazone, clonidine PRN  - s/p magnesium for seizure prophylaxis  -Nephrology managing and continue to titrate  medication as needed  - heparin 5000 mg q8 for DVT prophylaxis            KOBY (acute kidney injury)  - Baseline Cr 1.1 - 1.2  - Cr on admit 1.5 >>>> > 2.2  - Avoid nephrotoxic agents  - Nephrology following, appreciate assistance  - s/p Mag for seizure prophylaxis   - Renally dose medications  - On lasix drip per nephrology   - Nephrology recommending renal biopsy once less fluid overloaded    UTI (urinary tract infection)  - UA consistent with UTI  - urine culture negative  - s/p rocephin in [facility]    Gastroparesis due to DM  - continue home famotidine and scheduled reglan    Anxiety  - PEC discontinued by psychiatry yesterday   - Medications per psychiatry: haldol q8 PRN, ativan q12 PRN, benadyrl PRN  - Psychiatry following, appreciate recommendations    Proteinuria  - follows with nephrology as outpatient  - Initial P:C of 3.75 downtrending to 1.76  - most recent 24 hour urine with 6.27g  - Repeat PC 17.81  - Labs per nephrology:    - lipid panel: elevated cholesterol and HDL   - anti-ds DNA negative   - YARON positive, reflex pending   - microalbumin/cr 36342  - Heparin 5000 mg q8 for DVT prophylaxis  - Nephrology consulted, appreciate assistance    Anemia  - normocytic anemia with most recent iron panel with iron of 84, transferrin 180, TIBC 266, 32% saturation and ferritin 79  - Recommended nephrology labs:   - Folate, B12, homocysteine - WNL   - methylmalonic WNL  - s/p 3 units pRBC  - CBC stable       Type 1 diabetes mellitus with other specified complication  - Home regimen: levemir 10/8, CR 1:7, ISF: 1:50 > 120  - Most recent A1c 6.7%  - BG patterning: fasting, pre-prandial, 2 hour post-prandial and 2 AM  - Current regimen: levemir 5/5   - Carb ratio adjusted yesterday: CR 1:12 B, 1:15 L/D   - ISF 1:50 > 180              F          pB/ppB                   pL/ppL                pD/ppD                          2 am  9/30   106      135(3)/213             93(6)/55>102   163(4)/193/273(2)              291(2)  -  follows closely with endocrine  - Diabetic diabetic ordered   - Discussed plan of care with endocrine yesterday, will continue to discuss as needed  - Hypoglycemia order set in place         At this point, patient no longer has any Obstetric needs. Will place nexplanon prior to discharge. Discharge per nephrology - desire kidney biopsy for evaluation of renal disease. Will evaluate for transfer to medicine service.    Karo Mcfarlane MD  OBGYN PGY-4

## 2022-10-01 NOTE — ASSESSMENT & PLAN NOTE
- POD#6 s/p pLTCS  - Pain better controlled on current regimen  - Continue routine management and advances  - Meeting post-op milestones

## 2022-10-01 NOTE — ASSESSMENT & PLAN NOTE
- PEC discontinued by psychiatry yesterday   - Medications per psychiatry: haldol q8 PRN, ativan q12 PRN, benadyrl PRN  - Psychiatry following, appreciate recommendations

## 2022-10-01 NOTE — PROGRESS NOTES
"Progress Note  Nephrology    Consult Requested By: Praveen Benítez MD  Reason for Consult: KOBY/>K/Nephrotic Syn    SUBJECTIVE:     History of Present Illness from initial consultation:  "Patient is a 27 y.o. female presents with admission from Nephrology appt yesterday with N/V/Acc HTN and feeling poorly.  Pt is 27+W accidental pregnancy with uncontrolled type 1 dm, nephrotic syn, uncontrolled HTN.  Initial /100, Creat 1.5, K 5.8, urine PC 17 (falsely high with blood but hx of 6+).  Admitted to OB floor for mgmt of HTN.  Trends noted and now with Creat 1.7, K 6.0, Bicarb 17.  Consulted for evaluation.  Pt seen and examined with [facility] staff-team at bedside.  Of note pt has been refusing some meds/treatment modalities/etc.  Extensive discussion about plan of care and consequences of refusal.  Nephrotic w/up ordered earlier but suspect due to uncontrolled Type 1 DM/Uncontrolled HTN. Pt admits to taking Excedrin and occasional NSAIDS for her gastroparesis.  Diet habits are uncontrolled."     Interval History:    Diuresing well.  Asking to go home again and explain importance of diuresis as well as determining underlying reason for renal failure and anemia. No other complaints.     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      Past Surgical History:   Procedure Laterality Date     SECTION N/A 2022    Procedure:  SECTION;  Surgeon: Quoc Pryor Jr., MD;  Location: [facility];  Service: OB/GYN;  Laterality: N/A;    RETINAL DETACHMENT SURGERY  2018    RETINAL DETACHMENT SURGERY       Family History   Problem Relation Age of Onset    No Known Problems Father     Cancer Paternal Grandfather         unsure    Diabetes Maternal Grandfather  "    Hypertension Mother      Social History     Tobacco Use    Smoking status: Never    Smokeless tobacco: Never   Substance Use Topics    Alcohol use: Not Currently    Drug use: Never       Review of patient's allergies indicates:  No Known Allergies     Review of Systems:  Constitutional: No fever or chills  Respiratory: No cough or shortness of breath  Cardiovascular: No chest pain or palpitations  Gastrointestinal: No nausea or vomiting  Neurological: No confusion or weakness    OBJECTIVE:     Vital Signs (Most Recent)  Temp: 98.8 °F (37.1 °C) (10/01/22 1556)  Pulse: 107 (10/01/22 1556)  Resp: 17 (10/01/22 1556)  BP: (!) 144/79 (10/01/22 1556)  SpO2: 98 % (10/01/22 1556)    Vital Signs Range (Last 24H):  Temp:  [98.6 °F (37 °C)-99.1 °F (37.3 °C)]   Pulse:  []   Resp:  [16-18]   BP: (125-167)/(60-87)   SpO2:  [97 %-99 %]       Intake/Output Summary (Last 24 hours) at 10/1/2022 1642  Last data filed at 10/1/2022 1506  Gross per 24 hour   Intake 1214.2 ml   Output 3100 ml   Net -1885.8 ml       Physical Exam:  NAD  Anasarca-slowly improving  RRR  Diminished lungs.  +distention, hypo BS  Edema ++    Laboratory:  Recent Labs   Lab 10/01/22  0609   WBC 8.02   RBC 2.30*   HGB 7.1*   HCT 20.9*      MCV 91   MCH 30.9   MCHC 34.0     BMP:   Recent Labs   Lab 09/26/22  1202 09/26/22  1952 10/01/22  0725   *   < > 151*   *   < > 138   K 5.5*   < > 3.8      < > 100   CO2 16*   < > 31*   BUN 34*   < > 45*   CREATININE 2.0*   < > 2.0*   CALCIUM 7.4*   < > 7.9*   MG 4.9*  --   --     < > = values in this interval not displayed.     Lab Results   Component Value Date    CALCIUM 7.9 (L) 10/01/2022    PHOS 5.0 (H) 09/24/2022     BNP  No results for input(s): BNP, BNPTRIAGEBLO in the last 168 hours.No results found for: URICACID  Lab Results   Component Value Date    IRON 84 05/06/2022    TIBC 266 05/06/2022    FERRITIN 79 05/06/2022     Lab Results   Component Value Date    CALCIUM 7.9 (L) 10/01/2022     PHOS 5.0 (H) 2022       Diagnostic Results:  No orders to display       ASSESSMENT/PLAN:     Oliguric/nonoliguric KOBY on CKD II with nephrotic syndrome secondary to uncontrolled DM, uncontrolled HTN, Preeclampsia with severe features.  -Followed with  nephrology at [facility] Dr. Cheek/Liliane   -Creat trends noted. 1.5 -1.9 - 2.0 - 2.3 - 2.1 - 2.0  -Alb 1.6  -Repeat UPCR 9 grams - baseline historically 6 grams  -avoid NSAIDS.    -no need for RRT yet  -increase Heparin to Q8 and ok for lovenox.    -<Hapto, anemia, KOBY.  ADAMTS  76%  -now with worsening anasarca and volume overload started lasix.    -changed to lasix drip with metolazone  -YARON mildy + but neg DsDNA.     -trending with Creat 2.1-2.3  -Have consulted IR for kidney biopsy hopefully Monday.  -Will need to make NPO after MN tomorrow and hold Heparin appropriately  -Renally dose meds, avoid nephrotoxins, and monitor I/O's closely.    Hyperkalemia with NAGMA, hyponatremia  -s/ p Lokelma TID and sodium bicarb to shift and correct acidosis  -stable.   -low k diet.  -some are hemolyzed.     Uncontrolled HTN  -max procardia 60 BID  -increased hydralazine to 100 TID  -changed to coreg for antiprotenuric effects.   -added catapres patch and prn tabs.  -lasix/metolazone to help with volume removal which should help BP also  -weaned off cardene  -added minoxidil with improvement  -If she chooses not to breastfeed, then would change regimen.    Uncontrolled Type 1 DM  -formerly on insulin pump until she had a hypoglycemic seizure and then transitioned to MDI  -she was not wearing her sensor was cause of this but highly recommend retry with closed loop system.  -defer to Endo     Pregnancy, Extremely high risk  -per MFM/OB  -s/p emergent .  -OB feels doing well from their standpoint and wishes to transition to medicine service.    Leukocystosis without fever or S/S infection  -Due to steroid therapy  -resolved    Anemia of pregnancy/CKD  -B12 and folate  both on low side now on supplementation  -on iron  -Transfusion again today pre-procedure    Anxiety:  -defer to psych/OB  -PEC'd  but now rescinded.  -Greatly appreciate help of Psych.  -Pt doing much better  -asking to go home, but willing to stay for ongoing care.      See above

## 2022-10-01 NOTE — CONSULTS
[facility] ([facility]  Consult Note    Patient Name: Alicia Valles  MRN: [id number]  Admission Date: 2022  Hospital Length of Stay: 9 days  Attending Physician: Dr. Calin Carrera MD  Primary Care Provider: Giovanna Hyatt MD           Patient information was obtained from patient, spouse/SO, and ER records.     Consults  Subjective:     Principal Problem:S/P primary low transverse     Chief Complaint:   Chief Complaint   Patient presents with    Hypertension        HPI: Alicia Valles is a 27 y.o.  at 29w5d gestation who presented to the [facility] as a transfer from [facility] for elevated BP. Ultimately underwent   to single viable female currently in the NICU. Since her admission her BP has continued to be difficult to control and her Cr continues to uptrend (1.5 up to max 2.3). Nephrology following, renal work up underway. Currently on lasix gtt with metolazone. Planning for kidney biopsy with IR on Monday.   Also during stay she was PEC'd for grave disability, however this has been rescinded. On exam patient reports good UOP, no CP, SOB. Reports significant improvement in leg swelling. No thought ofSI/HI.     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     Current  "Outpatient Medications on File Prior to Encounter   Medication Sig    acetaminophen (TYLENOL) 500 MG tablet Take 2,000 mg by mouth daily as needed for Pain.    amoxicillin (AMOXIL) 500 MG capsule Take 500 mg by mouth 3 (three) times daily.    aspirin 81 MG Chew Take 1 tablet (81 mg total) by mouth once daily.    BD ULTRA-FINE LIGIA PEN NEEDLE 32 gauge x 5/32" Ndle To inject insulin 4-6 times daily.    blood-glucose meter,continuous (DEXCOM G6 ) Misc 1 Device by Misc.(Non-Drug; Combo Route) route once. for 1 dose    blood-glucose sensor (DEXCOM G6 SENSOR) Sandrine 3 Devices by Misc.(Non-Drug; Combo Route) route every 30 days.    blood-glucose transmitter (DEXCOM G6 TRANSMITTER) Sandrine 2 Devices by Misc.(Non-Drug; Combo Route) route every 6 (six) months.    diphenhydrAMINE (BENADRYL) 25 mg capsule Take 25 mg by mouth daily as needed for Insomnia ((if Unisom ineffective)).    docusate sodium (COLACE) 100 MG capsule Take 100 mg by mouth once daily.    doxylamine succinate (UNISOM, DOXYLAMINE, ORAL) Take 1 capsule by mouth nightly as needed (Sleep).    enoxaparin (LOVENOX) 40 mg/0.4 mL Syrg Inject 0.4 mLs (40 mg total) into the skin every evening.    famotidine (PEPCID) 40 MG tablet Take 1 tablet (40 mg total) by mouth every evening.    fluconazole (DIFLUCAN) 150 MG Tab 1 po Now may repeat in 72 hr (Patient not taking: No sig reported)    GLUCAGEN HYPOKIT 1 mg SolR USE AS DIRECTED FOR HYPOGLYCEMIA INJECTION AS NEEDED    glucagon (BAQSIMI) 3 mg/actuation Spry 1 each by Nasal route as needed (For Hypoglycemia).    hydrALAZINE (APRESOLINE) 25 MG tablet Take 2 tablets (50 mg total) by mouth every 8 (eight) hours.    insulin admin supplies (INPEN, NOVOLOG OR FIASP, PINK) InPn Use device to inject insulin. Dispense one kit.    insulin detemir (LEVEMIR FLEXTOUCH U-100 INSULN SUBQ) Inject into the skin. 10 units in the A.M and 8 units Nightly    labetaloL (NORMODYNE) 200 MG tablet Take 1 tablet (200 mg total) by mouth every 12 " (twelve) hours.    metoclopramide HCl (REGLAN) 10 MG tablet Take 1 tablet (10 mg total) by mouth 4 (four) times daily.    NIFEdipine (PROCARDIA-XL) 30 MG (OSM) 24 hr tablet Take 30 mg by mouth once daily.    NIFEdipine (PROCARDIA-XL) 90 MG (OSM) 24 hr tablet Take 1 tablet (90 mg total) by mouth 2 (two) times a day.    NOVOLOG FLEXPEN U-100 INSULIN 100 unit/mL (3 mL) InPn pen Inject under the skin three times a day based on carb counting. Max daily dose of 70 units    ondansetron (ZOFRAN-ODT) 4 MG TbDL Take 4 mg by mouth every 8 (eight) hours as needed.    PNV,calcium 72/iron/folic acid (PRENATAL VITAMIN) Tab Take 1 tablet by mouth once daily. for 7 days    progesterone (PROMETRIUM) 200 MG capsule Take 1 capsule (200 mg total) by mouth nightly.    promethazine (PHENERGAN) 25 MG suppository Place 1 suppository (25 mg total) rectally every 6 (six) hours as needed for Nausea.    promethazine (PHENERGAN) 25 MG tablet Take 1 tablet (25 mg total) by mouth every 4 (four) hours.    [DISCONTINUED] methyldopa (ALDOMET) 250 MG tablet Take 500 mg by mouth 2 (two) times daily.    [DISCONTINUED] pregabalin (LYRICA) 150 MG capsule Take 150 mg by mouth.     Family History       Problem Relation (Age of Onset)    Cancer Paternal Grandfather    Diabetes Maternal Grandfather    Hypertension Mother    No Known Problems Father          Tobacco Use    Smoking status: Never    Smokeless tobacco: Never   Substance and Sexual Activity    Alcohol use: Not Currently    Drug use: Never    Sexual activity: Yes     Partners: Male     Birth control/protection: None     Review of Systems   Constitutional:  Negative for chills, diaphoresis and fever.   Respiratory:  Negative for cough, chest tightness and shortness of breath.    Cardiovascular:  Positive for leg swelling. Negative for chest pain.   Gastrointestinal:  Negative for abdominal pain, nausea and vomiting.   Genitourinary:  Positive for frequency (since lasix). Negative for decreased urine  volume, difficulty urinating, dysuria and flank pain.   Musculoskeletal:  Negative for arthralgias.   Neurological:  Negative for dizziness, weakness and headaches.   Psychiatric/Behavioral:  Negative for agitation, confusion, self-injury and suicidal ideas. The patient is not nervous/anxious.    Objective:     Vital Signs (Most Recent):  Temp: 98.8 °F (37.1 °C) (10/01/22 1556)  Pulse: 107 (10/01/22 1556)  Resp: 18 (10/01/22 1715)  BP: (!) 144/79 (10/01/22 1556)  SpO2: 98 % (10/01/22 1556)   Vital Signs (24h Range):  Temp:  [98.6 °F (37 °C)-99.1 °F (37.3 °C)] 98.8 °F (37.1 °C)  Pulse:  [] 107  Resp:  [17-18] 18  SpO2:  [97 %-99 %] 98 %  BP: (125-167)/(60-87) 144/79     Weight: 74.6 kg (164 lb 5.7 oz)  Body mass index is 31.05 kg/m².    Physical Exam  Constitutional:       General: She is not in acute distress.     Appearance: Normal appearance. She is not ill-appearing.   HENT:      Head: Normocephalic and atraumatic.   Eyes:      Extraocular Movements: Extraocular movements intact.   Cardiovascular:      Rate and Rhythm: Normal rate and regular rhythm.   Pulmonary:      Effort: Pulmonary effort is normal. No respiratory distress.      Breath sounds: No wheezing.   Abdominal:      General: Bowel sounds are normal. There is distension.   Musculoskeletal:      Right lower leg: Edema present.      Left lower leg: Edema present.   Skin:     General: Skin is warm and dry.   Neurological:      General: No focal deficit present.      Mental Status: She is alert.   Psychiatric:         Mood and Affect: Mood normal.         Behavior: Behavior normal.     Significant Labs: All pertinent labs within the past 24 hours have been reviewed.    Significant Imaging: I have reviewed all pertinent imaging results/findings within the past 24 hours.    Assessment/Plan:   Oliguric/nonoliguric KOBY on CKD II with nephrotic syndrome secondary to uncontrolled DM, uncontrolled HTN, Preeclampsia with severe features.    Hyperkalemia with  NAGMA, hyponatremia  -Followed with  nephrology at [facility] [place]  -Creat trends noted. 1.5 -1.9 - 2.0 - 2.3 - 2.1 - 2.0  -s/ p Lokelma TID and sodium bicarb to shift and correct acidosis  - nephrology following, appreciate assistance, work up underway  - plan for kidney biopsy with IR Monday  -no need for RRT yet  - currently on lasix gtt with metolazone, patient reporting good UOP and improvement in swelling       Uncontrolled HTN:  Per nephro:  -max procardia 60 BID  -increased hydralazine to 100 TID  -changed to coreg for antiprotenuric effects.   -added catapres patch and prn tabs.  -lasix/metolazone to help with volume removal which should help BP also  -added minoxidil with improvement  -If she chooses not to breastfeed, then would change regimen.     Uncontrolled Type 1 DM  -formerly on insulin pump until she had a hypoglycemic seizure and then transitioned to MDI  - f/u with endocrine outpt     Pregnancy, Extremely high risk  -per MFM/OB  -s/p emergent  , baby currently in NICU       Anemia of pregnancy/CKD  -B12 and folate both on low side now on supplementation  -on iron     Anxiety:  - patient reports anxiety around medical problems, complicated pregnancy but understands importance of hospitalization and continued work up for renal failure       VTE Risk Mitigation (From admission, onward)           Ordered     heparin (porcine) injection 5,000 Units  Every 8 hours         22 1435     IP VTE HIGH RISK PATIENT  Once         22 1707     Place sequential compression device  Until discontinued         22 1707     Place sequential compression device  Until discontinued         22 2216                    Thank you for your consult. I will follow-up with patient. Please contact us if you have any additional questions.    Selena Stokes PA-C  Department of Hospital Medicine   [facility] ([place])

## 2022-10-01 NOTE — NURSING
Component      Latest Ref Rng & Units 10/1/2022           6:05 AM   POC Glucose      70 - 110 MG/ (A)   Fasting BG per pt dexcom. MD Crow notified. No new orders at this time.

## 2022-10-01 NOTE — ASSESSMENT & PLAN NOTE
- Baseline Cr 1.1 - 1.2  - Cr on admit 1.5 >>>> > 2.2  - Avoid nephrotoxic agents  - Nephrology following, appreciate assistance  - s/p Mag for seizure prophylaxis   - Renally dose medications  - On lasix drip per nephrology   - Nephrology recommending renal biopsy once less fluid overloaded

## 2022-10-01 NOTE — ASSESSMENT & PLAN NOTE
- Home regimen: levemir 10/8, CR 1:7, ISF: 1:50 > 120  - Most recent A1c 6.7%  - BG patterning: fasting, pre-prandial, 2 hour post-prandial and 2 AM  - Current regimen: levemir 5/5   - Carb ratio adjusted yesterday: CR 1:12 B, 1:15 L/D   - ISF 1:50 > 180              F          pB/ppB                   pL/ppL                pD/ppD                          2 am  9/30   106      135(3)/213             93(6)/55>102   163(4)/193/273(2)              291(2)  - follows closely with endocrine  - Diabetic diabetic ordered   - Discussed plan of care with endocrine yesterday, will continue to discuss as needed  - Hypoglycemia order set in place

## 2022-10-01 NOTE — ASSESSMENT & PLAN NOTE
- follows with nephrology as outpatient  - Initial P:C of 3.75 downtrending to 1.76  - most recent 24 hour urine with 6.27g  - Repeat PC 17.81  - Labs per nephrology:    - lipid panel: elevated cholesterol and HDL   - anti-ds DNA negative   - YARON positive, reflex pending   - microalbumin/cr 33658  - Heparin 5000 mg q8 for DVT prophylaxis  - Nephrology consulted, appreciate assistance

## 2022-10-01 NOTE — LACTATION NOTE
This note was copied from a baby's chart.  LC made phone contact with mother:  She reports desire to provide expressed breastmilk for her baby and plans to pump more regularly today.  LC Discussed the importance of frequent pumping in first two weeks to establish a full breast milk supply. Encouraged pumping 8 or more times in 24 hours-to signal body to make breastmilk. Discussed pumping every 2-3 hours with only one 5-hour break without pumping for sleep. Pumping supplies at baby's bedside already per mom. Mom reports having ordered home breast pump,not in yet. LC discussed Loaner NICU Tho Breast pump-offered for home use when mom is discharged, until her home pump arrives (up to 2-3 weeks if she is consistently pumping for her baby). Mom agreeable to loaner pump. Paperwork left at baby's bedside, RN to notify lactation when mom has loaner agreement completed. Pump will remain in Lactation Office in the NICU, to be provided to mom, when she is discharged home from the hospital. Encouragement and support offered to mom.

## 2022-10-01 NOTE — NURSING
Component      Latest Ref Rng & Units 9/30/2022           8:28 PM   POC Glucose      70 - 110 MG/ (A)   Per pt dexcom. MD Crow notified. No new orders

## 2022-10-01 NOTE — NURSING
"1030-Called to Dr. Mcfarlane glucose on dexcom 200- 106 carbs with breakfast     1232-called to Dr. Mcfarlane glucose on our machine is 293 and dexcom was 292, orders received     1336-pt walking back to bed from restroom, states "feels funny" pt assisted back to bed, dexcom reading glucose at 147, BP obtained, WNL     1400-dexcom reading 101, pt states glucose going down too quickly, pt eating mark cracker, lunch is on the way-informed Dr. Mcfarlane, no new orders received     1420-called Dr. Mcfarlane to inform pt dexcom reading 78 for glucose, lunch came and carbs are 63-orders received for insulin    1424-to pt bed to check POCT for CBG initial result 48, retest POCT CBG 55..the patient does not feel comfortable with taking 3units of insulin and would like to wait to see where here sugar goes from here, dexcom reading 68. Pt explained that her body does not digest food as fast a normal due to having gastroparesis, pt seems knowledgeable in how her body works and reacts to foods and insulin     1432-called Dr. Mcfarlane to inform pt not comfortable with taking 3 units of insulin when dexcom reading 68-pt asks if we can repeat sugar in 30mins to see where her sugar is going based off what she has eaten    1500-POCT CBG is 107 dexcom reading 105, pt requesting that we wait for 20-30more mins to recheck sugar before giving 3units of insulin    [id number], dexcom reading 132-pt still not agreeing to insulin. States "my sugar is good" "3 units of insulin will bottom me out and make me feel bad again" request a 1 hour check for 2 hours postprandial sugar.     1543-notified Dr. Mcfarlane of CBG's, and that pt is not wanting to take insulin,  will recheck CBG at 2 hr postprandial    1635-CBG called to Dr. Mcfarlane, no new orders received at this time     1709-called CBG and carbs to Dr. Mcfarlane, orders received         "

## 2022-10-01 NOTE — SUBJECTIVE & OBJECTIVE
Interval History:     She reports she is doing well this morning. She is tolerating her diabetic diet.. She is voiding spontaneously. She is ambulating. She has passed flatus, and has not a BM. Vaginal bleeding is mild. She denies fever or chills. Abdominal pain is mild and well controlled on her current pain regimen.     Objective:     Vital Signs (Most Recent):  Temp: 98.6 °F (37 °C) (10/01/22 0805)  Pulse: 98 (10/01/22 0805)  Resp: 18 (10/01/22 1101)  BP: 134/84 (10/01/22 0805)  SpO2: 97 % (10/01/22 0245)   Vital Signs (24h Range):  Temp:  [98.6 °F (37 °C)] 98.6 °F (37 °C)  Pulse:  [] 98  Resp:  [16-18] 18  SpO2:  [97 %-99 %] 97 %  BP: (134-167)/(71-87) 134/84     Weight: 74.6 kg (164 lb 5.7 oz)  Body mass index is 31.05 kg/m².      Intake/Output Summary (Last 24 hours) at 10/1/2022 1207  Last data filed at 10/1/2022 1105  Gross per 24 hour   Intake 1362.4 ml   Output 3500 ml   Net -2137.6 ml           Significant Labs:  Lab Results   Component Value Date    GROUPTRH A POS 09/24/2022    HEPBSAG Negative 07/21/2022    STREPBCULT No Group B Streptococcus isolated 09/24/2022     Recent Labs   Lab 10/01/22  0609   HGB 7.1*   HCT 20.9*         CBC:   Recent Labs   Lab 10/01/22  0609   WBC 8.02   RBC 2.30*   HGB 7.1*   HCT 20.9*      MCV 91   MCH 30.9   MCHC 34.0       CMP:   Recent Labs   Lab 10/01/22  0725   *   CALCIUM 7.9*   ALBUMIN 1.6*   PROT 5.0*      K 3.8   CO2 31*      BUN 45*   CREATININE 2.0*   ALKPHOS 81   ALT 19   AST 22   BILITOT 0.2       I have personallly reviewed all pertinent lab results from the last 24 hours.    Physical Exam:   Constitutional: She is oriented to person, place, and time. She appears well-developed and well-nourished. She is cooperative.    HENT:   Head: Normocephalic and atraumatic.       Pulmonary/Chest: Effort normal. No respiratory distress.        Abdominal: Soft. She exhibits abdominal incision (bandage in place, no areas of saturation). She  exhibits no distension. There is no abdominal tenderness. There is no rebound and no guarding.   Abdomen appropriately tender, no fundal tenderness appreciated             Musculoskeletal: Normal range of motion. Edema (pitting edema up to hips BL, much improved from yesterday) present.       Neurological: She is alert and oriented to person, place, and time.    Skin: Skin is warm and dry.

## 2022-10-01 NOTE — ASSESSMENT & PLAN NOTE
- Decision made to proceed with delivery in setting of cHTN with ARANZA with SF (Cr)  - Home regimen: hydralazine 50 mg TID, labetalol 200 mg BID, and procardia 90 mg BID  - Current BP regimen: hydralazine 100 mg TID, co reg 25 mg BID, procardia 60 mg BID, clonidine patch, minoxidil, lasix gtt, metolazone, clonidine PRN  - s/p magnesium for seizure prophylaxis  -Nephrology managing and continue to titrate medication as needed  - heparin 5000 mg q8 for DVT prophylaxis

## 2022-10-02 PROBLEM — K31.84 GASTROPARESIS DUE TO DM: Status: ACTIVE | Noted: 2022-05-14

## 2022-10-02 PROBLEM — E11.43 GASTROPARESIS DUE TO DM: Status: ACTIVE | Noted: 2022-05-14

## 2022-10-02 PROBLEM — N18.2 ACUTE RENAL FAILURE SUPERIMPOSED ON STAGE 2 CHRONIC KIDNEY DISEASE: Status: ACTIVE | Noted: 2022-09-24

## 2022-10-02 LAB
ALBUMIN SERPL BCP-MCNC: 1.8 G/DL (ref 3.5–5.2)
ALP SERPL-CCNC: 83 U/L (ref 55–135)
ALT SERPL W/O P-5'-P-CCNC: 22 U/L (ref 10–44)
ANION GAP SERPL CALC-SCNC: 6 MMOL/L (ref 8–16)
ANISOCYTOSIS BLD QL SMEAR: SLIGHT
AST SERPL-CCNC: 25 U/L (ref 10–40)
BASOPHILS # BLD AUTO: 0.03 K/UL (ref 0–0.2)
BASOPHILS NFR BLD: 0.3 % (ref 0–1.9)
BILIRUB SERPL-MCNC: 0.3 MG/DL (ref 0.1–1)
BLD PROD TYP BPU: NORMAL
BLOOD UNIT EXPIRATION DATE: NORMAL
BLOOD UNIT TYPE CODE: 6200
BLOOD UNIT TYPE: NORMAL
BUN SERPL-MCNC: 42 MG/DL (ref 6–20)
CALCIUM SERPL-MCNC: 8.2 MG/DL (ref 8.7–10.5)
CHLORIDE SERPL-SCNC: 99 MMOL/L (ref 95–110)
CO2 SERPL-SCNC: 33 MMOL/L (ref 23–29)
CODING SYSTEM: NORMAL
CREAT SERPL-MCNC: 1.8 MG/DL (ref 0.5–1.4)
DACRYOCYTES BLD QL SMEAR: ABNORMAL
DIFFERENTIAL METHOD: ABNORMAL
DISPENSE STATUS: NORMAL
EOSINOPHIL # BLD AUTO: 0.1 K/UL (ref 0–0.5)
EOSINOPHIL NFR BLD: 1.4 % (ref 0–8)
ERYTHROCYTE [DISTWIDTH] IN BLOOD BY AUTOMATED COUNT: 13.5 % (ref 11.5–14.5)
EST. GFR  (NO RACE VARIABLE): 39 ML/MIN/1.73 M^2
GIANT PLATELETS BLD QL SMEAR: PRESENT
GLUCOSE SERPL-MCNC: 121 MG/DL (ref 70–110)
HAPTOGLOB SERPL-MCNC: 189 MG/DL (ref 30–250)
HCT VFR BLD AUTO: 27.7 % (ref 37–48.5)
HGB BLD-MCNC: 9.5 G/DL (ref 12–16)
IMM GRANULOCYTES # BLD AUTO: 0.62 K/UL (ref 0–0.04)
IMM GRANULOCYTES NFR BLD AUTO: 6.3 % (ref 0–0.5)
LYMPHOCYTES # BLD AUTO: 1.7 K/UL (ref 1–4.8)
LYMPHOCYTES NFR BLD: 17.4 % (ref 18–48)
MCH RBC QN AUTO: 30.5 PG (ref 27–31)
MCHC RBC AUTO-ENTMCNC: 34.3 G/DL (ref 32–36)
MCV RBC AUTO: 89 FL (ref 82–98)
MONOCYTES # BLD AUTO: 1.1 K/UL (ref 0.3–1)
MONOCYTES NFR BLD: 11.4 % (ref 4–15)
NEUTROPHILS # BLD AUTO: 6.2 K/UL (ref 1.8–7.7)
NEUTROPHILS NFR BLD: 63.2 % (ref 38–73)
NRBC BLD-RTO: 0 /100 WBC
NUM UNITS TRANS PACKED RBC: NORMAL
PLATELET # BLD AUTO: 252 K/UL (ref 150–450)
PLATELET BLD QL SMEAR: ABNORMAL
PMV BLD AUTO: 9.8 FL (ref 9.2–12.9)
POCT GLUCOSE: 157 MG/DL (ref 70–110)
POCT GLUCOSE: 197 MG/DL (ref 70–110)
POCT GLUCOSE: 284 MG/DL (ref 70–110)
POCT GLUCOSE: 301 MG/DL (ref 70–110)
POCT GLUCOSE: 324 MG/DL (ref 70–110)
POCT GLUCOSE: 456 MG/DL (ref 70–110)
POLYCHROMASIA BLD QL SMEAR: ABNORMAL
POTASSIUM SERPL-SCNC: 3.8 MMOL/L (ref 3.5–5.1)
PROT SERPL-MCNC: 5.5 G/DL (ref 6–8.4)
RBC # BLD AUTO: 3.11 M/UL (ref 4–5.4)
SODIUM SERPL-SCNC: 138 MMOL/L (ref 136–145)
SPHEROCYTES BLD QL SMEAR: ABNORMAL
TOXIC GRANULES BLD QL SMEAR: PRESENT
WBC # BLD AUTO: 9.82 K/UL (ref 3.9–12.7)

## 2022-10-02 PROCEDURE — 85007 BL SMEAR W/DIFF WBC COUNT: CPT | Performed by: INTERNAL MEDICINE

## 2022-10-02 PROCEDURE — 63600175 PHARM REV CODE 636 W HCPCS

## 2022-10-02 PROCEDURE — 36415 COLL VENOUS BLD VENIPUNCTURE: CPT | Performed by: INTERNAL MEDICINE

## 2022-10-02 PROCEDURE — 85027 COMPLETE CBC AUTOMATED: CPT | Performed by: INTERNAL MEDICINE

## 2022-10-02 PROCEDURE — 36430 TRANSFUSION BLD/BLD COMPNT: CPT

## 2022-10-02 PROCEDURE — 63600175 PHARM REV CODE 636 W HCPCS: Performed by: INTERNAL MEDICINE

## 2022-10-02 PROCEDURE — 11981 INSERTION DRUG DLVR IMPLANT: CPT | Performed by: OBSTETRICS & GYNECOLOGY

## 2022-10-02 PROCEDURE — 11000001 HC ACUTE MED/SURG PRIVATE ROOM

## 2022-10-02 PROCEDURE — 25000003 PHARM REV CODE 250

## 2022-10-02 PROCEDURE — 63600175 PHARM REV CODE 636 W HCPCS: Performed by: PHYSICIAN ASSISTANT

## 2022-10-02 PROCEDURE — 25000003 PHARM REV CODE 250: Performed by: NURSE PRACTITIONER

## 2022-10-02 PROCEDURE — 25000003 PHARM REV CODE 250: Performed by: STUDENT IN AN ORGANIZED HEALTH CARE EDUCATION/TRAINING PROGRAM

## 2022-10-02 PROCEDURE — 80053 COMPREHEN METABOLIC PANEL: CPT | Performed by: INTERNAL MEDICINE

## 2022-10-02 PROCEDURE — 99233 PR SUBSEQUENT HOSPITAL CARE,LEVL III: ICD-10-PCS | Mod: ,,, | Performed by: INTERNAL MEDICINE

## 2022-10-02 PROCEDURE — 25000003 PHARM REV CODE 250: Performed by: PSYCHIATRY & NEUROLOGY

## 2022-10-02 PROCEDURE — P9016 RBC LEUKOCYTES REDUCED: HCPCS | Performed by: INTERNAL MEDICINE

## 2022-10-02 PROCEDURE — 25000003 PHARM REV CODE 250: Performed by: INTERNAL MEDICINE

## 2022-10-02 PROCEDURE — 99233 SBSQ HOSP IP/OBS HIGH 50: CPT | Mod: ,,, | Performed by: INTERNAL MEDICINE

## 2022-10-02 PROCEDURE — 63600175 PHARM REV CODE 636 W HCPCS: Performed by: NURSE PRACTITIONER

## 2022-10-02 RX ORDER — LIDOCAINE HYDROCHLORIDE 10 MG/ML
2 INJECTION, SOLUTION EPIDURAL; INFILTRATION; INTRACAUDAL; PERINEURAL ONCE
Status: DISCONTINUED | OUTPATIENT
Start: 2022-10-02 | End: 2022-10-02

## 2022-10-02 RX ORDER — INSULIN ASPART 100 [IU]/ML
0-5 INJECTION, SOLUTION INTRAVENOUS; SUBCUTANEOUS
Status: DISCONTINUED | OUTPATIENT
Start: 2022-10-02 | End: 2022-10-05 | Stop reason: HOSPADM

## 2022-10-02 RX ORDER — LIDOCAINE HYDROCHLORIDE 10 MG/ML
2 INJECTION INFILTRATION; PERINEURAL ONCE
Status: COMPLETED | OUTPATIENT
Start: 2022-10-02 | End: 2022-10-02

## 2022-10-02 RX ORDER — MINOXIDIL 2.5 MG/1
10 TABLET ORAL 2 TIMES DAILY
Status: DISCONTINUED | OUTPATIENT
Start: 2022-10-02 | End: 2022-10-04

## 2022-10-02 RX ADMIN — CARVEDILOL 25 MG: 12.5 TABLET, FILM COATED ORAL at 10:10

## 2022-10-02 RX ADMIN — INSULIN ASPART 4 UNITS: 100 INJECTION, SOLUTION INTRAVENOUS; SUBCUTANEOUS at 06:10

## 2022-10-02 RX ADMIN — HYDROCODONE BITARTRATE AND ACETAMINOPHEN 1 TABLET: 5; 325 TABLET ORAL at 06:10

## 2022-10-02 RX ADMIN — POLYSACCHARIDE-IRON COMPLEX 150 MG: 150 CAPSULE ORAL at 08:10

## 2022-10-02 RX ADMIN — INSULIN DETEMIR 5 UNITS: 100 INJECTION, SOLUTION SUBCUTANEOUS at 08:10

## 2022-10-02 RX ADMIN — DOCUSATE SODIUM 200 MG: 100 CAPSULE, LIQUID FILLED ORAL at 10:10

## 2022-10-02 RX ADMIN — INSULIN ASPART 5 UNITS: 100 INJECTION, SOLUTION INTRAVENOUS; SUBCUTANEOUS at 11:10

## 2022-10-02 RX ADMIN — METOCLOPRAMIDE 10 MG: 10 TABLET ORAL at 04:10

## 2022-10-02 RX ADMIN — HEPARIN SODIUM 5000 UNITS: 5000 INJECTION INTRAVENOUS; SUBCUTANEOUS at 03:10

## 2022-10-02 RX ADMIN — CYANOCOBALAMIN TAB 1000 MCG 1000 MCG: 1000 TAB at 08:10

## 2022-10-02 RX ADMIN — MINOXIDIL 5 MG: 2.5 TABLET ORAL at 08:10

## 2022-10-02 RX ADMIN — METOCLOPRAMIDE 10 MG: 10 TABLET ORAL at 10:10

## 2022-10-02 RX ADMIN — MINOXIDIL 10 MG: 2.5 TABLET ORAL at 10:10

## 2022-10-02 RX ADMIN — DOCUSATE SODIUM 200 MG: 100 CAPSULE, LIQUID FILLED ORAL at 08:10

## 2022-10-02 RX ADMIN — METOCLOPRAMIDE 10 MG: 10 TABLET ORAL at 08:10

## 2022-10-02 RX ADMIN — HYDROCODONE BITARTRATE AND ACETAMINOPHEN 1 TABLET: 10; 325 TABLET ORAL at 10:10

## 2022-10-02 RX ADMIN — INSULIN ASPART 1 UNITS: 100 INJECTION, SOLUTION INTRAVENOUS; SUBCUTANEOUS at 01:10

## 2022-10-02 RX ADMIN — HYDROCODONE BITARTRATE AND ACETAMINOPHEN 1 TABLET: 5; 325 TABLET ORAL at 03:10

## 2022-10-02 RX ADMIN — HYDROCODONE BITARTRATE AND ACETAMINOPHEN 1 TABLET: 5; 325 TABLET ORAL at 10:10

## 2022-10-02 RX ADMIN — SODIUM BICARBONATE 650 MG TABLET 1300 MG: at 08:10

## 2022-10-02 RX ADMIN — LIDOCAINE HYDROCHLORIDE 2 ML: 10 INJECTION, SOLUTION INFILTRATION; PERINEURAL at 10:10

## 2022-10-02 RX ADMIN — FUROSEMIDE 20 MG/HR: 10 INJECTION, SOLUTION INTRAMUSCULAR; INTRAVENOUS at 03:10

## 2022-10-02 RX ADMIN — PRENATAL VIT W/ FE FUMARATE-FA TAB 27-0.8 MG 1 TABLET: 27-0.8 TAB at 08:10

## 2022-10-02 RX ADMIN — DIPHENHYDRAMINE HYDROCHLORIDE 25 MG: 25 CAPSULE ORAL at 11:10

## 2022-10-02 RX ADMIN — DIPHENHYDRAMINE HYDROCHLORIDE 25 MG: 25 CAPSULE ORAL at 04:10

## 2022-10-02 RX ADMIN — HYDRALAZINE HYDROCHLORIDE 100 MG: 25 TABLET, FILM COATED ORAL at 10:10

## 2022-10-02 RX ADMIN — INSULIN ASPART 2 UNITS: 100 INJECTION, SOLUTION INTRAVENOUS; SUBCUTANEOUS at 10:10

## 2022-10-02 RX ADMIN — FAMOTIDINE 40 MG: 20 TABLET ORAL at 10:10

## 2022-10-02 RX ADMIN — HYDRALAZINE HYDROCHLORIDE 100 MG: 25 TABLET, FILM COATED ORAL at 04:10

## 2022-10-02 RX ADMIN — HYDROCODONE BITARTRATE AND ACETAMINOPHEN 1 TABLET: 10; 325 TABLET ORAL at 03:10

## 2022-10-02 RX ADMIN — CARVEDILOL 25 MG: 12.5 TABLET, FILM COATED ORAL at 08:10

## 2022-10-02 RX ADMIN — HEPARIN SODIUM 5000 UNITS: 5000 INJECTION INTRAVENOUS; SUBCUTANEOUS at 06:10

## 2022-10-02 RX ADMIN — SODIUM BICARBONATE 650 MG TABLET 1300 MG: at 10:10

## 2022-10-02 RX ADMIN — SERTRALINE HYDROCHLORIDE 50 MG: 50 TABLET ORAL at 08:10

## 2022-10-02 RX ADMIN — NIFEDIPINE 60 MG: 30 TABLET, FILM COATED, EXTENDED RELEASE ORAL at 10:10

## 2022-10-02 RX ADMIN — INSULIN DETEMIR 5 UNITS: 100 INJECTION, SOLUTION SUBCUTANEOUS at 10:10

## 2022-10-02 RX ADMIN — HYDRALAZINE HYDROCHLORIDE 100 MG: 25 TABLET, FILM COATED ORAL at 06:10

## 2022-10-02 RX ADMIN — NIFEDIPINE 60 MG: 30 TABLET, FILM COATED, EXTENDED RELEASE ORAL at 08:10

## 2022-10-02 NOTE — ASSESSMENT & PLAN NOTE
Oliguric/nonoliguric KOBY on CKD II with nephrotic syndrome secondary to uncontrolled DM, uncontrolled HTN, Preeclampsia with severe features - [place] [facility]  Creatinine was 1.5 on admission with a peak of 2.3 on 9/28/2022 with associated hyperkalemia and NAGMA.    [facility] following and plan for IR biopsy of Kidney on Monday.  Creatinine today is 1.8.    Plan:  IR consult placed for biopsy  Continue with Lasix infusion  Continue Sodium Bicarb  Monitor I&Os  Blood pressure control  Replace electrolytes as needed  Renal Dose Medications and Avoid Nephrotoxins

## 2022-10-02 NOTE — ASSESSMENT & PLAN NOTE
A1C 6.7 in 8/2022  Home Meds:  Detemir 10 units AM and 8 units PM and Novolog sliding scale  Hospital Meds:  Detemir 5 units bid and low correction dose SSI  Glucose goal of 140-180 in the acute care setting  -Monitor and adjust as needed

## 2022-10-02 NOTE — SUBJECTIVE & OBJECTIVE
Interval History: Patient is awake and alert this morning.  Overall feeling better.  No adverse events noted overnight.  Mood good.  No significant pain.  Awaiting Kidney Biopsy this week - possibly tomorrow.    Review of Systems   Constitutional:  Negative for chills, fatigue and fever.   HENT:  Negative for congestion, ear pain, rhinorrhea and sore throat.    Eyes:  Negative for photophobia, pain and visual disturbance.   Respiratory:  Negative for chest tightness and shortness of breath.    Cardiovascular:  Positive for leg swelling (improving). Negative for chest pain.   Gastrointestinal:  Positive for abdominal pain (post-op discomfort). Negative for constipation, diarrhea, nausea and vomiting.   Endocrine: Negative for polyphagia and polyuria.   Genitourinary:  Negative for dysuria and urgency.   Musculoskeletal:  Negative for arthralgias and myalgias.   Skin:  Negative for color change and wound.   Neurological:  Negative for weakness, light-headedness and headaches.   Psychiatric/Behavioral:  Negative for agitation, behavioral problems and confusion. The patient is not nervous/anxious.    Objective:     Vital Signs (Most Recent):  Temp: 98.3 °F (36.8 °C) (10/02/22 1124)  Pulse: 94 (10/02/22 1124)  Resp: 18 (10/02/22 1124)  BP: (!) 160/86 (10/02/22 1124)  SpO2: 96 % (10/02/22 1124)   Vital Signs (24h Range):  Temp:  [97.8 °F (36.6 °C)-98.8 °F (37.1 °C)] 98.3 °F (36.8 °C)  Pulse:  [] 94  Resp:  [16-18] 18  SpO2:  [94 %-98 %] 96 %  BP: (139-176)/(76-96) 160/86     Weight: 74.6 kg (164 lb 5.7 oz)  Body mass index is 31.05 kg/m².    Intake/Output Summary (Last 24 hours) at 10/2/2022 1349  Last data filed at 10/2/2022 0758  Gross per 24 hour   Intake 1538.54 ml   Output 2150 ml   Net -611.46 ml      Physical Exam  Vitals and nursing note reviewed.   Constitutional:       General: She is not in acute distress.     Appearance: Normal appearance. She is normal weight. She is not ill-appearing.   HENT:       Head: Normocephalic and atraumatic.      Right Ear: External ear normal.      Left Ear: External ear normal.      Nose: Nose normal.      Mouth/Throat:      Pharynx: Oropharynx is clear.   Eyes:      Extraocular Movements: Extraocular movements intact.      Conjunctiva/sclera: Conjunctivae normal.   Cardiovascular:      Rate and Rhythm: Regular rhythm. Tachycardia present.      Pulses: Normal pulses.      Heart sounds: Normal heart sounds.   Pulmonary:      Effort: Pulmonary effort is normal. No respiratory distress.      Breath sounds: Normal breath sounds. No wheezing.   Abdominal:      General: Abdomen is flat. Bowel sounds are normal. There is no distension.      Palpations: Abdomen is soft.      Tenderness: There is abdominal tenderness (post-op).   Genitourinary:     Comments: S/p  - wound closed and clean  Musculoskeletal:         General: No swelling or tenderness. Normal range of motion.      Cervical back: Normal range of motion and neck supple.      Right lower leg: Edema present.      Left lower leg: Edema present.   Skin:     General: Skin is warm and dry.   Neurological:      General: No focal deficit present.      Mental Status: She is alert and oriented to person, place, and time. Mental status is at baseline.      Motor: No weakness.   Psychiatric:         Mood and Affect: Mood normal.         Behavior: Behavior normal.       Significant Labs: All pertinent labs within the past 24 hours have been reviewed.    Significant Imaging: I have reviewed all pertinent imaging results/findings within the past 24 hours.

## 2022-10-02 NOTE — ASSESSMENT & PLAN NOTE
Hospital Meds:  Carvedilol 25mg bid, Clonidine 0.3mg patch q7d, Hydralazine 100mg q8, Nifedipine XL 60mg bid, Minoxidil 5mg bid  -Monitor and adjust as needed

## 2022-10-02 NOTE — PROCEDURES
Patient was counseled on the risks of the procedure including pain, bleeding, and infection.  Patient acknowledged risks, and all questions were answered.  Consents signed.    Post partum day 7, urine pregnancy test not required    Insertion site was prepped with povidine iodine and injected with 3-cc of lidocaine.  Implant was inserted with the 's provided device.  Patient palpated the device.  Site bandaged and wrapped.  Patient instructed to keep bandage on for 24 hours.    Arm: LEFT   Lot: [id number]   Exp. Date: 07/18/2024   Supply: BUY AND BILL     Pain, bleeding, and infection precautions given.           Daiana Díaz MD  [facility]   OBGYN PGY1

## 2022-10-02 NOTE — NURSING
Patient requested to take a shower prior to getting her blood transfusion.  Patient set up by nurse and is showering now.

## 2022-10-02 NOTE — NURSING
Patient is finished with her shower.  Blood consent found and verified with NIDHI Santana.  I contacted the blood bank and requested the one unit of prbc's to be transfused.

## 2022-10-02 NOTE — ASSESSMENT & PLAN NOTE
Hgb 7.9 on admission and dropped to 6.8 on 9/24/2022.  S/p 2 units PRBC on 9/25/2022.  Hgb was 7.1 on 10/1/2022 and transfused another unit of PRBC.  Hgb today is 9.5.  Iron Studies in 5/2022 not suggestive of DERIK.  Haptoglobin normal with mild bump in LDH.  -Monitor CBC

## 2022-10-02 NOTE — PROGRESS NOTES
"Progress Note  Nephrology    Consult Requested By: Calin Carrera MD  Reason for Consult: KOBY/>K/Nephrotic Syn    SUBJECTIVE:     History of Present Illness from initial consultation:  "Patient is a 27 y.o. female presents with admission from Nephrology appt yesterday with N/V/Acc HTN and feeling poorly.  Pt is 27+W accidental pregnancy with uncontrolled type 1 dm, nephrotic syn, uncontrolled HTN.  Initial /100, Creat 1.5, K 5.8, urine PC 17 (falsely high with blood but hx of 6+).  Admitted to OB floor for mgmt of HTN.  Trends noted and now with Creat 1.7, K 6.0, Bicarb 17.  Consulted for evaluation.  Pt seen and examined with [facility] staff-team at bedside.  Of note pt has been refusing some meds/treatment modalities/etc.  Extensive discussion about plan of care and consequences of refusal.  Nephrotic w/up ordered earlier but suspect due to uncontrolled Type 1 DM/Uncontrolled HTN. Pt admits to taking Excedrin and occasional NSAIDS for her gastroparesis.  Diet habits are uncontrolled."     Interval History:    Pt continues to be very pleasant and feeling better each day. Diuresing well.   No other complaints. BP up.    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      Past Surgical History:   Procedure Laterality Date     SECTION N/A 2022    Procedure:  SECTION;  Surgeon: Quoc Pryor Jr., MD;  Location: [facility];  Service: OB/GYN;  Laterality: N/A;    RETINAL DETACHMENT SURGERY  2018    RETINAL DETACHMENT SURGERY       Family History   Problem Relation Age of Onset    No Known Problems Father     Cancer Paternal Grandfather         unsure    Diabetes Maternal Grandfather     Hypertension Mother      Social History     Tobacco Use "    Smoking status: Never    Smokeless tobacco: Never   Substance Use Topics    Alcohol use: Not Currently    Drug use: Never       Review of patient's allergies indicates:  No Known Allergies     Review of Systems:  Constitutional: No fever or chills  Respiratory: No cough or shortness of breath  Cardiovascular: No chest pain or palpitations  Gastrointestinal: No nausea or vomiting  Neurological: No confusion or weakness    OBJECTIVE:     Vital Signs (Most Recent)  Temp: 99.4 °F (37.4 °C) (10/02/22 1527)  Pulse: 100 (10/02/22 1527)  Resp: 18 (10/02/22 1542)  BP: (!) 162/87 (10/02/22 1527)  SpO2: 96 % (10/02/22 1527)    Vital Signs Range (Last 24H):  Temp:  [97.8 °F (36.6 °C)-99.4 °F (37.4 °C)]   Pulse:  []   Resp:  [16-18]   BP: (139-176)/(80-96)   SpO2:  [94 %-98 %]       Intake/Output Summary (Last 24 hours) at 10/2/2022 1700  Last data filed at 10/2/2022 0758  Gross per 24 hour   Intake 1298.54 ml   Output 2150 ml   Net -851.46 ml       Physical Exam:  NAD  Anasarca-slowly improving  RRR  Diminished lungs.  +distention, hypo BS  Edema ++    Laboratory:  Recent Labs   Lab 10/02/22  0547   WBC 9.82   RBC 3.11*   HGB 9.5*   HCT 27.7*      MCV 89   MCH 30.5   MCHC 34.3     BMP:   Recent Labs   Lab 09/26/22  1202 09/26/22  1952 10/02/22  0547   *   < > 121*   *   < > 138   K 5.5*   < > 3.8      < > 99   CO2 16*   < > 33*   BUN 34*   < > 42*   CREATININE 2.0*   < > 1.8*   CALCIUM 7.4*   < > 8.2*   MG 4.9*  --   --     < > = values in this interval not displayed.     Lab Results   Component Value Date    CALCIUM 8.2 (L) 10/02/2022    PHOS 5.0 (H) 09/24/2022     BNP  No results for input(s): BNP, BNPTRIAGEBLO in the last 168 hours.No results found for: URICACID  Lab Results   Component Value Date    IRON 84 05/06/2022    TIBC 266 05/06/2022    FERRITIN 79 05/06/2022     Lab Results   Component Value Date    CALCIUM 8.2 (L) 10/02/2022    PHOS 5.0 (H) 09/24/2022       Diagnostic Results:  No  orders to display       ASSESSMENT/PLAN:     Oliguric/nonoliguric KOBY on CKD II with nephrotic syndrome secondary to uncontrolled DM, uncontrolled HTN, Preeclampsia with severe features.  -Followed with  nephrology at [facility] Dr. Cheek/Liliane   -Creat trends noted. 1.5 -1.9 - 2.0 - 2.3 - 2.1 - 2.0  -Alb 1.6> 1.8  -Repeat UPCR 9 grams - baseline historically 6 grams  -avoid NSAIDS.    -no need for RRT yet  -increase Heparin to Q8 and ok for lovenox.    -Low Hapto initially but now normal, mildly elevated LDH , anemia, Transient decrease but not frankly low plts now rsing serially, KOBY.  ADAMTS  76%  -now with worsening anasarca and volume overload started lasix> drip    -de-escalate lasix drip to 12mg/hr with metolazone due to CO2 33  -YARON mildy + but neg DsDNA.     -trending with Creat down to 1.8 today.  -Have consulted IR for kidney biopsy hopefully Monday.  -order coags for am.  -Will need to make NPO after MN tonight except oral meds with sips, hold am insulin and hold Heparin appropriately.  -Discussed need for post biopsy precautions and will need to make sure she is compliant after procedure.  -Discussed that if all is stable that potentially she she can go home Tuesday and will need very close FU with Nephrology perhaps closer to where she lives?  -Renally dose meds, avoid nephrotoxins, and monitor I/O's closely.    Hyperkalemia with NAGMA, hyponatremia  -s/ p Lokelma TID and sodium bicarb to shift and correct acidosis  -stable.   -low k diet.  -some are hemolyzed.     Uncontrolled HTN  -max procardia 60 BID  -increased hydralazine to 100 TID  -changed to coreg for antiprotenuric effects.   -added catapres patch and prn tabs.  -lasix/metolazone to help with volume removal which should help BP also  -weaned off cardene  -added minoxidil with improvement, but again elevated thus will increase Minoxidil 10 mg bid.  -If she chooses not to breastfeed, then would change regimen.    Uncontrolled Type 1 DM  -formerly  on insulin pump until she had a hypoglycemic seizure and then transitioned to MDI  -she was not wearing her sensor was cause of this but highly recommend retry with closed loop system.  -defer to Endo     Pregnancy, Extremely high risk  -per MFM/OB  -s/p emergent .  -OB feels doing well from their standpoint and wishes was transition to medicine service.    Leukocystosis without fever or S/S infection  -Due to steroid therapy  -resolved    Anemia of pregnancy/CKD  -B12 and folate both on low side now on supplementation  -on iron  -Transfused 1 unit 10/1/22 again due to pending pre-procedure tomorrow    Possible delirium superimposed on cognitive disorder  Possible psychosis  Unspecified anxiety  History of depression  -PEC'd  but now rescinded.  -Greatly appreciate help of Psych.  -Pt doing much better and actually quite peasant now  -asking to go home, but willing to stay for ongoing care.      See above

## 2022-10-02 NOTE — NURSING
Patient just returned to room 384 from the NICU via wheelchair with her continuous lasix drip by EVELIO Ladd.

## 2022-10-02 NOTE — PROGRESS NOTES
[facility] - Med Surg [facility] Medicine  Progress Note    Patient Name: Alicia Valles  MRN: [id number]  Patient Class: IP- Inpatient   Admission Date: 2022  Length of Stay: 10 days  Attending Physician: Praveen Benítez MD  Primary Care Provider: Giovanna Hyatt MD        Subjective:     Principal Problem:S/P primary low transverse       HPI:  Patient is a 27 y.o. female presents with admission from Nephrology appt yesterday with N/V/Acc HTN and feeling poorly.  Pt is 27+W accidental pregnancy with uncontrolled type 1 dm, nephrotic syn, uncontrolled HTN.  Initial /100, Creat 1.5, K 5.8, urine PC 17 (falsely high with blood but hx of 6+).  Admitted to OB floor for mgmt of HTN.  Trends noted and now with Creat 1.7, K 6.0, Bicarb 17.  Consulted for evaluation.  Pt seen and examined with [facility] staff-team at bedside.  Of note pt has been refusing some meds/treatment modalities/etc. Extensive discussion about plan of care and consequences of refusal.  Nephrotic w/up ordered earlier but suspect due to uncontrolled Type 1 DM/Uncontrolled HTN. Pt admits to taking Excedrin and occasional NSAIDS for her gastroparesis.  Diet habits are uncontrolled      Overview/Hospital Course:  2022: admit for BP monitoring for cHTN w/ ARANZA vs cHTN exacerbation. CLD, cont monitoring. Rocephin for UTI. Continue levimir 10/8, BG Q4 while on CLD. Labs per nephrology recs pending.   2022 - HD#2. Received 5 units of aspart since admission for elevated BG. No obstetric complaints. Monitoring reassuring. Magnesium boluses continued for seizure prophylaxis and fetal neuroprotection in setting of elevated Cr (1.7). K 5.8 this AM  2022 - HD#3. BP meds changed yesterday to labetalol 200 mg BID, procardia 60 BID, and hydralazine 75 mg TID. Patient had persistently elevated blood sugars yesterday requiring multiple slides with insulin aspart. BMZ 1/2 given. Insulin drip started at 1 u/hr in anticipation of  hyperglycemia secondary to steroids. Patient had hypoglycemic episode overnight. Reported left sided pelvic pain overnight, SVE closed. No other obstetric complaints. No pre-E symptoms.   09/25/2022 - HD#4. IOL started secondary to cHTN with ARANZA with SF (Cr).   09/26/2022 - HD#5. POD#1 s/p pLTCS. Multiple episodes of hypoglycemia overnight. Admitted to ICU for cardene drip in setting of uncontrolled sustained severe range BP requiring hydral 5/10/5/10, procardia 10/20, and labetalol 20/40/80 after delivery.   09/27/2022 - HD#6. POD#2 s/p pLTCS. Continued on cardene drip for elevated BP. BP meds adjusted yesterday - labetalol discontinued, hydralazine up-titrated and co-reg added. Started on levemir 3/3 with CR of 1:20 and ISF 1:50 > 180.  09/28/2022 - HD#7. POD#3 s/p pLTCS. PECed by psych yesterday. S/p cardene drip. Lasix drip started by nephro. Will increase levemir to 4/4.   09/29/2022 - HD#8. POD#4 s/p pLTCS. Stepped down from ICU yesterday. Lasix drip continued. Meeting post-op milestones. PEC continued per psych. Will increase CR to 1:15 with breakfast and dinner.   09/30/2022 - HD#9. POD#5 s/p pLTCS. Insulin regimen adjusted yesterday. Lasix drip continued. PEC in place per psych.    10/01/2022 - HD#10, POD#6 s/p pLTCS. Patient doing well this morning. Has lot 30 pounds of fluid since delivery. She feels much more like her old safe. She desires a nexplanon before discharge. Discussed plan for admission for a few more days pending renal biopsy.      Interval History: Patient is awake and alert this morning.  Overall feeling better.  No adverse events noted overnight.  Mood good.  No significant pain.  Awaiting Kidney Biopsy this week - possibly tomorrow.    Review of Systems   Constitutional:  Negative for chills, fatigue and fever.   HENT:  Negative for congestion, ear pain, rhinorrhea and sore throat.    Eyes:  Negative for photophobia, pain and visual disturbance.   Respiratory:  Negative for chest  tightness and shortness of breath.    Cardiovascular:  Positive for leg swelling (improving). Negative for chest pain.   Gastrointestinal:  Positive for abdominal pain (post-op discomfort). Negative for constipation, diarrhea, nausea and vomiting.   Endocrine: Negative for polyphagia and polyuria.   Genitourinary:  Negative for dysuria and urgency.   Musculoskeletal:  Negative for arthralgias and myalgias.   Skin:  Negative for color change and wound.   Neurological:  Negative for weakness, light-headedness and headaches.   Psychiatric/Behavioral:  Negative for agitation, behavioral problems and confusion. The patient is not nervous/anxious.    Objective:     Vital Signs (Most Recent):  Temp: 98.3 °F (36.8 °C) (10/02/22 1124)  Pulse: 94 (10/02/22 1124)  Resp: 18 (10/02/22 1124)  BP: (!) 160/86 (10/02/22 1124)  SpO2: 96 % (10/02/22 1124)   Vital Signs (24h Range):  Temp:  [97.8 °F (36.6 °C)-98.8 °F (37.1 °C)] 98.3 °F (36.8 °C)  Pulse:  [] 94  Resp:  [16-18] 18  SpO2:  [94 %-98 %] 96 %  BP: (139-176)/(76-96) 160/86     Weight: 74.6 kg (164 lb 5.7 oz)  Body mass index is 31.05 kg/m².    Intake/Output Summary (Last 24 hours) at 10/2/2022 1349  Last data filed at 10/2/2022 0758  Gross per 24 hour   Intake 1538.54 ml   Output 2150 ml   Net -611.46 ml      Physical Exam  Vitals and nursing note reviewed.   Constitutional:       General: She is not in acute distress.     Appearance: Normal appearance. She is normal weight. She is not ill-appearing.   HENT:      Head: Normocephalic and atraumatic.      Right Ear: External ear normal.      Left Ear: External ear normal.      Nose: Nose normal.      Mouth/Throat:      Pharynx: Oropharynx is clear.   Eyes:      Extraocular Movements: Extraocular movements intact.      Conjunctiva/sclera: Conjunctivae normal.   Cardiovascular:      Rate and Rhythm: Regular rhythm. Tachycardia present.      Pulses: Normal pulses.      Heart sounds: Normal heart sounds.   Pulmonary:       Effort: Pulmonary effort is normal. No respiratory distress.      Breath sounds: Normal breath sounds. No wheezing.   Abdominal:      General: Abdomen is flat. Bowel sounds are normal. There is no distension.      Palpations: Abdomen is soft.      Tenderness: There is abdominal tenderness (post-op).   Genitourinary:     Comments: S/p  - wound closed and clean  Musculoskeletal:         General: No swelling or tenderness. Normal range of motion.      Cervical back: Normal range of motion and neck supple.      Right lower leg: Edema present.      Left lower leg: Edema present.   Skin:     General: Skin is warm and dry.   Neurological:      General: No focal deficit present.      Mental Status: She is alert and oriented to person, place, and time. Mental status is at baseline.      Motor: No weakness.   Psychiatric:         Mood and Affect: Mood normal.         Behavior: Behavior normal.       Significant Labs: All pertinent labs within the past 24 hours have been reviewed.    Significant Imaging: I have reviewed all pertinent imaging results/findings within the past 24 hours.      Assessment/Plan:      * S/P primary low transverse   POD#7 s/p pLTCS  -Continue with Pain Control  -Follow up with OB-GYN recommendations        Acute renal failure superimposed on stage 2 chronic kidney disease with Nephrotic Range Proteinuria  Oliguric/nonoliguric KOBY on CKD II with nephrotic syndrome secondary to uncontrolled DM, uncontrolled HTN, Preeclampsia with severe features - Followed with  Nephrology at [facility].  Creatinine was 1.5 on admission with a peak of 2.3 on 2022 with associated hyperkalemia and NAGMA.    [facility] following and plan for IR biopsy of Kidney on Monday.  Creatinine today is 1.8.    Plan:  IR consult placed for biopsy  Continue with Lasix infusion  Continue Sodium Bicarb  Monitor I&Os  Blood pressure control  Replace electrolytes as needed  Renal Dose Medications and Avoid  Nephrotoxins        Chronic hypertension with superimposed pre-eclampsia  Hospital Meds:  Carvedilol 25mg bid, Clonidine 0.3mg patch q7d, Hydralazine 100mg q8, Nifedipine XL 60mg bid, Minoxidil 5mg bid  -Monitor and adjust as needed        Gastroparesis due to DM  -Continue Reglan QID with mealns  -Continue Anti-emetics as needed    Anxiety  Was on PEC, but discontinued by Psychiatry 9/30/2022.  Patient calm and cooperative this morning.  -Continue Haldol q8 PRN, Ativan q12 PRN, Benadyrl PRN        Proteinuria        Anemia  Hgb 7.9 on admission and dropped to 6.8 on 9/24/2022.  S/p 2 units PRBC on 9/25/2022.  Hgb was 7.1 on 10/1/2022 and transfused another unit of PRBC.  Hgb today is 9.5.  Iron Studies in 5/2022 not suggestive of DERIK.  Haptoglobin normal with mild bump in LDH.  -Monitor CBC      Type 1 diabetes mellitus with other specified complication  A1C 6.7 in 8/2022  Home Meds:  Detemir 10 units AM and 8 units PM and Novolog sliding scale  Hospital Meds:  Detemir 5 units bid and low correction dose SSI  Glucose goal of 140-180 in the acute care setting  -Monitor and adjust as needed    VTE Risk Mitigation (From admission, onward)         Ordered     heparin (porcine) injection 5,000 Units  Every 8 hours         09/28/22 1435     IP VTE HIGH RISK PATIENT  Once         09/25/22 1707     Place sequential compression device  Until discontinued         09/25/22 1707     Place sequential compression device  Until discontinued         09/22/22 2216                Discharge Planning   BRYAN:      Code Status: Full Code   Is the patient medically ready for discharge?:     Reason for patient still in hospital (select all that apply): Patient trending condition, Treatment and Consult recommendations  Discharge Plan A: Home with family                  Calin Carrera MD  Department of Hospital Medicine   [facility]

## 2022-10-02 NOTE — HPI
Patient is a 27 y.o. female presents with admission from Nephrology appt yesterday with N/V/Acc HTN and feeling poorly.  Pt is 27+W accidental pregnancy with uncontrolled type 1 dm, nephrotic syn, uncontrolled HTN.  Initial /100, Creat 1.5, K 5.8, urine PC 17 (falsely high with blood but hx of 6+).  Admitted to OB floor for mgmt of HTN.  Trends noted and now with Creat 1.7, K 6.0, Bicarb 17.  Consulted for evaluation.  Pt seen and examined with [facility] staff-team at bedside.  Of note pt has been refusing some meds/treatment modalities/etc. Extensive discussion about plan of care and consequences of refusal.  Nephrotic w/up ordered earlier but suspect due to uncontrolled Type 1 DM/Uncontrolled HTN. Pt admits to taking Excedrin and occasional NSAIDS for her gastroparesis.  Diet habits are uncontrolled

## 2022-10-02 NOTE — ASSESSMENT & PLAN NOTE
Was on PEC, but discontinued by Psychiatry 9/30/2022.  Patient calm and cooperative this morning.  -Continue Haldol q8 PRN, Ativan q12 PRN, Benadyrl PRN

## 2022-10-02 NOTE — HOSPITAL COURSE
Per OB/GYN  09/22/2022: admit for BP monitoring for cHTN w/ ARANZA vs cHTN exacerbation. CLD, cont monitoring. Rocephin for UTI. Continue levimir 10/8, BG Q4 while on CLD. Labs per nephrology recs pending.   09/23/2022 - HD#2. Received 5 units of aspart since admission for elevated BG. No obstetric complaints. Monitoring reassuring. Magnesium boluses continued for seizure prophylaxis and fetal neuroprotection in setting of elevated Cr (1.7). K 5.8 this AM  09/24/2022 - HD#3. BP meds changed yesterday to labetalol 200 mg BID, procardia 60 BID, and hydralazine 75 mg TID. Patient had persistently elevated blood sugars yesterday requiring multiple slides with insulin aspart. BMZ 1/2 given. Insulin drip started at 1 u/hr in anticipation of hyperglycemia secondary to steroids. Patient had hypoglycemic episode overnight. Reported left sided pelvic pain overnight, SVE closed. No other obstetric complaints. No pre-E symptoms.   09/25/2022 - HD#4. IOL started secondary to cHTN with ARANZA with SF (Cr).   09/26/2022 - HD#5. POD#1 s/p pLTCS. Multiple episodes of hypoglycemia overnight. Admitted to ICU for cardene drip in setting of uncontrolled sustained severe range BP requiring hydral 5/10/5/10, procardia 10/20, and labetalol 20/40/80 after delivery.   09/27/2022 - HD#6. POD#2 s/p pLTCS. Continued on cardene drip for elevated BP. BP meds adjusted yesterday - labetalol discontinued, hydralazine up-titrated and co-reg added. Started on levemir 3/3 with CR of 1:20 and ISF 1:50 > 180.  09/28/2022 - HD#7. POD#3 s/p pLTCS. PECed by psych yesterday. S/p cardene drip. Lasix drip started by nephro. Will increase levemir to 4/4.   09/29/2022 - HD#8. POD#4 s/p pLTCS. Stepped down from ICU yesterday. Lasix drip continued. Meeting post-op milestones. PEC continued per psych. Will increase CR to 1:15 with breakfast and dinner.   09/30/2022 - HD#9. POD#5 s/p pLTCS. Insulin regimen adjusted yesterday. Lasix drip continued. PEC in place per  psych.    10/01/2022 - HD#10, POD#6 s/p pLTCS. Patient doing well this morning. Has lot 30 pounds of fluid since delivery. She feels much more like her old safe. She desires a nexplanon before discharge. Discussed plan for admission for a few more days pending renal biopsy.    HM and Nephrology were consulted for HTN and KOBY. Her bp meds were adjusted to reflect the discharge list here and renal biopsy preliminary showed diabetic GN. She will follow up with [facility] as well as with endocrine.

## 2022-10-03 LAB
ALBUMIN SERPL BCP-MCNC: 1.9 G/DL (ref 3.5–5.2)
ALP SERPL-CCNC: 91 U/L (ref 55–135)
ALT SERPL W/O P-5'-P-CCNC: 21 U/L (ref 10–44)
ANION GAP SERPL CALC-SCNC: 4 MMOL/L (ref 8–16)
ANISOCYTOSIS BLD QL SMEAR: SLIGHT
APTT BLDCRRT: 27.1 SEC (ref 21–32)
AST SERPL-CCNC: 22 U/L (ref 10–40)
BASOPHILS # BLD AUTO: ABNORMAL K/UL (ref 0–0.2)
BASOPHILS NFR BLD: 0 % (ref 0–1.9)
BILIRUB SERPL-MCNC: 0.3 MG/DL (ref 0.1–1)
BUN SERPL-MCNC: 41 MG/DL (ref 6–20)
CALCIUM SERPL-MCNC: 8.3 MG/DL (ref 8.7–10.5)
CHLORIDE SERPL-SCNC: 94 MMOL/L (ref 95–110)
CO2 SERPL-SCNC: 38 MMOL/L (ref 23–29)
CREAT SERPL-MCNC: 1.9 MG/DL (ref 0.5–1.4)
DIFFERENTIAL METHOD: ABNORMAL
EOSINOPHIL # BLD AUTO: ABNORMAL K/UL (ref 0–0.5)
EOSINOPHIL NFR BLD: 0 % (ref 0–8)
ERYTHROCYTE [DISTWIDTH] IN BLOOD BY AUTOMATED COUNT: 13.8 % (ref 11.5–14.5)
EST. GFR  (NO RACE VARIABLE): 36 ML/MIN/1.73 M^2
GLUCOSE SERPL-MCNC: 218 MG/DL (ref 70–110)
HCT VFR BLD AUTO: 26.5 % (ref 37–48.5)
HGB BLD-MCNC: 9 G/DL (ref 12–16)
IMM GRANULOCYTES # BLD AUTO: ABNORMAL K/UL (ref 0–0.04)
IMM GRANULOCYTES NFR BLD AUTO: ABNORMAL % (ref 0–0.5)
INR PPP: 0.9 (ref 0.8–1.2)
LYMPHOCYTES # BLD AUTO: ABNORMAL K/UL (ref 1–4.8)
LYMPHOCYTES NFR BLD: 20 % (ref 18–48)
MCH RBC QN AUTO: 30.4 PG (ref 27–31)
MCHC RBC AUTO-ENTMCNC: 34 G/DL (ref 32–36)
MCV RBC AUTO: 90 FL (ref 82–98)
METAMYELOCYTES NFR BLD MANUAL: 2 %
MONOCYTES # BLD AUTO: ABNORMAL K/UL (ref 0.3–1)
MONOCYTES NFR BLD: 12 % (ref 4–15)
MYELOCYTES NFR BLD MANUAL: 2 %
NEUTROPHILS NFR BLD: 63 % (ref 38–73)
NEUTS BAND NFR BLD MANUAL: 1 %
NRBC BLD-RTO: 0 /100 WBC
PLATELET # BLD AUTO: 286 K/UL (ref 150–450)
PLATELET BLD QL SMEAR: ABNORMAL
PMV BLD AUTO: 10.3 FL (ref 9.2–12.9)
POCT GLUCOSE: 195 MG/DL (ref 70–110)
POCT GLUCOSE: 222 MG/DL (ref 70–110)
POCT GLUCOSE: 248 MG/DL (ref 70–110)
POCT GLUCOSE: 288 MG/DL (ref 70–110)
POLYCHROMASIA BLD QL SMEAR: ABNORMAL
POTASSIUM SERPL-SCNC: 4.3 MMOL/L (ref 3.5–5.1)
PROT SERPL-MCNC: 5.5 G/DL (ref 6–8.4)
PROTHROMBIN TIME: 10.1 SEC (ref 9–12.5)
RBC # BLD AUTO: 2.96 M/UL (ref 4–5.4)
SODIUM SERPL-SCNC: 136 MMOL/L (ref 136–145)
WBC # BLD AUTO: 9.04 K/UL (ref 3.9–12.7)

## 2022-10-03 PROCEDURE — 85610 PROTHROMBIN TIME: CPT | Performed by: INTERNAL MEDICINE

## 2022-10-03 PROCEDURE — 25000003 PHARM REV CODE 250

## 2022-10-03 PROCEDURE — 85027 COMPLETE CBC AUTOMATED: CPT | Performed by: INTERNAL MEDICINE

## 2022-10-03 PROCEDURE — 36415 COLL VENOUS BLD VENIPUNCTURE: CPT | Performed by: INTERNAL MEDICINE

## 2022-10-03 PROCEDURE — 25000003 PHARM REV CODE 250: Performed by: STUDENT IN AN ORGANIZED HEALTH CARE EDUCATION/TRAINING PROGRAM

## 2022-10-03 PROCEDURE — 85730 THROMBOPLASTIN TIME PARTIAL: CPT | Performed by: INTERNAL MEDICINE

## 2022-10-03 PROCEDURE — 25000003 PHARM REV CODE 250: Performed by: PSYCHIATRY & NEUROLOGY

## 2022-10-03 PROCEDURE — 99152 MOD SED SAME PHYS/QHP 5/>YRS: CPT

## 2022-10-03 PROCEDURE — 99233 PR SUBSEQUENT HOSPITAL CARE,LEVL III: ICD-10-PCS | Mod: ,,, | Performed by: INTERNAL MEDICINE

## 2022-10-03 PROCEDURE — 11000001 HC ACUTE MED/SURG PRIVATE ROOM

## 2022-10-03 PROCEDURE — 99153 MOD SED SAME PHYS/QHP EA: CPT

## 2022-10-03 PROCEDURE — 85007 BL SMEAR W/DIFF WBC COUNT: CPT | Performed by: INTERNAL MEDICINE

## 2022-10-03 PROCEDURE — 63600175 PHARM REV CODE 636 W HCPCS: Performed by: PHYSICIAN ASSISTANT

## 2022-10-03 PROCEDURE — 99233 SBSQ HOSP IP/OBS HIGH 50: CPT | Mod: ,,, | Performed by: INTERNAL MEDICINE

## 2022-10-03 PROCEDURE — 63600175 PHARM REV CODE 636 W HCPCS: Performed by: STUDENT IN AN ORGANIZED HEALTH CARE EDUCATION/TRAINING PROGRAM

## 2022-10-03 PROCEDURE — 80053 COMPREHEN METABOLIC PANEL: CPT | Performed by: INTERNAL MEDICINE

## 2022-10-03 PROCEDURE — 25000003 PHARM REV CODE 250: Performed by: INTERNAL MEDICINE

## 2022-10-03 PROCEDURE — 25000003 PHARM REV CODE 250: Performed by: NURSE PRACTITIONER

## 2022-10-03 PROCEDURE — 63600175 PHARM REV CODE 636 W HCPCS: Performed by: INTERNAL MEDICINE

## 2022-10-03 RX ORDER — METOCLOPRAMIDE 5 MG/1
5 TABLET ORAL EVERY 6 HOURS
Status: DISCONTINUED | OUTPATIENT
Start: 2022-10-03 | End: 2022-10-05 | Stop reason: HOSPADM

## 2022-10-03 RX ORDER — FAMOTIDINE 20 MG/1
20 TABLET, FILM COATED ORAL NIGHTLY
Status: DISCONTINUED | OUTPATIENT
Start: 2022-10-03 | End: 2022-10-05 | Stop reason: HOSPADM

## 2022-10-03 RX ORDER — LIDOCAINE HYDROCHLORIDE 10 MG/ML
INJECTION INFILTRATION; PERINEURAL
Status: DISCONTINUED | OUTPATIENT
Start: 2022-10-03 | End: 2022-10-03 | Stop reason: HOSPADM

## 2022-10-03 RX ORDER — MIDAZOLAM HYDROCHLORIDE 1 MG/ML
INJECTION INTRAMUSCULAR; INTRAVENOUS
Status: DISCONTINUED | OUTPATIENT
Start: 2022-10-03 | End: 2022-10-03 | Stop reason: HOSPADM

## 2022-10-03 RX ORDER — FUROSEMIDE 40 MG/1
80 TABLET ORAL DAILY
Status: DISCONTINUED | OUTPATIENT
Start: 2022-10-03 | End: 2022-10-04

## 2022-10-03 RX ORDER — FENTANYL CITRATE 50 UG/ML
INJECTION, SOLUTION INTRAMUSCULAR; INTRAVENOUS
Status: DISCONTINUED | OUTPATIENT
Start: 2022-10-03 | End: 2022-10-03 | Stop reason: HOSPADM

## 2022-10-03 RX ADMIN — INSULIN DETEMIR 5 UNITS: 100 INJECTION, SOLUTION SUBCUTANEOUS at 09:10

## 2022-10-03 RX ADMIN — HYDRALAZINE HYDROCHLORIDE 100 MG: 25 TABLET, FILM COATED ORAL at 03:10

## 2022-10-03 RX ADMIN — FAMOTIDINE 20 MG: 20 TABLET ORAL at 09:10

## 2022-10-03 RX ADMIN — CYANOCOBALAMIN TAB 1000 MCG 1000 MCG: 1000 TAB at 08:10

## 2022-10-03 RX ADMIN — POLYETHYLENE GLYCOL 3350 17 G: 17 POWDER, FOR SOLUTION ORAL at 08:10

## 2022-10-03 RX ADMIN — SODIUM BICARBONATE 650 MG TABLET 1300 MG: at 08:10

## 2022-10-03 RX ADMIN — HYDROCODONE BITARTRATE AND ACETAMINOPHEN 1 TABLET: 5; 325 TABLET ORAL at 05:10

## 2022-10-03 RX ADMIN — INSULIN ASPART 3 UNITS: 100 INJECTION, SOLUTION INTRAVENOUS; SUBCUTANEOUS at 09:10

## 2022-10-03 RX ADMIN — INSULIN ASPART 2 UNITS: 100 INJECTION, SOLUTION INTRAVENOUS; SUBCUTANEOUS at 04:10

## 2022-10-03 RX ADMIN — HYDRALAZINE HYDROCHLORIDE 100 MG: 25 TABLET, FILM COATED ORAL at 09:10

## 2022-10-03 RX ADMIN — FUROSEMIDE 12 MG/HR: 10 INJECTION, SOLUTION INTRAMUSCULAR; INTRAVENOUS at 07:10

## 2022-10-03 RX ADMIN — METOCLOPRAMIDE 5 MG: 5 TABLET ORAL at 11:10

## 2022-10-03 RX ADMIN — MINOXIDIL 10 MG: 2.5 TABLET ORAL at 08:10

## 2022-10-03 RX ADMIN — DOCUSATE SODIUM 200 MG: 100 CAPSULE, LIQUID FILLED ORAL at 09:10

## 2022-10-03 RX ADMIN — HYDRALAZINE HYDROCHLORIDE 100 MG: 25 TABLET, FILM COATED ORAL at 05:10

## 2022-10-03 RX ADMIN — CARVEDILOL 25 MG: 12.5 TABLET, FILM COATED ORAL at 08:10

## 2022-10-03 RX ADMIN — HYDROCODONE BITARTRATE AND ACETAMINOPHEN 1 TABLET: 5; 325 TABLET ORAL at 02:10

## 2022-10-03 RX ADMIN — CARVEDILOL 25 MG: 12.5 TABLET, FILM COATED ORAL at 09:10

## 2022-10-03 RX ADMIN — PRENATAL VIT W/ FE FUMARATE-FA TAB 27-0.8 MG 1 TABLET: 27-0.8 TAB at 08:10

## 2022-10-03 RX ADMIN — NIFEDIPINE 60 MG: 30 TABLET, FILM COATED, EXTENDED RELEASE ORAL at 09:10

## 2022-10-03 RX ADMIN — MINOXIDIL 10 MG: 2.5 TABLET ORAL at 09:10

## 2022-10-03 RX ADMIN — METOCLOPRAMIDE 5 MG: 5 TABLET ORAL at 03:10

## 2022-10-03 RX ADMIN — NIFEDIPINE 60 MG: 30 TABLET, FILM COATED, EXTENDED RELEASE ORAL at 08:10

## 2022-10-03 RX ADMIN — DOCUSATE SODIUM 200 MG: 100 CAPSULE, LIQUID FILLED ORAL at 08:10

## 2022-10-03 RX ADMIN — DIPHENHYDRAMINE HYDROCHLORIDE 25 MG: 25 CAPSULE ORAL at 11:10

## 2022-10-03 RX ADMIN — POLYSACCHARIDE-IRON COMPLEX 150 MG: 150 CAPSULE ORAL at 08:10

## 2022-10-03 RX ADMIN — FUROSEMIDE 80 MG: 40 TABLET ORAL at 03:10

## 2022-10-03 RX ADMIN — HYDROCODONE BITARTRATE AND ACETAMINOPHEN 1 TABLET: 10; 325 TABLET ORAL at 09:10

## 2022-10-03 RX ADMIN — METOCLOPRAMIDE 10 MG: 10 TABLET ORAL at 08:10

## 2022-10-03 RX ADMIN — INSULIN DETEMIR 5 UNITS: 100 INJECTION, SOLUTION SUBCUTANEOUS at 10:10

## 2022-10-03 RX ADMIN — HYDROCODONE BITARTRATE AND ACETAMINOPHEN 1 TABLET: 10; 325 TABLET ORAL at 03:10

## 2022-10-03 RX ADMIN — HYDROCODONE BITARTRATE AND ACETAMINOPHEN 1 TABLET: 10; 325 TABLET ORAL at 10:10

## 2022-10-03 RX ADMIN — SERTRALINE HYDROCHLORIDE 50 MG: 50 TABLET ORAL at 08:10

## 2022-10-03 NOTE — LACTATION NOTE
10/02/22 1615   Maternal Assessment   Breast Shape Bilateral:;round   Breast Density Bilateral:;filling   Areola Bilateral:;elastic   Nipples Bilateral:;everted;scarring   Equipment Type   Breast Pump Type double electric, hospital grade   Breast Pump Flange Type hard   Breast Pump Flange Size 24 mm   Breast Pumping   Breast Pumping Interventions frequent pumping encouraged   Breast Pumping bilateral breasts pumped until soft;hand expression utilized   Community Referrals   Community Referrals outpatient lactation program

## 2022-10-03 NOTE — H&P
Interventional Radiology Consult/Pre-Procedure Note      Chief Complaint/Reason for Consult: renal biopsy    History of Present Illness:  Alicia Valles is a 27 y.o. female with the PMH listed below who presents for renal biopsy. The patient has no acute complaints today, does note that her abdomen is sore secondary to her c section. Nephrology has recommended a renal biopsy for KOBY on CKD with nephrotic syndrome.     Admission H&P reviewed.    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      Past Surgical History:   Procedure Laterality Date     SECTION N/A 2022    Procedure:  SECTION;  Surgeon: Quoc Pryor Jr., MD;  [facility];  Service: OB/GYN;  Laterality: N/A;    RETINAL DETACHMENT SURGERY  2018    RETINAL DETACHMENT SURGERY         Allergies:   Review of patient's allergies indicates:  No Known Allergies    Scheduled Meds:    carvediloL  25 mg Oral BID    cloNIDine 0.3 mg/24 hr td ptwk  1 patch Transdermal Q7 Days    cyanocobalamin  1,000 mcg Oral Daily    docusate sodium  200 mg Oral BID    etonogestreL  68 mg Subdermal Once    famotidine  20 mg Oral QHS    furosemide  80 mg Oral Daily    hydrALAZINE  100 mg Oral Q8H    HYDROcodone-acetaminophen  1 tablet Oral Q4H    insulin aspart U-100  3 Units Subcutaneous Once    insulin detemir U-100  5 Units Subcutaneous QHS    insulin detemir U-100  5 Units Subcutaneous Daily    iron polysaccharides  150 mg Oral Daily    metoclopramide HCl  5 mg Oral Q6H    minoxidiL  10 mg Oral BID    NIFEdipine  60 mg Oral BID    polyethylene glycol  17 g Oral Daily    prenatal vitamin  1 tablet Oral Daily    sertraline  50 mg Oral Daily     Continuous Infusions:   PRN Meds:sodium chloride,  sodium chloride, bisacodyL, cloNIDine, dextrose 10%, dextrose 10%, dextrose 10%, dextrose 10%, diphenhydrAMINE, diphenhydrAMINE, diphenhydrAMINE, diphenoxylate-atropine 2.5-0.025 mg, glucagon (human recombinant), glucose, glucose, haloperidol lactate, haloperidoL, HYDROcodone-acetaminophen, insulin aspart U-100, lanolin, magnesium hydroxide 400 mg/5 ml, nalbuphine, ondansetron, prochlorperazine, senna-docusate 8.6-50 mg, simethicone, sodium chloride 0.9%    Anticoagulation/Antiplatelet Meds:  prophylactic was held    Review of Systems:   As documented in admission H&P.    Physical Exam:  Temp: 97 °F (36.1 °C) (10/03/22 0827)  Pulse: 99 (10/03/22 1215)  Resp: 18 (10/03/22 1215)  BP: (!) 145/81 (10/03/22 1215)  SpO2: 95 % (10/03/22 1215)     General:NAD  HEENT: Normocephalic, sclera anicteric, oropharynx clear  Heart: RRR by pulse exam  Lungs: no increased work of breathing  Abd: NTND, soft  Neuro: Gross nonfocal    Labs:  Recent Labs   Lab 10/03/22  0616   INR 0.9       Recent Labs   Lab 10/03/22  0616   WBC 9.04   HGB 9.0*   HCT 26.5*   MCV 90         Recent Labs   Lab 10/03/22  0616   *      K 4.3   CL 94*   CO2 38*   BUN 41*   CREATININE 1.9*   CALCIUM 8.3*   ALT 21   AST 22   ALBUMIN 1.9*   BILITOT 0.3       Imaging:  US reviewed.    Assessment/Plan:   Ms Valles is a 26 yo female with KOBY on CKD II with nephrotic syndrome who presents for renal biopsy.     Sedation:  Sedation history: have not been any systemic reactions  ASA: 2 / Mallampati: 2  Sedation plan: Up to moderate (Versed, fentanyl)     Risks (including, but not limited to, pain, bleeding, infection, damage to nearby structures, treatment failure/recurrence, and the need for additional procedures), potential benefits, and alternatives were discussed with the patient. All questions were answered to the best of my abilities. The patient wishes to proceed. Written informed consent was obtained.      Brianna Oliver MD [facility]

## 2022-10-03 NOTE — ASSESSMENT & PLAN NOTE
POD#8 s/p pLTCS.  Nexplanon device implanted on 10/2/2022.  -Continue with Pain Control  -Follow up with OB-GYN recommendations

## 2022-10-03 NOTE — ASSESSMENT & PLAN NOTE
Oliguric/nonoliguric KOBY on CKD II with nephrotic syndrome secondary to uncontrolled DM, uncontrolled HTN, Preeclampsia with severe features - Followed with  Nephrology at [facility] [place].  Creatinine was 1.5 on admission with a peak of 2.3 on 9/28/2022 with associated hyperkalemia and NAGMA.    [facility] following and plan for IR biopsy of Kidney on Monday.   Lasix drip stopped today.  Creatinine today is 1.9.     Plan:  Follow up with Nephrology recommendations  IR consulted for biopsy today   Continue Lasix po  Continue Sodium Bicarb  Monitor I&Os  Blood pressure control  Replace electrolytes as needed  Renal Dose Medications and Avoid Nephrotoxins

## 2022-10-03 NOTE — DISCHARGE INSTRUCTIONS
Preparation and Hygiene:    Shower daily.  Wear a clean bra each day and wash daily in warm soapy water.  Change wet or moist breast pads frequently.  Moist pads can promote growth of germs.  Actively wash your hands, paying close attention to the area around and under your fingernails, thoroughly with soap and water for 15 seconds before pumping or handling your milk.  Re-wash your hands if you touch anything (scratching your nose, answering the phone, etc) while pumping or handling your milk.   Before pumping your breasts, assemble the pump collection kit and have ready the sterile container and labels.  Place these items on a clean surface next to the breastpump.  Each time after you have finished pumping, take apart all of the parts of the breastpump collection kit and place them in a separate cleaning container (do not place them in the sink).  Be sure to remove the yellow valve from the breastshield and separate the white membrane from the yellow valve.  Rinse all of these parts with cool water.  Then use a new sponge and/or bottle brush and dishwashing detergent to clean the parts.  Rinse off the soapy water with cool water and air dry on a clean towel covered with a clean cloth.  All parts may also be washed after each use in the top rack of a .  Once each day, sterilize all of the parts of the breastpump collection kit.  This can be done by boiling the kit parts for 10 minutes or by using a Quick Clean Micro-Steam Bag made by [employer]  If condensation appears in the tubing, continue to run the pump with the tubing attached for 1-2 minutes or until the tubing is dry.   Notify your babys nurse or doctor if you become ill or need to take any medication, even over-the-counter medicines.        Collection and Storage of Expressed Breastmilk:         1. Pump your breasts at least 8-10 times every 24 hours.  Double pump (both breasts at  the same time) for at least 15-20 minutes using the most  suction that is comfortable.    2. Write the date and time of pumping and the name of any medications you are takingon the babys pre-printed hospital identification label.   3. Place your babys pre-printed hospital identification label on each container of breastmilk.  Additional pre-printed labels can be obtained from your babys nurse.  If your expressed breastmilk is not correctly labeled, the nurse cannot feed the milk to the baby.       4. Place a brightly colored sticker on the top of each container of milk pumped during the first 30 days.  This identifies the milk as special and having higher levels of nutrients and anti-infective properties that are so important for your baby.  Additional stickers can be obtained from the lactation consultants or your babys nurse.  5.   Do not touch the inside of the storage containers or lids.  6.      Pour the amount of expressed milk needed for 1 of your babys feedings into each   storage container. Use a new container(s) for each pumping.  Additional storage   containers can be obtained from your babys nurse.        7.       Tightly screw the lid onto the container and place immediately into the       refrigerator fordaily transportation to the hospital.   Do not freeze your milk      unless asked to do so by your babys nurse.  However, if you are not able to      visit your baby each day, place the expressed breastmilk in the freezer.  8.   Expressed breastmilk should be refrigerated or frozen within 1 hour of      pumping.  9.      Do not store expressed breastmilk on the door of your refrigerator or freezer where the temperature is warmer.         Transportation of Expressed Breastmilk:    Refrigerated breastmilk or frozen milk should be packed tightly together in a cooler with frozen, blue gel-packs to keep the milk frozen.  DO NOT USE ICE CUBES (WET ICE) TO TRANSPORT FROZEN MILK.   A clean towel can be used to fill any extra space between containers of  frozen milk.  2.    Bring your expressed milk from home each time you visit the baby.    NICU lactation warmline:  [phone]  M[facility] lactation warmline:  [phone]

## 2022-10-03 NOTE — PLAN OF CARE
Lactation note:  To room to assist with breast pump use/care. Mom has not pumped since yesterday; breasts feel heavy and tender. We discussed her goal with pumping and mom would like to provide breast milk for her infant in NICU. Discussed importance of pumping frequently, at least 8 times in 24 hours as breasts work on supply/demand. Assisted with using breast pump on maintain program for 20 minutes and then finishing emptying breast with hand expression. Mom obtained 10 ml of breast milk and was encouraged by this amount. Reviewed cleaning/sterilizing pump parts and storage/labeling of breast milk. Breast milk brought to NICU by lactation after this visit. Mom has lactation warmline number to call with any further needs or questions.

## 2022-10-03 NOTE — ASSESSMENT & PLAN NOTE
Oliguric/nonoliguric KOBY on CKD II with nephrotic syndrome secondary to uncontrolled DM, uncontrolled HTN, Preeclampsia with severe features - Followed with  Nephrology at [facility] [place].  Creatinine was 1.5 on admission with a peak of 2.3 on 9/28/2022 with associated hyperkalemia and NAGMA.    [facility] following and plan for IR biopsy of Kidney on Monday.   Lasix drip stopped today.  Creatinine today is 1.9.     Plan:  Follow up with Nephrology recommendations  IR consulted for biopsy today   Continue Lasix po  Continue Sodium Bicarb  Monitor I&Os  Blood pressure control  Replace electrolytes as needed  Renal Dose Medications and Avoid Nephrotoxins

## 2022-10-03 NOTE — PLAN OF CARE
Received request to obtain medical records on patient from [facility] for Dr Veronica Swanson - patient has never been seen at [facility] - message sent to Dr Swanson to update

## 2022-10-03 NOTE — PROGRESS NOTES
Pharmacist Renal Dose Adjustment Note    Alicia Valles is a 27 y.o. female being treated with the medication reglan    Patient Data:    Vital Signs (Most Recent):  Temp: 97 °F (36.1 °C) (10/03/22 0827)  Pulse: 99 (10/03/22 0827)  Resp: 17 (10/03/22 0827)  BP: (!) 164/91 (10/03/22 0827)  SpO2: 96 % (10/03/22 0827)   Vital Signs (72h Range):  Temp:  [97 °F (36.1 °C)-99.4 °F (37.4 °C)]   Pulse:  []   Resp:  [16-20]   BP: (125-176)/(60-96)   SpO2:  [93 %-99 %]      Recent Labs   Lab 10/01/22  0725 10/02/22  0547 10/03/22  0616   CREATININE 2.0* 1.8* 1.9*     Serum creatinine: 1.9 mg/dL (H) 10/03/22 0616  Estimated creatinine clearance: 40.3 mL/min (A)     Medication Dose Route Frequency   Previous Order Reglan 10mg qid   New Order Reglan 5mg q6h     Renal dose adjustments performed as noted above.  We will continue monitoring and adjusting as necessary.    Pharmacist: Unique Greer, PharmD  [phone]

## 2022-10-03 NOTE — PROGRESS NOTES
Pharmacist Renal Dose Adjustment Note    Alicia Valles is a 27 y.o. female being treated with the medication famotidine    Patient Data:    Vital Signs (Most Recent):  Temp: 97 °F (36.1 °C) (10/03/22 0827)  Pulse: 99 (10/03/22 0827)  Resp: 17 (10/03/22 0827)  BP: (!) 164/91 (10/03/22 0827)  SpO2: 96 % (10/03/22 0827)   Vital Signs (72h Range):  Temp:  [97 °F (36.1 °C)-99.4 °F (37.4 °C)]   Pulse:  []   Resp:  [16-20]   BP: (125-176)/(60-96)   SpO2:  [93 %-99 %]      Recent Labs   Lab 10/01/22  0725 10/02/22  0547 10/03/22  0616   CREATININE 2.0* 1.8* 1.9*     Serum creatinine: 1.9 mg/dL (H) 10/03/22 0616  Estimated creatinine clearance: 40.3 mL/min (A)     Medication Dose Route Frequency   Previous Order Famotidine 40mg qd   New Order Famotidine 20mg qd     Renal dose adjustments performed as noted above.  We will continue monitoring and adjusting as necessary.    Pharmacist: Unique Greer, PharmD  [phone]

## 2022-10-03 NOTE — PLAN OF CARE
Problem:  Fall Injury Risk  Goal: Absence of Fall, Infant Drop and Related Injury  Outcome: Ongoing, Progressing     Problem: Adult Inpatient Plan of Care  Goal: Plan of Care Review  Outcome: Ongoing, Progressing  Goal: Patient-Specific Goal (Individualized)  Outcome: Ongoing, Progressing  Goal: Absence of Hospital-Acquired Illness or Injury  Outcome: Ongoing, Progressing  Goal: Optimal Comfort and Wellbeing  Outcome: Ongoing, Progressing  Goal: Readiness for Transition of Care  Outcome: Ongoing, Progressing     Problem: Diabetes Comorbidity  Goal: Blood Glucose Level Within Targeted Range  Outcome: Ongoing, Progressing     Problem: Hyperglycemia  Goal: Blood Glucose Level Within Targeted Range  Outcome: Ongoing, Progressing     Problem: Fluid and Electrolyte Imbalance (Acute Kidney Injury/Impairment)  Goal: Fluid and Electrolyte Balance  Outcome: Ongoing, Progressing     Problem: Oral Intake Inadequate (Acute Kidney Injury/Impairment)  Goal: Optimal Nutrition Intake  Outcome: Ongoing, Progressing     Problem: Renal Function Impairment (Acute Kidney Injury/Impairment)  Goal: Effective Renal Function  Outcome: Ongoing, Progressing     Problem: Infection  Goal: Absence of Infection Signs and Symptoms  Outcome: Ongoing, Progressing     Problem: Diabetes in Pregnancy  Goal: Blood Glucose Level Within Targeted Range  Outcome: Ongoing, Progressing     Problem: Hypertensive Disorders in Pregnancy  Goal: Maternal-Fetal Stabilization  Outcome: Ongoing, Progressing     Problem: Adjustment to Role Transition (Postpartum  Delivery)  Goal: Successful Maternal Role Transition  Outcome: Ongoing, Progressing     Problem: Bleeding (Postpartum  Delivery)  Goal: Hemostasis  Outcome: Ongoing, Progressing     Problem: Infection (Postpartum  Delivery)  Goal: Absence of Infection Signs and Symptoms  Outcome: Ongoing, Progressing     Problem: Pain (Postpartum  Delivery)  Goal: Acceptable Pain  Control  Outcome: Ongoing, Progressing     Problem: Postoperative Nausea and Vomiting (Postpartum  Delivery)  Goal: Nausea and Vomiting Relief  Outcome: Ongoing, Progressing     Problem: Postoperative Urinary Retention (Postpartum  Delivery)  Goal: Effective Urinary Elimination  Outcome: Ongoing, Progressing     Problem: Fall Injury Risk  Goal: Absence of Fall and Fall-Related Injury  Outcome: Ongoing, Progressing     Problem: Pain Acute  Goal: Acceptable Pain Control and Functional Ability  Outcome: Ongoing, Progressing     Problem: Breastfeeding  Goal: Effective Breastfeeding  Outcome: Ongoing, Progressing

## 2022-10-03 NOTE — PROGRESS NOTES
[facility] Medicine  Progress Note    Patient Name: Alicia Valles  MRN: [id number]  Patient Class: IP- Inpatient   Admission Date: 2022  Length of Stay: 11 days  Attending Physician: Calin Carrera MD  Primary Care Provider: Giovanna Hyatt MD        Subjective:     Principal Problem:S/P primary low transverse     HPI:  Patient is a 27 y.o. female presents with admission from Nephrology appt yesterday with N/V/Acc HTN and feeling poorly.  Pt is 27+W accidental pregnancy with uncontrolled type 1 dm, nephrotic syn, uncontrolled HTN.  Initial /100, Creat 1.5, K 5.8, urine PC 17 (falsely high with blood but hx of 6+).  Admitted to OB floor for mgmt of HTN.  Trends noted and now with Creat 1.7, K 6.0, Bicarb 17.  Consulted for evaluation.  Pt seen and examined with [facility] staff-team at bedside.  Of note pt has been refusing some meds/treatment modalities/etc. Extensive discussion about plan of care and consequences of refusal.  Nephrotic w/up ordered earlier but suspect due to uncontrolled Type 1 DM/Uncontrolled HTN. Pt admits to taking Excedrin and occasional NSAIDS for her gastroparesis.  Diet habits are uncontrolled      Overview/Hospital Course:  2022: admit for BP monitoring for cHTN w/ ARANZA vs cHTN exacerbation. CLD, cont monitoring. Rocephin for UTI. Continue levimir 10/8, BG Q4 while on CLD. Labs per nephrology recs pending.   2022 - HD#2. Received 5 units of aspart since admission for elevated BG. No obstetric complaints. Monitoring reassuring. Magnesium boluses continued for seizure prophylaxis and fetal neuroprotection in setting of elevated Cr (1.7). K 5.8 this AM  2022 - HD#3. BP meds changed yesterday to labetalol 200 mg BID, procardia 60 BID, and hydralazine 75 mg TID. Patient had persistently elevated blood sugars yesterday requiring multiple slides with insulin aspart. BMZ 1/2 given. Insulin drip started at 1 u/hr in anticipation of  hyperglycemia secondary to steroids. Patient had hypoglycemic episode overnight. Reported left sided pelvic pain overnight, SVE closed. No other obstetric complaints. No pre-E symptoms.   09/25/2022 - HD#4. IOL started secondary to cHTN with ARANZA with SF (Cr).   09/26/2022 - HD#5. POD#1 s/p pLTCS. Multiple episodes of hypoglycemia overnight. Admitted to ICU for cardene drip in setting of uncontrolled sustained severe range BP requiring hydral 5/10/5/10, procardia 10/20, and labetalol 20/40/80 after delivery.   09/27/2022 - HD#6. POD#2 s/p pLTCS. Continued on cardene drip for elevated BP. BP meds adjusted yesterday - labetalol discontinued, hydralazine up-titrated and co-reg added. Started on levemir 3/3 with CR of 1:20 and ISF 1:50 > 180.  09/28/2022 - HD#7. POD#3 s/p pLTCS. PECed by psych yesterday. S/p cardene drip. Lasix drip started by nephro. Will increase levemir to 4/4.   09/29/2022 - HD#8. POD#4 s/p pLTCS. Stepped down from ICU yesterday. Lasix drip continued. Meeting post-op milestones. PEC continued per psych. Will increase CR to 1:15 with breakfast and dinner.   09/30/2022 - HD#9. POD#5 s/p pLTCS. Insulin regimen adjusted yesterday. Lasix drip continued. PEC in place per psych.    10/01/2022 - HD#10, POD#6 s/p pLTCS. Patient doing well this morning. Has lot 30 pounds of fluid since delivery. She feels much more like her old safe. She desires a nexplanon before discharge. Discussed plan for admission for a few more days pending renal biopsy.      Interval History: Patient is awake and alert this morning.  Overall feeling better.  No adverse events noted overnight.  Mood stable.  No significant pain.  Awaiting Kidney Biopsy today.    Review of Systems   Constitutional:  Negative for chills, fatigue and fever.   HENT:  Negative for congestion, ear pain, rhinorrhea and sore throat.    Eyes:  Negative for photophobia, pain and visual disturbance.   Respiratory:  Negative for chest tightness and shortness of  breath.    Cardiovascular:  Positive for leg swelling (improving). Negative for chest pain.   Gastrointestinal:  Positive for abdominal pain (post-op discomfort). Negative for constipation, diarrhea, nausea and vomiting.   Endocrine: Negative for polyphagia and polyuria.   Genitourinary:  Negative for dysuria and urgency.   Musculoskeletal:  Negative for arthralgias and myalgias.   Skin:  Negative for color change and wound.   Neurological:  Negative for weakness, light-headedness and headaches.   Psychiatric/Behavioral:  Negative for agitation, behavioral problems and confusion. The patient is not nervous/anxious.    Objective:     Vital Signs (Most Recent):  Temp: 97 °F (36.1 °C) (10/03/22 0827)  Pulse: 99 (10/03/22 1430)  Resp: 18 (10/03/22 1430)  BP: 139/72 (10/03/22 1430)  SpO2: 97 % (10/03/22 1430)   Vital Signs (24h Range):  Temp:  [97 °F (36.1 °C)-99.4 °F (37.4 °C)] 97 °F (36.1 °C)  Pulse:  [] 99  Resp:  [16-20] 18  SpO2:  [92 %-100 %] 97 %  BP: (139-166)/(72-91) 139/72     Weight: 74.6 kg (164 lb 5.7 oz)  Body mass index is 31.05 kg/m².  No intake or output data in the 24 hours ending 10/03/22 1434     Physical Exam  Vitals and nursing note reviewed.   Constitutional:       General: She is not in acute distress.     Appearance: Normal appearance. She is normal weight. She is not ill-appearing.   HENT:      Head: Normocephalic and atraumatic.      Right Ear: External ear normal.      Left Ear: External ear normal.      Nose: Nose normal.      Mouth/Throat:      Pharynx: Oropharynx is clear.   Eyes:      Extraocular Movements: Extraocular movements intact.      Conjunctiva/sclera: Conjunctivae normal.   Cardiovascular:      Rate and Rhythm: Regular rhythm. Tachycardia present.      Pulses: Normal pulses.      Heart sounds: Normal heart sounds.   Pulmonary:      Effort: Pulmonary effort is normal. No respiratory distress.      Breath sounds: Normal breath sounds. No wheezing.   Abdominal:      General:  Abdomen is flat. Bowel sounds are normal. There is no distension.      Palpations: Abdomen is soft.      Tenderness: There is abdominal tenderness (post-op).   Genitourinary:     Comments: S/p  - wound closed and clean  Musculoskeletal:         General: No swelling or tenderness. Normal range of motion.      Cervical back: Normal range of motion and neck supple.      Right lower leg: Edema present.      Left lower leg: Edema present.   Skin:     General: Skin is warm and dry.   Neurological:      General: No focal deficit present.      Mental Status: She is alert and oriented to person, place, and time. Mental status is at baseline.      Motor: No weakness.   Psychiatric:         Mood and Affect: Mood normal.         Behavior: Behavior normal.       Significant Labs: All pertinent labs within the past 24 hours have been reviewed.    Significant Imaging: I have reviewed all pertinent imaging results/findings within the past 24 hours.      Assessment/Plan:      * S/P primary low transverse   POD#8 s/p pLTCS.  Nexplanon device implanted on 10/2/2022.  -Continue with Pain Control  -Follow up with OB-GYN recommendations        Acute renal failure superimposed on stage 2 chronic kidney disease with Nephrotic Range Proteinuria  Oliguric/nonoliguric KOBY on CKD II with nephrotic syndrome secondary to uncontrolled DM, uncontrolled HTN, Preeclampsia with severe features - [place] [facility]  Creatinine was 1.5 on admission with a peak of 2.3 on 2022 with associated hyperkalemia and NAGMA.    [facility] following and plan for IR biopsy of Kidney on Monday.   Lasix drip stopped today.  Creatinine today is 1.9.     Plan:  Follow up with Nephrology recommendations  IR consulted for biopsy today   Continue Lasix po  Continue Sodium Bicarb  Monitor I&Os  Blood pressure control  Replace electrolytes as needed  Renal Dose Medications and Avoid Nephrotoxins        Chronic hypertension with  superimposed pre-eclampsia  Hospital Meds:  Carvedilol 25mg bid, Clonidine 0.3mg patch q7d, Hydralazine 100mg q8, Nifedipine XL 60mg bid, Minoxidil 5mg bid  -Monitor and adjust as needed        Gastroparesis due to DM  -Continue Reglan QID with meals  -Continue Anti-emetics as needed    Anxiety  Was on PEC, but discontinued by Psychiatry 9/30/2022.  Patient calm and cooperative this morning.  -Continue Haldol q8 PRN, Ativan q12 PRN, Benadyrl PRN        Proteinuria        Anemia  Hgb 7.9 on admission and dropped to 6.8 on 9/24/2022.  S/p 2 units PRBC on 9/25/2022.  Hgb was 7.1 on 10/1/2022 and transfused another unit of PRBC.  Hgb today is 9.5.  Iron Studies in 5/2022 not suggestive of DERIK.  Haptoglobin normal with mild bump in LDH.  -Monitor CBC      Type 1 diabetes mellitus with other specified complication  A1C 6.7 in 8/2022  Home Meds:  Detemir 10 units AM and 8 units PM and Novolog sliding scale  Hospital Meds:  Detemir 5 units bid and low correction dose SSI  Glucose goal of 140-180 in the acute care setting  -Monitor and adjust as needed      VTE Risk Mitigation (From admission, onward)         Ordered     IP VTE HIGH RISK PATIENT  Once         09/25/22 1707     Place sequential compression device  Until discontinued         09/25/22 1707     Place sequential compression device  Until discontinued         09/22/22 2216                Discharge Planning   BRYAN:      Code Status: Full Code   Is the patient medically ready for discharge?:     Reason for patient still in hospital (select all that apply): Patient trending condition, Treatment and Consult recommendations  Discharge Plan A: Home with family                  Calin Carrera MD  Department of Hospital Medicine   [facility] ([facility])

## 2022-10-03 NOTE — CONSULTS
Thank you for your consult to [facility]. We have reviewed the patient chart. This patient does not meet criteria for [facility] service at this time due to Patient has a condition likely to benefit from bedside evaluation  as patient is on an IV lasix gtt.. Will not assume care of the patient at this time

## 2022-10-03 NOTE — BRIEF OP NOTE
Radiology Post-Procedure Note    Pre Op Diagnosis: adilene    Post Op Diagnosis: same    Procedure: right kidney biopsy    Procedure performed by: Radha Will MD    Written Informed Consent Obtained: Yes    Specimen Removed: YES 4    Estimated Blood Loss: Minimal    Findings:   Using ultrasound guidance a 17 g sheath needle was placed into the kidney. 18 g biopsy device used to take 4 core biopsy samples. Specimen sent to pathology.     Patient tolerated procedure well.    Radha Will MD  Interventional Radiology

## 2022-10-03 NOTE — SUBJECTIVE & OBJECTIVE
Interval History: Patient is awake and alert this morning.  Overall feeling better.  No adverse events noted overnight.  Mood stable.  No significant pain.  Awaiting Kidney Biopsy today.    Review of Systems   Constitutional:  Negative for chills, fatigue and fever.   HENT:  Negative for congestion, ear pain, rhinorrhea and sore throat.    Eyes:  Negative for photophobia, pain and visual disturbance.   Respiratory:  Negative for chest tightness and shortness of breath.    Cardiovascular:  Positive for leg swelling (improving). Negative for chest pain.   Gastrointestinal:  Positive for abdominal pain (post-op discomfort). Negative for constipation, diarrhea, nausea and vomiting.   Endocrine: Negative for polyphagia and polyuria.   Genitourinary:  Negative for dysuria and urgency.   Musculoskeletal:  Negative for arthralgias and myalgias.   Skin:  Negative for color change and wound.   Neurological:  Negative for weakness, light-headedness and headaches.   Psychiatric/Behavioral:  Negative for agitation, behavioral problems and confusion. The patient is not nervous/anxious.    Objective:     Vital Signs (Most Recent):  Temp: 97 °F (36.1 °C) (10/03/22 0827)  Pulse: 99 (10/03/22 1430)  Resp: 18 (10/03/22 1430)  BP: 139/72 (10/03/22 1430)  SpO2: 97 % (10/03/22 1430)   Vital Signs (24h Range):  Temp:  [97 °F (36.1 °C)-99.4 °F (37.4 °C)] 97 °F (36.1 °C)  Pulse:  [] 99  Resp:  [16-20] 18  SpO2:  [92 %-100 %] 97 %  BP: (139-166)/(72-91) 139/72     Weight: 74.6 kg (164 lb 5.7 oz)  Body mass index is 31.05 kg/m².  No intake or output data in the 24 hours ending 10/03/22 1434     Physical Exam  Vitals and nursing note reviewed.   Constitutional:       General: She is not in acute distress.     Appearance: Normal appearance. She is normal weight. She is not ill-appearing.   HENT:      Head: Normocephalic and atraumatic.      Right Ear: External ear normal.      Left Ear: External ear normal.      Nose: Nose normal.       Mouth/Throat:      Pharynx: Oropharynx is clear.   Eyes:      Extraocular Movements: Extraocular movements intact.      Conjunctiva/sclera: Conjunctivae normal.   Cardiovascular:      Rate and Rhythm: Regular rhythm. Tachycardia present.      Pulses: Normal pulses.      Heart sounds: Normal heart sounds.   Pulmonary:      Effort: Pulmonary effort is normal. No respiratory distress.      Breath sounds: Normal breath sounds. No wheezing.   Abdominal:      General: Abdomen is flat. Bowel sounds are normal. There is no distension.      Palpations: Abdomen is soft.      Tenderness: There is abdominal tenderness (post-op).   Genitourinary:     Comments: S/p  - wound closed and clean  Musculoskeletal:         General: No swelling or tenderness. Normal range of motion.      Cervical back: Normal range of motion and neck supple.      Right lower leg: Edema present.      Left lower leg: Edema present.   Skin:     General: Skin is warm and dry.   Neurological:      General: No focal deficit present.      Mental Status: She is alert and oriented to person, place, and time. Mental status is at baseline.      Motor: No weakness.   Psychiatric:         Mood and Affect: Mood normal.         Behavior: Behavior normal.       Significant Labs: All pertinent labs within the past 24 hours have been reviewed.    Significant Imaging: I have reviewed all pertinent imaging results/findings within the past 24 hours.

## 2022-10-03 NOTE — PROGRESS NOTES
"Progress Note  Nephrology    Consult Requested By: Calin Carrera MD  Reason for Consult: KOBY/>K/Nephrotic Syn    SUBJECTIVE:     History of Present Illness from initial consultation:  "Patient is a 27 y.o. female presents with admission from Nephrology appt yesterday with N/V/Acc HTN and feeling poorly.  Pt is 27+W accidental pregnancy with uncontrolled type 1 dm, nephrotic syn, uncontrolled HTN.  Initial /100, Creat 1.5, K 5.8, urine PC 17 (falsely high with blood but hx of 6+).  Admitted to OB floor for mgmt of HTN.  Trends noted and now with Creat 1.7, K 6.0, Bicarb 17.  Consulted for evaluation.  Pt seen and examined with [facility] staff-team at bedside.  Of note pt has been refusing some meds/treatment modalities/etc.  Extensive discussion about plan of care and consequences of refusal.  Nephrotic w/up ordered earlier but suspect due to uncontrolled Type 1 DM/Uncontrolled HTN. Pt admits to taking Excedrin and occasional NSAIDS for her gastroparesis.  Diet habits are uncontrolled."     Interval History:    Discussed with pt/mom this am.  Awaiting renal biopsy today.  Labs/VS reviewed.  Discussed with team. I/O's not recorded but pt states that "I'm peeing a lot".    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      Past Surgical History:   Procedure Laterality Date     SECTION N/A 2022    Procedure:  SECTION;  Surgeon: Quoc Pryor Jr., MD;  Location: [facility] [place], operated by [facility] L&D;  Service: OB/GYN;  Laterality: N/A;    RETINAL DETACHMENT SURGERY  2018    RETINAL DETACHMENT SURGERY       Family History   Problem Relation Age of Onset    No Known Problems Father     Cancer Paternal Grandfather         unsure    Diabetes Maternal Grandfather     " Hypertension Mother      Social History     Tobacco Use    Smoking status: Never    Smokeless tobacco: Never   Substance Use Topics    Alcohol use: Not Currently    Drug use: Never       Review of patient's allergies indicates:  No Known Allergies     Review of Systems:  Constitutional: No fever or chills  Respiratory: No cough or shortness of breath  Cardiovascular: No chest pain or palpitations  Gastrointestinal: No nausea or vomiting  Neurological: No confusion or weakness    OBJECTIVE:     Vital Signs (Most Recent)  Temp: 99.4 °F (37.4 °C) (10/02/22 1527)  Pulse: 100 (10/02/22 1527)  Resp: 18 (10/02/22 1542)  BP: (!) 162/87 (10/02/22 1527)  SpO2: 96 % (10/02/22 1527)    Vital Signs Range (Last 24H):  Temp:  [97.8 °F (36.6 °C)-99.4 °F (37.4 °C)]   Pulse:  []   Resp:  [16-18]   BP: (139-176)/(80-96)   SpO2:  [94 %-98 %]       Intake/Output Summary (Last 24 hours) at 10/2/2022 1700  Last data filed at 10/2/2022 0758  Gross per 24 hour   Intake 1298.54 ml   Output 2150 ml   Net -851.46 ml       Physical Exam:  NAD  Anasarca-slowly improving  RRR  Diminished lungs.  +distention, hypo BS  Edema +    Laboratory:  Recent Labs   Lab 10/02/22  0547   WBC 9.82   RBC 3.11*   HGB 9.5*   HCT 27.7*      MCV 89   MCH 30.5   MCHC 34.3     BMP:   Recent Labs   Lab 09/26/22  1202 09/26/22  1952 10/02/22  0547   *   < > 121*   *   < > 138   K 5.5*   < > 3.8      < > 99   CO2 16*   < > 33*   BUN 34*   < > 42*   CREATININE 2.0*   < > 1.8*   CALCIUM 7.4*   < > 8.2*   MG 4.9*  --   --     < > = values in this interval not displayed.     Lab Results   Component Value Date    CALCIUM 8.2 (L) 10/02/2022    PHOS 5.0 (H) 09/24/2022     BNP  No results for input(s): BNP, BNPTRIAGEBLO in the last 168 hours.No results found for: URICACID  Lab Results   Component Value Date    IRON 84 05/06/2022    TIBC 266 05/06/2022    FERRITIN 79 05/06/2022     Lab Results   Component Value Date    CALCIUM 8.2 (L) 10/02/2022     PHOS 5.0 (H) 09/24/2022       Diagnostic Results:  No orders to display       ASSESSMENT/PLAN:     Oliguric/nonoliguric KOBY on CKD II with nephrotic syndrome secondary to uncontrolled DM, uncontrolled HTN, Preeclampsia with severe features.  -Followed with  nephrology at [facility]. Cheek/Liliane   -Creat trends noted. 1.5 -1.9 - 2.0 - 2.3 - 2.1 - 2.0  -Alb 1.6> 1.8  -Repeat UPCR 9 grams - baseline historically 6 grams  -avoid NSAIDS.    -no need for RRT yet  -increase Heparin to Q8 and ok for lovenox.    -Low Hapto initially but now normal, mildly elevated LDH , anemia, Transient decrease but not frankly low plts now rsing serially, KOBY.  ADAMTS  76%  -now with worsening anasarca and volume overload started lasix> drip    -de-escalate lasix drip to 12mg/hr with metolazone due to CO2 33  -YARON mildy + but neg DsDNA.     -trending with Creat   -Have consulted IR for kidney biopsy   -ordered coags   -Discussed need for post biopsy precautions and will need to make sure she is compliant after procedure.  -Discussed that if all is stable that potentially she she can go home Tuesday and will need very close FU with Nephrology perhaps closer to where she lives in [facility].  -change lasix drip to oral  -Renally dose meds, avoid nephrotoxins, and monitor I/O's closely.    Hyperkalemia with NAGMA, hyponatremia  -s/ p Lokelma TID and sodium bicarb to shift and correct acidosis  -stable.   -low k diet.  -some are hemolyzed.   -DC bicarb tabs with alkalosis.     Uncontrolled HTN  -max procardia 60 BID  -increased hydralazine to 100 TID  -changed to coreg for antiprotenuric effects.   -added catapres patch and prn tabs.  -lasix/metolazone to help with volume removal which should help BP also  -weaned off cardene  -added minoxidil with improvement, but again elevated thus will increase Minoxidil 10 mg bid.  -suspect lots of anxiety also causing BP spikes.  -If she chooses not to breastfeed, then would change regimen.    Uncontrolled  Type 1 DM  -formerly on insulin pump until she had a hypoglycemic seizure and then transitioned to MDI  -she was not wearing her sensor was cause of this but highly recommend retry with closed loop system.  -defer to Endo     Pregnancy, Extremely high risk  -per MFM/OB  -s/p emergent .  -OB feels doing well from their standpoint and wishes was transition to medicine service.    Leukocystosis without fever or S/S infection  -Due to steroid therapy  -resolved    Anemia of pregnancy/CKD  -B12 and folate both on low side now on supplementation  -on iron  -Transfused 1 unit 10/1/22 again due to pending pre-procedure tomorrow    Possible delirium superimposed on cognitive disorder  Possible psychosis  Unspecified anxiety  History of depression  -PEC'd  but now rescinded.  -Greatly appreciate help of Psych.  -Pt doing much better and actually quite pleasant now  -asking to go home, but willing to stay for ongoing care.      See above

## 2022-10-03 NOTE — PLAN OF CARE
Kidney biopsy completed today - probable discharge home tomorrow        10/03/22 1744   Discharge Reassessment   Assessment Type Discharge Planning Reassessment   Did the patient's condition or plan change since previous assessment? No   Discharge Plan discussed with: Patient   Communicated BRYAN with patient/caregiver Yes   Discharge Plan A Home with family   DME Needed Upon Discharge  none   Why the patient remains in the hospital Requires continued medical care

## 2022-10-04 ENCOUNTER — TELEPHONE (OUTPATIENT)
Dept: ENDOCRINOLOGY | Facility: CLINIC | Age: 27
End: 2022-10-04
Payer: MEDICAID

## 2022-10-04 LAB
ANION GAP SERPL CALC-SCNC: 9 MMOL/L (ref 8–16)
BASOPHILS # BLD AUTO: 0.03 K/UL (ref 0–0.2)
BASOPHILS NFR BLD: 0.3 % (ref 0–1.9)
BUN SERPL-MCNC: 44 MG/DL (ref 6–20)
CALCIUM SERPL-MCNC: 7.9 MG/DL (ref 8.7–10.5)
CHLORIDE SERPL-SCNC: 96 MMOL/L (ref 95–110)
CO2 SERPL-SCNC: 32 MMOL/L (ref 23–29)
CREAT SERPL-MCNC: 2.1 MG/DL (ref 0.5–1.4)
DIFFERENTIAL METHOD: ABNORMAL
EOSINOPHIL # BLD AUTO: 0.1 K/UL (ref 0–0.5)
EOSINOPHIL NFR BLD: 0.9 % (ref 0–8)
ERYTHROCYTE [DISTWIDTH] IN BLOOD BY AUTOMATED COUNT: 13.5 % (ref 11.5–14.5)
EST. GFR  (NO RACE VARIABLE): 32 ML/MIN/1.73 M^2
GLUCOSE SERPL-MCNC: 97 MG/DL (ref 70–110)
HCT VFR BLD AUTO: 23.1 % (ref 37–48.5)
HGB BLD-MCNC: 7.9 G/DL (ref 12–16)
IMM GRANULOCYTES # BLD AUTO: 0.25 K/UL (ref 0–0.04)
IMM GRANULOCYTES NFR BLD AUTO: 2.5 % (ref 0–0.5)
LYMPHOCYTES # BLD AUTO: 1.6 K/UL (ref 1–4.8)
LYMPHOCYTES NFR BLD: 15.8 % (ref 18–48)
MAGNESIUM SERPL-MCNC: 1.8 MG/DL (ref 1.6–2.6)
MCH RBC QN AUTO: 30.7 PG (ref 27–31)
MCHC RBC AUTO-ENTMCNC: 34.2 G/DL (ref 32–36)
MCV RBC AUTO: 90 FL (ref 82–98)
MONOCYTES # BLD AUTO: 0.9 K/UL (ref 0.3–1)
MONOCYTES NFR BLD: 9.3 % (ref 4–15)
NEUTROPHILS # BLD AUTO: 7.1 K/UL (ref 1.8–7.7)
NEUTROPHILS NFR BLD: 71.2 % (ref 38–73)
NRBC BLD-RTO: 0 /100 WBC
PHOSPHATE SERPL-MCNC: 5.1 MG/DL (ref 2.7–4.5)
PLATELET # BLD AUTO: 263 K/UL (ref 150–450)
PMV BLD AUTO: 10.1 FL (ref 9.2–12.9)
POCT GLUCOSE: 118 MG/DL (ref 70–110)
POCT GLUCOSE: 124 MG/DL (ref 70–110)
POCT GLUCOSE: 160 MG/DL (ref 70–110)
POCT GLUCOSE: 182 MG/DL (ref 70–110)
POTASSIUM SERPL-SCNC: 4.1 MMOL/L (ref 3.5–5.1)
RBC # BLD AUTO: 2.57 M/UL (ref 4–5.4)
SODIUM SERPL-SCNC: 137 MMOL/L (ref 136–145)
WBC # BLD AUTO: 9.91 K/UL (ref 3.9–12.7)

## 2022-10-04 PROCEDURE — 83735 ASSAY OF MAGNESIUM: CPT | Performed by: HOSPITALIST

## 2022-10-04 PROCEDURE — 25000003 PHARM REV CODE 250: Performed by: STUDENT IN AN ORGANIZED HEALTH CARE EDUCATION/TRAINING PROGRAM

## 2022-10-04 PROCEDURE — 36415 COLL VENOUS BLD VENIPUNCTURE: CPT | Performed by: HOSPITALIST

## 2022-10-04 PROCEDURE — 84100 ASSAY OF PHOSPHORUS: CPT | Performed by: HOSPITALIST

## 2022-10-04 PROCEDURE — 85025 COMPLETE CBC W/AUTO DIFF WBC: CPT | Performed by: HOSPITALIST

## 2022-10-04 PROCEDURE — 25000003 PHARM REV CODE 250: Performed by: INTERNAL MEDICINE

## 2022-10-04 PROCEDURE — 94761 N-INVAS EAR/PLS OXIMETRY MLT: CPT

## 2022-10-04 PROCEDURE — 25000003 PHARM REV CODE 250

## 2022-10-04 PROCEDURE — 63600175 PHARM REV CODE 636 W HCPCS: Performed by: INTERNAL MEDICINE

## 2022-10-04 PROCEDURE — 80048 BASIC METABOLIC PNL TOTAL CA: CPT | Performed by: HOSPITALIST

## 2022-10-04 PROCEDURE — 25000003 PHARM REV CODE 250: Performed by: PSYCHIATRY & NEUROLOGY

## 2022-10-04 PROCEDURE — 11000001 HC ACUTE MED/SURG PRIVATE ROOM

## 2022-10-04 PROCEDURE — 25000003 PHARM REV CODE 250: Performed by: NURSE PRACTITIONER

## 2022-10-04 RX ORDER — MINOXIDIL 2.5 MG/1
10 TABLET ORAL DAILY
Status: DISCONTINUED | OUTPATIENT
Start: 2022-10-05 | End: 2022-10-05

## 2022-10-04 RX ORDER — FUROSEMIDE 40 MG/1
40 TABLET ORAL DAILY
Status: DISCONTINUED | OUTPATIENT
Start: 2022-10-05 | End: 2022-10-05 | Stop reason: HOSPADM

## 2022-10-04 RX ORDER — INSULIN ASPART 100 [IU]/ML
2 INJECTION, SOLUTION INTRAVENOUS; SUBCUTANEOUS
Status: DISCONTINUED | OUTPATIENT
Start: 2022-10-04 | End: 2022-10-05 | Stop reason: HOSPADM

## 2022-10-04 RX ORDER — CLONIDINE 0.2 MG/24H
1 PATCH, EXTENDED RELEASE TRANSDERMAL
Status: DISCONTINUED | OUTPATIENT
Start: 2022-10-05 | End: 2022-10-05 | Stop reason: HOSPADM

## 2022-10-04 RX ORDER — OXYCODONE AND ACETAMINOPHEN 5; 325 MG/1; MG/1
1 TABLET ORAL EVERY 4 HOURS PRN
Status: DISCONTINUED | OUTPATIENT
Start: 2022-10-04 | End: 2022-10-05 | Stop reason: HOSPADM

## 2022-10-04 RX ADMIN — METOCLOPRAMIDE 5 MG: 5 TABLET ORAL at 11:10

## 2022-10-04 RX ADMIN — OXYCODONE HYDROCHLORIDE AND ACETAMINOPHEN 1 TABLET: 5; 325 TABLET ORAL at 01:10

## 2022-10-04 RX ADMIN — DOCUSATE SODIUM 200 MG: 100 CAPSULE, LIQUID FILLED ORAL at 08:10

## 2022-10-04 RX ADMIN — FAMOTIDINE 20 MG: 20 TABLET ORAL at 10:10

## 2022-10-04 RX ADMIN — FUROSEMIDE 80 MG: 40 TABLET ORAL at 10:10

## 2022-10-04 RX ADMIN — INSULIN ASPART 2 UNITS: 100 INJECTION, SOLUTION INTRAVENOUS; SUBCUTANEOUS at 06:10

## 2022-10-04 RX ADMIN — MINOXIDIL 10 MG: 2.5 TABLET ORAL at 08:10

## 2022-10-04 RX ADMIN — HYDRALAZINE HYDROCHLORIDE 100 MG: 25 TABLET, FILM COATED ORAL at 05:10

## 2022-10-04 RX ADMIN — PRENATAL VIT W/ FE FUMARATE-FA TAB 27-0.8 MG 1 TABLET: 27-0.8 TAB at 08:10

## 2022-10-04 RX ADMIN — SERTRALINE HYDROCHLORIDE 50 MG: 50 TABLET ORAL at 08:10

## 2022-10-04 RX ADMIN — OXYCODONE HYDROCHLORIDE AND ACETAMINOPHEN 1 TABLET: 5; 325 TABLET ORAL at 10:10

## 2022-10-04 RX ADMIN — NIFEDIPINE 60 MG: 30 TABLET, FILM COATED, EXTENDED RELEASE ORAL at 08:10

## 2022-10-04 RX ADMIN — HYDROCODONE BITARTRATE AND ACETAMINOPHEN 1 TABLET: 10; 325 TABLET ORAL at 05:10

## 2022-10-04 RX ADMIN — CYANOCOBALAMIN TAB 1000 MCG 1000 MCG: 1000 TAB at 08:10

## 2022-10-04 RX ADMIN — NIFEDIPINE 60 MG: 30 TABLET, FILM COATED, EXTENDED RELEASE ORAL at 10:10

## 2022-10-04 RX ADMIN — METOCLOPRAMIDE 5 MG: 5 TABLET ORAL at 05:10

## 2022-10-04 RX ADMIN — CLONIDINE 1 PATCH: 0.3 PATCH TRANSDERMAL at 08:10

## 2022-10-04 RX ADMIN — CARVEDILOL 25 MG: 12.5 TABLET, FILM COATED ORAL at 10:10

## 2022-10-04 RX ADMIN — INSULIN DETEMIR 5 UNITS: 100 INJECTION, SOLUTION SUBCUTANEOUS at 08:10

## 2022-10-04 RX ADMIN — METOCLOPRAMIDE 5 MG: 5 TABLET ORAL at 01:10

## 2022-10-04 RX ADMIN — POLYSACCHARIDE-IRON COMPLEX 150 MG: 150 CAPSULE ORAL at 08:10

## 2022-10-04 RX ADMIN — HYDROCODONE BITARTRATE AND ACETAMINOPHEN 1 TABLET: 10; 325 TABLET ORAL at 09:10

## 2022-10-04 RX ADMIN — DOCUSATE SODIUM 200 MG: 100 CAPSULE, LIQUID FILLED ORAL at 10:10

## 2022-10-04 RX ADMIN — HYDRALAZINE HYDROCHLORIDE 100 MG: 25 TABLET, FILM COATED ORAL at 11:10

## 2022-10-04 RX ADMIN — HYDRALAZINE HYDROCHLORIDE 100 MG: 25 TABLET, FILM COATED ORAL at 01:10

## 2022-10-04 RX ADMIN — INSULIN DETEMIR 5 UNITS: 100 INJECTION, SOLUTION SUBCUTANEOUS at 11:10

## 2022-10-04 RX ADMIN — CARVEDILOL 25 MG: 12.5 TABLET, FILM COATED ORAL at 08:10

## 2022-10-04 NOTE — PLAN OF CARE
Problem: Diabetes Comorbidity  Goal: Blood Glucose Level Within Targeted Range  Intervention: Monitor and Manage Glycemia  Flowsheets (Taken 10/4/2022 1537)  Glycemic Management:   blood glucose monitored   supplemental insulin given     Problem: Hyperglycemia  Goal: Blood Glucose Level Within Targeted Range  Intervention: Optimize Glycemic Control  Flowsheets (Taken 10/4/2022 1537)  Glycemic Management:   blood glucose monitored   supplemental insulin given     Problem: Fall Injury Risk  Goal: Absence of Fall and Fall-Related Injury  Intervention: Identify and Manage Contributors  Flowsheets (Taken 10/4/2022 1537)  Self-Care Promotion: independence encouraged  Medication Review/Management:   medications reviewed   high-risk medications identified  Intervention: Promote Injury-Free Environment  Flowsheets (Taken 10/4/2022 1537)  Safety Promotion/Fall Prevention:   assistive device/personal item within reach   lighting adjusted   medications reviewed   nonskid shoes/socks when out of bed   side rails raised x 2

## 2022-10-04 NOTE — TELEPHONE ENCOUNTER
----- Message from Sylvia Alcala sent at 10/4/2022  2:13 PM CDT -----  Regarding: Appt  Contact: pt @ [phone]  Pt is hospitalized, need a follow up appt asap. Asking for a call back

## 2022-10-04 NOTE — ASSESSMENT & PLAN NOTE
A1C 6.7 in 8/2022  Home Meds:  Detemir 10 units AM and 8 units PM and Novolog sliding scale  Hospital Meds:  Detemir 5 units bid and low correction dose SSI. Will add 2U premeal per patient/fam request with precautions about hypoglycemia with decreased renal function  Glucose goal of 140-180 in the acute care setting  -Monitor and adjust as needed    Best option for her would be to go back on her pump but she is hesitant due to hypoglycemic episodes, uncertain if user error or pump malfunction.

## 2022-10-04 NOTE — PROGRESS NOTES
"Progress Note  Nephrology    Consult Requested By: Calin Carrera MD  Reason for Consult: KOBY/>K/Nephrotic Syn    SUBJECTIVE:     History of Present Illness from initial consultation:  "Patient is a 27 y.o. female presents with admission from Nephrology appt yesterday with N/V/Acc HTN and feeling poorly.  Pt is 27+W accidental pregnancy with uncontrolled type 1 dm, nephrotic syn, uncontrolled HTN.  Initial /100, Creat 1.5, K 5.8, urine PC 17 (falsely high with blood but hx of 6+).  Admitted to OB floor for mgmt of HTN.  Trends noted and now with Creat 1.7, K 6.0, Bicarb 17.  Consulted for evaluation.  Pt seen and examined with [facility] staff-team at bedside.  Of note pt has been refusing some meds/treatment modalities/etc.  Extensive discussion about plan of care and consequences of refusal.  Nephrotic w/up ordered earlier but suspect due to uncontrolled Type 1 DM/Uncontrolled HTN. Pt admits to taking Excedrin and occasional NSAIDS for her gastroparesis.  Diet habits are uncontrolled."     Interval History:    Discussed with pt/mom this am.  Labs noted.  S/p biopsy yesterday and hopefully prelim later today.  Feels better overall but concerned about insulin regimen.  No CP/SOB.      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      Past Surgical History:   Procedure Laterality Date     SECTION N/A 2022    Procedure:  SECTION;  Surgeon: Quoc Pryor Jr., MD;  Location: [facility] L&D;  Service: OB/GYN;  Laterality: N/A;    RETINAL DETACHMENT SURGERY  2018    RETINAL DETACHMENT SURGERY       Family History   Problem Relation Age of Onset    No Known Problems Father     Cancer Paternal Grandfather         unsure    Diabetes Maternal " Grandfather     Hypertension Mother      Social History     Tobacco Use    Smoking status: Never    Smokeless tobacco: Never   Substance Use Topics    Alcohol use: Not Currently    Drug use: Never       Review of patient's allergies indicates:  No Known Allergies     Review of Systems:  Constitutional: No fever or chills  Respiratory: No cough or shortness of breath  Cardiovascular: No chest pain or palpitations  Gastrointestinal: No nausea or vomiting  Neurological: No confusion or weakness    OBJECTIVE:     Vital Signs (Most Recent)  Temp: 99.4 °F (37.4 °C) (10/02/22 1527)  Pulse: 100 (10/02/22 1527)  Resp: 18 (10/02/22 1542)  BP: (!) 162/87 (10/02/22 1527)  SpO2: 96 % (10/02/22 1527)    Vital Signs Range (Last 24H):  Temp:  [97.8 °F (36.6 °C)-99.4 °F (37.4 °C)]   Pulse:  []   Resp:  [16-18]   BP: (139-176)/(80-96)   SpO2:  [94 %-98 %]       Intake/Output Summary (Last 24 hours) at 10/2/2022 1700  Last data filed at 10/2/2022 0758  Gross per 24 hour   Intake 1298.54 ml   Output 2150 ml   Net -851.46 ml       Physical Exam:  NAD  Anasarca-slowly improving  RRR  Diminished lungs.  + BS  Edema + and improving daily    Laboratory:  Recent Labs   Lab 10/02/22  0547   WBC 9.82   RBC 3.11*   HGB 9.5*   HCT 27.7*      MCV 89   MCH 30.5   MCHC 34.3     BMP:   Recent Labs   Lab 09/26/22  1202 09/26/22  1952 10/02/22  0547   *   < > 121*   *   < > 138   K 5.5*   < > 3.8      < > 99   CO2 16*   < > 33*   BUN 34*   < > 42*   CREATININE 2.0*   < > 1.8*   CALCIUM 7.4*   < > 8.2*   MG 4.9*  --   --     < > = values in this interval not displayed.     Lab Results   Component Value Date    CALCIUM 8.2 (L) 10/02/2022    PHOS 5.0 (H) 09/24/2022     BNP  No results for input(s): BNP, BNPTRIAGEBLO in the last 168 hours.No results found for: URICACID  Lab Results   Component Value Date    IRON 84 05/06/2022    TIBC 266 05/06/2022    FERRITIN 79 05/06/2022     Lab Results   Component Value Date    CALCIUM  8.2 (L) 10/02/2022    PHOS 5.0 (H) 09/24/2022       Diagnostic Results:  No orders to display       ASSESSMENT/PLAN:     Oliguric/nonoliguric KOBY on CKD II with nephrotic syndrome secondary to uncontrolled DM, uncontrolled HTN, Preeclampsia with severe features.  -[place] [facility] [place]   -Creat trends noted. 1.5 -1.9 - 2.0 - 2.3 - 2.1 - 2.0  -Alb 1.6> 1.8  -Repeat UPCR 9 grams - baseline historically 6 grams  -avoid NSAIDS.    -no need for RRT yet  -increase Heparin to Q8 and ok for lovenox.    -Low Hapto initially but now normal, mildly elevated LDH , anemia, Transient decrease but not frankly low plts now rsing serially, KOBY.  ADAMTS  76%  -now with worsening anasarca and volume overload started lasix> drip    -de-escalate lasix drip to 12mg/hr with metolazone due to CO2 33  -YARON mildy + but neg DsDNA.     -trending with Creat   -consulted IR for kidney biopsy 10/3 and hopefully prelim back later today  -Discussed that if all is stable that potentially she she can go home tomorrow and will need very close FU with Nephrology perhaps closer to where she lives in [place].  -change lasix drip to oral and decrease with mild rise in creat  -Renally dose meds, avoid nephrotoxins, and monitor I/O's closely.    Hyperkalemia with NAGMA, hyponatremia  -s/ p Lokelma TID and sodium bicarb to shift and correct acidosis  -stable.   -low k diet.  -some are hemolyzed.   -DC bicarb tabs with alkalosis.     Uncontrolled HTN  -max procardia 60 BID  -increased hydralazine to 100 TID  -changed to coreg for antiprotenuric effects.   -added catapres patch and prn tabs.  -lasix/metolazone to help with volume removal which should help BP also  -weaned off cardene  -added minoxidil with improvement, but again elevated thus will increased Minoxidil 10 mg bid.  -suspect lots of anxiety also causing BP spikes.  -If she chooses not to breastfeed, then would change regimen.  -much improved and will start to decrease  regimen.   Start decreasing minoxidil    Uncontrolled Type 1 DM  -formerly on insulin pump until she had a hypoglycemic seizure and then transitioned to MDI  -she was not wearing her sensor was cause of this but highly recommend retry with closed loop system.  -defer to Endo   -labile trends and needs coverage for meals.     Pregnancy, Extremely high risk  -per MFM/OB  -s/p emergent .    Anemia of pregnancy/CKD  -B12 and folate both on low side now on supplementation  -on iron  -Transfused 1 unit 10/1/22     Possible delirium superimposed on cognitive disorder  Possible psychosis  Unspecified anxiety  History of depression  -PEC'd  but now rescinded.  -Greatly appreciate help of Psych.  -Pt doing much better and actually quite pleasant now  -asking to go home, but willing to stay for ongoing care.      See above  Home tomorrow.  [place]rolog[place] [facility] [place].

## 2022-10-04 NOTE — PROGRESS NOTES
[facility] Surg [facility] Medicine  Progress Note    Patient Name: Alicia Valles  MRN: [id number]  Patient Class: IP- Inpatient   Admission Date: 2022  Length of Stay: 12 days  Attending Physician: Jluis Martinez MD  Primary Care Provider: Giovanna Hyatt MD        Subjective:     Principal Problem:S/P primary low transverse         HPI:  Patient is a 27 y.o. female presents with admission from Nephrology appt yesterday with N/V/Acc HTN and feeling poorly.  Pt is 27+W accidental pregnancy with uncontrolled type 1 dm, nephrotic syn, uncontrolled HTN.  Initial /100, Creat 1.5, K 5.8, urine PC 17 (falsely high with blood but hx of 6+).  Admitted to OB floor for mgmt of HTN.  Trends noted and now with Creat 1.7, K 6.0, Bicarb 17.  Consulted for evaluation.  Pt seen and examined with [facility] staff-team at bedside.  Of note pt has been refusing some meds/treatment modalities/etc. Extensive discussion about plan of care and consequences of refusal.  Nephrotic w/up ordered earlier but suspect due to uncontrolled Type 1 DM/Uncontrolled HTN. Pt admits to taking Excedrin and occasional NSAIDS for her gastroparesis.  Diet habits are uncontrolled      Overview/Hospital Course:  Per OB/GYN  2022: admit for BP monitoring for cHTN w/ ARANZA vs cHTN exacerbation. CLD, cont monitoring. Rocephin for UTI. Continue levimir 10/8, BG Q4 while on CLD. Labs per nephrology recs pending.   2022 - HD#2. Received 5 units of aspart since admission for elevated BG. No obstetric complaints. Monitoring reassuring. Magnesium boluses continued for seizure prophylaxis and fetal neuroprotection in setting of elevated Cr (1.7). K 5.8 this AM  2022 - HD#3. BP meds changed yesterday to labetalol 200 mg BID, procardia 60 BID, and hydralazine 75 mg TID. Patient had persistently elevated blood sugars yesterday requiring multiple slides with insulin aspart. BMZ 1/2 given. Insulin drip started at 1 u/hr in  anticipation of hyperglycemia secondary to steroids. Patient had hypoglycemic episode overnight. Reported left sided pelvic pain overnight, SVE closed. No other obstetric complaints. No pre-E symptoms.   09/25/2022 - HD#4. IOL started secondary to cHTN with ARANZA with SF (Cr).   09/26/2022 - HD#5. POD#1 s/p pLTCS. Multiple episodes of hypoglycemia overnight. Admitted to ICU for cardene drip in setting of uncontrolled sustained severe range BP requiring hydral 5/10/5/10, procardia 10/20, and labetalol 20/40/80 after delivery.   09/27/2022 - HD#6. POD#2 s/p pLTCS. Continued on cardene drip for elevated BP. BP meds adjusted yesterday - labetalol discontinued, hydralazine up-titrated and co-reg added. Started on levemir 3/3 with CR of 1:20 and ISF 1:50 > 180.  09/28/2022 - HD#7. POD#3 s/p pLTCS. PECed by psych yesterday. S/p cardene drip. Lasix drip started by nephro. Will increase levemir to 4/4.   09/29/2022 - HD#8. POD#4 s/p pLTCS. Stepped down from ICU yesterday. Lasix drip continued. Meeting post-op milestones. PEC continued per psych. Will increase CR to 1:15 with breakfast and dinner.   09/30/2022 - HD#9. POD#5 s/p pLTCS. Insulin regimen adjusted yesterday. Lasix drip continued. PEC in place per psych.    10/01/2022 - HD#10, POD#6 s/p pLTCS. Patient doing well this morning. Has lot 30 pounds of fluid since delivery. She feels much more like her old safe. She desires a nexplanon before discharge. Discussed plan for admission for a few more days pending renal biopsy.      Interval History: right sided flank/back pain and some abdominal pain related to biopsy site. Concerned about her sugars and not receiving premeal insulin.     Review of Systems   Constitutional:  Negative for chills, fatigue and fever.   HENT:  Negative for congestion, ear pain, rhinorrhea and sore throat.    Eyes:  Negative for photophobia, pain and visual disturbance.   Respiratory:  Negative for chest tightness and shortness of breath.     Cardiovascular:  Negative for chest pain.   Gastrointestinal:  Positive for abdominal pain (post-op discomfort). Negative for constipation, diarrhea, nausea and vomiting.   Endocrine: Negative for polyphagia and polyuria.   Genitourinary:  Negative for dysuria and urgency.   Musculoskeletal:  Negative for arthralgias and myalgias.   Skin:  Negative for color change and wound.   Neurological:  Negative for weakness, light-headedness and headaches.   Psychiatric/Behavioral:  Negative for agitation, behavioral problems and confusion. The patient is not nervous/anxious.    Objective:     Vital Signs (Most Recent):  Temp: 98.5 °F (36.9 °C) (10/04/22 0714)  Pulse: 106 (10/04/22 0714)  Resp: 18 (10/04/22 0939)  BP: (!) 124/59 (10/04/22 0714)  SpO2: (!) 94 % (10/04/22 0745)   Vital Signs (24h Range):  Temp:  [97.9 °F (36.6 °C)-99 °F (37.2 °C)] 98.5 °F (36.9 °C)  Pulse:  [] 106  Resp:  [14-20] 18  SpO2:  [92 %-100 %] 94 %  BP: (118-160)/(59-87) 124/59     Weight: 74.6 kg (164 lb 5.7 oz)  Body mass index is 31.05 kg/m².    Intake/Output Summary (Last 24 hours) at 10/4/2022 1240  Last data filed at 10/4/2022 0944  Gross per 24 hour   Intake --   Output 1700 ml   Net -1700 ml      Physical Exam  Vitals and nursing note reviewed.   Constitutional:       General: She is not in acute distress.     Appearance: Normal appearance. She is normal weight. She is not ill-appearing.   HENT:      Head: Normocephalic and atraumatic.      Right Ear: External ear normal.      Left Ear: External ear normal.      Nose: Nose normal.      Mouth/Throat:      Pharynx: Oropharynx is clear.   Eyes:      Extraocular Movements: Extraocular movements intact.      Conjunctiva/sclera: Conjunctivae normal.   Cardiovascular:      Rate and Rhythm: Regular rhythm. Tachycardia present.      Pulses: Normal pulses.      Heart sounds: Normal heart sounds.   Pulmonary:      Effort: Pulmonary effort is normal. No respiratory distress.      Breath sounds:  Normal breath sounds. No wheezing.   Abdominal:      General: Abdomen is flat. Bowel sounds are normal. There is no distension.      Palpations: Abdomen is soft.      Tenderness: There is abdominal tenderness (post-op).   Genitourinary:     Comments: S/p  - wound closed and clean  Musculoskeletal:         General: No swelling or tenderness. Normal range of motion.      Cervical back: Normal range of motion and neck supple.      Right lower leg: Edema present.      Left lower leg: Edema present.   Skin:     General: Skin is warm and dry.   Neurological:      General: No focal deficit present.      Mental Status: She is alert and oriented to person, place, and time. Mental status is at baseline.      Motor: No weakness.   Psychiatric:         Mood and Affect: Mood normal.         Behavior: Behavior normal.       Significant Labs: All pertinent labs within the past 24 hours have been reviewed.    Significant Imaging: I have reviewed all pertinent imaging results/findings within the past 24 hours.      Assessment/Plan:      * S/P primary low transverse   POD#8 s/p pLTCS.  Nexplanon device implanted on 10/2/2022.  -Continue with Pain Control  -Follow up with OB-GYN recommendations        Chronic hypertension with superimposed pre-eclampsia  Hospital Meds:  Carvedilol 25mg bid, Clonidine 0.3mg patch q7d, Hydralazine 100mg q8, Nifedipine XL 60mg bid, Minoxidil 5mg bid  -Monitor and adjust as needed        Acute renal failure superimposed on stage 2 chronic kidney disease with Nephrotic Range Proteinuria  Oliguric/nonoliguric KOBY on CKD II with nephrotic syndrome secondary to uncontrolled DM, uncontrolled HTN, Preeclampsia with severe features - [place] [facility] [place]  Creatinine was 1.5 on admission with a peak of 2.3 on 2022 with associated hyperkalemia and NAGMA.    [facility] following and plan for IR biopsy of Kidney on Monday.   Lasix drip stopped today.  Creatinine today is  1.9.     Plan:  Follow up with Nephrology recommendations  IR consulted for biopsy today   Continue Lasix po  Continue Sodium Bicarb  Monitor I&Os  Blood pressure control  Replace electrolytes as needed  Renal Dose Medications and Avoid Nephrotoxins        Gastroparesis due to DM  -Continue Reglan QID with meals  -Continue Anti-emetics as needed    Anxiety  Was on PEC, but discontinued by Psychiatry 9/30/2022.  Patient calm and cooperative this morning.  -Continue Haldol q8 PRN, Ativan q12 PRN, Benadyrl PRN        Proteinuria        Anemia  Hgb 7.9 on admission and dropped to 6.8 on 9/24/2022.  S/p 2 units PRBC on 9/25/2022.  Hgb was 7.1 on 10/1/2022 and transfused another unit of PRBC.  Hgb today is 9.5.  Iron Studies in 5/2022 not suggestive of DERIK.  Haptoglobin normal with mild bump in LDH.  -Monitor CBC      Type 1 diabetes mellitus with other specified complication  A1C 6.7 in 8/2022  Home Meds:  Detemir 10 units AM and 8 units PM and Novolog sliding scale  Hospital Meds:  Detemir 5 units bid and low correction dose SSI. Will add 2U premeal per patient/fam request with precautions about hypoglycemia with decreased renal function  Glucose goal of 140-180 in the acute care setting  -Monitor and adjust as needed    Best option for her would be to go back on her pump but she is hesitant due to hypoglycemic episodes, uncertain if user error or pump malfunction.      VTE Risk Mitigation (From admission, onward)         Ordered     IP VTE HIGH RISK PATIENT  Once         09/25/22 1707     Place sequential compression device  Until discontinued         09/25/22 1707     Place sequential compression device  Until discontinued         09/22/22 2216                Discharge Planning   BRYAN:      Code Status: Full Code   Is the patient medically ready for discharge?:     Reason for patient still in hospital (select all that apply): Treatment  Discharge Plan A: Home with family                  Jluis Martinez MD  Department of  [facility] - Med Surg ([facility])

## 2022-10-04 NOTE — PLAN OF CARE
Problem: Adult Inpatient Plan of Care  Goal: Plan of Care Review  Outcome: Ongoing, Progressing  Goal: Patient-Specific Goal (Individualized)  Outcome: Ongoing, Progressing  Goal: Absence of Hospital-Acquired Illness or Injury  Outcome: Ongoing, Progressing  Goal: Optimal Comfort and Wellbeing  Outcome: Ongoing, Progressing  Goal: Readiness for Transition of Care  Outcome: Ongoing, Progressing     Problem: Diabetes Comorbidity  Goal: Blood Glucose Level Within Targeted Range  Outcome: Ongoing, Progressing     Problem: Hyperglycemia  Goal: Blood Glucose Level Within Targeted Range  Outcome: Ongoing, Progressing     Problem: Fluid and Electrolyte Imbalance (Acute Kidney Injury/Impairment)  Goal: Fluid and Electrolyte Balance  Outcome: Ongoing, Progressing     Problem: Renal Function Impairment (Acute Kidney Injury/Impairment)  Goal: Effective Renal Function  Outcome: Ongoing, Progressing     Problem: Adjustment to Role Transition (Postpartum  Delivery)  Goal: Successful Maternal Role Transition  Outcome: Ongoing, Progressing     Problem: Pain Acute  Goal: Acceptable Pain Control and Functional Ability  Outcome: Ongoing, Progressing     Pt remained free from falls or injuries this shift. Pt independent in repositioning. Dressing to  incision C/D/I. Pt rested well through the night. BP much improved this a.m. Pt very pleasant this shift. Visited baby once w mother. Mother remained at bedside through the night. Pt reports pain to biopsy site, csection incision, and nexplanon site as 10/10 although does not appear to be in any distress. Medicated w hydrocodone 10mg prn. Voiding well.

## 2022-10-04 NOTE — SUBJECTIVE & OBJECTIVE
Interval History: right sided flank/back pain and some abdominal pain related to biopsy site. Concerned about her sugars and not receiving premeal insulin.     Review of Systems   Constitutional:  Negative for chills, fatigue and fever.   HENT:  Negative for congestion, ear pain, rhinorrhea and sore throat.    Eyes:  Negative for photophobia, pain and visual disturbance.   Respiratory:  Negative for chest tightness and shortness of breath.    Cardiovascular:  Negative for chest pain.   Gastrointestinal:  Positive for abdominal pain (post-op discomfort). Negative for constipation, diarrhea, nausea and vomiting.   Endocrine: Negative for polyphagia and polyuria.   Genitourinary:  Negative for dysuria and urgency.   Musculoskeletal:  Negative for arthralgias and myalgias.   Skin:  Negative for color change and wound.   Neurological:  Negative for weakness, light-headedness and headaches.   Psychiatric/Behavioral:  Negative for agitation, behavioral problems and confusion. The patient is not nervous/anxious.    Objective:     Vital Signs (Most Recent):  Temp: 98.5 °F (36.9 °C) (10/04/22 0714)  Pulse: 106 (10/04/22 0714)  Resp: 18 (10/04/22 0939)  BP: (!) 124/59 (10/04/22 0714)  SpO2: (!) 94 % (10/04/22 0745)   Vital Signs (24h Range):  Temp:  [97.9 °F (36.6 °C)-99 °F (37.2 °C)] 98.5 °F (36.9 °C)  Pulse:  [] 106  Resp:  [14-20] 18  SpO2:  [92 %-100 %] 94 %  BP: (118-160)/(59-87) 124/59     Weight: 74.6 kg (164 lb 5.7 oz)  Body mass index is 31.05 kg/m².    Intake/Output Summary (Last 24 hours) at 10/4/2022 1240  Last data filed at 10/4/2022 0944  Gross per 24 hour   Intake --   Output 1700 ml   Net -1700 ml      Physical Exam  Vitals and nursing note reviewed.   Constitutional:       General: She is not in acute distress.     Appearance: Normal appearance. She is normal weight. She is not ill-appearing.   HENT:      Head: Normocephalic and atraumatic.      Right Ear: External ear normal.      Left Ear: External ear  normal.      Nose: Nose normal.      Mouth/Throat:      Pharynx: Oropharynx is clear.   Eyes:      Extraocular Movements: Extraocular movements intact.      Conjunctiva/sclera: Conjunctivae normal.   Cardiovascular:      Rate and Rhythm: Regular rhythm. Tachycardia present.      Pulses: Normal pulses.      Heart sounds: Normal heart sounds.   Pulmonary:      Effort: Pulmonary effort is normal. No respiratory distress.      Breath sounds: Normal breath sounds. No wheezing.   Abdominal:      General: Abdomen is flat. Bowel sounds are normal. There is no distension.      Palpations: Abdomen is soft.      Tenderness: There is abdominal tenderness (post-op).   Genitourinary:     Comments: S/p  - wound closed and clean  Musculoskeletal:         General: No swelling or tenderness. Normal range of motion.      Cervical back: Normal range of motion and neck supple.      Right lower leg: Edema present.      Left lower leg: Edema present.   Skin:     General: Skin is warm and dry.   Neurological:      General: No focal deficit present.      Mental Status: She is alert and oriented to person, place, and time. Mental status is at baseline.      Motor: No weakness.   Psychiatric:         Mood and Affect: Mood normal.         Behavior: Behavior normal.       Significant Labs: All pertinent labs within the past 24 hours have been reviewed.    Significant Imaging: I have reviewed all pertinent imaging results/findings within the past 24 hours.

## 2022-10-04 NOTE — NURSING
Patient informed of telemetry ordered after biopsy but refuses to wear it. Spoke with Teresa Parmar NP. Who informed me to just put in note about it.

## 2022-10-05 VITALS
OXYGEN SATURATION: 96 % | WEIGHT: 164.38 LBS | RESPIRATION RATE: 18 BRPM | SYSTOLIC BLOOD PRESSURE: 158 MMHG | TEMPERATURE: 99 F | HEART RATE: 104 BPM | DIASTOLIC BLOOD PRESSURE: 87 MMHG | BODY MASS INDEX: 31.03 KG/M2 | HEIGHT: 61 IN

## 2022-10-05 LAB
ANION GAP SERPL CALC-SCNC: 10 MMOL/L (ref 8–16)
BASOPHILS # BLD AUTO: 0.02 K/UL (ref 0–0.2)
BASOPHILS NFR BLD: 0.2 % (ref 0–1.9)
BUN SERPL-MCNC: 40 MG/DL (ref 6–20)
CALCIUM SERPL-MCNC: 8 MG/DL (ref 8.7–10.5)
CHLORIDE SERPL-SCNC: 99 MMOL/L (ref 95–110)
CO2 SERPL-SCNC: 30 MMOL/L (ref 23–29)
CREAT SERPL-MCNC: 2 MG/DL (ref 0.5–1.4)
DIFFERENTIAL METHOD: ABNORMAL
EOSINOPHIL # BLD AUTO: 0.1 K/UL (ref 0–0.5)
EOSINOPHIL NFR BLD: 0.8 % (ref 0–8)
ERYTHROCYTE [DISTWIDTH] IN BLOOD BY AUTOMATED COUNT: 13.4 % (ref 11.5–14.5)
EST. GFR  (NO RACE VARIABLE): 34 ML/MIN/1.73 M^2
GLUCOSE SERPL-MCNC: 110 MG/DL (ref 70–110)
HCT VFR BLD AUTO: 22.2 % (ref 37–48.5)
HGB BLD-MCNC: 7.4 G/DL (ref 12–16)
IMM GRANULOCYTES # BLD AUTO: 0.17 K/UL (ref 0–0.04)
IMM GRANULOCYTES NFR BLD AUTO: 1.8 % (ref 0–0.5)
LYMPHOCYTES # BLD AUTO: 1.5 K/UL (ref 1–4.8)
LYMPHOCYTES NFR BLD: 15.9 % (ref 18–48)
MAGNESIUM SERPL-MCNC: 1.9 MG/DL (ref 1.6–2.6)
MCH RBC QN AUTO: 30.2 PG (ref 27–31)
MCHC RBC AUTO-ENTMCNC: 33.3 G/DL (ref 32–36)
MCV RBC AUTO: 91 FL (ref 82–98)
MONOCYTES # BLD AUTO: 1 K/UL (ref 0.3–1)
MONOCYTES NFR BLD: 10 % (ref 4–15)
NEUTROPHILS # BLD AUTO: 6.8 K/UL (ref 1.8–7.7)
NEUTROPHILS NFR BLD: 71.3 % (ref 38–73)
NRBC BLD-RTO: 0 /100 WBC
PHOSPHATE SERPL-MCNC: 5 MG/DL (ref 2.7–4.5)
PLATELET # BLD AUTO: 290 K/UL (ref 150–450)
PMV BLD AUTO: 10.4 FL (ref 9.2–12.9)
POCT GLUCOSE: 137 MG/DL (ref 70–110)
POTASSIUM SERPL-SCNC: 4 MMOL/L (ref 3.5–5.1)
RBC # BLD AUTO: 2.45 M/UL (ref 4–5.4)
SODIUM SERPL-SCNC: 139 MMOL/L (ref 136–145)
WBC # BLD AUTO: 9.57 K/UL (ref 3.9–12.7)

## 2022-10-05 PROCEDURE — 85025 COMPLETE CBC W/AUTO DIFF WBC: CPT | Performed by: HOSPITALIST

## 2022-10-05 PROCEDURE — 84100 ASSAY OF PHOSPHORUS: CPT | Performed by: HOSPITALIST

## 2022-10-05 PROCEDURE — 25000003 PHARM REV CODE 250: Performed by: PSYCHIATRY & NEUROLOGY

## 2022-10-05 PROCEDURE — 25000003 PHARM REV CODE 250: Performed by: INTERNAL MEDICINE

## 2022-10-05 PROCEDURE — 11981 INSERTION DRUG DLVR IMPLANT: CPT | Mod: ,,, | Performed by: OBSTETRICS & GYNECOLOGY

## 2022-10-05 PROCEDURE — 25000003 PHARM REV CODE 250

## 2022-10-05 PROCEDURE — 11981 PR INSERT, DRUG DELIVERY IMPLANT, BIORESORB/BIODEGR/NON-BIODEGR: ICD-10-PCS | Mod: ,,, | Performed by: OBSTETRICS & GYNECOLOGY

## 2022-10-05 PROCEDURE — 25000003 PHARM REV CODE 250: Performed by: NURSE PRACTITIONER

## 2022-10-05 PROCEDURE — 80048 BASIC METABOLIC PNL TOTAL CA: CPT | Performed by: HOSPITALIST

## 2022-10-05 PROCEDURE — 83735 ASSAY OF MAGNESIUM: CPT | Performed by: HOSPITALIST

## 2022-10-05 PROCEDURE — 36415 COLL VENOUS BLD VENIPUNCTURE: CPT | Performed by: HOSPITALIST

## 2022-10-05 RX ORDER — LANOLIN ALCOHOL/MO/W.PET/CERES
1000 CREAM (GRAM) TOPICAL DAILY
Qty: 30 TABLET | Refills: 1 | Status: SHIPPED | OUTPATIENT
Start: 2022-10-06 | End: 2022-10-05 | Stop reason: SDUPTHER

## 2022-10-05 RX ORDER — CARVEDILOL 25 MG/1
25 TABLET ORAL 2 TIMES DAILY
Qty: 60 TABLET | Refills: 11 | Status: SHIPPED | OUTPATIENT
Start: 2022-10-05 | End: 2022-10-05 | Stop reason: SDUPTHER

## 2022-10-05 RX ORDER — CARVEDILOL 25 MG/1
25 TABLET ORAL 2 TIMES DAILY
Qty: 60 TABLET | Refills: 11 | Status: SHIPPED | OUTPATIENT
Start: 2022-10-05 | End: 2023-09-29

## 2022-10-05 RX ORDER — NIFEDIPINE 60 MG/1
60 TABLET, EXTENDED RELEASE ORAL 2 TIMES DAILY
Qty: 60 TABLET | Refills: 11 | Status: SHIPPED | OUTPATIENT
Start: 2022-10-05 | End: 2023-09-29

## 2022-10-05 RX ORDER — LANOLIN ALCOHOL/MO/W.PET/CERES
1000 CREAM (GRAM) TOPICAL DAILY
Qty: 30 TABLET | Refills: 1 | Status: SHIPPED | OUTPATIENT
Start: 2022-10-06 | End: 2022-10-31

## 2022-10-05 RX ORDER — HYDRALAZINE HYDROCHLORIDE 100 MG/1
100 TABLET, FILM COATED ORAL EVERY 8 HOURS
Qty: 90 TABLET | Refills: 11 | Status: SHIPPED | OUTPATIENT
Start: 2022-10-05 | End: 2022-10-05 | Stop reason: SDUPTHER

## 2022-10-05 RX ORDER — NIFEDIPINE 60 MG/1
60 TABLET, EXTENDED RELEASE ORAL 2 TIMES DAILY
Qty: 60 TABLET | Refills: 11 | Status: SHIPPED | OUTPATIENT
Start: 2022-10-05 | End: 2022-10-05 | Stop reason: SDUPTHER

## 2022-10-05 RX ORDER — IRON POLYSACCHARIDE COMPLEX 150 MG
150 CAPSULE ORAL DAILY
Qty: 30 CAPSULE | Refills: 1 | Status: SHIPPED | OUTPATIENT
Start: 2022-10-06 | End: 2023-02-08

## 2022-10-05 RX ORDER — HYDRALAZINE HYDROCHLORIDE 100 MG/1
100 TABLET, FILM COATED ORAL EVERY 8 HOURS
Qty: 90 TABLET | Refills: 11 | Status: SHIPPED | OUTPATIENT
Start: 2022-10-05 | End: 2023-09-29 | Stop reason: SDUPTHER

## 2022-10-05 RX ORDER — SERTRALINE HYDROCHLORIDE 50 MG/1
50 TABLET, FILM COATED ORAL DAILY
Qty: 30 TABLET | Refills: 11 | Status: SHIPPED | OUTPATIENT
Start: 2022-10-06 | End: 2022-10-05 | Stop reason: SDUPTHER

## 2022-10-05 RX ORDER — IRON POLYSACCHARIDE COMPLEX 150 MG
150 CAPSULE ORAL DAILY
Qty: 30 CAPSULE | Refills: 1 | Status: SHIPPED | OUTPATIENT
Start: 2022-10-06 | End: 2022-10-05 | Stop reason: SDUPTHER

## 2022-10-05 RX ORDER — OXYCODONE AND ACETAMINOPHEN 5; 325 MG/1; MG/1
1 TABLET ORAL EVERY 6 HOURS PRN
Qty: 16 TABLET | Refills: 0 | Status: SHIPPED | OUTPATIENT
Start: 2022-10-05 | End: 2022-10-09

## 2022-10-05 RX ORDER — CLONIDINE 0.1 MG/24H
1 PATCH, EXTENDED RELEASE TRANSDERMAL
Qty: 4 PATCH | Refills: 1 | Status: SHIPPED | OUTPATIENT
Start: 2022-10-05 | End: 2022-10-31

## 2022-10-05 RX ORDER — SERTRALINE HYDROCHLORIDE 50 MG/1
50 TABLET, FILM COATED ORAL DAILY
Qty: 30 TABLET | Refills: 11 | Status: SHIPPED | OUTPATIENT
Start: 2022-10-06 | End: 2023-09-29 | Stop reason: SDUPTHER

## 2022-10-05 RX ADMIN — CARVEDILOL 25 MG: 12.5 TABLET, FILM COATED ORAL at 08:10

## 2022-10-05 RX ADMIN — NIFEDIPINE 60 MG: 30 TABLET, FILM COATED, EXTENDED RELEASE ORAL at 08:10

## 2022-10-05 RX ADMIN — CLONIDINE 1 PATCH: 0.2 PATCH TRANSDERMAL at 08:10

## 2022-10-05 RX ADMIN — PRENATAL VIT W/ FE FUMARATE-FA TAB 27-0.8 MG 1 TABLET: 27-0.8 TAB at 08:10

## 2022-10-05 RX ADMIN — FUROSEMIDE 40 MG: 40 TABLET ORAL at 08:10

## 2022-10-05 RX ADMIN — SERTRALINE HYDROCHLORIDE 50 MG: 50 TABLET ORAL at 08:10

## 2022-10-05 RX ADMIN — OXYCODONE HYDROCHLORIDE AND ACETAMINOPHEN 1 TABLET: 5; 325 TABLET ORAL at 04:10

## 2022-10-05 RX ADMIN — OXYCODONE HYDROCHLORIDE AND ACETAMINOPHEN 1 TABLET: 5; 325 TABLET ORAL at 11:10

## 2022-10-05 RX ADMIN — POLYSACCHARIDE-IRON COMPLEX 150 MG: 150 CAPSULE ORAL at 08:10

## 2022-10-05 RX ADMIN — CYANOCOBALAMIN TAB 1000 MCG 1000 MCG: 1000 TAB at 08:10

## 2022-10-05 RX ADMIN — INSULIN DETEMIR 5 UNITS: 100 INJECTION, SOLUTION SUBCUTANEOUS at 08:10

## 2022-10-05 RX ADMIN — HYDRALAZINE HYDROCHLORIDE 100 MG: 25 TABLET, FILM COATED ORAL at 04:10

## 2022-10-05 RX ADMIN — METOCLOPRAMIDE 5 MG: 5 TABLET ORAL at 04:10

## 2022-10-05 NOTE — DISCHARGE SUMMARY
[facility] S[facility] Medicine  Discharge Summary      Patient Name: Alicia Valles  MRN: [id number]  Patient Class: IP- Inpatient  Admission Date: 9/22/2022  Hospital Length of Stay: 13 days  Discharge Date and Time:  10/05/2022 11:52 AM  Attending Physician: Jluis Martinez MD   Discharging Provider: Jluis Martinez MD  Primary Care Provider: Giovanna Hyatt MD      HPI:   Patient is a 27 y.o. female presents with admission from Nephrology appt yesterday with N/V/Acc HTN and feeling poorly.  Pt is 27+W accidental pregnancy with uncontrolled type 1 dm, nephrotic syn, uncontrolled HTN.  Initial /100, Creat 1.5, K 5.8, urine PC 17 (falsely high with blood but hx of 6+).  Admitted to OB floor for mgmt of HTN.  Trends noted and now with Creat 1.7, K 6.0, Bicarb 17.  Consulted for evaluation.  Pt seen and examined with [facility] staff-team at bedside.  Of note pt has been refusing some meds/treatment modalities/etc. Extensive discussion about plan of care and consequences of refusal.  Nephrotic w/up ordered earlier but suspect due to uncontrolled Type 1 DM/Uncontrolled HTN. Pt admits to taking Excedrin and occasional NSAIDS for her gastroparesis.  Diet habits are uncontrolled      Procedure(s) (LRB):  BIOPSY, WITH CT GUIDANCE (Right)      Hospital Course:   Per OB/GYN  09/22/2022: admit for BP monitoring for cHTN w/ ARANZA vs cHTN exacerbation. CLD, cont monitoring. Rocephin for UTI. Continue levimir 10/8, BG Q4 while on CLD. Labs per nephrology recs pending.   09/23/2022 - HD#2. Received 5 units of aspart since admission for elevated BG. No obstetric complaints. Monitoring reassuring. Magnesium boluses continued for seizure prophylaxis and fetal neuroprotection in setting of elevated Cr (1.7). K 5.8 this AM  09/24/2022 - HD#3. BP meds changed yesterday to labetalol 200 mg BID, procardia 60 BID, and hydralazine 75 mg TID. Patient had persistently elevated blood sugars yesterday requiring multiple slides with  insulin aspart. BMZ 1/2 given. Insulin drip started at 1 u/hr in anticipation of hyperglycemia secondary to steroids. Patient had hypoglycemic episode overnight. Reported left sided pelvic pain overnight, SVE closed. No other obstetric complaints. No pre-E symptoms.   09/25/2022 - HD#4. IOL started secondary to cHTN with ARANZA with SF (Cr).   09/26/2022 - HD#5. POD#1 s/p pLTCS. Multiple episodes of hypoglycemia overnight. Admitted to ICU for cardene drip in setting of uncontrolled sustained severe range BP requiring hydral 5/10/5/10, procardia 10/20, and labetalol 20/40/80 after delivery.   09/27/2022 - HD#6. POD#2 s/p pLTCS. Continued on cardene drip for elevated BP. BP meds adjusted yesterday - labetalol discontinued, hydralazine up-titrated and co-reg added. Started on levemir 3/3 with CR of 1:20 and ISF 1:50 > 180.  09/28/2022 - HD#7. POD#3 s/p pLTCS. PECed by psych yesterday. S/p cardene drip. Lasix drip started by nephro. Will increase levemir to 4/4.   09/29/2022 - HD#8. POD#4 s/p pLTCS. Stepped down from ICU yesterday. Lasix drip continued. Meeting post-op milestones. PEC continued per psych. Will increase CR to 1:15 with breakfast and dinner.   09/30/2022 - HD#9. POD#5 s/p pLTCS. Insulin regimen adjusted yesterday. Lasix drip continued. PEC in place per psych.    10/01/2022 - HD#10, POD#6 s/p pLTCS. Patient doing well this morning. Has lot 30 pounds of fluid since delivery. She feels much more like her old safe. She desires a nexplanon before discharge. Discussed plan for admission for a few more days pending renal biopsy.    HM and Nephrology were consulted for HTN and KOBY. Her bp meds were adjusted to reflect the discharge list here and renal biopsy preliminary showed diabetic GN. She will follow up with Nephrology at [facility] as well as with endocrine.       Goals of Care Treatment Preferences:  Code Status: Full Code      Consults:   Consults (From admission, onward)        Status Ordering Provider      Inpatient virtual consult to Hospital Medicine  Once        Provider:  (Not yet assigned)    Completed LAZARUS ESPINAL     Inpatient consult to Interventional Radiology  Once        Provider:  Sam Rosales MD    Completed LITTLE GUTIERREZ     Inpatient consult to Social Work  Once        Provider:  (Not yet assigned)    Completed JONAS FISHER     Inpatient consult to Midline team  Once        Provider:  (Not yet assigned)    Acknowledged JONAS FISHER     Inpatient consult to Midline team  Once        Provider:  (Not yet assigned)    Acknowledged ALFREDO BATRES     Inpatient consult to Psychiatry  Once        Provider:  (Not yet assigned)    Completed JONSA FISHER     Inpatient consult to Pulmonary Critical Care  Once        Provider:  (Not yet assigned)    Completed VLAD MENDOSA     Inpatient consult to Endocrinology  Once        Provider:  (Not yet assigned)    Acknowledged JONAS FISHER     Inpatient consult to Nephrology  Once        Provider:  (Not yet assigned)    Completed JONAS FISHER          No new Assessment & Plan notes have been filed under this hospital service since the last note was generated.  Service: Hospital Medicine    Final Active Diagnoses:    Diagnosis Date Noted POA    PRINCIPAL PROBLEM:  S/P primary low transverse  [Z98.891] 2022 Not Applicable    Chronic hypertension with superimposed pre-eclampsia [O11.9] 2022 Yes    Acute renal failure superimposed on stage 2 chronic kidney disease with Nephrotic Range Proteinuria [N17.9, N18.2] 2022 Yes    Gastroparesis due to DM [E11.43, K31.84] 2022 Yes    Anxiety [F41.9] 2022 Yes    Anemia [D64.9] 2022 Yes    Type 1 diabetes mellitus with other specified complication [E10.69] 2022 Yes      Problems Resolved During this Admission:    Diagnosis Date Noted Date Resolved POA    Executive function deficit [R41.844] 2022 10/01/2022 No    Type 1 diabetes mellitus with  hyperglycemia [E10.65] 09/27/2022 09/28/2022 Yes    Type 1 diabetes mellitus with hyperglycemia [E10.65] 09/26/2022 09/27/2022 Yes    Hyperkalemia [E87.5] 09/23/2022 09/25/2022 Unknown    Short cervix [N88.3] 09/22/2022 09/25/2022 Yes    29 weeks gestation of pregnancy [Z3A.29] 09/22/2022 09/25/2022 Not Applicable    Hypertension affecting pregnancy, antepartum [O16.9] 05/04/2022 09/25/2022 Yes       Discharged Condition: good    Disposition: Home or Self Care    Follow Up:   Follow-up Information     LATRICE Vallecillo Follow up in 6 week(s).    Specialty: Obstetrics and Gynecology  Why: Post partum visit  Contact information:  [address], [address]  [facility] [address] [phone]           Niko Momin MD. Schedule an appointment as soon as possible for a visit.    Specialty: Internal Medicine  Why: Nurse will reach out to schedule an appointment within 48 hours.  Contact information:  [address]  [facility] [id number] [phone]             Eunice Schumacher MD. Schedule an appointment as soon as possible for a visit.    Specialty: Endocrinology  Why: Office will reach out to schedule a follow up appointment within 48 hours.  Contact information:  [address]  [facility] [address]  [phone]                       Patient Instructions:      BREAST PUMP FOR HOME USE     Order Specific Question Answer Comments   Type of pump: Electric    Weight: 84.3 kg (185 lb 13.6 oz)    Does patient have medical equipment at home? none    Length of need (1-99 months): 99      Diet diabetic     Notify your health care provider if you experience any of the following:  persistent nausea and vomiting or diarrhea     Activity as tolerated       Significant Diagnostic Studies: Labs:   BMP:   Recent Labs   Lab 10/04/22  0411 10/05/22  0419   GLU 97 110    139   K 4.1 4.0   CL 96 99   CO2 32* 30*   BUN 44* 40*   CREATININE 2.1* 2.0*   CALCIUM 7.9* 8.0*   MG 1.8 1.9    and  CBC   Recent Labs   Lab 10/04/22  0411 10/05/22  0419   WBC 9.91 9.57   HGB 7.9* 7.4*   HCT 23.1* 22.2*    290       Pending Diagnostic Studies:     Procedure Component Value Units Date/Time    IR Biopsy Kidney [[id number]] Resulted: 10/03/22 1334    Order Status: Sent Lab Status: In process Updated: 10/03/22 1439    Specimen to Pathology, Surgery Other [[id number]] Collected: 10/03/22 1414    Order Status: Sent Lab Status: In process Updated: 10/03/22 1446    Specimen: Tissue          Medications:  Reconciled Home Medications:      Medication List      START taking these medications    carvediloL 25 MG tablet  Commonly known as: COREG  Take 1 tablet (25 mg total) by mouth 2 (two) times daily.     cloNIDine 0.1 mg/24 hr td ptwk 0.1 mg/24 hr  Commonly known as: CATAPRES  Place 1 patch onto the skin every 7 days.     cyanocobalamin 1000 MCG tablet  Commonly known as: VITAMIN B-12  Take 1 tablet (1,000 mcg total) by mouth once daily.  Start taking on: October 6, 2022     iron polysaccharides 150 mg iron Cap  Commonly known as: NIFEREX  Take 1 capsule (150 mg total) by mouth once daily.  Start taking on: October 6, 2022     oxyCODONE-acetaminophen 5-325 mg per tablet  Commonly known as: PERCOCET  Take 1 tablet by mouth every 6 (six) hours as needed for Pain.     sertraline 50 MG tablet  Commonly known as: ZOLOFT  Take 1 tablet (50 mg total) by mouth once daily.  Start taking on: October 6, 2022        CHANGE how you take these medications    hydrALAZINE 100 MG tablet  Commonly known as: APRESOLINE  Take 1 tablet (100 mg total) by mouth every 8 (eight) hours.  What changed:   · medication strength  · how much to take     NIFEdipine 60 MG (OSM) 24 hr tablet  Commonly known as: PROCARDIA-XL  Take 1 tablet (60 mg total) by mouth 2 (two) times a day.  What changed:   · medication strength  · how much to take  · Another medication with the same name was removed. Continue taking this medication, and follow the directions  "you see here.        CONTINUE taking these medications    aspirin 81 MG Chew  Take 1 tablet (81 mg total) by mouth once daily.     * GLUCAGEN HYPOKIT 1 mg Solr  Generic drug: glucagon  USE AS DIRECTED FOR HYPOGLYCEMIA INJECTION AS NEEDED     * BAQSIMI 3 mg/actuation Spry  Generic drug: glucagon  1 each by Nasal route as needed (For Hypoglycemia).     BD ULTRA-FINE LIGIA PEN NEEDLE 32 gauge x 5/32" Ndle  Generic drug: pen needle, diabetic  To inject insulin 4-6 times daily.     DEXCOM G6  Misc  Generic drug: blood-glucose meter,continuous  1 Device by Misc.(Non-Drug; Combo Route) route once. for 1 dose     DEXCOM G6 SENSOR Sandrine  Generic drug: blood-glucose sensor  3 Devices by Misc.(Non-Drug; Combo Route) route every 30 days.     DEXCOM G6 TRANSMITTER Sandrine  Generic drug: blood-glucose transmitter  2 Devices by Misc.(Non-Drug; Combo Route) route every 6 (six) months.     diphenhydrAMINE 25 mg capsule  Commonly known as: BENADRYL  Take 25 mg by mouth daily as needed for Insomnia ((if Unisom ineffective)).     docusate sodium 100 MG capsule  Commonly known as: COLACE  Take 100 mg by mouth once daily.     famotidine 40 MG tablet  Commonly known as: PEPCID  Take 1 tablet (40 mg total) by mouth every evening.     HYDROcodone-acetaminophen 5-325 mg per tablet  Commonly known as: NORCO  Take 1 tablet by mouth.     INPEN (NOVOLOG OR FIASP) PINK Inpn  Generic drug: insulin admin supplies  Use device to inject insulin. Dispense one kit.     LEVEMIR FLEXTOUCH U-100 INSULN SUBQ  Inject into the skin. 10 units in the A.M and 8 units Nightly     metoclopramide HCl 10 MG tablet  Commonly known as: REGLAN  Take 1 tablet (10 mg total) by mouth 4 (four) times daily.     * insulin aspart U-100 100 unit/mL (3 mL) Inpn pen  Commonly known as: NovoLOG  SMARTSI Unit(s) SUB-Q Daily     * NovoLOG Flexpen U-100 Insulin 100 unit/mL (3 mL) Inpn pen  Generic drug: insulin aspart U-100  Inject under the skin three times a day based on " carb counting. Max daily dose of 70 units     ondansetron 4 MG Tbdl  Commonly known as: ZOFRAN-ODT  Take 4 mg by mouth every 8 (eight) hours as needed.     PRENATAL 28 mg iron- 800 mcg Tab  Generic drug: PNV cmb#95-ferrous fumarate-FA  Take 1 tablet by mouth once daily. for 7 days     progesterone 200 MG capsule  Commonly known as: PROMETRIUM  Take 1 capsule (200 mg total) by mouth nightly.     * promethazine 25 MG tablet  Commonly known as: PHENERGAN  Take 1 tablet (25 mg total) by mouth every 4 (four) hours.     * promethazine 25 MG suppository  Commonly known as: PHENERGAN  Place 1 suppository (25 mg total) rectally every 6 (six) hours as needed for Nausea.         * This list has 6 medication(s) that are the same as other medications prescribed for you. Read the directions carefully, and ask your doctor or other care provider to review them with you.            STOP taking these medications    acetaminophen 500 MG tablet  Commonly known as: TYLENOL     amoxicillin 500 MG capsule  Commonly known as: AMOXIL     enoxaparin 40 mg/0.4 mL Syrg  Commonly known as: LOVENOX     fluconazole 150 MG Tab  Commonly known as: DIFLUCAN     labetaloL 200 MG tablet  Commonly known as: NORMODYNE     UNISOM (DOXYLAMINE) ORAL            Indwelling Lines/Drains at time of discharge:   Lines/Drains/Airways     None                 Time spent on the discharge of patient: 48 minutes         Jluis Martinez MD  Department of Hospital Medicine  [facility] ([facility]

## 2022-10-05 NOTE — NURSING
Pt discharged to home with family. Pt is Aox4 and states verbal understanding of all discharge instructions.  Midline removed - pt tolerated.  Pt took all personal belongings and refused transport.  Physician notified to resend medications to patient Pharmacy in [place]. Pt did  one medication at [facility].  Pt safe

## 2022-10-05 NOTE — PLAN OF CARE
CM met with pt and her mother for final discharge planning asssessment. Pt to discharge home today.    Follow-up apts with Nephrology and Endocrine needed, offices of each will call pt to schedule.    Meds sent to [facility].    Pt's mother to provide transportation home today.    Pt is ready for discharge from CM perspective.       10/05/22 1449   Final Note   Assessment Type Final Discharge Note   Anticipated Discharge Disposition Home   What phone number can be called within the next 1-3 days to see how you are doing after discharge? [phone]   Hospital Resources/Appts/Education Provided Provided patient/caregiver with written discharge plan information;Appointments scheduled and added to AVS;Provided education on problems/symptoms using teachback   Post-Acute Status   Discharge Delays None known at this time   [facility] ([facility])  Discharge Final Note    Primary Care Provider: Giovanna Hyatt MD    Expected Discharge Date: 10/5/2022    Final Discharge Note (most recent)       Final Note - 10/05/22 1449          Final Note    Assessment Type Final Discharge Note (P)      Anticipated Discharge Disposition Home or Self Care (P)      What phone number can be called within the next 1-3 days to see how you are doing after discharge? [phone] (P)      Hospital Resources/Appts/Education Provided Provided patient/caregiver with written discharge plan information;Appointments scheduled and added to AVS;Provided education on problems/symptoms using teachback (P)         Post-Acute Status    Discharge Delays None known at this time (P)                      Important Message from Medicare             Contact LATRICE Dumont   Specialty: Obstetrics and Gynecology    [address], [address]  [facility] [address]   Phone: [phone]       Next Steps: Follow up in 6 week(s)    Instructions: Post partum visit    Niko Momin MD   Specialty: Internal  Medicine    [address]  [facility] [address]   Phone: [phone]       Next Steps: Schedule an appointment as soon as possible for a visit    Instructions: Nurse will reach out to schedule an appointment within 48 hours.    Eunice Schumacher MD   Specialty: Endocrinology   Relationship: Consulting Physician    [address]  [facility] [address]   Phone: [phone]       Next Steps: Schedule an appointment as soon as possible for a visit    Instructions: Office will reach out to schedule a follow up appointment within 48 hours.

## 2022-10-05 NOTE — PROGRESS NOTES
"Progress Note  Nephrology    Consult Requested By: Calin Carrera MD  Reason for Consult: KOBY/>K/Nephrotic Syn    SUBJECTIVE:     History of Present Illness from initial consultation:  "Patient is a 27 y.o. female presents with admission from Nephrology appt yesterday with N/V/Acc HTN and feeling poorly.  Pt is 27+W accidental pregnancy with uncontrolled type 1 dm, nephrotic syn, uncontrolled HTN.  Initial /100, Creat 1.5, K 5.8, urine PC 17 (falsely high with blood but hx of 6+).  Admitted to OB floor for mgmt of HTN.  Trends noted and now with Creat 1.7, K 6.0, Bicarb 17.  Consulted for evaluation.  Pt seen and examined with [facility] staff-team at bedside.  Of note pt has been refusing some meds/treatment modalities/etc.  Extensive discussion about plan of care and consequences of refusal.  Nephrotic w/up ordered earlier but suspect due to uncontrolled Type 1 DM/Uncontrolled HTN. Pt admits to taking Excedrin and occasional NSAIDS for her gastroparesis.  Diet habits are uncontrolled."     Interval History:    Labs stable and discussed with pt about plan.  Renal biopsy with DM/HTN Nephropathy.  BP better and weaning meds.  Ok to DC with Renal f/up at [facility].  Pt eager and happy to go home.     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      Past Surgical History:   Procedure Laterality Date     SECTION N/A 2022    Procedure:  SECTION;  Surgeon: Qouc Pryor Jr., MD;  Location: [facility] L&D;  Service: OB/GYN;  Laterality: N/A;    RETINAL DETACHMENT SURGERY  2018    RETINAL DETACHMENT SURGERY       Family History   Problem Relation Age of Onset    No Known Problems Father     Cancer Paternal Grandfather         unsure    Diabetes Maternal " Grandfather     Hypertension Mother      Social History     Tobacco Use    Smoking status: Never    Smokeless tobacco: Never   Substance Use Topics    Alcohol use: Not Currently    Drug use: Never       Review of patient's allergies indicates:  No Known Allergies     Review of Systems:  Constitutional: No fever or chills  Respiratory: No cough or shortness of breath  Cardiovascular: No chest pain or palpitations  Gastrointestinal: No nausea or vomiting  Neurological: No confusion or weakness    OBJECTIVE:     Vital Signs (Most Recent)  Temp: 99.4 °F (37.4 °C) (10/02/22 1527)  Pulse: 100 (10/02/22 1527)  Resp: 18 (10/02/22 1542)  BP: (!) 162/87 (10/02/22 1527)  SpO2: 96 % (10/02/22 1527)    Vital Signs Range (Last 24H):  Temp:  [97.8 °F (36.6 °C)-99.4 °F (37.4 °C)]   Pulse:  []   Resp:  [16-18]   BP: (139-176)/(80-96)   SpO2:  [94 %-98 %]       Intake/Output Summary (Last 24 hours) at 10/2/2022 1700  Last data filed at 10/2/2022 0758  Gross per 24 hour   Intake 1298.54 ml   Output 2150 ml   Net -851.46 ml       Physical Exam:  NAD  Anasarca-improving  RRR  Diminished lungs.  + BS  Edema + and improving daily    Laboratory:  Recent Labs   Lab 10/02/22  0547   WBC 9.82   RBC 3.11*   HGB 9.5*   HCT 27.7*      MCV 89   MCH 30.5   MCHC 34.3     BMP:   Recent Labs   Lab 09/26/22  1202 09/26/22  1952 10/02/22  0547   *   < > 121*   *   < > 138   K 5.5*   < > 3.8      < > 99   CO2 16*   < > 33*   BUN 34*   < > 42*   CREATININE 2.0*   < > 1.8*   CALCIUM 7.4*   < > 8.2*   MG 4.9*  --   --     < > = values in this interval not displayed.     Lab Results   Component Value Date    CALCIUM 8.2 (L) 10/02/2022    PHOS 5.0 (H) 09/24/2022     BNP  No results for input(s): BNP, BNPTRIAGEBLO in the last 168 hours.No results found for: URICACID  Lab Results   Component Value Date    IRON 84 05/06/2022    TIBC 266 05/06/2022    FERRITIN 79 05/06/2022     Lab Results   Component Value Date    CALCIUM 8.2 (L)  10/02/2022    PHOS 5.0 (H) 09/24/2022       Diagnostic Results:  No orders to display       ASSESSMENT/PLAN:     Stabilized Oliguric/nonoliguric KOBY on CKD II with nephrotic syndrome secondary to uncontrolled DM, uncontrolled HTN, Preeclampsia with severe features.  Now with Biopsy proven DM/HTN Nephrosclerosis  -Followed with  nephrology at [facility] Dr. Cheek/Liliane   -Creat trends noted. 1.5 -1.9 - 2.0 - 2.3 - 2.1 - 2.0  -Alb 1.6> 1.8  -Repeat UPCR 9 grams - baseline historically 6 grams  -avoid NSAIDS.    -no need for RRT yet  -increase Heparin to Q8 and ok for lovenox.    -Low Hapto initially but now normal, mildly elevated LDH , anemia, Transient decrease but not frankly low plts now rsing serially, KOBY.  ADAMTS  76%  -now with worsening anasarca and volume overload started lasix> drip    -de-escalate lasix drip to 12mg/hr with metolazone due to CO2 33  -YARON mildy + but neg DsDNA.     -trending with Creat   -changed lasix drip to oral and decreased with mild rise in creat with improvement.   -Renally dose meds, avoid nephrotoxins, and monitor I/O's closely.    Hyperkalemia with NAGMA, hyponatremia  -s/ p Lokelma TID and sodium bicarb to shift and correct acidosis  -stable.   -low k diet.  -some are hemolyzed.   -DC bicarb tabs with alkalosis.     Uncontrolled HTN  -max procardia 60 BID  -increased hydralazine to 100 TID  -changed to coreg for antiprotenuric effects.   -added catapres patch and prn tabs.  -lasix/metolazone to help with volume removal which should help BP also  -weaned off cardene  -added minoxidil with improvement, but again elevated thus will increased Minoxidil 10 mg bid.  -suspect lots of anxiety also causing BP spikes.  -If she chooses not to breastfeed, then would change regimen.  -much improved and will start to decrease regimen.     -DC minoxidil and decrease clonidine    Uncontrolled Type 1 DM  -formerly on insulin pump until she had a hypoglycemic seizure and then transitioned to MDI  -she  was not wearing her sensor was cause of this but highly recommend retry with closed loop system.  -defer to Endo   -labile trends and needs coverage for meals.     Pregnancy, Extremely high risk  -per MFM/OB  -s/p emergent .    Anemia of pregnancy/CKD  -B12 and folate both on low side now on supplementation  -on iron  -Transfused 1 unit 10/1/22     Possible delirium superimposed on cognitive disorder  Possible psychosis  Unspecified anxiety  History of depression  -PEC'd  but now rescinded.  -Greatly appreciate help of Psych.  -Pt doing much better and actually quite pleasant now  -asking to go home, but willing to stay for ongoing care.      See above  Home today  [facility]rolog[facility] [place] f/up.    Discussed with team.

## 2022-10-06 ENCOUNTER — PATIENT OUTREACH (OUTPATIENT)
Dept: ADMINISTRATIVE | Facility: CLINIC | Age: 27
End: 2022-10-06
Payer: MEDICAID

## 2022-10-06 ENCOUNTER — TELEPHONE (OUTPATIENT)
Dept: OBSTETRICS AND GYNECOLOGY | Facility: CLINIC | Age: 27
End: 2022-10-06
Payer: MEDICAID

## 2022-10-06 NOTE — TELEPHONE ENCOUNTER
----- Message from Irene Iosco sent at 10/5/2022  4:47 PM CDT -----  Contact: pt  Type: Needs Medical Advice         Who Called: PT  Best Call Back Number: [phone]  Additional Information: Requesting a call back regarding Pt said that the RX she was given at the hospital discharge from delivering is not working and asking for something else to be called in to her pharm.         [id number] - [place], [place] - [address] AT [address]  [address]  [place] [address]  Phone: [phone] Fax: [phone]      Please Advise- Thank you

## 2022-10-07 ENCOUNTER — OFFICE VISIT (OUTPATIENT)
Dept: PRIMARY CARE CLINIC | Facility: CLINIC | Age: 27
End: 2022-10-07
Payer: MEDICAID

## 2022-10-07 ENCOUNTER — TELEPHONE (OUTPATIENT)
Dept: OBSTETRICS AND GYNECOLOGY | Facility: CLINIC | Age: 27
End: 2022-10-07
Payer: MEDICAID

## 2022-10-07 VITALS — SYSTOLIC BLOOD PRESSURE: 133 MMHG | DIASTOLIC BLOOD PRESSURE: 74 MMHG

## 2022-10-07 DIAGNOSIS — I10 HYPERTENSION, ESSENTIAL: Primary | ICD-10-CM

## 2022-10-07 DIAGNOSIS — Z78.9 NONSMOKER: ICD-10-CM

## 2022-10-07 PROBLEM — H33.40: Status: ACTIVE | Noted: 2022-10-07

## 2022-10-07 PROBLEM — E10.39: Status: ACTIVE | Noted: 2022-10-07

## 2022-10-07 PROBLEM — H54.40 BLINDNESS OF RIGHT EYE: Status: ACTIVE | Noted: 2022-10-07

## 2022-10-07 PROBLEM — R11.0 NAUSEA: Status: ACTIVE | Noted: 2021-08-24

## 2022-10-07 PROBLEM — F32.A DEPRESSIVE DISORDER: Status: ACTIVE | Noted: 2022-10-07

## 2022-10-07 PROBLEM — F41.1 GENERALIZED ANXIETY DISORDER: Status: ACTIVE | Noted: 2022-10-07

## 2022-10-07 LAB
FINAL PATHOLOGIC DIAGNOSIS: NORMAL
GROSS: NORMAL
Lab: NORMAL

## 2022-10-07 PROCEDURE — 3066F PR DOCUMENTATION OF TREATMENT FOR NEPHROPATHY: ICD-10-PCS | Mod: CPTII,GT,, | Performed by: FAMILY MEDICINE

## 2022-10-07 PROCEDURE — 1159F PR MEDICATION LIST DOCUMENTED IN MEDICAL RECORD: ICD-10-PCS | Mod: CPTII,GT,, | Performed by: FAMILY MEDICINE

## 2022-10-07 PROCEDURE — 3078F PR MOST RECENT DIASTOLIC BLOOD PRESSURE < 80 MM HG: ICD-10-PCS | Mod: CPTII,GT,, | Performed by: FAMILY MEDICINE

## 2022-10-07 PROCEDURE — 1111F PR DISCHARGE MEDS RECONCILED W/ CURRENT OUTPATIENT MED LIST: ICD-10-PCS | Mod: CPTII,GT,, | Performed by: FAMILY MEDICINE

## 2022-10-07 PROCEDURE — 1111F DSCHRG MED/CURRENT MED MERGE: CPT | Mod: CPTII,GT,, | Performed by: FAMILY MEDICINE

## 2022-10-07 PROCEDURE — 3075F SYST BP GE 130 - 139MM HG: CPT | Mod: CPTII,GT,, | Performed by: FAMILY MEDICINE

## 2022-10-07 PROCEDURE — 1160F RVW MEDS BY RX/DR IN RCRD: CPT | Mod: CPTII,GT,, | Performed by: FAMILY MEDICINE

## 2022-10-07 PROCEDURE — 3078F DIAST BP <80 MM HG: CPT | Mod: CPTII,GT,, | Performed by: FAMILY MEDICINE

## 2022-10-07 PROCEDURE — 3062F PR POS MACROALBUMINURIA RESULT DOCUMENTED/REVIEW: ICD-10-PCS | Mod: CPTII,GT,, | Performed by: FAMILY MEDICINE

## 2022-10-07 PROCEDURE — 3062F POS MACROALBUMINURIA REV: CPT | Mod: CPTII,GT,, | Performed by: FAMILY MEDICINE

## 2022-10-07 PROCEDURE — 4010F PR ACE/ARB THEARPY RXD/TAKEN: ICD-10-PCS | Mod: CPTII,GT,, | Performed by: FAMILY MEDICINE

## 2022-10-07 PROCEDURE — 99213 PR OFFICE/OUTPT VISIT, EST, LEVL III, 20-29 MIN: ICD-10-PCS | Mod: GT,,, | Performed by: FAMILY MEDICINE

## 2022-10-07 PROCEDURE — 3066F NEPHROPATHY DOC TX: CPT | Mod: CPTII,GT,, | Performed by: FAMILY MEDICINE

## 2022-10-07 PROCEDURE — 3044F HG A1C LEVEL LT 7.0%: CPT | Mod: CPTII,GT,, | Performed by: FAMILY MEDICINE

## 2022-10-07 PROCEDURE — 3075F PR MOST RECENT SYSTOLIC BLOOD PRESS GE 130-139MM HG: ICD-10-PCS | Mod: CPTII,GT,, | Performed by: FAMILY MEDICINE

## 2022-10-07 PROCEDURE — 1160F PR REVIEW ALL MEDS BY PRESCRIBER/CLIN PHARMACIST DOCUMENTED: ICD-10-PCS | Mod: CPTII,GT,, | Performed by: FAMILY MEDICINE

## 2022-10-07 PROCEDURE — 1159F MED LIST DOCD IN RCRD: CPT | Mod: CPTII,GT,, | Performed by: FAMILY MEDICINE

## 2022-10-07 PROCEDURE — 3044F PR MOST RECENT HEMOGLOBIN A1C LEVEL <7.0%: ICD-10-PCS | Mod: CPTII,GT,, | Performed by: FAMILY MEDICINE

## 2022-10-07 PROCEDURE — 4010F ACE/ARB THERAPY RXD/TAKEN: CPT | Mod: CPTII,GT,, | Performed by: FAMILY MEDICINE

## 2022-10-07 PROCEDURE — 99213 OFFICE O/P EST LOW 20 MIN: CPT | Mod: GT,,, | Performed by: FAMILY MEDICINE

## 2022-10-07 RX ORDER — HYDROCHLOROTHIAZIDE 25 MG/1
25 TABLET ORAL DAILY
Qty: 30 TABLET | Refills: 1 | Status: SHIPPED | OUTPATIENT
Start: 2022-10-07 | End: 2022-12-02

## 2022-10-07 RX ORDER — OXYCODONE HYDROCHLORIDE 5 MG/1
5 TABLET ORAL EVERY 6 HOURS PRN
Qty: 25 TABLET | Refills: 0 | Status: SHIPPED | OUTPATIENT
Start: 2022-10-07 | End: 2022-10-31

## 2022-10-07 RX ORDER — DEXTROMETHORPHAN HYDROBROMIDE, GUAIFENESIN 5; 100 MG/5ML; MG/5ML
650 LIQUID ORAL EVERY 6 HOURS PRN
Qty: 60 TABLET | Refills: 1 | Status: SHIPPED | OUTPATIENT
Start: 2022-10-07 | End: 2022-11-06

## 2022-10-07 NOTE — TELEPHONE ENCOUNTER
----- Message from Stella Haro sent at 10/6/2022  4:26 PM CDT -----  Pt came in stating that she was just discharged yesterday from the hospital and the pain medication that was given to her is not helping and would like something different or some advice. Pt # [phone]

## 2022-10-07 NOTE — PROGRESS NOTES
"The patient location is: [facility]  The chief complaint leading to consultation is: HTN and medication side effect    Visit type: audiovisual    Face to Face time with patient: 15 mins  25 minutes of total time spent on the encounter, which includes face to face time and non-face to face time preparing to see the patient (eg, review of tests), Obtaining and/or reviewing separately obtained history, Documenting clinical information in the electronic or other health record, Independently interpreting results (not separately reported) and communicating results to the patient/family/caregiver, or Care coordination (not separately reported).         Each patient to whom he or she provides medical services by telemedicine is:  (1) informed of the relationship between the physician and patient and the respective role of any other health care provider with respect to management of the patient; and (2) notified that he or she may decline to receive medical services by telemedicine and may withdraw from such care at any time.    Notes:      Pt is 12 days post partum. S/p  due to HTN urgency.  was placed on clonidine patch upon discharged and since starting that medication she's been fatigued, SOB, had some blurry vision, states still has swelling in her legs. Next patch change date is 10/12/22States she was feeling "fine" until she was started on this patch. States they had her on a higher does and weaned her down to 0.1mg patch, but she hates how she feels on it. States the plan was to take her off of it completely she thinks. State she has a post partum f/u visit Oct 12th. States her bp at home runs good and today it was running 133/74. Pt also asking about pain medications s/p . Mother is with pt and she is worried about rebound HTN on clonidine. Pt states she had HTN prior to pregnancy, but never check it and was not on any bp medications prior to pregnancy.     Review of Systems "   Constitutional:  Positive for malaise/fatigue.   Respiratory:  Positive for shortness of breath. Negative for cough.    Cardiovascular:  Positive for leg swelling. Negative for chest pain, palpitations, orthopnea and PND.   Gastrointestinal:  Positive for abdominal pain ( surgical site).   Psychiatric/Behavioral:  Negative for suicidal ideas.       Physical Exam  Constitutional:       General: She is not in acute distress.     Appearance: She is not ill-appearing or toxic-appearing.   Pulmonary:      Effort: No respiratory distress.   Neurological:      General: No focal deficit present.      Mental Status: She is alert and oriented to person, place, and time.   Psychiatric:         Mood and Affect: Mood normal.         Behavior: Behavior normal.    Pt was passenger in car during visit.      A/P  HTN: unsure if this is completely medication side effect or mild symptoms of postpartum preeclampsia. Advised pt to contact her gyn office regarding symptoms as well and to keep f/u appointment. Bp per pt's records are controlled. Advised that yes there is risk of rebound HTN coming of Clonidine which we will have to monitor.   Advised she can start hctz Monday 10/10/22 that way if there are issues she can follow up here in office. Advised that it is safe a low doses for breast feeding.   Advised to go to ER if symptoms worsen.   Risks, benefits, and side effects were discussed with the patient. All questions were answered to the fullest satisfaction of the patient, and pt verbalized understanding and agreement to treatment plan. Pt was to call with any new or worsening symptoms, or present to the ER.         Giovanna Hyatt MD  Family Medicine Physician   [facility]

## 2022-10-07 NOTE — TELEPHONE ENCOUNTER
Patient is status post  at [facility] on 2022 and right kidney biopsy on 10/3/22.  Patient states that she has been taking Percocet every 4-6 hours with minimal relief.  She denies fever, chills, nausea/vomiting.  We discussed trying Oxy IR 5 mg every 4 hours as needed with Tylenol every 6 hours scheduled.  Patient was in agreement.  Prescription sent to pharmacy.  Patient has follow-up visit in office on 10/12/2022.

## 2022-10-10 ENCOUNTER — PATIENT MESSAGE (OUTPATIENT)
Dept: ADMINISTRATIVE | Facility: HOSPITAL | Age: 27
End: 2022-10-10
Payer: MEDICAID

## 2022-10-11 ENCOUNTER — TELEPHONE (OUTPATIENT)
Dept: OBSTETRICS AND GYNECOLOGY | Facility: CLINIC | Age: 27
End: 2022-10-11
Payer: MEDICAID

## 2022-10-11 NOTE — TELEPHONE ENCOUNTER
----- Message from Irene Thomson sent at 10/11/2022 10:09 AM CDT -----  Contact: pt  Type: Needs Medical Advice         Who Called: pt  Best Call Back Number: [phone]  Additional Information: Requesting a call back regarding pt is having pain insecion and was told she could call to be seen sooner if she had pain. Pt is asking if something is available for today   Please Advise- Thank you

## 2022-10-12 ENCOUNTER — CLINICAL SUPPORT (OUTPATIENT)
Dept: ENDOCRINOLOGY | Facility: CLINIC | Age: 27
End: 2022-10-12
Payer: MEDICAID

## 2022-10-12 ENCOUNTER — PATIENT MESSAGE (OUTPATIENT)
Dept: ENDOCRINOLOGY | Facility: CLINIC | Age: 27
End: 2022-10-12

## 2022-10-12 DIAGNOSIS — N17.9 ACUTE RENAL FAILURE SUPERIMPOSED ON STAGE 2 CHRONIC KIDNEY DISEASE, UNSPECIFIED ACUTE RENAL FAILURE TYPE: ICD-10-CM

## 2022-10-12 DIAGNOSIS — E10.69 TYPE 1 DIABETES MELLITUS WITH OTHER SPECIFIED COMPLICATION: ICD-10-CM

## 2022-10-12 DIAGNOSIS — N18.2 ACUTE RENAL FAILURE SUPERIMPOSED ON STAGE 2 CHRONIC KIDNEY DISEASE, UNSPECIFIED ACUTE RENAL FAILURE TYPE: ICD-10-CM

## 2022-10-12 PROBLEM — E11.43 GASTROPARESIS DUE TO DM: Status: RESOLVED | Noted: 2022-05-14 | Resolved: 2022-10-12

## 2022-10-12 PROBLEM — K31.84 GASTROPARESIS DUE TO DM: Status: RESOLVED | Noted: 2022-05-14 | Resolved: 2022-10-12

## 2022-10-12 NOTE — Clinical Note
Please call patient and let her know that for her tandem paperwork her insurance will need an updated fasting blood draw for blood glucose, C-peptide, antibodies.  Please schedule this for her but let her know that this is only going to be usable by her insurance if her blood sugar is less than 200 that morning.  If on the day she is scheduled for her labs her blood sugar is higher than 200 she will need to let us know so that we can reschedule it.

## 2022-10-12 NOTE — ASSESSMENT & PLAN NOTE
Limited data today but patient reports most numbers in 100s aside from fasting hyperglycemia so will slightly increase bedtime levemir by one unit.  She is interested in Tandem pump and would greatly benefit from AID do will send paperwork.      She will look for endocrinologist closer to home to make visits easier.      See regimen below.

## 2022-10-12 NOTE — PATIENT INSTRUCTIONS
Please call dexcom to figure out why you can't use the dexcom lam on your phone.     We will send the Tandem insulin pump paperwork.  If you get it let us know so we can set you up for the in person training session.    Regimen as of 10/12/2022    Levemir 5 units in the morning and increase to 6 units at night  novolog based on carb counting  Carb ratio: 1:10  (divide carbs by 10)   Correction/sliding scale - hold off on this for now since you seem to be very sensitive to the sliding scale    Please see if there is an endocrinologist closer to your home as it may be easier for you to do in person visits closer to home.

## 2022-10-12 NOTE — Clinical Note
RTC wed am pump clinic in 1 month - can cancel if she establishes with local endocrine provider kalpana send tandem paperwork.

## 2022-10-12 NOTE — PROGRESS NOTES
Alicia Valles is a 27 y.o. female  presenting for follow-up of T1DM  10/12/2022     The patient location is: home    Visit type: audiovisual    Face to Face time with patient: 20  30 minutes of total time spent on the encounter, which includes face to face time and non-face to face time preparing to see the patient (eg, review of tests), Obtaining and/or reviewing separately obtained history, Documenting clinical information in the electronic or other health record, Independently interpreting results (not separately reported) and communicating results to the patient/family/caregiver, or Care coordination (not separately reported).     Each patient to whom he or she provides medical services by telemedicine is:  (1) informed of the relationship between the physician and patient and the respective role of any other health care provider with respect to management of the patient; and (2) notified that he or she may decline to receive medical services by telemedicine and may withdraw from such care at any time.    The patient's last visit with me was on 2022.     History of Present Illness  T1DM  Diagnosed at age 9  Known complications: gastroparesis, nephropathy, retinopathy  Frequent DKA admissions    On medtronic pump (got it about a year ago when she had [employer] insurance, started it in 2022 w/o sensor as she did not like the sensor) but stopped following episode of severe hypoglycemia.  previously also tried omnipod but did not like it.     Has been admitted multiple times during pregnancy for uncontrolled glucose and HTN    Lab Results   Component Value Date    CPEPTIDE <0.08 (L) 2022     Interval hx:  Developed hypertension and underwent  22 at 30w- baby doing well  Had significant KOBY (renal bxy with DIABETES/hypertension related nephrosclerosis) and fluid retention treated with lasix.   Since hospitalization using dexcom with  as it isn't working with her phone anymore (has  "not contacted dexcom about this).      She has been experimenting with ICR and has found that sliding scale causes significant drop in BG.      She is interested in starting tandem pump with CIQ.      Numbers usually in 100s, sometimes in 200s fasting.  Per patient starts rising in the morning before she eats anything, not drinking coffee.      Resolved N/V but with poor appetite now.     Current Diabetes Regimen:  Levemir 5 units in the morning and 5 units at night  novolog based on carb counting and blood sugar  Carb ratio: 1:10  (divide carbs by 10)   Correction/sliding scale - not really using as with one unit BG drops >100 points        Diet/Exercise: >3 meals a day + snacks    Last Hgb A1C:  Lab Results   Component Value Date    HGBA1C 6.7 (H) 08/31/2022         Glucose Monitoring:  Meter/CGM: Dexcom G6- not able to view data as she is using  now      Hypoglycemic Episodes: denies    DKA: multiple episodes in past    Screening / DM Complications:    Nephropathy:  ACEi/ARB: Not taking- working on establishing care with nephrology  Lab Results   Component Value Date    MICALBCREAT 49701.0 (H) 09/22/2022       Last Lipid Panel:  Statin: Not taking  Lab Results   Component Value Date    LDLCALC 125.6 09/23/2022       Last foot exam : 07/06/2022  Last eye exam Most Recent Eye Exam Date: Not Found;  no laser surgery or DR    Lab Results   Component Value Date    TSH 1.570 05/05/2022       No results found for: TTGIGA          Current Outpatient Medications:     acetaminophen (TYLENOL) 650 MG TbSR, Take 1 tablet (650 mg total) by mouth every 6 (six) hours as needed (pain)., Disp: 60 tablet, Rfl: 1    aspirin 81 MG Chew, Take 1 tablet (81 mg total) by mouth once daily., Disp: 100 tablet, Rfl: 3    BD ULTRA-FINE LIGIA PEN NEEDLE 32 gauge x 5/32" Ndle, To inject insulin 4-6 times daily., Disp: 100 each, Rfl: 3    blood-glucose meter,continuous (DEXCOM G6 ) Misc, 1 Device by Misc.(Non-Drug; Combo Route) " route once. for 1 dose, Disp: 1 each, Rfl: 0    blood-glucose sensor (DEXCOM G6 SENSOR) Sandrine, 3 Devices by Misc.(Non-Drug; Combo Route) route every 30 days., Disp: 1 each, Rfl: 5    blood-glucose transmitter (DEXCOM G6 TRANSMITTER) Sandrine, 2 Devices by Misc.(Non-Drug; Combo Route) route every 6 (six) months., Disp: 1 each, Rfl: 5    carvediloL (COREG) 25 MG tablet, Take 1 tablet (25 mg total) by mouth 2 (two) times daily., Disp: 60 tablet, Rfl: 11    cloNIDine 0.1 mg/24 hr td ptwk (CATAPRES) 0.1 mg/24 hr, Place 1 patch onto the skin every 7 days., Disp: 4 patch, Rfl: 1    cyanocobalamin (VITAMIN B-12) 1000 MCG tablet, Take 1 tablet (1,000 mcg total) by mouth once daily., Disp: 30 tablet, Rfl: 1    diphenhydrAMINE (BENADRYL) 25 mg capsule, Take 25 mg by mouth daily as needed for Insomnia ((if Unisom ineffective))., Disp: , Rfl:     docusate sodium (COLACE) 100 MG capsule, Take 100 mg by mouth once daily., Disp: , Rfl:     famotidine (PEPCID) 40 MG tablet, Take 1 tablet (40 mg total) by mouth every evening., Disp: 30 tablet, Rfl: 10    GLUCAGEN HYPOKIT 1 mg SolR, USE AS DIRECTED FOR HYPOGLYCEMIA INJECTION AS NEEDED, Disp: , Rfl:     glucagon (BAQSIMI) 3 mg/actuation Spry, 1 each by Nasal route as needed (For Hypoglycemia)., Disp: 2 each, Rfl: 3    hydrALAZINE (APRESOLINE) 100 MG tablet, Take 1 tablet (100 mg total) by mouth every 8 (eight) hours., Disp: 90 tablet, Rfl: 11    hydroCHLOROthiazide (HYDRODIURIL) 25 MG tablet, Take 1 tablet (25 mg total) by mouth once daily. Stop the clonidine patch, Disp: 30 tablet, Rfl: 1    HYDROcodone-acetaminophen (NORCO) 5-325 mg per tablet, Take 1 tablet by mouth., Disp: , Rfl:     insulin admin supplies (INPEN, NOVOLOG OR FIASP, PINK) InPn, Use device to inject insulin. Dispense one kit., Disp: 1 each, Rfl: 0    insulin aspart U-100 (NOVOLOG) 100 unit/mL (3 mL) InPn pen, SMARTSI Unit(s) SUB-Q Daily, Disp: , Rfl:     insulin detemir (LEVEMIR FLEXTOUCH U-100 INSULN SUBQ), Inject  into the skin. 10 units in the A.M and 8 units Nightly, Disp: , Rfl:     iron polysaccharides (NIFEREX) 150 mg iron Cap, Take 1 capsule (150 mg total) by mouth once daily., Disp: 30 capsule, Rfl: 1    metoclopramide HCl (REGLAN) 10 MG tablet, Take 1 tablet (10 mg total) by mouth 4 (four) times daily., Disp: 120 tablet, Rfl: 2    NIFEdipine (PROCARDIA-XL) 60 MG (OSM) 24 hr tablet, Take 1 tablet (60 mg total) by mouth 2 (two) times a day., Disp: 60 tablet, Rfl: 11    NOVOLOG FLEXPEN U-100 INSULIN 100 unit/mL (3 mL) InPn pen, Inject under the skin three times a day based on carb counting. Max daily dose of 70 units, Disp: 5 each, Rfl: 3    ondansetron (ZOFRAN-ODT) 4 MG TbDL, Take 4 mg by mouth every 8 (eight) hours as needed., Disp: , Rfl:     oxyCODONE (ROXICODONE) 5 MG immediate release tablet, Take 1 tablet (5 mg total) by mouth every 6 (six) hours as needed for Pain., Disp: 25 tablet, Rfl: 0    PNV,calcium 72/iron/folic acid (PRENATAL VITAMIN) Tab, Take 1 tablet by mouth once daily. for 7 days, Disp: 7 tablet, Rfl: 0    progesterone (PROMETRIUM) 200 MG capsule, Take 1 capsule (200 mg total) by mouth nightly., Disp: 30 capsule, Rfl: 4    promethazine (PHENERGAN) 25 MG suppository, Place 1 suppository (25 mg total) rectally every 6 (six) hours as needed for Nausea., Disp: 12 suppository, Rfl: 1    promethazine (PHENERGAN) 25 MG tablet, Take 1 tablet (25 mg total) by mouth every 4 (four) hours., Disp: 60 tablet, Rfl: 0    sertraline (ZOLOFT) 50 MG tablet, Take 1 tablet (50 mg total) by mouth once daily., Disp: 30 tablet, Rfl: 11    ROS as above    Objective:     There were no vitals filed for this visit.  Wt Readings from Last 3 Encounters:   10/01/22 74.6 kg (164 lb 5.7 oz)   09/22/22 72.1 kg (159 lb)   09/22/22 72.1 kg (159 lb)     There is no height or weight on file to calculate BMI.    Constitutional:  Pleasant,  in no acute distress.   HENT:   Eyes:     No scleral icterus.   Respiratory:   Effort normal    Neurological:  normal speech  Psych:  Normal mood and affect.      LABS    Chemistry        Component Value Date/Time     10/05/2022 0419    K 4.0 10/05/2022 0419    CL 99 10/05/2022 0419    CO2 30 (H) 10/05/2022 0419    BUN 40 (H) 10/05/2022 0419    CREATININE 2.0 (H) 10/05/2022 0419     10/05/2022 0419        Component Value Date/Time    CALCIUM 8.0 (L) 10/05/2022 0419    ALKPHOS 91 10/03/2022 0616    AST 22 10/03/2022 0616    ALT 21 10/03/2022 0616    BILITOT 0.3 10/03/2022 0616    ESTGFRAFRICA >60.0 05/26/2022 0507    EGFRNONAA >60.0 05/26/2022 0507          Assessment and Plan     Problem List Items Addressed This Visit          1 - High    Type 1 diabetes mellitus with other specified complication     Limited data today but patient reports most numbers in 100s aside from fasting hyperglycemia so will slightly increase bedtime levemir by one unit.  She is interested in Tandem pump and would greatly benefit from AID do will send paperwork.      She will look for endocrinologist closer to home to make visits easier.      See regimen below.             2     Acute renal failure superimposed on stage 2 chronic kidney disease with Nephrotic Range Proteinuria     Encouraged to follow up with nephrology.  Discussed that kidney dysfuction can decrease insulin clearance and reduce her requirements.                 Patient Instructions   Please call dexcom to figure out why you can't use the dexcom lam on your phone.     We will send the Tandem insulin pump paperwork.  If you get it let us know so we can set you up for the in person training session.    Regimen as of 10/12/2022    Levemir 5 units in the morning and increase to 6 units at night  novolog based on carb counting  Carb ratio: 1:10  (divide carbs by 10)   Correction/sliding scale - hold off on this for now since you seem to be very sensitive to the sliding scale    Please see if there is an endocrinologist closer to your home as it may be easier  for you to do in person visits closer to home.       RTC wed am pump clinic in 1 month - can cancel if she establishes with local endocrine provider  kalpana send tandem paperwork.     Eunice Schumacher MD

## 2022-10-12 NOTE — ASSESSMENT & PLAN NOTE
Encouraged to follow up with nephrology.  Discussed that kidney dysfuction can decrease insulin clearance and reduce her requirements.

## 2022-10-14 ENCOUNTER — OFFICE VISIT (OUTPATIENT)
Dept: OBSTETRICS AND GYNECOLOGY | Facility: CLINIC | Age: 27
End: 2022-10-14
Payer: MEDICAID

## 2022-10-14 VITALS
BODY MASS INDEX: 25.68 KG/M2 | WEIGHT: 136 LBS | DIASTOLIC BLOOD PRESSURE: 68 MMHG | HEIGHT: 61 IN | SYSTOLIC BLOOD PRESSURE: 118 MMHG

## 2022-10-14 DIAGNOSIS — Z09 EXAMINATION FOLLOWING SURGERY: Primary | ICD-10-CM

## 2022-10-14 PROCEDURE — 3062F PR POS MACROALBUMINURIA RESULT DOCUMENTED/REVIEW: ICD-10-PCS | Mod: CPTII,S$GLB,, | Performed by: OBSTETRICS & GYNECOLOGY

## 2022-10-14 PROCEDURE — 3062F POS MACROALBUMINURIA REV: CPT | Mod: CPTII,S$GLB,, | Performed by: OBSTETRICS & GYNECOLOGY

## 2022-10-14 PROCEDURE — 99024 PR POST-OP FOLLOW-UP VISIT: ICD-10-PCS | Mod: S$GLB,,, | Performed by: OBSTETRICS & GYNECOLOGY

## 2022-10-14 PROCEDURE — 1159F MED LIST DOCD IN RCRD: CPT | Mod: CPTII,S$GLB,, | Performed by: OBSTETRICS & GYNECOLOGY

## 2022-10-14 PROCEDURE — 3078F PR MOST RECENT DIASTOLIC BLOOD PRESSURE < 80 MM HG: ICD-10-PCS | Mod: CPTII,S$GLB,, | Performed by: OBSTETRICS & GYNECOLOGY

## 2022-10-14 PROCEDURE — 99024 POSTOP FOLLOW-UP VISIT: CPT | Mod: S$GLB,,, | Performed by: OBSTETRICS & GYNECOLOGY

## 2022-10-14 PROCEDURE — 4010F PR ACE/ARB THEARPY RXD/TAKEN: ICD-10-PCS | Mod: CPTII,S$GLB,, | Performed by: OBSTETRICS & GYNECOLOGY

## 2022-10-14 PROCEDURE — 1159F PR MEDICATION LIST DOCUMENTED IN MEDICAL RECORD: ICD-10-PCS | Mod: CPTII,S$GLB,, | Performed by: OBSTETRICS & GYNECOLOGY

## 2022-10-14 PROCEDURE — 3066F PR DOCUMENTATION OF TREATMENT FOR NEPHROPATHY: ICD-10-PCS | Mod: CPTII,S$GLB,, | Performed by: OBSTETRICS & GYNECOLOGY

## 2022-10-14 PROCEDURE — 3044F HG A1C LEVEL LT 7.0%: CPT | Mod: CPTII,S$GLB,, | Performed by: OBSTETRICS & GYNECOLOGY

## 2022-10-14 PROCEDURE — 3074F PR MOST RECENT SYSTOLIC BLOOD PRESSURE < 130 MM HG: ICD-10-PCS | Mod: CPTII,S$GLB,, | Performed by: OBSTETRICS & GYNECOLOGY

## 2022-10-14 PROCEDURE — 4010F ACE/ARB THERAPY RXD/TAKEN: CPT | Mod: CPTII,S$GLB,, | Performed by: OBSTETRICS & GYNECOLOGY

## 2022-10-14 PROCEDURE — 3078F DIAST BP <80 MM HG: CPT | Mod: CPTII,S$GLB,, | Performed by: OBSTETRICS & GYNECOLOGY

## 2022-10-14 PROCEDURE — 3074F SYST BP LT 130 MM HG: CPT | Mod: CPTII,S$GLB,, | Performed by: OBSTETRICS & GYNECOLOGY

## 2022-10-14 PROCEDURE — 3066F NEPHROPATHY DOC TX: CPT | Mod: CPTII,S$GLB,, | Performed by: OBSTETRICS & GYNECOLOGY

## 2022-10-14 PROCEDURE — 3044F PR MOST RECENT HEMOGLOBIN A1C LEVEL <7.0%: ICD-10-PCS | Mod: CPTII,S$GLB,, | Performed by: OBSTETRICS & GYNECOLOGY

## 2022-10-14 RX ORDER — OXYCODONE AND ACETAMINOPHEN 10; 325 MG/1; MG/1
1 TABLET ORAL EVERY 6 HOURS PRN
Qty: 20 TABLET | Refills: 0 | Status: SHIPPED | OUTPATIENT
Start: 2022-10-14 | End: 2022-10-31

## 2022-10-14 NOTE — PROGRESS NOTES
"Chief Complaint   Patient presents with    Post-op Evaluation     Pt is here for a Post Op evaluation incision check . Pt delivered 22 Baby Girl 2lbs 13oz at [facility] in [place] by Quoc Pryor Jr. Pt is requesting pain medication.        History of Present Illness:   Pateint presents today  2 weeks postop, status post , at [facility] due to PreE with KOBY.  She is currently on 4 BP medications and her BP is still elevated at times.  No HA/RUQ pain/vision changes.  She does complain of pain from incision. Pt is not well controlled. No fevers or chills.     Pathology: Discussion of test results with performing physician    Past medical and surgical history reviewed.   I have reviewed the patient's medical history in detail and updated the computerized patient record.    Physical exam:  /68   Ht 5' 1" (1.549 m)   Wt 61.7 kg (136 lb)   LMP  (Approximate)   Breastfeeding No Comment: bottle feeding  BMI 25.70 kg/m²   General:  Well-developed, well-nourished female in no acute distress  HEENT:  Normocephalic, atraumatic, extraocular muscles intact, moist mucous membranes  Breasts: deferred  Abdominal:  Soft, nontender, nondistended, incision clean dry and intact.   Extremities:  Warm, dry, no clubbing/cyanosis/edema  Neurological:  For cranial nerves 2-12 grossly intact   Dermatologic:  No rashes/lesions/bruising  Psychiatric:  Appropriate mood, affect, speech    Assessment:  Status post     Plan:  Doing well.  Routine postop care.  Recommend cardiology consult for BP management.  Pt agrees.  Pain meds for pain control.  Return in 2 weeks.  Pt agrees.        "

## 2022-10-20 ENCOUNTER — TELEPHONE (OUTPATIENT)
Dept: FAMILY MEDICINE | Facility: CLINIC | Age: 27
End: 2022-10-20
Payer: MEDICAID

## 2022-10-28 ENCOUNTER — OFFICE VISIT (OUTPATIENT)
Dept: OBSTETRICS AND GYNECOLOGY | Facility: CLINIC | Age: 27
End: 2022-10-28
Payer: MEDICAID

## 2022-10-28 ENCOUNTER — TELEPHONE (OUTPATIENT)
Dept: FAMILY MEDICINE | Facility: CLINIC | Age: 27
End: 2022-10-28

## 2022-10-28 VITALS
WEIGHT: 131.81 LBS | BODY MASS INDEX: 24.89 KG/M2 | DIASTOLIC BLOOD PRESSURE: 62 MMHG | HEIGHT: 61 IN | SYSTOLIC BLOOD PRESSURE: 114 MMHG

## 2022-10-28 DIAGNOSIS — Z09 EXAMINATION FOLLOWING SURGERY: Primary | ICD-10-CM

## 2022-10-28 PROCEDURE — 99024 POSTOP FOLLOW-UP VISIT: CPT | Mod: S$GLB,,, | Performed by: OBSTETRICS & GYNECOLOGY

## 2022-10-28 PROCEDURE — 3074F SYST BP LT 130 MM HG: CPT | Mod: CPTII,S$GLB,, | Performed by: OBSTETRICS & GYNECOLOGY

## 2022-10-28 PROCEDURE — 3066F NEPHROPATHY DOC TX: CPT | Mod: CPTII,S$GLB,, | Performed by: OBSTETRICS & GYNECOLOGY

## 2022-10-28 PROCEDURE — 1159F MED LIST DOCD IN RCRD: CPT | Mod: CPTII,S$GLB,, | Performed by: OBSTETRICS & GYNECOLOGY

## 2022-10-28 PROCEDURE — 3066F PR DOCUMENTATION OF TREATMENT FOR NEPHROPATHY: ICD-10-PCS | Mod: CPTII,S$GLB,, | Performed by: OBSTETRICS & GYNECOLOGY

## 2022-10-28 PROCEDURE — 3044F HG A1C LEVEL LT 7.0%: CPT | Mod: CPTII,S$GLB,, | Performed by: OBSTETRICS & GYNECOLOGY

## 2022-10-28 PROCEDURE — 3062F POS MACROALBUMINURIA REV: CPT | Mod: CPTII,S$GLB,, | Performed by: OBSTETRICS & GYNECOLOGY

## 2022-10-28 PROCEDURE — 4010F PR ACE/ARB THEARPY RXD/TAKEN: ICD-10-PCS | Mod: CPTII,S$GLB,, | Performed by: OBSTETRICS & GYNECOLOGY

## 2022-10-28 PROCEDURE — 3062F PR POS MACROALBUMINURIA RESULT DOCUMENTED/REVIEW: ICD-10-PCS | Mod: CPTII,S$GLB,, | Performed by: OBSTETRICS & GYNECOLOGY

## 2022-10-28 PROCEDURE — 3044F PR MOST RECENT HEMOGLOBIN A1C LEVEL <7.0%: ICD-10-PCS | Mod: CPTII,S$GLB,, | Performed by: OBSTETRICS & GYNECOLOGY

## 2022-10-28 PROCEDURE — 3074F PR MOST RECENT SYSTOLIC BLOOD PRESSURE < 130 MM HG: ICD-10-PCS | Mod: CPTII,S$GLB,, | Performed by: OBSTETRICS & GYNECOLOGY

## 2022-10-28 PROCEDURE — 3078F DIAST BP <80 MM HG: CPT | Mod: CPTII,S$GLB,, | Performed by: OBSTETRICS & GYNECOLOGY

## 2022-10-28 PROCEDURE — 4010F ACE/ARB THERAPY RXD/TAKEN: CPT | Mod: CPTII,S$GLB,, | Performed by: OBSTETRICS & GYNECOLOGY

## 2022-10-28 PROCEDURE — 3078F PR MOST RECENT DIASTOLIC BLOOD PRESSURE < 80 MM HG: ICD-10-PCS | Mod: CPTII,S$GLB,, | Performed by: OBSTETRICS & GYNECOLOGY

## 2022-10-28 PROCEDURE — 1159F PR MEDICATION LIST DOCUMENTED IN MEDICAL RECORD: ICD-10-PCS | Mod: CPTII,S$GLB,, | Performed by: OBSTETRICS & GYNECOLOGY

## 2022-10-28 PROCEDURE — 99024 PR POST-OP FOLLOW-UP VISIT: ICD-10-PCS | Mod: S$GLB,,, | Performed by: OBSTETRICS & GYNECOLOGY

## 2022-10-28 RX ORDER — HYDROCODONE BITARTRATE AND ACETAMINOPHEN 5; 325 MG/1; MG/1
1 TABLET ORAL EVERY 6 HOURS PRN
Qty: 7 TABLET | Refills: 0 | Status: SHIPPED | OUTPATIENT
Start: 2022-10-28 | End: 2023-05-22

## 2022-10-28 NOTE — PROGRESS NOTES
"Chief Complaint   Patient presents with    Follow-up     Pt is here for a follow up csection check. Pt delivered on 22        History of Present Illness:   Pateint presents today  4 weeks postop, status post , with complaint of mild off and on pain. Pain is controlled with current analgesics. Medications being used: narcotic analgesics including Norco.    Pathology: Discussion of test results with performing physician    Past medical and surgical history reviewed.   I have reviewed the patient's medical history in detail and updated the computerized patient record.    Physical exam:  /62   Ht 5' 1" (1.549 m)   Wt 59.8 kg (131 lb 12.8 oz)   LMP 2022 (Approximate)   Breastfeeding No   BMI 24.90 kg/m²   General:  Well-developed, well-nourished female in no acute distress  HEENT:  Normocephalic, atraumatic, extraocular muscles intact, moist mucous membranes  Breasts: deferred  Abdominal:  Soft, nontender, nondistended, incision clean dry and intact.   Extremities:  Warm, dry, no clubbing/cyanosis/edema  Neurological:  For cranial nerves 2-12 grossly intact   Dermatologic:  No rashes/lesions/bruising  Psychiatric:  Appropriate mood, affect, speech  Incision:  No induration or erythema.  Still slightly firm area likely organized hematoma.    Assessment:  Status post     Plan:  Doing well.  Routine postop care.  Cleared from a surgery standpoint.  Return for postpartum visit.  Will refill Percocets for as needed pain.      "

## 2022-10-31 ENCOUNTER — OFFICE VISIT (OUTPATIENT)
Dept: PRIMARY CARE CLINIC | Facility: CLINIC | Age: 27
End: 2022-10-31
Payer: MEDICAID

## 2022-10-31 VITALS
BODY MASS INDEX: 24.71 KG/M2 | WEIGHT: 130.88 LBS | HEART RATE: 97 BPM | DIASTOLIC BLOOD PRESSURE: 82 MMHG | SYSTOLIC BLOOD PRESSURE: 128 MMHG | OXYGEN SATURATION: 99 % | HEIGHT: 61 IN

## 2022-10-31 DIAGNOSIS — Z78.9 NONSMOKER: ICD-10-CM

## 2022-10-31 DIAGNOSIS — I10 HYPERTENSION, ESSENTIAL: ICD-10-CM

## 2022-10-31 DIAGNOSIS — E10.43 TYPE 1 DIABETES MELLITUS WITH PERIPHERAL AUTONOMIC NEUROPATHY: Primary | ICD-10-CM

## 2022-10-31 PROBLEM — I16.1 HYPERTENSIVE EMERGENCY: Status: RESOLVED | Noted: 2022-09-26 | Resolved: 2022-10-31

## 2022-10-31 PROBLEM — O26.859 SPOTTING IN EARLY PREGNANCY: Status: RESOLVED | Noted: 2022-05-14 | Resolved: 2022-10-31

## 2022-10-31 PROBLEM — O24.012 PRE-EXISTING TYPE 1 DIABETES MELLITUS DURING PREGNANCY IN SECOND TRIMESTER: Status: RESOLVED | Noted: 2022-05-04 | Resolved: 2022-10-31

## 2022-10-31 PROBLEM — R73.9 HYPERGLYCEMIA: Status: RESOLVED | Noted: 2022-09-26 | Resolved: 2022-10-31

## 2022-10-31 PROCEDURE — 99212 OFFICE O/P EST SF 10 MIN: CPT | Mod: S$GLB,,, | Performed by: FAMILY MEDICINE

## 2022-10-31 PROCEDURE — 1159F PR MEDICATION LIST DOCUMENTED IN MEDICAL RECORD: ICD-10-PCS | Mod: CPTII,S$GLB,, | Performed by: FAMILY MEDICINE

## 2022-10-31 PROCEDURE — 3044F HG A1C LEVEL LT 7.0%: CPT | Mod: CPTII,S$GLB,, | Performed by: FAMILY MEDICINE

## 2022-10-31 PROCEDURE — 3044F PR MOST RECENT HEMOGLOBIN A1C LEVEL <7.0%: ICD-10-PCS | Mod: CPTII,S$GLB,, | Performed by: FAMILY MEDICINE

## 2022-10-31 PROCEDURE — 1111F PR DISCHARGE MEDS RECONCILED W/ CURRENT OUTPATIENT MED LIST: ICD-10-PCS | Mod: CPTII,S$GLB,, | Performed by: FAMILY MEDICINE

## 2022-10-31 PROCEDURE — 1159F MED LIST DOCD IN RCRD: CPT | Mod: CPTII,S$GLB,, | Performed by: FAMILY MEDICINE

## 2022-10-31 PROCEDURE — 3079F DIAST BP 80-89 MM HG: CPT | Mod: CPTII,S$GLB,, | Performed by: FAMILY MEDICINE

## 2022-10-31 PROCEDURE — 1111F DSCHRG MED/CURRENT MED MERGE: CPT | Mod: CPTII,S$GLB,, | Performed by: FAMILY MEDICINE

## 2022-10-31 PROCEDURE — 3066F PR DOCUMENTATION OF TREATMENT FOR NEPHROPATHY: ICD-10-PCS | Mod: CPTII,S$GLB,, | Performed by: FAMILY MEDICINE

## 2022-10-31 PROCEDURE — 3079F PR MOST RECENT DIASTOLIC BLOOD PRESSURE 80-89 MM HG: ICD-10-PCS | Mod: CPTII,S$GLB,, | Performed by: FAMILY MEDICINE

## 2022-10-31 PROCEDURE — 3074F PR MOST RECENT SYSTOLIC BLOOD PRESSURE < 130 MM HG: ICD-10-PCS | Mod: CPTII,S$GLB,, | Performed by: FAMILY MEDICINE

## 2022-10-31 PROCEDURE — 3066F NEPHROPATHY DOC TX: CPT | Mod: CPTII,S$GLB,, | Performed by: FAMILY MEDICINE

## 2022-10-31 PROCEDURE — 3062F POS MACROALBUMINURIA REV: CPT | Mod: CPTII,S$GLB,, | Performed by: FAMILY MEDICINE

## 2022-10-31 PROCEDURE — 4010F PR ACE/ARB THEARPY RXD/TAKEN: ICD-10-PCS | Mod: CPTII,S$GLB,, | Performed by: FAMILY MEDICINE

## 2022-10-31 PROCEDURE — 3062F PR POS MACROALBUMINURIA RESULT DOCUMENTED/REVIEW: ICD-10-PCS | Mod: CPTII,S$GLB,, | Performed by: FAMILY MEDICINE

## 2022-10-31 PROCEDURE — 4010F ACE/ARB THERAPY RXD/TAKEN: CPT | Mod: CPTII,S$GLB,, | Performed by: FAMILY MEDICINE

## 2022-10-31 PROCEDURE — 3074F SYST BP LT 130 MM HG: CPT | Mod: CPTII,S$GLB,, | Performed by: FAMILY MEDICINE

## 2022-10-31 PROCEDURE — 99212 PR OFFICE/OUTPT VISIT, EST, LEVL II, 10-19 MIN: ICD-10-PCS | Mod: S$GLB,,, | Performed by: FAMILY MEDICINE

## 2022-10-31 RX ORDER — FLUCONAZOLE 150 MG/1
TABLET ORAL
COMMUNITY
Start: 2022-10-26 | End: 2023-02-08

## 2022-10-31 RX ORDER — HYDROQUINONE 40 MG/G
CREAM TOPICAL
COMMUNITY
Start: 2022-10-26

## 2022-10-31 NOTE — PROGRESS NOTES
Subjective:       Patient ID: Alicia Valles is a 27 y.o. female.    Chief Complaint: Leg Pain      Pt here for referral to Pain management: states was diagnosed with peripheral neuropathy b/l legs s/p EMG/NCV by her Endocrinologist in [place]. Was taking Percocet 5 mg. States Neurontin did not help with pain and she could not afford Lycira, but when she did try it it made her sleepy. States would like to resume percocet. Is/was currently getting it from OB after her recent . Pt cannot remember the name of her pain management clinic in [place].     HTN: no longer breastfeeding and off Clonidine patch.   Review of Systems   Constitutional:  Negative for appetite change, chills, fatigue, fever and unexpected weight change.   HENT:  Negative for ear discharge, ear pain, hearing loss, mouth dryness, mouth sores, postnasal drip, rhinorrhea, sinus pressure/congestion, sneezing, sore throat and trouble swallowing.    Eyes:  Negative for photophobia, pain, discharge, redness and itching.   Respiratory:  Negative for cough, chest tightness, shortness of breath and wheezing.    Cardiovascular:  Negative for chest pain, palpitations and leg swelling.   Gastrointestinal:  Negative for abdominal pain, blood in stool, constipation, diarrhea, nausea and vomiting.   Endocrine: Negative for cold intolerance, heat intolerance, polydipsia, polyphagia and polyuria.   Genitourinary:  Negative for bladder incontinence, difficulty urinating, dysuria, frequency, hematuria, nocturia and urgency.   Musculoskeletal:  Positive for arthralgias and myalgias. Negative for gait problem and joint swelling.   Integumentary:  Negative for rash and wound.   Neurological:  Positive for numbness. Negative for dizziness, vertigo, syncope, weakness and headaches.   Psychiatric/Behavioral:  Negative for agitation, behavioral problems, confusion, dysphoric mood, self-injury, sleep disturbance and suicidal ideas. The patient is not  nervous/anxious and is not hyperactive.        Objective:      Physical Exam  Vitals reviewed.   Constitutional:       General: She is not in acute distress.     Appearance: Normal appearance. She is normal weight. She is not ill-appearing or toxic-appearing.   Cardiovascular:      Rate and Rhythm: Regular rhythm.      Pulses: Normal pulses.      Heart sounds: Normal heart sounds.   Pulmonary:      Effort: Pulmonary effort is normal.      Breath sounds: Normal breath sounds.   Musculoskeletal:         General: Normal range of motion.   Neurological:      General: No focal deficit present.      Mental Status: She is alert and oriented to person, place, and time.   Psychiatric:         Mood and Affect: Mood normal.         Behavior: Behavior normal.       Assessment:       Problem List Items Addressed This Visit          Cardiac/Vascular    Hypertension, essential     Other Visit Diagnoses       Type 1 diabetes mellitus with peripheral autonomic neuropathy    -  Primary    Nonsmoker                Plan:       Will try to obtain records from [place] endocrinology  Advised that it might be hard to find pain management that accepts her insurance for pills only  Advised pt to call insurance company for a list of participating pain management providers that does pills only   Continue BP medications  Pt states her pap smear was done by Dr. Anastasia Howard, but she has since passed away and it was her own private practice  RTC 1-2 months  Risks, benefits, and side effects were discussed with the patient. All questions were answered to the fullest satisfaction of the patient, and pt verbalized understanding and agreement to treatment plan. Pt was to call with any new or worsening symptoms, or present to the ER.         Giovanna Hyatt MD  Family Medicine Physician   [facility]

## 2022-11-10 ENCOUNTER — POSTPARTUM VISIT (OUTPATIENT)
Dept: OBSTETRICS AND GYNECOLOGY | Facility: CLINIC | Age: 27
End: 2022-11-10
Payer: MEDICAID

## 2022-11-10 VITALS
HEIGHT: 61 IN | BODY MASS INDEX: 23.41 KG/M2 | DIASTOLIC BLOOD PRESSURE: 58 MMHG | WEIGHT: 124 LBS | SYSTOLIC BLOOD PRESSURE: 110 MMHG

## 2022-11-10 PROCEDURE — 0503F POSTPARTUM CARE VISIT: CPT | Mod: CPTII,S$GLB,, | Performed by: OBSTETRICS & GYNECOLOGY

## 2022-11-10 PROCEDURE — 0503F PR POSTPARTUM CARE VISIT: ICD-10-PCS | Mod: CPTII,S$GLB,, | Performed by: OBSTETRICS & GYNECOLOGY

## 2022-11-10 NOTE — PROGRESS NOTES
"CC: Post-partum follow-up    Alicia Valles is a 27 y.o. female  who presents for post-partum visit.  She is S/P  section.  Patient without complaints.  Pain controlled.  Tolerating p.o.  Pain controlled. Healing well. Positive ambulation. Positive flatus.  Bleeding is controlled.  No depression.  No abuse.    Delivery Date: 22  Delivery MD: Dr. Quoc Pryor Jr  Gender: female  Breast Feeding: bottle  Depression: no  Contraception: Nexplanon  given  by [facility]   Facility: [facility] in [place]    Pregnancy was complicated by:  Chronic hypertension with superimposed preeclampsia, acute kidney insufficiency, on controlled type 1 diabetes, delivery at 30 weeks via  section at [facility]      Current Outpatient Medications:     BD ULTRA-FINE LIGIA PEN NEEDLE 32 gauge x 5/32" Ndle, To inject insulin 4-6 times daily., Disp: 100 each, Rfl: 3    blood-glucose sensor (DEXCOM G6 SENSOR) Sandrine, 3 Devices by Misc.(Non-Drug; Combo Route) route every 30 days., Disp: 1 each, Rfl: 5    blood-glucose transmitter (DEXCOM G6 TRANSMITTER) Sandrine, 2 Devices by Misc.(Non-Drug; Combo Route) route every 6 (six) months., Disp: 1 each, Rfl: 5    carvediloL (COREG) 25 MG tablet, Take 1 tablet (25 mg total) by mouth 2 (two) times daily., Disp: 60 tablet, Rfl: 11    diphenhydrAMINE (BENADRYL) 25 mg capsule, Take 25 mg by mouth daily as needed for Insomnia ((if Unisom ineffective))., Disp: , Rfl:     docusate sodium (COLACE) 100 MG capsule, Take 100 mg by mouth once daily., Disp: , Rfl:     fluconazole (DIFLUCAN) 150 MG Tab, Take by mouth., Disp: , Rfl:     GLUCAGEN HYPOKIT 1 mg SolR, USE AS DIRECTED FOR HYPOGLYCEMIA INJECTION AS NEEDED, Disp: , Rfl:     glucagon (BAQSIMI) 3 mg/actuation Spry, 1 each by Nasal route as needed (For Hypoglycemia)., Disp: 2 each, Rfl: 3    hydrALAZINE (APRESOLINE) 100 MG tablet, Take 1 tablet (100 mg total) by mouth every 8 (eight) hours., Disp: 90 tablet, Rfl: " 11    hydroCHLOROthiazide (HYDRODIURIL) 25 MG tablet, Take 1 tablet (25 mg total) by mouth once daily. Stop the clonidine patch, Disp: 30 tablet, Rfl: 1    hydroquinone 4 % Crea, SMARTSI Sparingly Topical Twice Daily, Disp: , Rfl:     insulin admin supplies (INPEN, NOVOLOG OR FIASP, PINK) InPn, Use device to inject insulin. Dispense one kit., Disp: 1 each, Rfl: 0    insulin aspart U-100 (NOVOLOG) 100 unit/mL (3 mL) InPn pen, SMARTSI Unit(s) SUB-Q Daily, Disp: , Rfl:     insulin detemir (LEVEMIR FLEXTOUCH U-100 INSULN SUBQ), Inject into the skin. 10 units in the A.M and 8 units Nightly, Disp: , Rfl:     iron polysaccharides (NIFEREX) 150 mg iron Cap, Take 1 capsule (150 mg total) by mouth once daily., Disp: 30 capsule, Rfl: 1    metoclopramide HCl (REGLAN) 10 MG tablet, Take 1 tablet (10 mg total) by mouth 4 (four) times daily., Disp: 120 tablet, Rfl: 2    NIFEdipine (PROCARDIA-XL) 60 MG (OSM) 24 hr tablet, Take 1 tablet (60 mg total) by mouth 2 (two) times a day., Disp: 60 tablet, Rfl: 11    NOVOLOG FLEXPEN U-100 INSULIN 100 unit/mL (3 mL) InPn pen, Inject under the skin three times a day based on carb counting. Max daily dose of 70 units, Disp: 5 each, Rfl: 3    ondansetron (ZOFRAN-ODT) 4 MG TbDL, Take 4 mg by mouth every 8 (eight) hours as needed., Disp: , Rfl:     promethazine (PHENERGAN) 25 MG tablet, Take 1 tablet (25 mg total) by mouth every 4 (four) hours., Disp: 60 tablet, Rfl: 0    sertraline (ZOLOFT) 50 MG tablet, Take 1 tablet (50 mg total) by mouth once daily., Disp: 30 tablet, Rfl: 11    blood-glucose meter,continuous (DEXCOM G6 ) Misc, 1 Device by Misc.(Non-Drug; Combo Route) route once. for 1 dose, Disp: 1 each, Rfl: 0    HYDROcodone-acetaminophen (NORCO) 5-325 mg per tablet, Take 1 tablet by mouth every 6 (six) hours as needed for Pain. (Patient not taking: Reported on 11/10/2022), Disp: 7 tablet, Rfl: 0    PNV,calcium 72/iron/folic acid (PRENATAL VITAMIN) Tab, Take 1 tablet by mouth  "once daily. for 7 days, Disp: 7 tablet, Rfl: 0     ROS:  GENERAL: No fever, chills, fatigability.  VULVAR: No pain, no lesions and no itching.  VAGINAL: No relaxation, no itching, no discharge, no abnormal bleeding and no lesions.  ABDOMEN: No abdominal pain. Denies nausea. Denies vomiting. No diarrhea. No constipation  BREAST: Denies pain. No lumps.  URINARY: No incontinence, no nocturia, no frequency and no dysuria.  CARDIOVASCULAR: No chest pain. No shortness of breath. No leg cramps.  NEUROLOGICAL: No headaches. No vision changes.      PHYSICAL EXAM:  BP (!) 110/58   Ht 5' 1" (1.549 m)   Wt 56.2 kg (124 lb)   LMP 2022 (Approximate)   Breastfeeding No   BMI 23.43 kg/m²    Exam chaperoned by nurse  General:  Well-developed, well-nourished female in no acute distress  HEENT:  Normocephalic, atraumatic, extraocular muscles intact  Breasts:  Nontender, no masses, bilaterally symmetric  Abdomen:  Soft, nontender, nondistended, fundus firm and below umbilicus, incision c/d/i  Extremities:  Warm, dry, no clubbing/cyanosis/edema  Neurologic:  Cranial nerves 2-12 grossly intact  Dermatologic:  No rashes/lesions/bruising    IMP:  Status post  section    PLAN:  Doing well.  Depression precautions discussed.  Method of contraception discussed and patient has decided.  Pregnancy spacing discussed.  Patient expressed understanding.  Return for annual exam.  Patient needs follow-up with Internal Medicine Endocrinology.  Names given for patient to follow-up for internal medicine.  Return for annual exam.    "

## 2022-11-15 PROBLEM — Z30.9 ENCOUNTER FOR CONTRACEPTIVE MANAGEMENT: Status: ACTIVE | Noted: 2022-11-15

## 2022-11-17 ENCOUNTER — TELEPHONE (OUTPATIENT)
Dept: PRIMARY CARE CLINIC | Facility: CLINIC | Age: 27
End: 2022-11-17
Payer: MEDICAID

## 2022-11-17 NOTE — TELEPHONE ENCOUNTER
Dr. Talbot is not taking new patients at this time. [facility] accepts this patient's insurance if you would like to send her there.

## 2022-11-17 NOTE — TELEPHONE ENCOUNTER
----- Message from Patricia Felder sent at 11/17/2022  3:57 PM CST -----  Contact: PT  Type: Needs Medical Advice    Who Called: PT  Best Call Back Number: [phone]  Additional  Information: PT asking for a call back to get a new referral for Pain Management, that is accepting new Medicaid pts. Would like Dr. Hernando Tlabot, within [facility] network  Please Advise- Than

## 2022-11-18 ENCOUNTER — PATIENT MESSAGE (OUTPATIENT)
Dept: PRIMARY CARE CLINIC | Facility: CLINIC | Age: 27
End: 2022-11-18
Payer: MEDICAID

## 2022-11-18 DIAGNOSIS — E11.43 PERIPHERAL AUTONOMIC NEUROPATHY DUE TO DIABETES MELLITUS: Primary | ICD-10-CM

## 2022-11-22 ENCOUNTER — TELEPHONE (OUTPATIENT)
Dept: PRIMARY CARE CLINIC | Facility: CLINIC | Age: 27
End: 2022-11-22
Payer: MEDICAID

## 2022-11-22 NOTE — TELEPHONE ENCOUNTER
Spoke with pt advised that [facility] pain management requires imaging and [facility] cannot locate EMG/NVC due to pt having a large file with them. Pt states they were not the ones that did the test directly. Advised pt to call [facility] and try to work with them on obtaining that EMG result because it's required by [facility] pain management. Pt verbalized understanding.

## 2022-11-25 ENCOUNTER — TELEPHONE (OUTPATIENT)
Dept: FAMILY MEDICINE | Facility: CLINIC | Age: 27
End: 2022-11-25
Payer: MEDICAID

## 2022-11-25 NOTE — TELEPHONE ENCOUNTER
Call placed to patient due to message left, currently not available message left for return call.  ----- Message from Lu Keita sent at 11/23/2022 12:24 PM CST -----  Contact: Patient  Type:  Needs Medical Advice    Who Called: Patient       Would the patient rather a call back or a response via [facility]? Call    Best Call Back Number: [phone] (home)     Additional Information:  Patient is needing to get docs filled out. Please advise how she should go about getting the forms to the office

## 2022-12-02 ENCOUNTER — PATIENT MESSAGE (OUTPATIENT)
Dept: PRIMARY CARE CLINIC | Facility: CLINIC | Age: 27
End: 2022-12-02
Payer: MEDICAID

## 2022-12-07 ENCOUNTER — TELEPHONE (OUTPATIENT)
Dept: FAMILY MEDICINE | Facility: CLINIC | Age: 27
End: 2022-12-07
Payer: MEDICAID

## 2022-12-07 ENCOUNTER — TELEPHONE (OUTPATIENT)
Dept: PRIMARY CARE CLINIC | Facility: CLINIC | Age: 27
End: 2022-12-07
Payer: MEDICAID

## 2022-12-07 NOTE — TELEPHONE ENCOUNTER
Referral faxed to [facility] management. Phone [phone], Fax [phone]

## 2022-12-07 NOTE — TELEPHONE ENCOUNTER
Call placed to patient due to message left. Patient currently not available message left for return call.  ----- Message from Lu Keita sent at 12/6/2022  3:48 PM CST -----  Contact: Patient  Type:  Needs Medical Advice    Who Called: Patient    Would the patient rather a call back or a response via [facility]? Call    Best Call Back Number: [phone] (home)     Additional Information: Please call to advise

## 2023-01-02 PROBLEM — N39.0 UTI (URINARY TRACT INFECTION): Status: RESOLVED | Noted: 2022-09-22 | Resolved: 2023-01-02

## 2023-01-06 RX ORDER — HYDROCHLOROTHIAZIDE 25 MG/1
TABLET ORAL
Qty: 30 TABLET | Refills: 1 | OUTPATIENT
Start: 2023-01-06

## 2023-01-11 ENCOUNTER — TELEPHONE (OUTPATIENT)
Dept: OBSTETRICS AND GYNECOLOGY | Facility: CLINIC | Age: 28
End: 2023-01-11
Payer: MEDICAID

## 2023-01-11 NOTE — TELEPHONE ENCOUNTER
----- Message from Yun Miner MA sent at 1/10/2023  5:11 PM CST -----  Contact: pt    ----- Message -----  From: Hina Rodrigues  Sent: 1/6/2023   2:05 PM CST  To: Mechelle Anderson (Obgyn) Staff    Patient is calling she had her baby 09/25/22 and she is still bleeding she has some concerns   Please give pt a call [phone]

## 2023-01-11 NOTE — TELEPHONE ENCOUNTER
Pt scheduled 1/13/23 at [address] location with Dr. Min. Pt confirmed time and location        ----- Message from Hina Rodrigues sent at 1/6/2023  2:01 PM CST -----  Contact: pt  Patient is calling she had her baby 09/25/22 and she is still bleeding she has some concerns   Please give pt a call [phone]

## 2023-01-12 ENCOUNTER — OFFICE VISIT (OUTPATIENT)
Dept: OBSTETRICS AND GYNECOLOGY | Facility: CLINIC | Age: 28
End: 2023-01-12
Payer: MEDICAID

## 2023-01-12 VITALS
BODY MASS INDEX: 21.52 KG/M2 | SYSTOLIC BLOOD PRESSURE: 120 MMHG | WEIGHT: 114 LBS | HEIGHT: 61 IN | DIASTOLIC BLOOD PRESSURE: 66 MMHG

## 2023-01-12 DIAGNOSIS — N93.9 ABNORMAL UTERINE BLEEDING: Primary | ICD-10-CM

## 2023-01-12 DIAGNOSIS — Z30.09 GENERAL COUNSELING AND ADVICE ON CONTRACEPTIVE MANAGEMENT: ICD-10-CM

## 2023-01-12 PROCEDURE — 3078F DIAST BP <80 MM HG: CPT | Mod: CPTII,S$GLB,, | Performed by: OBSTETRICS & GYNECOLOGY

## 2023-01-12 PROCEDURE — 1159F PR MEDICATION LIST DOCUMENTED IN MEDICAL RECORD: ICD-10-PCS | Mod: CPTII,S$GLB,, | Performed by: OBSTETRICS & GYNECOLOGY

## 2023-01-12 PROCEDURE — 1159F MED LIST DOCD IN RCRD: CPT | Mod: CPTII,S$GLB,, | Performed by: OBSTETRICS & GYNECOLOGY

## 2023-01-12 PROCEDURE — 3078F PR MOST RECENT DIASTOLIC BLOOD PRESSURE < 80 MM HG: ICD-10-PCS | Mod: CPTII,S$GLB,, | Performed by: OBSTETRICS & GYNECOLOGY

## 2023-01-12 PROCEDURE — 3008F BODY MASS INDEX DOCD: CPT | Mod: CPTII,S$GLB,, | Performed by: OBSTETRICS & GYNECOLOGY

## 2023-01-12 PROCEDURE — 11982 REMOVE DRUG IMPLANT DEVICE: CPT | Mod: S$GLB,,, | Performed by: OBSTETRICS & GYNECOLOGY

## 2023-01-12 PROCEDURE — 99213 OFFICE O/P EST LOW 20 MIN: CPT | Mod: 25,,, | Performed by: OBSTETRICS & GYNECOLOGY

## 2023-01-12 PROCEDURE — 3008F PR BODY MASS INDEX (BMI) DOCUMENTED: ICD-10-PCS | Mod: CPTII,S$GLB,, | Performed by: OBSTETRICS & GYNECOLOGY

## 2023-01-12 PROCEDURE — 99213 PR OFFICE/OUTPT VISIT, EST, LEVL III, 20-29 MIN: ICD-10-PCS | Mod: 25,,, | Performed by: OBSTETRICS & GYNECOLOGY

## 2023-01-12 PROCEDURE — 3074F PR MOST RECENT SYSTOLIC BLOOD PRESSURE < 130 MM HG: ICD-10-PCS | Mod: CPTII,S$GLB,, | Performed by: OBSTETRICS & GYNECOLOGY

## 2023-01-12 PROCEDURE — 11982 PR REMOVAL DRUG IMPLANT DEVICE: ICD-10-PCS | Mod: S$GLB,,, | Performed by: OBSTETRICS & GYNECOLOGY

## 2023-01-12 PROCEDURE — 3074F SYST BP LT 130 MM HG: CPT | Mod: CPTII,S$GLB,, | Performed by: OBSTETRICS & GYNECOLOGY

## 2023-01-13 ENCOUNTER — PATIENT MESSAGE (OUTPATIENT)
Dept: PRIMARY CARE CLINIC | Facility: CLINIC | Age: 28
End: 2023-01-13
Payer: MEDICAID

## 2023-01-13 ENCOUNTER — TELEPHONE (OUTPATIENT)
Dept: FAMILY MEDICINE | Facility: CLINIC | Age: 28
End: 2023-01-13
Payer: MEDICAID

## 2023-01-13 RX ORDER — NORELGESTROMIN AND ETHINYL ESTRADIOL 35; 150 UG/MG; UG/MG
1 PATCH TRANSDERMAL
Qty: 4 PATCH | Refills: 11 | Status: SHIPPED | OUTPATIENT
Start: 2023-01-13 | End: 2024-01-13

## 2023-01-16 PROBLEM — N17.9 ACUTE RENAL FAILURE SUPERIMPOSED ON STAGE 2 CHRONIC KIDNEY DISEASE: Status: RESOLVED | Noted: 2022-09-24 | Resolved: 2023-01-16

## 2023-01-16 PROBLEM — N18.2 ACUTE RENAL FAILURE SUPERIMPOSED ON STAGE 2 CHRONIC KIDNEY DISEASE: Status: RESOLVED | Noted: 2022-09-24 | Resolved: 2023-01-16

## 2023-01-18 NOTE — PROGRESS NOTES
"     Patient ID: Alicia Valles is a 27 y.o. female.    Chief Complaint:  Vaginal Bleeding (Pt is here for vaginal bleeding since delivery 22. Pt has a Nexplanon placement)      History of Present Illness  Vaginal Bleeding  The patient's pertinent negatives include no pelvic pain or vaginal discharge. Pertinent negatives include no abdominal pain, back pain, constipation, diarrhea, dysuria, fever, headaches, nausea, rash or vomiting. Her past medical history is significant for menorrhagia.   Patient currently has Nexplanon placed.  She has had this in since delivery.  She states over the last 4-5 months she is had basically continuous increase in decreased vaginal bleeding.  She is attempted hormonal treatment without failure.  She now desires removal of the Nexplanon but she does desire to continue contraception.  She states she would like to try the patch.  Her cycles prior to get the Nexplanon were normal.  Her delivery was normal.  She states she has become to frustrated with this bleeding number like to change her contraceptive    GYN & OB History  No LMP recorded (lmp unknown).   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 100 each 3    blood-glucose sensor (DEXCOM G6 SENSOR) Sandrine 3 Devices by Misc.(Non-Drug; Combo Route) route every 30 days. 1 each 5    blood-glucose transmitter (DEXCOM G6 TRANSMITTER) Sandrine 2 Devices by Misc.(Non-Drug; Combo Route) route every 6 (six) months. 1 each 5    carvediloL (COREG) 25 MG tablet Take 1 tablet (25 mg total) by mouth 2 (two) times daily. 60 tablet 11    diphenhydrAMINE (BENADRYL) 25 mg capsule Take 25 mg by mouth daily as needed for Insomnia ((if Unisom ineffective)).      docusate sodium (COLACE) 100 MG capsule Take 100 mg by mouth once daily.      GLUCAGEN HYPOKIT 1 mg SolR USE AS DIRECTED FOR HYPOGLYCEMIA INJECTION AS NEEDED      glucagon (BAQSIMI) 3 mg/actuation Spry 1 each by Nasal route as needed (For Hypoglycemia). 2 each 3    hydrALAZINE (APRESOLINE) 100 MG tablet Take 1 tablet (100 mg total) by mouth every 8 (eight) hours. 90 tablet 11    hydroCHLOROthiazide (HYDRODIURIL) 25 MG tablet TAKE 1 TABLET(25 MG) BY MOUTH EVERY DAY. STOP THE CLONIDINE PATCH 30 tablet 1    hydroquinone 4 % Crea SMARTSI Sparingly Topical Twice Daily      insulin admin supplies (INPEN, NOVOLOG OR FIASP, PINK) InPn Use device to inject insulin. Dispense one kit. 1 each 0    insulin aspart U-100 (NOVOLOG) 100 unit/mL (3 mL) InPn pen SMARTSI Unit(s) SUB-Q Daily      insulin detemir (LEVEMIR FLEXTOUCH U-100 INSULN SUBQ) Inject into the skin. 10 units in the A.M and 8 units Nightly      iron polysaccharides (NIFEREX) 150 mg iron Cap Take 1 capsule (150 mg total) by mouth once daily. 30 capsule 1    metoclopramide HCl (REGLAN) 10 MG tablet Take 1 tablet (10 mg total) by mouth 4 (four) times daily. 120 tablet 2    NIFEdipine (PROCARDIA-XL) 60 MG (OSM) 24 hr tablet Take 1 tablet (60 mg total) by mouth 2 (two) times a day. 60 tablet 11    NOVOLOG FLEXPEN U-100 INSULIN 100 unit/mL (3 mL) InPn pen Inject under the skin three times a day based on carb counting. Max daily dose of 70 units  5 each 3    ondansetron (ZOFRAN-ODT) 4 MG TbDL Take 4 mg by mouth every 8 (eight) hours as needed.      sertraline (ZOLOFT) 50 MG tablet Take 1 tablet (50 mg total) by mouth once daily. 30 tablet 11    blood-glucose meter,continuous (DEXCOM G6 ) Misc 1 Device by Misc.(Non-Drug; Combo Route) route once. for 1 dose 1 each 0    fluconazole (DIFLUCAN) 150 MG Tab Take by mouth.      HYDROcodone-acetaminophen (NORCO) 5-325 mg per tablet Take 1 tablet by mouth every 6 (six) hours as needed for Pain. (Patient not taking: Reported on 11/10/2022) 7 tablet 0    PNV,calcium 72/iron/folic acid (PRENATAL VITAMIN) Tab Take 1 tablet by mouth once daily. for 7 days 7 tablet 0    promethazine (PHENERGAN) 25 MG tablet Take 1 tablet (25 mg total) by mouth every 4 (four) hours. (Patient not taking: Reported on 2023) 60 tablet 0    [DISCONTINUED] methyldopa (ALDOMET) 250 MG tablet Take 500 mg by mouth 2 (two) times daily.      [DISCONTINUED] pregabalin (LYRICA) 150 MG capsule Take 150 mg by mouth.       No current facility-administered medications on file prior to visit.       OB History    Para Term  AB Living   4 1 0 1 2 1   SAB IAB Ectopic Multiple Live Births   0 0 0   1      # Outcome Date GA Lbr Alireza/2nd Weight Sex Delivery Anes PTL Lv   4  22 30w1d  1.276 kg (2 lb 13 oz) F    LUC   3 AB            2 AB            1                 Review of Systems  Review of Systems   Constitutional:  Negative for fatigue, fever and unexpected weight change.   HENT:  Negative for nasal congestion.    Respiratory:  Negative for cough and shortness of breath.    Cardiovascular:  Negative for chest pain, palpitations and leg swelling.   Gastrointestinal:  Negative for abdominal pain, constipation, diarrhea, nausea, vomiting and reflux.   Endocrine: Negative for diabetes, hair loss and hot flashes.   Genitourinary:  Positive for menorrhagia, menstrual problem and vaginal bleeding. Negative for bladder  "incontinence, decreased libido, dysmenorrhea, dyspareunia, dysuria, hot flashes, pelvic pain, vaginal discharge and vaginal dryness.   Musculoskeletal:  Negative for arthralgias, back pain and myalgias.   Integumentary:  Negative for rash, acne, hair changes, mole/lesion, breast mass, nipple discharge, breast skin changes and breast tenderness.   Neurological:  Negative for seizures, syncope and headaches.   Hematological:  Negative for adenopathy. Does not bruise/bleed easily.   Psychiatric/Behavioral:  Negative for depression and sleep disturbance. The patient is not nervous/anxious.    Breast: Negative for breast self exam, lump, mass, mastodynia, nipple discharge, skin changes and tenderness            Objective:   /66   Ht 5' 1" (1.549 m)   Wt 51.7 kg (114 lb)   LMP  (LMP Unknown)   Breastfeeding No   BMI 21.54 kg/m²        Physical Exam:   Constitutional: She is oriented to person, place, and time. She appears well-developed and well-nourished. No distress.    HENT:   Head: Normocephalic and atraumatic.   Nose: Nose normal.            Abdominal: Soft. She exhibits no distension and no mass. There is no abdominal tenderness.             Musculoskeletal: Normal range of motion and moves all extremeties. No edema.       Neurological: She is alert and oriented to person, place, and time. No cranial nerve deficit.     Psychiatric: She has a normal mood and affect. Her behavior is normal. Judgment and thought content normal.       Procedure:  Nexplanon removal    Nexplanon palpated through the skin.  Area wiped with rubbing alcohol.  3 cc of 1% lidocaine without epinephrine was injected in the subcutaneous tissue.  The area was prepped with betadine.  A stabbing incision was made with an 11 blade scalpel.  The Nexplanon was identified and grasped with curved hemostat.  It was removed intact.  A bandage was placed over the incision site.      Patient instructed to call the office if she has excessive " bleeding, redness, discharge from the site or fever.          Assessment:      1. Abnormal uterine bleeding    2. General counseling and advice on contraceptive management          Plan:       Removal of Nexplanon without complication.  Recommend starting oral contraceptive patch after extensive discussion regarding contraception.  Administration and application were discussed at length and patient expressed understanding.  Return 1 year for annual.

## 2023-01-27 ENCOUNTER — TELEPHONE (OUTPATIENT)
Dept: PRIMARY CARE CLINIC | Facility: CLINIC | Age: 28
End: 2023-01-27

## 2023-01-27 NOTE — TELEPHONE ENCOUNTER
----- Message from Hina Rodrigues sent at 1/27/2023  1:47 PM CST -----  Contact: pt  Patient is calling wanting to know about an referral for pain management and who is she suppose to go see   Please give pt a call back [phone]

## 2023-01-27 NOTE — TELEPHONE ENCOUNTER
I relayed the message to the patient and she would like to see Neurology. She has no prior imaging. Patient is aware that this will be handled once you returned.

## 2023-02-01 ENCOUNTER — PATIENT MESSAGE (OUTPATIENT)
Dept: PRIMARY CARE CLINIC | Facility: CLINIC | Age: 28
End: 2023-02-01
Payer: MEDICAID

## 2023-02-01 DIAGNOSIS — E10.43 TYPE 1 DIABETES MELLITUS WITH PERIPHERAL AUTONOMIC NEUROPATHY: Primary | ICD-10-CM

## 2023-02-08 ENCOUNTER — TELEPHONE (OUTPATIENT)
Dept: PRIMARY CARE CLINIC | Facility: CLINIC | Age: 28
End: 2023-02-08

## 2023-02-08 ENCOUNTER — TELEPHONE (OUTPATIENT)
Dept: LAB | Facility: CLINIC | Age: 28
End: 2023-02-08
Payer: MEDICAID

## 2023-02-08 ENCOUNTER — OFFICE VISIT (OUTPATIENT)
Dept: PRIMARY CARE CLINIC | Facility: CLINIC | Age: 28
End: 2023-02-08
Payer: MEDICAID

## 2023-02-08 VITALS
RESPIRATION RATE: 18 BRPM | TEMPERATURE: 98 F | HEIGHT: 61 IN | SYSTOLIC BLOOD PRESSURE: 130 MMHG | BODY MASS INDEX: 22.78 KG/M2 | OXYGEN SATURATION: 99 % | WEIGHT: 120.63 LBS | DIASTOLIC BLOOD PRESSURE: 80 MMHG | HEART RATE: 92 BPM

## 2023-02-08 DIAGNOSIS — Z78.9 NONSMOKER: ICD-10-CM

## 2023-02-08 DIAGNOSIS — E10.3591: ICD-10-CM

## 2023-02-08 DIAGNOSIS — I10 PRIMARY HYPERTENSION: Primary | ICD-10-CM

## 2023-02-08 PROCEDURE — 99213 OFFICE O/P EST LOW 20 MIN: CPT | Mod: S$GLB,,, | Performed by: FAMILY MEDICINE

## 2023-02-08 PROCEDURE — 3075F PR MOST RECENT SYSTOLIC BLOOD PRESS GE 130-139MM HG: ICD-10-PCS | Mod: CPTII,S$GLB,, | Performed by: FAMILY MEDICINE

## 2023-02-08 PROCEDURE — 3008F PR BODY MASS INDEX (BMI) DOCUMENTED: ICD-10-PCS | Mod: CPTII,S$GLB,, | Performed by: FAMILY MEDICINE

## 2023-02-08 PROCEDURE — 1159F MED LIST DOCD IN RCRD: CPT | Mod: CPTII,S$GLB,, | Performed by: FAMILY MEDICINE

## 2023-02-08 PROCEDURE — 3008F BODY MASS INDEX DOCD: CPT | Mod: CPTII,S$GLB,, | Performed by: FAMILY MEDICINE

## 2023-02-08 PROCEDURE — 3079F DIAST BP 80-89 MM HG: CPT | Mod: CPTII,S$GLB,, | Performed by: FAMILY MEDICINE

## 2023-02-08 PROCEDURE — 1159F PR MEDICATION LIST DOCUMENTED IN MEDICAL RECORD: ICD-10-PCS | Mod: CPTII,S$GLB,, | Performed by: FAMILY MEDICINE

## 2023-02-08 PROCEDURE — 3079F PR MOST RECENT DIASTOLIC BLOOD PRESSURE 80-89 MM HG: ICD-10-PCS | Mod: CPTII,S$GLB,, | Performed by: FAMILY MEDICINE

## 2023-02-08 PROCEDURE — 3075F SYST BP GE 130 - 139MM HG: CPT | Mod: CPTII,S$GLB,, | Performed by: FAMILY MEDICINE

## 2023-02-08 PROCEDURE — 99213 PR OFFICE/OUTPT VISIT, EST, LEVL III, 20-29 MIN: ICD-10-PCS | Mod: S$GLB,,, | Performed by: FAMILY MEDICINE

## 2023-02-08 RX ORDER — DOXYCYCLINE HYCLATE 50 MG/1
50 CAPSULE ORAL 2 TIMES DAILY
COMMUNITY
Start: 2023-01-04 | End: 2023-02-08

## 2023-02-08 RX ORDER — HYDROCHLOROTHIAZIDE 25 MG/1
TABLET ORAL
Qty: 30 TABLET | Refills: 1 | OUTPATIENT
Start: 2023-02-08

## 2023-02-08 RX ORDER — HYDROCHLOROTHIAZIDE 25 MG/1
25 TABLET ORAL DAILY
Qty: 90 TABLET | Refills: 1 | Status: SHIPPED | OUTPATIENT
Start: 2023-02-08 | End: 2023-05-22 | Stop reason: SDUPTHER

## 2023-02-08 NOTE — PROGRESS NOTES
Subjective:       Patient ID: Alicia Valles is a 27 y.o. female.    Chief Complaint: Follow-up (3 week) and Medication Refill    Pt here with her mother and infant    HTN f/u: complaint with medications, out of hctz since yesterday. Mother states pt has long hx with htn and this regimen works. Pt no longer breast feeding. Mother upset that pt had to go day without medication states it was difficult to get in touch with someone regarding refill request as no one told them pt needed to make an appointment.     Dm with periperhal neuro: still following with endo, states had diabetic eye [facility]. Has appointment with ne    Health Maintenance:  pap smear still overdue.     Review of Systems   Constitutional:  Negative for appetite change, chills, fatigue, fever and unexpected weight change.   HENT:  Negative for ear discharge, ear pain, hearing loss, mouth dryness, mouth sores, postnasal drip, rhinorrhea, sinus pressure/congestion, sneezing, sore throat and trouble swallowing.    Eyes:  Negative for photophobia, pain, discharge, redness and itching.   Respiratory:  Negative for cough, chest tightness, shortness of breath and wheezing.    Cardiovascular:  Negative for chest pain, palpitations and leg swelling.   Gastrointestinal:  Negative for abdominal pain, blood in stool, constipation, diarrhea, nausea and vomiting.   Endocrine: Negative for cold intolerance, heat intolerance, polydipsia, polyphagia and polyuria.   Genitourinary:  Negative for bladder incontinence, difficulty urinating, dysuria, frequency, hematuria, nocturia and urgency.   Musculoskeletal:  Negative for arthralgias, gait problem and joint swelling.   Integumentary:  Negative for rash and wound.   Neurological:  Negative for dizziness, vertigo, syncope, weakness, numbness and headaches.   Psychiatric/Behavioral:  Negative for agitation, behavioral problems, confusion, dysphoric mood, self-injury, sleep  disturbance and suicidal ideas. The patient is not nervous/anxious and is not hyperactive.        Objective:      Physical Exam  Vitals reviewed.   Constitutional:       General: She is not in acute distress.     Appearance: Normal appearance. She is normal weight. She is not ill-appearing or toxic-appearing.   Cardiovascular:      Rate and Rhythm: Regular rhythm.      Pulses: Normal pulses.      Heart sounds: Normal heart sounds.   Pulmonary:      Effort: Pulmonary effort is normal.      Breath sounds: Normal breath sounds.   Neurological:      General: No focal deficit present.      Mental Status: She is alert and oriented to person, place, and time.   Psychiatric:         Mood and Affect: Mood normal.         Behavior: Behavior normal.       Assessment:       Problem List Items Addressed This Visit    None  Visit Diagnoses       Primary hypertension    -  Primary              Plan:       Chart reviewed  Pt has upcoming labs via endo  Refilled bp medication  Advised pt's mother that I will check with my staff to make sure they are calling patients to schedule appointments when I decline medication requests. Advised as well that I mentioned to pt back in dec that she would need an appointment in January for medication refills.   Will obtain diabetic eye information  Advised pt to schedule pap smear with gyn  Risks, benefits, and side effects were discussed with the patient. All questions were answered to the fullest satisfaction of the patient, and pt verbalized understanding and agreement to treatment plan. Pt was to call with any new or worsening symptoms, or present to the ER.  RTC 6 months or sooner if needed       Giovanna Hyatt MD  Family Medicine Physician   [facility]

## 2023-02-08 NOTE — TELEPHONE ENCOUNTER
----- Message from Jessica Hui sent at 2/8/2023 10:47 AM CST -----  Contact: called at [phone]  Type:  RX Refill Request    Who Called:  Pt  Refill or New Rx:  Refill  RX Name and Strength:  hydroCHLOROthiazide (HYDRODIURIL) 25 MG tablet  How is the patient currently taking it? (ex. 1XDay):  As directed  Is this a 30 day or 90 day RX:  Enough to get her appt on 02/10/2023  Preferred Pharmacy with phone number:    [facility] #[id number] - [place], [place] - [address] AT [address]  [address]  [place] [address]  Phone: [phone] Fax: [phone]  Local or Mail Order:  Local  Ordering Provider:  Dr. BRAXTON Mckeon Call Back Number:  [phone]  Additional Information:  Pt is calling to at least get enough pills of the med (hydroCHLOROthiazide (HYDRODIURIL) 25 MG tablet), to get her to her appt on 02/10/2023. Pt is completely out of that med. Please call back and advise.

## 2023-02-08 NOTE — TELEPHONE ENCOUNTER
Call placed to patient due to medication refill request. Noted LOV 10/31/2022. Patient needs an updated appointment. Patient currently unavailable message left for return call for scheduling an appt.

## 2023-02-08 NOTE — TELEPHONE ENCOUNTER
----- Message from Tenisha Guillaume sent at 2/8/2023  9:55 AM CST -----  Contact: pt ,mother  Type:  RX Refill Request    Who Called:  pt;s mother Polina   Refill or New Rx:  refill   RX Name and Strength:  hydroCHLOROthiazide (HYDRODIURIL) 25 MG tablet  How is the patient currently taking it? (ex. 1XDay):  daily  Is this a 30 day or 90 day RX:  90  Preferred Pharmacy with phone number:   [facility] #[id number] - [place], [place] - [address] AT [address];  Local or Mail Order:  local  Ordering Provider:  Edmar Mckeon Call Back Number:  [phone]  Additional Information:  Mother called they went to the [facility] today the med was denied  the pt Is out of her Blood Pressure med. She took the last one last night please call in today as soon as possible she has not  taken her medication she is out of the med

## 2023-02-08 NOTE — TELEPHONE ENCOUNTER
----- Message from Tenisha Guillaume sent at 2/8/2023 11:10 AM CST -----  Type:  Same Day Appointment Request    Caller is requesting a same day appointment.  Caller declined first available appointment listed below.      Name of Caller:  patient     When is the first available appointment?  2/10    Symptoms:  med refill     Best Call Back Number:  [phone]    Additional Information:   Patient called she is asking if you will be able to see her today.    She needs to be seen today.

## 2023-02-14 NOTE — ASSESSMENT & PLAN NOTE
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 On 2/13, pt had inc WOB and inc secretions. Sister gave albuterol, CPT, suctioning. Pulse ox in 70s at home and then low 80s after suctioning so brought to ED. Had fever 6d ago, but none since. Pt had intermittent coughing in hospital, otherwise at baseline. Denies vomiting, diarrhea, rhinorrhea, congestion, abdominal pain. Has been taking all medications.    Initial labs:  Quad neg  CBC UR  CMP UR  CRP 2.4 (H)  Procal UR  BCX NGTD <24h  RPP pend    Initial imaging:  CXR - RLL opacity (mild vol loss vs PNA)    Initial Tx:  Unasyn  Duoneb  Solumedrol  MIVF D5NS w/ KCl 20 mEq  HFNC 30L 50% (max)    Since being in PICU, pt has been weaned HFNC to 15L 30% with lowest sat 91% while asleep. Pulm toilet. No fevers.    Recurrent PNA Hx:  Pt has been getting recurrent PNA for the past year. Mom states last was nov 2022. Mom states she came to [facility] for all of the times pt had PNA. On chart review, pt at [facility] for RLL PNA on 11/18/22 treated with augmentin. 6/24/22 for RLL PNA tx with augmentin. 11/22/21 for RML PNA tx with CTX. 6/21/21 - sputum cx + for rare staph - MSSA, tx with oxacillin.    Restrictive lung disease Hx:   Pt has scoliosis and abnormal tone with Cerebral palsy. Mom states that pt was put on O2 in 2020. States that it is 2L O2 at baseline  Continue to work on dietary modifications.  In the future if she starts to hypoglycemia from not tolerating meals can consider giving half the bolus prior to eating and the other afterwards.   and 3-5L when pt is sick.    Asthma history: Severe persistent  Diagnosed on: 20  History of RSV infection: yes, 20  History of prematurity: yes, 24-weeker, NICU ETT x2 mos  Prior ED, hosp visits, PICU: 20, 3/24/21, 21, 21, 10/29/21, 21(no PICU), 3/27/22, 22, 22, 22  Prior Hx of intubation: yes, 21  Followed by pulmonology: Yes - [facility]    Severity -   Last steroids: 22  Steroid frequency in previous year: 4x  Last abx: 22  Night time awakenings: None  Albuterol frequency: Daily as controller medication  Controller med (name and start date):    Albuterol 2.5 mg neb BID    Flovent 220 mcg 2 puffs BID 20    Triggers/Risk -   Known triggers: colds  Family Hx: None  Allergy/atopy Hx: Allergic to vanco and ibuprofen      Pediatric History  Birth History   • Gestation Age: 24 wks     The infant was born premature and born at [facility], NICU x2 months by primary  section. Maternal problems: Mom was on Dialysis.        Past Medical History  Past Medical History:   Diagnosis Date   • Bilateral retinopathy of prematurity, stage 3 2015   • Cerebral palsy (CMS/HCC)    • Developmental delay    • Extreme immaturity of , gestational age 24 completed weeks    • Fever of unknown origin 2021   • G tube feedings (CMS/HCC)    • Global developmental delay 2016   • History of recurrent pneumonia 2019   • Horizontal nystagmus 2016   • Hydrocephalus (CMS/HCC)    • Other dysphagia    • Pneumonia of both lungs due to infectious organism 3/28/2022   • Pneumonia of right middle lobe due to infectious organism 2019   • Restrictive lung disease    • Rhinovirus infection 2021   • S/P  shunt    • Seizure (CMS/HCC)    • Thelarche, premature 2017   • Transfusion reaction 2019        Surgical History  Past Surgical History:   Procedure Laterality Date   • Csf shunt     • Dental surgery       Multiple dental carries extracted by oral surgeon.    • Eeg inc recording awake and drowsy  3/25/2021        • Eeg recording in coma or sleep only  3/28/2022        • Gastrostomy tube placement          Family History    Family History   Problem Relation Age of Onset   • Hyperlipidemia Maternal Grandmother    • Diabetes Maternal Grandmother    • Hyperlipidemia Maternal Grandfather    • Diabetes Maternal Grandfather    • Kidney disease Mother    • Diabetes Mother    • Hypertension Mother    • Cancer, Skin Melanoma Mother    • Seizure Disorder Sister    • Diabetes Paternal Grandmother    • ADHD/ADD Maternal Cousin    • Birth defects Paternal Cousin    • Diabetes Maternal Uncle    • Thyroid Paternal Aunt        Social History  Social History     Tobacco Use   • Smoking status: Never   • Smokeless tobacco: Never        Allergies  ALLERGIES:  Ibuprofen and Vancomycin    Medications  Current Facility-Administered Medications   Medication   • ampicillin-sulbactam (UNASYN) 30 mg of ampicillin/mL in sodium chloride 0.9% IVPB syringe 1,110 mg of ampicillin 37 mL total volume   • fluticasone propionate (FLOVENT HFA) 110 MCG/ACT inhaler 4 puff   • lidocaine (ANECREAM/LMX) 4 % cream 1 application   • sodium chloride (PF) 0.9 % injection 0.5-1 mL    And   • sodium chloride (PF) 0.9 % injection 0.5-1 mL   • cloBAZam (ONFI) 2.5 MG/ML suspension 10 mg   • OXcarbazepine (TRILEPTAL) 300 MG/5ML suspension 240 mg   • levETIRAcetam ORAL (KepPRA) 100 MG/ML oral solution 600 mg   • dextrose 5 % / sodium chloride 0.9% with KCl 20 mEq infusion         Review of Systems  A complete ROS was performed. Pertinent findings as documented in chart. All other systems negative except as documented.     Physical Exam  Physical Exam  Vitals reviewed.   Constitutional:       General: She is not in acute distress.     Appearance: She is not toxic-appearing.   HENT:      Right Ear: External ear normal.      Left Ear: External ear normal.      Nose:  Congestion present. No rhinorrhea.      Comments: HFNC in place  Inc secretions     Mouth/Throat:      Mouth: Mucous membranes are moist.   Eyes:      Conjunctiva/sclera: Conjunctivae normal.      Comments: +nystagmus   Cardiovascular:      Rate and Rhythm: Normal rate and regular rhythm.      Pulses: Normal pulses.      Heart sounds: Normal heart sounds. No murmur heard.  Pulmonary:      Effort: Pulmonary effort is normal. No respiratory distress.      Breath sounds: Decreased air movement (R side, Lower lobe> upper lobes) present. No stridor. No wheezing, rhonchi or rales.      Comments: Tracheal tugging  Pectus  No other visual signs of inc WOB  Overall, tight lung sounds  Abdominal:      General: Abdomen is flat.   Skin:     General: Skin is warm and dry.   Neurological:      Mental Status: She is alert.            Last Recorded Vitals    VITAL SIGNS:     Vital Last Value 24 Hour Range   Temperature 98.8 °F (37.1 °C) (02/14/23 1200) Temp  Min: 97 °F (36.1 °C)  Max: 98.9 °F (37.2 °C)   Pulse 100 (02/14/23 1200) Pulse  Min: 59  Max: 142   Respiratory 29 (02/14/23 1200) Resp  Min: 14  Max: 45   Non-Invasive  Blood Pressure 108/65 (02/14/23 1200) BP  Min: 91/56  Max: 124/89   Pulse Oximetry 97 % (02/14/23 1200) SpO2  Min: 88 %  Max: 98 %     Vital Today Admitted   Weight 23 kg (50 lb 11.3 oz) (02/13/23 2035) Weight: 22 kg (48 lb 8 oz) (02/13/23 1547)   Height N/A     Body Mass Index N/A       INTAKE/OUTPUT:      Intake/Output Summary (Last 24 hours) at 2/14/2023 1342  Last data filed at 2/14/2023 1159  Gross per 24 hour   Intake 1283.8 ml   Output 682 ml   Net 601.8 ml         Vent Settings Last Value   FiO2 30 % (02/14/23 1210)   Mode     Rate     Tidal Volume     Pressure Support     PEEP/CPAC/EPAP        Labs  Recent Results (from the past 24 hour(s))   COVID/Flu/RSV panel    Collection Time: 02/13/23  4:25 PM   Result Value Ref Range    Rapid SARS-COV-2 by PCR Not Detected Not Detected / Detected / Presumptive  Positive / Inhibitors present    Influenza A by PCR Not Detected Not Detected    Influenza B by PCR Not Detected Not Detected    RSV BY PCR Not Detected Not Detected    Isolation Guidelines      Procedural Comment     Blood Culture    Collection Time: 02/13/23  7:59 PM    Specimen: Blood, Source not specified   Result Value Ref Range    Culture, Blood or Bone Marrow No Growth <24 hours    Comprehensive Metabolic Panel    Collection Time: 02/13/23  8:00 PM   Result Value Ref Range    Fasting Status      Sodium 138 135 - 145 mmol/L    Potassium 4.5 3.4 - 5.1 mmol/L    Chloride 103 97 - 110 mmol/L    Carbon Dioxide 31 21 - 32 mmol/L    Anion Gap 9 7 - 19 mmol/L    Glucose 84 70 - 99 mg/dL    BUN 8 5 - 18 mg/dL    Creatinine 0.38 0.21 - 0.65 mg/dL    Glomerular Filtration Rate      BUN/ Creatinine Ratio 21 7 - 25    Calcium 9.9 8.0 - 11.0 mg/dL    Bilirubin, Total 0.2 0.2 - 1.4 mg/dL    GOT/AST 18 10 - 55 Units/L    GPT/ALT 27 10 - 30 Units/L    Alkaline Phosphatase 165 150 - 360 Units/L    Albumin 3.7 3.6 - 5.1 g/dL    Protein, Total 8.1 (H) 6.0 - 8.0 g/dL    Globulin 4.4 (H) 2.0 - 4.0 g/dL    A/G Ratio 0.8 (L) 1.0 - 2.4   C Reactive Protein    Collection Time: 02/13/23  8:00 PM   Result Value Ref Range    C-Reactive Protein 2.4 (H) <=1.0 mg/dL   Procalcitonin    Collection Time: 02/13/23  8:00 PM   Result Value Ref Range    Procalcitonin <0.05 <=0.09 ng/mL   CBC with Automated Differential (performable only)    Collection Time: 02/13/23  8:00 PM   Result Value Ref Range    WBC 10.3 5.0 - 14.5 K/mcL    RBC 4.67 3.90 - 5.30 mil/mcL    HGB 14.4 11.5 - 15.5 g/dL    HCT 43.0 35.0 - 45.0 %    MCV 92.1 77.0 - 95.0 fl    MCH 30.8 25.0 - 33.0 pg    MCHC 33.5 31.0 - 37.0 g/dL    RDW-CV 12.1 11.0 - 15.0 %    RDW-SD 40.8 35.0 - 47.0 fL     140 - 450 K/mcL    NRBC 0 <=0 /100 WBC    Neutrophil, Percent 69 %    Lymphocytes, Percent 24 %    Mono, Percent 4 %    Eosinophils, Percent 3 %    Basophils, Percent 0 %    Immature  Granulocytes 0 %    Absolute Neutrophils 7.1 1.5 - 8.0 K/mcL    Absolute Lymphocytes 2.5 1.5 - 7.0 K/mcL    Absolute Monocytes 0.4 0.0 - 0.8 K/mcL    Absolute Eosinophils  0.3 0.0 - 0.5 K/mcL    Absolute Basophils 0.0 0.0 - 0.2 K/mcL    Absolute Immmature Granulocytes 0.0 0.0 - 0.2 K/mcL   Basic Metabolic Panel    Collection Time: 02/14/23  4:16 AM   Result Value Ref Range    Fasting Status      Sodium 137 135 - 145 mmol/L    Potassium 5.3 (H) 3.4 - 5.1 mmol/L    Chloride 110 97 - 110 mmol/L    Carbon Dioxide 26 21 - 32 mmol/L    Anion Gap 6 (L) 7 - 19 mmol/L    Glucose 159 (H) 70 - 99 mg/dL    BUN 8 5 - 18 mg/dL    Creatinine 0.33 0.21 - 0.65 mg/dL    Glomerular Filtration Rate      BUN/ Creatinine Ratio 24 7 - 25    Calcium 9.3 8.0 - 11.0 mg/dL       Relevant Results  CATH XR CHEST PA OR AP 1 VIEW    Result Date: 2/13/2023  DATE OF EXAM: 2/13/2023 5:12 PM EXAM: XR CHEST AP OR PA INDICATION: hypoxia , shortness of breath  Findings: A single frontal view of the chest is compared with 11/18/2022 frontal view.  There is mild thoracic dextroscoliosis and patient is mildly rotated to the right on the current exam.  Cardiac silhouette appears within normal limits for size.  There is slight bandlike hazy density in the right lung base.  There is mild streaky retrocardiac density at the left lung base. No definite pleural fluid collection is seen.  Gastrostomy tube projects at the stomach.       Mild bandlike hazy opacification in the right lung base, as above.  This could represent mild volume loss or pneumonia. Electronically Signed by: DOMITILA GARCIA M.D. Signed on: 2/13/2023 7:43 PM      CATH XR CHEST PA OR AP 1 VIEW    Result Date: 2/13/2023  DATE OF EXAM: 2/13/2023 5:12 PM EXAM: XR CHEST AP OR PA INDICATION: hypoxia , shortness of breath  Findings: A single frontal view of the chest is compared with 11/18/2022 frontal view.  There is mild thoracic dextroscoliosis and patient is mildly rotated to the right on the  current exam.  Cardiac silhouette appears within normal limits for size.  There is slight bandlike hazy density in the right lung base.  There is mild streaky retrocardiac density at the left lung base. No definite pleural fluid collection is seen.  Gastrostomy tube projects at the stomach.       Mild bandlike hazy opacification in the right lung base, as above.  This could represent mild volume loss or pneumonia. Electronically Signed by: DOMITILA GARCIA M.D. Signed on: 2023 7:43 PM          Assessment & Plan  Patient Active Problem List   Diagnosis   • Bilateral retinopathy of prematurity, stage 3   • Cerebral palsy, quadriplegic (CMS/HCC)   • Global developmental delay   • Horizontal nystagmus   • Hydrocephalus (CMS/HCC)   • Thelarche, premature   • Dysphagia, oropharyngeal phase   • Seizure (CMS/HCC)   • Pneumonia of right middle lobe due to infectious organism   • History of prematurity   •  (ventriculoperitoneal) shunt status   • Restrictive lung disease   • History of recurrent pneumonia   • Gross motor development delay   • Transfusion reaction   • Congenital malformations of corpus callosum (CMS/HCC)   • Disorder of pituitary gland, unspecified (CMS/HCC)   • Gastrostomy status (CMS/HCC)   • Generalized convulsive epilepsy (CMS/HCC)   • Feeding by G-tube (CMS/HCC)   • Holoprosencephaly (CMS/HCC)   • Ineffective airway clearance   • Profound intellectual disability   • Adrenal hemorrhage of fetus or    • At risk for aspiration pneumonia   • Acute on chronic respiratory failure with hypoxia (CMS/HCC)   • Asthma   • Visual impairment   • Spastic quadriplegia (CMS/HCC)   • Pneumonia in child   • Profound developmental delay   • Dysphagia, oropharyngeal phase   • Acquired hydrocephalus (CMS/HCC)   • Seizure disorder (CMS/HCC)   • RDS (respiratory distress syndrome in the )   • Acute on chronic respiratory failure with hypoxemia (CMS/HCC)   • Pneumonia due to organism   • Hypoxia     Jessica  is a 5 y.o. female with complicated PMHx notable for prematurity with asthma, Recurrent aspiration PNA, and restrictive lung disease on home O2 of 2L who presents for AHRF 2/2 possible viral component, asthma exacerbation, and RLL PNA.    Recommendations  1. Wean O2 as tolerated to 2L O2 (baseline)  2. Start Albuterol QID  3. Cont home Flovent  4. Cont steroid course for 3d course  5. CPT and Cough assist QID to not disrupt pt sleep  6. Suction as needed  7. Cont Unasyn IV for 7d course  8. F/u RPP  9. F/u BCX    Discussed plan with Dr. Farris, pulmonology.    Discussed with Mother    Miki Orozco, DO  Pediatrics PGY1

## 2023-02-27 ENCOUNTER — TELEPHONE (OUTPATIENT)
Dept: ENDOCRINOLOGY | Facility: CLINIC | Age: 28
End: 2023-02-27
Payer: MEDICAID

## 2023-03-02 NOTE — NURSING
Component Ref Range & Units 02:20   (10/1/22)   1 d ago   (9/30/22) 4 mo ago   (5/11/22) 4 mo ago   (5/11/22) 4 mo ago   (5/10/22)   POC Glucose 70 - 110 MG/ Abnormal             Per pt dexcom. MD Crow notified. 2 units aspart ordered.    grimace/guarding

## 2023-03-03 ENCOUNTER — OFFICE VISIT (OUTPATIENT)
Dept: PRIMARY CARE CLINIC | Facility: CLINIC | Age: 28
End: 2023-03-03
Payer: MEDICAID

## 2023-03-03 DIAGNOSIS — E10.43 TYPE 1 DIABETES MELLITUS WITH PERIPHERAL AUTONOMIC NEUROPATHY: Primary | ICD-10-CM

## 2023-03-03 PROCEDURE — 99212 PR OFFICE/OUTPT VISIT, EST, LEVL II, 10-19 MIN: ICD-10-PCS | Mod: GT,,, | Performed by: FAMILY MEDICINE

## 2023-03-03 PROCEDURE — 99212 OFFICE O/P EST SF 10 MIN: CPT | Mod: GT,,, | Performed by: FAMILY MEDICINE

## 2023-03-03 RX ORDER — INSULIN DETEMIR 100 [IU]/ML
INJECTION, SOLUTION SUBCUTANEOUS
Qty: 15 ML | Refills: 5 | Status: SHIPPED | OUTPATIENT
Start: 2023-03-03 | End: 2024-03-02

## 2023-03-03 RX ORDER — INSULIN ASPART 100 [IU]/ML
20 INJECTION, SOLUTION INTRAVENOUS; SUBCUTANEOUS
Qty: 18 ML | Refills: 5 | Status: SHIPPED | OUTPATIENT
Start: 2023-03-03 | End: 2023-04-02

## 2023-03-03 NOTE — PROGRESS NOTES
The patient location is: [place]   The chief complaint leading to consultation is: diabetic medication refills    Visit type: audiovisual    Face to Face time with patient: 10  15 minutes of total time spent on the encounter, which includes face to face time and non-face to face time preparing to see the patient (eg, review of tests), Obtaining and/or reviewing separately obtained history, Documenting clinical information in the electronic or other health record, Independently interpreting results (not separately reported) and communicating results to the patient/family/caregiver, or Care coordination (not separately reported).         Each patient to whom he or she provides medical services by telemedicine is:  (1) informed of the relationship between the physician and patient and the respective role of any other health care provider with respect to management of the patient; and (2) notified that he or she may decline to receive medical services by telemedicine and may withdraw from such care at any time.    Notes:  DM    States unable to get an appointment with endocrinology for medication refills. States she knows her diabetes is not controlled. Taking levemir BID and Novolog sliding scale. States maybe 60 units total throughout the day.     Review of Systems   Constitutional:  Negative for chills, fever and malaise/fatigue.   Respiratory:  Negative for cough, shortness of breath and wheezing.    Cardiovascular:  Negative for chest pain, palpitations and leg swelling.   Gastrointestinal:  Negative for abdominal pain, constipation, diarrhea, nausea and vomiting.   Neurological:  Negative for dizziness and headaches.   Psychiatric/Behavioral:  Negative for depression and suicidal ideas. The patient is not nervous/anxious and does not have insomnia.       Physical Exam  Constitutional:       General: She is not in acute distress.     Appearance: She is not ill-appearing or toxic-appearing.   Cardiovascular:       Pulses: Normal pulses.   Pulmonary:      Effort: Pulmonary effort is normal. No respiratory distress.   Musculoskeletal:         General: Normal range of motion.   Neurological:      General: No focal deficit present.      Mental Status: She is alert. Mental status is at baseline.   Psychiatric:         Mood and Affect: Mood normal.         Behavior: Behavior normal.        A/P  DM:    Refilled medications, sent message to endocrinology. She messaged back stating it's hard to manage pt via virtual visits. Recommends she be referred to local endocrinologist, but she will see if they have any available appointments. Will refer to [facility] at [facility].   Risks, benefits, and side effects were discussed with the patient. All questions were answered to the fullest satisfaction of the patient, and pt verbalized understanding and agreement to treatment plan. Pt was to call with any new or worsening symptoms, or present to the ER.         Giovanna Hyatt MD  Family Medicine Physician   [facility]

## 2023-03-07 ENCOUNTER — TELEPHONE (OUTPATIENT)
Dept: ENDOCRINOLOGY | Facility: CLINIC | Age: 28
End: 2023-03-07
Payer: MEDICAID

## 2023-03-07 LAB
ALBUMIN CREATININE RATIO: 1321 MG/G
CREATININE, URINE: 136 MG/DL
HBA1C MFR BLD: 10.5 %
MICROALBUMIN URINE: 1796

## 2023-03-07 NOTE — TELEPHONE ENCOUNTER
Called patient to get her scheduled for an appointment and to inform her that Dr. Schumacher would like for her to see in endocrinologist in her area. Patient did not answer phone and voicemail said it was full.

## 2023-04-05 DIAGNOSIS — E11.9 TYPE 2 DIABETES MELLITUS WITHOUT COMPLICATION: ICD-10-CM

## 2023-04-11 ENCOUNTER — PATIENT MESSAGE (OUTPATIENT)
Dept: ADMINISTRATIVE | Facility: HOSPITAL | Age: 28
End: 2023-04-11
Payer: MEDICAID

## 2023-04-12 RX ORDER — PROMETHAZINE HYDROCHLORIDE 25 MG/1
25 TABLET ORAL EVERY 4 HOURS
Qty: 60 TABLET | Refills: 0 | OUTPATIENT
Start: 2023-04-12

## 2023-05-03 RX ORDER — HYDROCHLOROTHIAZIDE 25 MG/1
TABLET ORAL
Qty: 90 TABLET | Refills: 1 | OUTPATIENT
Start: 2023-05-03

## 2023-05-10 ENCOUNTER — TELEPHONE (OUTPATIENT)
Dept: FAMILY MEDICINE | Facility: CLINIC | Age: 28
End: 2023-05-10
Payer: MEDICAID

## 2023-05-10 NOTE — TELEPHONE ENCOUNTER
Spoke with patient.  Patient states she got her nerve conduction test done, but she can only tolerate a portion of it.  Advised patient that she will have to reach out to Neurology to discuss results and to see if there is enough there for her to have a diagnosis so that she can follow with [facility] pain management.  Advised that if there is a diagnosis of peripheral neuropathy she can call this clinic back to let me know and I will reach out to the pain management group for them to process her referral.  Patient verbalized understanding Dr. Hyatt

## 2023-05-10 NOTE — TELEPHONE ENCOUNTER
----- Message from Latisha Diane sent at 5/10/2023  1:27 PM CDT -----  Contact: pt  Type: Needs Medical Advice    Who Called:  pt  Best Call Back Number: [phone]  Additional Information: Requesting a call back regarding patient is has question to the referral you sent her to, seems they won't be able to help her and needs to speak to you on this matter and needs to find a solution.    Please Advise ---Thank you

## 2023-05-11 ENCOUNTER — PATIENT MESSAGE (OUTPATIENT)
Dept: FAMILY MEDICINE | Facility: CLINIC | Age: 28
End: 2023-05-11
Payer: MEDICAID

## 2023-05-11 ENCOUNTER — TELEPHONE (OUTPATIENT)
Dept: FAMILY MEDICINE | Facility: CLINIC | Age: 28
End: 2023-05-11
Payer: MEDICAID

## 2023-05-11 NOTE — TELEPHONE ENCOUNTER
----- Message from Melania Thomas sent at 5/11/2023  3:09 PM CDT -----  Contact: PT  Type:  Needs Medical Advice    Who Called: PT   Would the patient rather a call back or a response via [facility]? CALL  Best Call Back Number: [phone] (home)     Additional Information: PLEASE CALL TO CONFIRM THAT THE PAPERWORK FOR PT HANDICAP PLACARD WAS RECEIVED AND THE TURN AROUND TIME FOR THE PAPERWORK TO BE FAXED BACK.  PT WOULD ALSO LIKE TO KNOW IF ITS POSSIBLE TO GET A PERMANENT HANGUP PLACARD OR ON HER LIC PLATE.

## 2023-05-11 NOTE — TELEPHONE ENCOUNTER
No answer. Left portal message advising pt to bring in all documentation of disability for me to review for handicapped application

## 2023-05-22 ENCOUNTER — OFFICE VISIT (OUTPATIENT)
Dept: FAMILY MEDICINE | Facility: CLINIC | Age: 28
End: 2023-05-22
Payer: MEDICAID

## 2023-05-22 DIAGNOSIS — I10 HYPERTENSION, ESSENTIAL: ICD-10-CM

## 2023-05-22 DIAGNOSIS — E10.42 TYPE 1 DIABETES MELLITUS WITH DIABETIC POLYNEUROPATHY: Primary | ICD-10-CM

## 2023-05-22 DIAGNOSIS — Z78.9 NONSMOKER: ICD-10-CM

## 2023-05-22 PROBLEM — O26.872 SHORT CERVICAL LENGTH DURING PREGNANCY IN SECOND TRIMESTER: Status: RESOLVED | Noted: 2022-07-13 | Resolved: 2023-05-22

## 2023-05-22 PROCEDURE — 99212 OFFICE O/P EST SF 10 MIN: CPT | Mod: GT,,, | Performed by: FAMILY MEDICINE

## 2023-05-22 PROCEDURE — 1160F PR REVIEW ALL MEDS BY PRESCRIBER/CLIN PHARMACIST DOCUMENTED: ICD-10-PCS | Mod: CPTII,GT,, | Performed by: FAMILY MEDICINE

## 2023-05-22 PROCEDURE — 99212 PR OFFICE/OUTPT VISIT, EST, LEVL II, 10-19 MIN: ICD-10-PCS | Mod: GT,,, | Performed by: FAMILY MEDICINE

## 2023-05-22 PROCEDURE — 1159F PR MEDICATION LIST DOCUMENTED IN MEDICAL RECORD: ICD-10-PCS | Mod: CPTII,GT,, | Performed by: FAMILY MEDICINE

## 2023-05-22 PROCEDURE — 1160F RVW MEDS BY RX/DR IN RCRD: CPT | Mod: CPTII,GT,, | Performed by: FAMILY MEDICINE

## 2023-05-22 PROCEDURE — 1159F MED LIST DOCD IN RCRD: CPT | Mod: CPTII,GT,, | Performed by: FAMILY MEDICINE

## 2023-05-22 RX ORDER — HYDROCHLOROTHIAZIDE 25 MG/1
25 TABLET ORAL DAILY
Qty: 90 TABLET | Refills: 1 | Status: SHIPPED | OUTPATIENT
Start: 2023-05-22 | End: 2023-09-27 | Stop reason: SDUPTHER

## 2023-05-22 NOTE — PROGRESS NOTES
The patient location is: [place]   The chief complaint leading to consultation is:  Handicap permit    Visit type: audiovisual    Face to Face time with patient:  15 minutes  Nineteen minutes of total time spent on the encounter, which includes face to face time and non-face to face time preparing to see the patient (eg, review of tests), Obtaining and/or reviewing separately obtained history, Documenting clinical information in the electronic or other health record, Independently interpreting results (not separately reported) and communicating results to the patient/family/caregiver, or Care coordination (not separately reported).         Each patient to whom he or she provides medical services by telemedicine is:  (1) informed of the relationship between the physician and patient and the respective role of any other health care provider with respect to management of the patient; and (2) notified that he or she may decline to receive medical services by telemedicine and may withdraw from such care at any time.    Notes:      Diabetes with peripheral neuropathy:  Patient currently following with Endocrinology at [facility].  She also had a EMG done by Neurology at [facility].  [facility] results show moderate peripheral neuropathy.  Patient still awaiting appointment with pain management for control of symptoms.  States she takes Percocet for pain.  Patient requesting today handicap parking permit.  States she has to stop and rest when walking over 200 ft.  States her legs get numb and painful.  States some days she has good days and she can walk a little further, other days she has bad days and has to do a lot of stopping and resting with prolonged walking.  States she has an appointment this week with endocrinology.  She thinks her hemoglobin A1c was 10 at her last visit.  States her sugars run in the 200s.  States she has not had a diabetic foot exam this year.  [facility] she follows with [facility] and  [facility] for her diabetic eye care..    Hypertension:  Stable with medications states her blood pressure runs less than 140/90.    Answers submitted by the patient for this visit:  Diabetes Questionnaire (Submitted on 5/22/2023)  Chief Complaint: Diabetes problem  Diabetes type: type 1  MedicAlert ID: [id number]  Disease duration: 19 Years  blurred vision: Yes  chest pain: No  fatigue: No  foot paresthesias: Yes  foot ulcerations: No  polydipsia: Yes  polyphagia: No  polyuria: No  visual change: Yes  weakness: No  weight loss: Yes  Symptom course: worsening  confusion: Yes  dizziness: Yes  headaches: No  hunger: No  mood changes: Yes  nervous/anxious: Yes  pallor: No  seizures: No  sleepiness: Yes  speech difficulty: No  sweats: Yes  tremors: No  blackouts: No  hospitalization: No  nocturnal hypoglycemia: Yes  required assistance: Yes  required glucagon: No  CVA: No  heart disease: No  nephropathy: Yes  peripheral neuropathy: Yes  PVD: No  retinopathy: Yes  autonomic neuropathy: Yes  CAD risks: hypertension, stress, diabetes mellitus  Current treatments: insulin injections  Treatment compliance: all of the time  Dose schedule: pre-breakfast, pre-lunch, pre-dinner, at bedtime  Given by: patient  Injection sites: abdominal wall, arms, buttocks, lower legs, thighs  Home blood tests: 5+ x per day  Home urines: <1 x per month  Monitoring compliance: fair  Blood glucose trend: fluctuating minimally  Weight trend: fluctuating minimally  Current diet: diabetic  Meal planning: none  Exercise: rarely  Dietitian visit: No  Eye exam current: Yes  Sees podiatrist: No    Physical Exam  Constitutional:       General: She is not in acute distress.     Appearance: She is not ill-appearing or toxic-appearing.   Pulmonary:      Effort: No respiratory distress.   Neurological:      General: No focal deficit present.      Mental Status: She is alert. Mental status is at baseline.   Psychiatric:         Mood and Affect: Mood  normal.         Behavior: Behavior normal.      Assessment and plan    Diabetes type 1 with peripheral neuropathy:  Advised patient that I need to get records back from [facility] to to review.  Advised that once records are received I will write a temporary parking apartment.  Advised that it will not be per minute as symptoms of peripheral neuropathy tend to improve as glycemic control improves and the focus should be 1 that rather than a permanent handicap parking sticker.  Will obtain records from prior diabetic eye exam.  Advised that she can ask endocrinology if they do a diabetic foot exam.  If they do not advised to make follow-up appointment here for foot exam.    Hypertension: Will refill hydrochlorothiazide.  Advised patient that when she runs out of the rest of her medications for blood pressure management she will need to contact clinic as they were written by a previous provider.      Also advised patient that given the fact that a majority of her care is now being provided by [facility] she may want to consider a PCP within their system so that she has continuity of care as it is difficult for this office to review records from [facility] since patient is asking for indicate parking tag today    All questions were answered to the fullest satisfaction of the patient, and pt verbalized understanding and agreement to treatment plan. Pt was to call with any new or worsening symptoms, or present to the ER.         Giovanna Hyatt MD  Family Medicine Physician   [facility]     This note was created using [employer] voice recognition software that occasionally may misinterpret phrases or words.

## 2023-05-24 ENCOUNTER — PATIENT OUTREACH (OUTPATIENT)
Dept: ADMINISTRATIVE | Facility: HOSPITAL | Age: 28
End: 2023-05-24
Payer: MEDICAID

## 2023-05-24 ENCOUNTER — PATIENT MESSAGE (OUTPATIENT)
Dept: FAMILY MEDICINE | Facility: CLINIC | Age: 28
End: 2023-05-24
Payer: MEDICAID

## 2023-05-24 NOTE — PROGRESS NOTES
Population Health Chart Review & Patient Outreach Details:     Reason for Outreach Encounter:     [x]  Non-Compliant Report   []  Payor Report (Hum[employer], PHN, BCBS, MSSP, MCIP, C, etc.)   []  Pre-Visit Chart Review     Updates Requested / Reviewed:     [x]  Care Everywhere    [x]     [x]  External Sources ([facility], [facility], DIS, etc.)   []  Care Team Updated    Patient Outreach Method:    []  Telephone Outreach Completed   [] Successful   [] Left Voicemail   [] Unable to Contact (wrong number, no voicemail)  []  [facility] Outreach Sent  []  Letter Outreach Mailed  []  Fax Sent for External Records  [x]  External Records Upload    Health Maintenance Topics Addressed and Outreach Outcomes / Actions Taken:        []      Breast Cancer Screening []  Mammo Scheduled      []  External Records Requested     []  Added Reminder to Complete to Upcoming Primary Care Appt Notes     []  Patient Declined     []  Patient Will Call Back to Schedule     []  Patient Will Schedule with External Provider / Order Routed if Applicable             []       Cervical Cancer Screening []  Pap Scheduled      []  External Records Requested     []  Added Reminder to Complete to Upcoming Primary Care Appt Notes     []  Patient Declined     []  Patient Will Call Back to Schedule     []  Patient Will Schedule with External Provider               []          Colorectal Cancer Screening []  Colonoscopy Case Request or Referral Placed     []  External Records Requested     []  Added Reminder to Complete to Upcoming Primary Care Appt Notes     []  Patient Declined     []  Patient Will Call Back to Schedule     []  Patient Will Schedule with External Provider     []  Fit Kit Mailed (add the SmartPhrase under additional notes)     []  Reminded Patient to Complete Home Test             [x]      Diabetic Eye Exam []  Eye Camera Scheduled or Optometry Referral Placed     [x]  External Records Requested     []  Added Reminder to Complete  to Upcoming Primary Care Appt Notes     []  Patient Declined     []  Patient Will Call Back to Schedule     []  Patient Will Schedule with External Provider             []      Blood Pressure Control []  Primary Care Follow Up Visit Scheduled     []  Remote Blood Pressure Reading Captured     []  Added Reminder to Complete to Upcoming Primary Care Appt Notes     []  Patient Declined     []  Patient Will Call Back / Patient Will Send Portal Message with Reading     []  Patient Will Call Back to Schedule Provider Visit             []       HbA1c & Other Labs []  Lab Appt Scheduled for Due Labs     []  Primary Care Follow Up Visit Scheduled      []  Reminded Patient to Complete Home Test     []  Added Reminder to Complete to Upcoming Primary Care Appt Notes     []  Patient Declined     []  Patient Will Call Back to Schedule     []  Patient Will Schedule with External Provider / Order Routed if Applicable           []    Schedule Primary Care Appt []  Primary Care Appt Scheduled     []  Patient Declined     []  Patient Will Call Back to Schedule     []  Pt Established with External Provider & Updated Care Team             []      Medication Adherence []  Primary Care Appointment Scheduled     []  Added Reminder to Upcoming Primary Care Appt Notes     []  Patient Reminded to  Prescription     []  Patient Declined, Provider Notified if Needed     []  Sent Provider Message to Review and/or Add Exclusion to Problem List             []      Osteoporosis Screening []  DXA Appointment Scheduled     []  External Records Requested     []  Added Reminder to Complete to Upcoming Primary Care Appt Notes     []  Patient Declined     []  Patient Will Call Back to Schedule     []  Patient Will Schedule with External Provider / Order Routed if Applicable     Additional Care Coordinator Notes:  REQUESTED DIABETIC EYE EXAM FROM [facility]       Further Action Needed If Patient Returns Outreach:

## 2023-05-24 NOTE — LETTER
"                      FAX      AUTHORIZATION FOR RELEASE OF   CONFIDENTIAL INFORMATION      MEDICAL RECORDS      We are seeing Alicia Valles, date of birth 1995, in the clinic at [facility]. Giovanna Hyatt MD is the patient's PCP. Alicia Valles has an outstanding lab/procedure at the time we reviewed their chart. In order to help keep their health information updated, Alicia Valles has authorized us to request the following medical record(s):       ( X )  DIABETIC EYE EXAM (WITHIN 48 MONTHS)               Please fax records to [facility]  [phone]        CORTNEY SALTER LPN  CLINICAL CARE COORDINATOR   [facility]  PHONE: [phone]  FAX: [phone]                               A. Consent for Examination and Treatment: I hereby authorize the providers and employees of [facility] to provide medical treatment/services which includes, but is not limited to, performing and administering tests and diagnostic procedures that are deemed necessary, Including, but not limited to, imaging examinations, blood tests and other laboratory procedures as may be required by the hospital, clinic, or may be ordered by my physician(s) or persons working under the general and/or special instructions of my physician(s).                   1.      I understand and agree that this consent covers all authorized persons, including but not limited to physicians, residents, nurse practitioners, physicians' assistants, specialists, consultants, student nurses, and independently contracted physicians, who are called upon by the physician in charge, to carry out the diagnostic procedures and medical or surgical treatment.  2.      I hereby authorize [facility] to retain or dispose of any specimens or tissue, should there be such remaining from any test or procedure.  3.      I hereby authorize and give consent for [facility] providers and employees to take photographs, " images or videotapes of such diagnostic, surgical or treatment procedures of Patient as may be required by [facility] or as may be ordered by a physician.  I further acknowledge and agree that [facility] may use cameras or other devices for patient monitoring.  4.      I am aware that the practice of medicine is not an exact science, and I acknowledge that no guarantees have been made to me as to the outcome of any tests, procedures or treatment.                   B. Authorization for Release of Information:  I understand that my insurance company and/or their agents may need information necessary to make determinations about payment/reimbursement.  I hereby provide authorization to release to all insurance companies, their successors, assignees, other parties with whom they may have contracted, or others acting on their behalf, that are involved with payment for any hospital and/or clinic charges incurred by the patient, any information that they request and deem necessary for payment/reimbursement, and/or quality review.  I further authorize the release of my health information to physicians or other health care practitioners on staff who are involved in my health care now and in the future, and to other health care providers, entities, or institutions for the purpose of my continued care and treatment, including referrals.     C. Medicare Patient's Certification and Authorization to Release Information and Payment Request:  I certify that the information given by me in applying for payment under Title XVIII of the Social Security Act is correct.  I authorize any gallegos of medical or other information about me to release to the Social Security Administration, or its intermediaries or carriers, any information needed for this or a related Medicare claim.  I request that payment of authorized benefits be made on my behalf.     D. Assignment of Insurance Benefits:  I hereby authorize any and all insurance companies, health  plans, defined benefit plans, health insurers or any entity that is or may be responsible for payment of my medical expenses to pay all hospital and medical benefits now due, and to become due and payable to me under any hospital benefits, sick benefits, injury benefits or any other benefit for services rendered to me, including Major Medical Benefits, direct to [facility] and all independently contracted physicians.  I assign any and all rights that I may have against any and all insurance companies, health plans, defined benefit plans, health insurers or any entity that is or may be responsible for payment of my medical expenses, including, but not limited to any right to appeal a denial of a claim, any right to bring any action, lawsuit, administrative proceeding, or other cause of action on my behalf.  I specifically assign my right to pursue litigation against any and all insurance companies, health plans, defined benefit plans, health insurers or any entity that is or may be responsible for payment of medical expenses based upon a refusal to pay charges.              REGISTRATION  AUTHORIZATION                    E. Valuables:  It is understood and agreed that [facility] is not liable for the damage to or loss of any money, jewelry, documents, dentures, eye glasses, hearing aids, prosthetics, or other property of value.     F. Computer Equipment:  I understand and agree that should I choose to use computer equipment owned by [facility] or if I choose to access the Internet via [facility]'s network, I do so at my own risk.  [facility] is not responsible for any damage to my computer equipment or to any damages of any type that might arise from my loss of equipment or data.                   G. Acceptance of Financial Responsibility:  I agree that in consideration of the services and supplies that have been or will be furnished to the patient,  I am hereby obligated to pay all charges made for or on the account of the patient  according to the standard rates (in effect at the time the services and supplies are delivered) established by [facility], including its Patient Financial Assistance Policy to the extent it is applicable.  I understand that I am responsible for all charges, or portions thereof, not covered by insurance or other sources.  Patient refunds will be distributed only after balances at all [facility] facilities are paid.                   H. Communication Authorization:  I hereby authorize [facility] and its representatives, along with any billing service or  who may work on their behalf, to contact me on my cell phone and/or home phone using prerecorded messages, artificial voice messages, automatic telephone dialing devices or other computer assisted technology, or by electronic mail, text messaging, or by any other form of electronic communication.  This includes, but is not limited to appointment reminders, yearly physical exam reminders, preventive care reminders, patient campaigns, welcome calls, and calls about account balances on my account or any account on which I am listed as a guarantor.  I understand I have the right to opt out of these communications at any time.     I.  Relationship Between Facility and Physician:  I understand that some, but not all, providers furnishing services to the patient are not employees or agents of [facility].  The patient is under the care and supervision of his/her attending physician, and it is the responsibility of the facility and its nursing staff to carry out the instructions of such physicians.  It is the responsibility of the patient's physician/designee to obtain the patient's informed consent, when required, for medical or surgical treatment, special diagnostic or therapeutic procedures, or hospital services rendered for the patient under the special instructions of the physician/designee.     J. Notice of Private Practices:  I acknowledge I have received a copy of  [facility]'s Notice of Privacy Practices.     K. Facility Directory:  I have discussed with the organization my desire to be either included or excluded in the facility directory.  I understand that if my choice is to opt-out of being identified in the facility directory that the facility will not provide any information about me such as my condition (e.g. Fair, stable, etc.) or my location in the facility (e.g. Room number, department).     L. LINKS:  For [place] Residents:  [facility] is a LINKS ([place] [facility]) participating facility.  [facility] is a [facility]-sponsored confidential computer system that helps you and your doctor keep track of you and your child's immunization history.  I acknowledge that I am allowing [facility] to share this information with [facility].  For [place] Residents:  [facility] is a MIIX ([place] Immunization Information eXchange) participant.  [facility] is a [place] Department of Health-sponsored confidential computer system that helps you and your doctor keep track of you and your child's immunization history.  I acknowledge that I am allowing [facility] to share information with [facility].     M. Term:  This authorization is valid for this and subsequent care/treatment I receive at [facility] and will remain valid unless/until revoked in writing by me.      ACKNOWLEDGMENT OF POTENTIAL RISKS OF COVID-19 VACCINE  It is important that you, as the patient or patients legally authorized representative(s), understand and acknowledge the following, with regard to administration of the COVID-19 vaccine offered by [facility]:  The SARS-CoV-2 virus (COVID-19) has caused an unprecedented modern global pandemic that has mobilized scientists and drug manufacturers to work to create safe and effective vaccines to get the crisis under control.  No vaccine is released in the [place] without undergoing rigorous, multi-layered testing and approval by the Food and Drug  Administration.  During a public health emergency, however, vaccines can be released for patient administration by the FDA prior to completion of multi-phase clinical trials and approval. This is done by the FDAs granting of Emergency Use Authorization (EUA) when the vaccine meets reasonable thresholds for safety and effectiveness and people are in urgent need of care. Under an EUA, the FDA has found that known and potential benefits outweigh its known and potential risks.  The vaccine for which you are presenting to [facility] has been released under an EUA, which [facility] is honoring in its distribution of the vaccine to the public. While the FDAs authorization indicates its belief that usage is recommended over possible risks, there is still the possibility that unknown risks of the vaccine could exist.  By signing this document, you acknowledge and assume these risks. Further, you waive any and all claims of liability against and hold harmless any [facility] entity or provider for any harm caused to you by said possible unknown risks of the vaccine.        N. [facility]:  As used in this document, [facility] means all [facility] affiliated entities including all health centers, surgery centers, clinics, and hospitals.  It includes more specifically, the following entities: [facility], a not for profit [facility], and its subsidiaries and affiliates, including [facility], [facility], [facility]ene[facility], L.LJaneneC., [facility], [facility], [facility], LJaneneLJaneneC., [facility], L.LJaneneC., [facility] LJaneneLRAZIA.d/b/a [facility], LJaneneLRAZIA.d/b/a [facility] [employer], [employer]. As manager of [facility], [facility], LJaneneL[facility]. Jos[facility] [employer], L.L.C.d/b/a [facility], [facility]., [facility], L.L.C., and [facility] as manager of [facility].                                                                Patient/Legal Guardian Signature                                                    This signature was collected at 06/01/2022      Alicia Valles/Self                                                                            Printed Name/Relationship to Patient                                                [facility] complies with applicable Federal civil rights laws and does not discriminate on the basis of race, color, national origin, age, disability, or sex.          ATENCIÓN: si habla español, tiene a romano disposición servicios gratuitos de asistencia lingüística. Rubina martini [phone].         CHÚ Ý: N?u b?n nói Ti?ng Vi?t, có các d?ch v? h? tr? ngôn ng? mi?n phí dành cho b?n. G?i s? [phone].              REGISTRATION  AUTHORIZATION

## 2023-05-29 ENCOUNTER — PATIENT MESSAGE (OUTPATIENT)
Dept: ADMINISTRATIVE | Facility: HOSPITAL | Age: 28
End: 2023-05-29
Payer: MEDICAID

## 2023-06-01 ENCOUNTER — PATIENT MESSAGE (OUTPATIENT)
Dept: FAMILY MEDICINE | Facility: CLINIC | Age: 28
End: 2023-06-01
Payer: MEDICAID

## 2023-06-12 NOTE — ASSESSMENT & PLAN NOTE
Lab Results   Component Value Date    HGBA1C 9.8 (H) 05/05/2022     Anemia may impact the accuracy of the A1C.      Patient called to follow up. Please call.

## 2023-06-29 LAB
ALBUMIN CREATININE RATIO: 219 MG/G
CREATININE, URINE: 221 MG/DL
EGFR: 35
HBA1C MFR BLD: 8.5 % (ref 4–6)
MICROALBUMIN URINE: 484

## 2023-07-12 ENCOUNTER — TELEPHONE (OUTPATIENT)
Dept: FAMILY MEDICINE | Facility: CLINIC | Age: 28
End: 2023-07-12
Payer: MEDICAID

## 2023-07-12 NOTE — TELEPHONE ENCOUNTER
Call placed to patient due to message left, currently not available message left for return call.    ----- Message from Patricia Felder sent at 7/12/2023  4:11 PM CDT -----  Contact: PT  Type: Needs Medical Advice    Who Called: Zena SANTACRUZ  Best Call Back Number: [phone]  Additional  Information: PT requesting a call  back to discuss getting some paperwork corrected and  faxed back to the [employer]  Please Advise- Thank you

## 2023-07-19 ENCOUNTER — PATIENT OUTREACH (OUTPATIENT)
Dept: ADMINISTRATIVE | Facility: HOSPITAL | Age: 28
End: 2023-07-19
Payer: MEDICAID

## 2023-07-19 NOTE — PROGRESS NOTES

## 2023-09-20 LAB
LEFT EYE DM RETINOPATHY: POSITIVE
RIGHT EYE DM RETINOPATHY: POSITIVE

## 2023-09-21 ENCOUNTER — PATIENT OUTREACH (OUTPATIENT)
Dept: ADMINISTRATIVE | Facility: HOSPITAL | Age: 28
End: 2023-09-21
Payer: MEDICAID

## 2023-09-21 ENCOUNTER — PATIENT MESSAGE (OUTPATIENT)
Dept: FAMILY MEDICINE | Facility: CLINIC | Age: 28
End: 2023-09-21
Payer: MEDICAID

## 2023-09-21 NOTE — PROGRESS NOTES
Population Health Chart Review & Patient Outreach Details:     Reason for Outreach Encounter:     [x]  Non-Compliant Report   []  Payor Report (Humana, PHN, BCBS, MSSP, MCIP, UHC, etc.)   []  Pre-Visit Chart Review     Updates Requested / Reviewed:     [x]  Care Everywhere    []     [x]  External Sources ([facility], [facility], DIS, etc.)   [x]  Care Team Updated    Patient Outreach Method:    []  Telephone Outreach Completed   [] Successful   [] Left Voicemail   [] Unable to Contact (wrong number, no voicemail)  []  [facility] Portal Outreach Sent  []  Letter Outreach Mailed  []  Fax Sent for External Records  [x]  External Records Upload    Health Maintenance Topics Addressed and Outreach Outcomes / Actions Taken:        []      Breast Cancer Screening []  Mammo Scheduled      []  External Records Requested     []  Added Reminder to Complete to Upcoming Primary Care Appt Notes     []  Patient Declined     []  Patient Will Call Back to Schedule     []  Patient Will Schedule with External Provider / Order Routed if Applicable             []       Cervical Cancer Screening []  Pap Scheduled      []  External Records Requested     []  Added Reminder to Complete to Upcoming Primary Care Appt Notes     []  Patient Declined     []  Patient Will Call Back to Schedule     []  Patient Will Schedule with External Provider               []          Colorectal Cancer Screening []  Colonoscopy Case Request or Referral Placed     []  External Records Requested     []  Added Reminder to Complete to Upcoming Primary Care Appt Notes     []  Patient Declined     []  Patient Will Call Back to Schedule     []  Patient Will Schedule with External Provider     []  Fit Kit Mailed (add the SmartPhrase under additional notes)     []  Reminded Patient to Complete Home Test             []      Diabetic Eye Exam []  Eye Camera Scheduled or Optometry Referral Placed     []  External Records Requested     []  Added Reminder to Complete to  Upcoming Primary Care Appt Notes     []  Patient Declined     []  Patient Will Call Back to Schedule     []  Patient Will Schedule with External Provider             []      Blood Pressure Control []  Primary Care Follow Up Visit Scheduled     []  Remote Blood Pressure Reading Captured     []  Added Reminder to Complete to Upcoming Primary Care Appt Notes     []  Patient Declined     []  Patient Will Call Back / Patient Will Send Portal Message with Reading     []  Patient Will Call Back to Schedule Provider Visit             [x]       HbA1c & Other Labs []  Lab Appt Scheduled for Due Labs     []  Primary Care Follow Up Visit Scheduled      []  Reminded Patient to Complete Home Test     []  Added Reminder to Complete to Upcoming Primary Care Appt Notes     []  Patient Declined     []  Patient Will Call Back to Schedule     []  Patient Will Schedule with External Provider / Order Routed if Applicable           []    Schedule Primary Care Appt []  Primary Care Appt Scheduled     []  Patient Declined     []  Patient Will Call Back to Schedule     []  Pt Established with External Provider & Updated Care Team             []      Medication Adherence []  Primary Care Appointment Scheduled     []  Added Reminder to Upcoming Primary Care Appt Notes     []  Patient Reminded to  Prescription     []  Patient Declined, Provider Notified if Needed     []  Sent Provider Message to Review and/or Add Exclusion to Problem List             []      Osteoporosis Screening []  DXA Appointment Scheduled     []  External Records Requested     []  Added Reminder to Complete to Upcoming Primary Care Appt Notes     []  Patient Declined     []  Patient Will Call Back to Schedule     []  Patient Will Schedule with External Provider / Order Routed if Applicable     Additional Care Coordinator Notes:     Uploaded 03/07/2023 labs from [facility] 03/07/2023 09/21/2023  EFAX SENT [facility] MED REC DEPT FOR MOST RECENT DM LABS    Further  Action Needed If Patient Returns Outreach:

## 2023-09-21 NOTE — LETTER
FAX      AUTHORIZATION FOR RELEASE OF   CONFIDENTIAL INFORMATION        [facility] MEDICAL RECORDS DEPARTMENT      We are seeing Alicia Valles, date of birth 1995, in the clinic at [facility]. Giovanna Hyatt MD is the patient's PCP. Alicia Valles has an outstanding lab/procedure at the time we reviewed their chart. In order to help keep their health information updated, Alicia ALLEN Valles has authorized us to request the following medical record(s):         MOST RECENT DIABETIC LABS - A1c, LIPID, CMP, & URINE MICRO-ALBUMIN      Please fax records to Giovanna Hyatt MD at [facility] [phone]     If you have any questions, please contact Kyra at [phone].      Kyra Joshi LPN  Performance Improvement Coordinator for Population Health  [facility]     Patient Name: Alicia Valles  : 1995  Patient Phone #: [phone]

## 2023-09-22 ENCOUNTER — PATIENT OUTREACH (OUTPATIENT)
Dept: ADMINISTRATIVE | Facility: HOSPITAL | Age: 28
End: 2023-09-22
Payer: MEDICAID

## 2023-09-22 NOTE — PROGRESS NOTES

## 2023-09-27 LAB — HBA1C MFR BLD: 7 % (ref 4–6)

## 2023-09-27 RX ORDER — HYDROCHLOROTHIAZIDE 25 MG/1
25 TABLET ORAL DAILY
Qty: 30 TABLET | Refills: 1 | Status: SHIPPED | OUTPATIENT
Start: 2023-09-27 | End: 2023-09-29 | Stop reason: SDUPTHER

## 2023-09-28 NOTE — TELEPHONE ENCOUNTER
----- Message from Melania Guzman sent at 9/28/2023  1:32 PM CDT -----  Contact: PT  Type:  Sooner Apoointment Request    Caller is requesting a sooner appointment.  Caller declined first available appointment listed below.  Caller will not accept being placed on the waitlist and is requesting a message be sent to doctor.  Name of Caller:PT   When is the first available appointment?10/27/23  Symptoms: RX REFILL - WILL RUN OUT OF BP MEDS TOMORROW  Would the patient rather a call back or a response via [facility]? CALL   Best Call Back Number:[phone] (home)   Additional Information: THANK YOU

## 2023-09-29 ENCOUNTER — PATIENT OUTREACH (OUTPATIENT)
Dept: ADMINISTRATIVE | Facility: HOSPITAL | Age: 28
End: 2023-09-29
Payer: MEDICAID

## 2023-09-29 ENCOUNTER — OFFICE VISIT (OUTPATIENT)
Dept: FAMILY MEDICINE | Facility: CLINIC | Age: 28
End: 2023-09-29
Payer: MEDICAID

## 2023-09-29 VITALS
HEART RATE: 94 BPM | OXYGEN SATURATION: 99 % | SYSTOLIC BLOOD PRESSURE: 90 MMHG | HEIGHT: 61 IN | BODY MASS INDEX: 20.55 KG/M2 | WEIGHT: 108.81 LBS | DIASTOLIC BLOOD PRESSURE: 68 MMHG

## 2023-09-29 DIAGNOSIS — D50.0 IRON DEFICIENCY ANEMIA DUE TO CHRONIC BLOOD LOSS: Primary | ICD-10-CM

## 2023-09-29 DIAGNOSIS — I10 HYPERTENSION, ESSENTIAL: ICD-10-CM

## 2023-09-29 DIAGNOSIS — F32.A DEPRESSIVE DISORDER: ICD-10-CM

## 2023-09-29 DIAGNOSIS — E10.22 TYPE 1 DIABETES MELLITUS WITH STAGE 3B CHRONIC KIDNEY DISEASE: ICD-10-CM

## 2023-09-29 DIAGNOSIS — E11.9 ENCOUNTER FOR DIABETIC FOOT EXAM: ICD-10-CM

## 2023-09-29 DIAGNOSIS — N18.32 TYPE 1 DIABETES MELLITUS WITH STAGE 3B CHRONIC KIDNEY DISEASE: ICD-10-CM

## 2023-09-29 DIAGNOSIS — Z78.9 NONSMOKER: ICD-10-CM

## 2023-09-29 LAB
BASOPHILS # BLD AUTO: 0.02 K/UL (ref 0–0.2)
BASOPHILS NFR BLD: 0.3 % (ref 0–1.9)
DIFFERENTIAL METHOD: ABNORMAL
EOSINOPHIL # BLD AUTO: 0.1 K/UL (ref 0–0.5)
EOSINOPHIL NFR BLD: 1.5 % (ref 0–8)
ERYTHROCYTE [DISTWIDTH] IN BLOOD BY AUTOMATED COUNT: 12.9 % (ref 11.5–14.5)
HCT VFR BLD AUTO: 30.7 % (ref 37–48.5)
HGB BLD-MCNC: 10.2 G/DL (ref 12–16)
IMM GRANULOCYTES # BLD AUTO: 0.01 K/UL (ref 0–0.04)
IMM GRANULOCYTES NFR BLD AUTO: 0.2 % (ref 0–0.5)
LYMPHOCYTES # BLD AUTO: 2.3 K/UL (ref 1–4.8)
LYMPHOCYTES NFR BLD: 37.3 % (ref 18–48)
MCH RBC QN AUTO: 30.4 PG (ref 27–31)
MCHC RBC AUTO-ENTMCNC: 33.2 G/DL (ref 32–36)
MCV RBC AUTO: 91 FL (ref 82–98)
MONOCYTES # BLD AUTO: 0.4 K/UL (ref 0.3–1)
MONOCYTES NFR BLD: 7.2 % (ref 4–15)
NEUTROPHILS # BLD AUTO: 3.3 K/UL (ref 1.8–7.7)
NEUTROPHILS NFR BLD: 53.5 % (ref 38–73)
NRBC BLD-RTO: 0 /100 WBC
PLATELET # BLD AUTO: 251 K/UL (ref 150–450)
PMV BLD AUTO: 11 FL (ref 9.2–12.9)
RBC # BLD AUTO: 3.36 M/UL (ref 4–5.4)
WBC # BLD AUTO: 6.12 K/UL (ref 3.9–12.7)

## 2023-09-29 PROCEDURE — 99999 PR PBB SHADOW E&M-EST. PATIENT-LVL III: CPT | Mod: PBBFAC,,, | Performed by: FAMILY MEDICINE

## 2023-09-29 PROCEDURE — 1159F MED LIST DOCD IN RCRD: CPT | Mod: CPTII,,, | Performed by: FAMILY MEDICINE

## 2023-09-29 PROCEDURE — 3008F PR BODY MASS INDEX (BMI) DOCUMENTED: ICD-10-PCS | Mod: CPTII,,, | Performed by: FAMILY MEDICINE

## 2023-09-29 PROCEDURE — 3052F PR MOST RECENT HEMOGLOBIN A1C LEVEL 8.0 - < 9.0%: ICD-10-PCS | Mod: CPTII,,, | Performed by: FAMILY MEDICINE

## 2023-09-29 PROCEDURE — 99999 PR PBB SHADOW E&M-EST. PATIENT-LVL III: ICD-10-PCS | Mod: PBBFAC,,, | Performed by: FAMILY MEDICINE

## 2023-09-29 PROCEDURE — 1159F PR MEDICATION LIST DOCUMENTED IN MEDICAL RECORD: ICD-10-PCS | Mod: CPTII,,, | Performed by: FAMILY MEDICINE

## 2023-09-29 PROCEDURE — 1160F RVW MEDS BY RX/DR IN RCRD: CPT | Mod: CPTII,,, | Performed by: FAMILY MEDICINE

## 2023-09-29 PROCEDURE — 99213 OFFICE O/P EST LOW 20 MIN: CPT | Mod: PBBFAC,PN | Performed by: FAMILY MEDICINE

## 2023-09-29 PROCEDURE — 3074F SYST BP LT 130 MM HG: CPT | Mod: CPTII,,, | Performed by: FAMILY MEDICINE

## 2023-09-29 PROCEDURE — 99214 OFFICE O/P EST MOD 30 MIN: CPT | Mod: S$PBB,,, | Performed by: FAMILY MEDICINE

## 2023-09-29 PROCEDURE — 3078F DIAST BP <80 MM HG: CPT | Mod: CPTII,,, | Performed by: FAMILY MEDICINE

## 2023-09-29 PROCEDURE — 3078F PR MOST RECENT DIASTOLIC BLOOD PRESSURE < 80 MM HG: ICD-10-PCS | Mod: CPTII,,, | Performed by: FAMILY MEDICINE

## 2023-09-29 PROCEDURE — 3008F BODY MASS INDEX DOCD: CPT | Mod: CPTII,,, | Performed by: FAMILY MEDICINE

## 2023-09-29 PROCEDURE — 3074F PR MOST RECENT SYSTOLIC BLOOD PRESSURE < 130 MM HG: ICD-10-PCS | Mod: CPTII,,, | Performed by: FAMILY MEDICINE

## 2023-09-29 PROCEDURE — 99214 PR OFFICE/OUTPT VISIT, EST, LEVL IV, 30-39 MIN: ICD-10-PCS | Mod: S$PBB,,, | Performed by: FAMILY MEDICINE

## 2023-09-29 PROCEDURE — 1160F PR REVIEW ALL MEDS BY PRESCRIBER/CLIN PHARMACIST DOCUMENTED: ICD-10-PCS | Mod: CPTII,,, | Performed by: FAMILY MEDICINE

## 2023-09-29 PROCEDURE — 85025 COMPLETE CBC W/AUTO DIFF WBC: CPT | Performed by: FAMILY MEDICINE

## 2023-09-29 PROCEDURE — 3052F HG A1C>EQUAL 8.0%<EQUAL 9.0%: CPT | Mod: CPTII,,, | Performed by: FAMILY MEDICINE

## 2023-09-29 RX ORDER — NIFEDIPINE 60 MG/1
60 TABLET, EXTENDED RELEASE ORAL 2 TIMES DAILY
Qty: 60 TABLET | Refills: 5 | Status: SHIPPED | OUTPATIENT
Start: 2023-09-29

## 2023-09-29 RX ORDER — HYDROCHLOROTHIAZIDE 25 MG/1
25 TABLET ORAL DAILY
Qty: 90 TABLET | Refills: 1 | Status: SHIPPED | OUTPATIENT
Start: 2023-09-29

## 2023-09-29 RX ORDER — METOCLOPRAMIDE 10 MG/1
10 TABLET ORAL 4 TIMES DAILY
Qty: 120 TABLET | Refills: 5 | Status: SHIPPED | OUTPATIENT
Start: 2023-09-29

## 2023-09-29 RX ORDER — NIFEDIPINE 60 MG/1
60 TABLET, EXTENDED RELEASE ORAL 2 TIMES DAILY
COMMUNITY
Start: 2023-03-30 | End: 2023-09-29 | Stop reason: SDUPTHER

## 2023-09-29 RX ORDER — ROSUVASTATIN CALCIUM 5 MG/1
5 TABLET, COATED ORAL DAILY
Qty: 90 TABLET | Refills: 3 | Status: SHIPPED | OUTPATIENT
Start: 2023-09-29 | End: 2024-09-28

## 2023-09-29 RX ORDER — SERTRALINE HYDROCHLORIDE 50 MG/1
50 TABLET, FILM COATED ORAL DAILY
Qty: 90 TABLET | Refills: 3 | Status: SHIPPED | OUTPATIENT
Start: 2023-09-29 | End: 2024-09-28

## 2023-09-29 RX ORDER — CARVEDILOL 12.5 MG/1
12.5 TABLET ORAL 2 TIMES DAILY WITH MEALS
Qty: 60 TABLET | Refills: 1 | Status: SHIPPED | OUTPATIENT
Start: 2023-09-29 | End: 2023-11-01

## 2023-09-29 RX ORDER — HYDRALAZINE HYDROCHLORIDE 100 MG/1
100 TABLET, FILM COATED ORAL EVERY 12 HOURS
Qty: 60 TABLET | Refills: 5 | Status: SHIPPED | OUTPATIENT
Start: 2023-09-29

## 2023-09-29 RX ORDER — PROMETHAZINE HYDROCHLORIDE 25 MG/1
25 TABLET ORAL EVERY 4 HOURS PRN
Qty: 60 TABLET | Refills: 11 | Status: SHIPPED | OUTPATIENT
Start: 2023-09-29

## 2023-09-29 RX ORDER — DOXYCYCLINE HYCLATE 50 MG/1
50 CAPSULE ORAL 2 TIMES DAILY
COMMUNITY
Start: 2023-09-17 | End: 2023-09-29

## 2023-09-29 NOTE — PROGRESS NOTES
Subjective     Patient ID: Alicia Valles is a 28 y.o. female.    Chief Complaint: Follow-up    Follow-up    Hypertension:  States is compliant with blood pressure medications, but blood pressure runs low.  States she is lost 100 lb since giving birth to her child and is wondering if she does not need adjustments in her blood pressure medications.  Endocrinology also recommended that she come off carvedilol if possible or cut back on dose due to the fact that it will mass symptoms of hypoglycemia    Type 1 diabetes with chronic kidney disease stage 3B:  Patient has upcoming appointment with Nephrology [facility].  Following with endocrinology has appointment next month.  Most recent hemoglobin A1c in Care everywhere is 7.0.  Creatinine 1.9.  GFR 35.  Patient follows with retinal speciality in [place].  States she is compliant with her diabetic eye exam.  Overdue for diabetic foot exam.  States does not take a statin.    Iron-deficiency anemia:  Had to receive multiple blood transfusions after pregnancy.  Has not had iron checked since.    Depression:  Stable with Zoloft needs refill    Health maintenance: Overdue for Pap smear      Review of Systems   Constitutional:  Positive for fatigue. Negative for activity change, appetite change, chills, diaphoresis, fever and unexpected weight change.   Respiratory:  Negative for cough, choking and chest tightness.    Cardiovascular:  Negative for chest pain, palpitations, leg swelling and claudication.   Gastrointestinal:  Negative for abdominal pain, change in bowel habit, constipation, diarrhea, nausea and vomiting.   Psychiatric/Behavioral:  Negative for dysphoric mood, self-injury, sleep disturbance and suicidal ideas. The patient is not nervous/anxious.           Objective     Physical Exam  Vitals reviewed.   Constitutional:       General: She is not in acute distress.     Appearance: Normal appearance. She is normal weight. She is not ill-appearing or toxic-appearing.    Cardiovascular:      Rate and Rhythm: Normal rate and regular rhythm.      Heart sounds: Normal heart sounds.   Pulmonary:      Effort: Pulmonary effort is normal.      Breath sounds: Normal breath sounds.   Abdominal:      General: Abdomen is flat. Bowel sounds are normal.      Palpations: Abdomen is soft.      Tenderness: There is no abdominal tenderness.   Neurological:      General: No focal deficit present.      Mental Status: She is alert and oriented to person, place, and time.   Psychiatric:         Mood and Affect: Mood normal.         Behavior: Behavior normal.         Protective Sensation (w/ 10 gram monofilament):  Right: Intact  Left: Intact    Visual Inspection:  Normal -  Bilateral    Pedal Pulses:   Right: Present  Left: Present             Assessment and Plan     1. Iron deficiency anemia due to chronic blood loss  -     CBC Auto Differential; Future; Expected date: 09/29/2023    2. Type 1 diabetes mellitus with other specified complication  -     Cancel: Basic Metabolic Panel    3. Type 2 diabetes mellitus without complication  -     Cancel: Hemoglobin A1c    Other orders  -     NIFEdipine (ADALAT CC) 60 MG TbSR; Take 1 tablet (60 mg total) by mouth 2 (two) times a day.  Dispense: 60 tablet; Refill: 5  -     carvediloL (COREG) 12.5 MG tablet; Take 1 tablet (12.5 mg total) by mouth 2 (two) times daily with meals.  Dispense: 60 tablet; Refill: 1  -     hydrALAZINE (APRESOLINE) 100 MG tablet; Take 1 tablet (100 mg total) by mouth every 12 (twelve) hours.  Dispense: 60 tablet; Refill: 5  -     hydroCHLOROthiazide (HYDRODIURIL) 25 MG tablet; Take 1 tablet (25 mg total) by mouth once daily.  Dispense: 90 tablet; Refill: 1  -     rosuvastatin (CRESTOR) 5 MG tablet; Take 1 tablet (5 mg total) by mouth once daily.  Dispense: 90 tablet; Refill: 3  -     sertraline (ZOLOFT) 50 MG tablet; Take 1 tablet (50 mg total) by mouth once daily.  Dispense: 90 tablet; Refill: 3  -     promethazine (PHENERGAN) 25 MG  tablet; Take 1 tablet (25 mg total) by mouth every 4 (four) hours as needed for Nausea.  Dispense: 60 tablet; Refill: 11  -     metoclopramide HCl (REGLAN) 10 MG tablet; Take 1 tablet (10 mg total) by mouth 4 (four) times daily.  Dispense: 120 tablet; Refill: 5      Records reviewed in Care everywhere   Will check CBC today  We will start statin   Will continue nifedipine at current dose.  Cut carvedilol dose in half.  Decrease hydralazine to 100 mg b.i.d..  Continue hydrochlorothiazide.  Advised patient to monitor blood pressure at home with a goal of blood pressure to be around 130/80.  Advised to contact clinic in a month with status on blood pressure readings and we will continue to adjust medications at that time.  Advised to follow with gyn for Pap smear.  Risks, benefits, and side effects were discussed with the patient. All questions were answered to the fullest satisfaction of the patient, and pt verbalized understanding and agreement to treatment plan. Pt was to call with any new or worsening symptoms, or present to the ER.  RTC 2- 3months       Giovanna Hyatt MD  Family Medicine Physician   [facility]     This note was created using M*Modal voice recognition software that occasionally may misinterpret phrases or words.

## 2023-09-29 NOTE — PROGRESS NOTES
Population Health Chart Review & Patient Outreach Details:     Reason for Outreach Encounter:     []  Non-Compliant Report   []  Payor Report (Humana, PHN, BCBS, MSSP, MCIP, UHC, etc.)   []  Pre-Visit Chart Review     Updates Requested / Reviewed:     [x]  Care Everywhere    []     []  External Sources ([facility], Quest, DIS, etc.)   [x]  Care Team Updated    Patient Outreach Method:    []  Telephone Outreach Completed   [] Successful   [] Left Voicemail   [] Unable to Contact (wrong number, no voicemail)  []  [employer] [facility] Outreach Sent  []  Letter Outreach Mailed  []  Fax Sent for External Records  [x]  External Records Upload    Health Maintenance Topics Addressed and Outreach Outcomes / Actions Taken:        []      Breast Cancer Screening []  Mammo Scheduled      []  External Records Requested     []  Added Reminder to Complete to Upcoming Primary Care Appt Notes     []  Patient Declined     []  Patient Will Call Back to Schedule     []  Patient Will Schedule with External Provider / Order Routed if Applicable             []       Cervical Cancer Screening []  Pap Scheduled      []  External Records Requested     []  Added Reminder to Complete to Upcoming Primary Care Appt Notes     []  Patient Declined     []  Patient Will Call Back to Schedule     []  Patient Will Schedule with External Provider               []          Colorectal Cancer Screening []  Colonoscopy Case Request or Referral Placed     []  External Records Requested     []  Added Reminder to Complete to Upcoming Primary Care Appt Notes     []  Patient Declined     []  Patient Will Call Back to Schedule     []  Patient Will Schedule with External Provider     []  Fit Kit Mailed (add the SmartPhrase under additional notes)     []  Reminded Patient to Complete Home Test             []      Diabetic Eye Exam []  Eye Camera Scheduled or Optometry Referral Placed     []  External Records Requested     []  Added Reminder to Complete to  Upcoming Primary Care Appt Notes     []  Patient Declined     []  Patient Will Call Back to Schedule     []  Patient Will Schedule with External Provider             []      Blood Pressure Control []  Primary Care Follow Up Visit Scheduled     []  Remote Blood Pressure Reading Captured     []  Added Reminder to Complete to Upcoming Primary Care Appt Notes     []  Patient Declined     []  Patient Will Call Back / Patient Will Send Portal Message with Reading     []  Patient Will Call Back to Schedule Provider Visit             [x]       HbA1c & Other Labs []  Lab Appt Scheduled for Due Labs     []  Primary Care Follow Up Visit Scheduled      []  Reminded Patient to Complete Home Test     []  Added Reminder to Complete to Upcoming Primary Care Appt Notes     []  Patient Declined     []  Patient Will Call Back to Schedule     []  Patient Will Schedule with External Provider / Order Routed if Applicable           []    Schedule Primary Care Appt []  Primary Care Appt Scheduled     []  Patient Declined     []  Patient Will Call Back to Schedule     []  Pt Established with External Provider & Updated Care Team             []      Medication Adherence []  Primary Care Appointment Scheduled     []  Added Reminder to Upcoming Primary Care Appt Notes     []  Patient Reminded to  Prescription     []  Patient Declined, Provider Notified if Needed     []  Sent Provider Message to Review and/or Add Exclusion to Problem List             []      Osteoporosis Screening []  DXA Appointment Scheduled     []  External Records Requested     []  Added Reminder to Complete to Upcoming Primary Care Appt Notes     []  Patient Declined     []  Patient Will Call Back to Schedule     []  Patient Will Schedule with External Provider / Order Routed if Applicable     Additional Care Coordinator Notes:         Further Action Needed If Patient Returns Outreach:

## 2023-10-02 ENCOUNTER — PATIENT OUTREACH (OUTPATIENT)
Dept: ADMINISTRATIVE | Facility: HOSPITAL | Age: 28
End: 2023-10-02
Payer: MEDICAID

## 2023-10-02 RX ORDER — FERROUS SULFATE 325(65) MG
325 TABLET ORAL
Qty: 90 TABLET | Refills: 1 | Status: SHIPPED | OUTPATIENT
Start: 2023-10-02

## 2023-10-02 NOTE — LETTER
FAX      AUTHORIZATION FOR RELEASE OF   CONFIDENTIAL INFORMATION      [facility] MED REC DEPT      We are seeing Alicia Valles, date of birth 1995, in the clinic at [facility]. Giovanna Hyatt MD is the patient's PCP. Alicia Valles has an outstanding lab/procedure at the time we reviewed their chart. In order to help keep their health information updated, Alicia Valles has authorized us to request the following medical records:      DIABETIC EYE EXAM - WITH RETINAL SCREENING (MOST RECENT WITHIN 48 MONTHS)      Please fax records to Giovanna Hyatt MD at [facility] [phone].    If you have any questions, please contact Kyra at [phone].      Kyra Joshi LPN  Performance Improvement Coordinator for Population Health  [facility]    Patient Name: Alicia Valles  : 1995  Patient Phone #: [phone]

## 2023-10-02 NOTE — PROGRESS NOTES
Population Health Chart Review & Patient Outreach Details:     Reason for Outreach Encounter:     [x]  Non-Compliant Report   []  Payor Report (Humana, PHN, BCBS, MSSP, MCIP, UHC, etc.)   []  Pre-Visit Chart Review     Updates Requested / Reviewed:     []  Care Everywhere    []     []  External Sources ([facility], Quest, DIS, etc.)   [x]  Care Team Updated    Patient Outreach Method:    []  Telephone Outreach Completed   [] Successful   [] Left Voicemail   [] Unable to Contact (wrong number, no voicemail)  []  [employer] Portal Outreach Sent  []  Letter Outreach Mailed  [x]  Fax Sent for External Records  []  External Records Upload    Health Maintenance Topics Addressed and Outreach Outcomes / Actions Taken:        []      Breast Cancer Screening []  Mammo Scheduled      []  External Records Requested     []  Added Reminder to Complete to Upcoming Primary Care Appt Notes     []  Patient Declined     []  Patient Will Call Back to Schedule     []  Patient Will Schedule with External Provider / Order Routed if Applicable             []       Cervical Cancer Screening []  Pap Scheduled      []  External Records Requested     []  Added Reminder to Complete to Upcoming Primary Care Appt Notes     []  Patient Declined     []  Patient Will Call Back to Schedule     []  Patient Will Schedule with External Provider               []          Colorectal Cancer Screening []  Colonoscopy Case Request or Referral Placed     []  External Records Requested     []  Added Reminder to Complete to Upcoming Primary Care Appt Notes     []  Patient Declined     []  Patient Will Call Back to Schedule     []  Patient Will Schedule with External Provider     []  Fit Kit Mailed (add the SmartPhrase under additional notes)     []  Reminded Patient to Complete Home Test             [x]      Diabetic Eye Exam []  Eye Camera Scheduled or Optometry Referral Placed     [x]  External Records Requested     []  Added Reminder to Complete to  Upcoming Primary Care Appt Notes     []  Patient Declined     []  Patient Will Call Back to Schedule     []  Patient Will Schedule with External Provider             []      Blood Pressure Control []  Primary Care Follow Up Visit Scheduled     []  Remote Blood Pressure Reading Captured     []  Added Reminder to Complete to Upcoming Primary Care Appt Notes     []  Patient Declined     []  Patient Will Call Back / Patient Will Send Portal Message with Reading     []  Patient Will Call Back to Schedule Provider Visit             []       HbA1c & Other Labs []  Lab Appt Scheduled for Due Labs     []  Primary Care Follow Up Visit Scheduled      []  Reminded Patient to Complete Home Test     []  Added Reminder to Complete to Upcoming Primary Care Appt Notes     []  Patient Declined     []  Patient Will Call Back to Schedule     []  Patient Will Schedule with External Provider / Order Routed if Applicable           []    Schedule Primary Care Appt []  Primary Care Appt Scheduled     []  Patient Declined     []  Patient Will Call Back to Schedule     []  Pt Established with External Provider & Updated Care Team             []      Medication Adherence []  Primary Care Appointment Scheduled     []  Added Reminder to Upcoming Primary Care Appt Notes     []  Patient Reminded to  Prescription     []  Patient Declined, Provider Notified if Needed     []  Sent Provider Message to Review and/or Add Exclusion to Problem List             []      Osteoporosis Screening []  DXA Appointment Scheduled     []  External Records Requested     []  Added Reminder to Complete to Upcoming Primary Care Appt Notes     []  Patient Declined     []  Patient Will Call Back to Schedule     []  Patient Will Schedule with External Provider / Order Routed if Applicable     Additional Care Coordinator Notes:     10/02/2023  EFAX SENT TO [facility] FOR MOST RECENT DM EYE EXAM RESULTS    Further Action Needed If Patient Returns  Outreach:

## 2023-10-06 ENCOUNTER — PATIENT OUTREACH (OUTPATIENT)
Dept: ADMINISTRATIVE | Facility: HOSPITAL | Age: 28
End: 2023-10-06
Payer: MEDICAID

## 2023-11-01 RX ORDER — CARVEDILOL 12.5 MG/1
12.5 TABLET ORAL
Qty: 60 TABLET | Refills: 5 | Status: SHIPPED | OUTPATIENT
Start: 2023-11-01 | End: 2024-02-29

## 2023-11-27 ENCOUNTER — TELEPHONE (OUTPATIENT)
Dept: FAMILY MEDICINE | Facility: CLINIC | Age: 28
End: 2023-11-27
Payer: MEDICAID

## 2023-11-28 NOTE — TELEPHONE ENCOUNTER
----- Message from Yvonne Elizabeth sent at 11/27/2023  9:41 AM CST -----  Type:  Same Day Appointment Request    Caller is requesting a same day appointment.  Caller declined first available appointment listed below.      Name of Caller:  pt  When is the first available appointment?  December--said she need to be seen today--please call and advise  Symptoms:  cold  Best Call Back Number:  [phone] (home  Additional Information:   thank you

## 2023-12-19 NOTE — ASSESSMENT & PLAN NOTE
- see cHTN with ARANZA for full plan  - On lasix drip, s/p cardene drip   [facility]  Patient Centered Plan of Care  About Me:        Patient Name:  Donnie Ernandez    YOB: 1958  Age:         65 year old   [place]ia MRN:    [id number] Telephone Information:  Home Phone [phone]   Mobile [phone]       Address:  [address] [place] [address] Email address:  [email or web address]      Emergency Contact(s)    Name Relationship Lgl Grd Work Phone Home Phone Mobile Phone   1. YADY TAYLOR Daughter  [phone]   2. YADY JACKSON Other    [phone]   3. KAYCE ERNANDEZ Ex-Spouse    [phone]           Primary language:  English     needed? No   [employer]:  [phone] op. 1  Other communication barriers:None    Preferred Method of Communication:  Do Not Contact  Current living arrangement: I live in a private home    Mobility Status/ Medical Equipment: Independent        Health Maintenance  Health Maintenance Reviewed:   Health Maintenance Due   Topic Date Due    DEPRESSION ACTION PLAN  Never done    PHQ-9  Never done    RSV VACCINE (Pregnancy & 60+) (1 - 1-dose 60+ series) Never done    DTAP/TDAP/TD IMMUNIZATION (2 - Td or Tdap) 05/03/2021    INFLUENZA VACCINE (1) 09/01/2023    COVID-19 Vaccine (6 - 2023-24 season) 09/01/2023    MICROALBUMIN  12/13/2023          My Access Plan  Medical Emergency 911   Primary Clinic Line [facility] - [phone]   24 Hour Appointment Line [phone] or  [phone]) (toll-free)   24 Hour Nurse Line [phone] (toll-free)   Preferred Urgent Care [facility], [phone]     Preferred Hospital [facility]  [phone]     Preferred Pharmacy [facility] - [place], [place] - [address] [facility]     Behavioral Health Crisis Line The National Suicide Prevention Lifeline at 1-593.506.1933 or Text/Call 993           My Care Team Members  Patient Care Team          Relationship Specialty Notifications Start End    Mae Jones APRN CNP PCP - General Family Practice  3/18/13     Phone: [phone] Fax: [phone] [address] [facility] [address]    Mae Jones APRN CNP Assigned PCP   3/25/23     Phone: [phone] Fax: [phone] [address] [facility] [address]    Cuca Dupree, RN Lead Care Coordinator Primary Care - CC Admissions 12/14/23     Phone: [phone]         James Nieves MD MD Neurology  12/18/23     Phone: [phone] Fax: [phone]         [address] [facility] [address]                My Care Plans  Self Management and Treatment Plan    Care Plan  Care Plan: Physical Activity       Problem: Patient is inactive       Goal: Increase Physical Activity       Start Date: 12/19/2023 Expected End Date: 2/19/2024    This Visit's Progress: 30%    Priority: High    Note:     Barriers: Deconditioned   Strengths: Motivated   Patient expressed understanding of goal: Yes  Action steps to achieve this goal:  1. I will keep scheduled [facility] home visits   2. I will do home exercises as instructed   3. I will keep provider appointments and take my medications as directed                                Action Plans on File:                       Advance Care Plans/Directives:   Advanced Care Plan/Directives on file:   No    Discussed with patient/caregiver(s): No data recorded           My Medical and Care Information  Problem List   Patient Active Problem List   Diagnosis    Essential hypertension, benign    History of colonic polyps    Insomnia    Degeneration of cervical intervertebral disc    Sleep apnea    Biceps tendon tear    GERD (gastroesophageal reflux disease)    CARDIOVASCULAR SCREENING; LDL GOAL LESS THAN 130    NILDA (obstructive sleep apnea)    Chronic diarrhea    Sacroiliac joint pain    Herniation of intervertebral disc between L4 and L5    Advanced directives, counseling/discussion     Tubular adenoma    Pulmonary nodules    Chronic kidney disease, stage 3    Dilated bile duct    Gallbladder polyp    Diarrhea, unspecified type    Acute right arterial ischemic stroke, MCA (middle cerebral artery) (H)    Anemia due to unknown mechanism    Chronic back pain greater than 3 months duration    MDD (major depressive disorder), recurrent episode, mild (H24)    Acute renal failure superimposed on stage 2 chronic kidney disease     Alcohol abuse with intoxication (H24)      Current Medications and Allergies:    Current Outpatient Medications   Medication    Acetaminophen (TYLENOL EXTRA STRENGTH PO)    Ascorbic Acid (VITAMIN C PO)    aspirin (ASA) 81 MG chewable tablet    benzonatate (TESSALON) 100 MG capsule    clopidogrel (PLAVIX) 75 MG tablet    fluticasone (FLONASE) 50 MCG/ACT nasal spray    losartan (COZAAR) 100 MG tablet    metoprolol succinate ER (TOPROL XL) 100 MG 24 hr tablet    VITAMIN D PO    zolpidem ER (AMBIEN CR) 12.5 MG CR tablet     No current facility-administered medications for this visit.        Care Coordination Start Date: 12/14/2023   Frequency of Care Coordination: weekly, more frequently as needed     Form Last Updated: 12/19/2023

## 2024-01-08 RX ORDER — NIFEDIPINE 60 MG/1
60 TABLET, EXTENDED RELEASE ORAL 2 TIMES DAILY
Qty: 180 TABLET | Refills: 1 | Status: CANCELLED | OUTPATIENT
Start: 2024-01-08

## 2024-01-08 NOTE — TELEPHONE ENCOUNTER
----- Message from Xiao Pepe, Patient Care Assistant sent at 1/8/2024  3:22 PM CST -----  Regarding: returning call  Contact: pt  Type:  Patient Returning Call    Who Called:  pt     Who Left Message for Patient:  not sure     Does the patient know what this is regarding?:  yes     Best Call Back Number:  [phone] (home)     Additional Information:  please call pt to advise. Thanks!

## 2024-01-08 NOTE — TELEPHONE ENCOUNTER
Confirmed with pharmacy that they were filling it for 90 days instead of 30 so she is out of refills.

## 2024-01-08 NOTE — TELEPHONE ENCOUNTER
Patient stated that she is out of refills for the Nifedipine because the last prescription she received was for a 90 day supply so they pulled her last refill.

## 2024-02-29 RX ORDER — CARVEDILOL 12.5 MG/1
12.5 TABLET ORAL
Qty: 180 TABLET | Refills: 1 | Status: SHIPPED | OUTPATIENT
Start: 2024-02-29

## 2024-02-29 NOTE — TELEPHONE ENCOUNTER
Provider Staff:  Action required for this patient    Requires labs      Please see care gap opportunities below in Care Due Message.    Thanks!  [facility]     Appointments      Date Provider   Last Visit   9/29/2023 Giovanna Hyatt MD   Next Visit   Visit date not found Giovanna Hyatt MD     Refill Decision Note   Alicia Valles  is requesting a refill authorization.  Brief Assessment and Rationale for Refill:  Approve     Medication Therapy Plan:         Alert overridden per protocol: Yes   Comments:     Note composed:10:22 AM 02/29/2024

## 2024-02-29 NOTE — TELEPHONE ENCOUNTER
Care Due:                  Date            Visit Type   Department     Provider  --------------------------------------------------------------------------------                                EP -                              PRIMARY      [facility] FAMILY  Last Visit: 09-      CARE ([facility])   MEDICINE       TUAN GUZMAN  Next Visit: None Scheduled  None         None Found                                                            Last  Test          Frequency    Reason                     Performed    Due Date  --------------------------------------------------------------------------------    CMP.........  12 months..  hydroCHLOROthiazide,       10-   09-                             insulin, rosuvastatin....    HBA1C.......  6 months...  insulin..................  09- 03-    Lipid Panel.  12 months..  rosuvastatin.............  09- 09-    [employer] Embedded Care Due Messages. Reference number: [id number].   2/29/2024 8:09:21 AM CST

## 2024-04-04 DIAGNOSIS — E11.9 TYPE 2 DIABETES MELLITUS WITHOUT COMPLICATION: ICD-10-CM

## 2024-04-15 ENCOUNTER — TELEPHONE (OUTPATIENT)
Dept: FAMILY MEDICINE | Facility: CLINIC | Age: 29
End: 2024-04-15
Payer: MEDICAID

## 2024-04-15 NOTE — TELEPHONE ENCOUNTER
----- Message from Melania Guzman sent at 4/12/2024  3:50 PM CDT -----  Contact: PT  Type:  Needs Medical Advice    Who Called: PT  Symptoms (please be specific): UNCONTROLLED BOWEL MOVEMENTS    How long has patient had these symptoms:  4 DAYS   Pharmacy name and phone #:    [id number] [place] [place] - [address] AT [address]  [address]  [place] [address]  Phone: [phone] Fax: [phone]  Would the patient rather a call back or a response via [facility]? CALL   Best Call Back Number: [phone] (home)   Additional Information: THANK YOU

## 2024-04-19 DIAGNOSIS — E11.9 TYPE 2 DIABETES MELLITUS WITHOUT COMPLICATION: ICD-10-CM

## 2024-04-26 ENCOUNTER — E-VISIT (OUTPATIENT)
Dept: FAMILY MEDICINE | Facility: CLINIC | Age: 29
End: 2024-04-26
Payer: MEDICAID

## 2024-04-26 DIAGNOSIS — R11.2 NAUSEA AND VOMITING, UNSPECIFIED VOMITING TYPE: ICD-10-CM

## 2024-04-26 DIAGNOSIS — R19.7 DIARRHEA, UNSPECIFIED TYPE: ICD-10-CM

## 2024-04-29 ENCOUNTER — PATIENT MESSAGE (OUTPATIENT)
Dept: FAMILY MEDICINE | Facility: CLINIC | Age: 29
End: 2024-04-29
Payer: MEDICAID

## 2024-04-29 PROCEDURE — 99421 OL DIG E/M SVC 5-10 MIN: CPT | Mod: S$PBB,,, | Performed by: FAMILY MEDICINE

## 2024-04-29 NOTE — PROGRESS NOTES
Patient ID: Alicia Valles is a 29 y.o. female.    Chief Complaint: GI Problem (Entered automatically based on patient selection in Patient Portal.)    The patient initiated a request through [employer] on 4/26/2024 for evaluation and management with a chief complaint of GI Problem (Entered automatically based on patient selection in Patient Portal.)     I evaluated the questionnaire submission on 04/29/2024 .    [facility] Peq Evisit Diarrhea    4/26/2024 11:13 AM CDT - Filed by Patient   Do you agree to participate in an E-Visit? Yes   If you have any of the following symptoms, please present to your local ER or call 911:  I acknowledge   What is the main issue you would like addressed today? Im having uncontrollable bowel movements even in my sleep. Do i need to see a specialist again?   Are you able to take your vital signs? No   Are you pregnant, could you be pregnant, or are you breast feeding? None of the above   Do you have diarrhea? Yes   How many stools have you passed in the last 24 hours? More than eight   Is there blood in your stool, or is your stool dark red or black? None of the above   Does your stool contain pus or mucus? No   Have you taken a laxative or a medicine to help you move your bowels lately? No   Are you vomiting? No, I am not vomiting   Do you have belly pain? I have a little pain or no pain   Are you feeling dizzy or like you might pass out? No   When did your symptoms begin? 4/4/2024   Do you have a fever? No, I do not have a fever   Are you having trouble walking or lifting yourself due to weakness from this illness?  No   Do any of the following apply to you? My urine is normal   Did your condition begin after a specific meal that may have caused the illness? Not clearly related to a meal.    Have you taken antibiotics recently? I have not been on any antibiotics   Have you been hospitalized in the past 2 months? No, I have not been hospitalized recently.   Do you work in a   center or healthcare environment? No   Does anyone you know have similar symptoms? No   Have you had a meal consisting of raw meat or fish in the week prior to your illness? No   Have you recently travelled to a place where you may have caught an illness? No   Have you tried any medication or other treatment for your symptoms? No   Provide any additional information you feel is important.    Please attach any relevant images or files          Encounter Diagnoses   Name Primary?    Diarrhea, unspecified type     Nausea and vomiting, unspecified vomiting type         No orders of the defined types were placed in this encounter.           No follow-ups on file.      E-Visit Time Tracking:                     Chronic nausea and vomiting and diarrhea:  Symptoms could be multifactorial.  If you have been seen by a gastro doctor I do recommend that you see them again.  If not, we can do stool studies and place a referral.  Symptoms could be related to your diabetes if you have a history of gastroparesis.  Please consider making a office visit appointment to discuss further as your last office visit was in September.  Sincerely,  Dr. Hyatt

## 2024-05-03 ENCOUNTER — PATIENT MESSAGE (OUTPATIENT)
Dept: ADMINISTRATIVE | Facility: HOSPITAL | Age: 29
End: 2024-05-03
Payer: MEDICAID

## 2024-05-14 ENCOUNTER — PATIENT OUTREACH (OUTPATIENT)
Dept: ADMINISTRATIVE | Facility: HOSPITAL | Age: 29
End: 2024-05-14
Payer: MEDICAID

## 2024-05-14 NOTE — PROGRESS NOTES
Population Health Chart Review & Patient Outreach Details      Additional Pop Health Notes:               Updates Requested / Reviewed:      Updated Care Coordination Note and External Sources: [facility] and [facility]         Health Maintenance Topics Overdue:      VB Score: 3     Cervical Cancer Screening  Hemoglobin A1c  Lipid Panel                       Health Maintenance Topic(s) Outreach Outcomes & Actions Taken:    Primary Care Appt - Outreach Outcomes & Actions Taken  : Left VM for pt to call back to see if she is still seeing Dr Hyatt as her PCP

## 2024-05-15 LAB — HBA1C MFR BLD: 7.5 % (ref 4–6)

## 2024-05-18 ENCOUNTER — PATIENT OUTREACH (OUTPATIENT)
Dept: ADMINISTRATIVE | Facility: HOSPITAL | Age: 29
End: 2024-05-18
Payer: MEDICAID

## 2024-05-18 NOTE — LETTER
AUTHORIZATION FOR RELEASE OF   CONFIDENTIAL INFORMATION    Dear [facility] Medical Records,    We are seeing Alicia Valles, date of birth 1995, in the clinic at [facility]. Giovanna Hyatt MD is the patient's PCP. Alicia Valles has an outstanding lab/procedure at the time we reviewed her chart. In order to help keep her health information updated, she has authorized us to request the following medical record(s):        (  )  MAMMOGRAM                                      (  )  COLONOSCOPY      (  )  PAP SMEAR                                          ( X )  LAST LAB RESULTS     (  )  DEXA SCAN                                          (  )  EYE EXAM            (  )  FOOT EXAM                                          (  )  ENTIRE RECORD     (  )  OUTSIDE IMMUNIZATIONS                 (  )  _______________         Please fax records to [facility], Green, Cierra C., MD at [phone]    Thanks so much and have a great day!    Radha Leal LPN [address] [address]  - [phone]   [phone]           Patient Name: Alicia Valles  : 1995  Patient Phone #: [phone]

## 2024-05-18 NOTE — PROGRESS NOTES
Population Health Chart Review & Patient Outreach Details      Additional Pop Health Notes:               Updates Requested / Reviewed:      Updated Care Coordination Note, Care Everywhere, and External Sources: [facility] and [facility]         Health Maintenance Topics Overdue:      VB Score: 3     Cervical Cancer Screening  Hemoglobin A1c  Lipid Panel                       Health Maintenance Topic(s) Outreach Outcomes & Actions Taken:    Lab(s) - Outreach Outcomes & Actions Taken  : External Records Requested & Care Team Updated if Applicable

## 2024-05-21 ENCOUNTER — PATIENT OUTREACH (OUTPATIENT)
Dept: ADMINISTRATIVE | Facility: HOSPITAL | Age: 29
End: 2024-05-21
Payer: MEDICAID

## 2024-05-21 NOTE — PROGRESS NOTES
Population Health Chart Review & Patient Outreach Details      Additional [facility] Notes:               Updates Requested / Reviewed:      Updated Care Coordination Note, , and Immunizations Reconciliation Completed or Queried: [place] and [place]         Health Maintenance Topics Overdue:      VB Score: 2     Cervical Cancer Screening  Lipid Panel                       Health Maintenance Topic(s) Outreach Outcomes & Actions Taken:    Lab(s) - Outreach Outcomes & Actions Taken  : External Records Uploaded & Care Team Updated if Applicable

## 2024-05-31 RX ORDER — NIFEDIPINE 60 MG/1
60 TABLET, EXTENDED RELEASE ORAL 2 TIMES DAILY
Qty: 60 TABLET | Refills: 5 | Status: SHIPPED | OUTPATIENT
Start: 2024-05-31

## 2024-05-31 NOTE — TELEPHONE ENCOUNTER
Care Due:                  Date            Visit Type   Department     Provider  --------------------------------------------------------------------------------                                EP -                              PRIMARY      [facility] FAM[facility]  Last Visit: 09-      CARE ([facility])   MEDICINE       TUAN GUZMAN                              EP -                              PRIMARY      [facility] FAM[facility]  Next Visit: 06-      CARE ([facility])   MEDICINE       TUAN GUZMAN                                                            Last  Test          Frequency    Reason                     Performed    Due Date  --------------------------------------------------------------------------------    CMP.........  12 months..  hydroCHLOROthiazide,       10-   09-                             insulin, rosuvastatin....    Lipid Panel.  12 months..  rosuvastatin.............  09- 09-    [facility] Embedded Care Due Messages. Reference number: [id number].   5/31/2024 2:20:26 PM CDT

## 2024-05-31 NOTE — TELEPHONE ENCOUNTER
----- Message from Manuel Butt sent at 5/31/2024  2:07 PM CDT -----  Regarding: refill  Contact: patient  Type:  RX Refill Request    Who Called: patient  Refill or New Rx:refill  RX Name and Strength:NIFEdipine (ADALAT CC) 60 MG TbSR  How is the patient currently taking it? (ex. 1XDay):  Is this a 30 day or 90 day RX:90  Preferred Pharmacy with phone number:  [id number] - [place], [place] - [address] AT [address]  [address]  [place] [address]  Phone: [phone] Fax: [phone]  Local or Mail Order:local  Ordering Provider:BRAXTON Hyatt  Would the patient rather a call back or a response via [facility]? refill  Best Call Back Number:[phone]  Additional Information:

## 2024-06-03 ENCOUNTER — OFFICE VISIT (OUTPATIENT)
Dept: FAMILY MEDICINE | Facility: CLINIC | Age: 29
End: 2024-06-03
Payer: MEDICAID

## 2024-06-03 ENCOUNTER — CLINICAL SUPPORT (OUTPATIENT)
Dept: FAMILY MEDICINE | Facility: CLINIC | Age: 29
End: 2024-06-03
Payer: MEDICAID

## 2024-06-03 VITALS
OXYGEN SATURATION: 98 % | WEIGHT: 109.13 LBS | BODY MASS INDEX: 20.6 KG/M2 | HEIGHT: 61 IN | DIASTOLIC BLOOD PRESSURE: 84 MMHG | SYSTOLIC BLOOD PRESSURE: 120 MMHG | HEART RATE: 98 BPM

## 2024-06-03 DIAGNOSIS — I10 HYPERTENSION, ESSENTIAL: ICD-10-CM

## 2024-06-03 DIAGNOSIS — F32.A DEPRESSIVE DISORDER: ICD-10-CM

## 2024-06-03 DIAGNOSIS — N18.32 TYPE 1 DIABETES MELLITUS WITH STAGE 3B CHRONIC KIDNEY DISEASE: ICD-10-CM

## 2024-06-03 DIAGNOSIS — K31.84 GASTROPARESIS DUE TO DM: ICD-10-CM

## 2024-06-03 DIAGNOSIS — E11.43 GASTROPARESIS DUE TO DM: ICD-10-CM

## 2024-06-03 DIAGNOSIS — E11.9 TYPE 2 DIABETES MELLITUS WITHOUT COMPLICATION: ICD-10-CM

## 2024-06-03 DIAGNOSIS — Z78.9 NONSMOKER: ICD-10-CM

## 2024-06-03 DIAGNOSIS — E10.22 TYPE 1 DIABETES MELLITUS WITH STAGE 3B CHRONIC KIDNEY DISEASE: ICD-10-CM

## 2024-06-03 DIAGNOSIS — I10 HYPERTENSION, ESSENTIAL: Primary | ICD-10-CM

## 2024-06-03 LAB
ALBUMIN SERPL BCP-MCNC: 3 G/DL (ref 3.5–5.2)
ALP SERPL-CCNC: 65 U/L (ref 55–135)
ALT SERPL W/O P-5'-P-CCNC: 29 U/L (ref 10–44)
ANION GAP SERPL CALC-SCNC: 7 MMOL/L (ref 8–16)
AST SERPL-CCNC: 36 U/L (ref 10–40)
BASOPHILS # BLD AUTO: 0.02 K/UL (ref 0–0.2)
BASOPHILS NFR BLD: 0.4 % (ref 0–1.9)
BILIRUB SERPL-MCNC: 0.2 MG/DL (ref 0.1–1)
BUN SERPL-MCNC: 29 MG/DL (ref 6–20)
CALCIUM SERPL-MCNC: 8.5 MG/DL (ref 8.7–10.5)
CHLORIDE SERPL-SCNC: 105 MMOL/L (ref 95–110)
CHOLEST SERPL-MCNC: 154 MG/DL (ref 120–199)
CHOLEST/HDLC SERPL: 2.7 {RATIO} (ref 2–5)
CO2 SERPL-SCNC: 21 MMOL/L (ref 23–29)
CREAT SERPL-MCNC: 1.8 MG/DL (ref 0.5–1.4)
DIFFERENTIAL METHOD BLD: ABNORMAL
EOSINOPHIL # BLD AUTO: 0.1 K/UL (ref 0–0.5)
EOSINOPHIL NFR BLD: 2.4 % (ref 0–8)
ERYTHROCYTE [DISTWIDTH] IN BLOOD BY AUTOMATED COUNT: 12.3 % (ref 11.5–14.5)
EST. GFR  (NO RACE VARIABLE): 38.2 ML/MIN/1.73 M^2
ESTIMATED AVG GLUCOSE: 180 MG/DL (ref 68–131)
GLUCOSE SERPL-MCNC: 239 MG/DL (ref 70–110)
HBA1C MFR BLD: 7.9 % (ref 4–5.6)
HCT VFR BLD AUTO: 34.3 % (ref 37–48.5)
HDLC SERPL-MCNC: 57 MG/DL (ref 40–75)
HDLC SERPL: 37 % (ref 20–50)
HGB BLD-MCNC: 11.1 G/DL (ref 12–16)
IMM GRANULOCYTES # BLD AUTO: 0.02 K/UL (ref 0–0.04)
IMM GRANULOCYTES NFR BLD AUTO: 0.4 % (ref 0–0.5)
LDLC SERPL CALC-MCNC: 86.6 MG/DL (ref 63–159)
LYMPHOCYTES # BLD AUTO: 1.6 K/UL (ref 1–4.8)
LYMPHOCYTES NFR BLD: 31.9 % (ref 18–48)
MCH RBC QN AUTO: 29.4 PG (ref 27–31)
MCHC RBC AUTO-ENTMCNC: 32.4 G/DL (ref 32–36)
MCV RBC AUTO: 91 FL (ref 82–98)
MONOCYTES # BLD AUTO: 0.4 K/UL (ref 0.3–1)
MONOCYTES NFR BLD: 8.8 % (ref 4–15)
NEUTROPHILS # BLD AUTO: 2.8 K/UL (ref 1.8–7.7)
NEUTROPHILS NFR BLD: 56.1 % (ref 38–73)
NONHDLC SERPL-MCNC: 97 MG/DL
NRBC BLD-RTO: 0 /100 WBC
PLATELET # BLD AUTO: 264 K/UL (ref 150–450)
PMV BLD AUTO: 11.4 FL (ref 9.2–12.9)
POTASSIUM SERPL-SCNC: 4.1 MMOL/L (ref 3.5–5.1)
PROT SERPL-MCNC: 6.6 G/DL (ref 6–8.4)
RBC # BLD AUTO: 3.78 M/UL (ref 4–5.4)
SODIUM SERPL-SCNC: 133 MMOL/L (ref 136–145)
TRIGL SERPL-MCNC: 52 MG/DL (ref 30–150)
WBC # BLD AUTO: 5.01 K/UL (ref 3.9–12.7)

## 2024-06-03 PROCEDURE — 83036 HEMOGLOBIN GLYCOSYLATED A1C: CPT | Performed by: FAMILY MEDICINE

## 2024-06-03 PROCEDURE — 85025 COMPLETE CBC W/AUTO DIFF WBC: CPT | Performed by: FAMILY MEDICINE

## 2024-06-03 PROCEDURE — 80053 COMPREHEN METABOLIC PANEL: CPT | Performed by: FAMILY MEDICINE

## 2024-06-03 PROCEDURE — 3051F HG A1C>EQUAL 7.0%<8.0%: CPT | Mod: CPTII,,, | Performed by: FAMILY MEDICINE

## 2024-06-03 PROCEDURE — 1159F MED LIST DOCD IN RCRD: CPT | Mod: CPTII,,, | Performed by: FAMILY MEDICINE

## 2024-06-03 PROCEDURE — 99214 OFFICE O/P EST MOD 30 MIN: CPT | Mod: S$PBB,,, | Performed by: FAMILY MEDICINE

## 2024-06-03 PROCEDURE — 3079F DIAST BP 80-89 MM HG: CPT | Mod: CPTII,,, | Performed by: FAMILY MEDICINE

## 2024-06-03 PROCEDURE — 80061 LIPID PANEL: CPT | Performed by: FAMILY MEDICINE

## 2024-06-03 PROCEDURE — 99213 OFFICE O/P EST LOW 20 MIN: CPT | Mod: PBBFAC,PN | Performed by: FAMILY MEDICINE

## 2024-06-03 PROCEDURE — 3074F SYST BP LT 130 MM HG: CPT | Mod: CPTII,,, | Performed by: FAMILY MEDICINE

## 2024-06-03 PROCEDURE — 99999 PR PBB SHADOW E&M-EST. PATIENT-LVL III: CPT | Mod: PBBFAC,,, | Performed by: FAMILY MEDICINE

## 2024-06-03 PROCEDURE — 3008F BODY MASS INDEX DOCD: CPT | Mod: CPTII,,, | Performed by: FAMILY MEDICINE

## 2024-06-03 RX ORDER — HYDROCHLOROTHIAZIDE 25 MG/1
25 TABLET ORAL DAILY
Qty: 90 TABLET | Refills: 1 | Status: SHIPPED | OUTPATIENT
Start: 2024-06-03

## 2024-06-03 RX ORDER — METOCLOPRAMIDE 10 MG/1
10 TABLET ORAL 4 TIMES DAILY
Qty: 120 TABLET | Refills: 5 | Status: SHIPPED | OUTPATIENT
Start: 2024-06-03

## 2024-06-03 RX ORDER — SERTRALINE HYDROCHLORIDE 100 MG/1
100 TABLET, FILM COATED ORAL DAILY
Qty: 90 TABLET | Refills: 3 | Status: SHIPPED | OUTPATIENT
Start: 2024-06-03 | End: 2025-06-03

## 2024-06-03 NOTE — PROGRESS NOTES
Subjective     Patient ID: Alicia Valles is a 29 y.o. female.    Chief Complaint: Medication Refill (Hydrochlorothiazide, Nifedipine, Reglan, Crestor, Zoloft )     Hypertension, diabetes with stage 3b ckd and gastroparesis: Stable with medications needs refill depression:  would like to increase Zoloft.  Still following with endocrinology at [facility] maintenance: Patient on  menstrual cycle today so unable to do Pap smear      Review of Systems   Constitutional:  Negative for activity change, appetite change, chills, diaphoresis, fatigue, fever and unexpected weight change.   Respiratory:  Negative for cough, choking and chest tightness.    Cardiovascular:  Negative for chest pain, palpitations, leg swelling and claudication.   Gastrointestinal:  Negative for abdominal pain, change in bowel habit, constipation, diarrhea, nausea and vomiting.   Psychiatric/Behavioral:  Positive for dysphoric mood. Negative for self-injury, sleep disturbance and suicidal ideas. The patient is not nervous/anxious.           Objective     Physical Exam  Vitals reviewed.   Constitutional:       General: She is not in acute distress.     Appearance: Normal appearance. She is not ill-appearing or toxic-appearing.   Cardiovascular:      Rate and Rhythm: Normal rate and regular rhythm.      Heart sounds: Normal heart sounds.   Pulmonary:      Effort: Pulmonary effort is normal.      Breath sounds: Normal breath sounds.   Neurological:      General: No focal deficit present.      Mental Status: She is alert and oriented to person, place, and time.   Psychiatric:         Mood and Affect: Mood normal.         Behavior: Behavior normal.            Assessment and Plan     1. Hypertension, essential  -     CBC Auto Differential; Future; Expected date: 06/03/2024  -     Comprehensive Metabolic Panel; Future; Expected date: 06/03/2024  -     Lipid Panel; Future; Expected date: 06/03/2024    2. Nonsmoker    3. Type 1 diabetes mellitus  with stage 3b chronic kidney disease  -     Microalbumin/Creatinine Ratio, Urine; Future    4. Depressive disorder    Other orders  -     hydroCHLOROthiazide (HYDRODIURIL) 25 MG tablet; Take 1 tablet (25 mg total) by mouth once daily.  Dispense: 90 tablet; Refill: 1  -     sertraline (ZOLOFT) 100 MG tablet; Take 1 tablet (100 mg total) by mouth once daily.  Dispense: 90 tablet; Refill: 3  -     metoclopramide HCl (REGLAN) 10 MG tablet; Take 1 tablet (10 mg total) by mouth 4 (four) times daily.  Dispense: 120 tablet; Refill: 5      Refilled medications  Labs today  Continue to follow with specialists  Risks, benefits, and side effects were discussed with the patient. All questions were answered to the fullest satisfaction of the patient, and pt verbalized understanding and agreement to treatment plan. Pt was to call with any new or worsening symptoms, or present to the ER.  RTC 2 wk pap smear.        Giovanna Hyatt MD  Family Medicine Physician   [facility]     This note was created using M*Modal voice recognition software that occasionally may misinterpret phrases or words.

## 2024-06-06 ENCOUNTER — PATIENT MESSAGE (OUTPATIENT)
Dept: ADMINISTRATIVE | Facility: HOSPITAL | Age: 29
End: 2024-06-06
Payer: MEDICAID

## 2024-07-27 ENCOUNTER — PATIENT MESSAGE (OUTPATIENT)
Dept: ADMINISTRATIVE | Facility: HOSPITAL | Age: 29
End: 2024-07-27
Payer: MEDICAID

## 2024-09-06 DIAGNOSIS — E11.9 TYPE 2 DIABETES MELLITUS WITHOUT COMPLICATION: ICD-10-CM

## 2024-10-09 ENCOUNTER — PATIENT MESSAGE (OUTPATIENT)
Dept: ADMINISTRATIVE | Facility: HOSPITAL | Age: 29
End: 2024-10-09
Payer: MEDICAID

## 2024-10-10 ENCOUNTER — PATIENT MESSAGE (OUTPATIENT)
Dept: ADMINISTRATIVE | Facility: HOSPITAL | Age: 29
End: 2024-10-10
Payer: MEDICAID

## 2024-10-18 ENCOUNTER — PATIENT MESSAGE (OUTPATIENT)
Dept: FAMILY MEDICINE | Facility: CLINIC | Age: 29
End: 2024-10-18
Payer: MEDICAID

## 2024-11-13 RX ORDER — NIFEDIPINE 60 MG/1
TABLET, EXTENDED RELEASE ORAL
Qty: 60 TABLET | Refills: 1 | Status: SHIPPED | OUTPATIENT
Start: 2024-11-13

## 2024-11-13 NOTE — TELEPHONE ENCOUNTER
Care Due:                  Date            Visit Type   Department     Provider  --------------------------------------------------------------------------------                                EP -                              PRIMARY      [facility] FAM[facility]  Last Visit: 06-      CARE ([facility])   MEDICINE       TUAN GUZMAN  Next Visit: None Scheduled  None         None Found                                                            Last  Test          Frequency    Reason                     Performed    Due Date  --------------------------------------------------------------------------------    HBA1C.......  6 months...  insulin..................  06- 11-    [facility] Embedded Care Due Messages. Reference number: [id number].   11/13/2024 8:09:05 AM CST

## 2024-11-13 NOTE — TELEPHONE ENCOUNTER
Provider Staff:  Action required for this patient    Requires appointment   Requires labs      Please see care gap opportunities below in Care Due Message.    Thanks!  [facility]     Appointments      Date Provider   Last Visit   6/3/2024 Giovanna Hyatt MD   Next Visit   Visit date not found Giovanna Hyatt MD     Refill Decision Note   Alicia Valles  is requesting a refill authorization.  Brief Assessment and Rationale for Refill:  Approve     Medication Therapy Plan:        Comments:     Note composed:1:20 PM 11/13/2024

## 2024-12-17 RX ORDER — HYDROCHLOROTHIAZIDE 25 MG/1
TABLET ORAL
Qty: 90 TABLET | Refills: 1 | Status: SHIPPED | OUTPATIENT
Start: 2024-12-17 | End: 2024-12-19

## 2024-12-17 NOTE — TELEPHONE ENCOUNTER
No care due was identified.  [facility] Embedded Care Due Messages. Reference number: [id number].   12/17/2024 8:14:06 AM CST

## 2024-12-17 NOTE — TELEPHONE ENCOUNTER
Refill Decision Note   Alicia Valles  is requesting a refill authorization.  Brief Assessment and Rationale for Refill:  Approve     Medication Therapy Plan:  No dose adjustment recommended per renal fxn.       Comments:     Note composed:12:47 PM 12/17/2024

## 2024-12-19 ENCOUNTER — LAB VISIT (OUTPATIENT)
Dept: LAB | Facility: HOSPITAL | Age: 29
End: 2024-12-19
Payer: MEDICAID

## 2024-12-19 ENCOUNTER — TELEPHONE (OUTPATIENT)
Dept: FAMILY MEDICINE | Facility: CLINIC | Age: 29
End: 2024-12-19
Payer: MEDICAID

## 2024-12-19 ENCOUNTER — OFFICE VISIT (OUTPATIENT)
Dept: FAMILY MEDICINE | Facility: CLINIC | Age: 29
End: 2024-12-19
Payer: MEDICAID

## 2024-12-19 VITALS
WEIGHT: 116.5 LBS | BODY MASS INDEX: 21.99 KG/M2 | OXYGEN SATURATION: 98 % | HEIGHT: 61 IN | DIASTOLIC BLOOD PRESSURE: 90 MMHG | HEART RATE: 91 BPM | SYSTOLIC BLOOD PRESSURE: 150 MMHG

## 2024-12-19 DIAGNOSIS — E10.69 TYPE 1 DIABETES MELLITUS WITH OTHER SPECIFIED COMPLICATION: ICD-10-CM

## 2024-12-19 DIAGNOSIS — R51.9 NONINTRACTABLE HEADACHE, UNSPECIFIED CHRONICITY PATTERN, UNSPECIFIED HEADACHE TYPE: ICD-10-CM

## 2024-12-19 DIAGNOSIS — Z78.9 NONSMOKER: ICD-10-CM

## 2024-12-19 DIAGNOSIS — I10 HYPERTENSION, ESSENTIAL: Primary | ICD-10-CM

## 2024-12-19 LAB
ALBUMIN SERPL BCP-MCNC: 3.2 G/DL (ref 3.5–5.2)
ALP SERPL-CCNC: 99 U/L (ref 40–150)
ALT SERPL W/O P-5'-P-CCNC: 37 U/L (ref 10–44)
ANION GAP SERPL CALC-SCNC: 9 MMOL/L (ref 8–16)
AST SERPL-CCNC: 32 U/L (ref 10–40)
BILIRUB SERPL-MCNC: 0.1 MG/DL (ref 0.1–1)
BUN SERPL-MCNC: 41 MG/DL (ref 6–20)
CALCIUM SERPL-MCNC: 8.2 MG/DL (ref 8.7–10.5)
CHLORIDE SERPL-SCNC: 101 MMOL/L (ref 95–110)
CHOLEST SERPL-MCNC: 176 MG/DL (ref 120–199)
CHOLEST/HDLC SERPL: 2.8 {RATIO} (ref 2–5)
CO2 SERPL-SCNC: 23 MMOL/L (ref 23–29)
CREAT SERPL-MCNC: 2.7 MG/DL (ref 0.5–1.4)
EST. GFR  (NO RACE VARIABLE): 23.5 ML/MIN/1.73 M^2
ESTIMATED AVG GLUCOSE: 232 MG/DL (ref 68–131)
GLUCOSE SERPL-MCNC: 532 MG/DL (ref 70–110)
HBA1C MFR BLD: 9.7 % (ref 4–5.6)
HDLC SERPL-MCNC: 64 MG/DL (ref 40–75)
HDLC SERPL: 36.4 % (ref 20–50)
LDLC SERPL CALC-MCNC: 94.8 MG/DL (ref 63–159)
NONHDLC SERPL-MCNC: 112 MG/DL
POTASSIUM SERPL-SCNC: 4.3 MMOL/L (ref 3.5–5.1)
PROT SERPL-MCNC: 7.1 G/DL (ref 6–8.4)
SODIUM SERPL-SCNC: 133 MMOL/L (ref 136–145)
TRIGL SERPL-MCNC: 86 MG/DL (ref 30–150)

## 2024-12-19 PROCEDURE — 3008F BODY MASS INDEX DOCD: CPT | Mod: CPTII,,, | Performed by: FAMILY MEDICINE

## 2024-12-19 PROCEDURE — 80053 COMPREHEN METABOLIC PANEL: CPT | Performed by: FAMILY MEDICINE

## 2024-12-19 PROCEDURE — 80061 LIPID PANEL: CPT | Performed by: FAMILY MEDICINE

## 2024-12-19 PROCEDURE — 4010F ACE/ARB THERAPY RXD/TAKEN: CPT | Mod: CPTII,,, | Performed by: FAMILY MEDICINE

## 2024-12-19 PROCEDURE — 82043 UR ALBUMIN QUANTITATIVE: CPT | Performed by: FAMILY MEDICINE

## 2024-12-19 PROCEDURE — 99213 OFFICE O/P EST LOW 20 MIN: CPT | Mod: PBBFAC,PN | Performed by: FAMILY MEDICINE

## 2024-12-19 PROCEDURE — 3066F NEPHROPATHY DOC TX: CPT | Mod: CPTII,,, | Performed by: FAMILY MEDICINE

## 2024-12-19 PROCEDURE — 3062F POS MACROALBUMINURIA REV: CPT | Mod: CPTII,,, | Performed by: FAMILY MEDICINE

## 2024-12-19 PROCEDURE — 99999 PR PBB SHADOW E&M-EST. PATIENT-LVL III: CPT | Mod: PBBFAC,,, | Performed by: FAMILY MEDICINE

## 2024-12-19 PROCEDURE — 99214 OFFICE O/P EST MOD 30 MIN: CPT | Mod: S$PBB,,, | Performed by: FAMILY MEDICINE

## 2024-12-19 PROCEDURE — 3077F SYST BP >= 140 MM HG: CPT | Mod: CPTII,,, | Performed by: FAMILY MEDICINE

## 2024-12-19 PROCEDURE — 3046F HEMOGLOBIN A1C LEVEL >9.0%: CPT | Mod: CPTII,,, | Performed by: FAMILY MEDICINE

## 2024-12-19 PROCEDURE — 36415 COLL VENOUS BLD VENIPUNCTURE: CPT | Performed by: FAMILY MEDICINE

## 2024-12-19 PROCEDURE — 3080F DIAST BP >= 90 MM HG: CPT | Mod: CPTII,,, | Performed by: FAMILY MEDICINE

## 2024-12-19 PROCEDURE — 83036 HEMOGLOBIN GLYCOSYLATED A1C: CPT | Performed by: FAMILY MEDICINE

## 2024-12-19 RX ORDER — CARVEDILOL 25 MG/1
25 TABLET ORAL DAILY
Qty: 90 TABLET | Refills: 1 | Status: SHIPPED | OUTPATIENT
Start: 2024-12-19

## 2024-12-19 RX ORDER — BUPROPION HYDROCHLORIDE 300 MG/1
300 TABLET ORAL EVERY MORNING
COMMUNITY
Start: 2024-11-25

## 2024-12-19 RX ORDER — DAPAGLIFLOZIN 10 MG/1
10 TABLET, FILM COATED ORAL DAILY
COMMUNITY

## 2024-12-19 RX ORDER — LOSARTAN POTASSIUM 50 MG/1
50 TABLET ORAL DAILY
Qty: 30 TABLET | Refills: 1 | Status: SHIPPED | OUTPATIENT
Start: 2024-12-19 | End: 2025-12-19

## 2024-12-19 RX ORDER — AMITRIPTYLINE HYDROCHLORIDE 25 MG/1
25 TABLET, FILM COATED ORAL NIGHTLY
Qty: 30 TABLET | Refills: 1 | Status: SHIPPED | OUTPATIENT
Start: 2024-12-19 | End: 2025-12-19

## 2024-12-19 RX ORDER — BUTALBITAL, ACETAMINOPHEN AND CAFFEINE 300; 40; 50 MG/1; MG/1; MG/1
1 CAPSULE ORAL EVERY 4 HOURS
COMMUNITY
Start: 2024-12-09

## 2024-12-19 RX ORDER — HYDROCODONE BITARTRATE AND ACETAMINOPHEN 5; 325 MG/1; MG/1
1 TABLET ORAL EVERY 8 HOURS PRN
COMMUNITY
Start: 2024-12-14 | End: 2024-12-19

## 2024-12-19 RX ORDER — INSULIN PMP CART,AUT,G6/7,CNTR
EACH SUBCUTANEOUS
COMMUNITY
Start: 2024-11-07

## 2024-12-19 RX ORDER — HYDROCHLOROTHIAZIDE 25 MG/1
25 TABLET ORAL DAILY
COMMUNITY

## 2024-12-19 NOTE — TELEPHONE ENCOUNTER
[facility] order discontinued due to cosign declined by Giovanna Guzman MD. Entered patient-reported order on med list for placeholder.      Pended Medication(s)   Requested Prescriptions     Signed Prescriptions Disp Refills    hydroCHLOROthiazide (HYDRODIURIL) 25 MG tablet 90 tablet 1     Sig: TAKE 1 TABLET(25 MG) BY MOUTH DAILY     Authorizing Provider: GIOVANNA GUZMAN     Ordering User: JIN PADRON

## 2024-12-19 NOTE — PROGRESS NOTES
Subjective     Patient ID: Alicia Valles is a 29 y.o. female.    Chief Complaint: Migraine (Severe over the last 3 weeks) and Lab order request (Requested by endocrinologist- [facility])    Migraines x 3wks occurring daily ER gave her Fioricet. States years ago she was having migraines and got a piercing that help her migraines. Now they have come back.     HTN: states when she went to her endocrinologist it was good, but since then her bp has been running in the 150's.       DM: eye exam retina specialists and [facility] [place].  States endocrinology wanted her to get her labs drawn today.  Patient states her sugars have been controlled    Health maintenance: Overdue for Pap      Review of Systems   Constitutional:  Negative for activity change, appetite change, chills, diaphoresis, fatigue, fever and unexpected weight change.   Respiratory:  Negative for cough, choking and chest tightness.    Cardiovascular:  Negative for chest pain, palpitations, leg swelling and claudication.   Gastrointestinal:  Negative for abdominal pain, change in bowel habit, constipation, diarrhea, nausea and vomiting.   Neurological:  Positive for headaches.   Psychiatric/Behavioral:  Negative for dysphoric mood, self-injury, sleep disturbance and suicidal ideas. The patient is not nervous/anxious.           Objective     Physical Exam  Vitals reviewed.   Constitutional:       General: She is not in acute distress.     Appearance: Normal appearance. She is not ill-appearing or toxic-appearing.   Cardiovascular:      Rate and Rhythm: Normal rate and regular rhythm.      Heart sounds: Normal heart sounds.   Pulmonary:      Effort: Pulmonary effort is normal.      Breath sounds: Normal breath sounds.   Neurological:      General: No focal deficit present.      Mental Status: She is alert and oriented to person, place, and time.   Psychiatric:         Mood and Affect: Mood normal.         Behavior: Behavior normal.            Assessment  and Plan     1. Hypertension, essential    2. Type 1 diabetes mellitus with other specified complication  -     Comprehensive Metabolic Panel; Future; Expected date: 12/19/2024  -     Lipid Panel; Future; Expected date: 12/19/2024  -     Hemoglobin A1C; Future; Expected date: 12/19/2024  -     Microalbumin/Creatinine Ratio, Urine; Future    3. Nonsmoker    4. Nonintractable headache, unspecified chronicity pattern, unspecified headache type    Other orders  -     carvediloL (COREG) 25 MG tablet; Take 1 tablet (25 mg total) by mouth once daily.  Dispense: 90 tablet; Refill: 1  -     amitriptyline (ELAVIL) 25 MG tablet; Take 1 tablet (25 mg total) by mouth every evening.  Dispense: 30 tablet; Refill: 1  -     losartan (COZAAR) 50 MG tablet; Take 1 tablet (50 mg total) by mouth once daily. Stop the nifedipine  Dispense: 30 tablet; Refill: 1      Etiology of headache unclear   We will check labs   Trial of amitriptyline  We will adjust blood pressure medications since patient no longer breastfeeding we will discontinue nifedipine and start losartan an increase carvedilol  Risks, benefits, and side effects were discussed with the patient. All questions were answered to the fullest satisfaction of the patient, and pt verbalized understanding and agreement to treatment plan. Pt was to call with any new or worsening symptoms, or present to the ER.  Return to clinic 3 weeks continue care       Giovanna Hyatt MD  Family Medicine Physician   [facility]     This note was created using M*Modal voice recognition software that occasionally may misinterpret phrases or words.

## 2024-12-20 ENCOUNTER — PATIENT OUTREACH (OUTPATIENT)
Dept: ADMINISTRATIVE | Facility: HOSPITAL | Age: 29
End: 2024-12-20
Payer: MEDICAID

## 2024-12-20 LAB
ALBUMIN/CREAT UR: 3497.6 UG/MG (ref 0–30)
CREAT UR-MCNC: 41 MG/DL (ref 15–325)
MICROALBUMIN UR DL<=1MG/L-MCNC: 1434 UG/ML

## 2024-12-20 NOTE — TELEPHONE ENCOUNTER
I contacted Ms. Ellis lawler b/o critical high BS  We talked for about 10 minutes  I informed her of the very high blood sugar and decrease in her kidney functions  She stated that she already adjusted her insulin pump  I advised her to head to the emergency room for hydration and to rule out DKA  Patient stated that she would have to find a  before doing that tonight  I also asked her to hold her hydrochlorothiazide  I asked her to get in touch with her kidney specialist and Dr. Hyatt 1st thing tomorrow  Patient verbalized understanding of the above    JOSHUA Pinto MD  6:13 pm

## 2024-12-20 NOTE — LETTER
"       AUTHORIZATION FOR RELEASE OF   CONFIDENTIAL INFORMATION    Dear Dr Barrett,    We are seeing Alicia Valles, date of birth 1995, in the clinic at [facility]. Giovanna Hyatt MD is the patient's PCP. Alicia Valles has an outstanding lab/procedure at the time we reviewed her chart. In order to help keep her health information updated, she has authorized us to request the following medical record(s):        (  )  MAMMOGRAM                                      (  )  COLONOSCOPY      (  )  PAP SMEAR                                          (  )  OUTSIDE LAB RESULTS     (  )  DEXA SCAN                                          ( X )  EYE EXAM            (  )  FOOT EXAM                                          (  )  ENTIRE RECORD     (  )  OUTSIDE IMMUNIZATIONS                 (  )  _______________         Please fax records to [facility], Green, Cierra C., MD at [phone]    Thanks so much and have a great day!    Radha Leal LPN [address] [address]  - [phone]   [phone]           Patient Name: Alicia Valles  : 1995  Patient Phone #: [phone]          A. Consent for Examination and Treatment: I hereby authorize the providers and employees of [facility] to provide medical treatment/services which includes, but is not limited to, performing and administering tests and diagnostic procedures that are deemed necessary, Including, but not limited to, imaging examinations, blood tests and other laboratory procedures as may be required by the hospital, clinic, or may be ordered by my physician(s) or persons working under the general and/or special instructions of my physician(s).                   1.      I understand and agree that this consent covers all authorized persons, including but not limited to physicians, residents, nurse practitioners, physicians' assistants, specialists, consultants, " student nurses, and independently contracted physicians, who are called upon by the physician in charge, to carry out the diagnostic procedures and medical or surgical treatment.  2.      I hereby authorize [facility] to retain or dispose of any specimens or tissue, should there be such remaining from any test or procedure.  3.      I hereby authorize and give consent for [facility] providers and employees to take photographs, images or videotapes of such diagnostic, surgical or treatment procedures of Patient as may be required by [facility] or as may be ordered by a physician.  I further acknowledge and agree that [facility] may use cameras or other devices for patient monitoring.  4.      I am aware that the practice of medicine is not an exact science, and I acknowledge that no guarantees have been made to me as to the outcome of any tests, procedures or treatment.                   B. Authorization for Release of Information:  I understand that my insurance company and/or their agents may need information necessary to make determinations about payment/reimbursement.  I hereby provide authorization to release to all insurance companies, their successors, assignees, other parties with whom they may have contracted, or others acting on their behalf, that are involved with payment for any hospital and/or clinic charges incurred by the patient, any information that they request and deem necessary for payment/reimbursement, and/or quality review.  I further authorize the release of my health information to physicians or other health care practitioners on staff who are involved in my health care now and in the future, and to other health care providers, entities, or institutions for the purpose of my continued care and treatment, including referrals.     C. Medicare Patient's Certification and Authorization to Release Information and Payment Request:  I certify that the information given by me in applying for payment under Title  XVIII of the Social Security Act is correct.  I authorize any gallegos of medical or other information about me to release to the Social Security Administration, or its intermediaries or carriers, any information needed for this or a related Medicare claim.  I request that payment of authorized benefits be made on my behalf.     D. Assignment of Insurance Benefits:  I hereby authorize any and all insurance companies, health plans, defined benefit plans, health insurers or any entity that is or may be responsible for payment of my medical expenses to pay all hospital and medical benefits now due, and to become due and payable to me under any hospital benefits, sick benefits, injury benefits or any other benefit for services rendered to me, including Major Medical Benefits, direct to [facility] and all independently contracted physicians.  I assign any and all rights that I may have against any and all insurance companies, health plans, defined benefit plans, health insurers or any entity that is or may be responsible for payment of my medical expenses, including, but not limited to any right to appeal a denial of a claim, any right to bring any action, lawsuit, administrative proceeding, or other cause of action on my behalf.  I specifically assign my right to pursue litigation against any and all insurance companies, health plans, defined benefit plans, health insurers or any entity that is or may be responsible for payment of medical expenses based upon a refusal to pay charges.              REGISTRATION  AUTHORIZATION                    E. Valuables:  It is understood and agreed that [facility] is not liable for the damage to or loss of any money, jewelry, documents, dentures, eye glasses, hearing aids, prosthetics, or other property of value.     F. Computer Equipment:  I understand and agree that should I choose to use computer equipment owned by [facility] or if I choose to access the Internet via [facility]'s network, I do  so at my own risk.  [facility] is not responsible for any damage to my computer equipment or to any damages of any type that might arise from my loss of equipment or data.                   G. Acceptance of Financial Responsibility:  I agree that in consideration of the services and supplies that have been or will be furnished to the patient,  I am hereby obligated to pay all charges made for or on the account of the patient according to the standard rates (in effect at the time the services and supplies are delivered) established by [facility], including its Patient Financial Assistance Policy to the extent it is applicable.  I understand that I am responsible for all charges, or portions thereof, not covered by insurance or other sources.  Patient refunds will be distributed only after balances at all [facility] facilities are paid.                   H. Communication Authorization:  I hereby authorize [facility] and its representatives, along with any billing service or  who may work on their behalf, to contact me on my cell phone and/or home phone using prerecorded messages, artificial voice messages, automatic telephone dialing devices or other computer assisted technology, or by electronic mail, text messaging, or by any other form of electronic communication.  This includes, but is not limited to appointment reminders, yearly physical exam reminders, preventive care reminders, patient campaigns, welcome calls, and calls about account balances on my account or any account on which I am listed as a guarantor.  I understand I have the right to opt out of these communications at any time.     I.  Relationship Between Facility and Physician:  I understand that some, but not all, providers furnishing services to the patient are not employees or agents of [facility].  The patient is under the care and supervision of his/her attending physician, and it is the responsibility of the facility and its nursing staff to  carry out the instructions of such physicians.  It is the responsibility of the patient's physician/designee to obtain the patient's informed consent, when required, for medical or surgical treatment, special diagnostic or therapeutic procedures, or hospital services rendered for the patient under the special instructions of the physician/designee.     J. Notice of Private Practices:  I acknowledge I have received a copy of [facility]'s Notice of Privacy Practices.     K. Facility Directory:  I have discussed with the organization my desire to be either included or excluded in the facility directory.  I understand that if my choice is to opt-out of being identified in the facility directory that the facility will not provide any information about me such as my condition (e.g. Fair, stable, etc.) or my location in the facility (e.g. Room number, department).     L. LINKS:  For [place] Residents:  [facility] is a LINKS ([place] [facility] [employer]) participating facility.  LINKS is a [facility]-sponsored confidential computer system that helps you and your doctor keep track of you and your child's immunization history.  I acknowledge that I am allowing [facility] to share this information with [facility].  For [place] Residents:  [facility] is a MIIX ([place] Immunization Information eXchange) participant.  MIIX is a [facility]-sponsored confidential computer system that helps you and your doctor keep track of you and your child's immunization history.  I acknowledge that I am allowing [facility] to share information with [facility].     M. Term:  This authorization is valid for this and subsequent care/treatment I receive at [facility] and will remain valid unless/until revoked in writing by me.      ACKNOWLEDGMENT OF POTENTIAL RISKS OF COVID-19 VACCINE  It is important that you, as the patient or patients legally authorized representative(s), understand and acknowledge the following, with regard  to administration of the COVID-19 vaccine offered by [facility]:  The SARS-CoV-2 virus (COVID-19) has caused an unprecedented modern global pandemic that has mobilized scientists and drug manufacturers to work to create safe and effective vaccines to get the crisis under control.  No vaccine is released in the [place] without undergoing rigorous, multi-layered testing and approval by the Food and Drug Administration.  During a public health emergency, however, vaccines can be released for patient administration by the FDA prior to completion of multi-phase clinical trials and approval. This is done by the FDAs granting of Emergency Use Authorization (EUA) when the vaccine meets reasonable thresholds for safety and effectiveness and people are in urgent need of care. Under an EUA, the FDA has found that known and potential benefits outweigh its known and potential risks.  The vaccine for which you are presenting to [facility] has been released under an EUA, which [facility] is honoring in its distribution of the vaccine to the public. While the FDAs authorization indicates its belief that usage is recommended over possible risks, there is still the possibility that unknown risks of the vaccine could exist.  By signing this document, you acknowledge and assume these risks. Further, you waive any and all claims of liability against and hold harmless any [facility] entity or provider for any harm caused to you by said possible unknown risks of the vaccine.        N. [facility]:  As used in this document, [facility] means all [facility] affiliated entities including all health centers, surgery centers, clinics, and hospitals.  It includes more specifically, the following entities: [facility], a not for profit [facility], and its subsidiaries and affiliates, including [facility], [facility]  [facility], [facility], [facility], [facility]d/b/a [facility], L.L.C.d/b/a [facility], [employer], [facility] As manager of [facility], [facility], [facility], [facility], L.L.C.d/b/a [facility], [facility], [facility], [facility] and [facility] as manager of [facility].                                                                Patient/Legal Guardian Signature                                                    This signature was collected at 12/19/2024      Alicia Valles/Self                                                                            Printed Name/Relationship to Patient                                                [facility] complies with applicable Federal civil rights laws and does not discriminate on the basis of race, color, national origin, age, disability, or sex.          ATENCIÓN: si habla español, tiene a romano disposición servicios gratuitos de asistencia lingüística. Rubina martini [phone].         CHÚ Ý: N?u b?n nói Ti?ng Vi?t, có các d?ch v? h? tr? ngôn ng? mi?n phí dành cho b?n. G?i s? [phone].              REGISTRATION  AUTHORIZATION

## 2024-12-20 NOTE — TELEPHONE ENCOUNTER
Records request faxed to [facility]. Attempted to contact [facility] with no success. Faxed labs to them and will have to call back after lunch.

## 2025-01-07 ENCOUNTER — PATIENT MESSAGE (OUTPATIENT)
Dept: FAMILY MEDICINE | Facility: CLINIC | Age: 30
End: 2025-01-07
Payer: MEDICAID

## 2025-01-09 ENCOUNTER — PATIENT MESSAGE (OUTPATIENT)
Dept: ADMINISTRATIVE | Facility: HOSPITAL | Age: 30
End: 2025-01-09
Payer: MEDICAID

## 2025-02-26 RX ORDER — AMITRIPTYLINE HYDROCHLORIDE 25 MG/1
25 TABLET, FILM COATED ORAL NIGHTLY
Qty: 30 TABLET | Refills: 1 | OUTPATIENT
Start: 2025-02-26

## 2025-02-26 RX ORDER — LOSARTAN POTASSIUM 50 MG/1
TABLET ORAL
Qty: 30 TABLET | Refills: 1 | OUTPATIENT
Start: 2025-02-26

## 2025-02-26 NOTE — TELEPHONE ENCOUNTER
No care due was identified.  [facility] Embedded Care Due Messages. Reference number: [id number].   2/26/2025 8:10:22 AM CST

## 2025-02-26 NOTE — TELEPHONE ENCOUNTER
Refill Decision Note   Alicia Valles  is requesting a refill authorization.  Brief Assessment and Rationale for Refill:  Quick Discontinue     Medication Therapy Plan:         Comments:     Note composed:10:53 AM 02/26/2025

## 2025-02-27 ENCOUNTER — OFFICE VISIT (OUTPATIENT)
Dept: FAMILY MEDICINE | Facility: CLINIC | Age: 30
End: 2025-02-27
Payer: MEDICAID

## 2025-02-27 ENCOUNTER — TELEPHONE (OUTPATIENT)
Dept: FAMILY MEDICINE | Facility: CLINIC | Age: 30
End: 2025-02-27

## 2025-02-27 DIAGNOSIS — I15.2 HYPERTENSION ASSOCIATED WITH DIABETES: Primary | ICD-10-CM

## 2025-02-27 DIAGNOSIS — E11.59 HYPERTENSION ASSOCIATED WITH DIABETES: Primary | ICD-10-CM

## 2025-02-27 RX ORDER — VALSARTAN 320 MG/1
320 TABLET ORAL DAILY
Qty: 90 TABLET | Refills: 1 | Status: SHIPPED | OUTPATIENT
Start: 2025-02-27 | End: 2026-02-27

## 2025-02-27 RX ORDER — CARVEDILOL 25 MG/1
25 TABLET ORAL 2 TIMES DAILY WITH MEALS
Qty: 180 TABLET | Refills: 1 | Status: SHIPPED | OUTPATIENT
Start: 2025-02-27 | End: 2025-05-28

## 2025-02-27 RX ORDER — HYDRALAZINE HYDROCHLORIDE 25 MG/1
25 TABLET, FILM COATED ORAL EVERY 8 HOURS PRN
Qty: 90 TABLET | Refills: 0 | Status: SHIPPED | OUTPATIENT
Start: 2025-02-27 | End: 2026-02-27

## 2025-02-27 NOTE — TELEPHONE ENCOUNTER
Vilma Mcneill,  Please review message below from this patient concerning BP  Call placed to pt due to msg left, spoke w/pt states was seen in clinic by Dr. Mcneill this morning and Rx's were changed to Coreg 25 mg BID and Diovan 320 mg QD, but is asking can another BP med be added for prn when levels are higher. States current BP is 187/118.   Last office visit: 2/27/2025  Thank you.  Signed:  Rod Thrasher LPN    ----- Message from Jarvis sent at 2/27/2025 12:21 PM CST -----  Contact: Self  Type: Needs Medical AdviceWho Called:  PatientPharmacy name and phone #:  [id number] [place] [facility] [place] - [id number] [place] [address]Xjynm: [phone] Fax: [phone] Call Back Number: [phone] Information: Patient is requesting a call back as soon as possible, she states her BP is 186/118, and she is needing to speak to someone to see what she should do

## 2025-02-27 NOTE — TELEPHONE ENCOUNTER
For blood pressure, we try to avoid using medicines as needed. These medicines make BP higher long term. If she has high readings and she has symptoms (e.g.  The vision, shortness for breath, headache, chest pain, and swelling) to please let us know or go to the ER

## 2025-02-27 NOTE — TELEPHONE ENCOUNTER
Vilma Mcneill,   Please review message below from this patient due complaint of a headache and elevated /118.  Second call placed to pt due to 's orders to tell pt we try to avoid using medications as needed. Because prn BP can caused BP higher long term. Information given to pt states she is having headaches and last BP was 187/118.   Please review and advise.  Thank you.  Signed:  Rod Thrasher LPN    For blood pressure, we try to avoid using medicines as needed. These medicines make BP higher long term. If she has high readings and she has symptoms (e.g.  The vision, shortness for breath, headache, chest pain, and swelling) to please let us know or go to the ER

## 2025-02-27 NOTE — TELEPHONE ENCOUNTER
I sent something in. Let her know to only take it if she's having symptoms, if she's in the 180s-200s and she's not having symptoms, do not take it. Thank you

## 2025-02-27 NOTE — PROGRESS NOTES
"  [facility]  [address] [facility] [address]  [place] [address]    Subjective     Patient ID: Alicia Valles is a 30 y.o. female who is accessing care through a virtual telemed visit.    Chief Complaint: HTN    HTN - takes HCTZ 25mg daily, losartan 50mg daily, and coreg 25mg daily and 12.5mg in the evening. She has been running in the 150s-180s at home    ROS negative unless stated above       Objective     No vitals obtained d/t telemed visit    Limited physical exam d/t telemed visit but what could be obtained is listed below:    General: NAD, appears stated age, not ill appearing  Respiratory: Not in respiratory distress, no tachypnea  Psych: AOx3, normal affect, thought pattern, and judgement    Wt Readings from Last 3 Encounters:   12/19/24 1511 52.9 kg (116 lb 8.2 oz)   06/03/24 1202 49.5 kg (109 lb 2 oz)   09/29/23 0928 49.3 kg (108 lb 12.8 oz)        Current Outpatient Medications   Medication Instructions    amitriptyline (ELAVIL) 25 mg, Oral, Nightly    BD ULTRA-FINE LIGIA PEN NEEDLE 32 gauge x 5/32" Ndle To inject insulin 4-6 times daily.    blood-glucose meter,continuous (DEXCOM G6 ) Misc 1 Device, Misc.(Non-Drug; Combo Route), Once    blood-glucose sensor (DEXCOM G6 SENSOR) Sandrine 3 Devices, Misc.(Non-Drug; Combo Route), Every 30 days    blood-glucose transmitter (DEXCOM G6 TRANSMITTER) Sandrine 2 Devices, Misc.(Non-Drug; Combo Route), Every 6 months    buPROPion (WELLBUTRIN XL) 300 mg, Every morning    butalbital-acetaminophen-caff -40 mg Cap 1 capsule, Every 4 hours    carvediloL (COREG) 25 mg, Oral, 2 times daily with meals    FARXIGA 10 mg, Daily    GLUCAGEN HYPOKIT 1 mg SolR USE AS DIRECTED FOR HYPOGLYCEMIA INJECTION AS NEEDED    hydroCHLOROthiazide (HYDRODIURIL) 25 mg, Oral, Daily    insulin aspart U-100 (NOVOLOG) 20 Units, Subcutaneous, 3 times daily with meals    insulin detemir U-100 (LEVEMIR FLEXTOUCH U-100 INSULN) 100 unit/mL " (3 mL) InPn pen Inject 5 Units into the skin once daily AND 6 Units every evening. 10 units in the A.M and 8 units Nightly.    insulin pump cart,cont inf,RF (OMNIPOD CLASSIC PODS, GEN 3, SUBQ) Inject into the skin.    metoclopramide HCl (REGLAN) 10 mg, Oral, 4 times daily    OMNIPOD 5 G6-G7 PODS, GEN 5, Crtg SMARTSIG:SUB-Q Every 3 Days    promethazine (PHENERGAN) 25 mg, Oral, Every 4 hours PRN    rosuvastatin (CRESTOR) 5 mg, Oral    valsartan (DIOVAN) 320 mg, Oral, Daily           Assessment and Plan     1. Hypertension associated with diabetes  -     carvediloL (COREG) 25 MG tablet; Take 1 tablet (25 mg total) by mouth 2 (two) times daily with meals.  Dispense: 180 tablet; Refill: 1  -     valsartan (DIOVAN) 320 MG tablet; Take 1 tablet (320 mg total) by mouth once daily.  Dispense: 90 tablet; Refill: 1        For HTN, above goal, will stop losartan, start valsartan 320mg, increase coreg to 25mg BID    For T1DM, continue insulin regimen as above         RTC in 1 month w/ PCP    I encouraged the patient to take all medications as prescribed and to keep follow up appointments with their providers. Patient stated they had no other concerns. Questions were invited and answered. Follow up sooner if needed. ED precautions given.    Jt Mcneill MD  02/27/2025 8:12 AM        The patient location is: [place]  The chief complaint leading to consultation is: BP    Visit type: audiovisual    Face to Face time with patient: 11  14 minutes of total time spent on the encounter, which includes face to face time and non-face to face time preparing to see the patient (eg, review of tests), Obtaining and/or reviewing separately obtained history, Documenting clinical information in the electronic or other health record, Independently interpreting results (not separately reported) and communicating results to the patient/family/caregiver, or Care coordination (not separately reported).     Each patient to whom he or she provides  medical services by telemedicine is: (1) informed of the relationship between the physician and patient and the respective role of any other health care provider with respect to management of the patient; and (2) notified that he or she may decline to receive medical services by telemedicine and may withdraw from such care at any time.

## 2025-03-03 ENCOUNTER — TELEPHONE (OUTPATIENT)
Dept: FAMILY MEDICINE | Facility: CLINIC | Age: 30
End: 2025-03-03
Payer: MEDICAID

## 2025-03-03 ENCOUNTER — NURSE TRIAGE (OUTPATIENT)
Dept: ADMINISTRATIVE | Facility: CLINIC | Age: 30
End: 2025-03-03
Payer: MEDICAID

## 2025-03-03 NOTE — TELEPHONE ENCOUNTER
Patient reports she is having high blood pressure readings, today 203/134 , patient also has a headache and does not feel well. She has had elevated readings since her discharge from the hospital. Patient reports compliance with her medications and is frustrated why her BP continues to rise. DIspo provided- go to ER now. Instructed to call back with additional questions or worsening of symptoms. Patient verbalized understanding.     Reason for Disposition   Systolic BP >= 160 OR Diastolic >= 100, and any cardiac (e.g., breathing difficulty, chest pain) or neurologic symptoms (e.g., new-onset blurred or double vision)    Additional Information   Negative: Sounds like a life-threatening emergency to the triager    Protocols used: Blood Pressure - High-A-OH

## 2025-03-03 NOTE — TELEPHONE ENCOUNTER
----- Message from Patricia sent at 3/3/2025  1:47 PM CST -----  Contact: PT  Type:  Sooner Appointment RequestCaller is requesting a sooner appointment.  Caller declined first available appointment listed below.  Caller will not accept being placed on the waitlist and is requesting a message be sent to doctor.Name of Caller:  PTWhen is the first available appointment?  3/10/25Symptoms:  Symptom: High Blood Pressure - Caller ReportsOutcome: Transfer to a nurse or provider NOW!Reason: Severe headacheThe caller accepted this outcome.Would the patient rather a call back or a response via [facility]? Call [facility] Call Back Number:  [phone] Information:  Pt said current prescribed medications are not working. Highest BP reading 203/134 & Lowest 164/118

## 2025-03-05 ENCOUNTER — TELEPHONE (OUTPATIENT)
Dept: FAMILY MEDICINE | Facility: CLINIC | Age: 30
End: 2025-03-05
Payer: MEDICAID

## 2025-03-05 NOTE — TELEPHONE ENCOUNTER
Spoke with Dr. Hyatt, instructions that she could send in Clonidine or patient has go to the ED. Call placed to patient states unable to take Clonidine due to side effects. Instructed patient to go to ED for evaluation. And scheduled patient and follow-up appointment per Dr. Hyatt's orders for 3/10/2025 @ 0745.  Vilma Hyatt,  Call placed to pt due msg left, spoke w/pt states calling due to the side effects she is experiencing from the Valsartan 320 mg tabs. Side effects: nausea, vomiting, diarrhea, fatigue, headaches, and dizziness. States BP is high 202/117 taken 30 mins ago.   Please review and advise.  Thank you.  Signed:  Rod Thrasher LPN  ----- Message from Luis Manuel sent at 3/5/2025  2:58 PM CST -----  Type: Needs Medical AdviceWho Called:  pt Symptoms (please be specific):  How long has patient had these symptoms:  Pharmacy name and phone #:  Best Call Back Number:  [phone] Information: pt is requesting a call back from nurse or Dr Valadez regarding her having side effects from med valsartan (DIOVAN) 320 MG tablet

## 2025-03-10 ENCOUNTER — OFFICE VISIT (OUTPATIENT)
Dept: FAMILY MEDICINE | Facility: CLINIC | Age: 30
End: 2025-03-10
Payer: MEDICAID

## 2025-03-10 VITALS
OXYGEN SATURATION: 99 % | HEIGHT: 61 IN | DIASTOLIC BLOOD PRESSURE: 80 MMHG | HEART RATE: 95 BPM | SYSTOLIC BLOOD PRESSURE: 110 MMHG | BODY MASS INDEX: 23.65 KG/M2 | WEIGHT: 125.25 LBS

## 2025-03-10 DIAGNOSIS — Z78.9 NONSMOKER: ICD-10-CM

## 2025-03-10 DIAGNOSIS — E11.59 HYPERTENSION ASSOCIATED WITH DIABETES: Primary | ICD-10-CM

## 2025-03-10 DIAGNOSIS — I15.2 HYPERTENSION ASSOCIATED WITH DIABETES: Primary | ICD-10-CM

## 2025-03-10 PROCEDURE — 3008F BODY MASS INDEX DOCD: CPT | Mod: CPTII,,, | Performed by: FAMILY MEDICINE

## 2025-03-10 PROCEDURE — 99214 OFFICE O/P EST MOD 30 MIN: CPT | Mod: PBBFAC,PN | Performed by: FAMILY MEDICINE

## 2025-03-10 PROCEDURE — 99999 PR PBB SHADOW E&M-EST. PATIENT-LVL IV: CPT | Mod: PBBFAC,,, | Performed by: FAMILY MEDICINE

## 2025-03-10 PROCEDURE — 1159F MED LIST DOCD IN RCRD: CPT | Mod: CPTII,,, | Performed by: FAMILY MEDICINE

## 2025-03-10 PROCEDURE — 4010F ACE/ARB THERAPY RXD/TAKEN: CPT | Mod: CPTII,,, | Performed by: FAMILY MEDICINE

## 2025-03-10 PROCEDURE — 3079F DIAST BP 80-89 MM HG: CPT | Mod: CPTII,,, | Performed by: FAMILY MEDICINE

## 2025-03-10 PROCEDURE — 99214 OFFICE O/P EST MOD 30 MIN: CPT | Mod: S$PBB,,, | Performed by: FAMILY MEDICINE

## 2025-03-10 PROCEDURE — 3074F SYST BP LT 130 MM HG: CPT | Mod: CPTII,,, | Performed by: FAMILY MEDICINE

## 2025-03-10 RX ORDER — AMITRIPTYLINE HYDROCHLORIDE 25 MG/1
25 TABLET, FILM COATED ORAL NIGHTLY
Qty: 90 TABLET | Refills: 1 | Status: SHIPPED | OUTPATIENT
Start: 2025-03-10 | End: 2026-03-10

## 2025-03-10 RX ORDER — LOSARTAN POTASSIUM 50 MG/1
50 TABLET ORAL DAILY
Qty: 90 TABLET | Refills: 3 | Status: SHIPPED | OUTPATIENT
Start: 2025-03-10 | End: 2026-03-10

## 2025-03-10 NOTE — PROGRESS NOTES
Subjective     Patient ID: Alicia Valles is a 30 y.o. female.    Chief Complaint: Hypertension    HTN:  States currently taking Coreg 25 mg twice a day, hydrochlorothiazide daily and she took a hydralazine 25 mg this morning because her blood pressure was over 140/90.  States she was on losartan 50 but it was discontinued by 1 of our providers at an urgent care visit.  States she tried the valsartan but did not tolerated side effects so discontinued the medication.  Patient has blood pressure cuff at home checks blood pressure regularly.  Patient also follows with Cardiology    Diabetes: Following with endocrinology states her sugars are down and she has had she thinks a recent hemoglobin A1c drawn.  Overdue for eye and foot exam.    Health maintenance: Still overdue for Pap      Review of Systems   Constitutional:  Negative for activity change, appetite change, chills, diaphoresis, fatigue, fever and unexpected weight change.   Respiratory:  Negative for cough, choking and chest tightness.    Cardiovascular:  Negative for chest pain, palpitations, leg swelling and claudication.   Gastrointestinal:  Negative for abdominal pain, change in bowel habit, constipation, diarrhea, nausea and vomiting.   Psychiatric/Behavioral:  Negative for dysphoric mood, self-injury, sleep disturbance and suicidal ideas. The patient is not nervous/anxious.           Objective     Physical Exam  Vitals reviewed.   Constitutional:       General: She is not in acute distress.     Appearance: Normal appearance. She is not ill-appearing or toxic-appearing.   Cardiovascular:      Rate and Rhythm: Normal rate.   Pulmonary:      Effort: Pulmonary effort is normal. No respiratory distress.   Neurological:      General: No focal deficit present.      Mental Status: She is alert and oriented to person, place, and time.   Psychiatric:         Mood and Affect: Mood normal.         Behavior: Behavior normal.            Assessment and Plan     1.  Hypertension associated with diabetes    2. Nonsmoker    Other orders  -     losartan (COZAAR) 50 MG tablet; Take 1 tablet (50 mg total) by mouth once daily. Cancel valsartan rx pt did not tolerate  Dispense: 90 tablet; Refill: 3  -     amitriptyline (ELAVIL) 25 MG tablet; Take 1 tablet (25 mg total) by mouth every evening.  Dispense: 90 tablet; Refill: 1      Prior cardiology note reviewed in Care everywhere   Prior urgent care notes reviewed in chart as well   We will try to obtain most recent labs from endocrinology  Advised patient that blood pressure is controlled in office today we will back to taking carvedilol twice a day, hydrochlorothiazide 25 mg once a day and losartan 50 mg daily advised to monitor blood pressure and if over 140/90 we will then increase losartan to 100 mg and then possibly resume nifedipine and/or hydralazine.  Advised to continue to follow with specialists  Risks, benefits, and side effects were discussed with the patient. All questions were answered to the fullest satisfaction of the patient, and pt verbalized understanding and agreement to treatment plan. Pt was to call with any new or worsening symptoms, or present to the ER.  RTC for pap        Giovanna Hyatt MD  Family Medicine Physician   [facility]     This note was created using [employer] voice recognition software that occasionally may misinterpret phrases or words.

## 2025-03-13 ENCOUNTER — PATIENT OUTREACH (OUTPATIENT)
Dept: ADMINISTRATIVE | Facility: HOSPITAL | Age: 30
End: 2025-03-13
Payer: MEDICAID

## 2025-03-13 NOTE — PROGRESS NOTES
Population Health Chart Review & Patient Outreach Details      Additional Pop Health Notes:               Updates Requested / Reviewed:      Updated Care Coordination Note and External Sources: [facility] and [facility]         Health Maintenance Topics Overdue:      VB Score: 3     Cervical Cancer Screening  Eye Exam  Foot Exam                       Health Maintenance Topic(s) Outreach Outcomes & Actions Taken:    Lab(s) - Outreach Outcomes & Actions Taken  : External Records Requested & Care Team Updated if Applicable

## 2025-03-13 NOTE — LETTER
AUTHORIZATION FOR RELEASE OF   CONFIDENTIAL INFORMATION    Dear [facility] Medical Records,    We are seeing Alicia Valles, date of birth 1995, in the clinic at [facility]. Giovanna Hyatt MD is the patient's PCP. Alicia Valles has an outstanding lab/procedure at the time we reviewed her chart. In order to help keep her health information updated, she has authorized us to request the following medical record(s):        (  )  MAMMOGRAM                                      (  )  COLONOSCOPY      (  )  PAP SMEAR                                          (  )  OUTSIDE LAB RESULTS     (  )  DEXA SCAN                                          (  )  EYE EXAM            (  )  FOOT EXAM                                          (  )  ENTIRE RECORD     (  )  OUTSIDE IMMUNIZATIONS                 ( X ) HEMOGLOBIN A1C         Please fax records to [facility], Green, Cierra C., MD at [phone]    Thanks so much and have a great day!    Radha Leal LPN [facility]  [address] [address]  P- [phone]  - [phone]           Patient Name: Alicia Valles  : 1995  Patient Phone #: [phone]

## 2025-04-07 ENCOUNTER — PATIENT MESSAGE (OUTPATIENT)
Dept: FAMILY MEDICINE | Facility: CLINIC | Age: 30
End: 2025-04-07
Payer: MEDICAID

## 2025-04-10 ENCOUNTER — PATIENT MESSAGE (OUTPATIENT)
Dept: FAMILY MEDICINE | Facility: CLINIC | Age: 30
End: 2025-04-10
Payer: MEDICAID

## 2025-04-14 LAB
ANION GAP SERPL CALC-SCNC: 9 MMOL/L (ref ?–30)
BUN SERPL-MCNC: 54 MG/DL
BUN/CREAT SERPL: 14
CALCIUM SERPL-MCNC: 8.2 MG/DL
CHLORIDE SERPL-SCNC: 105 MMOL/L (ref 99–108)
CO2 SERPL-SCNC: 17 MMOL/L
CREAT SERPL-MCNC: 4 MG/DL
EGFR: 15
GLUCOSE SERPL-MCNC: 423 MG/DL
HBA1C MFR BLD: 8.2 % (ref 4–6)
POTASSIUM SERPL-SCNC: 5.1 MMOL/L (ref 3.4–5.3)
SODIUM BLD-SCNC: 131 MMOL/L (ref 137–147)

## 2025-04-25 ENCOUNTER — PATIENT MESSAGE (OUTPATIENT)
Dept: ADMINISTRATIVE | Facility: HOSPITAL | Age: 30
End: 2025-04-25
Payer: MEDICAID

## 2025-04-27 RX ORDER — HYDROCHLOROTHIAZIDE 25 MG/1
25 TABLET ORAL DAILY
Qty: 90 TABLET | Refills: 0 | Status: SHIPPED | OUTPATIENT
Start: 2025-04-27

## 2025-04-29 ENCOUNTER — PATIENT OUTREACH (OUTPATIENT)
Dept: ADMINISTRATIVE | Facility: HOSPITAL | Age: 30
End: 2025-04-29
Payer: MEDICAID

## 2025-04-29 NOTE — LETTER
AUTHORIZATION FOR RELEASE OF   CONFIDENTIAL INFORMATION    Dear [facility] Medical Records,    We are seeing Alicia Valles, date of birth 1995, in the clinic at [facility]. Giovanna Hyatt MD is the patient's PCP. Alicia Valles has an outstanding lab/procedure at the time we reviewed her chart. In order to help keep her health information updated, she has authorized us to request the following medical record(s):        (  )  MAMMOGRAM                                      (  )  COLONOSCOPY      (  )  PAP SMEAR                                          (  )  OUTSIDE LAB RESULTS     (  )  DEXA SCAN                                          (  )  EYE EXAM            (  )  FOOT EXAM                                          (  )  ENTIRE RECORD     (  )  OUTSIDE IMMUNIZATIONS                 ( X )  HEMOGLOBIN A1C/LABS         Please fax records to [facility] Green, Cierra C., MD at [phone]    Thanks so much and have a great day!    Rdaha Leal LPN [facility]  [address] [place] [address]  - [phone]  - [phone]           Patient Name: Alicia Valles  : 1995  Patient Phone #: [phone]

## 2025-04-29 NOTE — PROGRESS NOTES
Population Health Chart Review & Patient Outreach Details      Additional Pop Health Notes:               Updates Requested / Reviewed:      Updated Care Coordination Note and Care Everywhere         Health Maintenance Topics Overdue:      VB Score: 4     Cervical Cancer Screening  Eye Exam  Hemoglobin A1c  Foot Exam                       Health Maintenance Topic(s) Outreach Outcomes & Actions Taken:    Lab(s) - Outreach Outcomes & Actions Taken  : External Records Requested & Care Team Updated if Applicable

## 2025-04-30 ENCOUNTER — PATIENT MESSAGE (OUTPATIENT)
Dept: OBSTETRICS AND GYNECOLOGY | Facility: CLINIC | Age: 30
End: 2025-04-30
Payer: MEDICAID

## 2025-04-30 ENCOUNTER — PATIENT OUTREACH (OUTPATIENT)
Dept: ADMINISTRATIVE | Facility: HOSPITAL | Age: 30
End: 2025-04-30
Payer: MEDICAID

## 2025-05-12 ENCOUNTER — TELEPHONE (OUTPATIENT)
Dept: FAMILY MEDICINE | Facility: CLINIC | Age: 30
End: 2025-05-12
Payer: MEDICAID

## 2025-05-12 NOTE — LETTER
May 12, 2025    Alicia Valles  [address]  [place] [address]             [facility]  [address] [place] [address]  Phone: [phone] Dear Ms. Alicia Valles:    We are sorry that you missed your appointment with Dr. Hyatt on 5/12/2025. Your health and follow-up medical care are important to us. Please call our office as soon as possible so that we may reschedule your appointment. If you have already rescheduled your appointment, please disregard this letter.    Sincerely,      Dr. Hyatt and Staff

## 2025-06-26 ENCOUNTER — TELEPHONE (OUTPATIENT)
Dept: TRANSPLANT | Facility: CLINIC | Age: 30
End: 2025-06-26
Payer: MEDICAID

## 2025-07-14 ENCOUNTER — TELEPHONE (OUTPATIENT)
Dept: FAMILY MEDICINE | Facility: CLINIC | Age: 30
End: 2025-07-14
Payer: MEDICAID

## 2025-07-23 ENCOUNTER — OFFICE VISIT (OUTPATIENT)
Dept: FAMILY MEDICINE | Facility: CLINIC | Age: 30
End: 2025-07-23
Payer: MEDICAID

## 2025-07-23 VITALS
BODY MASS INDEX: 26.36 KG/M2 | RESPIRATION RATE: 16 BRPM | DIASTOLIC BLOOD PRESSURE: 88 MMHG | WEIGHT: 139.63 LBS | HEIGHT: 61 IN | OXYGEN SATURATION: 98 % | HEART RATE: 104 BPM | SYSTOLIC BLOOD PRESSURE: 138 MMHG

## 2025-07-23 DIAGNOSIS — I10 PRIMARY HYPERTENSION: ICD-10-CM

## 2025-07-23 DIAGNOSIS — E10.69 TYPE 1 DIABETES MELLITUS WITH OTHER SPECIFIED COMPLICATION: Primary | ICD-10-CM

## 2025-07-23 DIAGNOSIS — Z78.9 NONSMOKER: ICD-10-CM

## 2025-07-23 DIAGNOSIS — E11.59 HYPERTENSION ASSOCIATED WITH DIABETES: ICD-10-CM

## 2025-07-23 DIAGNOSIS — I15.2 HYPERTENSION ASSOCIATED WITH DIABETES: ICD-10-CM

## 2025-07-23 PROCEDURE — 99999 PR PBB SHADOW E&M-EST. PATIENT-LVL IV: CPT | Mod: PBBFAC,,, | Performed by: FAMILY MEDICINE

## 2025-07-23 PROCEDURE — 99214 OFFICE O/P EST MOD 30 MIN: CPT | Mod: PBBFAC,PN | Performed by: FAMILY MEDICINE

## 2025-07-23 PROCEDURE — 1159F MED LIST DOCD IN RCRD: CPT | Mod: CPTII,,, | Performed by: FAMILY MEDICINE

## 2025-07-23 PROCEDURE — 3052F HG A1C>EQUAL 8.0%<EQUAL 9.0%: CPT | Mod: CPTII,,, | Performed by: FAMILY MEDICINE

## 2025-07-23 PROCEDURE — 4010F ACE/ARB THERAPY RXD/TAKEN: CPT | Mod: CPTII,,, | Performed by: FAMILY MEDICINE

## 2025-07-23 PROCEDURE — 3008F BODY MASS INDEX DOCD: CPT | Mod: CPTII,,, | Performed by: FAMILY MEDICINE

## 2025-07-23 PROCEDURE — 99214 OFFICE O/P EST MOD 30 MIN: CPT | Mod: S$PBB,,, | Performed by: FAMILY MEDICINE

## 2025-07-23 PROCEDURE — 3075F SYST BP GE 130 - 139MM HG: CPT | Mod: CPTII,,, | Performed by: FAMILY MEDICINE

## 2025-07-23 PROCEDURE — 3079F DIAST BP 80-89 MM HG: CPT | Mod: CPTII,,, | Performed by: FAMILY MEDICINE

## 2025-07-23 RX ORDER — LOSARTAN POTASSIUM 50 MG/1
100 TABLET ORAL DAILY
Start: 2025-07-23 | End: 2026-07-23

## 2025-07-23 RX ORDER — HYDRALAZINE HYDROCHLORIDE 25 MG/1
25 TABLET, FILM COATED ORAL EVERY 8 HOURS
Start: 2025-07-23 | End: 2026-07-23

## 2025-07-23 RX ORDER — AMLODIPINE BESYLATE 5 MG/1
10 TABLET ORAL DAILY
COMMUNITY

## 2025-07-23 NOTE — PROGRESS NOTES
Subjective     Patient ID: Alicia Valles is a 30 y.o. female.    Chief Complaint: Hospital Follow Up (Went to the hospital on 7/11/2025 and 7/12 - 7/16 for DKA and hypertension )    Hospital follow up    Admitted to [facility] for DKA.  Patient states she has to schedule follow up appointment with endocrinology.  Following with Nephrology.  States they have made adjustments to her blood pressure medication.  Patient states she is still overdue for diabetic eye exam and Pap smear.  Patient states her ankles have been swelling since Nephrology stopped her hydrochlorothiazide, scheduled her hydralazine TID and put her on Norvasc      Review of Systems   Constitutional:  Negative for activity change, appetite change, chills, diaphoresis, fatigue, fever and unexpected weight change.   Respiratory:  Negative for cough, choking and chest tightness.    Cardiovascular:  Negative for chest pain, palpitations, leg swelling and claudication.   Gastrointestinal:  Negative for abdominal pain, change in bowel habit, constipation, diarrhea, nausea and vomiting.   Psychiatric/Behavioral:  Negative for dysphoric mood, self-injury, sleep disturbance and suicidal ideas. The patient is not nervous/anxious.           Objective     Physical Exam  Vitals reviewed.   Constitutional:       General: She is not in acute distress.     Appearance: Normal appearance. She is not ill-appearing or toxic-appearing.   Cardiovascular:      Rate and Rhythm: Normal rate.   Pulmonary:      Effort: Pulmonary effort is normal. No respiratory distress.   Musculoskeletal:      Right lower leg: Edema (1+) present.      Left lower leg: Edema (1+) present.   Neurological:      General: No focal deficit present.      Mental Status: She is alert and oriented to person, place, and time.   Psychiatric:         Mood and Affect: Mood normal.         Behavior: Behavior normal.            Assessment and Plan     1. Type 1 diabetes mellitus with other specified  complication    2. Hypertension associated with diabetes  -     hydrALAZINE (APRESOLINE) 25 MG tablet; Take 1 tablet (25 mg total) by mouth every 8 (eight) hours.    3. Primary hypertension    4. Nonsmoker    Other orders  -     losartan (COZAAR) 50 MG tablet; Take 2 tablets (100 mg total) by mouth once daily. Cancel valsartan rx pt did not tolerate      Diabetes: Uncontrolled we will have patient sign release of records so that we can get documents back from endocrinology.  Advised patient to follow up with them for management of diabetes.  Advised to schedule diabetic eye exam   Hypertension: Controlled at this time advised patient that Norvasc can cause swelling of the ankles but I will defer management and adjustment of medications per Nephrology continue hydralazine losartan Coreg  Advised to schedule Pap  Labs reviewed in Care everywhere from recent hospitalization hemoglobin A1c 8.6  Risks, benefits, and side effects were discussed with the patient. All questions were answered to the fullest satisfaction of the patient, and pt verbalized understanding and agreement to treatment plan. Pt was to call with any new or worsening symptoms, or present to the ER.         Giovanna Hyatt MD  Family Medicine Physician   [facility]     Total time spent 30 minutes    This note was created using [employer] voice recognition software that occasionally may misinterpret phrases or words.

## 2025-07-24 ENCOUNTER — TELEPHONE (OUTPATIENT)
Dept: FAMILY MEDICINE | Facility: CLINIC | Age: 30
End: 2025-07-24
Payer: MEDICAID

## 2025-07-24 NOTE — TELEPHONE ENCOUNTER
Vilma Hyatt's Staff,  Please review this message from this patient states returning a call from staff and asked for Ms. Reddy.  Rod Thrasher LPN  Copied from CRM #[id number]. Topic: General Inquiry - Return Call  >> Jul 24, 2025  2:11 PM Ruma wrote:  Type:  Patient Returning Call    Who Called:Pt   Who Left Message for Patient: Rod   Does the patient know what this is regarding?:NA  Would the patient rather a call back or a response via [facility]? Call Back  Best Call Back Number: [phone]   Additional Information: Pt returning a phone call to the office if patient misses call again can a message be left inside of portal

## 2025-07-24 NOTE — TELEPHONE ENCOUNTER
Copied from CRM #[id number]. Topic: General Inquiry - Return Call  >> Jul 24, 2025 10:32 AM Shwetha Melgar wrote:  Type:  Patient Returning Call    Who Called:pt  Who Left Message for Patient: Janett  Does the patient know what this is regarding?:no   Would the patient rather a call back or a response via [facility]? Call   Best Call Back Number:[phone] (home)   Additional Information:

## 2025-07-28 ENCOUNTER — PATIENT MESSAGE (OUTPATIENT)
Dept: FAMILY MEDICINE | Facility: CLINIC | Age: 30
End: 2025-07-28
Payer: MEDICAID

## 2025-07-28 DIAGNOSIS — I15.2 HYPERTENSION ASSOCIATED WITH DIABETES: ICD-10-CM

## 2025-07-28 DIAGNOSIS — E11.59 HYPERTENSION ASSOCIATED WITH DIABETES: ICD-10-CM

## 2025-07-28 RX ORDER — HYDRALAZINE HYDROCHLORIDE 25 MG/1
25 TABLET, FILM COATED ORAL EVERY 8 HOURS
Start: 2025-07-28 | End: 2026-07-28

## 2025-07-28 NOTE — TELEPHONE ENCOUNTER
Vilma Hyatt,  The Rx for the Hydralazine 25 mg did not print and patient is requesting this Rx be sent to [facility]  Last office visit: 7/23/25  Rod Thrasher LPN    Copied from CRM #[id number]. Topic: Medications - Medication Refill  >> Jul 28, 2025  2:20 PM Roxann wrote:  Type:  RX Refill Request    Who Called:  Pt   Refill or New Rx:  refill  RX Name and Strength:  hydrALAZINE (APRESOLINE) 25 MG tablet   How is the patient currently taking it? (ex. 1XDay):  50mgs every 8 hrs   Is this a 30 day or 90 day RX:  30  Preferred Pharmacy with phone number:    [id number] - [place], [place] - [address]  [address]  [place] [address]  Phone: [phone] Fax: [phone]        Local or Mail Order:  Local   Ordering Provider:  Edmar Mckeon Call Back Number:  [phone]    Additional Information:  Pls call back and advise

## 2025-07-29 DIAGNOSIS — E11.59 HYPERTENSION ASSOCIATED WITH DIABETES: ICD-10-CM

## 2025-07-29 DIAGNOSIS — I15.2 HYPERTENSION ASSOCIATED WITH DIABETES: ICD-10-CM

## 2025-07-29 RX ORDER — HYDRALAZINE HYDROCHLORIDE 25 MG/1
50 TABLET, FILM COATED ORAL EVERY 8 HOURS
Qty: 180 TABLET | Refills: 0 | OUTPATIENT
Start: 2025-07-29 | End: 2025-08-28

## 2025-07-29 RX ORDER — HYDRALAZINE HYDROCHLORIDE 25 MG/1
25 TABLET, FILM COATED ORAL EVERY 8 HOURS
Status: CANCELLED
Start: 2025-07-29 | End: 2026-07-29

## 2025-07-29 NOTE — TELEPHONE ENCOUNTER
" Refill Decision Note   Mishelmelvin Valles  is requesting a refill authorization.  Brief Assessment and Rationale for Refill:  Quick Discontinue     Medication Therapy Plan:  PCP refused med stating "nephrology" is managing BP but no provider found. Possibly "care everywhere"      Comments:     Note composed:1:23 PM 07/29/2025           "

## 2025-07-29 NOTE — TELEPHONE ENCOUNTER
Copied from CRM #[id number]. Topic: Medications - Medication Refill  >> Jul 29, 2025  4:12 PM Isaac wrote:  Type:  RX Refill Request    Who Called: Pt   Refill or New Rx:Refill  RX Name and Strength: hydrALAZINE (APRESOLINE) 25 MG tablet - - 7/23/2025 7/23/2026   Sig - Route: Take 1 tablet (25 mg total) by mouth every 8 (eight) hours. - Oral   Class: No Print   Notes to Pharmacy: .     How is the patient currently taking it? (ex. 1XDay):See above  Is this a 30 day or 90 day RX:See above   Preferred Pharmacy with phone number:  [id number] - [place], [place] - [address]  [address]  [place] [address]  Phone: [phone] Fax: [phone]  Local or Mail Order:local   Ordering Provider:Giovanna Hyatt MD    Would the patient rather a call back or a response via [facility]? Call   Best Call Back Number:[phone]    Additional Information: Pt states her pressure is extremely high and needs rx today or she will have to go to the hospital. Pt states that she been requesting refill for sometime now. Pt is suffering from headaches. Pt dosage change to 2 tablets every 8 hours by kidney doctor, please update rx and refill. Please call pt if rx is not going to be refill

## 2025-07-30 NOTE — TELEPHONE ENCOUNTER
Call placed to pt due to Dr. Hyatt's orders, spoke w/pt states she contacted nephrology but because they did not prescribe this Rx. Pt states is all out of medication Hydralazine 50 mg TID was increased by nephrologist a few days ago. Informed pt nephrologist has to send that Rx to pharmacy due to changing dosage.        Copied from CRM #[id number]. Topic: Medications - Medication Status Check   >> Jul 30, 2025  8:41 AM Jennifer wrote:  Type: Needs Medical Advice  Who Called:  Pt    Best Call Back Number: [phone]    Additional Information:Pt  says shes been trying to get her hydralazine refilled for days now with no contact from anyone on why its not being called in she's said she doesn't feel god at all without taking it and she's said if she cant get it shes going to have to go to the hospital so she needs to have someone call and give her some insight on why her requests being denied

## 2025-08-04 ENCOUNTER — TELEPHONE (OUTPATIENT)
Dept: FAMILY MEDICINE | Facility: CLINIC | Age: 30
End: 2025-08-04
Payer: MEDICAID

## 2025-08-04 NOTE — TELEPHONE ENCOUNTER
Copied from CRM #[id number]. Topic: General Inquiry - Patient Advice  >> Aug 1, 2025  2:10 PM Annie wrote:  Type:  Needs Medical Advice    Who Called: pt  Symptoms (please be specific):    How long has patient had these symptoms:    Pharmacy name and phone #:    Would the patient rather a call back or a response via [facility]? call  Best Call Back Number: [phone]    Additional Information: pt states she needs an emergency rx until Monday pt is in pain and would like to speak with a nurse

## 2025-08-05 ENCOUNTER — TELEPHONE (OUTPATIENT)
Dept: FAMILY MEDICINE | Facility: CLINIC | Age: 30
End: 2025-08-05
Payer: MEDICAID

## 2025-08-05 NOTE — TELEPHONE ENCOUNTER
Vilma Hyatt,  Please review this message below from this patient concerning bringing recent kidney function lab.  Pt states will get a copy and bring by today or tomorrow morning. States do not take Gabapentin but Lyrica Rx.   NAVA Reed Cierra C., MD to Me  (Selected Message)        8/6/25  7:58 AM  Please advise patient that I will need to her most recent kidney function in order to prescribe the gabapentin for leg pain otherwise she would need a follow up appointment with me for further evaluation.  If she can contact her nephrology group to tell me what her most recent can you function is can then send gabapentin to the pharmacy.  Sincerely,  Dr. Edmar Hyatt,  Please review this message below from this patient.  Spoke w/pt states calling requesting can a Rx for pain be sent to the pharmacy. C/o pain all over especially in both legs.   [facility]  Please review  Last office visit: 7/23/25  Rod Thrasher LPN    Copied from CRM #[id number]. Topic: General Inquiry - Return Call  >> Aug 4, 2025  3:03 PM Lizette wrote:  Type:  Patient Returning Call    Who Called:pt  Who Left Message for Patient:nurse  Does the patient know what this is regarding?:Yes  Would the patient rather a call back or a response via [facility]? call  Best Call Back Number:[phone]   Additional Information:

## 2025-08-22 ENCOUNTER — TELEPHONE (OUTPATIENT)
Dept: FAMILY MEDICINE | Facility: CLINIC | Age: 30
End: 2025-08-22
Payer: MEDICAID

## 2025-08-22 ENCOUNTER — PATIENT MESSAGE (OUTPATIENT)
Dept: FAMILY MEDICINE | Facility: CLINIC | Age: 30
End: 2025-08-22
Payer: MEDICAID

## 2025-08-22 DIAGNOSIS — M79.605 BILATERAL LEG PAIN: Primary | ICD-10-CM

## 2025-08-22 DIAGNOSIS — E10.22 CKD STAGE 4 DUE TO TYPE 1 DIABETES MELLITUS: ICD-10-CM

## 2025-08-22 DIAGNOSIS — N18.4 CKD STAGE 4 DUE TO TYPE 1 DIABETES MELLITUS: ICD-10-CM

## 2025-08-22 DIAGNOSIS — E10.42 POORLY CONTROLLED TYPE 1 DIABETES MELLITUS WITH PERIPHERAL NEUROPATHY: ICD-10-CM

## 2025-08-22 DIAGNOSIS — E10.65 POORLY CONTROLLED TYPE 1 DIABETES MELLITUS WITH PERIPHERAL NEUROPATHY: ICD-10-CM

## 2025-08-22 DIAGNOSIS — E10.69 TYPE 1 DIABETES MELLITUS WITH OTHER SPECIFIED COMPLICATION: ICD-10-CM

## 2025-08-22 DIAGNOSIS — M79.604 BILATERAL LEG PAIN: Primary | ICD-10-CM

## (undated) DEVICE — KIT BIOPSY MISSION 18GX16CM

## (undated) DEVICE — Device

## (undated) DEVICE — STOPCOCK 3 WAY MED PRESSURE

## (undated) DEVICE — KIT PROBE COVER WITH GEL